# Patient Record
Sex: MALE | Race: BLACK OR AFRICAN AMERICAN | NOT HISPANIC OR LATINO | URBAN - METROPOLITAN AREA
[De-identification: names, ages, dates, MRNs, and addresses within clinical notes are randomized per-mention and may not be internally consistent; named-entity substitution may affect disease eponyms.]

---

## 2017-12-12 ENCOUNTER — INPATIENT (INPATIENT)
Facility: HOSPITAL | Age: 67
LOS: 15 days | Discharge: HOME | DRG: 871 | End: 2017-12-28
Attending: INTERNAL MEDICINE | Admitting: INTERNAL MEDICINE
Payer: COMMERCIAL

## 2017-12-12 VITALS
RESPIRATION RATE: 26 BRPM | SYSTOLIC BLOOD PRESSURE: 206 MMHG | HEART RATE: 105 BPM | OXYGEN SATURATION: 87 % | DIASTOLIC BLOOD PRESSURE: 108 MMHG

## 2017-12-12 DIAGNOSIS — J96.01 ACUTE RESPIRATORY FAILURE WITH HYPOXIA: ICD-10-CM

## 2017-12-12 LAB
ALBUMIN SERPL ELPH-MCNC: 4.7 G/DL — SIGNIFICANT CHANGE UP (ref 3.3–5)
ALP SERPL-CCNC: 67 U/L — SIGNIFICANT CHANGE UP (ref 40–120)
ALT FLD-CCNC: 17 U/L RC — SIGNIFICANT CHANGE UP (ref 10–45)
ANION GAP SERPL CALC-SCNC: 21 MMOL/L — HIGH (ref 5–17)
APTT BLD: 34.7 SEC — SIGNIFICANT CHANGE UP (ref 27.5–37.4)
AST SERPL-CCNC: 30 U/L — SIGNIFICANT CHANGE UP (ref 10–40)
BASE EXCESS BLDV CALC-SCNC: 2.3 MMOL/L — HIGH (ref -2–2)
BASOPHILS # BLD AUTO: 0 K/UL — SIGNIFICANT CHANGE UP (ref 0–0.2)
BASOPHILS NFR BLD AUTO: 0.4 % — SIGNIFICANT CHANGE UP (ref 0–2)
BILIRUB SERPL-MCNC: 0.8 MG/DL — SIGNIFICANT CHANGE UP (ref 0.2–1.2)
BUN SERPL-MCNC: 15 MG/DL — SIGNIFICANT CHANGE UP (ref 7–23)
CA-I SERPL-SCNC: 1.15 MMOL/L — SIGNIFICANT CHANGE UP (ref 1.12–1.3)
CALCIUM SERPL-MCNC: 9.1 MG/DL — SIGNIFICANT CHANGE UP (ref 8.4–10.5)
CHLORIDE BLDV-SCNC: 90 MMOL/L — LOW (ref 96–108)
CHLORIDE SERPL-SCNC: 86 MMOL/L — LOW (ref 96–108)
CO2 BLDV-SCNC: 30 MMOL/L — SIGNIFICANT CHANGE UP (ref 22–30)
CO2 SERPL-SCNC: 23 MMOL/L — SIGNIFICANT CHANGE UP (ref 22–31)
CREAT SERPL-MCNC: 1.09 MG/DL — SIGNIFICANT CHANGE UP (ref 0.5–1.3)
EOSINOPHIL # BLD AUTO: 0 K/UL — SIGNIFICANT CHANGE UP (ref 0–0.5)
EOSINOPHIL NFR BLD AUTO: 0.1 % — SIGNIFICANT CHANGE UP (ref 0–6)
GAS PNL BLDV: 128 MMOL/L — LOW (ref 136–145)
GAS PNL BLDV: SIGNIFICANT CHANGE UP
GAS PNL BLDV: SIGNIFICANT CHANGE UP
GLUCOSE BLDV-MCNC: 113 MG/DL — HIGH (ref 70–99)
GLUCOSE SERPL-MCNC: 102 MG/DL — HIGH (ref 70–99)
HCO3 BLDV-SCNC: 28 MMOL/L — SIGNIFICANT CHANGE UP (ref 21–29)
HCT VFR BLD CALC: 47 % — SIGNIFICANT CHANGE UP (ref 39–50)
HCT VFR BLDA CALC: 49 % — SIGNIFICANT CHANGE UP (ref 39–50)
HGB BLD CALC-MCNC: 15.9 G/DL — SIGNIFICANT CHANGE UP (ref 13–17)
HGB BLD-MCNC: 15.9 G/DL — SIGNIFICANT CHANGE UP (ref 13–17)
INR BLD: 1.14 RATIO — SIGNIFICANT CHANGE UP (ref 0.88–1.16)
LACTATE BLDV-MCNC: 2.3 MMOL/L — HIGH (ref 0.7–2)
LYMPHOCYTES # BLD AUTO: 0.9 K/UL — LOW (ref 1–3.3)
LYMPHOCYTES # BLD AUTO: 21 % — SIGNIFICANT CHANGE UP (ref 13–44)
MCHC RBC-ENTMCNC: 32.4 PG — SIGNIFICANT CHANGE UP (ref 27–34)
MCHC RBC-ENTMCNC: 33.9 GM/DL — SIGNIFICANT CHANGE UP (ref 32–36)
MCV RBC AUTO: 95.4 FL — SIGNIFICANT CHANGE UP (ref 80–100)
MONOCYTES # BLD AUTO: 0.5 K/UL — SIGNIFICANT CHANGE UP (ref 0–0.9)
MONOCYTES NFR BLD AUTO: 10.3 % — SIGNIFICANT CHANGE UP (ref 2–14)
NEUTROPHILS # BLD AUTO: 3 K/UL — SIGNIFICANT CHANGE UP (ref 1.8–7.4)
NEUTROPHILS NFR BLD AUTO: 68.3 % — SIGNIFICANT CHANGE UP (ref 43–77)
PCO2 BLDV: 51 MMHG — HIGH (ref 35–50)
PH BLDV: 7.36 — SIGNIFICANT CHANGE UP (ref 7.35–7.45)
PLATELET # BLD AUTO: 189 K/UL — SIGNIFICANT CHANGE UP (ref 150–400)
PO2 BLDV: 24 MMHG — LOW (ref 25–45)
POTASSIUM BLDV-SCNC: 4.2 MMOL/L — SIGNIFICANT CHANGE UP (ref 3.5–5)
POTASSIUM SERPL-MCNC: 4.2 MMOL/L — SIGNIFICANT CHANGE UP (ref 3.5–5.3)
POTASSIUM SERPL-SCNC: 4.2 MMOL/L — SIGNIFICANT CHANGE UP (ref 3.5–5.3)
PROT SERPL-MCNC: 8.4 G/DL — HIGH (ref 6–8.3)
PROTHROM AB SERPL-ACNC: 12.5 SEC — SIGNIFICANT CHANGE UP (ref 9.8–12.7)
RBC # BLD: 4.93 M/UL — SIGNIFICANT CHANGE UP (ref 4.2–5.8)
RBC # FLD: 12.5 % — SIGNIFICANT CHANGE UP (ref 10.3–14.5)
SAO2 % BLDV: 33 % — LOW (ref 67–88)
SODIUM SERPL-SCNC: 130 MMOL/L — LOW (ref 135–145)
WBC # BLD: 4.4 K/UL — SIGNIFICANT CHANGE UP (ref 3.8–10.5)
WBC # FLD AUTO: 4.4 K/UL — SIGNIFICANT CHANGE UP (ref 3.8–10.5)

## 2017-12-12 PROCEDURE — 71010: CPT | Mod: 26

## 2017-12-12 PROCEDURE — 71275 CT ANGIOGRAPHY CHEST: CPT | Mod: 26

## 2017-12-12 PROCEDURE — 99291 CRITICAL CARE FIRST HOUR: CPT

## 2017-12-12 RX ORDER — AZITHROMYCIN 500 MG/1
500 TABLET, FILM COATED ORAL ONCE
Qty: 0 | Refills: 0 | Status: COMPLETED | OUTPATIENT
Start: 2017-12-12 | End: 2017-12-12

## 2017-12-12 RX ORDER — CEFTRIAXONE 500 MG/1
1 INJECTION, POWDER, FOR SOLUTION INTRAMUSCULAR; INTRAVENOUS ONCE
Qty: 0 | Refills: 0 | Status: COMPLETED | OUTPATIENT
Start: 2017-12-12 | End: 2017-12-13

## 2017-12-12 RX ORDER — HEPARIN SODIUM 5000 [USP'U]/ML
5000 INJECTION INTRAVENOUS; SUBCUTANEOUS EVERY 8 HOURS
Qty: 0 | Refills: 0 | Status: DISCONTINUED | OUTPATIENT
Start: 2017-12-12 | End: 2017-12-18

## 2017-12-12 RX ORDER — ALBUTEROL 90 UG/1
1 AEROSOL, METERED ORAL EVERY 4 HOURS
Qty: 0 | Refills: 0 | Status: DISCONTINUED | OUTPATIENT
Start: 2017-12-12 | End: 2017-12-18

## 2017-12-12 RX ORDER — NITROGLYCERIN 6.5 MG
0.4 CAPSULE, EXTENDED RELEASE ORAL ONCE
Qty: 0 | Refills: 0 | Status: COMPLETED | OUTPATIENT
Start: 2017-12-12 | End: 2017-12-12

## 2017-12-12 RX ORDER — TIOTROPIUM BROMIDE 18 UG/1
1 CAPSULE ORAL; RESPIRATORY (INHALATION) DAILY
Qty: 0 | Refills: 0 | Status: DISCONTINUED | OUTPATIENT
Start: 2017-12-12 | End: 2017-12-18

## 2017-12-12 RX ORDER — IPRATROPIUM/ALBUTEROL SULFATE 18-103MCG
3 AEROSOL WITH ADAPTER (GRAM) INHALATION EVERY 6 HOURS
Qty: 0 | Refills: 0 | Status: DISCONTINUED | OUTPATIENT
Start: 2017-12-12 | End: 2017-12-18

## 2017-12-12 RX ORDER — AZITHROMYCIN 500 MG/1
500 TABLET, FILM COATED ORAL EVERY 24 HOURS
Qty: 0 | Refills: 0 | Status: DISCONTINUED | OUTPATIENT
Start: 2017-12-12 | End: 2017-12-18

## 2017-12-12 RX ORDER — SODIUM CHLORIDE 9 MG/ML
3 INJECTION INTRAMUSCULAR; INTRAVENOUS; SUBCUTANEOUS ONCE
Qty: 0 | Refills: 0 | Status: COMPLETED | OUTPATIENT
Start: 2017-12-12 | End: 2017-12-12

## 2017-12-12 RX ORDER — IPRATROPIUM/ALBUTEROL SULFATE 18-103MCG
3 AEROSOL WITH ADAPTER (GRAM) INHALATION ONCE
Qty: 0 | Refills: 0 | Status: COMPLETED | OUTPATIENT
Start: 2017-12-12 | End: 2017-12-12

## 2017-12-12 RX ORDER — SODIUM CHLORIDE 9 MG/ML
1000 INJECTION, SOLUTION INTRAVENOUS
Qty: 0 | Refills: 0 | Status: DISCONTINUED | OUTPATIENT
Start: 2017-12-12 | End: 2017-12-17

## 2017-12-12 RX ORDER — CEFTRIAXONE 500 MG/1
1 INJECTION, POWDER, FOR SOLUTION INTRAMUSCULAR; INTRAVENOUS EVERY 24 HOURS
Qty: 0 | Refills: 0 | Status: DISCONTINUED | OUTPATIENT
Start: 2017-12-12 | End: 2017-12-18

## 2017-12-12 RX ORDER — FUROSEMIDE 40 MG
40 TABLET ORAL ONCE
Qty: 0 | Refills: 0 | Status: COMPLETED | OUTPATIENT
Start: 2017-12-12 | End: 2017-12-12

## 2017-12-12 RX ADMIN — Medication 80 MILLIGRAM(S): at 18:53

## 2017-12-12 RX ADMIN — Medication 40 MILLIGRAM(S): at 19:05

## 2017-12-12 RX ADMIN — Medication 0.4 MILLIGRAM(S): at 20:53

## 2017-12-12 RX ADMIN — SODIUM CHLORIDE 3 MILLILITER(S): 9 INJECTION INTRAMUSCULAR; INTRAVENOUS; SUBCUTANEOUS at 20:53

## 2017-12-12 RX ADMIN — Medication 3 MILLILITER(S): at 20:54

## 2017-12-12 RX ADMIN — Medication 0.5 MILLIGRAM(S): at 18:50

## 2017-12-12 RX ADMIN — AZITHROMYCIN 250 MILLIGRAM(S): 500 TABLET, FILM COATED ORAL at 19:00

## 2017-12-12 NOTE — ED PROVIDER NOTE - CRITICAL CARE PROVIDED
consult w/ pt's family directly relating to pts condition/additional history taking/consultation with other physicians/interpretation of diagnostic studies/documentation/direct patient care (not related to procedure)

## 2017-12-12 NOTE — ED ADULT TRIAGE NOTE - CHIEF COMPLAINT QUOTE
Pt reports cough, runny nose since Saturday and SOB/difficulty breathing since last night. Pt is tachypneic to 26, respirations labored, SpO2 87% on RA, SpO2 91% on 6L nasal cannula.

## 2017-12-12 NOTE — ED PROVIDER NOTE - ATTENDING CONTRIBUTION TO CARE
Respiratory distress and fever concerning for COPD exacerbation  vs asthma vs pneumonia, less likely acs or pe. Pt in hypoxic respiratory failure. 1) LABS/EKG/CXR/VBG for CO2 level/oxygen prn to maintain sat ~90-95% 2) Nebs/steroids/Abx 3) reassess 4) admit, BiPAP for resp failure, BP control

## 2017-12-12 NOTE — ED PROVIDER NOTE - MEDICAL DECISION MAKING DETAILS
Respiratory distress and fever concerning for COPD exacerbation  vs asthma vs pneumonia, less likely acs or pe. Pt in hypoxic respiratory failure. 1) LABS/EKG/CXR/VBG for CO2 level/oxygen prn to maintain sat ~90-95% 2) Nebs/steroids/Abx 3) reassess 4) admit. Respiratory distress and fever concerning for COPD exacerbation  vs asthma vs pneumonia, less likely acs or pe. Pt in hypoxic respiratory failure. 1) LABS/EKG/CXR/VBG for CO2 level/oxygen prn to maintain sat ~90-95% 2) Nebs/steroids/Abx 3) reassess 4) admit, BiPAP for resp failure, BP control

## 2017-12-12 NOTE — ED ADULT NURSE NOTE - OBJECTIVE STATEMENT
Pt is an ambulatory 67 yr old male a/0 X 3 sent from PMD for diff breathing and sob.  Pt was hypoxic to 67% on room air at doctors office.  PERRl wnl, dum[place] with equal strength.  L:ungs have expiratory wheezes in all fields.  Pt tachpneic, with shallow respirations.  Sinus tachycardia on cm at 110 bpm.  No chest pain.  C/o sob at rest.  No fevers, chills or N/V.  Abdomen NT Nd with BS+4Q.  SKIN WDI.  Peripheral pulses +2bl no edema

## 2017-12-12 NOTE — ED PROVIDER NOTE - OBJECTIVE STATEMENT
67M hx of copd, asthma, htn, presenting from home with sob<< fever non productive cough and wheezing x # days. Sent from [facility] for hypoxic respiratory failure in office with sa02 at 86% and tachycardia a/w hypertension. No travel, no leg swellig, no chest pain, no calf pain, no n/v. 67M hx of copd, asthma, htn, presenting from home with sob<< fever non productive cough and wheezing x # days. Sent from [facility] for hypoxic respiratory failure in office with sa02 at 86% and tachycardia a/w hypertension. No travel, no leg swelling, no chest pain, no calf pain, no n/v. Seen and evaluated patricia arrival. Pt hypoxemic, tachypneic in acute respiratory distress.

## 2017-12-12 NOTE — H&P ADULT - ASSESSMENT
pt with above history p/w hypoxic resp failure likely sec to copd exacerbation                    -  start steroids, nebs, supplemental oxygen , titrate bipap , given fever - check RVP , ct CHEST for ? consolidation urine legionella start ceftriaxone zithromax, pulm eval               - check procalcitonin     Lactic acidosis - iv hydration   dvt prophyalxis

## 2017-12-12 NOTE — H&P ADULT - HISTORY OF PRESENT ILLNESS
67M hx of copd, asthma, htn, presenting from home with shortness of breath, productive cough past 4 days , fevers since yesterday , pt denies chest pain, nausea, vomiting,  palpitations weakness numbness in ext , dysuria polyuria, no sick contacts no recent travel went to pmds office today , found to be hypoxic <90% referred to ed, pt denies orthopnea/ pnd, never was intubated fro asthma exacerbation

## 2017-12-12 NOTE — ED ADULT NURSE NOTE - ED STAT RN HANDOFF DETAILS 2
break coverage: noman montana d/c'd, pt rectal temp noted, fluids infusing as per mds orders.  pt cleaned, turned and positioned, griselda wash provided, diaper placed, will cont to monitor. report given to Jaja PHILIPPE

## 2017-12-12 NOTE — ED ADULT NURSE REASSESSMENT NOTE - NS ED NURSE REASSESS COMMENT FT1
Pt resting comfortably in bed NAD.  VSS.  MD Nichols aware patient remains tachypneic with RR 26-32.  Lung wheezes have improved although still mild on expiration.  NSR on cm at 87 bpm.  Pending results of CT scan

## 2017-12-13 LAB
ALBUMIN SERPL ELPH-MCNC: 4.3 G/DL — SIGNIFICANT CHANGE UP (ref 3.3–5)
ALP SERPL-CCNC: 60 U/L — SIGNIFICANT CHANGE UP (ref 40–120)
ALT FLD-CCNC: 15 U/L RC — SIGNIFICANT CHANGE UP (ref 10–45)
ANION GAP SERPL CALC-SCNC: 19 MMOL/L — HIGH (ref 5–17)
APPEARANCE UR: CLEAR — SIGNIFICANT CHANGE UP
AST SERPL-CCNC: 25 U/L — SIGNIFICANT CHANGE UP (ref 10–40)
BASE EXCESS BLDA CALC-SCNC: 0.6 MMOL/L — SIGNIFICANT CHANGE UP (ref -2–2)
BASOPHILS # BLD AUTO: 0 K/UL — SIGNIFICANT CHANGE UP (ref 0–0.2)
BASOPHILS NFR BLD AUTO: 0 % — SIGNIFICANT CHANGE UP (ref 0–2)
BILIRUB SERPL-MCNC: 0.5 MG/DL — SIGNIFICANT CHANGE UP (ref 0.2–1.2)
BILIRUB UR-MCNC: NEGATIVE — SIGNIFICANT CHANGE UP
BUN SERPL-MCNC: 22 MG/DL — SIGNIFICANT CHANGE UP (ref 7–23)
CALCIUM SERPL-MCNC: 8.8 MG/DL — SIGNIFICANT CHANGE UP (ref 8.4–10.5)
CHLORIDE SERPL-SCNC: 88 MMOL/L — LOW (ref 96–108)
CK MB BLD-MCNC: 1.7 % — SIGNIFICANT CHANGE UP (ref 0–3.5)
CK MB CFR SERPL CALC: 8.3 NG/ML — HIGH (ref 0–6.7)
CK SERPL-CCNC: 272 U/L — HIGH (ref 30–200)
CK SERPL-CCNC: 498 U/L — HIGH (ref 30–200)
CO2 BLDA-SCNC: 26 MMOL/L — SIGNIFICANT CHANGE UP (ref 22–30)
CO2 SERPL-SCNC: 24 MMOL/L — SIGNIFICANT CHANGE UP (ref 22–31)
COLOR SPEC: YELLOW — SIGNIFICANT CHANGE UP
CREAT SERPL-MCNC: 1.12 MG/DL — SIGNIFICANT CHANGE UP (ref 0.5–1.3)
DIFF PNL FLD: NEGATIVE — SIGNIFICANT CHANGE UP
EOSINOPHIL # BLD AUTO: 0 K/UL — SIGNIFICANT CHANGE UP (ref 0–0.5)
EOSINOPHIL NFR BLD AUTO: 0 — SIGNIFICANT CHANGE UP
GAS PNL BLDA: SIGNIFICANT CHANGE UP
GLUCOSE SERPL-MCNC: 141 MG/DL — HIGH (ref 70–99)
GLUCOSE UR QL: NEGATIVE — SIGNIFICANT CHANGE UP
HCO3 BLDA-SCNC: 24 MMOL/L — SIGNIFICANT CHANGE UP (ref 21–29)
HCT VFR BLD CALC: 42.5 % — SIGNIFICANT CHANGE UP (ref 39–50)
HGB BLD-MCNC: 15 G/DL — SIGNIFICANT CHANGE UP (ref 13–17)
HMPV RNA SPEC QL NAA+PROBE: DETECTED
KETONES UR-MCNC: NEGATIVE — SIGNIFICANT CHANGE UP
LDH SERPL L TO P-CCNC: 178 U/L — SIGNIFICANT CHANGE UP (ref 50–242)
LEUKOCYTE ESTERASE UR-ACNC: NEGATIVE — SIGNIFICANT CHANGE UP
LYMPHOCYTES # BLD AUTO: 0.65 K/UL — LOW (ref 1–3.3)
LYMPHOCYTES # BLD AUTO: 22.1 % — SIGNIFICANT CHANGE UP (ref 13–44)
MACROCYTES BLD QL: SLIGHT — SIGNIFICANT CHANGE UP
MANUAL SMEAR VERIFICATION: SIGNIFICANT CHANGE UP
MCHC RBC-ENTMCNC: 31.7 PG — SIGNIFICANT CHANGE UP (ref 27–34)
MCHC RBC-ENTMCNC: 35.3 GM/DL — SIGNIFICANT CHANGE UP (ref 32–36)
MCV RBC AUTO: 89.9 FL — SIGNIFICANT CHANGE UP (ref 80–100)
MONOCYTES # BLD AUTO: 0.16 K/UL — SIGNIFICANT CHANGE UP (ref 0–0.9)
MONOCYTES NFR BLD AUTO: 5.3 % — SIGNIFICANT CHANGE UP (ref 2–14)
NEUTROPHILS # BLD AUTO: 2.07 K/UL — SIGNIFICANT CHANGE UP (ref 1.8–7.4)
NEUTROPHILS NFR BLD AUTO: 69.9 % — SIGNIFICANT CHANGE UP (ref 43–77)
NEUTS BAND # BLD: 1 % — SIGNIFICANT CHANGE UP (ref 0–8)
NITRITE UR-MCNC: NEGATIVE — SIGNIFICANT CHANGE UP
NT-PROBNP SERPL-SCNC: 2264 PG/ML — HIGH (ref 0–300)
PCO2 BLDA: 38 MMHG — SIGNIFICANT CHANGE UP (ref 32–46)
PH BLDA: 7.42 — SIGNIFICANT CHANGE UP (ref 7.35–7.45)
PH UR: 6 — SIGNIFICANT CHANGE UP (ref 5–8)
PLAT MORPH BLD: NORMAL — SIGNIFICANT CHANGE UP
PLATELET # BLD AUTO: 193 K/UL — SIGNIFICANT CHANGE UP (ref 150–400)
PO2 BLDA: 243 MMHG — HIGH (ref 74–108)
POTASSIUM SERPL-MCNC: 4.3 MMOL/L — SIGNIFICANT CHANGE UP (ref 3.5–5.3)
POTASSIUM SERPL-SCNC: 4.3 MMOL/L — SIGNIFICANT CHANGE UP (ref 3.5–5.3)
PROCALCITONIN SERPL-MCNC: 0.31 NG/ML — HIGH (ref 0–0.04)
PROT SERPL-MCNC: 7.7 G/DL — SIGNIFICANT CHANGE UP (ref 6–8.3)
PROT UR-MCNC: SIGNIFICANT CHANGE UP
RAPID RVP RESULT: DETECTED
RBC # BLD: 4.73 M/UL — SIGNIFICANT CHANGE UP (ref 4.2–5.8)
RBC # FLD: 13.6 % — SIGNIFICANT CHANGE UP (ref 10.3–14.5)
RBC BLD AUTO: NORMAL — SIGNIFICANT CHANGE UP
SAO2 % BLDA: 99 % — HIGH (ref 92–96)
SODIUM SERPL-SCNC: 131 MMOL/L — LOW (ref 135–145)
SP GR SPEC: >1.03 — HIGH (ref 1.01–1.02)
TROPONIN T SERPL-MCNC: <0.01 NG/ML — SIGNIFICANT CHANGE UP (ref 0–0.06)
UROBILINOGEN FLD QL: NEGATIVE — SIGNIFICANT CHANGE UP
VARIANT LYMPHS # BLD: 1.8 % — SIGNIFICANT CHANGE UP (ref 0–6)
WBC # BLD: 2.93 K/UL — LOW (ref 3.8–10.5)
WBC # FLD AUTO: 2.93 K/UL — LOW (ref 3.8–10.5)

## 2017-12-13 PROCEDURE — 99223 1ST HOSP IP/OBS HIGH 75: CPT

## 2017-12-13 PROCEDURE — 99291 CRITICAL CARE FIRST HOUR: CPT

## 2017-12-13 RX ADMIN — SODIUM CHLORIDE 75 MILLILITER(S): 9 INJECTION, SOLUTION INTRAVENOUS at 04:53

## 2017-12-13 RX ADMIN — Medication 60 MILLIGRAM(S): at 06:26

## 2017-12-13 RX ADMIN — Medication 3 MILLILITER(S): at 22:37

## 2017-12-13 RX ADMIN — Medication 3 MILLILITER(S): at 11:51

## 2017-12-13 RX ADMIN — Medication 60 MILLIGRAM(S): at 22:38

## 2017-12-13 RX ADMIN — HEPARIN SODIUM 5000 UNIT(S): 5000 INJECTION INTRAVENOUS; SUBCUTANEOUS at 06:26

## 2017-12-13 RX ADMIN — Medication 3 MILLILITER(S): at 04:50

## 2017-12-13 RX ADMIN — Medication 3 MILLILITER(S): at 19:35

## 2017-12-13 RX ADMIN — CEFTRIAXONE 100 GRAM(S): 500 INJECTION, POWDER, FOR SOLUTION INTRAMUSCULAR; INTRAVENOUS at 00:55

## 2017-12-13 RX ADMIN — HEPARIN SODIUM 5000 UNIT(S): 5000 INJECTION INTRAVENOUS; SUBCUTANEOUS at 22:38

## 2017-12-13 RX ADMIN — AZITHROMYCIN 250 MILLIGRAM(S): 500 TABLET, FILM COATED ORAL at 19:35

## 2017-12-13 NOTE — CONSULT NOTE ADULT - SUBJECTIVE AND OBJECTIVE BOX
CHIEF COMPLAINT: Shortness of breath/ Cough/ Fever     HPI: 67 M     PAST MEDICAL & SURGICAL HISTORY:  Asthma  COPD (chronic obstructive pulmonary disease)      FAMILY HISTORY:      SOCIAL HISTORY:  Smoking: [ ] Never Smoked [ ] Former Smoker (__ packs x ___ years) [ ] Current Smoker  (__ packs x ___ years)  Substance Use: [ ] Never Used [ ] Used ____  EtOH Use:  Marital Status: [ ] Single [ ]  [ ]  [ ]   Sexual History:   Occupation:  Recent Travel:  Country of Birth:  Advance Directives:    Allergies    No Known Allergies    Intolerances        HOME MEDICATIONS:    REVIEW OF SYSTEMS:  Constitutional: [ ] negative [ ] fevers [ ] chills [ ] weight loss [ ] weight gain  HEENT: [ ] negative [ ] dry eyes [ ] eye irritation [ ] postnasal drip [ ] nasal congestion  CV: [ ] negative  [ ] chest pain [ ] orthopnea [ ] palpitations [ ] murmur  Resp: [ ] negative [ ] cough [ ] shortness of breath [ ] dyspnea [ ] wheezing [ ] sputum [ ] hemoptysis  GI: [ ] negative [ ] nausea [ ] vomiting [ ] diarrhea [ ] constipation [ ] abd pain [ ] dysphagia   : [ ] negative [ ] dysuria [ ] nocturia [ ] hematuria [ ] increased urinary frequency  Musculoskeletal: [ ] negative [ ] back pain [ ] myalgias [ ] arthralgias [ ] fracture  Skin: [ ] negative [ ] rash [ ] itch  Neurological: [ ] negative [ ] headache [ ] dizziness [ ] syncope [ ] weakness [ ] numbness  Psychiatric: [ ] negative [ ] anxiety [ ] depression  Endocrine: [ ] negative [ ] diabetes [ ] thyroid problem  Hematologic/Lymphatic: [ ] negative [ ] anemia [ ] bleeding problem  Allergic/Immunologic: [ ] negative [ ] itchy eyes [ ] nasal discharge [ ] hives [ ] angioedema  [ ] All other systems negative  [ ] Unable to assess ROS because ________    OBJECTIVE:  ICU Vital Signs Last 24 Hrs  T(C): 36.4 (13 Dec 2017 07:57), Max: 36.8 (12 Dec 2017 20:06)  T(F): 97.5 (13 Dec 2017 07:57), Max: 98.3 (12 Dec 2017 20:06)  HR: 71 (13 Dec 2017 09:41) (71 - 112)  BP: 154/90 (13 Dec 2017 07:57) (112/81 - 206/108)  BP(mean): --  ABP: --  ABP(mean): --  RR: 20 (13 Dec 2017 09:41) (18 - 26)  SpO2: 100% (13 Dec 2017 09:41) (87% - 100%)        CAPILLARY BLOOD GLUCOSE          PHYSICAL EXAM:  General:   HEENT:   Lymph Nodes:  Neck:   Respiratory:   Cardiovascular:   Abdomen:   Extremities:   Skin:   Neurological:  Psychiatry:    HOSPITAL MEDICATIONS:  heparin  Injectable 5000 Unit(s) SubCutaneous every 8 hours    azithromycin  IVPB 500 milliGRAM(s) IV Intermittent every 24 hours  cefTRIAXone   IVPB 1 Gram(s) IV Intermittent every 24 hours      methylPREDNISolone sodium succinate Injectable 60 milliGRAM(s) IV Push every 8 hours    ALBUTerol    90 MICROgram(s) HFA Inhaler 1 Puff(s) Inhalation every 4 hours  ALBUTerol/ipratropium for Nebulization 3 milliLiter(s) Nebulizer every 6 hours  tiotropium 18 MICROgram(s) Capsule 1 Capsule(s) Inhalation daily            sodium chloride 0.45%. 1000 milliLiter(s) IV Continuous <Continuous>            LABS:                        15.0   2.93  )-----------( 193      ( 13 Dec 2017 07:25 )             42.5     Hgb Trend: 15.0<--, 15.9<--  12-13    131<L>  |  88<L>  |  22  ----------------------------<  141<H>  4.3   |  24  |  1.12    Ca    8.8      13 Dec 2017 06:52    TPro  7.7  /  Alb  4.3  /  TBili  0.5  /  DBili  x   /  AST  25  /  ALT  15  /  AlkPhos  60  12-13    Creatinine Trend: 1.12<--, 1.09<--  PT/INR - ( 12 Dec 2017 19:06 )   PT: 12.5 sec;   INR: 1.14 ratio         PTT - ( 12 Dec 2017 19:06 )  PTT:34.7 sec  Urinalysis Basic - ( 13 Dec 2017 05:27 )    Color: Yellow / Appearance: Clear / SG: >1.030 / pH: x  Gluc: x / Ketone: Negative  / Bili: Negative / Urobili: Negative   Blood: x / Protein: Trace / Nitrite: Negative   Leuk Esterase: Negative / RBC: x / WBC x   Sq Epi: x / Non Sq Epi: x / Bacteria: x      Arterial Blood Gas:  12-13 @ 01:08  7.42/38/243/24/99/.6  ABG lactate: --    Venous Blood Gas:  12-12 @ 19:06  7.36/51/24/28/33  VBG Lactate: 2.3      MICROBIOLOGY:   Rapid Respiratory Viral Panel (12.12.17 @ 23:41)    Rapid RVP Result: Detected: The FilmArray RVP Rapid uses polymerase chain reaction (PCR) and melt  curve analysis to screen for adenovirus; coronavirus HKU1, NL63, 229E,  OC43; human metapneumovirus (hMPV); human enterovirus/rhinovirus  (Entero/RV); influenza A; influenza A/H1;influenza A/H3; influenza  A/H1-2009; influenza B; parainfluenza viruses 1, 2, 3, 4; respiratory  syncytial virus; Bordetella pertussis; Mycoplasma pneumoniae; and  Chlamydophila pneumoniae.      RADIOLOGY:  < from: CT Angio Chest w/ IV Cont (12.12.17 @ 22:55) >  LUNGS AND LARGE AIRWAYS: Patent central airways. Mild centrilobular   emphysema. Mild peribronchial thickening with scattered tree-in-bud   opacities, suggesting small airway disease.  Subsegmental bibasilar   atelectasis.  PLEURA: No pleural effusion or pneumothorax. Mild biapical pleural   thickening.  VESSELS: Adequate contrast ossification of the pulmonary arteries. No   pulmonary embolus. Atherosclerotic change of the thoracic aorta, great   vessels and coronary artery.  HEART: Normal heart size. Trace pericardial effusion.  MEDIASTINUM AND LUCHO: No lymphadenopathy.  CHEST WALL AND LOWER NECK: Artifact from the metallic densities/foreign   body along the right chest wall degradingimages.  VISUALIZED UPPER ABDOMEN: Degraded by patient's respiratory motion.   Grossly unremarkable.  BONES: Mild rightward curvature and degenerative changes of the spine.   Age-indeterminate mild anterior wedging/superior endplate depression of   T11, likely chronic. T9 vertebral body hemangioma.    [x ] Reviewed and interpreted by me    PULMONARY FUNCTION TESTS:    EKG: CHIEF COMPLAINT: Shortness of breath/ Cough/ Fever     HPI: 67 M former smoker with a PMH of HTN, asthma/ COPD (never had official PFTs), no h/o intubations, now presenting with progressively worsening shortness of breath, wheezing, cough and subjective fever x 4-5 days. He reports that he began experiencing a runny nose, sore throat, a cough productive of yellow sputum and subjective fever on Saturday. His symptoms continued to worsen and he began experiencing worsening shortness of breath and wheezing over the next few days. He was unable to walk due to the severity of th shortness of breath and wheezing.  His symptoms were not relieved by his home nebulizers. He also endorses a 8 lb unintentional weight loss over 3-4 months. He went to see his PMD and was referred to the Ed due to work of breathing. In the ED he was hypoxic to 87 % RA, tachypneic to 27 and initially very hypertensive to 206/108. He was found to have an RVP + for hMPV and was placed on BIPAP due to increased work of breathing. CTA chest with no PE, + emphysema with peribronchial thinking and scattered tree in bud opacities. Currently pt reports improvement in his shortness of breath and wheezing. Denies chest pain.     PAST MEDICAL & SURGICAL HISTORY:  Asthma  COPD (chronic obstructive pulmonary disease)  HTN    FAMILY HISTORY:  Non contributory    SOCIAL HISTORY:  Smoking: [ ] Never Smoked [x] Former Smoker (0.5__ packs x 30___ years) [ ] Current Smoker  (__ packs x ___ years)  Substance Use: [x ] Never Used [ ] Used ____  EtOH Use: social  Marital Status: [ ] Single [x ]  [ ]  [ ]   Sexual History: NC  Occupation: Former    Recent Travel: None  Advance Directives: Full code     Allergies    No Known Allergies    Intolerances        HOME MEDICATIONS:  Reviewed   REVIEW OF SYSTEMS:  Constitutional: [ ] negative [+ ] fevers [ -] chills [+ ] weight loss [ ] weight gain  HEENT: [ ] negative [ -] dry eyes [- ] eye irritation [- ] postnasal drip [ ] nasal congestion  CV: [ ] negative  [- ] chest pain [ -] orthopnea [- ] palpitations [ ] murmur  Resp: [ ] negative [+ ] cough [ +] shortness of breath [ ] dyspnea [+ ] wheezing [ +] sputum [- ] hemoptysis  GI: [ ] negative [- ] nausea [- ] vomiting [- ] diarrhea [- ] constipation [ ] abd pain [ ] dysphagia   : [ ] negative -[ ] dysuria [- ] nocturia [- ] hematuria [ -] increased urinary frequency  Musculoskeletal: [ ] negative [- ] back pain [- ] myalgias [ ] arthralgias [ ] fracture  Skin: [ ] negative [- ] rash [ ] itch  Neurological: [ ] negative [- ] headache [- ] dizziness [ ] syncope [ ] weakness [ ] numbness  Psychiatric: [ ] negative [ -] anxiety [ ] depression  Endocrine: [ ] negative [- ] diabetes [ ] thyroid problem  Hematologic/Lymphatic: [- ] negative [ ] anemia [ ] bleeding problem  Allergic/Immunologic: [ ] negative [- ] itchy eyes [ ] nasal discharge [ ] hives [ ] angioedema  [ x] All other systems negative  [ ] Unable to assess ROS because ________    OBJECTIVE:  ICU Vital Signs Last 24 Hrs  T(C): 36.4 (13 Dec 2017 07:57), Max: 36.8 (12 Dec 2017 20:06)  T(F): 97.5 (13 Dec 2017 07:57), Max: 98.3 (12 Dec 2017 20:06)  HR: 71 (13 Dec 2017 09:41) (71 - 112)  BP: 154/90 (13 Dec 2017 07:57) (112/81 - 206/108)  BP(mean): --  ABP: --  ABP(mean): --  RR: 20 (13 Dec 2017 09:41) (18 - 26)  SpO2: 100% (13 Dec 2017 09:41) (87% - 100%)        CAPILLARY BLOOD GLUCOSE    PHYSICAL EXAM:  General: Thin male, NAD on BIPAP  HEENT: PERRLA  Lymph Nodes: None palpable  Neck: Supple  Respiratory: B/L scattered wheezing, no crackles  Cardiovascular: RRR, S1+S2+0  Abdomen: Soft, NT, ND, BS+  Extremities: No edema, pulses + b/l   Skin: Chronic skin changes, no rash  Neurological: AAOx3, grossly non focal   Psychiatry: Normal mood     HOSPITAL MEDICATIONS:  heparin  Injectable 5000 Unit(s) SubCutaneous every 8 hours  azithromycin  IVPB 500 milliGRAM(s) IV Intermittent every 24 hours  cefTRIAXone   IVPB 1 Gram(s) IV Intermittent every 24 hours  methylPREDNISolone sodium succinate Injectable 60 milliGRAM(s) IV Push every 8 hours  ALBUTerol    90 MICROgram(s) HFA Inhaler 1 Puff(s) Inhalation every 4 hours  ALBUTerol/ipratropium for Nebulization 3 milliLiter(s) Nebulizer every 6 hours  tiotropium 18 MICROgram(s) Capsule 1 Capsule(s) Inhalation daily  sodium chloride 0.45%. 1000 milliLiter(s) IV Continuous <Continuous>      LABS:                        15.0   2.93  )-----------( 193      ( 13 Dec 2017 07:25 )             42.5     Hgb Trend: 15.0<--, 15.9<--  12-13    131<L>  |  88<L>  |  22  ----------------------------<  141<H>  4.3   |  24  |  1.12    Ca    8.8      13 Dec 2017 06:52    TPro  7.7  /  Alb  4.3  /  TBili  0.5  /  DBili  x   /  AST  25  /  ALT  15  /  AlkPhos  60  12-13    Creatinine Trend: 1.12<--, 1.09<--  PT/INR - ( 12 Dec 2017 19:06 )   PT: 12.5 sec;   INR: 1.14 ratio         PTT - ( 12 Dec 2017 19:06 )  PTT:34.7 sec  Urinalysis Basic - ( 13 Dec 2017 05:27 )    Color: Yellow / Appearance: Clear / SG: >1.030 / pH: x  Gluc: x / Ketone: Negative  / Bili: Negative / Urobili: Negative   Blood: x / Protein: Trace / Nitrite: Negative   Leuk Esterase: Negative / RBC: x / WBC x   Sq Epi: x / Non Sq Epi: x / Bacteria: x      Arterial Blood Gas:  12-13 @ 01:08  7.42/38/243/24/99/.6  ABG lactate: --    Venous Blood Gas:  12-12 @ 19:06  7.36/51/24/28/33  VBG Lactate: 2.3      MICROBIOLOGY:   Rapid Respiratory Viral Panel (12.12.17 @ 23:41)    Rapid RVP Result: Detected: The FilmArray RVP Rapid uses polymerase chain reaction (PCR) and melt  curve analysis to screen for adenovirus; coronavirus HKU1, NL63, 229E,  OC43; human metapneumovirus (hMPV); human enterovirus/rhinovirus  (Entero/RV); influenza A; influenza A/H1;influenza A/H3; influenza  A/H1-2009; influenza B; parainfluenza viruses 1, 2, 3, 4; respiratory  syncytial virus; Bordetella pertussis; Mycoplasma pneumoniae; and  Chlamydophila pneumoniae.      RADIOLOGY:  < from: CT Angio Chest w/ IV Cont (12.12.17 @ 22:55) >  LUNGS AND LARGE AIRWAYS: Patent central airways. Mild centrilobular   emphysema. Mild peribronchial thickening with scattered tree-in-bud   opacities, suggesting small airway disease.  Subsegmental bibasilar   atelectasis.  PLEURA: No pleural effusion or pneumothorax. Mild biapical pleural   thickening.  VESSELS: Adequate contrast ossification of the pulmonary arteries. No   pulmonary embolus. Atherosclerotic change of the thoracic aorta, great   vessels and coronary artery.  HEART: Normal heart size. Trace pericardial effusion.  MEDIASTINUM AND LUCHO: No lymphadenopathy.  CHEST WALL AND LOWER NECK: Artifact from the metallic densities/foreign   body along the right chest wall degradingimages.  VISUALIZED UPPER ABDOMEN: Degraded by patient's respiratory motion.   Grossly unremarkable.  BONES: Mild rightward curvature and degenerative changes of the spine.   Age-indeterminate mild anterior wedging/superior endplate depression of   T11, likely chronic. T9 vertebral body hemangioma.    [x ] Reviewed and interpreted by me    PULMONARY FUNCTION TESTS:    EKG:

## 2017-12-13 NOTE — CONSULT NOTE ADULT - SUBJECTIVE AND OBJECTIVE BOX
CHIEF COMPLAINT: Patient is a 67y old  Male who presents with a chief complaint of SOB (13 Dec 2017 08:20)      HPI:  67M hx of copd, asthma, htn, presenting from home with shortness of breath, productive cough for past 4 days. States that he started to get dyspneic without relieve from his inhalers. Fevers since yesterday. Denies any chest pain, palpitations, PND, orthopnea, near syncope, syncope, lower extremity edema, stroke like symptoms. He required initiation of Bipap for hypoxic resp distress in ED. On Bipap he is feeling better. He does not follow doctors regularly.     EKG: SR septal infarct nonspecific ST changes    REVIEW OF SYSTEMS:   All other review of systems are negative unless indicated above    PAST MEDICAL & SURGICAL HISTORY:  Asthma  COPD (chronic obstructive pulmonary disease)      SOCIAL HISTORY:  No tobacco, ethanol, or drug abuse.    FAMILY HISTORY:    No family history of acute MI or sudden cardiac death.    MEDICATIONS  (STANDING):  ALBUTerol    90 MICROgram(s) HFA Inhaler 1 Puff(s) Inhalation every 4 hours  ALBUTerol/ipratropium for Nebulization 3 milliLiter(s) Nebulizer every 6 hours  azithromycin  IVPB 500 milliGRAM(s) IV Intermittent every 24 hours  cefTRIAXone   IVPB 1 Gram(s) IV Intermittent every 24 hours  heparin  Injectable 5000 Unit(s) SubCutaneous every 8 hours  methylPREDNISolone sodium succinate Injectable 60 milliGRAM(s) IV Push every 8 hours  sodium chloride 0.45%. 1000 milliLiter(s) (75 mL/Hr) IV Continuous <Continuous>  tiotropium 18 MICROgram(s) Capsule 1 Capsule(s) Inhalation daily    MEDICATIONS  (PRN):      Allergies    Allergy Status Unknown    Intolerances             VITAL SIGNS:   Vital Signs Last 24 Hrs  T(C): 36.4 (13 Dec 2017 07:57), Max: 36.8 (12 Dec 2017 20:06)  T(F): 97.5 (13 Dec 2017 07:57), Max: 98.3 (12 Dec 2017 20:06)  HR: 72 (13 Dec 2017 07:57) (72 - 112)  BP: 154/90 (13 Dec 2017 07:57) (112/81 - 206/108)  BP(mean): --  RR: 18 (13 Dec 2017 07:57) (18 - 26)  SpO2: 99% (13 Dec 2017 07:57) (87% - 100%)    I&O's Summary      On Exam:  TELE: SR  Constitutional: tachypnea, on bipap  HEENT: NCAT, on Bipap  Pulmonary: Decreased breath sounds b/l.   Cardiovascular: Regular, S1 and S2, distant   Gastrointestinal: Bowel Sounds present, soft, nontender.   Lymph: No peripheral edema. No lymphadenopathy.  Skin: No visible rashes or ulcers.  Psych:  Mood & affect appropriate    LABS: All Labs Reviewed:                        15.9   4.4   )-----------( 189      ( 12 Dec 2017 19:06 )             47.0     13 Dec 2017 06:52    131    |  88     |  22     ----------------------------<  141    4.3     |  24     |  1.12   12 Dec 2017 19:06    130    |  86     |  15     ----------------------------<  102    4.2     |  23     |  1.09     Ca    8.8        13 Dec 2017 06:52  Ca    9.1        12 Dec 2017 19:06    TPro  7.7    /  Alb  4.3    /  TBili  0.5    /  DBili  x      /  AST  25     /  ALT  15     /  AlkPhos  60     13 Dec 2017 06:52  TPro  8.4    /  Alb  4.7    /  TBili  0.8    /  DBili  x      /  AST  30     /  ALT  17     /  AlkPhos  67     12 Dec 2017 19:06    PT/INR - ( 12 Dec 2017 19:06 )   PT: 12.5 sec;   INR: 1.14 ratio         PTT - ( 12 Dec 2017 19:06 )  PTT:34.7 sec  CARDIAC MARKERS ( 13 Dec 2017 06:52 )  x     / x     / 272 U/L / x     / x      CARDIAC MARKERS ( 13 Dec 2017 01:08 )  x     / <0.01 ng/mL / 498 U/L / x     / 8.3 ng/mL      Blood Culture:   12-13 @ 06:52  Pro Bnp [id number]        RADIOLOGY:    < from: CT Angio Chest w/ IV Cont (12.12.17 @ 22:55) >    EXAM:  CT ANGIO CHEST (W)AW IC                            PROCEDURE DATE:  12/12/2017            INTERPRETATION:  CLINICAL INFORMATION: Shortness of breath for 2 days   duration.    COMPARISON: None.    PROCEDURE:   CT Angiography of the Chest.  90ml of Omnipaque 350 was injected intravenously. 10 ml were discarded.  Sagittal and coronal reformats were performed as well as 3D (MIP)   reconstructions.    FINDINGS: Artifact from the patient's respiratory motion and metallic   density/foreign body along the right anterior chest wall degrading images.    LUNGS AND LARGE AIRWAYS: Patent central airways. Mild centrilobular   emphysema. Mild peribronchial thickening with scattered tree-in-bud   opacities, suggesting small airway disease.  Subsegmental bibasilar   atelectasis.  PLEURA: No pleural effusion or pneumothorax. Mild biapical pleural   thickening.  VESSELS: Adequate contrast ossification of the pulmonary arteries. No   pulmonary embolus. Atherosclerotic change of the thoracic aorta, great   vessels and coronary artery.  HEART: Normal heart size. Trace pericardial effusion.  MEDIASTINUM AND LUCHO: No lymphadenopathy.  CHEST WALL AND LOWER NECK: Artifact from the metallic densities/foreign   body along the right chest wall degradingimages.  VISUALIZED UPPER ABDOMEN: Degraded by patient's respiratory motion.   Grossly unremarkable.  BONES: Mild rightward curvature and degenerative changes of the spine.   Age-indeterminate mild anterior wedging/superior endplate depression of   T11, likely chronic. T9 vertebral body hemangioma.    IMPRESSION:    No pulmonary embolus.    Mild centrilobular emphysema. Findings suggestive of small airway   disease. Recommend clinical correlation to assess superimposed   bronchopneumonia.    Additional findings as described.                MYLES CONNORS M.D., RADIOLOGY RESIDENT  This document has been electronically signed.  SUZETTE LORENZ M.D., ATTENDING RADIOLOGIST  This document has been electronically signed. Dec 13 2017 12:26AM                < end of copied text >

## 2017-12-13 NOTE — PROGRESS NOTE ADULT - SUBJECTIVE AND OBJECTIVE BOX
chief complaint : shortness of breath         SUBJECTIVE / OVERNIGHT EVENTS: pt says his breathing is better than before      MEDICATIONS  (STANDING):  ALBUTerol    90 MICROgram(s) HFA Inhaler 1 Puff(s) Inhalation every 4 hours  ALBUTerol/ipratropium for Nebulization 3 milliLiter(s) Nebulizer every 6 hours  azithromycin  IVPB 500 milliGRAM(s) IV Intermittent every 24 hours  cefTRIAXone   IVPB 1 Gram(s) IV Intermittent every 24 hours  heparin  Injectable 5000 Unit(s) SubCutaneous every 8 hours  methylPREDNISolone sodium succinate Injectable 60 milliGRAM(s) IV Push every 8 hours  sodium chloride 0.45%. 1000 milliLiter(s) (75 mL/Hr) IV Continuous <Continuous>  tiotropium 18 MICROgram(s) Capsule 1 Capsule(s) Inhalation daily    MEDICATIONS  (PRN):        CAPILLARY BLOOD GLUCOSE        I&O's Summary    13 Dec 2017 07:01  -  13 Dec 2017 22:37  --------------------------------------------------------  IN: 0 mL / OUT: 0 mL / NET: 0 mL        Constitutional: No fever, fatigue  Skin: No rash.  Eyes: No recent vision problems or eye pain.  ENT: No congestion, ear pain, or sore throat.  Cardiovascular: No chest pain or palpation.  Respiratory: No cough, shortness of breath, congestion, or wheezing.  Gastrointestinal: No abdominal pain, nausea, vomiting, or diarrhea.  Genitourinary: No dysuria.  Musculoskeletal: No joint swelling.  Neurologic: No headache.    PHYSICAL EXAM:  GENERAL: NAD  EYES: EOMI, PERRLA  NECK: Supple, No JVD  CHEST/LUNG: dec breath sounds at bases   HEART:  S1 , S2 +  ABDOMEN: soft, bs+  EXTREMITIES:  trace edema  NEUROLOGY:alert awake calm cooperative      LABS:                        15.0   2.93  )-----------( 193      ( 13 Dec 2017 07:25 )             42.5     12-13    131<L>  |  88<L>  |  22  ----------------------------<  141<H>  4.3   |  24  |  1.12    Ca    8.8      13 Dec 2017 06:52    TPro  7.7  /  Alb  4.3  /  TBili  0.5  /  DBili  x   /  AST  25  /  ALT  15  /  AlkPhos  60  12-13    PT/INR - ( 12 Dec 2017 19:06 )   PT: 12.5 sec;   INR: 1.14 ratio         PTT - ( 12 Dec 2017 19:06 )  PTT:34.7 sec  CARDIAC MARKERS ( 13 Dec 2017 06:52 )  x     / x     / 272 U/L / x     / x      CARDIAC MARKERS ( 13 Dec 2017 01:08 )  x     / <0.01 ng/mL / 498 U/L / x     / 8.3 ng/mL      Urinalysis Basic - ( 13 Dec 2017 05:27 )    Color: Yellow / Appearance: Clear / SG: >1.030 / pH: x  Gluc: x / Ketone: Negative  / Bili: Negative / Urobili: Negative   Blood: x / Protein: Trace / Nitrite: Negative   Leuk Esterase: Negative / RBC: x / WBC x   Sq Epi: x / Non Sq Epi: x / Bacteria: x        RADIOLOGY & ADDITIONAL TESTS:    Imaging Personally Reviewed:    Consultant(s) Notes Reviewed:      Care Discussed with Consultants/Other Providers:

## 2017-12-13 NOTE — CONSULT NOTE ADULT - ASSESSMENT
7M hx of copd, asthma, htn, presenting with hypoxic resp failure  - Cont Bipap. Wean as tolerated.  - Agree with rx for COPD exacerbation including steroids, nebs, abx  - No signs of significant ischemia or volume overload.   - Nonspecific EKG changes are not c/w ischemia.   - check 2d echo  - Monitor BP. may benefit from starting on Norvasc 5mg Qday if bp still remains elevated  - Further cardiac workup will depend on clinical course.   - All other workup per primary team. Will followup.   Patient at high risk for decompensation.  >35 minutes of critical care time was spent with this patient.

## 2017-12-13 NOTE — CONSULT NOTE ADULT - ASSESSMENT
67M hx of copd, asthma, htn, presenting from home with shortness of breath, productive cough past 4 days , fevers since yesterday   Ex smoker  Patient with metapneumoviral URI  ?Pneumonia v bacterial superinfection (CAP)  No recent hospitalization or antimicrobials  CT unclear  While changes could be secondary to viral LRTI,patient is hypoxic and requiring Bipap.  Given this think reasonable to cover for possible bacterial superinfection  rec:  1) Check blood,urine cx  2) Check sputum Cx  3) Check procalcitonin  4) Continue CAP coverage with ceftriaxone and zithromax  5) Hypoxia/bipap per primary team  Tailor plan per course,results.  Case d/w primary team NP  Will Follow.  Beeper [id number]/afterhours/No [id number]

## 2017-12-13 NOTE — CONSULT NOTE ADULT - SUBJECTIVE AND OBJECTIVE BOX
Patient is a 67y old  Male who presents with a chief complaint of SOB (13 Dec 2017 08:20)    From HPI" HPI:  67M hx of copd, asthma, htn, presenting from home with shortness of breath, productive cough past 4 days , fevers since yesterday , pt denies chest pain, nausea, vomiting,  palpitations weakness numbness in ext , dysuria polyuria, no sick contacts no recent travel went to [facility] office today , found to be hypoxic <90% referred to ed, pt denies orthopnea/ pnd, never was intubated fro asthma exacerbation (12 Dec 2017 22:08)  "    Above verified-agree with above unless noted below.  After admission given ceftriaxone,zithromax.  Also on BIpap    ID consulted     PAST MEDICAL & SURGICAL HISTORY:  Asthma  COPD (chronic obstructive pulmonary disease)      Social history:  ex Smoking,quit 9 years ago,no  ETOH, IVDU       FAMILY HISTORY:  - Reviewed,Non contributory     REVIEW OF SYSTEMS  General:	Denies any malaise fatigue or chills. Fevers +    Skin:No rash  	  Ophthalmologic:Denies any visual complaints,discharge redness or photophobia  	  ENMT:+ nasal discharge,headache,+ sinus congestion or throat pain.No dental complaints    Respiratory and Thorax:+ cough,+ sputum No chest pain.Denies shortness of breath  	  Cardiovascular:	No chest pain,palpitaions or dizziness    Gastrointestinal:	NO nausea,abdominal pain or diarrhea.    Genitourinary:	No dysuria,frequency. No flank pain    Musculoskeletal:	No joint swelling or pain.No weakness    Neurological:No confusion,diziness.No extremity weakness.No bladder or bowel incontinence	    Psychiatric:No delusions or hallucinations	    Hematology/Lymphatics:	No LN swelling.No gum bleeding     Endocrine:	No recent weight gain or loss.No abnormal heat/cold intolerance    Allergic/Immunologic:	No hives or rash   Allergies    Allergy Status Unknown    Intolerances        Antimicrobials:    azithromycin  IVPB 500 milliGRAM(s) IV Intermittent every 24 hours  cefTRIAXone   IVPB 1 Gram(s) IV Intermittent every 24 hours    Other medications reviewed      Vital Signs Last 24 Hrs  T(C): 36.4 (13 Dec 2017 07:57), Max: 36.8 (12 Dec 2017 20:06)  T(F): 97.5 (13 Dec 2017 07:57), Max: 98.3 (12 Dec 2017 20:06)  HR: 71 (13 Dec 2017 09:41) (71 - 112)  BP: 154/90 (13 Dec 2017 07:57) (112/81 - 206/108)  BP(mean): --  RR: 20 (13 Dec 2017 09:41) (18 - 26)  SpO2: 100% (13 Dec 2017 09:41) (87% - 100%)    PHYSICAL EXAM:Pleasant patient in no acute distress.      Constitutional:Comfortable.Awake and alert  No cachexia     Eyes:PERRL EOMI.NO discharge or conjunctival injection  On Bipap    ENMT:No sinus tenderness.No thrush.No pharyngeal exudate or erythema.Fair dental hygiene    Neck:Supple,No LN,no JVD      Respiratory:Good air entry bilaterally,Occ crackles and wheezes    Cardiovascular:S1 S2 wnl, No murmurs,rub or gallops    Gastrointestinal:Soft BS(+) no tenderness no masses ,No rebound or guarding    Genitourinary:No CVA tendereness     Extremities:No cyanosis,clubbing or edema.    Vascular:peripheral pulses felt    Neurological:AAO X 3,No grossly focal deficits    Skin:No rash     Lymph Nodes:No palpable LNs    Musculoskeletal:No joint swelling or LOM    Psychiatric:Affect normal.          Labs:                            15.0   2.93  )-----------( 193      ( 13 Dec 2017 07:25 )             42.5         12-13    131<L>  |  88<L>  |  22  ----------------------------<  141<H>  4.3   |  24  |  1.12    Ca    8.8      13 Dec 2017 06:52    TPro  7.7  /  Alb  4.3  /  TBili  0.5  /  DBili  x   /  AST  25  /  ALT  15  /  AlkPhos  60  12-13      RVP  hMPV (RapRVP): Detected (12.12.17 @ 23:41)    < from: CT Angio Chest w/ IV Cont (12.12.17 @ 22:55) >  IMPRESSION:    No pulmonary embolus.    Mild centrilobular emphysema. Findings suggestive of small airway   disease. Recommend clinical correlation to assess superimposed   bronchopneumonia.    Additional findings as described.      < end of copied text >      Blood Cx pending  Urine Cx pending

## 2017-12-13 NOTE — PROGRESS NOTE ADULT - ASSESSMENT
pt with above history p/w hypoxic resp failure likely sec to copd exacerbation                    -  - RVP +, appreciate ID/ Pulm input, cont steroids, nebs, abx  Lactic acidosis - iv hydration   dvt prophyalxis

## 2017-12-13 NOTE — ED ADULT NURSE REASSESSMENT NOTE - NS ED NURSE REASSESS COMMENT FT1
Report received from JOSE MARTIN Lopez. Patient resting in bed on CM and BiPAP. Tolerating well. patient admitted and awaiting bed assignment. Safety and comfort maintained.

## 2017-12-13 NOTE — CONSULT NOTE ADULT - ASSESSMENT
67 M former smoker, with a PMH of HTN, asthma/ COPD, admitted with acute hypoxic resp failure requiring Bilevel support , found to have a HMPV infection with possible superimposed bacterial pneumonia    1. Acute resp failure with hypoxia/ COPD exac/ hMPV infection/ Possible Bact PNA  - Agree with IV steroids, would reduce the dose to solumedrol 20 mg Q8H IV  - Would cont Duonebs Q6H  - Add symbicort BID and spiriva daily  - Check Bcxs, Scxs, urine legionella Ag  - For now agree with antibiotic coverage with Azithro/ Ceftriaxone for possible superimposed bacterial PNA.   - Cont BIPAP support as needed. Would titrate down FiO2 to 30 %, goal to keep O2 sat above 90 %  - Incentive spirometry/ acapella/ OBC as tolerated   - Requires oupt pulm FUP upon d-c with PFTs    2. Severe uncontrolled hypertension  - Unclear if he had pulm edema 2/2 fluid overall on arrival, which may have contributed to hsi work of breathing and resp failure.   - Would optimize BP control   - ProBNP elevated. Would follow serial cardiac enzymes, check TTE    3. Hyponatremia  - Would check urine lytes, urine and serum osm  - Possibly hypovolemic hyponatremia, follow serial BMPs    4. DVT PX:  - Hep SQ 67 M former smoker, with a PMH of HTN, asthma/ COPD, admitted with acute hypoxic resp failure requiring Bilevel support , found to have a HMPV infection with possible superimposed bacterial pneumonia    1. Acute resp failure with hypoxia/ COPD exac/ hMPV infection/ Possible Bact PNA  - Agree with IV steroids, would reduce the dose to solumedrol 20 mg Q8H IV  - Would cont Duonebs Q6H  - Add symbicort BID and spiriva daily  - Check Bcxs, Scxs, urine legionella Ag  - For now agree with antibiotic coverage with Azithro/ Ceftriaxone for possible superimposed bacterial PNA.   - Cont BIPAP support as needed. Would titrate down FiO2 to 30 %, goal to keep O2 sat above 90 %  - Incentive spirometry/ acapella/ OBC as tolerated   - Requires oupt pulm FUP upon d-c with PFTs    2. Severe uncontrolled hypertension  - Unclear if he had pulm edema 2/2 fluid overall on arrival, which may have contributed to his work of breathing and resp failure.   - Would optimize BP control   - ProBNP elevated. Would follow serial cardiac enzymes, check TTE    3. Hyponatremia  - Would check urine lytes, urine and serum osm  - Possibly hypovolemic hyponatremia, follow serial BMPs    4. DVT PX:  - Hep SQ

## 2017-12-14 LAB
ANION GAP SERPL CALC-SCNC: 18 MMOL/L — HIGH (ref 5–17)
BUN SERPL-MCNC: 26 MG/DL — HIGH (ref 7–23)
CALCIUM SERPL-MCNC: 9 MG/DL — SIGNIFICANT CHANGE UP (ref 8.4–10.5)
CHLORIDE SERPL-SCNC: 90 MMOL/L — LOW (ref 96–108)
CHLORIDE UR-SCNC: <20 MMOL/L — SIGNIFICANT CHANGE UP
CO2 SERPL-SCNC: 24 MMOL/L — SIGNIFICANT CHANGE UP (ref 22–31)
CREAT SERPL-MCNC: 1 MG/DL — SIGNIFICANT CHANGE UP (ref 0.5–1.3)
CULTURE RESULTS: NO GROWTH — SIGNIFICANT CHANGE UP
GLUCOSE SERPL-MCNC: 125 MG/DL — HIGH (ref 70–99)
HCT VFR BLD CALC: 45.2 % — SIGNIFICANT CHANGE UP (ref 39–50)
HGB BLD-MCNC: 15.1 G/DL — SIGNIFICANT CHANGE UP (ref 13–17)
MCHC RBC-ENTMCNC: 30.9 PG — SIGNIFICANT CHANGE UP (ref 27–34)
MCHC RBC-ENTMCNC: 33.4 GM/DL — SIGNIFICANT CHANGE UP (ref 32–36)
MCV RBC AUTO: 92.6 FL — SIGNIFICANT CHANGE UP (ref 80–100)
OSMOLALITY SERPL: 288 MOS/KG — SIGNIFICANT CHANGE UP (ref 275–300)
OSMOLALITY UR: 575 MOS/KG — SIGNIFICANT CHANGE UP (ref 50–1200)
PHOSPHATE 24H UR-MCNC: 143.6 MG/DL — SIGNIFICANT CHANGE UP
PLATELET # BLD AUTO: 193 K/UL — SIGNIFICANT CHANGE UP (ref 150–400)
POTASSIUM SERPL-MCNC: 4.6 MMOL/L — SIGNIFICANT CHANGE UP (ref 3.5–5.3)
POTASSIUM SERPL-SCNC: 4.6 MMOL/L — SIGNIFICANT CHANGE UP (ref 3.5–5.3)
POTASSIUM UR-SCNC: 45 MMOL/L — SIGNIFICANT CHANGE UP
RBC # BLD: 4.88 M/UL — SIGNIFICANT CHANGE UP (ref 4.2–5.8)
RBC # FLD: 13.5 % — SIGNIFICANT CHANGE UP (ref 10.3–14.5)
SODIUM SERPL-SCNC: 132 MMOL/L — LOW (ref 135–145)
SODIUM UR-SCNC: 21 MMOL/L — SIGNIFICANT CHANGE UP
SPECIMEN SOURCE: SIGNIFICANT CHANGE UP
WBC # BLD: 6.46 K/UL — SIGNIFICANT CHANGE UP (ref 3.8–10.5)
WBC # FLD AUTO: 6.46 K/UL — SIGNIFICANT CHANGE UP (ref 3.8–10.5)

## 2017-12-14 PROCEDURE — 99291 CRITICAL CARE FIRST HOUR: CPT

## 2017-12-14 PROCEDURE — 99233 SBSQ HOSP IP/OBS HIGH 50: CPT

## 2017-12-14 PROCEDURE — 99232 SBSQ HOSP IP/OBS MODERATE 35: CPT

## 2017-12-14 RX ORDER — AMLODIPINE BESYLATE 2.5 MG/1
5 TABLET ORAL DAILY
Qty: 0 | Refills: 0 | Status: DISCONTINUED | OUTPATIENT
Start: 2017-12-14 | End: 2017-12-15

## 2017-12-14 RX ADMIN — AMLODIPINE BESYLATE 5 MILLIGRAM(S): 2.5 TABLET ORAL at 06:30

## 2017-12-14 RX ADMIN — Medication 3 MILLILITER(S): at 16:11

## 2017-12-14 RX ADMIN — CEFTRIAXONE 100 GRAM(S): 500 INJECTION, POWDER, FOR SOLUTION INTRAMUSCULAR; INTRAVENOUS at 23:54

## 2017-12-14 RX ADMIN — HEPARIN SODIUM 5000 UNIT(S): 5000 INJECTION INTRAVENOUS; SUBCUTANEOUS at 21:46

## 2017-12-14 RX ADMIN — HEPARIN SODIUM 5000 UNIT(S): 5000 INJECTION INTRAVENOUS; SUBCUTANEOUS at 05:12

## 2017-12-14 RX ADMIN — Medication 3 MILLILITER(S): at 21:46

## 2017-12-14 RX ADMIN — Medication 20 MILLIGRAM(S): at 21:46

## 2017-12-14 RX ADMIN — Medication 60 MILLIGRAM(S): at 05:11

## 2017-12-14 RX ADMIN — Medication 3 MILLILITER(S): at 08:51

## 2017-12-14 RX ADMIN — Medication 3 MILLILITER(S): at 05:11

## 2017-12-14 RX ADMIN — SODIUM CHLORIDE 75 MILLILITER(S): 9 INJECTION, SOLUTION INTRAVENOUS at 05:11

## 2017-12-14 RX ADMIN — CEFTRIAXONE 100 GRAM(S): 500 INJECTION, POWDER, FOR SOLUTION INTRAMUSCULAR; INTRAVENOUS at 00:34

## 2017-12-14 RX ADMIN — AZITHROMYCIN 250 MILLIGRAM(S): 500 TABLET, FILM COATED ORAL at 18:18

## 2017-12-14 RX ADMIN — Medication 20 MILLIGRAM(S): at 16:11

## 2017-12-14 RX ADMIN — HEPARIN SODIUM 5000 UNIT(S): 5000 INJECTION INTRAVENOUS; SUBCUTANEOUS at 16:11

## 2017-12-14 NOTE — PROGRESS NOTE ADULT - ASSESSMENT
67M hx of copd, asthma, htn, presenting with hypoxic resp failure    - Cont Bipap. Wean as tolerated.  - Agree with rx for COPD exacerbation including steroids, nebs, abx  - No signs of significant ischemia or volume overload, though bnp is elevated  - Nonspecific EKG changes are not c/w ischemia.   - await echo  - has been somewhat hypertensive, started on amlodipine.  Would continue for now and adjust as needed  - Further cardiac workup will depend on clinical course.   - All other workup per primary team. Will follow    The patient is at risk of abrupt decompensation.  35 minutes was spent with this patient.

## 2017-12-14 NOTE — PROGRESS NOTE ADULT - ASSESSMENT
67 M former smoker, with a PMH of HTN, asthma/ COPD, admitted with acute hypoxic resp failure requiring Bilevel support , found to have a HMPV infection with possible superimposed bacterial pneumonia    1. Acute resp failure with hypoxia/ COPD exac/ hMPV infection/ Possible Bact PNA  - Respiratory status improving, would give him a break from BIPAP and transition to NC as work of breathing has significantly improved.   - Supplemental O2 to keep sats > 90 % and would uses BIPAP prn  - Please reduce solumedrol dose to 20 mg Q8H IV  - Would cont Duonebs Q6H  - Add symbicort BID and cont  spiriva daily  - Bcxs and UCx NGTD, FUP urine legionella Ag  - For now would suggest continuing  antibiotic coverage with Azithro/ Ceftriaxone for possible superimposed bacterial PNA.   - Incentive spirometry/ acapella/ OBC as tolerated   - Requires oupt pulm FUP upon d-c with PFTs and a repeat Ct chest in 6-8 weeks    2. Severe uncontrolled hypertension  - Unclear if he had pulm edema 2/2 fluid overall on arrival, which may have contributed to his work of breathing and resp failure.   - BP now improved on Norvasc 5 mg daily  - ProBNP elevated. First set of cardiac enzymes was negative, would check another set, check TTE    3. Hyponatremia  - Sodium improving, ? SIADH  - Would fluid restrict and follow serial BMPs, check TSH and cortisol level   - Given recent weight loss will also require age appropriate malignancy screening once acute issues resolved.     4. DVT PX:  - Hep SQ

## 2017-12-14 NOTE — PROGRESS NOTE ADULT - SUBJECTIVE AND OBJECTIVE BOX
[facility]    Mirta Moffett, Mary, Krista, Sharee, Rodger, Aaron      [phone]    CHIEF COMPLAINT: Patient is a 67y old  Male who presents with a chief complaint of SOB (13 Dec 2017 08:20)      Follow Up: resp failure    Interim history: remains sob and unable to come off bipap    MEDICATIONS  (STANDING):  ALBUTerol    90 MICROgram(s) HFA Inhaler 1 Puff(s) Inhalation every 4 hours  ALBUTerol/ipratropium for Nebulization 3 milliLiter(s) Nebulizer every 6 hours  amLODIPine   Tablet 5 milliGRAM(s) Oral daily  azithromycin  IVPB 500 milliGRAM(s) IV Intermittent every 24 hours  cefTRIAXone   IVPB 1 Gram(s) IV Intermittent every 24 hours  heparin  Injectable 5000 Unit(s) SubCutaneous every 8 hours  methylPREDNISolone sodium succinate Injectable 60 milliGRAM(s) IV Push every 8 hours  sodium chloride 0.45%. 1000 milliLiter(s) (75 mL/Hr) IV Continuous <Continuous>  tiotropium 18 MICROgram(s) Capsule 1 Capsule(s) Inhalation daily    MEDICATIONS  (PRN):      REVIEW OF SYSTEMS:  eye, ent, GI, , allergic, dermatologic, musculoskeletal and neurologic are negative except as described above    Vital Signs Last 24 Hrs  T(C): 36.5 (14 Dec 2017 04:55), Max: 36.6 (13 Dec 2017 21:24)  T(F): 97.7 (14 Dec 2017 04:55), Max: 97.8 (13 Dec 2017 21:24)  HR: 72 (14 Dec 2017 05:20) (70 - 86)  BP: 166/82 (14 Dec 2017 05:39) (140/80 - 166/88)  BP(mean): --  RR: 20 (14 Dec 2017 05:20) (18 - 20)  SpO2: 100% (14 Dec 2017 05:20) (98% - 100%)    I&O's Summary    13 Dec 2017 07:01  -  14 Dec 2017 07:00  --------------------------------------------------------  IN: 950 mL / OUT: 550 mL / NET: 400 mL        Telemetry past 24h: sr    PHYSICAL EXAM:    Constitutional: well-nourished, well-developed, NAD   HEENT:  bipap, sclerae anicteric, conjunctivae clear, no oral cyanosis.  Pulmonary: distant bs, mild diff wheeze  Cardiovascular: Regular, distant S1 and S2.  No murmur.  No rubs, gallops or clicks  Gastrointestinal: Bowel Sounds present, soft, nontender.   Lymph: No peripheral edema.   Neurological: Alert, no focal deficits  Skin: No rashes.  Psych:  Mood & affect appropriate    LABS: All Labs Reviewed:                        15.0   2.93  )-----------( 193      ( 13 Dec 2017 07:25 )             42.5                         15.9   4.4   )-----------( 189      ( 12 Dec 2017 19:06 )             47.0     13 Dec 2017 06:52    131    |  88     |  22     ----------------------------<  141    4.3     |  24     |  1.12   12 Dec 2017 19:06    130    |  86     |  15     ----------------------------<  102    4.2     |  23     |  1.09     Ca    8.8        13 Dec 2017 06:52  Ca    9.1        12 Dec 2017 19:06    TPro  7.7    /  Alb  4.3    /  TBili  0.5    /  DBili  x      /  AST  25     /  ALT  15     /  AlkPhos  60     13 Dec 2017 06:52  TPro  8.4    /  Alb  4.7    /  TBili  0.8    /  DBili  x      /  AST  30     /  ALT  17     /  AlkPhos  67     12 Dec 2017 19:06    PT/INR - ( 12 Dec 2017 19:06 )   PT: 12.5 sec;   INR: 1.14 ratio         PTT - ( 12 Dec 2017 19:06 )  PTT:34.7 sec  CARDIAC MARKERS ( 13 Dec 2017 06:52 )  x     / x     / 272 U/L / x     / x      CARDIAC MARKERS ( 13 Dec 2017 01:08 )  x     / <0.01 ng/mL / 498 U/L / x     / 8.3 ng/mL      Blood Culture: Organism --  Gram Stain Blood -- Gram Stain --  Specimen Source .Blood Blood-Peripheral  Culture-Blood --      12-13 @ 06:52  Pro Bnp [id number]        RADIOLOGY:    EKG:    Echo: pending

## 2017-12-14 NOTE — PROGRESS NOTE ADULT - ASSESSMENT
67M hx of copd, asthma, htn, presenting from home with shortness of breath, productive cough past 4 days , fevers since yesterday   Ex smoker  Patient with metapneumoviral URI  ?Pneumonia v bacterial superinfection (CAP)  No recent hospitalization or antimicrobials  CT unclear  While changes could be secondary to viral LRTI,patient is hypoxic and requiring Bipap,and has SIRS criteria  PCT 0.3  Blood cx negative  stable  Would continue antimicrobials for now  Oxygenation,steroids  and other plan per pulmonary and primary.  Will Follow.  Beeper [id number]/afterhours/No response-[id number]

## 2017-12-14 NOTE — PROGRESS NOTE ADULT - SUBJECTIVE AND OBJECTIVE BOX
chief complaint : shortness of breath         SUBJECTIVE / OVERNIGHT EVENTS: pt says his breathing is better than before    MEDICATIONS  (STANDING):  ALBUTerol    90 MICROgram(s) HFA Inhaler 1 Puff(s) Inhalation every 4 hours  ALBUTerol/ipratropium for Nebulization 3 milliLiter(s) Nebulizer every 6 hours  amLODIPine   Tablet 5 milliGRAM(s) Oral daily  azithromycin  IVPB 500 milliGRAM(s) IV Intermittent every 24 hours  cefTRIAXone   IVPB 1 Gram(s) IV Intermittent every 24 hours  heparin  Injectable 5000 Unit(s) SubCutaneous every 8 hours  methylPREDNISolone sodium succinate Injectable 20 milliGRAM(s) IV Push every 8 hours  sodium chloride 0.45%. 1000 milliLiter(s) (75 mL/Hr) IV Continuous <Continuous>  tiotropium 18 MICROgram(s) Capsule 1 Capsule(s) Inhalation daily    MEDICATIONS  (PRN):      Vital Signs Last 24 Hrs  T(C): 36.4 (14 Dec 2017 23:13), Max: 36.5 (14 Dec 2017 04:55)  T(F): 97.6 (14 Dec 2017 23:13), Max: 97.7 (14 Dec 2017 04:55)  HR: 75 (14 Dec 2017 23:13) (70 - 92)  BP: 165/90 (14 Dec 2017 23:13) (155/89 - 188/96)  BP(mean): --  RR: 22 (14 Dec 2017 23:13) (18 - 22)  SpO2: 97% (14 Dec 2017 23:13) (97% - 100%)      Constitutional: No fever, fatigue  Skin: No rash.  Eyes: No recent vision problems or eye pain.  ENT: No congestion, ear pain, or sore throat.  Cardiovascular: No chest pain or palpation.  Respiratory: No cough, shortness of breath, congestion, or wheezing.  Gastrointestinal: No abdominal pain, nausea, vomiting, or diarrhea.  Genitourinary: No dysuria.  Musculoskeletal: No joint swelling.  Neurologic: No headache.    PHYSICAL EXAM:  GENERAL: NAD  EYES: EOMI, PERRLA  NECK: Supple, No JVD  CHEST/LUNG: dec breath sounds at bases   HEART:  S1 , S2 +  ABDOMEN: soft, bs+  EXTREMITIES:  trace edema  NEUROLOGY:alert awake calm cooperative    LABS:  12-14    132<L>  |  90<L>  |  26<H>  ----------------------------<  125<H>  4.6   |  24  |  1.00    Ca    9.0      14 Dec 2017 07:35    TPro  7.7  /  Alb  4.3  /  TBili  0.5  /  DBili      /  AST  25  /  ALT  15  /  AlkPhos  60  12-13    Creatinine Trend: 1.00 <--, 1.12 <--, 1.09 <--                        15.1   6.46  )-----------( 193      ( 14 Dec 2017 07:25 )             45.2     Urine Studies:  Urinalysis Basic - ( 13 Dec 2017 05:27 )    Color: Yellow / Appearance: Clear / SG: >1.030 / pH:   Gluc:  / Ketone: Negative  / Bili: Negative / Urobili: Negative   Blood:  / Protein: Trace / Nitrite: Negative   Leuk Esterase: Negative / RBC:  / WBC    Sq Epi:  / Non Sq Epi:  / Bacteria:       Osmolality, Random Urine: 575 mos/kg (12-14 @ 07:51)  Sodium, Random Urine: 21 mmol/L (12-14 @ 06:32)  Potassium, Random Urine: 45 mmol/L (12-14 @ 06:32)  Chloride, Random Urine: <20 mmol/L (12-14 @ 06:32)    CARDIAC MARKERS ( 13 Dec 2017 06:52 )  x     / x     / 272 U/L / x     / x      CARDIAC MARKERS ( 13 Dec 2017 01:08 )  x     / <0.01 ng/mL / 498 U/L / x     / 8.3 ng/mL      ABG - ( 13 Dec 2017 01:08 )  pH: 7.42  /  pCO2: 38    /  pO2: 243   / HCO3: 24    / Base Excess: .6    /  SaO2: 99                LIVER FUNCTIONS - ( 13 Dec 2017 06:52 )  Alb: 4.3 g/dL / Pro: 7.7 g/dL / ALK PHOS: 60 U/L / ALT: 15 U/L RC / AST: 25 U/L / GGT: x

## 2017-12-14 NOTE — CHART NOTE - NSCHARTNOTEFT_GEN_A_CORE
Notified by Rn that patient BP is 166/82.     Vital Signs Last 24 Hrs  T(C): 36.5 (14 Dec 2017 04:55), Max: 36.6 (13 Dec 2017 21:24)  T(F): 97.7 (14 Dec 2017 04:55), Max: 97.8 (13 Dec 2017 21:24)  HR: 72 (14 Dec 2017 05:20) (70 - 86)  BP: 166/82 (14 Dec 2017 05:39) (140/80 - 166/88)  BP(mean): --  RR: 20 (14 Dec 2017 05:20) (18 - 20)  SpO2: 100% (14 Dec 2017 05:20) (98% - 100%)    Per cardiology note from 12/13 may start Norvasc 5mg if BP elevated    Plan  Norvasc 5mg po daily    Will continue to monitor, will endorse to primary day team, attending to follow

## 2017-12-14 NOTE — PROGRESS NOTE ADULT - SUBJECTIVE AND OBJECTIVE BOX
Patient is a 67y old  Male who presents with a chief complaint of SOB (13 Dec 2017 08:20)    Being followed by ID for ?pna,resp failure    Interval history:still on bipap  No acute events      ROS:  minimal  cough,no sputum,denies SOB,CP  No N/V/D./abd pain  No other complaints      Antimicrobials:    azithromycin  IVPB 500 milliGRAM(s) IV Intermittent every 24 hours  cefTRIAXone   IVPB 1 Gram(s) IV Intermittent every 24 hours    Other medications reviewed    Vital Signs Last 24 Hrs  T(C): 36.5 (12-14-17 @ 04:55), Max: 36.6 (12-13-17 @ 21:24)  T(F): 97.7 (12-14-17 @ 04:55), Max: 97.8 (12-13-17 @ 21:24)  HR: 73 (12-14-17 @ 09:46) (70 - 86)  BP: 166/82 (12-14-17 @ 05:39) (140/80 - 166/88)  BP(mean): --  RR: 20 (12-14-17 @ 05:20) (18 - 20)  SpO2: 99% (12-14-17 @ 09:46) (98% - 100%)    Physical Exam:        HEENT PERRLA EOMI    No oral exudate or erythema  bipap        Chest Good AE,CTA    CVS RRR S1 S2 WNl No murmur or rub or gallop    Abd soft BS normal No tenderness no masses      IV site no erythema tenderness or discharge        CNS AAO X 3 no focal    Lab Data:                          15.1   6.46  )-----------( 193      ( 14 Dec 2017 07:25 )             45.2       12-14    132<L>  |  90<L>  |  26<H>  ----------------------------<  125<H>  4.6   |  24  |  1.00    Ca    9.0      14 Dec 2017 07:35    TPro  7.7  /  Alb  4.3  /  TBili  0.5  /  DBili  x   /  AST  25  /  ALT  15  /  AlkPhos  60  12-13        Culture - Blood (collected 12 Dec 2017 21:42)  Source: .Blood Blood-Peripheral  Preliminary Report (13 Dec 2017 22:01):    No growth to date.    Culture - Blood (collected 12 Dec 2017 21:42)  Source: .Blood Blood-Peripheral  Preliminary Report (13 Dec 2017 22:01):    No growth to date.        Procalcitonin, Serum (12.13.17 @ 06:39)    Procalcitonin, Serum: 0.31: Procalcitonin (PCT) Interpretation (ng/mL) - Diagnosis of systemic  bacterial infection/sepsis

## 2017-12-14 NOTE — PROGRESS NOTE ADULT - SUBJECTIVE AND OBJECTIVE BOX
CHIEF COMPLAINT: Shortness of breath/ Cough/ Fever    Interval Events: Afebrile overnight, remains on BIPAP. SOB/ wheezing improved. Denies CP, N/V/D    REVIEW OF SYSTEMS:  Constitutional: [ ] negative [ -] fevers [-] chills [ ] weight loss [ ] weight gain  HEENT: [ ] negative [ ] dry eyes [- ] eye irritation [- ] postnasal drip [ ] nasal congestion  CV: [ ] negative  [- ] chest pain [ -] orthopnea [- ] palpitations [ ] murmur  Resp: [ ] negative [+ ] cough [+ ] shortness of breath [ ] dyspnea [ +] wheezing [- ] sputum [ ] hemoptysis  GI: [ ] negative [ -] nausea [- ] vomiting [- ] diarrhea [ ] constipation [ ] abd pain [ ] dysphagia   : [ ] negative [ -] dysuria [- nocturia [ -] hematuria [ ] increased urinary frequency  Musculoskeletal: [ ] negative [- ] back pain [ ] myalgias [ ] arthralgias [ ] fracture  Skin: [ ] negative [ ] rash [- ] itch  Neurological: [ ] negative [- ] headache [- ] dizziness [ ] syncope [ ] weakness [ ] numbness  Psychiatric: [ ] negative [ ] anxiety [- ] depression  Endocrine: [- ] negative [ ] diabetes [ ] thyroid problem  Hematologic/Lymphatic: [ ] negative [ ] anemia [- ] bleeding problem  Allergic/Immunologic: [ ] negative [ ] itchy eyes [ -] nasal discharge [ ] hives [ ] angioedema  [x ] All other systems negative  [ ] Unable to assess ROS because ________    OBJECTIVE:  ICU Vital Signs Last 24 Hrs  T(C): 36.5 (14 Dec 2017 04:55), Max: 36.6 (13 Dec 2017 21:24)  T(F): 97.7 (14 Dec 2017 04:55), Max: 97.8 (13 Dec 2017 21:24)  HR: 73 (14 Dec 2017 09:46) (70 - 86)  BP: 166/82 (14 Dec 2017 05:39) (140/80 - 166/88)  BP(mean): --  ABP: --  ABP(mean): --  RR: 20 (14 Dec 2017 05:20) (18 - 20)  SpO2: 99% (14 Dec 2017 09:46) (99% - 100%)        12-13 @ 07:01  -  12-14 @ 07:00  --------------------------------------------------------  IN: 950 mL / OUT: 550 mL / NET: 400 mL    12-14 @ 07:01 - 12-14 @ 12:18  --------------------------------------------------------  IN: 0 mL / OUT: 500 mL / NET: -500 mL      CAPILLARY BLOOD GLUCOSE          PHYSICAL EXAM:  General: NAD, on BIPAP  HEENT: PERRLA  Lymph Nodes: None palpable  Neck: Supple  Respiratory: Decreased BS b/l bases, mild b/l wheezing, no crackles   Cardiovascular: RRR, S1+S2+0  Abdomen: Soft, NT, ND, BS+  Extremities: No edema  Skin: No rash  Neurological: AAOx3. grossly non focal  Psychiatry: Normal mood     HOSPITAL MEDICATIONS:  heparin  Injectable 5000 Unit(s) SubCutaneous every 8 hours    azithromycin  IVPB 500 milliGRAM(s) IV Intermittent every 24 hours  cefTRIAXone   IVPB 1 Gram(s) IV Intermittent every 24 hours    amLODIPine   Tablet 5 milliGRAM(s) Oral daily    methylPREDNISolone sodium succinate Injectable 60 milliGRAM(s) IV Push every 8 hours    ALBUTerol    90 MICROgram(s) HFA Inhaler 1 Puff(s) Inhalation every 4 hours  ALBUTerol/ipratropium for Nebulization 3 milliLiter(s) Nebulizer every 6 hours  tiotropium 18 MICROgram(s) Capsule 1 Capsule(s) Inhalation daily            sodium chloride 0.45%. 1000 milliLiter(s) IV Continuous <Continuous>            LABS:                        15.1   6.46  )-----------( 193      ( 14 Dec 2017 07:25 )             45.2     Hgb Trend: 15.1<--, 15.0<--, 15.9<--  12-14    132<L>  |  90<L>  |  26<H>  ----------------------------<  125<H>  4.6   |  24  |  1.00    Ca    9.0      14 Dec 2017 07:35    TPro  7.7  /  Alb  4.3  /  TBili  0.5  /  DBili  x   /  AST  25  /  ALT  15  /  AlkPhos  60  12-13    Creatinine Trend: 1.00<--, 1.12<--, 1.09<--  PT/INR - ( 12 Dec 2017 19:06 )   PT: 12.5 sec;   INR: 1.14 ratio         PTT - ( 12 Dec 2017 19:06 )  PTT:34.7 sec  Urinalysis Basic - ( 13 Dec 2017 05:27 )    Color: Yellow / Appearance: Clear / SG: >1.030 / pH: x  Gluc: x / Ketone: Negative  / Bili: Negative / Urobili: Negative   Blood: x / Protein: Trace / Nitrite: Negative   Leuk Esterase: Negative / RBC: x / WBC x   Sq Epi: x / Non Sq Epi: x / Bacteria: x      Arterial Blood Gas:  12-13 @ 01:08  7.42/38/243/24/99/.6  ABG lactate: --    Venous Blood Gas:  12-12 @ 19:06  7.36/51/24/28/33  VBG Lactate: 2.3      MICROBIOLOGY:   Rapid Respiratory Viral Panel (12.12.17 @ 23:41)    Rapid RVP Result: Detected: The [employer] RVP Rapid uses polymerase chain reaction (PCR) and melt  curve analysis to screen for adenovirus; coronavirus HKU1, NL63, 229E,  OC43; human metapneumovirus (hMPV); human enterovirus/rhinovirus  (Entero/RV); influenza A; influenza A/H1;influenza A/H3; influenza  A/H1-2009; influenza B; parainfluenza viruses 1, 2, 3, 4; respiratory  syncytial virus; Bordetella pertussis; Mycoplasma pneumoniae; and  Chlamydophila pneumoniae.  Culture - Blood (12.12.17 @ 21:42)    Specimen Source: .Blood Blood-Peripheral    Culture Results:   No growth to date.    Culture - Urine (12.13.17 @ 08:43)    Specimen Source: .Urine Clean Catch (Midstream)    Culture Results:   No growth    RADIOLOGY:  < from: CT Angio Chest w/ IV Cont (12.12.17 @ 22:55) >  LUNGS AND LARGE AIRWAYS: Patent central airways. Mild centrilobular   emphysema. Mild peribronchial thickening with scattered tree-in-bud   opacities, suggesting small airway disease.  Subsegmental bibasilar   atelectasis.  PLEURA: No pleural effusion or pneumothorax. Mild biapical pleural   thickening.  VESSELS: Adequate contrast ossification of the pulmonary arteries. No   pulmonary embolus. Atherosclerotic change of the thoracic aorta, great   vessels and coronary artery.  HEART: Normal heart size. Trace pericardial effusion.  MEDIASTINUM AND LUCHO: No lymphadenopathy.  CHEST WALL AND LOWER NECK: Artifact from the metallic densities/foreign   body along the right chest wall degradingimages.  VISUALIZED UPPER ABDOMEN: Degraded by patient's respiratory motion.   Grossly unremarkable.  BONES: Mild rightward curvature and degenerative changes of the spine.   Age-indeterminate mild anterior wedging/superior endplate depression of   T11, likely chronic. T9 vertebral body hemangioma.    [x ] Reviewed and interpreted by me    PULMONARY FUNCTION TESTS:    EKG:

## 2017-12-14 NOTE — PROGRESS NOTE ADULT - ASSESSMENT
pt with above history p/w hypoxic resp failure likely sec to copd exacerbation                 - pt clinically improving               -  - RVP +, appreciate ID/ Pulm input, cont steroids, nebs, abx    hyponatremia- f/u closely     dvt prophylaxis

## 2017-12-15 LAB — LEGIONELLA AG UR QL: NEGATIVE — SIGNIFICANT CHANGE UP

## 2017-12-15 PROCEDURE — 99233 SBSQ HOSP IP/OBS HIGH 50: CPT

## 2017-12-15 PROCEDURE — 99232 SBSQ HOSP IP/OBS MODERATE 35: CPT

## 2017-12-15 PROCEDURE — 93306 TTE W/DOPPLER COMPLETE: CPT | Mod: 26

## 2017-12-15 RX ORDER — HYDRALAZINE HCL 50 MG
25 TABLET ORAL THREE TIMES A DAY
Qty: 0 | Refills: 0 | Status: DISCONTINUED | OUTPATIENT
Start: 2017-12-15 | End: 2017-12-16

## 2017-12-15 RX ORDER — HYDRALAZINE HCL 50 MG
25 TABLET ORAL ONCE
Qty: 0 | Refills: 0 | Status: COMPLETED | OUTPATIENT
Start: 2017-12-15 | End: 2017-12-15

## 2017-12-15 RX ORDER — AMLODIPINE BESYLATE 2.5 MG/1
10 TABLET ORAL DAILY
Qty: 0 | Refills: 0 | Status: DISCONTINUED | OUTPATIENT
Start: 2017-12-16 | End: 2017-12-18

## 2017-12-15 RX ORDER — AMLODIPINE BESYLATE 2.5 MG/1
5 TABLET ORAL ONCE
Qty: 0 | Refills: 0 | Status: COMPLETED | OUTPATIENT
Start: 2017-12-15 | End: 2017-12-15

## 2017-12-15 RX ADMIN — AZITHROMYCIN 250 MILLIGRAM(S): 500 TABLET, FILM COATED ORAL at 17:51

## 2017-12-15 RX ADMIN — Medication 3 MILLILITER(S): at 15:38

## 2017-12-15 RX ADMIN — CEFTRIAXONE 100 GRAM(S): 500 INJECTION, POWDER, FOR SOLUTION INTRAMUSCULAR; INTRAVENOUS at 23:56

## 2017-12-15 RX ADMIN — Medication 20 MILLIGRAM(S): at 15:36

## 2017-12-15 RX ADMIN — HEPARIN SODIUM 5000 UNIT(S): 5000 INJECTION INTRAVENOUS; SUBCUTANEOUS at 05:11

## 2017-12-15 RX ADMIN — Medication 20 MILLIGRAM(S): at 21:06

## 2017-12-15 RX ADMIN — HEPARIN SODIUM 5000 UNIT(S): 5000 INJECTION INTRAVENOUS; SUBCUTANEOUS at 21:06

## 2017-12-15 RX ADMIN — Medication 20 MILLIGRAM(S): at 05:11

## 2017-12-15 RX ADMIN — AMLODIPINE BESYLATE 5 MILLIGRAM(S): 2.5 TABLET ORAL at 07:02

## 2017-12-15 RX ADMIN — AMLODIPINE BESYLATE 5 MILLIGRAM(S): 2.5 TABLET ORAL at 05:10

## 2017-12-15 RX ADMIN — Medication 25 MILLIGRAM(S): at 09:09

## 2017-12-15 RX ADMIN — Medication 3 MILLILITER(S): at 09:10

## 2017-12-15 RX ADMIN — Medication 3 MILLILITER(S): at 05:10

## 2017-12-15 RX ADMIN — Medication 25 MILLIGRAM(S): at 21:06

## 2017-12-15 RX ADMIN — SODIUM CHLORIDE 75 MILLILITER(S): 9 INJECTION, SOLUTION INTRAVENOUS at 23:56

## 2017-12-15 RX ADMIN — Medication 3 MILLILITER(S): at 21:06

## 2017-12-15 RX ADMIN — Medication 25 MILLIGRAM(S): at 15:40

## 2017-12-15 RX ADMIN — HEPARIN SODIUM 5000 UNIT(S): 5000 INJECTION INTRAVENOUS; SUBCUTANEOUS at 15:37

## 2017-12-15 NOTE — PROGRESS NOTE ADULT - ASSESSMENT
67 M former smoker, with a PMH of HTN, asthma/ COPD, admitted with acute hypoxic resp failure requiring Bilevel support , found to have a HMPV infection with possible superimposed bacterial pneumonia    1. Acute resp failure with hypoxia/ COPD exac/ hMPV infection/ Possible Bact PNA  - Respiratory status improving, unable to tolerate trial off BIPAP yesterday, try to transition off BIPAP to high flow NC today   - Supplemental O2 to keep sats > 90 % and would use BIPAP at night and prn during the day  - Cont solumedrol dose 20 mg Q8H IV  - Would cont Duonebs Q6H  - Add symbicort BID and cont  spiriva daily  - Bcxs and UCx NGTD, urine legionella Ag negative  - For now would suggest continuing  antibiotic coverage with Azithro/ Ceftriaxone for possible superimposed bacterial PNA. Would complete a 5 day course of antibiotics.   - Incentive spirometry/ acapella/ OBC as tolerated   - Requires oupt pulm FUP upon d-c with PFTs and a repeat Ct chest in 6-8 weeks    2. Severe uncontrolled hypertension  - Unclear if he had pulm edema 2/2 fluid overall on arrival, which may have contributed to his work of breathing and resp failure.   - Remains hypertensive, now on Norvasc 10 mg daily and hydralazine 25 mg Q8H  - Cont to optimize BP control  - check TTE    3. Hyponatremia  - Sodium improving, ? SIADH  - Would fluid restrict and follow serial BMPs, check TSH and cortisol level   - Given recent weight loss will also require age appropriate malignancy screening once acute issues resolved.     4. DVT PX:  - Hep SQ

## 2017-12-15 NOTE — PROGRESS NOTE ADULT - SUBJECTIVE AND OBJECTIVE BOX
Patient is a 67y old  Male who presents with a chief complaint of SOB (13 Dec 2017 08:20)    Being followed by ID for ?PNA    Interval history:still on bipap but feels better  No acute events      ROS:  No cough,SOB,CP  No N/V/D./abd pain  No other complaints      Antimicrobials:    azithromycin  IVPB 500 milliGRAM(s) IV Intermittent every 24 hours  cefTRIAXone   IVPB 1 Gram(s) IV Intermittent every 24 hours    Other medications reviewed    Vital Signs Last 24 Hrs  T(C): 36.3 (12-15-17 @ 05:00), Max: 36.4 (12-14-17 @ 22:42)  T(F): 97.4 (12-15-17 @ 05:00), Max: 97.6 (12-14-17 @ 23:13)  HR: 77 (12-15-17 @ 09:51) (70 - 92)  BP: 190/90 (12-15-17 @ 06:44) (165/90 - 208/86)  BP(mean): --  RR: 22 (12-15-17 @ 09:51) (19 - 22)  SpO2: 98% (12-15-17 @ 09:51) (97% - 100%)    Physical Exam:        HEENT PERRLA EOMI    No oral exudate or erythema        Chest Good AE,CTA    CVS RRR S1 S2 WNl No murmur or rub or gallop    Abd soft BS normal No tenderness no masses      IV site no erythema tenderness or discharge        CNS AAO X 3 no focal    Lab Data:                          15.1   6.46  )-----------( 193      ( 14 Dec 2017 07:25 )             45.2       12-14    132<L>  |  90<L>  |  26<H>  ----------------------------<  125<H>  4.6   |  24  |  1.00    Ca    9.0      14 Dec 2017 07:35          Culture - Urine (collected 13 Dec 2017 08:43)  Source: .Urine Clean Catch (Midstream)  Final Report (14 Dec 2017 11:46):    No growth    Culture - Blood (collected 12 Dec 2017 21:42)  Source: .Blood Blood-Peripheral  Preliminary Report (13 Dec 2017 22:01):    No growth to date.    Culture - Blood (collected 12 Dec 2017 21:42)  Source: .Blood Blood-Peripheral  Preliminary Report (13 Dec 2017 22:01):    No growth to date.

## 2017-12-15 NOTE — PROGRESS NOTE ADULT - SUBJECTIVE AND OBJECTIVE BOX
CHIEF COMPLAINT: Shortness of breath/ cough/ fever     Interval Events: Afebrile overnight, remained on BIPAP. Was tachypneic and desaturating off BIPAP yesterday afternoon. Reports overall SOB and cough have improved. Denies CP, N/V/D/     REVIEW OF SYSTEMS:  Constitutional: [ ] negative [- ] fevers [- ] chills [ ] weight loss [ ] weight gain  HEENT: [ ] negative [- ] dry eyes [- ] eye irritation [ ] postnasal drip [ ] nasal congestion  CV: [ ] negative  [ -] chest pain [ -] orthopnea [ ] palpitations [ ] murmur  Resp: [ ] negative [ +] cough [+ ] shortness of breath [ ] dyspnea [- ] wheezing [- ] sputum [- ] hemoptysis  GI: [ ] negative [- ] nausea [ -] vomiting [- ] diarrhea [- ] constipation [- ] abd pain [ ] dysphagia   : [ ] negative [- ] dysuria [ -] nocturia [ -] hematuria [- ] increased urinary frequency  Musculoskeletal: [ ] negative [- ] back pain [ ] myalgias [ ] arthralgias [ ] fracture  Skin: [ ] negative [ ] rash [- ] itch  Neurological: [ ] negative [ -] headache [- ] dizziness [ -] syncope [ ] weakness [ ] numbness  Psychiatric: [ ] negative [- anxiety [ ] depression  Endocrine: [ ] negative [ ] diabetes [ -] thyroid problem  Hematologic/Lymphatic: [ ] negative [ ] anemia [- ] bleeding problem  Allergic/Immunologic: [ ] negative [ ] itchy eyes [- ] nasal discharge [ ] hives [ ] angioedema  [x ] All other systems negative  [ ] Unable to assess ROS because ________    OBJECTIVE:  ICU Vital Signs Last 24 Hrs  T(C): 36.3 (15 Dec 2017 13:02), Max: 36.4 (14 Dec 2017 22:42)  T(F): 97.4 (15 Dec 2017 13:02), Max: 97.6 (14 Dec 2017 23:13)  HR: 106 (15 Dec 2017 13:02) (70 - 106)  BP: 190/95 (15 Dec 2017 13:02) (165/90 - 208/86)  BP(mean): --  ABP: --  ABP(mean): --  RR: 22 (15 Dec 2017 13:02) (19 - 22)  SpO2: 100% (15 Dec 2017 13:02) (97% - 100%)        12-14 @ 07:01  -  12-15 @ 07:00  --------------------------------------------------------  IN: 0 mL / OUT: 1175 mL / NET: -1175 mL    12-15 @ 07:01  -  12-15 @ 14:58  --------------------------------------------------------  IN: 0 mL / OUT: 1000 mL / NET: -1000 mL      CAPILLARY BLOOD GLUCOSE          PHYSICAL EXAM:  General: NAD, on BIPAP  HEENT: PERRLA  Lymph Nodes: None palpable  Neck: Supple  Respiratory: Decreased BS b/l bases, mild b/l wheezing, no crackles   Cardiovascular: RRR, S1+S2+0  Abdomen: Soft, NT, ND, BS+  Extremities: No edema  Skin: No rash  Neurological: AAOx3. grossly non focal  Psychiatry: Normal mood     HOSPITAL MEDICATIONS:  heparin  Injectable 5000 Unit(s) SubCutaneous every 8 hours    azithromycin  IVPB 500 milliGRAM(s) IV Intermittent every 24 hours  cefTRIAXone   IVPB 1 Gram(s) IV Intermittent every 24 hours    hydrALAZINE 25 milliGRAM(s) Oral three times a day    methylPREDNISolone sodium succinate Injectable 20 milliGRAM(s) IV Push every 8 hours    ALBUTerol    90 MICROgram(s) HFA Inhaler 1 Puff(s) Inhalation every 4 hours  ALBUTerol/ipratropium for Nebulization 3 milliLiter(s) Nebulizer every 6 hours  tiotropium 18 MICROgram(s) Capsule 1 Capsule(s) Inhalation daily            sodium chloride 0.45%. 1000 milliLiter(s) IV Continuous <Continuous>            LABS:                        15.1   6.46  )-----------( 193      ( 14 Dec 2017 07:25 )             45.2     Hgb Trend: 15.1<--, 15.0<--, 15.9<--  12-14    132<L>  |  90<L>  |  26<H>  ----------------------------<  125<H>  4.6   |  24  |  1.00    Ca    9.0      14 Dec 2017 07:35      Creatinine Trend: 1.00<--, 1.12<--, 1.09<--            MICROBIOLOGY:   Legionella pneumophila Antigen, Urine (12.13.17 @ 07:31)    Legionella Antigen, Urine: Negative    Culture - Urine (12.13.17 @ 08:43)    Specimen Source: .Urine Clean Catch (Midstream)    Culture Results:   No growth  Culture - Blood (12.12.17 @ 21:42)    Specimen Source: .Blood Blood-Peripheral    Culture Results:   No growth to date.    RVP: +hMPV    RADIOLOGY:    < from: CT Angio Chest w/ IV Cont (12.12.17 @ 22:55) >  LUNGS AND LARGE AIRWAYS: Patent central airways. Mild centrilobular   emphysema. Mild peribronchial thickening with scattered tree-in-bud   opacities, suggesting small airway disease.  Subsegmental bibasilar   atelectasis.  PLEURA: No pleural effusion or pneumothorax. Mild biapical pleural   thickening.  VESSELS: Adequate contrast ossification of the pulmonary arteries. No   pulmonary embolus. Atherosclerotic change of the thoracic aorta, great   vessels and coronary artery.  HEART: Normal heart size. Trace pericardial effusion.  MEDIASTINUM AND LUCHO: No lymphadenopathy.  CHEST WALL AND LOWER NECK: Artifact from the metallic densities/foreign   body along the right chest wall degradingimages.  VISUALIZED UPPER ABDOMEN: Degraded by patient's respiratory motion.   Grossly unremarkable.  BONES: Mild rightward curvature and degenerative changes of the spine.   Age-indeterminate mild anterior wedging/superior endplate depression of   T11, likely chronic. T9 vertebral body hemangioma.      [ ] Reviewed and interpreted by me    PULMONARY FUNCTION TESTS:    EKG:

## 2017-12-15 NOTE — PROGRESS NOTE ADULT - SUBJECTIVE AND OBJECTIVE BOX
chief complaint : shortness of breath         SUBJECTIVE / OVERNIGHT EVENTS: pt says his breathing is better than before    MEDICATIONS  (STANDING):  ALBUTerol    90 MICROgram(s) HFA Inhaler 1 Puff(s) Inhalation every 4 hours  ALBUTerol/ipratropium for Nebulization 3 milliLiter(s) Nebulizer every 6 hours  azithromycin  IVPB 500 milliGRAM(s) IV Intermittent every 24 hours  cefTRIAXone   IVPB 1 Gram(s) IV Intermittent every 24 hours  heparin  Injectable 5000 Unit(s) SubCutaneous every 8 hours  hydrALAZINE 25 milliGRAM(s) Oral three times a day  methylPREDNISolone sodium succinate Injectable 20 milliGRAM(s) IV Push every 8 hours  sodium chloride 0.45%. 1000 milliLiter(s) (75 mL/Hr) IV Continuous <Continuous>  tiotropium 18 MICROgram(s) Capsule 1 Capsule(s) Inhalation daily    MEDICATIONS  (PRN):      Vital Signs Last 24 Hrs  T(C): 36.4 (15 Dec 2017 20:59), Max: 36.4 (14 Dec 2017 22:42)  T(F): 97.6 (15 Dec 2017 20:59), Max: 97.6 (14 Dec 2017 23:13)  HR: 90 (15 Dec 2017 20:59) (70 - 106)  BP: 178/79 (15 Dec 2017 20:59) (165/90 - 208/86)  BP(mean): --  RR: 20 (15 Dec 2017 20:59) (19 - 22)  SpO2: 98% (15 Dec 2017 20:59) (97% - 100%)    Constitutional: No fever, fatigue  Skin: No rash.  Eyes: No recent vision problems or eye pain.  ENT: No congestion, ear pain, or sore throat.  Cardiovascular: No chest pain or palpation.  Respiratory: No cough, shortness of breath, congestion, or wheezing.  Gastrointestinal: No abdominal pain, nausea, vomiting, or diarrhea.  Genitourinary: No dysuria.  Musculoskeletal: No joint swelling.  Neurologic: No headache.    PHYSICAL EXAM:  GENERAL: NAD  EYES: EOMI, PERRLA  NECK: Supple, No JVD  CHEST/LUNG: dec breath sounds at bases   HEART:  S1 , S2 +  ABDOMEN: soft, bs+  EXTREMITIES:  trace edema  NEUROLOGY:alert awake calm cooperative    LABS:  12-14    132<L>  |  90<L>  |  26<H>  ----------------------------<  125<H>  4.6   |  24  |  1.00    Ca    9.0      14 Dec 2017 07:35      Creatinine Trend: 1.00 <--, 1.12 <--, 1.09 <--                        15.1   6.46  )-----------( 193      ( 14 Dec 2017 07:25 )             45.2     Urine Studies:  Urinalysis Basic - ( 13 Dec 2017 05:27 )    Color: Yellow / Appearance: Clear / SG: >1.030 / pH:   Gluc:  / Ketone: Negative  / Bili: Negative / Urobili: Negative   Blood:  / Protein: Trace / Nitrite: Negative   Leuk Esterase: Negative / RBC:  / WBC    Sq Epi:  / Non Sq Epi:  / Bacteria:       Osmolality, Random Urine: 575 mos/kg (12-14 @ 07:51)  Sodium, Random Urine: 21 mmol/L (12-14 @ 06:32)  Potassium, Random Urine: 45 mmol/L (12-14 @ 06:32)  Chloride, Random Urine: <20 mmol/L (12-14 @ 06:32)

## 2017-12-15 NOTE — PROGRESS NOTE ADULT - ASSESSMENT
67M hx of copd, asthma, htn, presenting from home with shortness of breath, productive cough past 4 days , fevers since yesterday   Ex smoker  Patient with metapneumoviral URI  ?Pneumonia v bacterial superinfection (CAP)  No recent hospitalization or antimicrobials  Blood cx negative  stable  Would continue antimicrobials for now,can change to PO ceftin + zithromax when ready for DC  5 day total course  Oxygenation,steroids  and other plan per pulmonary and primary.  Infectious Diseases Service will cover over weekend.  Please call [phone] if issues  case d/w Dr coronado

## 2017-12-15 NOTE — PROGRESS NOTE ADULT - SUBJECTIVE AND OBJECTIVE BOX
[facility] - Mirta Moffett, Mary, Krista, Sharee, Aaron Soares  Office Number:  [phone]    Patient resting comfortably in bed.  He is on BIPAP.  Reports respiratory status better.  BP remains uncontrolled.    Telemetry:  Sinus rhythm.    MEDICATIONS  (STANDING):  ALBUTerol    90 MICROgram(s) HFA Inhaler 1 Puff(s) Inhalation every 4 hours  ALBUTerol/ipratropium for Nebulization 3 milliLiter(s) Nebulizer every 6 hours  azithromycin  IVPB 500 milliGRAM(s) IV Intermittent every 24 hours  cefTRIAXone   IVPB 1 Gram(s) IV Intermittent every 24 hours  heparin  Injectable 5000 Unit(s) SubCutaneous every 8 hours  methylPREDNISolone sodium succinate Injectable 20 milliGRAM(s) IV Push every 8 hours  sodium chloride 0.45%. 1000 milliLiter(s) (75 mL/Hr) IV Continuous <Continuous>  tiotropium 18 MICROgram(s) Capsule 1 Capsule(s) Inhalation daily      Allergies    No Known Allergies        Vital Signs Last 24 Hrs  T(C): 36.3 (15 Dec 2017 05:00), Max: 36.4 (14 Dec 2017 22:42)  T(F): 97.4 (15 Dec 2017 05:00), Max: 97.6 (14 Dec 2017 23:13)  HR: 80 (15 Dec 2017 06:44) (70 - 92)  BP: 190/90 (15 Dec 2017 06:44) (165/90 - 208/86)  BP(mean): --  RR: 22 (15 Dec 2017 06:44) (19 - 22)  SpO2: 100% (15 Dec 2017 06:44) (97% - 100%)    I&O's Summary    14 Dec 2017 07:01  -  15 Dec 2017 07:00  --------------------------------------------------------  IN: 0 mL / OUT: 1175 mL / NET: -1175 mL        ON EXAM:    General: NAD, awake and alert, oriented x 3  HEENT: Mucous membranes are moist, anicteric  Lungs: Non-labored, breath sounds are clear bilaterally.  Mild wheezes b/l.  Cardiovascular: Regular, S1 and S2, no murmurs, rubs, or gallops  Gastrointestinal: Bowel Sounds present, soft, nontender.   Lymph: No peripheral edema. No lymphadenopathy.  Skin: No rashes or ulcers  Psych:  Mood & affect appropriate    LABS: All Labs Reviewed:                        15.1   6.46  )-----------( 193      ( 14 Dec 2017 07:25 )             45.2                         15.0   2.93  )-----------( 193      ( 13 Dec 2017 07:25 )             42.5                         15.9   4.4   )-----------( 189      ( 12 Dec 2017 19:06 )             47.0     14 Dec 2017 07:35    132    |  90     |  26     ----------------------------<  125    4.6     |  24     |  1.00   13 Dec 2017 06:52    131    |  88     |  22     ----------------------------<  141    4.3     |  24     |  1.12   12 Dec 2017 19:06    130    |  86     |  15     ----------------------------<  102    4.2     |  23     |  1.09     Ca    9.0        14 Dec 2017 07:35  Ca    8.8        13 Dec 2017 06:52  Ca    9.1        12 Dec 2017 19:06    TPro  7.7    /  Alb  4.3    /  TBili  0.5    /  DBili  x      /  AST  25     /  ALT  15     /  AlkPhos  60     13 Dec 2017 06:52  TPro  8.4    /  Alb  4.7    /  TBili  0.8    /  DBili  x      /  AST  30     /  ALT  17     /  AlkPhos  67     12 Dec 2017 19:06          Blood Culture: Organism --  Gram Stain Blood -- Gram Stain --  Specimen Source .Urine Clean Catch (Midstream)  Culture-Blood --    Organism --  Gram Stain Blood -- Gram Stain --  Specimen Source .Blood Blood-Peripheral  Culture-Blood --      12-13 @ 06:52  Pro Bnp [id number]

## 2017-12-15 NOTE — PROGRESS NOTE ADULT - ASSESSMENT
pt with above history p/w hypoxic resp failure likely sec to copd exacerbation                 - pt clinically improving ,               -  - RVP +, appreciate ID/ Pulm input, cont steroids, nebs, abx, oxygen supplementation     hyponatremia- f/u closely     dvt prophylaxis

## 2017-12-15 NOTE — PROGRESS NOTE ADULT - ASSESSMENT
67M hx of copd, asthma, htn, presenting with hypoxic resp failure    - Cont Bipap. Wean as tolerated.  - Agree with rx for COPD exacerbation including steroids, nebs, abx per pulmonary  - No signs of significant ischemia or volume overload, though bnp is elevated  - Nonspecific EKG changes are not c/w ischemia.   - await echo  - has been somewhat hypertensive, started on amlodipine.  Start hydralzine 25 TID for uncontrolled blood pressures  - Further cardiac workup will depend on clinical course.   - All other workup per primary team. Will follow    The patient is at risk of abrupt decompensation.  35 minutes was spent with this patient.

## 2017-12-16 LAB
ANION GAP SERPL CALC-SCNC: 16 MMOL/L — SIGNIFICANT CHANGE UP (ref 5–17)
BUN SERPL-MCNC: 18 MG/DL — SIGNIFICANT CHANGE UP (ref 7–23)
CALCIUM SERPL-MCNC: 9.4 MG/DL — SIGNIFICANT CHANGE UP (ref 8.4–10.5)
CHLORIDE SERPL-SCNC: 90 MMOL/L — LOW (ref 96–108)
CO2 SERPL-SCNC: 29 MMOL/L — SIGNIFICANT CHANGE UP (ref 22–31)
CREAT SERPL-MCNC: 0.76 MG/DL — SIGNIFICANT CHANGE UP (ref 0.5–1.3)
GLUCOSE SERPL-MCNC: 139 MG/DL — HIGH (ref 70–99)
POTASSIUM SERPL-MCNC: 4.2 MMOL/L — SIGNIFICANT CHANGE UP (ref 3.5–5.3)
POTASSIUM SERPL-SCNC: 4.2 MMOL/L — SIGNIFICANT CHANGE UP (ref 3.5–5.3)
SODIUM SERPL-SCNC: 135 MMOL/L — SIGNIFICANT CHANGE UP (ref 135–145)

## 2017-12-16 PROCEDURE — 99233 SBSQ HOSP IP/OBS HIGH 50: CPT

## 2017-12-16 RX ORDER — HYDRALAZINE HCL 50 MG
50 TABLET ORAL
Qty: 0 | Refills: 0 | Status: DISCONTINUED | OUTPATIENT
Start: 2017-12-16 | End: 2017-12-18

## 2017-12-16 RX ADMIN — AMLODIPINE BESYLATE 10 MILLIGRAM(S): 2.5 TABLET ORAL at 05:00

## 2017-12-16 RX ADMIN — AZITHROMYCIN 250 MILLIGRAM(S): 500 TABLET, FILM COATED ORAL at 18:40

## 2017-12-16 RX ADMIN — Medication 20 MILLIGRAM(S): at 13:29

## 2017-12-16 RX ADMIN — Medication 25 MILLIGRAM(S): at 04:57

## 2017-12-16 RX ADMIN — CEFTRIAXONE 100 GRAM(S): 500 INJECTION, POWDER, FOR SOLUTION INTRAMUSCULAR; INTRAVENOUS at 23:24

## 2017-12-16 RX ADMIN — Medication 3 MILLILITER(S): at 16:03

## 2017-12-16 RX ADMIN — Medication 3 MILLILITER(S): at 09:32

## 2017-12-16 RX ADMIN — Medication 50 MILLIGRAM(S): at 18:40

## 2017-12-16 RX ADMIN — Medication 20 MILLIGRAM(S): at 23:19

## 2017-12-16 RX ADMIN — Medication 3 MILLILITER(S): at 03:00

## 2017-12-16 RX ADMIN — HEPARIN SODIUM 5000 UNIT(S): 5000 INJECTION INTRAVENOUS; SUBCUTANEOUS at 13:29

## 2017-12-16 RX ADMIN — HEPARIN SODIUM 5000 UNIT(S): 5000 INJECTION INTRAVENOUS; SUBCUTANEOUS at 04:57

## 2017-12-16 RX ADMIN — Medication 20 MILLIGRAM(S): at 04:57

## 2017-12-16 RX ADMIN — Medication 25 MILLIGRAM(S): at 13:29

## 2017-12-16 RX ADMIN — Medication 50 MILLIGRAM(S): at 23:23

## 2017-12-16 RX ADMIN — HEPARIN SODIUM 5000 UNIT(S): 5000 INJECTION INTRAVENOUS; SUBCUTANEOUS at 23:20

## 2017-12-16 RX ADMIN — Medication 3 MILLILITER(S): at 23:18

## 2017-12-16 RX ADMIN — SODIUM CHLORIDE 75 MILLILITER(S): 9 INJECTION, SOLUTION INTRAVENOUS at 13:31

## 2017-12-16 NOTE — PROGRESS NOTE ADULT - ASSESSMENT
67 M former smoker, with a PMH of HTN, asthma/ COPD, admitted with acute hypoxic resp failure requiring Bilevel support , found to have a HMPV infection with possible superimposed bacterial pneumonia    1. Acute resp failure with hypoxia/ COPD exac/ hMPV infection/ Possible Bact PNA  - patient worse this AM than yesterday, likely due to hMPV with COPD exacerbation +/- bacterial pna, +/- fluid overload  - would c/w solumedrol 20 mg IV q8h for now, continue with standing nebulizers add symbicort 160 2 puffs bid, c/w spiriva, c/w ceftriaxone/azithromycin  - would place patient on bipap prn at this point for increased wob, titrate O2 sat of 88-92%  - would also consider stopping IVF; he is extremely hypertensive and may have a component of diastolic dysfunction that is causing him to develop some pulmonary edema; consider TTE  - needs better BP control      --    Juan Louie MD  Pulmonary & Critical Care Medicine Fellow | PGY-6  [phone]

## 2017-12-16 NOTE — PROGRESS NOTE ADULT - SUBJECTIVE AND OBJECTIVE BOX
Follow up: SOB, HTN    HPI:  67M hx of copd, asthma, htn, presenting from home with shortness of breath, productive cough past 4 days , fevers since yesterday , pt denies chest pain, nausea, vomiting,  palpitations weakness numbness in ext , dysuria polyuria, no sick contacts no recent travel went to [facility] office today , found to be hypoxic <90% referred to ed, pt denies orthopnea/ pnd, never was intubated fro asthma exacerbation (12 Dec 2017 22:08)    He continues to have dyspnea today and is having trouble getting a deep breath at times. He has a nonproductive cough and reports no chest pain, palpitations, or syncope.      PAST MEDICAL & SURGICAL HISTORY:  Asthma  COPD (chronic obstructive pulmonary disease)      MEDICATIONS  (STANDING):  ALBUTerol    90 MICROgram(s) HFA Inhaler 1 Puff(s) Inhalation every 4 hours  ALBUTerol/ipratropium for Nebulization 3 milliLiter(s) Nebulizer every 6 hours  amLODIPine   Tablet 10 milliGRAM(s) Oral daily  azithromycin  IVPB 500 milliGRAM(s) IV Intermittent every 24 hours  cefTRIAXone   IVPB 1 Gram(s) IV Intermittent every 24 hours  heparin  Injectable 5000 Unit(s) SubCutaneous every 8 hours  hydrALAZINE 25 milliGRAM(s) Oral three times a day  methylPREDNISolone sodium succinate Injectable 20 milliGRAM(s) IV Push every 8 hours  sodium chloride 0.45%. 1000 milliLiter(s) (75 mL/Hr) IV Continuous <Continuous>  tiotropium 18 MICROgram(s) Capsule 1 Capsule(s) Inhalation daily    Vital Signs Last 24 Hrs  T(C): 36.4 (16 Dec 2017 13:23), Max: 36.4 (15 Dec 2017 20:59)  T(F): 97.5 (16 Dec 2017 13:23), Max: 97.6 (15 Dec 2017 20:59)  HR: 91 (16 Dec 2017 13:23) (77 - 94)  BP: 142/87 (16 Dec 2017 13:23) (142/87 - 184/82)  BP(mean): --  RR: 20 (16 Dec 2017 13:23) (18 - 21)  SpO2: 99% (16 Dec 2017 13:23) (96% - 100%)    I&O's Summary    15 Dec 2017 07:01  -  16 Dec 2017 07:00  --------------------------------------------------------  IN: 0 mL / OUT: 1900 mL / NET: -1900 mL    16 Dec 2017 07:01  -  16 Dec 2017 14:14  --------------------------------------------------------  IN: 120 mL / OUT: 500 mL / NET: -380 mL        PHYSICAL EXAM:    Constitutional: NAD, awake and alert, well-developed  Eyes:  EOMI,  Pupils round, no lesions  ENMT: no exudate or erythema  Pulmonary: Decreased breath sounds with scattered rhonchi bilaterally  Cardiovascular: PMI not palpable RRR normal S1 and S2, no murmurs, rubs, gallops or clicks  Gastrointestinal: Bowel Sounds present, soft, nontender.   Lymph: No cervical lymphadenopathy.  Neurological: Alert, no focal deficits  Skin: No rashes.  No cyanosis.  Psych:  Mood & affect appropriate   Ext: trc lower ext edema            12-16    135  |  90<L>  |  18  ----------------------------<  139<H>  4.2   |  29  |  0.76    Ca    9.4      16 Dec 2017 08:23      < from: 12 Lead ECG (12.12.17 @ 18:29) >    Ventricular Rate 98 BPM    Atrial Rate 98 BPM    P-R Interval 186 ms    QRS Duration 74 ms     ms    QTc 451 ms    P Axis 98 degrees    R Axis 68 degrees    T Axis 79 degrees    Diagnosis Line NORMAL SINUS RHYTHM  POSSIBLE LEFT ATRIAL ENLARGEMENT  SEPTAL INFARCT , AGE UNDETERMINED  ABNORMAL ECG    < end of copied text >    < from: CT Angio Chest w/ IV Cont (12.12.17 @ 22:55) >    EXAM:  CT ANGIO CHEST (W)AW IC                            PROCEDURE DATE:  12/12/2017            INTERPRETATION:  CLINICAL INFORMATION: Shortness of breath for 2 days   duration.    COMPARISON: None.    PROCEDURE:   CT Angiography of the Chest.  90ml of Omnipaque 350 was injected intravenously. 10 ml were discarded.  Sagittal and coronal reformats were performed as well as 3D (MIP)   reconstructions.    FINDINGS: Artifact from the patient's respiratory motion and metallic   density/foreign body along the right anterior chest wall degrading images.    LUNGS AND LARGE AIRWAYS: Patent central airways. Mild centrilobular   emphysema. Mild peribronchial thickening with scattered tree-in-bud   opacities, suggesting small airway disease.  Subsegmental bibasilar   atelectasis.  PLEURA: No pleural effusion or pneumothorax. Mild biapical pleural   thickening.  VESSELS: Adequate contrast ossification of the pulmonary arteries. No   pulmonary embolus. Atherosclerotic change of the thoracic aorta, great   vessels and coronary artery.  HEART: Normal heart size. Trace pericardial effusion.  MEDIASTINUM AND LUCHO: No lymphadenopathy.  CHEST WALL AND LOWER NECK: Artifact from the metallic densities/foreign   body along the right chest wall degradingimages.  VISUALIZED UPPER ABDOMEN: Degraded by patient's respiratory motion.   Grossly unremarkable.  BONES: Mild rightward curvature and degenerative changes of the spine.   Age-indeterminate mild anterior wedging/superior endplate depression of   T11, likely chronic. T9 vertebral body hemangioma.    IMPRESSION:    No pulmonary embolus.    Mild centrilobular emphysema. Findings suggestive of small airway   disease. Recommend clinical correlation to assess superimposed   bronchopneumonia.    Additional findings as described.                MYLES CONNORS M.D., RADIOLOGY RESIDENT  This document has been electronically signed.  SUZETTE LORENZ M.D., ATTENDING RADIOLOGIST  This document has been electronically signed. Dec 13 2017 12:26AM                < end of copied text >

## 2017-12-16 NOTE — PROGRESS NOTE ADULT - SUBJECTIVE AND OBJECTIVE BOX
CHIEF COMPLAINT: sob    Interval Events:  Used bipap overnight, but feels very sob this AM.  Feels like unable to take deep breath in, though is just getting nebulizer.  No fevers or chills. + productive cough.    REVIEW OF SYSTEMS:  Constitutional: [ ] negative [ ] fevers [ ] chills [ ] weight loss [ ] weight gain  HEENT: [ ] negative [ ] dry eyes [ ] eye irritation [ ] postnasal drip [ ] nasal congestion  CV: [ ] negative  [ ] chest pain [ ] orthopnea [ ] palpitations [ ] murmur  Resp: [ ] negative [ ] cough [ ] shortness of breath [ ] dyspnea [ ] wheezing [ ] sputum [ ] hemoptysis  GI: [ ] negative [ ] nausea [ ] vomiting [ ] diarrhea [ ] constipation [ ] abd pain [ ] dysphagia   : [ ] negative [ ] dysuria [ ] nocturia [ ] hematuria [ ] increased urinary frequency  Musculoskeletal: [ ] negative [ ] back pain [ ] myalgias [ ] arthralgias [ ] fracture  Skin: [ ] negative [ ] rash [ ] itch  Neurological: [ ] negative [ ] headache [ ] dizziness [ ] syncope [ ] weakness [ ] numbness  Psychiatric: [ ] negative [ ] anxiety [ ] depression  Endocrine: [ ] negative [ ] diabetes [ ] thyroid problem  Hematologic/Lymphatic: [ ] negative [ ] anemia [ ] bleeding problem  Allergic/Immunologic: [ ] negative [ ] itchy eyes [ ] nasal discharge [ ] hives [ ] angioedema  [x ] All other systems negative  [ ] Unable to assess ROS because ________    OBJECTIVE:  ICU Vital Signs Last 24 Hrs  T(C): 36.4 (16 Dec 2017 04:50), Max: 36.4 (15 Dec 2017 20:59)  T(F): 97.5 (16 Dec 2017 04:50), Max: 97.6 (15 Dec 2017 20:59)  HR: 86 (16 Dec 2017 10:22) (77 - 106)  BP: 179/85 (16 Dec 2017 04:50) (178/79 - 190/95)  BP(mean): --  ABP: --  ABP(mean): --  RR: 21 (16 Dec 2017 09:25) (18 - 22)  SpO2: 98% (16 Dec 2017 10:22) (96% - 100%)        12-15 @ 07:01  -  12-16 @ 07:00  --------------------------------------------------------  IN: 0 mL / OUT: 1900 mL / NET: -1900 mL    12-16 @ 07:01  -  12-16 @ 10:35  --------------------------------------------------------  IN: 0 mL / OUT: 500 mL / NET: -500 mL      CAPILLARY BLOOD GLUCOSE          PHYSICAL EXAM:  GENERAL: mild respiratory distress  HEENT:  Atraumatic, Normocephalic  EYES: EOMI, PERRLA, conjunctiva and sclera clear  NECK: Supple, No JVD  CHEST/LUNG: decreased air entry bilaterally  HEART: Regular rate and rhythm; No murmurs, rubs, or gallops  ABDOMEN: Soft, Nontender, Nondistended; Bowel sounds present  EXTREMITIES:  2+ Peripheral Pulses, No clubbing, cyanosis, or edema  PSYCH: AAOx3  NEUROLOGY: non-focal exam  SKIN: No rashes or lesions    HOSPITAL MEDICATIONS:  MEDICATIONS  (STANDING):  ALBUTerol    90 MICROgram(s) HFA Inhaler 1 Puff(s) Inhalation every 4 hours  ALBUTerol/ipratropium for Nebulization 3 milliLiter(s) Nebulizer every 6 hours  amLODIPine   Tablet 10 milliGRAM(s) Oral daily  azithromycin  IVPB 500 milliGRAM(s) IV Intermittent every 24 hours  cefTRIAXone   IVPB 1 Gram(s) IV Intermittent every 24 hours  heparin  Injectable 5000 Unit(s) SubCutaneous every 8 hours  hydrALAZINE 25 milliGRAM(s) Oral three times a day  methylPREDNISolone sodium succinate Injectable 20 milliGRAM(s) IV Push every 8 hours  sodium chloride 0.45%. 1000 milliLiter(s) (75 mL/Hr) IV Continuous <Continuous>  tiotropium 18 MICROgram(s) Capsule 1 Capsule(s) Inhalation daily    MEDICATIONS  (PRN):      LABS:    Hgb Trend: 15.1<--, 15.0<--, 15.9<--  12-16    135  |  90<L>  |  18  ----------------------------<  139<H>  4.2   |  29  |  0.76    Ca    9.4      16 Dec 2017 08:23      Creatinine Trend: 0.76<--, 1.00<--, 1.12<--, 1.09<--            MICROBIOLOGY:       RADIOLOGY:  [ ] Reviewed and interpreted by me    PULMONARY FUNCTION TESTS:    EKG:

## 2017-12-16 NOTE — PROGRESS NOTE ADULT - ASSESSMENT
67M hx of copd, asthma, htn, presenting with hypoxic resp failure. COPD exacerbation,hMPV, pneumonia  Still dyspneic    - Cont Bipap. Wean as tolerated.  - Agree with rx for COPD exacerbation including steroids, nebs, abx per pulmonary  - No signs of significant ischemia or volume overload, though bnp is elevated  - Nonspecific EKG changes are not c/w ischemia.   - await echo  - has been somewhat hypertensive, started on amlodipine, hydralzine 25 TID , will inc dose  - Further cardiac workup will depend on clinical course.   - All other workup per primary team. Will follow

## 2017-12-16 NOTE — PROGRESS NOTE ADULT - ASSESSMENT
pt with above history p/w hypoxic resp failure likely sec to copd exacerbation                 - pt clinically improving , bipap               -  - RVP +, appreciate ID/ Pulm input, cont steroids, nebs, abx, oxygen supplementation     hyponatremia- f/u closely     dvt prophylaxis

## 2017-12-16 NOTE — PROGRESS NOTE ADULT - SUBJECTIVE AND OBJECTIVE BOX
chief complaint : shortness of breath         SUBJECTIVE / OVERNIGHT EVENTS: pt says his breathing is better than before    MEDICATIONS  (STANDING):  ALBUTerol    90 MICROgram(s) HFA Inhaler 1 Puff(s) Inhalation every 4 hours  ALBUTerol/ipratropium for Nebulization 3 milliLiter(s) Nebulizer every 6 hours  amLODIPine   Tablet 10 milliGRAM(s) Oral daily  azithromycin  IVPB 500 milliGRAM(s) IV Intermittent every 24 hours  cefTRIAXone   IVPB 1 Gram(s) IV Intermittent every 24 hours  heparin  Injectable 5000 Unit(s) SubCutaneous every 8 hours  hydrALAZINE 50 milliGRAM(s) Oral four times a day  methylPREDNISolone sodium succinate Injectable 20 milliGRAM(s) IV Push every 8 hours  sodium chloride 0.45%. 1000 milliLiter(s) (75 mL/Hr) IV Continuous <Continuous>  tiotropium 18 MICROgram(s) Capsule 1 Capsule(s) Inhalation daily    MEDICATIONS  (PRN):    Vital Signs Last 24 Hrs  T(C): 36.5 (16 Dec 2017 20:23), Max: 36.5 (16 Dec 2017 20:23)  T(F): 97.7 (16 Dec 2017 20:23), Max: 97.7 (16 Dec 2017 20:23)  HR: 90 (16 Dec 2017 20:23) (77 - 92)  BP: 150/90 (16 Dec 2017 20:23) (108/65 - 179/85)  BP(mean): --  RR: 20 (16 Dec 2017 20:23) (18 - 21)  SpO2: 97% (16 Dec 2017 20:23) (96% - 100%)    Constitutional: No fever, fatigue  Skin: No rash.  Eyes: No recent vision problems or eye pain.  ENT: No congestion, ear pain, or sore throat.  Cardiovascular: No chest pain or palpation.  Respiratory: No cough, shortness of breath, congestion, or wheezing.  Gastrointestinal: No abdominal pain, nausea, vomiting, or diarrhea.  Genitourinary: No dysuria.  Musculoskeletal: No joint swelling.  Neurologic: No headache.    PHYSICAL EXAM:  GENERAL: NAD  EYES: EOMI, PERRLA  NECK: Supple, No JVD  CHEST/LUNG: dec breath sounds at bases   HEART:  S1 , S2 +  ABDOMEN: soft, bs+  EXTREMITIES:  trace edema  NEUROLOGY:alert awake calm cooperative    LABS:  12-16    135  |  90<L>  |  18  ----------------------------<  139<H>  4.2   |  29  |  0.76    Ca    9.4      16 Dec 2017 08:23      Creatinine Trend: 0.76 <--, 1.00 <--, 1.12 <--, 1.09 <--    Urine Studies:  Urinalysis Basic - ( 13 Dec 2017 05:27 )    Color: Yellow / Appearance: Clear / SG: >1.030 / pH:   Gluc:  / Ketone: Negative  / Bili: Negative / Urobili: Negative   Blood:  / Protein: Trace / Nitrite: Negative   Leuk Esterase: Negative / RBC:  / WBC    Sq Epi:  / Non Sq Epi:  / Bacteria:       Osmolality, Random Urine: 575 mos/kg (12-14 @ 07:51)  Sodium, Random Urine: 21 mmol/L (12-14 @ 06:32)  Potassium, Random Urine: 45 mmol/L (12-14 @ 06:32)  Chloride, Random Urine: <20 mmol/L (12-14 @ 06:32)

## 2017-12-17 LAB
ANION GAP SERPL CALC-SCNC: 18 MMOL/L — HIGH (ref 5–17)
BUN SERPL-MCNC: 23 MG/DL — SIGNIFICANT CHANGE UP (ref 7–23)
CALCIUM SERPL-MCNC: 9.3 MG/DL — SIGNIFICANT CHANGE UP (ref 8.4–10.5)
CHLORIDE SERPL-SCNC: 92 MMOL/L — LOW (ref 96–108)
CO2 SERPL-SCNC: 26 MMOL/L — SIGNIFICANT CHANGE UP (ref 22–31)
CREAT SERPL-MCNC: 0.87 MG/DL — SIGNIFICANT CHANGE UP (ref 0.5–1.3)
CULTURE RESULTS: SIGNIFICANT CHANGE UP
CULTURE RESULTS: SIGNIFICANT CHANGE UP
GLUCOSE SERPL-MCNC: 143 MG/DL — HIGH (ref 70–99)
HCT VFR BLD CALC: 41.9 % — SIGNIFICANT CHANGE UP (ref 39–50)
HGB BLD-MCNC: 14.1 G/DL — SIGNIFICANT CHANGE UP (ref 13–17)
MCHC RBC-ENTMCNC: 31.3 PG — SIGNIFICANT CHANGE UP (ref 27–34)
MCHC RBC-ENTMCNC: 33.7 GM/DL — SIGNIFICANT CHANGE UP (ref 32–36)
MCV RBC AUTO: 92.9 FL — SIGNIFICANT CHANGE UP (ref 80–100)
PLATELET # BLD AUTO: 227 K/UL — SIGNIFICANT CHANGE UP (ref 150–400)
POTASSIUM SERPL-MCNC: 4.5 MMOL/L — SIGNIFICANT CHANGE UP (ref 3.5–5.3)
POTASSIUM SERPL-SCNC: 4.5 MMOL/L — SIGNIFICANT CHANGE UP (ref 3.5–5.3)
RBC # BLD: 4.51 M/UL — SIGNIFICANT CHANGE UP (ref 4.2–5.8)
RBC # FLD: 13.2 % — SIGNIFICANT CHANGE UP (ref 10.3–14.5)
SODIUM SERPL-SCNC: 136 MMOL/L — SIGNIFICANT CHANGE UP (ref 135–145)
SPECIMEN SOURCE: SIGNIFICANT CHANGE UP
SPECIMEN SOURCE: SIGNIFICANT CHANGE UP
WBC # BLD: 5.8 K/UL — SIGNIFICANT CHANGE UP (ref 3.8–10.5)
WBC # FLD AUTO: 5.8 K/UL — SIGNIFICANT CHANGE UP (ref 3.8–10.5)

## 2017-12-17 PROCEDURE — 99233 SBSQ HOSP IP/OBS HIGH 50: CPT

## 2017-12-17 RX ADMIN — HEPARIN SODIUM 5000 UNIT(S): 5000 INJECTION INTRAVENOUS; SUBCUTANEOUS at 05:46

## 2017-12-17 RX ADMIN — Medication 50 MILLIGRAM(S): at 23:27

## 2017-12-17 RX ADMIN — HEPARIN SODIUM 5000 UNIT(S): 5000 INJECTION INTRAVENOUS; SUBCUTANEOUS at 23:27

## 2017-12-17 RX ADMIN — Medication 20 MILLIGRAM(S): at 05:46

## 2017-12-17 RX ADMIN — Medication 50 MILLIGRAM(S): at 05:46

## 2017-12-17 RX ADMIN — Medication 50 MILLIGRAM(S): at 11:21

## 2017-12-17 RX ADMIN — AZITHROMYCIN 250 MILLIGRAM(S): 500 TABLET, FILM COATED ORAL at 20:17

## 2017-12-17 RX ADMIN — Medication 50 MILLIGRAM(S): at 17:18

## 2017-12-17 RX ADMIN — Medication 3 MILLILITER(S): at 13:14

## 2017-12-17 RX ADMIN — Medication 3 MILLILITER(S): at 20:17

## 2017-12-17 RX ADMIN — CEFTRIAXONE 100 GRAM(S): 500 INJECTION, POWDER, FOR SOLUTION INTRAMUSCULAR; INTRAVENOUS at 23:27

## 2017-12-17 RX ADMIN — Medication 3 MILLILITER(S): at 05:25

## 2017-12-17 RX ADMIN — HEPARIN SODIUM 5000 UNIT(S): 5000 INJECTION INTRAVENOUS; SUBCUTANEOUS at 13:14

## 2017-12-17 RX ADMIN — AMLODIPINE BESYLATE 10 MILLIGRAM(S): 2.5 TABLET ORAL at 05:46

## 2017-12-17 RX ADMIN — Medication 3 MILLILITER(S): at 08:13

## 2017-12-17 NOTE — PROGRESS NOTE ADULT - SUBJECTIVE AND OBJECTIVE BOX
chief complaint : shortness of breath         SUBJECTIVE / OVERNIGHT EVENTS: pt says his breathing is better than before    MEDICATIONS  (STANDING):  ALBUTerol    90 MICROgram(s) HFA Inhaler 1 Puff(s) Inhalation every 4 hours  ALBUTerol/ipratropium for Nebulization 3 milliLiter(s) Nebulizer every 6 hours  amLODIPine   Tablet 10 milliGRAM(s) Oral daily  azithromycin  IVPB 500 milliGRAM(s) IV Intermittent every 24 hours  cefTRIAXone   IVPB 1 Gram(s) IV Intermittent every 24 hours  heparin  Injectable 5000 Unit(s) SubCutaneous every 8 hours  hydrALAZINE 50 milliGRAM(s) Oral four times a day  methylPREDNISolone sodium succinate Injectable 40 milliGRAM(s) IV Push daily  tiotropium 18 MICROgram(s) Capsule 1 Capsule(s) Inhalation daily    MEDICATIONS  (PRN):      Vital Signs Last 24 Hrs  T(C): 36.8 (17 Dec 2017 21:09), Max: 36.8 (17 Dec 2017 21:09)  T(F): 98.2 (17 Dec 2017 21:09), Max: 98.2 (17 Dec 2017 21:09)  HR: 98 (17 Dec 2017 21:09) (75 - 116)  BP: 146/78 (17 Dec 2017 21:09) (146/78 - 190/100)  BP(mean): --  RR: 24 (17 Dec 2017 21:09) (20 - 30)  SpO2: 96% (17 Dec 2017 21:09) (96% - 98%)    Constitutional: No fever, fatigue  Skin: No rash.  Eyes: No recent vision problems or eye pain.  ENT: No congestion, ear pain, or sore throat.  Cardiovascular: No chest pain or palpation.  Respiratory: No cough, shortness of breath, congestion, or wheezing.  Gastrointestinal: No abdominal pain, nausea, vomiting, or diarrhea.  Genitourinary: No dysuria.  Musculoskeletal: No joint swelling.  Neurologic: No headache.    PHYSICAL EXAM:  GENERAL: NAD  EYES: EOMI, PERRLA  NECK: Supple, No JVD  CHEST/LUNG: dec breath sounds at bases   HEART:  S1 , S2 +  ABDOMEN: soft, bs+  EXTREMITIES:  trace edema  NEUROLOGY:alert awake calm cooperative    LABS:  12-17    136  |  92<L>  |  23  ----------------------------<  143<H>  4.5   |  26  |  0.87    Ca    9.3      17 Dec 2017 08:34      Creatinine Trend: 0.87 <--, 0.76 <--, 1.00 <--, 1.12 <--, 1.09 <--                        14.1   5.80  )-----------( 227      ( 17 Dec 2017 08:41 )             41.9     Urine Studies:  Urinalysis Basic - ( 13 Dec 2017 05:27 )    Color: Yellow / Appearance: Clear / SG: >1.030 / pH:   Gluc:  / Ketone: Negative  / Bili: Negative / Urobili: Negative   Blood:  / Protein: Trace / Nitrite: Negative   Leuk Esterase: Negative / RBC:  / WBC    Sq Epi:  / Non Sq Epi:  / Bacteria:       Osmolality, Random Urine: 575 mos/kg (12-14 @ 07:51)  Sodium, Random Urine: 21 mmol/L (12-14 @ 06:32)  Potassium, Random Urine: 45 mmol/L (12-14 @ 06:32)  Chloride, Random Urine: <20 mmol/L (12-14 @ 06:32)

## 2017-12-17 NOTE — PROGRESS NOTE ADULT - SUBJECTIVE AND OBJECTIVE BOX
CHIEF COMPLAINT: sob    Interval Events:  No events overnight.  Used bipap, feels better this AM.  Put on HFNC this AM, tolerating it well.  Feels like breathing is easier today, has more energy than prior. No f/chills/cough.    REVIEW OF SYSTEMS:  Constitutional: [ ] negative [ ] fevers [ ] chills [ ] weight loss [ ] weight gain  HEENT: [ ] negative [ ] dry eyes [ ] eye irritation [ ] postnasal drip [ ] nasal congestion  CV: [ ] negative  [ ] chest pain [ ] orthopnea [ ] palpitations [ ] murmur  Resp: [ ] negative [ ] cough [ ] shortness of breath [ ] dyspnea [ ] wheezing [ ] sputum [ ] hemoptysis  GI: [ ] negative [ ] nausea [ ] vomiting [ ] diarrhea [ ] constipation [ ] abd pain [ ] dysphagia   : [ ] negative [ ] dysuria [ ] nocturia [ ] hematuria [ ] increased urinary frequency  Musculoskeletal: [ ] negative [ ] back pain [ ] myalgias [ ] arthralgias [ ] fracture  Skin: [ ] negative [ ] rash [ ] itch  Neurological: [ ] negative [ ] headache [ ] dizziness [ ] syncope [ ] weakness [ ] numbness  Psychiatric: [ ] negative [ ] anxiety [ ] depression  Endocrine: [ ] negative [ ] diabetes [ ] thyroid problem  Hematologic/Lymphatic: [ ] negative [ ] anemia [ ] bleeding problem  Allergic/Immunologic: [ ] negative [ ] itchy eyes [ ] nasal discharge [ ] hives [ ] angioedema  [x ] All other systems negative  [ ] Unable to assess ROS because ________    OBJECTIVE:  ICU Vital Signs Last 24 Hrs  T(C): 36.3 (17 Dec 2017 04:45), Max: 36.5 (16 Dec 2017 20:23)  T(F): 97.4 (17 Dec 2017 04:45), Max: 97.7 (16 Dec 2017 20:23)  HR: 90 (17 Dec 2017 06:42) (75 - 92)  BP: 157/82 (17 Dec 2017 04:45) (108/65 - 157/82)  BP(mean): --  ABP: --  ABP(mean): --  RR: 22 (17 Dec 2017 08:25) (20 - 22)  SpO2: 97% (17 Dec 2017 08:25) (95% - 100%)        12-16 @ 07:01  -  12-17 @ 07:00  --------------------------------------------------------  IN: 1770 mL / OUT: 1300 mL / NET: 470 mL      CAPILLARY BLOOD GLUCOSE          PHYSICAL EXAM:  GENERAL: mild respiratory distress  HEENT:  Atraumatic, Normocephalic  EYES: EOMI, PERRLA, conjunctiva and sclera clear  NECK: Supple, No JVD  CHEST/LUNG: decreased air entry bilaterally though improving from yesterday  HEART: Regular rate and rhythm; No murmurs, rubs, or gallops  ABDOMEN: Soft, Nontender, Nondistended; Bowel sounds present  EXTREMITIES:  2+ Peripheral Pulses, No clubbing, cyanosis, or edema  PSYCH: AAOx3  NEUROLOGY: non-focal exam  SKIN: No rashes or lesions    HOSPITAL MEDICATIONS:  MEDICATIONS  (STANDING):  ALBUTerol    90 MICROgram(s) HFA Inhaler 1 Puff(s) Inhalation every 4 hours  ALBUTerol/ipratropium for Nebulization 3 milliLiter(s) Nebulizer every 6 hours  amLODIPine   Tablet 10 milliGRAM(s) Oral daily  azithromycin  IVPB 500 milliGRAM(s) IV Intermittent every 24 hours  cefTRIAXone   IVPB 1 Gram(s) IV Intermittent every 24 hours  heparin  Injectable 5000 Unit(s) SubCutaneous every 8 hours  hydrALAZINE 50 milliGRAM(s) Oral four times a day  methylPREDNISolone sodium succinate Injectable 40 milliGRAM(s) IV Push daily  sodium chloride 0.45%. 1000 milliLiter(s) (75 mL/Hr) IV Continuous <Continuous>  tiotropium 18 MICROgram(s) Capsule 1 Capsule(s) Inhalation daily    MEDICATIONS  (PRN):      LABS:                        14.1   5.80  )-----------( 227      ( 17 Dec 2017 08:41 )             41.9     Hgb Trend: 14.1<--, 15.1<--, 15.0<--, 15.9<--  12-17    136  |  92<L>  |  23  ----------------------------<  143<H>  4.5   |  26  |  0.87    Ca    9.3      17 Dec 2017 08:34      Creatinine Trend: 0.87<--, 0.76<--, 1.00<--, 1.12<--, 1.09<--

## 2017-12-17 NOTE — PROGRESS NOTE ADULT - ASSESSMENT
pt with above history p/w hypoxic resp failure likely sec to copd exacerbation                 - pt clinically improving , bipap               -  - RVP +, appreciate ID/ Pulm input, cont steroids, nebs, abx, oxygen supplementation     hyponatremia- improved    dvt prophylaxis

## 2017-12-17 NOTE — PROGRESS NOTE ADULT - SUBJECTIVE AND OBJECTIVE BOX
Follow up: SOB    HPI:  67M hx of copd, asthma, htn, presenting from home with shortness of breath, productive cough past 4 days , fevers since yesterday , pt denies chest pain, nausea, vomiting,  palpitations weakness numbness in ext , dysuria polyuria, no sick contacts no recent travel went to [facility] office today , found to be hypoxic <90% referred to ed, pt denies orthopnea/ pnd, never was intubated fro asthma exacerbation (12 Dec 2017 22:08)    He is awake and alert feeling somewhat better this morning. He remains on BiPAP and still had some shortness of breath at times       PAST MEDICAL & SURGICAL HISTORY:  Asthma  COPD (chronic obstructive pulmonary disease)      MEDICATIONS  (STANDING):  ALBUTerol    90 MICROgram(s) HFA Inhaler 1 Puff(s) Inhalation every 4 hours  ALBUTerol/ipratropium for Nebulization 3 milliLiter(s) Nebulizer every 6 hours  amLODIPine   Tablet 10 milliGRAM(s) Oral daily  azithromycin  IVPB 500 milliGRAM(s) IV Intermittent every 24 hours  cefTRIAXone   IVPB 1 Gram(s) IV Intermittent every 24 hours  heparin  Injectable 5000 Unit(s) SubCutaneous every 8 hours  hydrALAZINE 50 milliGRAM(s) Oral four times a day  methylPREDNISolone sodium succinate Injectable 40 milliGRAM(s) IV Push daily  sodium chloride 0.45%. 1000 milliLiter(s) (75 mL/Hr) IV Continuous <Continuous>  tiotropium 18 MICROgram(s) Capsule 1 Capsule(s) Inhalation daily    MEDICATIONS  (PRN):      REVIEW OF SYSTEMS:    CONSTITUTIONAL: No weakness, fevers or chills  EYES: No visual changes, No diplopia  ENMT: No throat pain , No exudate  NECK: No pain or stiffness  RESPIRATORY: No cough, wheezing, hemoptysis; No shortness of breath  CARDIOVASCULAR: No chest pain or palpitations  GASTROINTESTINAL: No abdominal pain. No nausea, vomiting, or hematemesis; No diarrhea or constipation. No melena or hematochezia.  GENITOURINARY: No dysuria, frequency or hematuria  NEUROLOGICAL: No numbness or weakness  SKIN: No itching or rash  All other review of systems is negative unless indicated above    Vital Signs Last 24 Hrs  T(C): 36.3 (17 Dec 2017 04:45), Max: 36.5 (16 Dec 2017 20:23)  T(F): 97.4 (17 Dec 2017 04:45), Max: 97.7 (16 Dec 2017 20:23)  HR: 90 (17 Dec 2017 06:42) (75 - 92)  BP: 157/82 (17 Dec 2017 04:45) (108/65 - 157/82)  BP(mean): --  RR: 22 (17 Dec 2017 08:25) (20 - 22)  SpO2: 97% (17 Dec 2017 08:25) (95% - 100%)    I&O's Summary    16 Dec 2017 07:01  -  17 Dec 2017 07:00  --------------------------------------------------------  IN: 1770 mL / OUT: 1300 mL / NET: 470 mL        PHYSICAL EXAM:    Constitutional: NAD, awake and alert, well-developed  Eyes:  EOMI,  Pupils round, no lesions  ENMT: no exudate or erythema  Pulmonary: Non-labored, breath sounds are clear bilaterally, No wheezing, rales or rhonchi  Cardiovascular: PMI not palpable RRR normal S1 and S2, no murmurs, rubs, gallops or clicks  Gastrointestinal: Bowel Sounds present, soft, nontender.   Lymph: No cervical lymphadenopathy.  Neurological: Alert, no focal deficits  Skin: No rashes.  No cyanosis.  Psych:  Mood & affect appropriate   Ext: No lower ext edema                                14.1   5.80  )-----------( 227      ( 17 Dec 2017 08:41 )             41.9     12-17    136  |  92<L>  |  23  ----------------------------<  143<H>  4.5   |  26  |  0.87    Ca    9.3      17 Dec 2017 08:34 Follow up: SOB    HPI:  67M hx of copd, asthma, htn, presenting from home with shortness of breath, productive cough past 4 days , fevers since yesterday , pt denies chest pain, nausea, vomiting,  palpitations weakness numbness in ext , dysuria polyuria, no sick contacts no recent travel went to [employer] office today , found to be hypoxic <90% referred to ed, pt denies orthopnea/ pnd, never was intubated fro asthma exacerbation (12 Dec 2017 22:08)    He is awake and alert feeling somewhat better this morning. He remains on BiPAP and still had some shortness of breath at times       PAST MEDICAL & SURGICAL HISTORY:  Asthma  COPD (chronic obstructive pulmonary disease)      MEDICATIONS  (STANDING):  ALBUTerol    90 MICROgram(s) HFA Inhaler 1 Puff(s) Inhalation every 4 hours  ALBUTerol/ipratropium for Nebulization 3 milliLiter(s) Nebulizer every 6 hours  amLODIPine   Tablet 10 milliGRAM(s) Oral daily  azithromycin  IVPB 500 milliGRAM(s) IV Intermittent every 24 hours  cefTRIAXone   IVPB 1 Gram(s) IV Intermittent every 24 hours  heparin  Injectable 5000 Unit(s) SubCutaneous every 8 hours  hydrALAZINE 50 milliGRAM(s) Oral four times a day  methylPREDNISolone sodium succinate Injectable 40 milliGRAM(s) IV Push daily  sodium chloride 0.45%. 1000 milliLiter(s) (75 mL/Hr) IV Continuous <Continuous>  tiotropium 18 MICROgram(s) Capsule 1 Capsule(s) Inhalation daily    Vital Signs Last 24 Hrs  T(C): 36.3 (17 Dec 2017 04:45), Max: 36.5 (16 Dec 2017 20:23)  T(F): 97.4 (17 Dec 2017 04:45), Max: 97.7 (16 Dec 2017 20:23)  HR: 90 (17 Dec 2017 06:42) (75 - 92)  BP: 157/82 (17 Dec 2017 04:45) (108/65 - 157/82)  BP(mean): --  RR: 22 (17 Dec 2017 08:25) (20 - 22)  SpO2: 97% (17 Dec 2017 08:25) (95% - 100%)    I&O's Summary    16 Dec 2017 07:01  -  17 Dec 2017 07:00  --------------------------------------------------------  IN: 1770 mL / OUT: 1300 mL / NET: 470 mL        PHYSICAL EXAM:    Constitutional: NAD, awake and alert, well-developed  Eyes:   Pupils round, no lesions  ENMT: no exudate or erythema  Pulmonary: mild tachypnea, scattered rhonchi bilat  Cardiovascular: PMI not palpable RRR normal S1 and S2, no murmurs, rubs, gallops or clicks  Gastrointestinal: Bowel Sounds present, soft, nontender.   Lymph: No cervical lymphadenopathy.  Neurological: Alert, no focal deficits  Skin: No rashes.  No cyanosis.  Psych:  Mood & affect appropriate   Ext: No lower ext edema                                14.1   5.80  )-----------( 227      ( 17 Dec 2017 08:41 )             41.9     12-17    136  |  92<L>  |  23  ----------------------------<  143<H>  4.5   |  26  |  0.87    Ca    9.3      17 Dec 2017 08:34    < from: 12 Lead ECG (12.12.17 @ 18:29) >    Ventricular Rate 98 BPM    Atrial Rate 98 BPM    P-R Interval 186 ms    QRS Duration 74 ms     ms    QTc 451 ms    P Axis 98 degrees    R Axis 68 degrees    T Axis 79 degrees    Diagnosis Line NORMAL SINUS RHYTHM  POSSIBLE LEFT ATRIAL ENLARGEMENT  SEPTAL INFARCT , AGE UNDETERMINED  ABNORMAL ECG    < end of copied text >  < from: CT Angio Chest w/ IV Cont (12.12.17 @ 22:55) >    EXAM:  CT ANGIO CHEST (W)AW IC                            PROCEDURE DATE:  12/12/2017            INTERPRETATION:  CLINICAL INFORMATION: Shortness of breath for 2 days   duration.    COMPARISON: None.    PROCEDURE:   CT Angiography of the Chest.  90ml of Omnipaque 350 was injected intravenously. 10 ml were discarded.  Sagittal and coronal reformats were performed as well as 3D (MIP)   reconstructions.    FINDINGS: Artifact from the patient's respiratory motion and metallic   density/foreign body along the right anterior chest wall degrading images.    LUNGS AND LARGE AIRWAYS: Patent central airways. Mild centrilobular   emphysema. Mild peribronchial thickening with scattered tree-in-bud   opacities, suggesting small airway disease.  Subsegmental bibasilar   atelectasis.  PLEURA: No pleural effusion or pneumothorax. Mild biapical pleural   thickening.  VESSELS: Adequate contrast ossification of the pulmonary arteries. No   pulmonary embolus. Atherosclerotic change of the thoracic aorta, great   vessels and coronary artery.  HEART: Normal heart size. Trace pericardial effusion.  MEDIASTINUM AND LUCHO: No lymphadenopathy.  CHEST WALL AND LOWER NECK: Artifact from the metallic densities/foreign   body along the right chest wall degradingimages.  VISUALIZED UPPER ABDOMEN: Degraded by patient's respiratory motion.   Grossly unremarkable.  BONES: Mild rightward curvature and degenerative changes of the spine.   Age-indeterminate mild anterior wedging/superior endplate depression of   T11, likely chronic. T9 vertebral body hemangioma.    IMPRESSION:    No pulmonary embolus.    Mild centrilobular emphysema. Findings suggestive of small airway   disease. Recommend clinical correlation to assess superimposed   bronchopneumonia.    Additional findings as described.                MYLES CONNORS M.D., RADIOLOGY RESIDENT  This document has been electronically signed.  SUZETTE LORENZ M.D., ATTENDING RADIOLOGIST  This document has been electronically signed. Dec 13 2017 12:26AM                < end of copied text >

## 2017-12-17 NOTE — PROGRESS NOTE ADULT - ASSESSMENT
67 M former smoker, with a PMH of HTN, asthma/ COPD, admitted with acute hypoxic resp failure requiring Bilevel support , found to have a HMPV infection with possible superimposed bacterial pneumonia    1. Acute resp failure with hypoxia/ COPD exac/ hMPV infection/ Possible Bact PNA  - likely due to hMPV with COPD exacerbation +/- bacterial pna, +/- fluid overload  - improving today, on HFNC 36%, 30 LPM  - would switch to solumedrol 40 mg IV daily, c/w standing nebulizers; please add symbicort 160 2 puffs bid, c/w spiriva  - continue with five day course of ceftriaxone and azithromycin  - bipap prn, continue to wean down HFNC as tolerated; target O2 sat is 88-92%  - please stop IVF if not needed        --    Juan Louie MD  Pulmonary & Critical Care Medicine Fellow | PGY-6  [phone]

## 2017-12-17 NOTE — PROGRESS NOTE ADULT - ASSESSMENT
67M hx of copd, asthma, htn, presenting with hypoxic resp failure. COPD exacerbation,hMPV, pneumonia  Still dyspneic, slightly better    - Cont Bipap. Wean as tolerated.  - Agree with rx for COPD exacerbation including steroids, nebs, abx per pulmonary  - No signs of significant ischemia or volume overload, though bnp is elevated  - Nonspecific EKG changes are not c/w ischemia.   - await echo  - has been somewhat hypertensive, started on amlodipine, hydralzine 25 TID , will inc dose  - Further cardiac workup will depend on clinical course.   - All other workup per primary team. Will follow 67M hx of copd, asthma, htn, presenting with hypoxic resp failure. COPD exacerbation,hMPV, pneumonia  Still dyspneic, slightly better  BP reasonable    - Cont Bipap. Wean as tolerated.  - Agree with rx for COPD exacerbation including steroids, nebs, abx per pulmonary  - No signs of significant ischemia or volume overload, though bnp is elevated  - Nonspecific EKG changes are not c/w ischemia.   - await echo  - cont amlodipine, hydralazine  - Further cardiac workup will depend on clinical course.   - All other workup per primary team. Will follow

## 2017-12-18 LAB
ALBUMIN SERPL ELPH-MCNC: 4.1 G/DL — SIGNIFICANT CHANGE UP (ref 3.3–5)
ALP SERPL-CCNC: 52 U/L — SIGNIFICANT CHANGE UP (ref 40–120)
ALT FLD-CCNC: 28 U/L RC — SIGNIFICANT CHANGE UP (ref 10–45)
ANION GAP SERPL CALC-SCNC: 15 MMOL/L — SIGNIFICANT CHANGE UP (ref 5–17)
APTT BLD: 28.7 SEC — SIGNIFICANT CHANGE UP (ref 27.5–37.4)
AST SERPL-CCNC: 34 U/L — SIGNIFICANT CHANGE UP (ref 10–40)
BASOPHILS # BLD AUTO: 0 K/UL — SIGNIFICANT CHANGE UP (ref 0–0.2)
BASOPHILS NFR BLD AUTO: 0.3 % — SIGNIFICANT CHANGE UP (ref 0–2)
BILIRUB SERPL-MCNC: 0.5 MG/DL — SIGNIFICANT CHANGE UP (ref 0.2–1.2)
BUN SERPL-MCNC: 24 MG/DL — HIGH (ref 7–23)
CALCIUM SERPL-MCNC: 9.1 MG/DL — SIGNIFICANT CHANGE UP (ref 8.4–10.5)
CHLORIDE SERPL-SCNC: 93 MMOL/L — LOW (ref 96–108)
CO2 SERPL-SCNC: 29 MMOL/L — SIGNIFICANT CHANGE UP (ref 22–31)
CREAT SERPL-MCNC: 0.91 MG/DL — SIGNIFICANT CHANGE UP (ref 0.5–1.3)
EOSINOPHIL # BLD AUTO: 0 K/UL — SIGNIFICANT CHANGE UP (ref 0–0.5)
EOSINOPHIL NFR BLD AUTO: 0.2 % — SIGNIFICANT CHANGE UP (ref 0–6)
GAS PNL BLDA: SIGNIFICANT CHANGE UP
GLUCOSE BLDC GLUCOMTR-MCNC: 137 MG/DL — HIGH (ref 70–99)
GLUCOSE SERPL-MCNC: 155 MG/DL — HIGH (ref 70–99)
HCT VFR BLD CALC: 47.1 % — SIGNIFICANT CHANGE UP (ref 39–50)
HGB BLD-MCNC: 15.6 G/DL — SIGNIFICANT CHANGE UP (ref 13–17)
INR BLD: 1.05 RATIO — SIGNIFICANT CHANGE UP (ref 0.88–1.16)
LYMPHOCYTES # BLD AUTO: 2.1 K/UL — SIGNIFICANT CHANGE UP (ref 1–3.3)
LYMPHOCYTES # BLD AUTO: 22.4 % — SIGNIFICANT CHANGE UP (ref 13–44)
MAGNESIUM SERPL-MCNC: 2.7 MG/DL — HIGH (ref 1.6–2.6)
MCHC RBC-ENTMCNC: 31.9 PG — SIGNIFICANT CHANGE UP (ref 27–34)
MCHC RBC-ENTMCNC: 33.1 GM/DL — SIGNIFICANT CHANGE UP (ref 32–36)
MCV RBC AUTO: 96.4 FL — SIGNIFICANT CHANGE UP (ref 80–100)
MONOCYTES # BLD AUTO: 1.1 K/UL — HIGH (ref 0–0.9)
MONOCYTES NFR BLD AUTO: 11.5 % — SIGNIFICANT CHANGE UP (ref 2–14)
NEUTROPHILS # BLD AUTO: 6 K/UL — SIGNIFICANT CHANGE UP (ref 1.8–7.4)
NEUTROPHILS NFR BLD AUTO: 65.5 % — SIGNIFICANT CHANGE UP (ref 43–77)
PHOSPHATE SERPL-MCNC: 3 MG/DL — SIGNIFICANT CHANGE UP (ref 2.5–4.5)
PLATELET # BLD AUTO: 274 K/UL — SIGNIFICANT CHANGE UP (ref 150–400)
POTASSIUM SERPL-MCNC: 3.8 MMOL/L — SIGNIFICANT CHANGE UP (ref 3.5–5.3)
POTASSIUM SERPL-SCNC: 3.8 MMOL/L — SIGNIFICANT CHANGE UP (ref 3.5–5.3)
PROT SERPL-MCNC: 7.6 G/DL — SIGNIFICANT CHANGE UP (ref 6–8.3)
PROTHROM AB SERPL-ACNC: 11.4 SEC — SIGNIFICANT CHANGE UP (ref 9.8–12.7)
RBC # BLD: 4.89 M/UL — SIGNIFICANT CHANGE UP (ref 4.2–5.8)
RBC # FLD: 12.3 % — SIGNIFICANT CHANGE UP (ref 10.3–14.5)
SODIUM SERPL-SCNC: 137 MMOL/L — SIGNIFICANT CHANGE UP (ref 135–145)
WBC # BLD: 9.2 K/UL — SIGNIFICANT CHANGE UP (ref 3.8–10.5)
WBC # FLD AUTO: 9.2 K/UL — SIGNIFICANT CHANGE UP (ref 3.8–10.5)

## 2017-12-18 PROCEDURE — 99291 CRITICAL CARE FIRST HOUR: CPT

## 2017-12-18 PROCEDURE — 71010: CPT | Mod: 26

## 2017-12-18 PROCEDURE — 99233 SBSQ HOSP IP/OBS HIGH 50: CPT

## 2017-12-18 RX ORDER — IPRATROPIUM/ALBUTEROL SULFATE 18-103MCG
3 AEROSOL WITH ADAPTER (GRAM) INHALATION EVERY 6 HOURS
Qty: 0 | Refills: 0 | Status: DISCONTINUED | OUTPATIENT
Start: 2017-12-18 | End: 2017-12-28

## 2017-12-18 RX ORDER — PROPOFOL 10 MG/ML
5 INJECTION, EMULSION INTRAVENOUS
Qty: 500 | Refills: 0 | Status: DISCONTINUED | OUTPATIENT
Start: 2017-12-18 | End: 2017-12-18

## 2017-12-18 RX ORDER — ENOXAPARIN SODIUM 100 MG/ML
40 INJECTION SUBCUTANEOUS DAILY
Qty: 0 | Refills: 0 | Status: DISCONTINUED | OUTPATIENT
Start: 2017-12-18 | End: 2017-12-28

## 2017-12-18 RX ORDER — TIOTROPIUM BROMIDE 18 UG/1
1 CAPSULE ORAL; RESPIRATORY (INHALATION) DAILY
Qty: 0 | Refills: 0 | Status: DISCONTINUED | OUTPATIENT
Start: 2017-12-18 | End: 2017-12-19

## 2017-12-18 RX ORDER — SODIUM CHLORIDE 9 MG/ML
1000 INJECTION, SOLUTION INTRAVENOUS
Qty: 0 | Refills: 0 | Status: DISCONTINUED | OUTPATIENT
Start: 2017-12-18 | End: 2017-12-19

## 2017-12-18 RX ORDER — IPRATROPIUM/ALBUTEROL SULFATE 18-103MCG
3 AEROSOL WITH ADAPTER (GRAM) INHALATION ONCE
Qty: 0 | Refills: 0 | Status: DISCONTINUED | OUTPATIENT
Start: 2017-12-18 | End: 2017-12-18

## 2017-12-18 RX ORDER — HYDRALAZINE HCL 50 MG
5 TABLET ORAL THREE TIMES A DAY
Qty: 0 | Refills: 0 | Status: DISCONTINUED | OUTPATIENT
Start: 2017-12-18 | End: 2017-12-19

## 2017-12-18 RX ORDER — HYDRALAZINE HCL 50 MG
5 TABLET ORAL THREE TIMES A DAY
Qty: 0 | Refills: 0 | Status: DISCONTINUED | OUTPATIENT
Start: 2017-12-18 | End: 2017-12-18

## 2017-12-18 RX ORDER — HYDRALAZINE HCL 50 MG
10 TABLET ORAL ONCE
Qty: 0 | Refills: 0 | Status: COMPLETED | OUTPATIENT
Start: 2017-12-18 | End: 2017-12-18

## 2017-12-18 RX ORDER — ALBUTEROL 90 UG/1
1 AEROSOL, METERED ORAL EVERY 4 HOURS
Qty: 0 | Refills: 0 | Status: DISCONTINUED | OUTPATIENT
Start: 2017-12-18 | End: 2017-12-19

## 2017-12-18 RX ORDER — FENTANYL CITRATE 50 UG/ML
100 INJECTION INTRAVENOUS ONCE
Qty: 0 | Refills: 0 | Status: DISCONTINUED | OUTPATIENT
Start: 2017-12-18 | End: 2017-12-18

## 2017-12-18 RX ORDER — AMLODIPINE BESYLATE 2.5 MG/1
10 TABLET ORAL DAILY
Qty: 0 | Refills: 0 | Status: DISCONTINUED | OUTPATIENT
Start: 2017-12-18 | End: 2017-12-28

## 2017-12-18 RX ORDER — HEPARIN SODIUM 5000 [USP'U]/ML
5000 INJECTION INTRAVENOUS; SUBCUTANEOUS EVERY 8 HOURS
Qty: 0 | Refills: 0 | Status: DISCONTINUED | OUTPATIENT
Start: 2017-12-18 | End: 2017-12-18

## 2017-12-18 RX ADMIN — FENTANYL CITRATE 100 MICROGRAM(S): 50 INJECTION INTRAVENOUS at 08:30

## 2017-12-18 RX ADMIN — Medication 5 MILLIGRAM(S): at 20:24

## 2017-12-18 RX ADMIN — AMLODIPINE BESYLATE 10 MILLIGRAM(S): 2.5 TABLET ORAL at 05:52

## 2017-12-18 RX ADMIN — Medication 3 MILLILITER(S): at 03:25

## 2017-12-18 RX ADMIN — Medication 40 MILLIGRAM(S): at 05:52

## 2017-12-18 RX ADMIN — Medication 0.5 MILLIGRAM(S): at 11:40

## 2017-12-18 RX ADMIN — Medication 5 MILLIGRAM(S): at 12:48

## 2017-12-18 RX ADMIN — Medication 3 MILLILITER(S): at 17:26

## 2017-12-18 RX ADMIN — HEPARIN SODIUM 5000 UNIT(S): 5000 INJECTION INTRAVENOUS; SUBCUTANEOUS at 05:52

## 2017-12-18 RX ADMIN — Medication 3 MILLILITER(S): at 11:42

## 2017-12-18 RX ADMIN — Medication 50 MILLIGRAM(S): at 05:52

## 2017-12-18 NOTE — AIRWAY REMOVAL NOTE  ADULT & PEDS - ARTIFICAL AIRWAY REMOVAL COMMENTS
Pt was given stat neb treatment with duoneb and placed on 40% ventimask. Breath sounds improved. mild SOB noted.

## 2017-12-18 NOTE — PROGRESS NOTE ADULT - ASSESSMENT
66 yo with COPD  Exacerbation  Metapneumovirus  No consolidation on imaging  D 6 of empiric abx  Intunbated but being extubated later today  Case d/w MICU team(DR Stewart)  Repeat X ray as well as ultrasound(done by MICU) has no evidence of consolidation or bacterial superinfection  No secretions  No fevers or leucocytosis(though on steroids)  Low PCT  already D 6 of CAP coverage and no risk factors for MDR  ?Intubation due to primary pulmonary bronchoconstrictive issues  Agree with ICU on possibly stoppinga bx if stable  Monitor for any s/s superimposed HCAP-Rx if any signs  Tailor plan per course,results.  Will Follow.  Beeper [id number]/afterhours/No response-[id number]

## 2017-12-18 NOTE — CHART NOTE - NSCHARTNOTEFT_GEN_A_CORE
12/18/2017    0808    Critical Care Note--Medicine Attending    See Rapid Response Sheet for detailed assessment and plan.  I personally provided 45 minutes of critical care time for respiratory failure in this patient with severe COPD exacerbation with increased work of breathing despite BiPAP evaluated with chest radiograph, ABG and patient urgently intubated for respiratory distress and work of breathing, protection of airway.  On steroids and IV antibiotics.  Transferred to the MICU.  Patient's family notified by staff aware and agree with plan/care as above.  Primary attending, Dr. Thacker, notified.  Prognosis is guarded.  Case reviewed with house staff.    Lobito Vivas MD  [facility] Medicine

## 2017-12-18 NOTE — PROGRESS NOTE ADULT - ASSESSMENT
67M hx of copd, asthma, htn, presenting with hypoxic respiratory failure. COPD exacerbation, hMPV, pneumonia, now intubated for increased work of breathing.  Bedside echocardiogram with what appears to be normal LV and RV function. There is a trace pericardial effusion anterior to the RV. No severe valvular disease noted.  Continue treatment for COPD exacerbation. Vent support per MICU  CXR this morning.  There is no evidence of significant volume overload, despite a BNP elevation  Repeat EKG this morning. Previous EKG with no changes worrisome for ischemia.  Official TTE pending.   Oxygenating well per ABG this morning.  BP elevated last night, and required prn hydralazine. While his status could be explained by flash pulmonary edema in the setting of severe hypertension, there really is not evidence of severe overload.  BP is currently in the 120 range. We may need to hold amlodipine and hydralazine temporarily  Further cardiac workup will depend on clinical course.     High risk of decompensation. >35 min spent taking care of patient.

## 2017-12-18 NOTE — PROGRESS NOTE ADULT - SUBJECTIVE AND OBJECTIVE BOX
chief complaint : shortness of breath         SUBJECTIVE / OVERNIGHT EVENTS: pt had RRT earlier today sec to tachypnea intubated , transferred to MICU , PT SELF EXTUBATED ,on bipap at present    MEDICATIONS  (STANDING):  ALBUTerol    90 MICROgram(s) HFA Inhaler 1 Puff(s) Inhalation every 4 hours  ALBUTerol/ipratropium for Nebulization 3 milliLiter(s) Nebulizer every 6 hours  amLODIPine   Tablet 10 milliGRAM(s) Oral daily  dextrose 5% + sodium chloride 0.45%. 1000 milliLiter(s) (100 mL/Hr) IV Continuous <Continuous>  enoxaparin Injectable 40 milliGRAM(s) SubCutaneous daily  hydrALAZINE Injectable 5 milliGRAM(s) IV Push three times a day  methylPREDNISolone sodium succinate Injectable 40 milliGRAM(s) IV Push daily  tiotropium 18 MICROgram(s) Capsule 1 Capsule(s) Inhalation daily    MEDICATIONS  (PRN):    Vital Signs Last 24 Hrs  T(C): 36.8 (18 Dec 2017 20:00), Max: 37.3 (18 Dec 2017 08:00)  T(F): 98.3 (18 Dec 2017 20:00), Max: 99.1 (18 Dec 2017 08:00)  HR: 65 (18 Dec 2017 21:00) (65 - 102)  BP: 127/70 (18 Dec 2017 21:00) (123/67 - 171/81)  BP(mean): 93 (18 Dec 2017 21:00) (90 - 116)  RR: 15 (18 Dec 2017 21:00) (15 - 32)  SpO2: 98% (18 Dec 2017 21:00) (94% - 100%)    Constitutional: No fever, fatigue  Skin: No rash.  Eyes: No recent vision problems or eye pain.  ENT: No congestion, ear pain, or sore throat.  Cardiovascular: No chest pain or palpation.  Respiratory: No cough, shortness of breath, congestion, or wheezing.  Gastrointestinal: No abdominal pain, nausea, vomiting, or diarrhea.  Genitourinary: No dysuria.  Musculoskeletal: No joint swelling.  Neurologic: No headache.    PHYSICAL EXAM:  GENERAL: NAD  EYES: EOMI, PERRLA  NECK: Supple, No JVD  CHEST/LUNG: dec breath sounds at bases   HEART:  S1 , S2 +  ABDOMEN: soft, bs+  EXTREMITIES:  trace edema  NEUROLOGY:alert awake calm cooperative    LABS:  12-18    137  |  93<L>  |  24<H>  ----------------------------<  155<H>  3.8   |  29  |  0.91    Ca    9.1      18 Dec 2017 07:28  Phos  3.0     12-18  Mg     2.7     12-18    TPro  7.6  /  Alb  4.1  /  TBili  0.5  /  DBili      /  AST  34  /  ALT  28  /  AlkPhos  52  12-18    Creatinine Trend: 0.91 <--, 0.87 <--, 0.76 <--, 1.00 <--, 1.12 <--, 1.09 <--                        15.6   9.2   )-----------( 274      ( 18 Dec 2017 07:28 )             47.1     Urine Studies:  Urinalysis Basic - ( 13 Dec 2017 05:27 )    Color: Yellow / Appearance: Clear / SG: >1.030 / pH:   Gluc:  / Ketone: Negative  / Bili: Negative / Urobili: Negative   Blood:  / Protein: Trace / Nitrite: Negative   Leuk Esterase: Negative / RBC:  / WBC    Sq Epi:  / Non Sq Epi:  / Bacteria:       Osmolality, Random Urine: 575 mos/kg (12-14 @ 07:51)  Sodium, Random Urine: 21 mmol/L (12-14 @ 06:32)  Potassium, Random Urine: 45 mmol/L (12-14 @ 06:32)  Chloride, Random Urine: <20 mmol/L (12-14 @ 06:32)        ABG - ( 18 Dec 2017 07:16 )  pH: 7.40  /  pCO2: 53    /  pO2: 236   / HCO3: 32    / Base Excess: 5.6   /  SaO2: 100               LIVER FUNCTIONS - ( 18 Dec 2017 07:28 )  Alb: 4.1 g/dL / Pro: 7.6 g/dL / ALK PHOS: 52 U/L / ALT: 28 U/L RC / AST: 34 U/L / GGT: x           PT/INR - ( 18 Dec 2017 07:28 )   PT: 11.4 sec;   INR: 1.05 ratio         PTT - ( 18 Dec 2017 07:28 )  PTT:28.7 sec          LIVER FUNCTIONS - ( 18 Dec 2017 07:28 )  Alb: 4.1 g/dL / Pro: 7.6 g/dL / ALK PHOS: 52 U/L / ALT: 28 U/L RC / AST: 34 U/L / GGT: x           PT/INR - ( 18 Dec 2017 07:28 )   PT: 11.4 sec;   INR: 1.05 ratio         PTT - ( 18 Dec 2017 07:28 )  PTT:28.7 sec

## 2017-12-18 NOTE — PROGRESS NOTE ADULT - ASSESSMENT
pt with above history p/w hypoxic resp failure likely sec to copd exacerbation   sec to meta pneumo virau        appreciate pulm / ID input , unclear why pt was tachypneic earlier - monitor pts resp status closely , cont present care as per ICU team              -hyponatremia- improved    dvt prophylaxis

## 2017-12-18 NOTE — CHART NOTE - NSCHARTNOTEFT_GEN_A_CORE
CHIEF COMPLAINT:    HPI:    PAST MEDICAL & SURGICAL HISTORY:  Asthma  COPD (chronic obstructive pulmonary disease)      FAMILY HISTORY:      SOCIAL HISTORY:  Smoking: [ ] Never Smoked [ ] Former Smoker (__ packs x ___ years) [ ] Current Smoker  (__ packs x ___ years)  Substance Use: [ ] Never Used [ ] Used ____  EtOH Use:  Marital Status: [ ] Single [ ]  [ ]  [ ]   Sexual History:   Occupation:  Recent Travel:  Country of Birth:  Advance Directives:    Allergies    No Known Allergies    Intolerances        HOME MEDICATIONS:    REVIEW OF SYSTEMS:  Constitutional: [ ] fevers [ ] chills [ ] weight loss [ ] weight gain  HEENT: [ ] dry eyes [ ] eye irritation [ ] postnasal drip [ ] nasal congestion  CV: [ ] chest pain [ ] orthopnea [ ] palpitations [ ] murmur  Resp: [ ] cough [ ] shortness of breath [ ] dyspnea [ ] wheezing [ ] sputum [ ] hemoptysis  GI: [ ] nausea [ ] vomiting [ ] diarrhea [ ] constipation [ ] abd pain [ ] dysphagia   : [ ] dysuria [ ] nocturia [ ] hematuria [ ] increased urinary frequency  Musculoskeletal: [ ] back pain [ ] myalgias [ ] arthralgias [ ] fracture  Skin: [ ] rash [ ] itch  Neurological: [ ] headache [ ] dizziness [ ] syncope [ ] weakness [ ] numbness  Psychiatric: [ ] anxiety [ ] depression  Endocrine: [ ] diabetes [ ] thyroid problem  Hematologic/Lymphatic: [ ] anemia [ ] bleeding problem  Allergic/Immunologic: [ ] itchy eyes [ ] nasal discharge [ ] hives [ ] angioedema  [ ] All other systems negative  [ ] Unable to assess ROS because ________    OBJECTIVE:  ICU Vital Signs Last 24 Hrs  T(C): 37.3 (18 Dec 2017 08:00), Max: 37.3 (18 Dec 2017 08:00)  T(F): 99.1 (18 Dec 2017 08:00), Max: 99.1 (18 Dec 2017 08:00)  HR: 94 (18 Dec 2017 11:14) (88 - 116)  BP: 139/66 (18 Dec 2017 09:00) (127/67 - 190/100)  BP(mean): 94 (18 Dec 2017 09:00) (90 - 94)  ABP: --  ABP(mean): --  RR: 19 (18 Dec 2017 09:00) (19 - 30)  SpO2: 98% (18 Dec 2017 11:14) (95% - 100%)    Mode: CPAP with PS, FiO2: 40, PEEP: 5, PS: 10    12-17 @ 07:01  -  12-18 @ 07:00  --------------------------------------------------------  IN: 780 mL / OUT: 1500 mL / NET: -720 mL    12-18 @ 07:01  -  12-18 @ 11:28  --------------------------------------------------------  IN: 137.6 mL / OUT: 0 mL / NET: 137.6 mL      CAPILLARY BLOOD GLUCOSE      POCT Blood Glucose.: 137 mg/dL (18 Dec 2017 07:09)      GENERAL: NAD, cachetic  HEAD:  Atraumatic, Normocephalic  EYES: conjunctiva and sclera clear  NECK: Supple  CHEST/LUNG: Clear to auscultation bilaterally; No wheeze, rales or rhonchi  HEART: Regular rate and rhythm; No murmurs, rubs, or gallops  ABDOMEN: Soft, Nontender, Nondistended; Bowel sounds present  EXTREMITIES:  2+ Peripheral Pulses, No clubbing, cyanosis, or edemal    LINES: [facility] MEDICATIONS:  MEDICATIONS  (STANDING):  ALBUTerol    90 MICROgram(s) HFA Inhaler 1 Puff(s) Inhalation every 4 hours  ALBUTerol/ipratropium for Nebulization 3 milliLiter(s) Nebulizer every 6 hours  ALBUTerol/ipratropium for Nebulization. 3 milliLiter(s) Nebulizer once  amLODIPine   Tablet 10 milliGRAM(s) Oral daily  dextrose 5% + sodium chloride 0.45%. 1000 milliLiter(s) (100 mL/Hr) IV Continuous <Continuous>  enoxaparin Injectable 40 milliGRAM(s) SubCutaneous daily  fentaNYL    Injectable 100 MICROGram(s) IV Push once  hydrALAZINE 50 milliGRAM(s) Oral four times a day  LORazepam   Injectable 0.5 milliGRAM(s) IV Push once  methylPREDNISolone sodium succinate Injectable 40 milliGRAM(s) IV Push daily  propofol Infusion 5 MICROgram(s)/kG/Min (1.884 mL/Hr) IV Continuous <Continuous>  tiotropium 18 MICROgram(s) Capsule 1 Capsule(s) Inhalation daily    MEDICATIONS  (PRN):      LABS:                        15.6   9.2   )-----------( 274      ( 18 Dec 2017 07:28 )             47.1     Hgb Trend: 15.6<--, 14.1<--, 15.1<--, 15.0<--, 15.9<--  12-18    137  |  93<L>  |  24<H>  ----------------------------<  155<H>  3.8   |  29  |  0.91    Ca    9.1      18 Dec 2017 07:28  Phos  3.0     12-18  Mg     2.7     12-18    TPro  7.6  /  Alb  4.1  /  TBili  0.5  /  DBili  x   /  AST  34  /  ALT  28  /  AlkPhos  52  12-18    Creatinine Trend: 0.91<--, 0.87<--, 0.76<--, 1.00<--, 1.12<--, 1.09<--  PT/INR - ( 18 Dec 2017 07:28 )   PT: 11.4 sec;   INR: 1.05 ratio         PTT - ( 18 Dec 2017 07:28 )  PTT:28.7 sec    Arterial Blood Gas:  12-18 @ 07:16  7.40/53/236/32/100/5.6  ABG lactate: --        MICROBIOLOGY:     RADIOLOGY:  [ ] Reviewed and interpreted by me    EKG:      Assessment & Plan:  ***Assessment here    Plan:    Neuro:   - pt intubated on propofol gtt, however patient extubated himself.    Respiratory:  - DDx: malignancy, less likely COPD and infectious etiology  - POCUS without evidence of consolidation (no B lines)  - d/c antibiotics  - pt intubated on propofol gtt, however, patient extubated himself.  Will put patient on CPAP.   - c/w duonebs Q6hrs  - c/w spiriva  - c/w albuterol inhaler  - c/w solumedrol 40 IV daily    Cardiology:  - c/w hydralazine 50 Q6   - c/w amlodipine 20 daily    Gastroenterology:  - no GI ppx necessary as pt is no longer intubated    Renal:  - c/w D5 1/2 NS @ 100 cc/hr    ID:  - not likely infectious etiology as POCUS without evidence of consolidation  - d/c abx    Hematology:  - no active issues    Derm:  - no active issues  DVT PPx: lovenox 40 mg subq daily CHIEF COMPLAINT:    HPI:  67 M with a PMH of HTN, asthma/ COPD (no official PFTs), no h/o intubations, intially presenting with progressive worsening of shortness of breath, wheezing, cough and subjective fever x 4-5 days. Prior to presentation, pt was experiencing a runny nose, sore throat, a cough productive of yellow sputum and subjective fever.  He began experiencing worsening shortness of breath and wheezing over the next few days. He was unable to walk due to the severity of the shortness of breath and wheezing;  his symptoms were not alleviated with his home medications. He endorses a 8 lb unintentional weight loss over 3-4 months. He went to see his PMD and was referred to the Ed due to work of breathing.     In the hospital, patient was being treated for acute respiratory failure with hypoxia and COPD exacerbation with solumedrol, duonebs, and symbicort.  Patient was found to have a hMPV infection.  In addition, he was being treated with ceftriaxone and azithromycin for possible bacteria pneumonia.  Patient was placed on bipap with weaning as tolerated.  This morning, patient was intubated for increased work of breathing.       PAST MEDICAL & SURGICAL HISTORY:  Asthma  COPD (chronic obstructive pulmonary disease)    SOCIAL HISTORY:  Smoking: [ ] Never Smoked [x ] Former Smoker quit 7 years ago [ ] Current Smoker  (__ packs x ___ years)  Substance Use: unknown   EtOH Use:  denies alcohol use  Occupation: retired       Allergies    No Known Allergies    Intolerances        HOME MEDICATIONS:    REVIEW OF SYSTEMS:  [x ] Unable to assess ROS because patient intubated    OBJECTIVE:  ICU Vital Signs Last 24 Hrs  T(C): 37.3 (18 Dec 2017 08:00), Max: 37.3 (18 Dec 2017 08:00)  T(F): 99.1 (18 Dec 2017 08:00), Max: 99.1 (18 Dec 2017 08:00)  HR: 94 (18 Dec 2017 11:14) (88 - 116)  BP: 139/66 (18 Dec 2017 09:00) (127/67 - 190/100)  BP(mean): 94 (18 Dec 2017 09:00) (90 - 94)  ABP: --  ABP(mean): --  RR: 19 (18 Dec 2017 09:00) (19 - 30)  SpO2: 98% (18 Dec 2017 11:14) (95% - 100%)    Mode: CPAP with PS, FiO2: 40, PEEP: 5, PS: 10    12-17 @ 07:01  -  12-18 @ 07:00  --------------------------------------------------------  IN: 780 mL / OUT: 1500 mL / NET: -720 mL    12-18 @ 07:01  -  12-18 @ 11:28  --------------------------------------------------------  IN: 137.6 mL / OUT: 0 mL / NET: 137.6 mL      CAPILLARY BLOOD GLUCOSE      POCT Blood Glucose.: 137 mg/dL (18 Dec 2017 07:09)      GENERAL: NAD, cachetic  HEAD:  Atraumatic, Normocephalic  EYES: conjunctiva and sclera clear  NECK: Supple  CHEST/LUNG: Clear to auscultation bilaterally; No wheeze, rales or rhonchi  HEART: Regular rate and rhythm; No murmurs, rubs, or gallops  ABDOMEN: Soft, Nontender, Nondistended; Bowel sounds present  EXTREMITIES:  2+ Peripheral Pulses, No clubbing, cyanosis, or edemal    LINES: [facility] MEDICATIONS:  MEDICATIONS  (STANDING):  ALBUTerol    90 MICROgram(s) HFA Inhaler 1 Puff(s) Inhalation every 4 hours  ALBUTerol/ipratropium for Nebulization 3 milliLiter(s) Nebulizer every 6 hours  ALBUTerol/ipratropium for Nebulization. 3 milliLiter(s) Nebulizer once  amLODIPine   Tablet 10 milliGRAM(s) Oral daily  dextrose 5% + sodium chloride 0.45%. 1000 milliLiter(s) (100 mL/Hr) IV Continuous <Continuous>  enoxaparin Injectable 40 milliGRAM(s) SubCutaneous daily  fentaNYL    Injectable 100 MICROGram(s) IV Push once  hydrALAZINE 50 milliGRAM(s) Oral four times a day  LORazepam   Injectable 0.5 milliGRAM(s) IV Push once  methylPREDNISolone sodium succinate Injectable 40 milliGRAM(s) IV Push daily  propofol Infusion 5 MICROgram(s)/kG/Min (1.884 mL/Hr) IV Continuous <Continuous>  tiotropium 18 MICROgram(s) Capsule 1 Capsule(s) Inhalation daily    MEDICATIONS  (PRN):      LABS:                        15.6   9.2   )-----------( 274      ( 18 Dec 2017 07:28 )             47.1     Hgb Trend: 15.6<--, 14.1<--, 15.1<--, 15.0<--, 15.9<--  12-18    137  |  93<L>  |  24<H>  ----------------------------<  155<H>  3.8   |  29  |  0.91    Ca    9.1      18 Dec 2017 07:28  Phos  3.0     12-18  Mg     2.7     12-18    TPro  7.6  /  Alb  4.1  /  TBili  0.5  /  DBili  x   /  AST  34  /  ALT  28  /  AlkPhos  52  12-18    Creatinine Trend: 0.91<--, 0.87<--, 0.76<--, 1.00<--, 1.12<--, 1.09<--  PT/INR - ( 18 Dec 2017 07:28 )   PT: 11.4 sec;   INR: 1.05 ratio         PTT - ( 18 Dec 2017 07:28 )  PTT:28.7 sec    Arterial Blood Gas:  12-18 @ 07:16  7.40/53/236/32/100/5.6  ABG lactate: --        MICROBIOLOGY:     RADIOLOGY:  [ ] Reviewed and interpreted by me    TTE 12/15: Conclusions:  1. Normal mitral valve. No mitral regurgitation seen.  2. Normal trileaflet aortic valve. No aortic valve  regurgitation seen.  3. Normal left ventricular systolic function. No segmental  wall motion abnormalities.  4. Normal diastolic function  5. Normal right ventricular size and function.  6. Normal tricuspid valve. Minimal tricuspid regurgitation.  7. Normal pericardium with trace pericardial effusion.        Assessment & Plan:  67 M with a PMH of HTN, asthma/ COPD (no official PFTs), no h/o intubations who was admitted for hypoxic respiratory failure secondary to COPD exacerbation, and possible pneumonia.  Patient was experiencing increased work of breathing and subsequently intubated on the floor and transferred to the MICU for management.    Plan:  Neuro:   - pt intubated on propofol gtt    Respiratory:  - DDx: malignancy, less likely COPD and infectious etiology  - POCUS without evidence of consolidation (no B lines)  - d/c antibiotics  - c/w duonebs Q6hrs  - c/w spiriva  - c/w albuterol inhaler  - c/w solumedrol 40 IV daily    Cardiology:  - Bedside echo with be normal LV and RV function with a trace pericardial effusion  - no evidence of volume overload on exam  - c/w hydralazine 50 Q6   - c/w amlodipine 20 daily    Gastroenterology:  - no GI ppx necessary as pt is no longer intubated    Renal:  - creatinine stable  - c/w D5 1/2 NS @ 100 cc/hr    ID:  - not likely infectious etiology as POCUS without evidence of consolidation  - d/c abx    Hematology:  - no active issues    Derm:  - no active issues  DVT PPx: lovenox 40 mg subq daily

## 2017-12-18 NOTE — CHART NOTE - NSCHARTNOTEFT_GEN_A_CORE
MICU Transfer Note    Transfer from: MICU    Transfer to: (x ) Medicine    (  ) Telemetry     (   ) RCU        (    ) Palliative         (   ) Stroke Unit          (   ) __________________    Accepting Physician: Dr. Thacker  Signout given to:     MICU COURSE:  67 M with a PMH of HTN, asthma/ COPD (no official PFTs), no h/o intubations, intially presenting with progressive worsening of shortness of breath, wheezing, cough and subjective fever x 4-5 days. Prior to presentation, pt was experiencing a runny nose, sore throat, a cough productive of yellow sputum and subjective fever.  He began experiencing worsening shortness of breath and wheezing over the next few days. He was unable to walk due to the severity of the shortness of breath and wheezing;  his symptoms were not alleviated with his home medications. He endorses a 8 lb unintentional weight loss over 3-4 months. He went to see his PMD and was referred to the Ed due to work of breathing.     In the hospital, patient was being treated for acute respiratory failure with hypoxia and COPD exacerbation with solumedrol, duonebs, and symbicort.  Patient was found to have a hMPV infection.  In addition, he was being treated with ceftriaxone and azithromycin for possible bacteria pneumonia.  Patient was placed on bipap with weaning as tolerated.  This morning, patient was intubated for increased work of breathing.  Patient extubated himself a few hours after intubation.  Patient was placed on cpap and is hemodynamically stable.  Anxiety may have attributed to patient's increased work of breathing.         ASSESSMENT & PLAN:     67 M with a PMH of HTN, asthma/ COPD (no official PFTs), no h/o intubations who was admitted for hypoxic respiratory failure secondary to COPD exacerbation, and possible pneumonia.  Patient was experiencing increased work of breathing and subsequently intubated on the floor and transferred to the MICU for management.  Patient is now s/p extubation and tachypnea could be 2/2 to anxiety.     Plan:  Neuro:   - pt intubated on propofol gtt    Respiratory:  - POCUS without evidence of consolidation (no B lines)  - d/c antibiotics  - c/w duonebs Q6hrs  - c/w spiriva  - c/w albuterol inhaler  - c/w solumedrol 40 IV daily  - continue CPAP, pt is tolerating CPAP well    Cardiology:  - Bedside echo with be normal LV and RV function with a trace pericardial effusion  - no evidence of volume overload on exam  - c/w hydralazine 5 IV TID  - c/w amlodipine 20 daily    Gastroenterology:  - no GI ppx necessary as pt is no longer intubated    Renal:  - creatinine stable  - c/w D5 1/2 NS @ 100 cc/hr    ID:  - not likely infectious etiology as POCUS without evidence of consolidation  - d/c abx    Hematology:  - no active issues    Derm:  - no active issues    Psychiatry:   - anxiety may have contributed to patient's tachypnea  - patient has anxiety associated with blood draws  - try to limit blood draws  - consider adding an anxiolytic    DVT PPx: lovenox 40 mg subq daily.    FOR FOLLOW UP:  - consider adding an anxiolytic   - continue CPAP  - limit blood draws MICU Transfer Note    Transfer from: MICU    Transfer to: (x ) Medicine    (  ) Telemetry     (   ) RCU        (    ) Palliative         (   ) Stroke Unit          (   ) __________________    Accepting Physician: Dr. Thacker  Signout given to:     MICU COURSE:  67 M with a PMH of HTN, asthma/ COPD (no official PFTs), no h/o intubations, intially presenting with progressive worsening of shortness of breath, wheezing, cough and subjective fever x 4-5 days. Prior to presentation, pt was experiencing a runny nose, sore throat, a cough productive of yellow sputum and subjective fever.  He began experiencing worsening shortness of breath and wheezing over the next few days. He was unable to walk due to the severity of the shortness of breath and wheezing;  his symptoms were not alleviated with his home medications. He endorses a 8 lb unintentional weight loss over 3-4 months. He went to see his PMD and was referred to the Ed due to work of breathing.     In the hospital, patient was being treated for acute respiratory failure with hypoxia and COPD exacerbation with solumedrol, duonebs, and symbicort.  Patient was found to have a hMPV infection.  In addition, he was being treated with ceftriaxone and azithromycin for possible bacteria pneumonia.  Patient was placed on bipap with weaning as tolerated.  This morning, patient was intubated for increased work of breathing.  Patient extubated himself a few hours after intubation.  Patient was placed on cpap and is hemodynamically stable.  Anxiety may have attributed to patient's increased work of breathing.         ASSESSMENT & PLAN:     67 M with a PMH of HTN, asthma/ COPD (no official PFTs), no h/o intubations who was admitted for hypoxic respiratory failure secondary to COPD exacerbation, and possible pneumonia.  Patient was experiencing increased work of breathing and subsequently intubated on the floor and transferred to the MICU for management.  Patient is now s/p extubation and tachypnea could be 2/2 to anxiety.     Plan:  Assessment:   67M hx of copd, asthma, htn, presenting from home with SOB, productive cough past 4 days, fevers since yesterday, pt denies CP, N/V,  palpitations, weakness, numbness in ext, dysuria polyuria, no sick contacts no recent travel went to pmds office today , found to be hypoxic <90% referred to ed, pt denies orthopnea/ pnd, never was intubated fro asthma exacerbation. Patient intubated on the floor for increased work of breathing on 12/18 and self-extubated few hours after    Plan:    Neuro:   - Patient no longer sedated or intubated  - Mentating well, non-focal     Skin:   -No rashes or ecchymoses or other pertinent skin findings      Gastroenterology:  - NPO d/karen, now on regular diet  -d/karen GI ppx as patient no longer intubated    Metabolic :  -Electrolytes wnl  -CO2 slightly elevated in the setting of COPD    -Volume status  -adequate skin turgor  -normal pressures    -Hematology  -no active issues    -DVT ppx  -Enoxaparin 40 mg daily    ID:  - not likely infectious bacterial etiology as POCUS without evidence of consolidation  - d/c abx  - white plaque that scrapes off observed on tongue; scraping sent to lab for evaluation for candida  - if positive, start swish and swallow    Cardiovascular:  - Bedside echo with be normal LV and RV function with a trace pericardial effusion  - no evidence of volume overload on exam  - d/karen hydralazine   - c/w amlodipine, atenolol, and HCTZ        Respiratory:  - POCUS without evidence of consolidation (no B lines)  - Satting well on 3L NC, continue to wean as tolerated   - continue off abx antibiotics  - c/w duonebs Q6hrs  - hold spiriva and albuterol inhaler while on duonebs  - d/c solumedrol  - continue with BIPAP nightly and PRN  -Ativan 0.5 mg BID for anti-dyspneic in order to prevent tachypnea in the setting of anxiety    Mobilization  -PT evaluation  -Out of Bed to chair    Ethics  No issues at present      Psychiatry:   - anxiety may have contributed to patient's tachypnea  - patient has anxiety associated with blood draws  - try to limit blood draws  - consider adding an anxiolytic    FOR FOLLOW UP:  - continue to wean O2 via NC as tolerated   - BIPAP for nocturnal use, also PRN  - limit blood draws

## 2017-12-18 NOTE — CHART NOTE - NSCHARTNOTEFT_GEN_A_CORE
Patient intubated for work of breathing    ABG stable  Dr. Thacker notified  Xni Winslow - emergency contact notified as well    patient being transferred to MICU s/p intubation

## 2017-12-18 NOTE — PROGRESS NOTE ADULT - SUBJECTIVE AND OBJECTIVE BOX
[facility] - Mirta Moffett, Mary, Krista, Sharee, Aaron Soares  Office Number:  [phone]    Intubated this morning for increased work of breathing. HR in 120's. Now on sedation and HR in 90's    ROS: negative unless otherwise mentioned.    Telemetry:  SR/'s    MEDICATIONS  (STANDING):  ALBUTerol    90 MICROgram(s) HFA Inhaler 1 Puff(s) Inhalation every 4 hours  ALBUTerol/ipratropium for Nebulization 3 milliLiter(s) Nebulizer every 6 hours  ALBUTerol/ipratropium for Nebulization. 3 milliLiter(s) Nebulizer once  amLODIPine   Tablet 10 milliGRAM(s) Oral daily  dextrose 5% + sodium chloride 0.45%. 1000 milliLiter(s) (100 mL/Hr) IV Continuous <Continuous>  enoxaparin Injectable 40 milliGRAM(s) SubCutaneous daily  hydrALAZINE 50 milliGRAM(s) Oral four times a day  hydrALAZINE Injectable 10 milliGRAM(s) IV Push once  methylPREDNISolone sodium succinate Injectable 40 milliGRAM(s) IV Push daily  propofol Infusion 5 MICROgram(s)/kG/Min (1.884 mL/Hr) IV Continuous <Continuous>  tiotropium 18 MICROgram(s) Capsule 1 Capsule(s) Inhalation daily    MEDICATIONS  (PRN):      Allergies    No Known Allergies    Intolerances        Vital Signs Last 24 Hrs  T(C): 36.8 (18 Dec 2017 04:55), Max: 36.8 (17 Dec 2017 21:09)  T(F): 98.3 (18 Dec 2017 04:55), Max: 98.3 (18 Dec 2017 04:55)  HR: 102 (18 Dec 2017 08:15) (80 - 116)  BP: 129/87 (18 Dec 2017 04:55) (129/87 - 190/100)  BP(mean): --  RR: 20 (18 Dec 2017 04:55) (20 - 30)  SpO2: 97% (18 Dec 2017 08:15) (95% - 98%)    I&O's Summary    17 Dec 2017 07:01  -  18 Dec 2017 07:00  --------------------------------------------------------  IN: 780 mL / OUT: 1500 mL / NET: -720 mL        ON EXAM:    Constitutional:  resp distress, now intubated, sedated  Eyes:   Pupils round, no lesions  ENMT: no exudate or erythema  Pulmonary: mild tachypnea, scattered rhonchi bilat  Cardiovascular: PMI not palpable RRR normal S1 and S2, no murmurs, rubs, gallops or clicks  Gastrointestinal: Bowel Sounds present, soft, nontender.   Lymph: No cervical lymphadenopathy.  Skin: No rashes.  No cyanosis.  Psych:  Unable to assess   Ext: No lower ext edema    LABS: All Labs Reviewed:                        15.6   9.2   )-----------( 274      ( 18 Dec 2017 07:28 )             47.1                         14.1   5.80  )-----------( 227      ( 17 Dec 2017 08:41 )             41.9     18 Dec 2017 07:28    137    |  93     |  24     ----------------------------<  155    3.8     |  29     |  0.91   17 Dec 2017 08:34    136    |  92     |  23     ----------------------------<  143    4.5     |  26     |  0.87   16 Dec 2017 08:23    135    |  90     |  18     ----------------------------<  139    4.2     |  29     |  0.76     Ca    9.1        18 Dec 2017 07:28  Ca    9.3        17 Dec 2017 08:34  Ca    9.4        16 Dec 2017 08:23  Phos  3.0       18 Dec 2017 07:28  Mg     2.7       18 Dec 2017 07:28    TPro  7.6    /  Alb  4.1    /  TBili  0.5    /  DBili  x      /  AST  34     /  ALT  28     /  AlkPhos  52     18 Dec 2017 07:28    PT/INR - ( 18 Dec 2017 07:28 )   PT: 11.4 sec;   INR: 1.05 ratio         PTT - ( 18 Dec 2017 07:28 )  PTT:28.7 sec      Blood Culture: Organism --  Gram Stain Blood -- Gram Stain --  Specimen Source .Urine Clean Catch (Midstream)  Culture-Blood --

## 2017-12-18 NOTE — PROGRESS NOTE ADULT - SUBJECTIVE AND OBJECTIVE BOX
Patient is a 67y old  Male who presents with a chief complaint of SOB (13 Dec 2017 08:20)    Patientw as seen earlier in teh day at 1000 hrs when he was still intubated    Being followed by ID for RSV,COPD exacerbation    Interval history:Events noted  Intubated  in ICU  no secretions  on CPAP  awake -responds to  some queries      No other acute events      ROS:  No cough,SOB,CP  No N/V/D  No abd pain  No urinary complaints  No HA  No joint or limb pain  No other complaints      Antimicrobials:        other medications reviewed    Vital Signs Last 24 Hrs  T(C): 36.6 (12-18-17 @ 12:00), Max: 37.3 (12-18-17 @ 08:00)  T(F): 97.8 (12-18-17 @ 12:00), Max: 99.1 (12-18-17 @ 08:00)  HR: 90 (12-18-17 @ 14:00) (88 - 102)  BP: 160/76 (12-18-17 @ 14:00) (123/67 - 171/81)  BP(mean): 109 (12-18-17 @ 14:00) (90 - 116)  RR: 27 (12-18-17 @ 14:00) (17 - 32)  SpO2: 98% (12-18-17 @ 14:00) (94% - 100%)    Physical Exam:    Constitutional well preserved,comfortable,pleasant    HEENT PERRLA EOMI,No pallor or icterus    No oral exudate or erythema    Neck supple no JVD or LN    Chest Good AE,CTA    CVS RRR S1 S2 WNl No murmur or rub or gallop    Abd soft BS normal No tenderness no masses    Ext No cyanosis clubbing or edema    IV site no erythema tenderness or discharge    Joints no swelling or LOM    CNS AAO X 3 no focal    Lab Data:                          15.6   9.2   )-----------( 274      ( 18 Dec 2017 07:28 )             47.1       12-18    137  |  93<L>  |  24<H>  ----------------------------<  155<H>  3.8   |  29  |  0.91    Ca    9.1      18 Dec 2017 07:28  Phos  3.0     12-18  Mg     2.7     12-18    TPro  7.6  /  Alb  4.1  /  TBili  0.5  /  DBili  x   /  AST  34  /  ALT  28  /  AlkPhos  52  12-18 Patient is a 67y old  Male who presents with a chief complaint of SOB (13 Dec 2017 08:20)    Patientw as seen earlier in teh day at 1000 hrs when he was still intubated    Being followed by ID for RSV,COPD exacerbation    Interval history:Events noted  Intubated  in ICU  no secretions  on CPAP  awake -responds to  some queries      No other acute events      ROS:  Not obatainable      Antimicrobials:  ceftriaxone/zithromax D 6        other medications reviewed    Vital Signs Last 24 Hrs  T(C): 36.6 (12-18-17 @ 12:00), Max: 37.3 (12-18-17 @ 08:00)  T(F): 97.8 (12-18-17 @ 12:00), Max: 99.1 (12-18-17 @ 08:00)  HR: 90 (12-18-17 @ 14:00) (88 - 102)  BP: 160/76 (12-18-17 @ 14:00) (123/67 - 171/81)  BP(mean): 109 (12-18-17 @ 14:00) (90 - 116)  RR: 27 (12-18-17 @ 14:00) (17 - 32)  SpO2: 98% (12-18-17 @ 14:00) (94% - 100%)    Physical Exam:    Constitutional comfortable    HEENT PERRLA EOMI,No pallor or icterus    ETT    Neck supple no JVD or LN    Chest Good AE,CTA    CVS RRR S1 S2 WNl No murmur or rub or gallop    Abd soft BS normal No tenderness no masses    Ext No cyanosis clubbing or edema    IV site no erythema tenderness or discharge    Joints no swelling or LOM    CNS as above     Lab Data:                          15.6   9.2   )-----------( 274      ( 18 Dec 2017 07:28 )             47.1       12-18    137  |  93<L>  |  24<H>  ----------------------------<  155<H>  3.8   |  29  |  0.91    Ca    9.1      18 Dec 2017 07:28  Phos  3.0     12-18  Mg     2.7     12-18    TPro  7.6  /  Alb  4.1  /  TBili  0.5  /  DBili  x   /  AST  34  /  ALT  28  /  AlkPhos  52  12-18        < from: Xray Chest 1 View AP- PORTABLE-Urgent (12.18.17 @ 07:42) >    FINDINGS:    The distal tip of the ET tube is below the thoracic inlet and above the   suki.  There is no focal consolidation.  There are no pleural effusions.  There is no evidence of pneumothorax.  The heart size is normal.  Radiopaque foreign body overlies the right lower chest.    IMPRESSION:    ET tube is noted with its tip above the suki.    Findings discussed by Dr. Flores at 12/18/2017 7:52 AM with Dr. Morales.      < end of copied text >        Procalcitonin, Serum: 0.31: Procalcitonin (PCT) Interpretation (ng/mL) - Diagnosis of systemic  bacterial infection/sepsis  PCT < 0.5: Systemic infection (sepsis) is not likely and risk for  progression to severe systemic infection is low. Local bacterial  infection is possible. If early sepsis is suspected clinically, PCT  should be re-assessed in 6-24 hours.  PCT >/= 0.5 but < 2.0: Systemic infection (sepsis) is possible, but other  conditions are known to elevate PCT as well. Moderate risk for  progression to severe systemic infection. The patient should be closely  monitored both clinically and by re-assessing PCT within 6-24 hours.  PCT >/= 2.0 but < 10.0: Systemic infection (sepsis) is likely, unless  other causes are known. High risk of progression to severe systemic  infection (severe sepsis/septic shock).  PCT >/= 10.0: Important systemic inflammatory response, almost  exclusively due to severe bacterial sepsis or septic shock. High  likelihood of severe sepsis or septic shock. ng/mL (12.13.17 @ 06:39) Patient is a 67y old  Male who presents with a chief complaint of SOB (13 Dec 2017 08:20)    Patientw as seen earlier in teh day at 1000 hrs when he was still intubated    Being followed by ID for metapneumo,COPD exacerbation    Interval history:Events noted  Intubated  in ICU  no secretions  on CPAP  awake -responds to  some queries      No other acute events      ROS:  Not obatainable      Antimicrobials:  ceftriaxone/zithromax D 6        other medications reviewed    Vital Signs Last 24 Hrs  T(C): 36.6 (12-18-17 @ 12:00), Max: 37.3 (12-18-17 @ 08:00)  T(F): 97.8 (12-18-17 @ 12:00), Max: 99.1 (12-18-17 @ 08:00)  HR: 90 (12-18-17 @ 14:00) (88 - 102)  BP: 160/76 (12-18-17 @ 14:00) (123/67 - 171/81)  BP(mean): 109 (12-18-17 @ 14:00) (90 - 116)  RR: 27 (12-18-17 @ 14:00) (17 - 32)  SpO2: 98% (12-18-17 @ 14:00) (94% - 100%)    Physical Exam:    Constitutional comfortable    HEENT PERRLA EOMI,No pallor or icterus    ETT    Neck supple no JVD or LN    Chest Good AE,CTA    CVS RRR S1 S2 WNl No murmur or rub or gallop    Abd soft BS normal No tenderness no masses    Ext No cyanosis clubbing or edema    IV site no erythema tenderness or discharge    Joints no swelling or LOM    CNS as above     Lab Data:                          15.6   9.2   )-----------( 274      ( 18 Dec 2017 07:28 )             47.1       12-18    137  |  93<L>  |  24<H>  ----------------------------<  155<H>  3.8   |  29  |  0.91    Ca    9.1      18 Dec 2017 07:28  Phos  3.0     12-18  Mg     2.7     12-18    TPro  7.6  /  Alb  4.1  /  TBili  0.5  /  DBili  x   /  AST  34  /  ALT  28  /  AlkPhos  52  12-18        < from: Xray Chest 1 View AP- PORTABLE-Urgent (12.18.17 @ 07:42) >    FINDINGS:    The distal tip of the ET tube is below the thoracic inlet and above the   suki.  There is no focal consolidation.  There are no pleural effusions.  There is no evidence of pneumothorax.  The heart size is normal.  Radiopaque foreign body overlies the right lower chest.    IMPRESSION:    ET tube is noted with its tip above the suki.    Findings discussed by Dr. Flores at 12/18/2017 7:52 AM with Dr. Morales.      < end of copied text >        Procalcitonin, Serum: 0.31: Procalcitonin (PCT) Interpretation (ng/mL) - Diagnosis of systemic  bacterial infection/sepsis  PCT < 0.5: Systemic infection (sepsis) is not likely and risk for  progression to severe systemic infection is low. Local bacterial  infection is possible. If early sepsis is suspected clinically, PCT  should be re-assessed in 6-24 hours.  PCT >/= 0.5 but < 2.0: Systemic infection (sepsis) is possible, but other  conditions are known to elevate PCT as well. Moderate risk for  progression to severe systemic infection. The patient should be closely  monitored both clinically and by re-assessing PCT within 6-24 hours.  PCT >/= 2.0 but < 10.0: Systemic infection (sepsis) is likely, unless  other causes are known. High risk of progression to severe systemic  infection (severe sepsis/septic shock).  PCT >/= 10.0: Important systemic inflammatory response, almost  exclusively due to severe bacterial sepsis or septic shock. High  likelihood of severe sepsis or septic shock. ng/mL (12.13.17 @ 06:39)

## 2017-12-19 PROCEDURE — 99232 SBSQ HOSP IP/OBS MODERATE 35: CPT

## 2017-12-19 PROCEDURE — 99233 SBSQ HOSP IP/OBS HIGH 50: CPT

## 2017-12-19 RX ORDER — HYDRALAZINE HCL 50 MG
10 TABLET ORAL ONCE
Qty: 0 | Refills: 0 | Status: COMPLETED | OUTPATIENT
Start: 2017-12-19 | End: 2017-12-19

## 2017-12-19 RX ORDER — HYDROCHLOROTHIAZIDE 25 MG
25 TABLET ORAL DAILY
Qty: 0 | Refills: 0 | Status: DISCONTINUED | OUTPATIENT
Start: 2017-12-19 | End: 2017-12-28

## 2017-12-19 RX ORDER — ATENOLOL 25 MG/1
50 TABLET ORAL DAILY
Qty: 0 | Refills: 0 | Status: DISCONTINUED | OUTPATIENT
Start: 2017-12-19 | End: 2017-12-28

## 2017-12-19 RX ORDER — MAGNESIUM SULFATE 500 MG/ML
1 VIAL (ML) INJECTION ONCE
Qty: 0 | Refills: 0 | Status: DISCONTINUED | OUTPATIENT
Start: 2017-12-19 | End: 2017-12-19

## 2017-12-19 RX ADMIN — Medication 25 MILLIGRAM(S): at 06:09

## 2017-12-19 RX ADMIN — Medication 0.5 MILLIGRAM(S): at 22:11

## 2017-12-19 RX ADMIN — Medication 3 MILLILITER(S): at 00:40

## 2017-12-19 RX ADMIN — Medication 3 MILLILITER(S): at 17:23

## 2017-12-19 RX ADMIN — ATENOLOL 50 MILLIGRAM(S): 25 TABLET ORAL at 06:12

## 2017-12-19 RX ADMIN — AMLODIPINE BESYLATE 10 MILLIGRAM(S): 2.5 TABLET ORAL at 06:09

## 2017-12-19 RX ADMIN — Medication 0.5 MILLIGRAM(S): at 12:58

## 2017-12-19 RX ADMIN — Medication 10 MILLIGRAM(S): at 02:44

## 2017-12-19 RX ADMIN — Medication 40 MILLIGRAM(S): at 06:08

## 2017-12-19 RX ADMIN — Medication 3 MILLILITER(S): at 11:26

## 2017-12-19 RX ADMIN — Medication 3 MILLILITER(S): at 05:16

## 2017-12-19 RX ADMIN — ENOXAPARIN SODIUM 40 MILLIGRAM(S): 100 INJECTION SUBCUTANEOUS at 13:01

## 2017-12-19 NOTE — DIETITIAN INITIAL EVALUATION ADULT. - ADHERENCE
No diet restrictions. Diet hx: Breakfast usually has coffee and PB+J sandwich, Lunch has pepper steak w/ rice and beans, Dinner stewed chicken,  rice and beans

## 2017-12-19 NOTE — PROGRESS NOTE ADULT - ASSESSMENT
Assessment:     Plan:  Neuro:   - pt self extubated yesterday, non-sedated    Respiratory:  - POCUS without evidence of consolidation (no B lines)  - d/c antibiotics  - c/w duonebs Q6hrs  - hold spiriva and albuterol inhaler while on duonebs  - c/w solumedrol 40 IV daily  - continue CPAP,    Cardiology:  - Bedside echo with be normal LV and RV function with a trace pericardial effusion  - no evidence of volume overload on exam  - d/karen hydralazine   - c/w amlodipine, atenolol, and HCTZ     Gastroenterology:  - no GI ppx necessary as pt is no longer intubated    Renal:  - creatinine stable  - fluids?     ID:  - not likely infectious bacterial etiology as POCUS without evidence of consolidation  - d/c abx    Hematology:  - no active issues    Derm:  - no active issues    Psychiatry:   - anxiety may have contributed to patient's tachypnea  - patient has anxiety associated with blood draws  - try to limit blood draws  - consider adding an anxiolytic    DVT PPx: lovenox 40 mg subq daily.    FOR FOLLOW UP:  - consider adding an anxiolytic   - continue CPAP  - limit blood draws. Assessment:   67M hx of copd, asthma, htn, presenting from home with SOB, productive cough past 4 days, fevers since yesterday, pt denies CP, N/V,  palpitations, weakness, numbness in ext, dysuria polyuria, no sick contacts no recent travel went to [facility] office today , found to be hypoxic <90% referred to ed, pt denies orthopnea/ pnd, never was intubated fro asthma exacerbation. Patient intubated on the floor for increased work of breathing on 12/18 and self-extubated few hours after    Plan:    Neuro:   - Patient no longer sedated or intubated  - Mentating well, non-focal     Skin:   -No rashes or ecchymoses or other pertinent skin findings      Gastroenterology:  - NPO d/karen, now on regular diet  -d/karen GI ppx as patient no longer intubated    Metabolic :  -Electrolytes wnl  -CO2 slightly elevated in the setting of COPD    -Volume status  -adequate skin turgor  -normal pressures    -Hematology  -no active issues    -DVT ppx  -Enoxaparin 40 mg daily    ID:  - not likely infectious bacterial etiology as POCUS without evidence of consolidation  - d/c abx  - white plaque that scrapes off observed on tongue; scraping sent to lab for evaluation for candida  - if positive, start swish and swallow    Cardiovascular:  - Bedside echo with be normal LV and RV function with a trace pericardial effusion  - no evidence of volume overload on exam  - d/karen hydralazine   - c/w amlodipine, atenolol, and HCTZ        Respiratory:  - POCUS without evidence of consolidation (no B lines)  - continue off abx antibiotics  - c/w duonebs Q6hrs  - hold spiriva and albuterol inhaler while on duonebs  - d/c solumedrol  - continue with BIPAP nightly and PRN    Mobilization  -PT evaluation  -Out of Bed to chair    Ethics  No issues at present      Psychiatry:   - anxiety may have contributed to patient's tachypnea  - patient has anxiety associated with blood draws  - try to limit blood draws  - consider adding an anxiolytic Assessment:   67M hx of copd, asthma, htn, presenting from home with SOB, productive cough past 4 days, fevers since yesterday, pt denies CP, N/V,  palpitations, weakness, numbness in ext, dysuria polyuria, no sick contacts no recent travel went to [facility] office today , found to be hypoxic <90% referred to ed, pt denies orthopnea/ pnd, never was intubated fro asthma exacerbation. Patient intubated on the floor for increased work of breathing on 12/18 and self-extubated few hours after    Plan:    Neuro:   - Patient no longer sedated or intubated  - Mentating well, non-focal     Skin:   -No rashes or ecchymoses or other pertinent skin findings      Gastroenterology:  - NPO d/karen, now on regular diet  -d/karen GI ppx as patient no longer intubated    Metabolic :  -Electrolytes wnl  -CO2 slightly elevated in the setting of COPD    -Volume status  -adequate skin turgor  -normal pressures    -Hematology  -no active issues    -DVT ppx  -Enoxaparin 40 mg daily    ID:  - not likely infectious bacterial etiology as POCUS without evidence of consolidation  - d/c abx  - white plaque that scrapes off observed on tongue; scraping sent to lab for evaluation for candida  - if positive, start swish and swallow    Cardiovascular:  - Bedside echo with be normal LV and RV function with a trace pericardial effusion  - no evidence of volume overload on exam  - d/karen hydralazine   - c/w amlodipine, atenolol, and HCTZ        Respiratory:  - POCUS without evidence of consolidation (no B lines)  - continue off abx antibiotics  - c/w duonebs Q6hrs  - hold spiriva and albuterol inhaler while on duonebs  - d/c solumedrol  - continue with BIPAP nightly and PRN  -Ativan 0.5 mg BID for anti-dyspneic in order to prevent tachypnea in the setting of anxiety    Mobilization  -PT evaluation  -Out of Bed to chair    Ethics  No issues at present      Psychiatry:   - anxiety may have contributed to patient's tachypnea  - patient has anxiety associated with blood draws  - try to limit blood draws  - consider adding an anxiolytic Assessment:   67M hx of copd, asthma, htn, presenting from home with SOB, productive cough past 4 days, fevers since yesterday, pt denies CP, N/V,  palpitations, weakness, numbness in ext, dysuria polyuria, no sick contacts no recent travel went to [facility] office today , found to be hypoxic <90% referred to ed, pt denies orthopnea/ pnd, never was intubated fro asthma exacerbation. Patient intubated on the floor for increased work of breathing on 12/18 and self-extubated few hours after    Plan:    Neuro:   - Patient no longer sedated or intubated  - Mentating well, non-focal     Skin:   -No rashes or ecchymoses or other pertinent skin findings      Gastroenterology:  - NPO d/karen, now on regular diet  -d/karen GI ppx as patient no longer intubated    Metabolic :  -Electrolytes wnl  -CO2 slightly elevated in the setting of COPD    -Volume status  -adequate skin turgor  -normal pressures    -Hematology  -no active issues    -DVT ppx  -Enoxaparin 40 mg daily    ID:  - not likely infectious bacterial etiology as POCUS without evidence of consolidation  - d/c abx  - white plaque that scrapes off observed on tongue; scraping sent to lab for evaluation for candida  - if positive, start swish and swallow    Cardiovascular:  - Bedside echo with be normal LV and RV function with a trace pericardial effusion  - no evidence of volume overload on exam  - d/karen hydralazine   - c/w amlodipine, atenolol, and HCTZ        Respiratory:  -Currently satting well on 3L NC; continue to wean as tolerated   - POCUS without evidence of consolidation (no B lines)  - continue off abx antibiotics  - c/w duonebs Q6hrs  - hold spiriva and albuterol inhaler while on duonebs  - d/c solumedrol  - continue with BIPAP nightly and PRN  -Ativan 0.5 mg BID for anti-dyspneic in order to prevent tachypnea in the setting of anxiety    Mobilization  -PT evaluation  -Out of Bed to chair    Ethics  No issues at present      Psychiatry:   - anxiety may have contributed to patient's tachypnea  - patient has anxiety associated with blood draws  - try to limit blood draws  - consider adding an anxiolytic

## 2017-12-19 NOTE — PROGRESS NOTE ADULT - SUBJECTIVE AND OBJECTIVE BOX
Patient is a 67y old  Male who presents with a chief complaint of SOB (13 Dec 2017 08:20)    Being followed by ID for metapneumovirus    Interval history:self extubated  On 5 L NC  No acute events      ROS:  Minimal  cough,SOB,CP  No N/V/D./abd pain  No other complaints      Antimicrobials:      Other medications reviewed    Vital Signs Last 24 Hrs  T(C): 36.7 (12-19-17 @ 08:00), Max: 37.3 (12-18-17 @ 16:00)  T(F): 98.1 (12-19-17 @ 08:00), Max: 99.1 (12-18-17 @ 16:00)  HR: 68 (12-19-17 @ 09:00) (65 - 98)  BP: 147/74 (12-19-17 @ 09:00) (127/70 - 171/81)  BP(mean): 104 (12-19-17 @ 09:00) (93 - 116)  RR: 27 (12-19-17 @ 09:00) (14 - 32)  SpO2: 96% (12-19-17 @ 09:00) (91% - 100%)    Physical Exam:        HEENT PERRLA EOMI    No oral exudate or erythema    Chest Good AE,CTA    CVS RRR S1 S2 WNl No murmur or rub or gallop    Abd soft BS normal No tenderness no masses    IV site no erythema tenderness or discharge    CNS AAO X 3 no focal    Lab Data:                          15.6   9.2   )-----------( 274      ( 18 Dec 2017 07:28 )             47.1       12-18    137  |  93<L>  |  24<H>  ----------------------------<  155<H>  3.8   |  29  |  0.91    Ca    9.1      18 Dec 2017 07:28  Phos  3.0     12-18  Mg     2.7     12-18    TPro  7.6  /  Alb  4.1  /  TBili  0.5  /  DBili  x   /  AST  34  /  ALT  28  /  AlkPhos  52  12-18

## 2017-12-19 NOTE — DIETITIAN INITIAL EVALUATION ADULT. - PHYSICAL APPEARANCE
NFPE performed w/ pt consent. Noted mild muscle loss in temporal, patella and clavicle regions. No fat loss noted in orbital and upper arm areas./underweight

## 2017-12-19 NOTE — PROGRESS NOTE ADULT - SUBJECTIVE AND OBJECTIVE BOX
[facility] - Mirta Moffett, Mary, Krista, Sharee, Aaron Soares  Office Number:  [phone]    Patient resting comfortably in bed in NAD.  Laying flat with no respiratory distress.  No complaints of chest pain, dyspnea, palpitations, PND, or orthopnea.  S/p self extubation. Says he is feeling much better.    ROS: negative unless otherwise mentioned.    Telemetry:  SR    MEDICATIONS  (STANDING):  ALBUTerol    90 MICROgram(s) HFA Inhaler 1 Puff(s) Inhalation every 4 hours  ALBUTerol/ipratropium for Nebulization 3 milliLiter(s) Nebulizer every 6 hours  amLODIPine   Tablet 10 milliGRAM(s) Oral daily  ATENolol  Tablet 50 milliGRAM(s) Oral daily  dextrose 5% + sodium chloride 0.45%. 1000 milliLiter(s) (100 mL/Hr) IV Continuous <Continuous>  enoxaparin Injectable 40 milliGRAM(s) SubCutaneous daily  hydrochlorothiazide 25 milliGRAM(s) Oral daily  magnesium sulfate  IVPB 1 Gram(s) IV Intermittent once  methylPREDNISolone sodium succinate Injectable 40 milliGRAM(s) IV Push daily  tiotropium 18 MICROgram(s) Capsule 1 Capsule(s) Inhalation daily    MEDICATIONS  (PRN):      Allergies    No Known Allergies    Intolerances        Vital Signs Last 24 Hrs  T(C): 37 (19 Dec 2017 04:00), Max: 37.3 (18 Dec 2017 16:00)  T(F): 98.6 (19 Dec 2017 04:00), Max: 99.1 (18 Dec 2017 16:00)  HR: 67 (19 Dec 2017 07:40) (65 - 100)  BP: 153/74 (19 Dec 2017 07:00) (123/67 - 171/81)  BP(mean): 105 (19 Dec 2017 07:00) (90 - 116)  RR: 24 (19 Dec 2017 07:40) (14 - 32)  SpO2: 92% (19 Dec 2017 07:40) (91% - 100%)    I&O's Summary    18 Dec 2017 07:01  -  19 Dec 2017 07:00  --------------------------------------------------------  IN: 2356.4 mL / OUT: 1750 mL / NET: 606.4 mL        ON EXAM:    General: NAD, awake and alert, oriented x 3, on nasal cannula o2  HEENT: Mucous membranes are moist, anicteric  Lungs: Non-labored, breath sounds are clear bilaterally, No wheezing, rales or rhonchi  Cardiovascular: Regular, S1 and S2, no murmurs, rubs, or gallops  Gastrointestinal: Bowel Sounds present, soft, nontender.   Lymph: No peripheral edema. No lymphadenopathy.  Skin: No rashes or ulcers  Psych:  Mood & affect appropriate    LABS: All Labs Reviewed:                        15.6   9.2   )-----------( 274      ( 18 Dec 2017 07:28 )             47.1                         14.1   5.80  )-----------( 227      ( 17 Dec 2017 08:41 )             41.9     18 Dec 2017 07:28    137    |  93     |  24     ----------------------------<  155    3.8     |  29     |  0.91   17 Dec 2017 08:34    136    |  92     |  23     ----------------------------<  143    4.5     |  26     |  0.87     Ca    9.1        18 Dec 2017 07:28  Ca    9.3        17 Dec 2017 08:34  Phos  3.0       18 Dec 2017 07:28  Mg     2.7       18 Dec 2017 07:28    TPro  7.6    /  Alb  4.1    /  TBili  0.5    /  DBili  x      /  AST  34     /  ALT  28     /  AlkPhos  52     18 Dec 2017 07:28    PT/INR - ( 18 Dec 2017 07:28 )   PT: 11.4 sec;   INR: 1.05 ratio         PTT - ( 18 Dec 2017 07:28 )  PTT:28.7 sec      Blood Culture:

## 2017-12-19 NOTE — PROGRESS NOTE ADULT - ASSESSMENT
67M hx of copd, asthma, htn, presenting with hypoxic respiratory failure. COPD exacerbation, hMPV, pneumonia, intubated for increased work of breathing, now s/p extubation.    His breathing is much improved this morning, and he seems comfortable on nasal cannula oxygen. He did require bipap overnight.  Bedside echocardiogram with what appears to be normal LV and RV function. There is a trace pericardial effusion anterior to the RV. No severe valvular disease noted. Official echocardiogram confirms normal LV function.  Continue treatment for COPD exacerbation. Oxygen supplementation as necessary  CXR this morning.  There is no evidence of significant volume overload, despite a BNP elevation  BP elevated prior to episode on 12/17, and required prn hydralazine. While his status could be explained by flash pulmonary edema in the setting of severe hypertension, there really is not evidence of severe overload.  BP is currently in the 150 range. Can continue with Atenolol, HCTZ and Norvasc.  Further cardiac workup will depend on clinical course.     High risk of decompensation. >35 min spent taking care of patient.

## 2017-12-19 NOTE — CHART NOTE - NSCHARTNOTEFT_GEN_A_CORE
Upon Nutritional Assessment by the Registered Dietitian your patient was determined to meet criteria / has evidence of the following diagnosis/diagnoses:          [x ]  Mild Protein Calorie Malnutrition        [ ]  Moderate Protein Calorie Malnutrition        [ ] Severe Protein Calorie Malnutrition        [ ] Unspecified Protein Calorie Malnutrition        [ ] Underweight / BMI <19        [ ] Morbid Obesity / BMI > 40      Findings as based on:  [x ] Comprehensive nutrition assessment   [x ] Nutrition Focused Physical Exam  [ ] Other:       Nutrition Plan/Recommendations:  recommend advancing diet to low sodium, Ensure Enlive 2x/day        PROVIDER Section:     By signing this assessment you are acknowledging and agree with the diagnosis/diagnoses assigned by the Registered Dietitian    Comments:

## 2017-12-19 NOTE — DIETITIAN INITIAL EVALUATION ADULT. - OTHER INFO
Pt seen for: BMI < 19.   Adm dx: COPD exacerbation, metapneumovirus, MICU adm 12/18.    GI issues: denies N/V/D/C   Last BM: 12/16    Food allergies: NKFA    Vit/supplement PTA: mary man

## 2017-12-19 NOTE — PROGRESS NOTE ADULT - SUBJECTIVE AND OBJECTIVE BOX
CHIEF COMPLAINT: Patient is a 67y old  Male who presents with a chief complaint of SOB (13 Dec 2017 08:20)    Interval Events: Patient self extubated yesterday.     REVIEW OF SYSTEMS:  Constitutional:   Eyes:  ENT:  CV:  Resp:  GI:  :  MSK:  Integumentary:  Neurological:  Psychiatric:  Endocrine:  Hematologic/Lymphatic:  Allergic/Immunologic:  [ ] All other systems negative  [ ] Unable to assess ROS because ________    OBJECTIVE:  ICU Vital Signs Last 24 Hrs  T(C): 37 (19 Dec 2017 04:00), Max: 37.3 (18 Dec 2017 08:00)  T(F): 98.6 (19 Dec 2017 04:00), Max: 99.1 (18 Dec 2017 08:00)  HR: 65 (19 Dec 2017 07:00) (65 - 102)  BP: 153/74 (19 Dec 2017 07:00) (123/67 - 171/81)  BP(mean): 105 (19 Dec 2017 07:00) (90 - 116)  ABP: --  ABP(mean): --  RR: 23 (19 Dec 2017 07:00) (14 - 32)  SpO2: 91% (19 Dec 2017 07:00) (91% - 100%)    Mode: CPAP with PS, FiO2: 40, PEEP: 5, PS: 10    12-18 @ 07:01  -  12-19 @ 07:00  --------------------------------------------------------  IN: 2356.4 mL / OUT: 1750 mL / NET: 606.4 mL      CAPILLARY BLOOD GLUCOSE      POCT Blood Glucose.: 137 mg/dL (18 Dec 2017 07:09)      PHYSICAL EXAM:  General:   HEENT:   Lymph Nodes:  Neck:   Respiratory:   Cardiovascular:   Abdomen:   Extremities:   Skin:   Neurological:  Psychiatry:    HOSPITAL MEDICATIONS:  MEDICATIONS  (STANDING):  ALBUTerol    90 MICROgram(s) HFA Inhaler 1 Puff(s) Inhalation every 4 hours  ALBUTerol/ipratropium for Nebulization 3 milliLiter(s) Nebulizer every 6 hours  amLODIPine   Tablet 10 milliGRAM(s) Oral daily  ATENolol  Tablet 50 milliGRAM(s) Oral daily  dextrose 5% + sodium chloride 0.45%. 1000 milliLiter(s) (100 mL/Hr) IV Continuous <Continuous>  enoxaparin Injectable 40 milliGRAM(s) SubCutaneous daily  hydrochlorothiazide 25 milliGRAM(s) Oral daily  methylPREDNISolone sodium succinate Injectable 40 milliGRAM(s) IV Push daily  tiotropium 18 MICROgram(s) Capsule 1 Capsule(s) Inhalation daily    MEDICATIONS  (PRN):      LABS:  (12-18 @ 07:28)                        15.6  9.2 )-----------( 274                 47.1    Neutrophils = 6.0 (65.5%)  Lymphocytes = 2.1 (22.4%)  Eosinophils = 0.0 (0.2%)  Basophils = 0.0 (0.3%)  Monocytes = 1.1 (11.5%)  Bands = --%    WBC Trend: 9.2<--, 5.80<--, 6.46<--  Hb Trend: 15.6<--, 14.1<--, 15.1<--, 15.0<--, 15.9<--  Plt Trend: 274<--, 227<--, 193<--, 193<--, 189<--  12-18    137  |  93<L>  |  24<H>  ----------------------------<  155<H>  3.8   |  29  |  0.91    Ca    9.1      18 Dec 2017 07:28  Phos  3.0     12-18  Mg     2.7     12-18    TPro  7.6  /  Alb  4.1  /  TBili  0.5  /  DBili  x   /  AST  34  /  ALT  28  /  AlkPhos  52  12-18    Creatinine Trend: 0.91<--, 0.87<--, 0.76<--, 1.00<--, 1.12<--, 1.09<--  PT/INR - ( 18 Dec 2017 07:28 )   PT: 11.4 sec;   INR: 1.05 ratio         PTT - ( 18 Dec 2017 07:28 )  PTT:28.7 sec    Arterial Blood Gas:  12-18 @ 07:16  7.40/53/236/32/100/5.6  ABG lactate: --            MICROBIOLOGY:   Blood Cx:  Urine Cx:  Sputum Cx:  Legionella:  RVP:    RADIOLOGY:  X Ray:  CT:  MRI:  Ultrasound:  [ ] Reviewed and interpreted by me    EKG: CHIEF COMPLAINT: Patient is a 67y old  Male who presents with a chief complaint of SOB (13 Dec 2017 08:20)    Interval Events: Patient self extubated yesterday. Now breathing comfortably on NC.     REVIEW OF SYSTEMS:  REVIEW OF SYSTEMS:    CONSTITUTIONAL: No weakness, fevers or chills  EYES/ENT: No visual changes;  No vertigo or throat pain   NECK: No pain or stiffness  RESPIRATORY: N wheezing, hemoptysis; No shortness of breath. Positive for cough  CARDIOVASCULAR: No chest pain or palpitations  GASTROINTESTINAL: No abdominal or epigastric pain. No nausea, vomiting, or hematemesis; No diarrhea or constipation. No melena or hematochezia.  GENITOURINARY: No dysuria, frequency or hematuria  NEUROLOGICAL: No numbness or weakness  SKIN: No itching, rashes          	    OBJECTIVE:  ICU Vital Signs Last 24 Hrs  T(C): 37 (19 Dec 2017 04:00), Max: 37.3 (18 Dec 2017 08:00)  T(F): 98.6 (19 Dec 2017 04:00), Max: 99.1 (18 Dec 2017 08:00)  HR: 65 (19 Dec 2017 07:00) (65 - 102)  BP: 153/74 (19 Dec 2017 07:00) (123/67 - 171/81)  BP(mean): 105 (19 Dec 2017 07:00) (90 - 116)  ABP: --  ABP(mean): --  RR: 23 (19 Dec 2017 07:00) (14 - 32)  SpO2: 91% (19 Dec 2017 07:00) (91% - 100%)    Mode: CPAP with PS, FiO2: 40, PEEP: 5, PS: 10    12-18 @ 07:01  -  12-19 @ 07:00  --------------------------------------------------------  IN: 2356.4 mL / OUT: 1750 mL / NET: 606.4 mL      CAPILLARY BLOOD GLUCOSE      POCT Blood Glucose.: 137 mg/dL (18 Dec 2017 07:09)      PHYSICAL EXAM:  General: WN/WD NAD  Neurology: A&Ox3, nonfocal, EDWARDS x 4  Eyes: PERRLA/ EOMI, Gross vision intact  ENT/Neck: Neck supple, trachea midline, No JVD, Gross hearing intact  Respiratory: CTA B/L, No wheezing,  rhonchi, mild bibasilar rales  CV: RRR, S1S2, no murmurs, rubs or gallops  Abdominal: Soft, NT, ND +BS,   Extremities: No edema, + peripheral pulses  Skin: No Rashes, Hematoma, Ecchymosis      HOSPITAL MEDICATIONS:  MEDICATIONS  (STANDING):  ALBUTerol    90 MICROgram(s) HFA Inhaler 1 Puff(s) Inhalation every 4 hours  ALBUTerol/ipratropium for Nebulization 3 milliLiter(s) Nebulizer every 6 hours  amLODIPine   Tablet 10 milliGRAM(s) Oral daily  ATENolol  Tablet 50 milliGRAM(s) Oral daily  dextrose 5% + sodium chloride 0.45%. 1000 milliLiter(s) (100 mL/Hr) IV Continuous <Continuous>  enoxaparin Injectable 40 milliGRAM(s) SubCutaneous daily  hydrochlorothiazide 25 milliGRAM(s) Oral daily  methylPREDNISolone sodium succinate Injectable 40 milliGRAM(s) IV Push daily  tiotropium 18 MICROgram(s) Capsule 1 Capsule(s) Inhalation daily    MEDICATIONS  (PRN):      LABS:  (12-18 @ 07:28)                        15.6  9.2 )-----------( 274                 47.1    Neutrophils = 6.0 (65.5%)  Lymphocytes = 2.1 (22.4%)  Eosinophils = 0.0 (0.2%)  Basophils = 0.0 (0.3%)  Monocytes = 1.1 (11.5%)  Bands = --%    WBC Trend: 9.2<--, 5.80<--, 6.46<--  Hb Trend: 15.6<--, 14.1<--, 15.1<--, 15.0<--, 15.9<--  Plt Trend: 274<--, 227<--, 193<--, 193<--, 189<--  12-18    137  |  93<L>  |  24<H>  ----------------------------<  155<H>  3.8   |  29  |  0.91    Ca    9.1      18 Dec 2017 07:28  Phos  3.0     12-18  Mg     2.7     12-18    TPro  7.6  /  Alb  4.1  /  TBili  0.5  /  DBili  x   /  AST  34  /  ALT  28  /  AlkPhos  52  12-18    Creatinine Trend: 0.91<--, 0.87<--, 0.76<--, 1.00<--, 1.12<--, 1.09<--  PT/INR - ( 18 Dec 2017 07:28 )   PT: 11.4 sec;   INR: 1.05 ratio         PTT - ( 18 Dec 2017 07:28 )  PTT:28.7 sec    Arterial Blood Gas:  12-18 @ 07:16  7.40/53/236/32/100/5.6  ABG lactate: --            MICROBIOLOGY:   Blood Cx: NG   RVP: Positive metapneumovirus

## 2017-12-19 NOTE — DIETITIAN INITIAL EVALUATION ADULT. - ENERGY NEEDS
Ht: 73"   Wt: 138  BMI: 18.3 kg/m2   IBW:184  (+/-10%)     75% IBW  Edema: none    Skin: no pressure injuries

## 2017-12-19 NOTE — PROGRESS NOTE ADULT - ASSESSMENT
68 yo with COPD  Exacerbation  Metapneumovirus  No consolidation on imaging  s/p 6 days of empiric abx  s/p short period of ventilation  stable off abx  Continue plan per MICU  Will follow  Beeper [id number]/afterhours/No response-[id number]

## 2017-12-19 NOTE — PROGRESS NOTE ADULT - SUBJECTIVE AND OBJECTIVE BOX
chief complaint : shortness of breath         SUBJECTIVE / OVERNIGHT EVENTS: pt had RRT earlier today sec to tachypnea intubated , transferred to MICU , PT SELF EXTUBATED ,on bipap at present    MEDICATIONS  (STANDING):  ALBUTerol/ipratropium for Nebulization 3 milliLiter(s) Nebulizer every 6 hours  amLODIPine   Tablet 10 milliGRAM(s) Oral daily  ATENolol  Tablet 50 milliGRAM(s) Oral daily  enoxaparin Injectable 40 milliGRAM(s) SubCutaneous daily  hydrochlorothiazide 25 milliGRAM(s) Oral daily  LORazepam     Tablet 0.5 milliGRAM(s) Oral two times a day    MEDICATIONS  (PRN):    Vital Signs Last 24 Hrs  T(C): 36.4 (19 Dec 2017 21:28), Max: 37 (19 Dec 2017 04:00)  T(F): 97.6 (19 Dec 2017 21:28), Max: 98.6 (19 Dec 2017 04:00)  HR: 69 (19 Dec 2017 21:28) (64 - 88)  BP: 148/74 (19 Dec 2017 21:28) (103/61 - 169/77)  BP(mean): 96 (19 Dec 2017 18:00) (76 - 111)  RR: 23 (19 Dec 2017 21:28) (14 - 31)  SpO2: 97% (19 Dec 2017 21:28) (88% - 100%)    Constitutional: No fever, fatigue  Skin: No rash.  Eyes: No recent vision problems or eye pain.  ENT: No congestion, ear pain, or sore throat.  Cardiovascular: No chest pain or palpation.  Respiratory: No cough, shortness of breath, congestion, or wheezing.  Gastrointestinal: No abdominal pain, nausea, vomiting, or diarrhea.  Genitourinary: No dysuria.  Musculoskeletal: No joint swelling.  Neurologic: No headache.    PHYSICAL EXAM:  GENERAL: NAD  EYES: EOMI, PERRLA  NECK: Supple, No JVD  CHEST/LUNG: dec breath sounds at bases   HEART:  S1 , S2 +  ABDOMEN: soft, bs+  EXTREMITIES:  trace edema  NEUROLOGY:alert awake calm cooperative    LABS:  12-18    137  |  93<L>  |  24<H>  ----------------------------<  155<H>  3.8   |  29  |  0.91    Ca    9.1      18 Dec 2017 07:28  Phos  3.0     12-18  Mg     2.7     12-18    TPro  7.6  /  Alb  4.1  /  TBili  0.5  /  DBili      /  AST  34  /  ALT  28  /  AlkPhos  52  12-18    Creatinine Trend: 0.91 <--, 0.87 <--, 0.76 <--, 1.00 <--, 1.12 <--                        15.6   9.2   )-----------( 274      ( 18 Dec 2017 07:28 )             47.1     Urine Studies:  Urinalysis Basic - ( 13 Dec 2017 05:27 )    Color: Yellow / Appearance: Clear / SG: >1.030 / pH:   Gluc:  / Ketone: Negative  / Bili: Negative / Urobili: Negative   Blood:  / Protein: Trace / Nitrite: Negative   Leuk Esterase: Negative / RBC:  / WBC    Sq Epi:  / Non Sq Epi:  / Bacteria:       Osmolality, Random Urine: 575 mos/kg (12-14 @ 07:51)  Sodium, Random Urine: 21 mmol/L (12-14 @ 06:32)  Potassium, Random Urine: 45 mmol/L (12-14 @ 06:32)  Chloride, Random Urine: <20 mmol/L (12-14 @ 06:32)        ABG - ( 18 Dec 2017 07:16 )  pH: 7.40  /  pCO2: 53    /  pO2: 236   / HCO3: 32    / Base Excess: 5.6   /  SaO2: 100               LIVER FUNCTIONS - ( 18 Dec 2017 07:28 )  Alb: 4.1 g/dL / Pro: 7.6 g/dL / ALK PHOS: 52 U/L / ALT: 28 U/L RC / AST: 34 U/L / GGT: x           PT/INR - ( 18 Dec 2017 07:28 )   PT: 11.4 sec;   INR: 1.05 ratio         PTT - ( 18 Dec 2017 07:28 )  PTT:28.7 sec pt was seen and examined by me in MICU    chief complaint : shortness of breath         SUBJECTIVE / OVERNIGHT EVENTS: pt had RRT earlier today sec to tachypnea intubated , transferred to MICU , PT SELF EXTUBATED ,on bipap at present    MEDICATIONS  (STANDING):  ALBUTerol/ipratropium for Nebulization 3 milliLiter(s) Nebulizer every 6 hours  amLODIPine   Tablet 10 milliGRAM(s) Oral daily  ATENolol  Tablet 50 milliGRAM(s) Oral daily  enoxaparin Injectable 40 milliGRAM(s) SubCutaneous daily  hydrochlorothiazide 25 milliGRAM(s) Oral daily  LORazepam     Tablet 0.5 milliGRAM(s) Oral two times a day    MEDICATIONS  (PRN):    Vital Signs Last 24 Hrs  T(C): 36.4 (19 Dec 2017 21:28), Max: 37 (19 Dec 2017 04:00)  T(F): 97.6 (19 Dec 2017 21:28), Max: 98.6 (19 Dec 2017 04:00)  HR: 69 (19 Dec 2017 21:28) (64 - 88)  BP: 148/74 (19 Dec 2017 21:28) (103/61 - 169/77)  BP(mean): 96 (19 Dec 2017 18:00) (76 - 111)  RR: 23 (19 Dec 2017 21:28) (14 - 31)  SpO2: 97% (19 Dec 2017 21:28) (88% - 100%)    Constitutional: No fever, fatigue  Skin: No rash.  Eyes: No recent vision problems or eye pain.  ENT: No congestion, ear pain, or sore throat.  Cardiovascular: No chest pain or palpation.  Respiratory: No cough, shortness of breath, congestion, or wheezing.  Gastrointestinal: No abdominal pain, nausea, vomiting, or diarrhea.  Genitourinary: No dysuria.  Musculoskeletal: No joint swelling.  Neurologic: No headache.    PHYSICAL EXAM:  GENERAL: NAD  EYES: EOMI, PERRLA  NECK: Supple, No JVD  CHEST/LUNG: dec breath sounds at bases   HEART:  S1 , S2 +  ABDOMEN: soft, bs+  EXTREMITIES:  trace edema  NEUROLOGY:alert awake calm cooperative    LABS:  12-18    137  |  93<L>  |  24<H>  ----------------------------<  155<H>  3.8   |  29  |  0.91    Ca    9.1      18 Dec 2017 07:28  Phos  3.0     12-18  Mg     2.7     12-18    TPro  7.6  /  Alb  4.1  /  TBili  0.5  /  DBili      /  AST  34  /  ALT  28  /  AlkPhos  52  12-18    Creatinine Trend: 0.91 <--, 0.87 <--, 0.76 <--, 1.00 <--, 1.12 <--                        15.6   9.2   )-----------( 274      ( 18 Dec 2017 07:28 )             47.1     Urine Studies:  Urinalysis Basic - ( 13 Dec 2017 05:27 )    Color: Yellow / Appearance: Clear / SG: >1.030 / pH:   Gluc:  / Ketone: Negative  / Bili: Negative / Urobili: Negative   Blood:  / Protein: Trace / Nitrite: Negative   Leuk Esterase: Negative / RBC:  / WBC    Sq Epi:  / Non Sq Epi:  / Bacteria:       Osmolality, Random Urine: 575 mos/kg (12-14 @ 07:51)  Sodium, Random Urine: 21 mmol/L (12-14 @ 06:32)  Potassium, Random Urine: 45 mmol/L (12-14 @ 06:32)  Chloride, Random Urine: <20 mmol/L (12-14 @ 06:32)        ABG - ( 18 Dec 2017 07:16 )  pH: 7.40  /  pCO2: 53    /  pO2: 236   / HCO3: 32    / Base Excess: 5.6   /  SaO2: 100               LIVER FUNCTIONS - ( 18 Dec 2017 07:28 )  Alb: 4.1 g/dL / Pro: 7.6 g/dL / ALK PHOS: 52 U/L / ALT: 28 U/L RC / AST: 34 U/L / GGT: x           PT/INR - ( 18 Dec 2017 07:28 )   PT: 11.4 sec;   INR: 1.05 ratio         PTT - ( 18 Dec 2017 07:28 )  PTT:28.7 sec

## 2017-12-20 ENCOUNTER — TRANSCRIPTION ENCOUNTER (OUTPATIENT)
Age: 67
End: 2017-12-20

## 2017-12-20 PROCEDURE — 99232 SBSQ HOSP IP/OBS MODERATE 35: CPT

## 2017-12-20 PROCEDURE — 99233 SBSQ HOSP IP/OBS HIGH 50: CPT

## 2017-12-20 RX ADMIN — Medication 3 MILLILITER(S): at 11:26

## 2017-12-20 RX ADMIN — Medication 3 MILLILITER(S): at 17:04

## 2017-12-20 RX ADMIN — Medication 0.5 MILLIGRAM(S): at 23:10

## 2017-12-20 RX ADMIN — AMLODIPINE BESYLATE 10 MILLIGRAM(S): 2.5 TABLET ORAL at 06:56

## 2017-12-20 RX ADMIN — ATENOLOL 50 MILLIGRAM(S): 25 TABLET ORAL at 06:56

## 2017-12-20 RX ADMIN — ENOXAPARIN SODIUM 40 MILLIGRAM(S): 100 INJECTION SUBCUTANEOUS at 11:26

## 2017-12-20 RX ADMIN — Medication 25 MILLIGRAM(S): at 06:56

## 2017-12-20 RX ADMIN — Medication 0.5 MILLIGRAM(S): at 11:26

## 2017-12-20 RX ADMIN — Medication 3 MILLILITER(S): at 06:57

## 2017-12-20 NOTE — DISCHARGE NOTE ADULT - HOSPITAL COURSE
67 M former smoker, with a PMH of HTN, asthma/ COPD, admitted with acute hypoxic resp failure 2/2 hMPV with possible superimposed bacterial PNA, initially required Bilevel support, followed by a brief intubation, extubated on 12/18, now doing well on NC 67 M former smoker, with a PMH of HTN, asthma/ COPD, admitted with acute hypoxic respiratory  failure 2/2 hMPV with possible superimposed bacterial PNA; which was treated empirically, initially required Bilevel support, followed by a brief intubation, extubated on 12/18, now doing well on NC. Completed a course of systemic steroids.   All Bcxs/ UCx NGTD, urine legionella Ag negative. Pt currently stable for discharge with follow up with Pulm for PFTs and repeat CT chest in 6-8 weeks, and his PMD.

## 2017-12-20 NOTE — DISCHARGE NOTE ADULT - SECONDARY DIAGNOSIS.
Chronic obstructive pulmonary disease, unspecified COPD type Essential hypertension Asthma, unspecified asthma severity, unspecified whether complicated, unspecified whether persistent

## 2017-12-20 NOTE — PROGRESS NOTE ADULT - SUBJECTIVE AND OBJECTIVE BOX
CHIEF COMPLAINT: Shortness of breath     Interval Events: No acute events overnight. Reports SOB greatly improved. Reports minimal cough, no CP, N/V/D    REVIEW OF SYSTEMS:  Constitutional: [ ] negative [- ] fevers [- ] chills [ ] weight loss [ ] weight gain  HEENT: [ ] negative [ ] dry eyes [ ] eye irritation [- ] postnasal drip [ ] nasal congestion  CV: [ ] negative  [ -] chest pain [ -] orthopnea [- ] palpitations [ ] murmur  Resp: [ ] negative [+ ] cough [+ ] shortness of breath [ ] dyspnea [- ] wheezing [- ] sputum [ -] hemoptysis  GI: [ ] negative [- ] nausea [ -] vomiting [ -] diarrhea [ -] constipation [- ] abd pain [ ] dysphagia   : [ ] negative [-] dysuria [ -] nocturia [- ] hematuria [- ] increased urinary frequency  Musculoskeletal: [ ] negative [ -] back pain [- ] myalgias [ -] arthralgias [ ] fracture  Skin: [ ] negative [- ] rash [ ] itch  Neurological: [ ] negative [- ] headache [ -] dizziness [ ] syncope [ ] weakness [ ] numbness  Psychiatric: [ ] negative [+ ] anxiety [ ] depression  Endocrine: [- ] negative [ ] diabetes [ ] thyroid problem  Hematologic/Lymphatic: [ ] negative [ ] anemia [- ] bleeding problem  Allergic/Immunologic: [ ] negative [ ] itchy eyes [- ] nasal discharge [ ] hives [ ] angioedema  [x ] All other systems negative  [ ] Unable to assess ROS because ________    OBJECTIVE:  ICU Vital Signs Last 24 Hrs  T(C): 36.5 (20 Dec 2017 11:27), Max: 37.1 (20 Dec 2017 03:59)  T(F): 97.7 (20 Dec 2017 11:27), Max: 98.7 (20 Dec 2017 03:59)  HR: 73 (20 Dec 2017 11:27) (64 - 88)  BP: 137/72 (20 Dec 2017 11:27) (103/61 - 148/74)  BP(mean): 96 (19 Dec 2017 18:00) (76 - 103)  ABP: --  ABP(mean): --  RR: 17 (20 Dec 2017 11:27) (17 - 31)  SpO2: 92% (20 Dec 2017 11:27) (88% - 98%)        12-19 @ 07:01  -  12-20 @ 07:00  --------------------------------------------------------  IN: 640 mL / OUT: 1450 mL / NET: -810 mL    12-20 @ 07:01  -  12-20 @ 13:15  --------------------------------------------------------  IN: 500 mL / OUT: 0 mL / NET: 500 mL      CAPILLARY BLOOD GLUCOSE          PHYSICAL EXAM:  General: NAD  HEENT: PERRLA  Lymph Nodes: None palpable  Neck: Supple  Respiratory: CTA b/l, no wheezing or crackles   Cardiovascular: RRR, S1+S2+0  Abdomen: Soft, NT, ND, BS+  Extremities: No edema, pulses + b/l  Skin: No rash  Neurological: AAOx3, grossly non focal  Psychiatry: Normal mood     HOSPITAL MEDICATIONS:  enoxaparin Injectable 40 milliGRAM(s) SubCutaneous daily      amLODIPine   Tablet 10 milliGRAM(s) Oral daily  ATENolol  Tablet 50 milliGRAM(s) Oral daily  hydrochlorothiazide 25 milliGRAM(s) Oral daily      ALBUTerol/ipratropium for Nebulization 3 milliLiter(s) Nebulizer every 6 hours    LORazepam     Tablet 0.5 milliGRAM(s) Oral two times a day                    LABS:    Hgb Trend: 15.6<--, 14.1<--, 15.1<--        Creatinine Trend: 0.91<--, 0.87<--, 0.76<--, 1.00<--, 1.12<--, 1.09<--            MICROBIOLOGY:   Culture - Urine (12.13.17 @ 08:43)    Specimen Source: .Urine Clean Catch (Midstream)    Culture Results:   No growth    Legionella pneumophila Antigen, Urine (12.13.17 @ 07:31)    Legionella Antigen, Urine: Negative    Rapid Respiratory Viral Panel (12.12.17 @ 23:41)    Rapid RVP Result: Detected: The [employer] RVP Rapid uses polymerase chain reaction (PCR) and melt  curve analysis to screen for adenovirus; coronavirus HKU1, NL63, 229E,  OC43; human metapneumovirus (hMPV); human enterovirus/rhinovirus  (Entero/RV); influenza A; influenza A/H1;influenza A/H3; influenza  A/H1-2009; influenza B; parainfluenza viruses 1, 2, 3, 4; respiratory  syncytial virus; Bordetella pertussis; Mycoplasma pneumoniae; and  Chlamydophila pneumoniae.        RADIOLOGY:  < from: CT Angio Chest w/ IV Cont (12.12.17 @ 22:55) >  No pulmonary embolus.    Mild centrilobular emphysema. Findings suggestive of small airway   disease.    [ ] Reviewed and interpreted by me    PULMONARY FUNCTION TESTS:    EKG:

## 2017-12-20 NOTE — DISCHARGE NOTE ADULT - CARE PROVIDER_API CALL
Luz Patton (RAJAN), Cardiology  [address] [place] [id number]  Phone: [phone]  Fax: [phone]    Suleman Hernández), Critical Care Medicine; Internal Medicine; Pulmonary Disease  [address] [facility] [address]  [place] [address]  Phone: [phone]  Fax: [phone]    Edvin Velasco), Cardiovascular Disease  [address] [place] [address]  Phone: [phone]  Fax: [phone]

## 2017-12-20 NOTE — PROGRESS NOTE ADULT - ASSESSMENT
67 M former smoker, with a PMH of HTN, asthma/ COPD, admitted with acute hypoxic resp failure 2/2 hMPV with possible superimposed bacterial PNA, initially required Bilevel support, followed by a brief intubation, extubated on 12/18, now doing well on NC    1. Acute resp failure with hypoxia/ COPD exac/ hMPV infection/ Possible Bact PNA  - Respiratory status greatly improved, saturating well on NC. Would titrate down NC to keep sats > 90 %,   - BIPAP at bedside, if he remains stable today, can discontinue BIPAP  - Completed a course of systemic steroids.   - Would cont Duonebs Q6H but can change to prn now  - Add symbicort BID and spiriva daily  - RT to teach correct inhaler technique  - Competed a 6 day course of antibiotics for possible bacterial PNA. All Bcxs/ UCx NGTD, urine legionella Ag negative.  - Incentive spirometry/ acapella/ OBC as tolerated   - Requires oupt pulm FUP upon d-c with PFTs and a repeat Ct chest in 6-8 weeks    2. Severe uncontrolled hypertension  - On Atenolol, HCTZ and Norvasc for BP control    3. Severe Anxiety:  - On Ativan BID    4. DVT PX:  - Lovenox SQ

## 2017-12-20 NOTE — PROGRESS NOTE ADULT - ASSESSMENT
67M hx of copd, asthma, htn, presenting with hypoxic respiratory failure. COPD exacerbation, hMPV, pneumonia, intubated for increased work of breathing, now s/p extubation.    - Appear that is breathing is markedly improved.   - Bedside echocardiogram with what appears to be normal LV and RV function. There is a trace pericardial effusion anterior to the RV. No severe valvular disease noted. Official echocardiogram confirms normal LV function.  - Continue treatment for COPD exacerbation. Oxygen supplementation as necessary  - No signs of significant ischemia or volume overload.   - BP relatively controlled on Atenolol, HCTZ and Norvasc.  - Monitor and replete electrolytes. Keep K>4.0 and Mg>2.0.  - Further cardiac workup will depend on clinical course.   - All other workup per primary team. Will followup.

## 2017-12-20 NOTE — DISCHARGE NOTE ADULT - PATIENT PORTAL LINK FT
“You can access the FollowHealth Patient Portal, offered by [facility], by registering with the following website: [email or web address]”

## 2017-12-20 NOTE — DISCHARGE NOTE ADULT - MEDICATION SUMMARY - MEDICATIONS TO STOP TAKING
I will STOP taking the medications listed below when I get home from the hospital:    Advair Diskus  -- 1 puff(s) inhaled 2 times a day, As Needed

## 2017-12-20 NOTE — DISCHARGE NOTE ADULT - CARE PROVIDERS DIRECT ADDRESSES
[email or web address]

## 2017-12-20 NOTE — PROGRESS NOTE ADULT - SUBJECTIVE AND OBJECTIVE BOX
Patient is a 67y old  Male who presents with a chief complaint of SOB (13 Dec 2017 08:20)    Being followed by ID for metapneumo,resp failure    Interval history:Transferred to floor  No acute events      ROS:  No cough,SOB,CP  No N/V/D./abd pain  No other complaints      Antimicrobials:None       Other medications reviewed    Vital Signs Last 24 Hrs  T(C): 36.5 (12-20-17 @ 11:27), Max: 37.1 (12-20-17 @ 03:59)  T(F): 97.7 (12-20-17 @ 11:27), Max: 98.7 (12-20-17 @ 03:59)  HR: 73 (12-20-17 @ 11:27) (64 - 88)  BP: 137/72 (12-20-17 @ 11:27) (103/61 - 148/74)  BP(mean): 96 (12-19-17 @ 18:00) (76 - 103)  RR: 17 (12-20-17 @ 11:27) (17 - 31)  SpO2: 92% (12-20-17 @ 11:27) (88% - 98%)    Physical Exam:        HEENT PERRLA EOMI    No oral exudate or erythema    Chest Good AE,CTA    CVS RRR S1 S2 WNl No murmur or rub or gallop    Abd soft BS normal No tenderness no masses    IV site no erythema tenderness or discharge    CNS AAO X 3 no focal    Lab Data:

## 2017-12-20 NOTE — DISCHARGE NOTE ADULT - PLAN OF CARE
Call your Health Care provider upon arrival home to make a follow up appointment within one week.  Take all inhalers as prescribed by your Health Care Provider.  Take steroids as prescribed by your Health Care Provider.  If your cough increases infrequency and severity and/or you have shortness of breath or increased shortness of breath call your Health Care Provider.  If you develop fever, chills, night sweats, malaise, and/or change in mental status call your Health care Provider.  Nutrition is very important.  Eat small frequent meals.  Increase your activity as tolerated.  Do not stay in bed all day Remain free of symptoms likely related to hMPV. symptoms improving, likely related to hMPV.  Please continue to take inhalers/ nebulizer treatments  You were treated for possible bacterial pneumonia. Follow up with the Pulmonologist. Low salt diet  Activity as tolerated.  Take all medication as prescribed.  Follow up with your medical doctor for routine blood pressure monitoring at your next visit.  Notify your doctor if you have any of the following symptoms:   Dizziness, Lightheadedness, Blurry vision, Headache, Chest pain, Shortness of breath Asthma attacks happen when the airways in the lungs become narrow and inflamed  Asthma symptoms can include - Wheezing or noisy breathing, coughing, tight feeling in the chest, shortness of breath  Almost everyone with asthma has a quick-relief inhaler that works in 5- 10mins that they carry with them and long-term controller medicines control asthma and prevent future symptoms. People with frequent asthma symptoms take these 1 or 2 times each day.  You can stay away from things that cause your symptoms or make them worse. Doctors call these "triggers."  avoid them as much as possible. Some common triggers include - Dust, mold, animals, such as dogs and cats, pollen and plants, cigarette smoke, getting sick with a cold or flu (that's why it's important to get a flu shot), stress  Talk to your caregiver about an action plan for managing asthma attacks. This includes the use of a peak flow meter which measures the severity of the attack and medicines that can help stop the attack.   SEEK MEDICAL CARE IF:  You have wheezing, shortness of breath, or a cough even if taking medicine to prevent attacks.   You have thickening of sputum, sputum changes from clear or white to yellow, green, gray, or bloody.   You have any problems that may be related to the medicines you are taking (such as a rash, itching, swelling, or trouble breathing).  You are using a reliever medicine more than 2–3 times per week.  Your peak flow is still at 50–79% of personal best after following your action plan for 1 hour.  SEEK IMMEDIATE MEDICAL CARE IF:  You are short of breath even at rest,or with very little physical activity, chest pain, heart beating fast, bluish color to your lips or fingernails, fever, dizziness,   You seem to be getting worse and are unresponsive to treatment during an asthma attack.   Your peak flow is less than 50% of personal best.

## 2017-12-20 NOTE — DISCHARGE NOTE ADULT - MEDICATION SUMMARY - MEDICATIONS TO TAKE
I will START or STAY ON the medications listed below when I get home from the hospital:    losartan 50 mg oral tablet  -- 1 tab(s) by mouth once a day  -- Indication: For Essential hypertension    atenolol-chlorthalidone 50 mg-25 mg oral tablet  -- 1 tab(s) by mouth once a day    **See internal gerald**      -- Indication: For Essential hypertension    Proventil HFA 90 mcg/inh inhalation aerosol  -- 1 puff(s) inhaled every 6 hours, As Needed -for shortness of breath and/or wheezing   -- Indication: For Asthma, unspecified asthma severity, unspecified whether complicated, unspecified whether persistent    budesonide-formoterol 80 mcg-4.5 mcg/inh inhalation aerosol  -- 2 puff(s) inhaled 2 times a day   -- Indication: For Asthma, unspecified asthma severity, unspecified whether complicated, unspecified whether persistent    tiotropium 18 mcg inhalation capsule  -- 1 cap(s) inhaled once a day  -- Indication: For Asthma, unspecified asthma severity, unspecified whether complicated, unspecified whether persistent    amLODIPine 10 mg oral tablet  -- 1 tab(s) by mouth once a day  -- Indication: For Essential hypertension    Singulair 10 mg oral tablet  -- 1 tab(s) by mouth once a day, As Needed  -- Indication: For Asthma

## 2017-12-20 NOTE — PROGRESS NOTE ADULT - ASSESSMENT
68 yo with COPD  Exacerbation  Metapneumovirus  No consolidation on imaging  s/p 6 days of empiric abx  s/p short period of ventilation  stable off abx  Continue plan per primary team   Will follow  Beeper [id number]/afterhours/No response-[id number]

## 2017-12-20 NOTE — PHYSICAL THERAPY INITIAL EVALUATION ADULT - GAIT DEVIATIONS NOTED, PT EVAL
decreased aldo/increased time in double stance/decreased velocity of limb motion/decreased stride length/decreased weight-shifting ability

## 2017-12-20 NOTE — PROGRESS NOTE ADULT - SUBJECTIVE AND OBJECTIVE BOX
[facility] -- Mirta Moffett, Krista Hernandez, Rodger Tolliver Savella  Office # [phone]      Follow Up:  resp failure, dyspnea    Subjective/Observations: Patient seen and examined. Events noted. Resting comfortably in bed. No complaints of chest pain,   or palpitations reported. No signs of orthopnea or PND. Dyspnea has improved.       REVIEW OF SYSTEMS: All other review of systems is negative unless indicated above    PAST MEDICAL & SURGICAL HISTORY:  Asthma  COPD (chronic obstructive pulmonary disease)      MEDICATIONS  (STANDING):  ALBUTerol/ipratropium for Nebulization 3 milliLiter(s) Nebulizer every 6 hours  amLODIPine   Tablet 10 milliGRAM(s) Oral daily  ATENolol  Tablet 50 milliGRAM(s) Oral daily  enoxaparin Injectable 40 milliGRAM(s) SubCutaneous daily  hydrochlorothiazide 25 milliGRAM(s) Oral daily  LORazepam     Tablet 0.5 milliGRAM(s) Oral two times a day    MEDICATIONS  (PRN):      Allergies    No Known Allergies    Intolerances            Vital Signs Last 24 Hrs  T(C): 36.5 (20 Dec 2017 11:27), Max: 37.1 (20 Dec 2017 03:59)  T(F): 97.7 (20 Dec 2017 11:27), Max: 98.7 (20 Dec 2017 03:59)  HR: 74 (20 Dec 2017 16:25) (69 - 83)  BP: 137/72 (20 Dec 2017 11:27) (114/65 - 148/74)  BP(mean): --  RR: 17 (20 Dec 2017 11:27) (17 - 23)  SpO2: 93% (20 Dec 2017 16:25) (91% - 97%)    I&O's Summary    19 Dec 2017 07:01  -  20 Dec 2017 07:00  --------------------------------------------------------  IN: 640 mL / OUT: 1450 mL / NET: -810 mL    20 Dec 2017 07:01  -  20 Dec 2017 18:59  --------------------------------------------------------  IN: 920 mL / OUT: 300 mL / NET: 620 mL          PHYSICAL EXAM:  TELE: SR, 9WCT  Constitutional: NAD, awake and alert, well-developed  HEENT: Moist Mucous Membranes, Anicteric  Pulmonary: Decreased breath sounds b/l. No rales, crackles or wheeze appreciated.   Cardiovascular: Regular, S1 and S2, No murmurs, rubs, gallops or clicks  Gastrointestinal: Bowel Sounds present, soft, nontender.   Lymph: No peripheral edema. No lymphadenopathy.  Skin: No visible rashes or ulcers.  Psych:  Mood & affect appropriate    LABS: All Labs Reviewed:                        15.6   9.2   )-----------( 274      ( 18 Dec 2017 07:28 )             47.1     18 Dec 2017 07:28    137    |  93     |  24     ----------------------------<  155    3.8     |  29     |  0.91     Ca    9.1        18 Dec 2017 07:28  Phos  3.0       18 Dec 2017 07:28  Mg     2.7       18 Dec 2017 07:28    TPro  7.6    /  Alb  4.1    /  TBili  0.5    /  DBili  x      /  AST  34     /  ALT  28     /  AlkPhos  52     18 Dec 2017 07:28

## 2017-12-20 NOTE — PHYSICAL THERAPY INITIAL EVALUATION ADULT - PERTINENT HX OF CURRENT PROBLEM, REHAB EVAL
Pt is a 67M hx of COPD, asthma, HTN, presenting from home with SOB, productive cough past 4 days, fevers since yesterday, pt denies CP, N/V,  palpitations, weakness. Went to PMDs office, found to be hypoxic <90% referred to ED. Intubated on the floor for increased work of breathing on 12/18 and self-extubated few hours after. Currently satting well on 3L NC. Cxray, TTE neg.

## 2017-12-20 NOTE — DISCHARGE NOTE ADULT - CARE PLAN
Principal Discharge DX:	Acute respiratory failure with hypoxia  Goal:	Remain free of symptoms  Secondary Diagnosis:	Chronic obstructive pulmonary disease, unspecified COPD type  Instructions for follow-up, activity and diet:	Call your Health Care provider upon arrival home to make a follow up appointment within one week.  Take all inhalers as prescribed by your Health Care Provider.  Take steroids as prescribed by your Health Care Provider.  If your cough increases infrequency and severity and/or you have shortness of breath or increased shortness of breath call your Health Care Provider.  If you develop fever, chills, night sweats, malaise, and/or change in mental status call your Health care Provider.  Nutrition is very important.  Eat small frequent meals.  Increase your activity as tolerated.  Do not stay in bed all day Principal Discharge DX:	Acute respiratory failure with hypoxia  Goal:	Remain free of symptoms  Instructions for follow-up, activity and diet:	likely related to hMPV.  Secondary Diagnosis:	Chronic obstructive pulmonary disease, unspecified COPD type  Instructions for follow-up, activity and diet:	Call your Health Care provider upon arrival home to make a follow up appointment within one week.  Take all inhalers as prescribed by your Health Care Provider.  Take steroids as prescribed by your Health Care Provider.  If your cough increases infrequency and severity and/or you have shortness of breath or increased shortness of breath call your Health Care Provider.  If you develop fever, chills, night sweats, malaise, and/or change in mental status call your Health care Provider.  Nutrition is very important.  Eat small frequent meals.  Increase your activity as tolerated.  Do not stay in bed all day Principal Discharge DX:	Acute respiratory failure with hypoxia  Goal:	Remain free of symptoms  Instructions for follow-up, activity and diet:	symptoms improving, likely related to hMPV.  Please continue to take inhalers/ nebulizer treatments  You were treated for possible bacterial pneumonia. Follow up with the Pulmonologist.  Secondary Diagnosis:	Chronic obstructive pulmonary disease, unspecified COPD type  Instructions for follow-up, activity and diet:	Call your Health Care provider upon arrival home to make a follow up appointment within one week.  Take all inhalers as prescribed by your Health Care Provider.  Take steroids as prescribed by your Health Care Provider.  If your cough increases infrequency and severity and/or you have shortness of breath or increased shortness of breath call your Health Care Provider.  If you develop fever, chills, night sweats, malaise, and/or change in mental status call your Health care Provider.  Nutrition is very important.  Eat small frequent meals.  Increase your activity as tolerated.  Do not stay in bed all day  Secondary Diagnosis:	Essential hypertension  Instructions for follow-up, activity and diet:	Low salt diet  Activity as tolerated.  Take all medication as prescribed.  Follow up with your medical doctor for routine blood pressure monitoring at your next visit.  Notify your doctor if you have any of the following symptoms:   Dizziness, Lightheadedness, Blurry vision, Headache, Chest pain, Shortness of breath  Secondary Diagnosis:	Asthma, unspecified asthma severity, unspecified whether complicated, unspecified whether persistent  Instructions for follow-up, activity and diet:	Asthma attacks happen when the airways in the lungs become narrow and inflamed  Asthma symptoms can include - Wheezing or noisy breathing, coughing, tight feeling in the chest, shortness of breath  Almost everyone with asthma has a quick-relief inhaler that works in 5- 10mins that they carry with them and long-term controller medicines control asthma and prevent future symptoms. People with frequent asthma symptoms take these 1 or 2 times each day.  You can stay away from things that cause your symptoms or make them worse. Doctors call these "triggers."  avoid them as much as possible. Some common triggers include - Dust, mold, animals, such as dogs and cats, pollen and plants, cigarette smoke, getting sick with a cold or flu (that's why it's important to get a flu shot), stress  Talk to your caregiver about an action plan for managing asthma attacks. This includes the use of a peak flow meter which measures the severity of the attack and medicines that can help stop the attack.   SEEK MEDICAL CARE IF:  You have wheezing, shortness of breath, or a cough even if taking medicine to prevent attacks.   You have thickening of sputum, sputum changes from clear or white to yellow, green, gray, or bloody.   You have any problems that may be related to the medicines you are taking (such as a rash, itching, swelling, or trouble breathing).  You are using a reliever medicine more than 2–3 times per week.  Your peak flow is still at 50–79% of personal best after following your action plan for 1 hour.  SEEK IMMEDIATE MEDICAL CARE IF:  You are short of breath even at rest,or with very little physical activity, chest pain, heart beating fast, bluish color to your lips or fingernails, fever, dizziness,   You seem to be getting worse and are unresponsive to treatment during an asthma attack.   Your peak flow is less than 50% of personal best.

## 2017-12-21 LAB
CULTURE RESULTS: SIGNIFICANT CHANGE UP
SPECIMEN SOURCE: SIGNIFICANT CHANGE UP

## 2017-12-21 PROCEDURE — 99232 SBSQ HOSP IP/OBS MODERATE 35: CPT

## 2017-12-21 PROCEDURE — 99233 SBSQ HOSP IP/OBS HIGH 50: CPT

## 2017-12-21 RX ADMIN — Medication 3 MILLILITER(S): at 23:51

## 2017-12-21 RX ADMIN — Medication 25 MILLIGRAM(S): at 05:48

## 2017-12-21 RX ADMIN — Medication 0.5 MILLIGRAM(S): at 12:16

## 2017-12-21 RX ADMIN — Medication 3 MILLILITER(S): at 05:48

## 2017-12-21 RX ADMIN — ENOXAPARIN SODIUM 40 MILLIGRAM(S): 100 INJECTION SUBCUTANEOUS at 12:16

## 2017-12-21 RX ADMIN — Medication 3 MILLILITER(S): at 18:09

## 2017-12-21 RX ADMIN — AMLODIPINE BESYLATE 10 MILLIGRAM(S): 2.5 TABLET ORAL at 05:48

## 2017-12-21 RX ADMIN — Medication 3 MILLILITER(S): at 12:16

## 2017-12-21 RX ADMIN — Medication 0.5 MILLIGRAM(S): at 23:51

## 2017-12-21 RX ADMIN — ATENOLOL 50 MILLIGRAM(S): 25 TABLET ORAL at 05:48

## 2017-12-21 NOTE — PROGRESS NOTE ADULT - ASSESSMENT
pt with above history p/w hypoxic resp failure likely sec to copd exacerbation   sec to meta pneumo virau      Acute resp failure with hypoxia/ COPD exac/ hMPV infection/ Possible Bact PNA  - as per pulm , Respiratory status greatly improved, saturating well on NC. Would titrate down NC to keep sats > 90 %,   - Can discontinue BIPAP  - Completed a course of systemic steroids.   - Would cont Duonebs Q6H but can change to prn now  - Add symbicort BID and spiriva daily    HTN - titrate htn meds for optimal bp control                 -hyponatremia- improved    dvt prophylaxis

## 2017-12-21 NOTE — PROGRESS NOTE ADULT - ASSESSMENT
67 M former smoker, with a PMH of HTN, asthma/ COPD, admitted with acute hypoxic resp failure 2/2 hMPV with possible superimposed bacterial PNA, initially required Bilevel support, followed by a brief intubation, extubated on 12/18, now doing well on NC    1. Acute resp failure with hypoxia/ COPD exac/ hMPV infection/ Possible Bact PNA  - Respiratory status greatly improved, saturating well on NC. Would titrate down NC to keep sats > 90 %,   - Can discontinue BIPAP  - Completed a course of systemic steroids.   - Would cont Duonebs Q6H but can change to prn now  - Add symbicort BID and spiriva daily  - RT to teach correct inhaler technique  - Competed a 6 day course of antibiotics for possible bacterial PNA. All Bcxs/ UCx NGTD, urine legionella Ag negative.  - Incentive spirometry/ acapella/ OBC as tolerated   - Requires oupt pulm FUP upon d-c with PFTs and a repeat Ct chest in 6-8 weeks    2. Severe uncontrolled hypertension  - On Atenolol, HCTZ and Norvasc for BP control    3. Severe Anxiety:  - On Ativan BID    4. DVT PX:  - Lovenox SQ

## 2017-12-21 NOTE — PROGRESS NOTE ADULT - ASSESSMENT
67M hx of copd, asthma, htn, presenting with hypoxic respiratory failure. COPD exacerbation, hMPV, pneumonia, intubated for increased work of breathing, now s/p extubation.    -resp status is markedly improved.   -there is no evidence of acute ischemia.  -there is no evidence of significant arrhythmia.  -there is no evidence for meaningful  volume overload.  -bp reasonable on present regimen  -DVT prophylaxis  -monitor electrolytes, keep k>4, Mg>2  - All other workup per primary team. Will follow

## 2017-12-21 NOTE — PROGRESS NOTE ADULT - SUBJECTIVE AND OBJECTIVE BOX
CHIEF COMPLAINT: Shortness of breath    Interval Events: No acute events overnight. Does not want to use BIPAP. Afebrile, stable hemodynamics. SOB greatly improved, +cough, no CP    REVIEW OF SYSTEMS:  Constitutional: [ ] negative [- ] fevers [- ] chills [ ] weight loss [ ] weight gain  HEENT: [ ] negative [ ] dry eyes [ ] eye irritation [- ] postnasal drip [ ] nasal congestion  CV: [ ] negative  [ -] chest pain [ -] orthopnea [- ] palpitations [ ] murmur  Resp: [ ] negative [+ ] cough [+ ] shortness of breath [ ] dyspnea [- ] wheezing [- ] sputum [ -] hemoptysis  GI: [ ] negative [- ] nausea [ -] vomiting [ -] diarrhea [ -] constipation [- ] abd pain [ ] dysphagia   : [ ] negative [-] dysuria [ -] nocturia [- ] hematuria [- ] increased urinary frequency  Musculoskeletal: [ ] negative [ -] back pain [- ] myalgias [ -] arthralgias [ ] fracture  Skin: [ ] negative [- ] rash [ ] itch  Neurological: [ ] negative [- ] headache [ -] dizziness [ ] syncope [ ] weakness [ ] numbness  Psychiatric: [ ] negative [+ ] anxiety [ ] depression  Endocrine: [- ] negative [ ] diabetes [ ] thyroid problem  Hematologic/Lymphatic: [ ] negative [ ] anemia [- ] bleeding problem  Allergic/Immunologic: [ ] negative [ ] itchy eyes [- ] nasal discharge [ ] hives [ ] angioedema  [x ] All other systems negative  [ ] Unable to assess ROS because ________    OBJECTIVE:  ICU Vital Signs Last 24 Hrs  T(C): 36.8 (21 Dec 2017 11:36), Max: 37.1 (20 Dec 2017 21:01)  T(F): 98.2 (21 Dec 2017 11:36), Max: 98.7 (20 Dec 2017 21:01)  HR: 69 (21 Dec 2017 11:36) (69 - 80)  BP: 119/71 (21 Dec 2017 11:36) (119/71 - 137/80)  BP(mean): --  ABP: --  ABP(mean): --  RR: 18 (21 Dec 2017 15:40) (17 - 18)  SpO2: 95% (21 Dec 2017 15:40) (94% - 98%)        12-20 @ 07:01  -  12-21 @ 07:00  --------------------------------------------------------  IN: 1430 mL / OUT: 950 mL / NET: 480 mL    12-21 @ 07:01  -  12-21 @ 16:30  --------------------------------------------------------  IN: 1040 mL / OUT: 400 mL / NET: 640 mL      CAPILLARY BLOOD GLUCOSE          PHYSICAL EXAM:  General: NAD  HEENT: PERRLA  Lymph Nodes: None palpable  Neck: Supple  Respiratory: CTA b/l, no wheezing or crackles   Cardiovascular: RRR, S1+S2+0  Abdomen: Soft, NT, ND, BS+  Extremities: No edema, pulses + b/l  Skin: No rash  Neurological: AAOx3, grossly non focal  Psychiatry: Normal mood     HOSPITAL MEDICATIONS:  enoxaparin Injectable 40 milliGRAM(s) SubCutaneous daily      amLODIPine   Tablet 10 milliGRAM(s) Oral daily  ATENolol  Tablet 50 milliGRAM(s) Oral daily  hydrochlorothiazide 25 milliGRAM(s) Oral daily      ALBUTerol/ipratropium for Nebulization 3 milliLiter(s) Nebulizer every 6 hours    LORazepam     Tablet 0.5 milliGRAM(s) Oral two times a day                    LABS:    Hgb Trend: 15.6<--, 14.1<--        Creatinine Trend: 0.91<--, 0.87<--, 0.76<--, 1.00<--, 1.12<--, 1.09<--            MICROBIOLOGY:   Culture - Blood (12.12.17 @ 21:42)    Specimen Source: .Blood Blood-Peripheral    Culture Results:   No growth at 5 days.      RADIOLOGY:  No new imaging  [ ] Reviewed and interpreted by me    PULMONARY FUNCTION TESTS:    EKG:

## 2017-12-21 NOTE — PROGRESS NOTE ADULT - SUBJECTIVE AND OBJECTIVE BOX
[facility]    Mirta Moffett, Mary, Krista, Sharee, Rodger, Aaron      [phone]    CHIEF COMPLAINT: Patient is a 67y old  Male who presents with a chief complaint of SOB (20 Dec 2017 12:43)      Follow Up: s/[p resp failure    Interim history: The patient reports no new symptoms.  Denies chest discomfort and shortness of breath.  No abdominal pain.  No new neurologic symptoms.      MEDICATIONS  (STANDING):  ALBUTerol/ipratropium for Nebulization 3 milliLiter(s) Nebulizer every 6 hours  amLODIPine   Tablet 10 milliGRAM(s) Oral daily  ATENolol  Tablet 50 milliGRAM(s) Oral daily  enoxaparin Injectable 40 milliGRAM(s) SubCutaneous daily  hydrochlorothiazide 25 milliGRAM(s) Oral daily  LORazepam     Tablet 0.5 milliGRAM(s) Oral two times a day    MEDICATIONS  (PRN):      REVIEW OF SYSTEMS:  eye, ent, GI, , allergic, dermatologic, musculoskeletal and neurologic are negative except as described above    Vital Signs Last 24 Hrs  T(C): 36.8 (21 Dec 2017 03:46), Max: 37.1 (20 Dec 2017 21:01)  T(F): 98.2 (21 Dec 2017 03:46), Max: 98.7 (20 Dec 2017 21:01)  HR: 76 (21 Dec 2017 06:29) (73 - 77)  BP: 131/79 (21 Dec 2017 03:46) (131/79 - 137/80)  BP(mean): --  RR: 18 (21 Dec 2017 03:46) (17 - 18)  SpO2: 98% (21 Dec 2017 06:29) (91% - 98%)    I&O's Summary    20 Dec 2017 07:01  -  21 Dec 2017 07:00  --------------------------------------------------------  IN: 1430 mL / OUT: 950 mL / NET: 480 mL        Telemetry past 24h: sr    PHYSICAL EXAM:    Constitutional: well-nourished, well-developed, NAD   HEENT:  MMM, sclerae anicteric, conjunctivae clear, no oral cyanosis.  Pulmonary: Non-labored, breath sounds are clear bilaterally, No wheezing, rales or rhonchi  Cardiovascular: Regular, S1 and S2.  No murmur.  No rubs, gallops or clicks  Gastrointestinal: Bowel Sounds present, soft, nontender.   Lymph: No peripheral edema.   Neurological: Alert, no focal deficits  Skin: No rashes.  Psych:  Mood & affect appropriate    LABS: All Labs Reviewed:                Blood Culture: Organism --  Gram Stain Blood -- Gram Stain --  Specimen Source Skin oropharynx  Culture-Blood --            RADIOLOGY:    EKG:    Echo:    < from: Transthoracic Echocardiogram (12.15.17 @ 19:43) >    Patient name: ANABELLE JACINTO  YOB: 1950   Age: 67 (M)   MR#: [id number]  Study Date: 12/15/2017  [facility]  Study quality: Technically limited  Referring Physician: Ramon Thacker MD  Blood Pressure: 184/82 mmHg  Height: 185 cm  Weight: 66 kg  BSA: 1.9 m2  Heart Rate: 92 mmHg  ------------------------------------------------------------------------  PROCEDURE: Transthoracic echocardiogram with 2-D, M-Mode  and complete spectral and color flow Doppler.  INDICATION: Abnormal electrocardiogram (ECG) (EKG) (R94.31)  ------------------------------------------------------------------------  Dimensions:    Normal Values:  LA:     2.8    2.0 - 4.0 cm  Ao:     3.4    2.0 - 3.8 cm  SEPTUM: 0.7    0.6 - 1.2 cm  PWT:    0.7    0.6 - 1.1 cm  LVIDd:  5.3    3.0 - 5.6 cm  LVIDs:  4.1    1.8 - 4.0 cm  Derived variables:  LVMI: 68 g/m2  RWT: 0.26  Fractional short: 23 %  EF (Visual Estimate): 55-60 %  Doppler Peak Velocity (m/sec): AoV=1.2  ------------------------------------------------------------------------  Observations:  Mitral Valve: Normal mitral valve. No mitral regurgitation  seen.  Aortic Valve/Aorta: Normal trileaflet aortic valve. Peak  transaortic valve gradient equals 6 mm Hg. No aortic valve  regurgitation seen. Peak left ventricular outflow tract  gradient equals 3 mm Hg.  Aortic Root: 3.4 cm.  LVOT diameter: 2.3 cm.  Left Atrium: Normal left atrium.  LA volume index = 27  cc/m2.  Left Ventricle: Normal left ventricular systolic function.  No segmental wall motion abnormalities. Normal left  ventricular internal dimensions and wall thicknesses.  Normal diastolic function  Right Heart: Normal right atrium. Normal right ventricular  size and function. Normal tricuspid valve. Minimal  tricuspid regurgitation. Pulmonic valve not well  visualized, probably normal. No pulmonic regurgitation.  Pericardium/Pleura: Normal pericardium with trace  pericardial effusion.  Hemodynamic: Estimated right atrial pressure is 3mm Hg.  Estimated right ventricular systolic pressure equals 21 mm  Hg, assuming right atrial pressure equals 3 mm Hg,  consistent with normal pulmonary pressures. Color Doppler  demonstrates no evidence of a patent foramen ovale.  ------------------------------------------------------------------------  Conclusions:  1. Normal mitral valve. No mitral regurgitation seen.  2. Normal trileaflet aortic valve. No aortic valve  regurgitation seen.  3. Normal left ventricular systolic function. No segmental  wall motion abnormalities.  4. Normal diastolic function  5. Normal right ventricular size and function.  6. Normal tricuspid valve. Minimal tricuspid regurgitation.  7. Normal pericardium with trace pericardial effusion.  *** No previous Echo exam.  Results communicatted Katiuska Castillo NP  ------------------------------------------------------------------------  Confirmed on  12/18/2017 - 08:32:36 by Harvinder Rea M.D.  ------------------------------------------------------------------------    < end of copied text >

## 2017-12-21 NOTE — PROGRESS NOTE ADULT - ASSESSMENT
68 yo with COPD  Exacerbation  Metapneumovirus  No consolidation on imaging  s/p 6 days of empiric abx  s/p short period of ventilation  stable off abx  Continue plan per primary team   Will follow  Beeper [id number]/afterhours/No response-[id number]

## 2017-12-21 NOTE — PROGRESS NOTE ADULT - SUBJECTIVE AND OBJECTIVE BOX
Patient is a 67y old  Male who presents with a chief complaint of SOB (20 Dec 2017 12:43)    Being followed by ID for metapneumovirus    Interval history:feels better  on 2 L NC  No acute events      ROS:  No cough,SOB,CP  No N/V/D./abd pain  No other complaints      Antimicrobials:None       Other medications reviewed    Vital Signs Last 24 Hrs  T(C): 36.8 (12-21-17 @ 11:36), Max: 37.1 (12-20-17 @ 21:01)  T(F): 98.2 (12-21-17 @ 11:36), Max: 98.7 (12-20-17 @ 21:01)  HR: 69 (12-21-17 @ 11:36) (69 - 80)  BP: 119/71 (12-21-17 @ 11:36) (119/71 - 137/80)  BP(mean): --  RR: 17 (12-21-17 @ 11:36) (17 - 18)  SpO2: 94% (12-21-17 @ 11:36) (93% - 98%)    Physical Exam:        HEENT PERRLA EOMI    No oral exudate or erythema    Chest Good AE,CTA    CVS RRR S1 S2 WNl No murmur or rub or gallop    Abd soft BS normal No tenderness no masses    IV site no erythema tenderness or discharge    CNS AAO X 3 no focal    Lab Data:                  Culture - Other (collected 19 Dec 2017 18:35)  Source: Skin oropharynx  Preliminary Report (20 Dec 2017 16:32):    Few Enterococcus species    Few Yeast like cells

## 2017-12-21 NOTE — PROGRESS NOTE ADULT - SUBJECTIVE AND OBJECTIVE BOX
chief complaint : shortness of breath         SUBJECTIVE / OVERNIGHT EVENTS: pt says he feels much better than before      MEDICATIONS  (STANDING):  ALBUTerol/ipratropium for Nebulization 3 milliLiter(s) Nebulizer every 6 hours  amLODIPine   Tablet 10 milliGRAM(s) Oral daily  ATENolol  Tablet 50 milliGRAM(s) Oral daily  enoxaparin Injectable 40 milliGRAM(s) SubCutaneous daily  hydrochlorothiazide 25 milliGRAM(s) Oral daily  LORazepam     Tablet 0.5 milliGRAM(s) Oral two times a day    MEDICATIONS  (PRN):      Vital Signs Last 24 Hrs  T(C): 37 (21 Dec 2017 20:48), Max: 37 (21 Dec 2017 20:48)  T(F): 98.6 (21 Dec 2017 20:48), Max: 98.6 (21 Dec 2017 20:48)  HR: 69 (21 Dec 2017 11:36) (69 - 80)  BP: 118/72 (21 Dec 2017 20:48) (118/72 - 131/79)  BP(mean): --  RR: 20 (21 Dec 2017 20:48) (17 - 20)  SpO2: 95% (21 Dec 2017 20:48) (94% - 98%)    Constitutional: No fever, fatigue  Skin: No rash.  Eyes: No recent vision problems or eye pain.  ENT: No congestion, ear pain, or sore throat.  Cardiovascular: No chest pain or palpation.  Respiratory: No cough, shortness of breath, congestion, or wheezing.  Gastrointestinal: No abdominal pain, nausea, vomiting, or diarrhea.  Genitourinary: No dysuria.  Musculoskeletal: No joint swelling.  Neurologic: No headache.    PHYSICAL EXAM:  GENERAL: NAD  EYES: EOMI, PERRLA  NECK: Supple, No JVD  CHEST/LUNG: dec breath sounds at bases   HEART:  S1 , S2 +  ABDOMEN: soft, bs+  EXTREMITIES:  trace edema  NEUROLOGY:alert awake calm cooperative    LABS:        Creatinine Trend: 0.91 <--, 0.87 <--, 0.76 <--    Urine Studies:

## 2017-12-22 PROCEDURE — 99232 SBSQ HOSP IP/OBS MODERATE 35: CPT

## 2017-12-22 RX ADMIN — Medication 3 MILLILITER(S): at 11:05

## 2017-12-22 RX ADMIN — ENOXAPARIN SODIUM 40 MILLIGRAM(S): 100 INJECTION SUBCUTANEOUS at 11:05

## 2017-12-22 RX ADMIN — Medication 0.5 MILLIGRAM(S): at 11:37

## 2017-12-22 RX ADMIN — ATENOLOL 50 MILLIGRAM(S): 25 TABLET ORAL at 05:55

## 2017-12-22 RX ADMIN — Medication 25 MILLIGRAM(S): at 05:52

## 2017-12-22 RX ADMIN — Medication 3 MILLILITER(S): at 05:52

## 2017-12-22 RX ADMIN — Medication 3 MILLILITER(S): at 17:25

## 2017-12-22 RX ADMIN — Medication 0.5 MILLIGRAM(S): at 23:59

## 2017-12-22 RX ADMIN — AMLODIPINE BESYLATE 10 MILLIGRAM(S): 2.5 TABLET ORAL at 05:52

## 2017-12-22 NOTE — PROGRESS NOTE ADULT - SUBJECTIVE AND OBJECTIVE BOX
Patient is a 67y old  Male who presents with a chief complaint of SOB (20 Dec 2017 12:43)    Being followed by ID for pna,metapneumo    Interval history:off O2  feels well  No acute events      ROS:  No cough,SOB,CP  No N/V/D./abd pain  No other complaints      Antimicrobials:      Other medications reviewed    Vital Signs Last 24 Hrs  T(C): 37 (12-22-17 @ 11:36), Max: 37 (12-21-17 @ 20:48)  T(F): 98.6 (12-22-17 @ 11:36), Max: 98.6 (12-21-17 @ 20:48)  HR: 75 (12-22-17 @ 11:36) (68 - 75)  BP: 124/71 (12-22-17 @ 11:36) (118/72 - 125/68)  BP(mean): --  RR: 17 (12-22-17 @ 11:36) (17 - 20)  SpO2: 94% (12-22-17 @ 11:36) (94% - 99%)    Physical Exam:        HEENT PERRLA EOMI    No oral exudate or erythema    Chest Good AE,CTA    CVS RRR S1 S2 WNl No murmur or rub or gallop    Abd soft BS normal No tenderness no masses    IV site no erythema tenderness or discharge    CNS AAO X 3 no focal    Lab Data:

## 2017-12-22 NOTE — PROGRESS NOTE ADULT - SUBJECTIVE AND OBJECTIVE BOX
[facility] -- Mirta Moffett, Krista Hernandez, Rodger Tolliver Savella  Office # [phone]      Follow Up:  dyspnea, elevated trop    Subjective/Observations: Patient seen and examined. Events noted. Resting comfortably in bed. No complaints of chest pain,  or palpitations reported. No signs of orthopnea or PND. SOB improved.      REVIEW OF SYSTEMS: All other review of systems is negative unless indicated above    PAST MEDICAL & SURGICAL HISTORY:  Asthma  COPD (chronic obstructive pulmonary disease)      MEDICATIONS  (STANDING):  ALBUTerol/ipratropium for Nebulization 3 milliLiter(s) Nebulizer every 6 hours  amLODIPine   Tablet 10 milliGRAM(s) Oral daily  ATENolol  Tablet 50 milliGRAM(s) Oral daily  enoxaparin Injectable 40 milliGRAM(s) SubCutaneous daily  hydrochlorothiazide 25 milliGRAM(s) Oral daily  LORazepam     Tablet 0.5 milliGRAM(s) Oral two times a day    MEDICATIONS  (PRN):      Allergies    No Known Allergies    Intolerances            Vital Signs Last 24 Hrs  T(C): 36.5 (22 Dec 2017 04:00), Max: 37 (21 Dec 2017 20:48)  T(F): 97.7 (22 Dec 2017 04:00), Max: 98.6 (21 Dec 2017 20:48)  HR: 68 (22 Dec 2017 04:00) (68 - 80)  BP: 125/68 (22 Dec 2017 04:00) (118/72 - 125/68)  BP(mean): --  RR: 18 (22 Dec 2017 04:00) (17 - 20)  SpO2: 99% (22 Dec 2017 04:00) (94% - 99%)    I&O's Summary    21 Dec 2017 07:01  -  22 Dec 2017 07:00  --------------------------------------------------------  IN: 1520 mL / OUT: 1600 mL / NET: -80 mL          PHYSICAL EXAM:  TELE:   Constitutional: NAD, awake and alert, well-developed  HEENT: Moist Mucous Membranes, Anicteric  Pulmonary: Decreased breath sounds b/l. No rales, crackles or wheeze appreciated.   Cardiovascular: Regular, S1 and S2, No murmurs, rubs, gallops or clicks  Gastrointestinal: Bowel Sounds present, soft, nontender.   Lymph: No peripheral edema. No lymphadenopathy.  Skin: No visible rashes or ulcers.  Psych:  Mood & affect appropriate    LABS: All Labs Reviewed:

## 2017-12-22 NOTE — PROGRESS NOTE ADULT - SUBJECTIVE AND OBJECTIVE BOX
chief complaint : shortness of breath         SUBJECTIVE / OVERNIGHT EVENTS: pt says he feels much better than before    MEDICATIONS  (STANDING):  ALBUTerol/ipratropium for Nebulization 3 milliLiter(s) Nebulizer every 6 hours  amLODIPine   Tablet 10 milliGRAM(s) Oral daily  ATENolol  Tablet 50 milliGRAM(s) Oral daily  enoxaparin Injectable 40 milliGRAM(s) SubCutaneous daily  hydrochlorothiazide 25 milliGRAM(s) Oral daily  LORazepam     Tablet 0.5 milliGRAM(s) Oral two times a day    MEDICATIONS  (PRN):      Vital Signs Last 24 Hrs  T(C): 37.5 (22 Dec 2017 21:01), Max: 37.5 (22 Dec 2017 21:01)  T(F): 99.5 (22 Dec 2017 21:01), Max: 99.5 (22 Dec 2017 21:01)  HR: 88 (22 Dec 2017 21:01) (68 - 88)  BP: 121/76 (22 Dec 2017 21:01) (121/76 - 125/68)  BP(mean): --  RR: 18 (22 Dec 2017 21:01) (17 - 18)  SpO2: 92% (22 Dec 2017 21:01) (92% - 99%)    Constitutional: No fever, fatigue  Skin: No rash.  Eyes: No recent vision problems or eye pain.  ENT: No congestion, ear pain, or sore throat.  Cardiovascular: No chest pain or palpation.  Respiratory: No cough, shortness of breath, congestion, or wheezing.  Gastrointestinal: No abdominal pain, nausea, vomiting, or diarrhea.  Genitourinary: No dysuria.  Musculoskeletal: No joint swelling.  Neurologic: No headache.    PHYSICAL EXAM:  GENERAL: NAD  EYES: EOMI, PERRLA  NECK: Supple, No JVD  CHEST/LUNG: dec breath sounds at bases   HEART:  S1 , S2 +  ABDOMEN: soft, bs+  EXTREMITIES:  trace edema  NEUROLOGY:alert awake calm cooperative    LABS:        Creatinine Trend: 0.91 <--, 0.87 <--, 0.76 <--    Urine Studies:

## 2017-12-22 NOTE — PROGRESS NOTE ADULT - ASSESSMENT
68 yo with COPD  Exacerbation  Metapneumovirus  No consolidation on imaging  s/p 6 days of empiric abx  s/p short period of ventilation  stable off abx  Continue plan per primary team   ID will follow as needed,please call [phone] if any questions or issues.

## 2017-12-23 LAB
ANION GAP SERPL CALC-SCNC: 10 MMOL/L — SIGNIFICANT CHANGE UP (ref 5–17)
BUN SERPL-MCNC: 21 MG/DL — SIGNIFICANT CHANGE UP (ref 7–23)
CALCIUM SERPL-MCNC: 8.9 MG/DL — SIGNIFICANT CHANGE UP (ref 8.4–10.5)
CHLORIDE SERPL-SCNC: 93 MMOL/L — LOW (ref 96–108)
CO2 SERPL-SCNC: 29 MMOL/L — SIGNIFICANT CHANGE UP (ref 22–31)
CREAT SERPL-MCNC: 0.88 MG/DL — SIGNIFICANT CHANGE UP (ref 0.5–1.3)
GLUCOSE SERPL-MCNC: 91 MG/DL — SIGNIFICANT CHANGE UP (ref 70–99)
MAGNESIUM SERPL-MCNC: 1.6 MG/DL — SIGNIFICANT CHANGE UP (ref 1.6–2.6)
POTASSIUM SERPL-MCNC: 3.9 MMOL/L — SIGNIFICANT CHANGE UP (ref 3.5–5.3)
POTASSIUM SERPL-SCNC: 3.9 MMOL/L — SIGNIFICANT CHANGE UP (ref 3.5–5.3)
SODIUM SERPL-SCNC: 132 MMOL/L — LOW (ref 135–145)

## 2017-12-23 PROCEDURE — 99232 SBSQ HOSP IP/OBS MODERATE 35: CPT

## 2017-12-23 RX ORDER — MAGNESIUM SULFATE 500 MG/ML
2 VIAL (ML) INJECTION ONCE
Qty: 0 | Refills: 0 | Status: COMPLETED | OUTPATIENT
Start: 2017-12-23 | End: 2017-12-23

## 2017-12-23 RX ADMIN — Medication 25 MILLIGRAM(S): at 05:29

## 2017-12-23 RX ADMIN — Medication 0.5 MILLIGRAM(S): at 11:40

## 2017-12-23 RX ADMIN — AMLODIPINE BESYLATE 10 MILLIGRAM(S): 2.5 TABLET ORAL at 05:29

## 2017-12-23 RX ADMIN — Medication 3 MILLILITER(S): at 05:29

## 2017-12-23 RX ADMIN — Medication 50 GRAM(S): at 09:21

## 2017-12-23 RX ADMIN — Medication 3 MILLILITER(S): at 11:40

## 2017-12-23 RX ADMIN — ENOXAPARIN SODIUM 40 MILLIGRAM(S): 100 INJECTION SUBCUTANEOUS at 11:40

## 2017-12-23 RX ADMIN — ATENOLOL 50 MILLIGRAM(S): 25 TABLET ORAL at 05:29

## 2017-12-23 RX ADMIN — Medication 3 MILLILITER(S): at 17:34

## 2017-12-23 NOTE — PROGRESS NOTE ADULT - SUBJECTIVE AND OBJECTIVE BOX
chief complaint : shortness of breath         SUBJECTIVE / OVERNIGHT EVENTS: pt says he feels much better than before    MEDICATIONS  (STANDING):  ALBUTerol/ipratropium for Nebulization 3 milliLiter(s) Nebulizer every 6 hours  amLODIPine   Tablet 10 milliGRAM(s) Oral daily  ATENolol  Tablet 50 milliGRAM(s) Oral daily  enoxaparin Injectable 40 milliGRAM(s) SubCutaneous daily  hydrochlorothiazide 25 milliGRAM(s) Oral daily  LORazepam     Tablet 0.5 milliGRAM(s) Oral two times a day    MEDICATIONS  (PRN):    Vital Signs Last 24 Hrs  T(C): 36.9 (23 Dec 2017 20:41), Max: 36.9 (23 Dec 2017 04:13)  T(F): 98.4 (23 Dec 2017 20:41), Max: 98.4 (23 Dec 2017 04:13)  HR: 69 (23 Dec 2017 20:41) (69 - 81)  BP: 122/72 (23 Dec 2017 20:41) (122/72 - 145/78)  BP(mean): --  RR: 18 (23 Dec 2017 20:41) (18 - 18)  SpO2: 97% (23 Dec 2017 20:41) (93% - 97%)    Constitutional: No fever, fatigue  Skin: No rash.  Eyes: No recent vision problems or eye pain.  ENT: No congestion, ear pain, or sore throat.  Cardiovascular: No chest pain or palpation.  Respiratory: No cough, shortness of breath, congestion, or wheezing.  Gastrointestinal: No abdominal pain, nausea, vomiting, or diarrhea.  Genitourinary: No dysuria.  Musculoskeletal: No joint swelling.  Neurologic: No headache.    PHYSICAL EXAM:  GENERAL: NAD  EYES: EOMI, PERRLA  NECK: Supple, No JVD  CHEST/LUNG: dec breath sounds at bases   HEART:  S1 , S2 +  ABDOMEN: soft, bs+  EXTREMITIES:  trace edema  NEUROLOGY:alert awake calm cooperative    LABS:  12-23    132<L>  |  93<L>  |  21  ----------------------------<  91  3.9   |  29  |  0.88    Ca    8.9      23 Dec 2017 05:15  Mg     1.6     12-23      Creatinine Trend: 0.88 <--, 0.91 <--, 0.87 <--    Urine Studies:

## 2017-12-23 NOTE — PROGRESS NOTE ADULT - ASSESSMENT
67M hx of copd, asthma, htn, presenting with hypoxic respiratory failure. COPD exacerbation, hMPV, pneumonia, intubated for increased work of breathing, now s/p extubation.    -resp status is markedly improved.   -there is no evidence of acute ischemia.  -there is no evidence of significant arrhythmia.  -there is no evidence for meaningful  volume overload.  -bp reasonable on present regimen  -sodium falling, consider stopping hctz and adding arb instead  -DVT prophylaxis  -monitor electrolytes, keep k>4, Mg>2  - All other workup per primary team. Will follow

## 2017-12-23 NOTE — PROGRESS NOTE ADULT - SUBJECTIVE AND OBJECTIVE BOX
[facility]    Mirta Moffett, Mary, Krista, Sharee, Rodger, Aaron      [phone]    CHIEF COMPLAINT: Patient is a 67y old  Male who presents with a chief complaint of SOB (20 Dec 2017 12:43)      Follow Up: dyspnea, s/p resp failure    Interim history: breathing a bit worse today, no cp    MEDICATIONS  (STANDING):  ALBUTerol/ipratropium for Nebulization 3 milliLiter(s) Nebulizer every 6 hours  amLODIPine   Tablet 10 milliGRAM(s) Oral daily  ATENolol  Tablet 50 milliGRAM(s) Oral daily  enoxaparin Injectable 40 milliGRAM(s) SubCutaneous daily  hydrochlorothiazide 25 milliGRAM(s) Oral daily  LORazepam     Tablet 0.5 milliGRAM(s) Oral two times a day    MEDICATIONS  (PRN):      REVIEW OF SYSTEMS:  eye, ent, GI, , allergic, dermatologic, musculoskeletal and neurologic are negative except as described above    Vital Signs Last 24 Hrs  T(C): 36.9 (23 Dec 2017 04:13), Max: 37.5 (22 Dec 2017 21:01)  T(F): 98.4 (23 Dec 2017 04:13), Max: 99.5 (22 Dec 2017 21:01)  HR: 81 (23 Dec 2017 04:13) (75 - 88)  BP: 125/73 (23 Dec 2017 04:13) (121/76 - 125/73)  BP(mean): --  RR: 18 (23 Dec 2017 04:13) (17 - 18)  SpO2: 93% (23 Dec 2017 04:13) (92% - 94%)    I&O's Summary    22 Dec 2017 07:01  -  23 Dec 2017 07:00  --------------------------------------------------------  IN: 1080 mL / OUT: 700 mL / NET: 380 mL    23 Dec 2017 07:01  -  23 Dec 2017 10:15  --------------------------------------------------------  IN: 360 mL / OUT: 0 mL / NET: 360 mL        Telemetry past 24h:    PHYSICAL EXAM:    Constitutional: well-nourished, well-developed, NAD   HEENT:  MMM, sclerae anicteric, conjunctivae clear, no oral cyanosis.  Pulmonary: Non-labored, breath sounds are clear bilaterally, No wheezing, rales or rhonchi  Cardiovascular: Regular, S1 and S2.  No murmur.  No rubs, gallops or clicks  Gastrointestinal: Bowel Sounds present, soft, nontender.   Lymph: No peripheral edema.   Neurological: Alert, no focal deficits  Skin: No rashes.  Psych:  Mood & affect appropriate    LABS: All Labs Reviewed:    23 Dec 2017 05:15    132    |  93     |  21     ----------------------------<  91     3.9     |  29     |  0.88     Ca    8.9        23 Dec 2017 05:15  Mg     1.6       23 Dec 2017 05:15            Blood Culture: Organism --  Gram Stain Blood -- Gram Stain --  Specimen Source Skin oropharynx  Culture-Blood --            RADIOLOGY:    EKG:    Echo:    < from: Transthoracic Echocardiogram (12.15.17 @ 19:43) >    Patient name: ANABELLE JACINTO  YOB: 1950   Age: 67 (M)   MR#: [id number]  Study Date: 12/15/2017  [facility]hon[facility] RDCS  Study quality: Technically limited  Referring Physician: Ramon Thacker MD  Blood Pressure: 184/82 mmHg  Height: 185 cm  Weight: 66 kg  BSA: 1.9 m2  Heart Rate: 92 mmHg  ------------------------------------------------------------------------  PROCEDURE: Transthoracic echocardiogram with 2-D, M-Mode  and complete spectral and color flow Doppler.  INDICATION: Abnormal electrocardiogram (ECG) (EKG) (R94.31)  ------------------------------------------------------------------------  Dimensions:    Normal Values:  LA:     2.8    2.0 - 4.0 cm  Ao:     3.4    2.0 - 3.8 cm  SEPTUM: 0.7    0.6 - 1.2 cm  PWT:    0.7    0.6 - 1.1 cm  LVIDd:  5.3    3.0 - 5.6 cm  LVIDs:  4.1    1.8 - 4.0 cm  Derived variables:  LVMI: 68 g/m2  RWT: 0.26  Fractional short: 23 %  EF (Visual Estimate): 55-60 %  Doppler Peak Velocity (m/sec): AoV=1.2  ------------------------------------------------------------------------  Observations:  Mitral Valve: Normal mitral valve. No mitral regurgitation  seen.  Aortic Valve/Aorta: Normal trileaflet aortic valve. Peak  transaortic valve gradient equals 6 mm Hg. No aortic valve  regurgitation seen. Peak left ventricular outflow tract  gradient equals 3 mm Hg.  Aortic Root: 3.4 cm.  LVOT diameter: 2.3 cm.  Left Atrium: Normal left atrium.  LA volume index = 27  cc/m2.  Left Ventricle: Normal left ventricular systolic function.  No segmental wall motion abnormalities. Normal left  ventricular internal dimensions and wall thicknesses.  Normal diastolic function  Right Heart: Normal right atrium. Normal right ventricular  size and function. Normal tricuspid valve. Minimal  tricuspid regurgitation. Pulmonic valve not well  visualized, probably normal. No pulmonic regurgitation.  Pericardium/Pleura: Normal pericardium with trace  pericardial effusion.  Hemodynamic: Estimated right atrial pressure is 3mm Hg.  Estimated right ventricular systolic pressure equals 21 mm  Hg, assuming right atrial pressure equals 3 mm Hg,  consistent with normal pulmonary pressures. Color Doppler  demonstrates no evidence of a patent foramen ovale.  ------------------------------------------------------------------------  Conclusions:  1. Normal mitral valve. No mitral regurgitation seen.  2. Normal trileaflet aortic valve. No aortic valve  regurgitation seen.  3. Normal left ventricular systolic function. No segmental  wall motion abnormalities.  4. Normal diastolic function  5. Normal right ventricular size and function.  6. Normal tricuspid valve. Minimal tricuspid regurgitation.  7. Normal pericardium with trace pericardial effusion.  *** No previous Echo exam.  Results communicatted Katiuska Castillo NP  ------------------------------------------------------------------------  Confirmed on  12/18/2017 - 08:32:36 by Harvinder Rea M.D.  ------------------------------------------------------------------------    < end of copied text >

## 2017-12-24 LAB
ANION GAP SERPL CALC-SCNC: 13 MMOL/L — SIGNIFICANT CHANGE UP (ref 5–17)
BUN SERPL-MCNC: 16 MG/DL — SIGNIFICANT CHANGE UP (ref 7–23)
CALCIUM SERPL-MCNC: 8.7 MG/DL — SIGNIFICANT CHANGE UP (ref 8.4–10.5)
CHLORIDE SERPL-SCNC: 94 MMOL/L — LOW (ref 96–108)
CO2 SERPL-SCNC: 24 MMOL/L — SIGNIFICANT CHANGE UP (ref 22–31)
CREAT SERPL-MCNC: 0.8 MG/DL — SIGNIFICANT CHANGE UP (ref 0.5–1.3)
GLUCOSE SERPL-MCNC: 144 MG/DL — HIGH (ref 70–99)
HCT VFR BLD CALC: 38.1 % — LOW (ref 39–50)
HGB BLD-MCNC: 12.5 G/DL — LOW (ref 13–17)
MAGNESIUM SERPL-MCNC: 1.6 MG/DL — SIGNIFICANT CHANGE UP (ref 1.6–2.6)
MCHC RBC-ENTMCNC: 30.3 PG — SIGNIFICANT CHANGE UP (ref 27–34)
MCHC RBC-ENTMCNC: 32.8 GM/DL — SIGNIFICANT CHANGE UP (ref 32–36)
MCV RBC AUTO: 92.5 FL — SIGNIFICANT CHANGE UP (ref 80–100)
PLATELET # BLD AUTO: 328 K/UL — SIGNIFICANT CHANGE UP (ref 150–400)
POTASSIUM SERPL-MCNC: 3.7 MMOL/L — SIGNIFICANT CHANGE UP (ref 3.5–5.3)
POTASSIUM SERPL-SCNC: 3.7 MMOL/L — SIGNIFICANT CHANGE UP (ref 3.5–5.3)
RBC # BLD: 4.12 M/UL — LOW (ref 4.2–5.8)
RBC # FLD: 13.2 % — SIGNIFICANT CHANGE UP (ref 10.3–14.5)
SODIUM SERPL-SCNC: 131 MMOL/L — LOW (ref 135–145)
WBC # BLD: 8.32 K/UL — SIGNIFICANT CHANGE UP (ref 3.8–10.5)
WBC # FLD AUTO: 8.32 K/UL — SIGNIFICANT CHANGE UP (ref 3.8–10.5)

## 2017-12-24 PROCEDURE — 99232 SBSQ HOSP IP/OBS MODERATE 35: CPT

## 2017-12-24 RX ADMIN — Medication 25 MILLIGRAM(S): at 05:00

## 2017-12-24 RX ADMIN — Medication 0.5 MILLIGRAM(S): at 00:14

## 2017-12-24 RX ADMIN — Medication 0.5 MILLIGRAM(S): at 11:22

## 2017-12-24 RX ADMIN — ENOXAPARIN SODIUM 40 MILLIGRAM(S): 100 INJECTION SUBCUTANEOUS at 11:21

## 2017-12-24 RX ADMIN — Medication 3 MILLILITER(S): at 11:21

## 2017-12-24 RX ADMIN — ATENOLOL 50 MILLIGRAM(S): 25 TABLET ORAL at 05:00

## 2017-12-24 RX ADMIN — Medication 3 MILLILITER(S): at 00:15

## 2017-12-24 RX ADMIN — Medication 3 MILLILITER(S): at 05:00

## 2017-12-24 RX ADMIN — Medication 3 MILLILITER(S): at 18:27

## 2017-12-24 RX ADMIN — AMLODIPINE BESYLATE 10 MILLIGRAM(S): 2.5 TABLET ORAL at 05:00

## 2017-12-24 NOTE — PROGRESS NOTE ADULT - ASSESSMENT
67M hx of copd, asthma, htn, presenting with hypoxic respiratory failure. COPD exacerbation, hMPV, pneumonia, intubated for increased work of breathing, now s/p extubation.    -resp status is markedly improved.   -there is no evidence of acute ischemia.  -there is no evidence of significant arrhythmia.  -there is no evidence for meaningful  volume overload.  -bp reasonable on present regimen  -sodium falling though yesterday, can consider stopping hctz and adding arb instead  -DVT prophylaxis  -monitor electrolytes, keep k>4, Mg>2  - All other workup per primary team. Will follow

## 2017-12-24 NOTE — PROGRESS NOTE ADULT - SUBJECTIVE AND OBJECTIVE BOX
chief complaint : shortness of breath         SUBJECTIVE / OVERNIGHT EVENTS: pt says he feels much better than before    MEDICATIONS  (STANDING):  ALBUTerol/ipratropium for Nebulization 3 milliLiter(s) Nebulizer every 6 hours  amLODIPine   Tablet 10 milliGRAM(s) Oral daily  ATENolol  Tablet 50 milliGRAM(s) Oral daily  enoxaparin Injectable 40 milliGRAM(s) SubCutaneous daily  hydrochlorothiazide 25 milliGRAM(s) Oral daily  LORazepam     Tablet 0.5 milliGRAM(s) Oral two times a day    MEDICATIONS  (PRN):    Vital Signs Last 24 Hrs  T(C): 36.6 (24 Dec 2017 11:06), Max: 36.9 (23 Dec 2017 20:41)  T(F): 97.8 (24 Dec 2017 11:06), Max: 98.4 (23 Dec 2017 20:41)  HR: 69 (24 Dec 2017 11:06) (69 - 78)  BP: 136/72 (24 Dec 2017 11:06) (122/72 - 155/79)  BP(mean): --  RR: 18 (24 Dec 2017 11:06) (18 - 18)  SpO2: 95% (24 Dec 2017 11:06) (95% - 97%)    Constitutional: No fever, fatigue  Skin: No rash.  Eyes: No recent vision problems or eye pain.  ENT: No congestion, ear pain, or sore throat.  Cardiovascular: No chest pain or palpation.  Respiratory: No cough, shortness of breath, congestion, or wheezing.  Gastrointestinal: No abdominal pain, nausea, vomiting, or diarrhea.  Genitourinary: No dysuria.  Musculoskeletal: No joint swelling.  Neurologic: No headache.    PHYSICAL EXAM:  GENERAL: NAD  EYES: EOMI, PERRLA  NECK: Supple, No JVD  CHEST/LUNG: dec breath sounds at bases   HEART:  S1 , S2 +  ABDOMEN: soft, bs+  EXTREMITIES:  trace edema  NEUROLOGY:alert awake calm cooperative    LABS:  12-24    131<L>  |  94<L>  |  16  ----------------------------<  144<H>  3.7   |  24  |  0.80    Ca    8.7      24 Dec 2017 06:32  Mg     1.6     12-24      Creatinine Trend: 0.80 <--, 0.88 <--, 0.91 <--                        12.5   8.32  )-----------( 328      ( 24 Dec 2017 06:29 )             38.1     Urine Studies:

## 2017-12-24 NOTE — PROGRESS NOTE ADULT - SUBJECTIVE AND OBJECTIVE BOX
[facility]    Mirta Moffett, Mary, Krista, Sharee, Rodger, Philсергей      [phone]    CHIEF COMPLAINT: Patient is a 67y old  Male who presents with a chief complaint of SOB (20 Dec 2017 12:43)      Follow Up: sob    Interim history: The patient reports no new symptoms.  Denies chest discomfort.  Shortness of breath stable.  No abdominal pain.  No new neurologic symptoms.      MEDICATIONS  (STANDING):  ALBUTerol/ipratropium for Nebulization 3 milliLiter(s) Nebulizer every 6 hours  amLODIPine   Tablet 10 milliGRAM(s) Oral daily  ATENolol  Tablet 50 milliGRAM(s) Oral daily  enoxaparin Injectable 40 milliGRAM(s) SubCutaneous daily  hydrochlorothiazide 25 milliGRAM(s) Oral daily  LORazepam     Tablet 0.5 milliGRAM(s) Oral two times a day    MEDICATIONS  (PRN):      REVIEW OF SYSTEMS:  eye, ent, GI, , allergic, dermatologic, musculoskeletal and neurologic are negative except as described above    Vital Signs Last 24 Hrs  T(C): 36.5 (24 Dec 2017 04:49), Max: 36.9 (23 Dec 2017 20:41)  T(F): 97.7 (24 Dec 2017 04:49), Max: 98.4 (23 Dec 2017 20:41)  HR: 78 (24 Dec 2017 04:49) (69 - 78)  BP: 155/79 (24 Dec 2017 04:49) (122/72 - 155/79)  BP(mean): --  RR: 18 (24 Dec 2017 04:49) (18 - 18)  SpO2: 96% (24 Dec 2017 04:49) (96% - 97%)    I&O's Summary    23 Dec 2017 07:01  -  24 Dec 2017 07:00  --------------------------------------------------------  IN: 1080 mL / OUT: 2100 mL / NET: -1020 mL        Telemetry past 24h:    PHYSICAL EXAM:    Constitutional: well-nourished, well-developed, NAD   HEENT:  MMM, sclerae anicteric, conjunctivae clear, no oral cyanosis.  Pulmonary: Non-labored, breath sounds are clear bilaterally, No wheezing, rales or rhonchi  Cardiovascular: Regular, S1 and S2.  No murmur.  No rubs, gallops or clicks  Gastrointestinal: Bowel Sounds present, soft, nontender.   Lymph: No peripheral edema.   Neurological: Alert, no focal deficits  Skin: No rashes.  Psych:  Mood & affect appropriate    LABS: All Labs Reviewed:                        12.5   8.32  )-----------( 328      ( 24 Dec 2017 06:29 )             38.1     24 Dec 2017 06:32    131    |  94     |  16     ----------------------------<  144    3.7     |  24     |  0.80   23 Dec 2017 05:15    132    |  93     |  21     ----------------------------<  91     3.9     |  29     |  0.88     Ca    8.7        24 Dec 2017 06:32  Ca    8.9        23 Dec 2017 05:15  Mg     1.6       24 Dec 2017 06:32  Mg     1.6       23 Dec 2017 05:15            Blood Culture: Organism --  Gram Stain Blood -- Gram Stain --  Specimen Source Skin oropharynx  Culture-Blood --            RADIOLOGY:    EKG:    Echo:

## 2017-12-24 NOTE — PROGRESS NOTE ADULT - ASSESSMENT
pt with above history p/w hypoxic resp failure likely sec to copd exacerbation   sec to meta pneumo virau      Acute resp failure with hypoxia/ COPD exac/ hMPV infection/ Possible Bact PNA  - as per pulm , Respiratory status greatly improved, saturating well on NC. Would titrate down NC to keep sats > 90 %,   - Can discontinue BIPAP  - Completed a course of systemic steroids.   - Would cont Duonebs Q6H but can change to prn now  - Add symbicort BID and spiriva daily    HTN - titrate htn meds for optimal bp control                 -hyponatremia- fluid restriction     dvt prophylaxis

## 2017-12-25 LAB
ANION GAP SERPL CALC-SCNC: 13 MMOL/L — SIGNIFICANT CHANGE UP (ref 5–17)
BUN SERPL-MCNC: 15 MG/DL — SIGNIFICANT CHANGE UP (ref 7–23)
CALCIUM SERPL-MCNC: 9.1 MG/DL — SIGNIFICANT CHANGE UP (ref 8.4–10.5)
CHLORIDE SERPL-SCNC: 92 MMOL/L — LOW (ref 96–108)
CO2 SERPL-SCNC: 28 MMOL/L — SIGNIFICANT CHANGE UP (ref 22–31)
CREAT SERPL-MCNC: 0.87 MG/DL — SIGNIFICANT CHANGE UP (ref 0.5–1.3)
GLUCOSE SERPL-MCNC: 84 MG/DL — SIGNIFICANT CHANGE UP (ref 70–99)
HCT VFR BLD CALC: 35.8 % — LOW (ref 39–50)
HGB BLD-MCNC: 12 G/DL — LOW (ref 13–17)
MCHC RBC-ENTMCNC: 30.6 PG — SIGNIFICANT CHANGE UP (ref 27–34)
MCHC RBC-ENTMCNC: 33.5 GM/DL — SIGNIFICANT CHANGE UP (ref 32–36)
MCV RBC AUTO: 91.3 FL — SIGNIFICANT CHANGE UP (ref 80–100)
PLATELET # BLD AUTO: 363 K/UL — SIGNIFICANT CHANGE UP (ref 150–400)
POTASSIUM SERPL-MCNC: 3.6 MMOL/L — SIGNIFICANT CHANGE UP (ref 3.5–5.3)
POTASSIUM SERPL-SCNC: 3.6 MMOL/L — SIGNIFICANT CHANGE UP (ref 3.5–5.3)
RBC # BLD: 3.92 M/UL — LOW (ref 4.2–5.8)
RBC # FLD: 13.1 % — SIGNIFICANT CHANGE UP (ref 10.3–14.5)
SODIUM SERPL-SCNC: 133 MMOL/L — LOW (ref 135–145)
WBC # BLD: 7.77 K/UL — SIGNIFICANT CHANGE UP (ref 3.8–10.5)
WBC # FLD AUTO: 7.77 K/UL — SIGNIFICANT CHANGE UP (ref 3.8–10.5)

## 2017-12-25 PROCEDURE — 99232 SBSQ HOSP IP/OBS MODERATE 35: CPT

## 2017-12-25 RX ADMIN — Medication 3 MILLILITER(S): at 00:21

## 2017-12-25 RX ADMIN — ATENOLOL 50 MILLIGRAM(S): 25 TABLET ORAL at 05:58

## 2017-12-25 RX ADMIN — AMLODIPINE BESYLATE 10 MILLIGRAM(S): 2.5 TABLET ORAL at 05:58

## 2017-12-25 RX ADMIN — Medication 3 MILLILITER(S): at 22:20

## 2017-12-25 RX ADMIN — Medication 3 MILLILITER(S): at 20:00

## 2017-12-25 RX ADMIN — ENOXAPARIN SODIUM 40 MILLIGRAM(S): 100 INJECTION SUBCUTANEOUS at 11:46

## 2017-12-25 RX ADMIN — Medication 3 MILLILITER(S): at 05:58

## 2017-12-25 RX ADMIN — Medication 25 MILLIGRAM(S): at 05:58

## 2017-12-25 RX ADMIN — Medication 0.5 MILLIGRAM(S): at 00:21

## 2017-12-25 RX ADMIN — Medication 0.5 MILLIGRAM(S): at 11:45

## 2017-12-25 RX ADMIN — Medication 0.5 MILLIGRAM(S): at 22:20

## 2017-12-25 RX ADMIN — Medication 3 MILLILITER(S): at 11:46

## 2017-12-25 NOTE — PROGRESS NOTE ADULT - SUBJECTIVE AND OBJECTIVE BOX
[facility]    Mirta Moffett, Mary, Krista, Sharee, Rodger, Philсергей      [phone]    CHIEF COMPLAINT: Patient is a 67y old  Male who presents with a chief complaint of SOB (20 Dec 2017 12:43)      Follow Up:  sob, copd    Interim history: improved sob, no cp. no abd pain. was off o2 this am    MEDICATIONS  (STANDING):  ALBUTerol/ipratropium for Nebulization 3 milliLiter(s) Nebulizer every 6 hours  amLODIPine   Tablet 10 milliGRAM(s) Oral daily  ATENolol  Tablet 50 milliGRAM(s) Oral daily  enoxaparin Injectable 40 milliGRAM(s) SubCutaneous daily  hydrochlorothiazide 25 milliGRAM(s) Oral daily  LORazepam     Tablet 0.5 milliGRAM(s) Oral two times a day    MEDICATIONS  (PRN):      REVIEW OF SYSTEMS:  eye, ent, GI, , allergic, dermatologic, musculoskeletal and neurologic are negative except as described above    Vital Signs Last 24 Hrs  T(C): 36.6 (25 Dec 2017 11:07), Max: 36.6 (24 Dec 2017 20:56)  T(F): 97.8 (25 Dec 2017 11:07), Max: 97.9 (25 Dec 2017 04:02)  HR: 70 (25 Dec 2017 11:07) (70 - 74)  BP: 119/64 (25 Dec 2017 11:07) (112/56 - 120/68)  BP(mean): --  RR: 18 (25 Dec 2017 11:07) (18 - 18)  SpO2: 95% (25 Dec 2017 11:07) (95% - 96%)    I&O's Summary    24 Dec 2017 07:01  -  25 Dec 2017 07:00  --------------------------------------------------------  IN: 1080 mL / OUT: 1900 mL / NET: -820 mL    25 Dec 2017 07:01  -  25 Dec 2017 11:26  --------------------------------------------------------  IN: 360 mL / OUT: 0 mL / NET: 360 mL        Telemetry past 24h: off    PHYSICAL EXAM:    Constitutional: well-nourished, well-developed, NAD   HEENT:  MMM, sclerae anicteric, conjunctivae clear, no oral cyanosis.  Pulmonary: Non-labored, breath sounds are clear bilaterally, No wheezing, rales or rhonchi  Cardiovascular: Regular, S1 and S2.  No murmur.  No rubs, gallops or clicks  Gastrointestinal: Bowel Sounds present, soft, nontender.   Lymph: No peripheral edema.   Neurological: Alert, no focal deficits  Skin: No rashes.  Psych:  Mood & affect appropriate    LABS: All Labs Reviewed:                        12.0   7.77  )-----------( 363      ( 25 Dec 2017 08:23 )             35.8                         12.5   8.32  )-----------( 328      ( 24 Dec 2017 06:29 )             38.1     25 Dec 2017 08:22    133    |  92     |  15     ----------------------------<  84     3.6     |  28     |  0.87   24 Dec 2017 06:32    131    |  94     |  16     ----------------------------<  144    3.7     |  24     |  0.80   23 Dec 2017 05:15    132    |  93     |  21     ----------------------------<  91     3.9     |  29     |  0.88     Ca    9.1        25 Dec 2017 08:22  Ca    8.7        24 Dec 2017 06:32  Ca    8.9        23 Dec 2017 05:15  Mg     1.6       24 Dec 2017 06:32  Mg     1.6       23 Dec 2017 05:15            Blood Culture:         RADIOLOGY:    EKG:    Echo:

## 2017-12-25 NOTE — PROGRESS NOTE ADULT - SUBJECTIVE AND OBJECTIVE BOX
chief complaint : shortness of breath         SUBJECTIVE / OVERNIGHT EVENTS: pt says he feels much better than before    MEDICATIONS  (STANDING):  ALBUTerol/ipratropium for Nebulization 3 milliLiter(s) Nebulizer every 6 hours  amLODIPine   Tablet 10 milliGRAM(s) Oral daily  ATENolol  Tablet 50 milliGRAM(s) Oral daily  enoxaparin Injectable 40 milliGRAM(s) SubCutaneous daily  hydrochlorothiazide 25 milliGRAM(s) Oral daily  LORazepam     Tablet 0.5 milliGRAM(s) Oral two times a day    MEDICATIONS  (PRN):      Vital Signs Last 24 Hrs  T(C): 36.8 (25 Dec 2017 21:00), Max: 36.8 (25 Dec 2017 21:00)  T(F): 98.3 (25 Dec 2017 21:00), Max: 98.3 (25 Dec 2017 21:00)  HR: 69 (25 Dec 2017 21:00) (69 - 72)  BP: 149/73 (25 Dec 2017 21:00) (112/56 - 149/73)  BP(mean): --  RR: 18 (25 Dec 2017 21:00) (18 - 18)  SpO2: 95% (25 Dec 2017 21:00) (95% - 95%)    Constitutional: No fever, fatigue  Skin: No rash.  Eyes: No recent vision problems or eye pain.  ENT: No congestion, ear pain, or sore throat.  Cardiovascular: No chest pain or palpation.  Respiratory: No cough, shortness of breath, congestion, or wheezing.  Gastrointestinal: No abdominal pain, nausea, vomiting, or diarrhea.  Genitourinary: No dysuria.  Musculoskeletal: No joint swelling.  Neurologic: No headache.    PHYSICAL EXAM:  GENERAL: NAD  EYES: EOMI, PERRLA  NECK: Supple, No JVD  CHEST/LUNG: dec breath sounds at bases   HEART:  S1 , S2 +  ABDOMEN: soft, bs+  EXTREMITIES:  trace edema  NEUROLOGY:alert awake calm cooperative    LABS:  12-25    133<L>  |  92<L>  |  15  ----------------------------<  84  3.6   |  28  |  0.87    Ca    9.1      25 Dec 2017 08:22  Mg     1.6     12-24      Creatinine Trend: 0.87 <--, 0.80 <--, 0.88 <--                        12.0   7.77  )-----------( 363      ( 25 Dec 2017 08:23 )             35.8     Urine Studies:

## 2017-12-25 NOTE — PROGRESS NOTE ADULT - ASSESSMENT
67M hx of copd, asthma, htn, presenting with hypoxic respiratory failure. COPD exacerbation, hMPV, pneumonia, intubated for increased work of breathing, now s/p extubation.    -resp status is markedly improved.   -there is no evidence of acute ischemia.  -there is no evidence of significant arrhythmia.  -there is no evidence for meaningful  volume overload.  -bp reasonable on present regimen  -sodium was recently low, can consider stopping hctz and adding arb instead  -resp status improved  -DVT prophylaxis  -monitor electrolytes, keep k>4, Mg>2  - All other workup per primary team. Will follow

## 2017-12-26 LAB
ANION GAP SERPL CALC-SCNC: 14 MMOL/L — SIGNIFICANT CHANGE UP (ref 5–17)
BUN SERPL-MCNC: 15 MG/DL — SIGNIFICANT CHANGE UP (ref 7–23)
CALCIUM SERPL-MCNC: 9.2 MG/DL — SIGNIFICANT CHANGE UP (ref 8.4–10.5)
CHLORIDE SERPL-SCNC: 91 MMOL/L — LOW (ref 96–108)
CO2 SERPL-SCNC: 27 MMOL/L — SIGNIFICANT CHANGE UP (ref 22–31)
CREAT SERPL-MCNC: 0.81 MG/DL — SIGNIFICANT CHANGE UP (ref 0.5–1.3)
GLUCOSE SERPL-MCNC: 86 MG/DL — SIGNIFICANT CHANGE UP (ref 70–99)
HCT VFR BLD CALC: 33.5 % — LOW (ref 39–50)
HGB BLD-MCNC: 11.2 G/DL — LOW (ref 13–17)
MCHC RBC-ENTMCNC: 30.7 PG — SIGNIFICANT CHANGE UP (ref 27–34)
MCHC RBC-ENTMCNC: 33.4 GM/DL — SIGNIFICANT CHANGE UP (ref 32–36)
MCV RBC AUTO: 91.8 FL — SIGNIFICANT CHANGE UP (ref 80–100)
PLATELET # BLD AUTO: 406 K/UL — HIGH (ref 150–400)
POTASSIUM SERPL-MCNC: 3.4 MMOL/L — LOW (ref 3.5–5.3)
POTASSIUM SERPL-SCNC: 3.4 MMOL/L — LOW (ref 3.5–5.3)
RBC # BLD: 3.65 M/UL — LOW (ref 4.2–5.8)
RBC # FLD: 13.2 % — SIGNIFICANT CHANGE UP (ref 10.3–14.5)
SODIUM SERPL-SCNC: 132 MMOL/L — LOW (ref 135–145)
WBC # BLD: 6.6 K/UL — SIGNIFICANT CHANGE UP (ref 3.8–10.5)
WBC # FLD AUTO: 6.6 K/UL — SIGNIFICANT CHANGE UP (ref 3.8–10.5)

## 2017-12-26 PROCEDURE — 99232 SBSQ HOSP IP/OBS MODERATE 35: CPT

## 2017-12-26 PROCEDURE — 99233 SBSQ HOSP IP/OBS HIGH 50: CPT

## 2017-12-26 RX ORDER — POTASSIUM CHLORIDE 20 MEQ
40 PACKET (EA) ORAL EVERY 4 HOURS
Qty: 0 | Refills: 0 | Status: COMPLETED | OUTPATIENT
Start: 2017-12-26 | End: 2017-12-26

## 2017-12-26 RX ADMIN — Medication 0.5 MILLIGRAM(S): at 12:22

## 2017-12-26 RX ADMIN — Medication 3 MILLILITER(S): at 18:09

## 2017-12-26 RX ADMIN — ATENOLOL 50 MILLIGRAM(S): 25 TABLET ORAL at 06:12

## 2017-12-26 RX ADMIN — Medication 40 MILLIEQUIVALENT(S): at 09:57

## 2017-12-26 RX ADMIN — Medication 40 MILLIEQUIVALENT(S): at 13:25

## 2017-12-26 RX ADMIN — Medication 3 MILLILITER(S): at 13:25

## 2017-12-26 RX ADMIN — AMLODIPINE BESYLATE 10 MILLIGRAM(S): 2.5 TABLET ORAL at 06:11

## 2017-12-26 RX ADMIN — Medication 3 MILLILITER(S): at 23:04

## 2017-12-26 RX ADMIN — Medication 3 MILLILITER(S): at 06:11

## 2017-12-26 RX ADMIN — Medication 25 MILLIGRAM(S): at 06:11

## 2017-12-26 RX ADMIN — ENOXAPARIN SODIUM 40 MILLIGRAM(S): 100 INJECTION SUBCUTANEOUS at 12:22

## 2017-12-26 NOTE — PROGRESS NOTE ADULT - ASSESSMENT
68 yo with COPD  Exacerbation  Metapneumovirus  No consolidation on imaging  s/p 6 days of empiric abx  s/p short period of ventilation  stable off abx  Continue plan per primary team   ID will follow as needed,please call [phone] if any questions or issues.

## 2017-12-26 NOTE — PROGRESS NOTE ADULT - ASSESSMENT
67M hx of copd, asthma, htn, presenting with hypoxic respiratory failure. COPD exacerbation, hMPV, pneumonia, intubated for increased work of breathing, now s/p extubation.    -resp status is markedly improved.   -there is no evidence of acute ischemia.  -there is no evidence of significant arrhythmia.  -there is no evidence for meaningful  volume overload.  -bp reasonable on present regimen  -sodium is somewhat low, and he is hypokalemic.  can consider stopping hctz and adding arb instead  -resp status improved  -DVT prophylaxis  -monitor electrolytes, keep k>4, Mg>2  - All other workup and dc planning per primary team. Will follow

## 2017-12-26 NOTE — PROGRESS NOTE ADULT - SUBJECTIVE AND OBJECTIVE BOX
chief complaint : shortness of breath         SUBJECTIVE / OVERNIGHT EVENTS: pt says he feels much better than before    MEDICATIONS  (STANDING):  ALBUTerol/ipratropium for Nebulization 3 milliLiter(s) Nebulizer every 6 hours  amLODIPine   Tablet 10 milliGRAM(s) Oral daily  ATENolol  Tablet 50 milliGRAM(s) Oral daily  enoxaparin Injectable 40 milliGRAM(s) SubCutaneous daily  hydrochlorothiazide 25 milliGRAM(s) Oral daily    MEDICATIONS  (PRN):      Vital Signs Last 24 Hrs  T(C): 36.6 (26 Dec 2017 11:20), Max: 36.8 (25 Dec 2017 21:00)  T(F): 97.9 (26 Dec 2017 11:20), Max: 98.3 (25 Dec 2017 21:00)  HR: 79 (26 Dec 2017 11:20) (69 - 79)  BP: 121/66 (26 Dec 2017 11:20) (121/66 - 149/73)  BP(mean): --  RR: 18 (26 Dec 2017 15:12) (18 - 18)  SpO2: 95% (26 Dec 2017 15:12) (95% - 96%)    Constitutional: No fever, fatigue  Skin: No rash.  Eyes: No recent vision problems or eye pain.  ENT: No congestion, ear pain, or sore throat.  Cardiovascular: No chest pain or palpation.  Respiratory: No cough, shortness of breath, congestion, or wheezing.  Gastrointestinal: No abdominal pain, nausea, vomiting, or diarrhea.  Genitourinary: No dysuria.  Musculoskeletal: No joint swelling.  Neurologic: No headache.    PHYSICAL EXAM:  GENERAL: NAD  EYES: EOMI, PERRLA  NECK: Supple, No JVD  CHEST/LUNG: dec breath sounds at bases   HEART:  S1 , S2 +  ABDOMEN: soft, bs+  EXTREMITIES:  trace edema  NEUROLOGY:alert awake calm cooperative    LABS:  12-26    132<L>  |  91<L>  |  15  ----------------------------<  86  3.4<L>   |  27  |  0.81    Ca    9.2      26 Dec 2017 07:23      Creatinine Trend: 0.81 <--, 0.87 <--, 0.80 <--, 0.88 <--                        11.2   6.60  )-----------( 406      ( 26 Dec 2017 07:23 )             33.5     Urine Studies:

## 2017-12-26 NOTE — PROGRESS NOTE ADULT - SUBJECTIVE AND OBJECTIVE BOX
[facility]    Mirta Moffett, Mary, Krista, Sharee, Rodger, Aaron      [phone]    CHIEF COMPLAINT: Patient is a 67y old  Male who presents with a chief complaint of SOB (20 Dec 2017 12:43)      Follow Up: sob, copd    Interim history: sob improved, denies cp    MEDICATIONS  (STANDING):  ALBUTerol/ipratropium for Nebulization 3 milliLiter(s) Nebulizer every 6 hours  amLODIPine   Tablet 10 milliGRAM(s) Oral daily  ATENolol  Tablet 50 milliGRAM(s) Oral daily  enoxaparin Injectable 40 milliGRAM(s) SubCutaneous daily  hydrochlorothiazide 25 milliGRAM(s) Oral daily  LORazepam     Tablet 0.5 milliGRAM(s) Oral two times a day  potassium chloride    Tablet ER 40 milliEquivalent(s) Oral every 4 hours    MEDICATIONS  (PRN):      REVIEW OF SYSTEMS:  eye, ent, GI, , allergic, dermatologic, musculoskeletal and neurologic are negative except as described above    Vital Signs Last 24 Hrs  T(C): 36.7 (26 Dec 2017 03:53), Max: 36.8 (25 Dec 2017 21:00)  T(F): 98 (26 Dec 2017 03:53), Max: 98.3 (25 Dec 2017 21:00)  HR: 71 (26 Dec 2017 03:53) (69 - 71)  BP: 121/67 (26 Dec 2017 03:53) (119/64 - 149/73)  BP(mean): --  RR: 18 (26 Dec 2017 03:53) (18 - 18)  SpO2: 96% (26 Dec 2017 03:53) (95% - 96%)    I&O's Summary    25 Dec 2017 07:01  -  26 Dec 2017 07:00  --------------------------------------------------------  IN: 720 mL / OUT: 900 mL / NET: -180 mL        Telemetry past 24h:    PHYSICAL EXAM:    Constitutional: well-nourished, well-developed, NAD   HEENT:  MMM, sclerae anicteric, conjunctivae clear, no oral cyanosis.  Pulmonary: Non-labored, breath sounds are diminished bilaterally, No wheezing, rales or rhonchi  Cardiovascular: Regular, S1 and S2.  No murmur.  No rubs, gallops or clicks  Gastrointestinal: Bowel Sounds present, soft, nontender.   Lymph: No peripheral edema.   Neurological: Alert, no focal deficits  Skin: No rashes.  Psych:  Mood & affect appropriate    LABS: All Labs Reviewed:                        11.2   6.60  )-----------( 406      ( 26 Dec 2017 07:23 )             33.5                         12.0   7.77  )-----------( 363      ( 25 Dec 2017 08:23 )             35.8                         12.5   8.32  )-----------( 328      ( 24 Dec 2017 06:29 )             38.1     26 Dec 2017 07:23    132    |  91     |  15     ----------------------------<  86     3.4     |  27     |  0.81   25 Dec 2017 08:22    133    |  92     |  15     ----------------------------<  84     3.6     |  28     |  0.87   24 Dec 2017 06:32    131    |  94     |  16     ----------------------------<  144    3.7     |  24     |  0.80     Ca    9.2        26 Dec 2017 07:23  Ca    9.1        25 Dec 2017 08:22  Ca    8.7        24 Dec 2017 06:32  Mg     1.6       24 Dec 2017 06:32            Blood Culture:         RADIOLOGY:    EKG:    Echo:    < from: Transthoracic Echocardiogram (12.15.17 @ 19:43) >    Patient name: ANABELLE JACINTO  YOB: 1950   Age: 67 (M)   MR#: [id number]  Study Date: 12/15/2017  [facility]honny [facility] RDCS  Study quality: Technically limited  Referring Physician: Ramon Thacker MD  Blood Pressure: 184/82 mmHg  Height: 185 cm  Weight: 66 kg  BSA: 1.9 m2  Heart Rate: 92 mmHg  ------------------------------------------------------------------------  PROCEDURE: Transthoracic echocardiogram with 2-D, M-Mode  and complete spectral and color flow Doppler.  INDICATION: Abnormal electrocardiogram (ECG) (EKG) (R94.31)  ------------------------------------------------------------------------  Dimensions:    Normal Values:  LA:     2.8    2.0 - 4.0 cm  Ao:     3.4    2.0 - 3.8 cm  SEPTUM: 0.7    0.6 - 1.2 cm  PWT:    0.7    0.6 - 1.1 cm  LVIDd:  5.3    3.0 - 5.6 cm  LVIDs:  4.1    1.8 - 4.0 cm  Derived variables:  LVMI: 68 g/m2  RWT: 0.26  Fractional short: 23 %  EF (Visual Estimate): 55-60 %  Doppler Peak Velocity (m/sec): AoV=1.2  ------------------------------------------------------------------------  Observations:  Mitral Valve: Normal mitral valve. No mitral regurgitation  seen.  Aortic Valve/Aorta: Normal trileaflet aortic valve. Peak  transaortic valve gradient equals 6 mm Hg. No aortic valve  regurgitation seen. Peak left ventricular outflow tract  gradient equals 3 mm Hg.  Aortic Root: 3.4 cm.  LVOT diameter: 2.3 cm.  Left Atrium: Normal left atrium.  LA volume index = 27  cc/m2.  Left Ventricle: Normal left ventricular systolic function.  No segmental wall motion abnormalities. Normal left  ventricular internal dimensions and wall thicknesses.  Normal diastolic function  Right Heart: Normal right atrium. Normal right ventricular  size and function. Normal tricuspid valve. Minimal  tricuspid regurgitation. Pulmonic valve not well  visualized, probably normal. No pulmonic regurgitation.  Pericardium/Pleura: Normal pericardium with trace  pericardial effusion.  Hemodynamic: Estimated right atrial pressure is 3mm Hg.  Estimated right ventricular systolic pressure equals 21 mm  Hg, assuming right atrial pressure equals 3 mm Hg,  consistent with normal pulmonary pressures. Color Doppler  demonstrates no evidence of a patent foramen ovale.  ------------------------------------------------------------------------  Conclusions:  1. Normal mitral valve. No mitral regurgitation seen.  2. Normal trileaflet aortic valve. No aortic valve  regurgitation seen.  3. Normal left ventricular systolic function. No segmental  wall motion abnormalities.  4. Normal diastolic function  5. Normal right ventricular size and function.  6. Normal tricuspid valve. Minimal tricuspid regurgitation.  7. Normal pericardium with trace pericardial effusion.  *** No previous Echo exam.  Results communicatted Katiuska Castillo NP  ------------------------------------------------------------------------  Confirmed on  12/18/2017 - 08:32:36 by Harvinder Rea M.D.  ------------------------------------------------------------------------    < end of copied text >

## 2017-12-26 NOTE — PROGRESS NOTE ADULT - ASSESSMENT
67 M former smoker, with a PMH of HTN, asthma/ COPD, admitted with acute hypoxic resp failure 2/2 hMPV with possible superimposed bacterial PNA, initially required Bilevel support, followed by a brief intubation, extubated on 12/18, now doing well on NC    1. Acute resp failure with hypoxia/ COPD exac/ hMPV infection/ Possible Bact PNA  - Respiratory status greatly improved, saturating well on RA. Maintain O2 sat > 90   - Has not required NIPPV in many days  - Completed a course of systemic steroids.   - Would cont Duonebs Q6H but can change to prn now  - Add symbicort BID and spiriva daily  - RT to teach correct inhaler technique  - Competed a 6 day course of antibiotics for possible bacterial PNA. All Bcxs/ UCx NGTD, urine legionella Ag negative.  - Incentive spirometry/ acapella/ OBC as tolerated   - Requires oupt pulm FUP upon d-c with PFTs and a repeat Ct chest in 6-8 weeks    2. Severe uncontrolled hypertension  - Continue antihypertensives to maintain BP control  - Cardiology evaluation appreciated    3. Severe Anxiety:  - On Ativan BID    4. DVT PX:  - Lovenox SQ

## 2017-12-26 NOTE — PROGRESS NOTE ADULT - SUBJECTIVE AND OBJECTIVE BOX
[facility] DIVISION OF PULMONARY, CRITICAL CARE and SLEEP MEDICINE  PULMONARY PROGRESS NOTE  FOR ANY QUESTIONS PLEASE CALL [phone] MONDAY - FRIDAY 8a-5p or [phone] on NIGHTS/WEEKENDS/HOLIDAYS    PATIENT INFORMATION:  NAME: ANABELLE JACINTO:  MRN: [id number]    CHIEF COMPLAINT: Patient is a 67y old  Male who presents with a chief complaint of SOB (20 Dec 2017 12:43)      [x] INITIAL CONSULT, H&P, FAMILY HISTORY and PAST MEDICAL AND SURGICAL HISTORY REVIEWED    OVERNIGHT EVENTS or CHANGES TO HPI: Patient feels well. No respiratory complaints    ========================REVIEW OF SYSTEMS========================  CONSTITUTIONAL: Feels well  CARDIOVASCULAR: Denies CP or palpitaitons  PULMONARY: No cough, SOB, or respiratory distress  [x] REMAINING REVIEW OF SYSTEMS NEGATIVE  [] UNABLE TO OBTAIN REVIEW OF SYSTEMS DUE TO _______________    ========================MEDICATIONS=============================  MEDICATIONS  (STANDING):  ALBUTerol/ipratropium for Nebulization 3 milliLiter(s) Nebulizer every 6 hours  amLODIPine   Tablet 10 milliGRAM(s) Oral daily  ATENolol  Tablet 50 milliGRAM(s) Oral daily  enoxaparin Injectable 40 milliGRAM(s) SubCutaneous daily  hydrochlorothiazide 25 milliGRAM(s) Oral daily  LORazepam     Tablet 0.5 milliGRAM(s) Oral two times a day  potassium chloride    Tablet ER 40 milliEquivalent(s) Oral every 4 hours      MEDICATIONS  (PRN):      ========================PHYSICAL EXAM============================    VITALS: ICU Vital Signs Last 24 Hrs  T(C): 36.7 (26 Dec 2017 03:53), Max: 36.8 (25 Dec 2017 21:00)  T(F): 98 (26 Dec 2017 03:53), Max: 98.3 (25 Dec 2017 21:00)  HR: 71 (26 Dec 2017 03:53) (69 - 71)  BP: 121/67 (26 Dec 2017 03:53) (119/64 - 149/73)  BP(mean): --  ABP: --  ABP(mean): --  RR: 18 (26 Dec 2017 03:53) (18 - 18)  SpO2: 96% (26 Dec 2017 03:53) (95% - 96%)      INTAKE and OUTPUT: I&O's Summary    25 Dec 2017 07:01  -  26 Dec 2017 07:00  --------------------------------------------------------  IN: 720 mL / OUT: 900 mL / NET: -180 mL    26 Dec 2017 07:01  -  26 Dec 2017 10:20  --------------------------------------------------------  IN: 360 mL / OUT: 0 mL / NET: 360 mL    VENTILATOR SETTINGS:  N/A    GENERAL: AAOx3, NAD, comfortable  EYES: EOMI, anicteric  EAR/NOSE/MOUTH/THROAT: normocephalic, atraumatic, MMM, nares clear, trachea midline  NECK: supple, no JVD  LYMPH NODES: no palpable supraclavicular LAD  CARDIOVASCULAR: RRR, S1S2  RESPIRATORY: CTA bilaterally, no wheeze  ABDOMEN: soft, NT, ND, +BS  EXTREMITIES: no clubbing or cyanosis  SKIN: warm and dry  MUSCULOSKELETAL: strength in tact  NEUROLOGIC: moves all extremities, nonfocal exam  PSYCHIATRIC: calm and in good spirits    ========================LABORATORY RESULTS AND IMAGING=============                        11.2   6.60  )-----------( 406      ( 26 Dec 2017 07:23 )             33.5                                                    12-26    132<L>  |  91<L>  |  15  ----------------------------<  86  3.4<L>   |  27  |  0.81    Ca    9.2      26 Dec 2017 07:23      Creatinine Trend: 0.81<--, 0.87<--, 0.80<--, 0.88<--, 0.91<--, 0.87<--    CT CHEST:     [] RADIOLOGY REVIEWED AND INTERPRETED BY ME      THANK YOU FOR ALLOWING US TO PARTICIPATE IN THE CARE OF THIS PATIENT

## 2017-12-26 NOTE — PROGRESS NOTE ADULT - SUBJECTIVE AND OBJECTIVE BOX
Patient is a 67y old  Male who presents with a chief complaint of SOB (20 Dec 2017 12:43)    Being followed by ID for pna    Interval history:Feels well  Off O2  No acute events      ROS:  No cough,SOB,CP  No N/V/D./abd pain  No other complaints      Antimicrobials:None       Other medications reviewed    Vital Signs Last 24 Hrs  T(C): 36.7 (12-26-17 @ 03:53), Max: 36.8 (12-25-17 @ 21:00)  T(F): 98 (12-26-17 @ 03:53), Max: 98.3 (12-25-17 @ 21:00)  HR: 71 (12-26-17 @ 03:53) (69 - 71)  BP: 121/67 (12-26-17 @ 03:53) (119/64 - 149/73)  BP(mean): --  RR: 18 (12-26-17 @ 03:53) (18 - 18)  SpO2: 96% (12-26-17 @ 03:53) (95% - 96%)    Physical Exam:        HEENT PERRLA EOMI    No oral exudate or erythema    Chest Good AE,CTA    CVS RRR S1 S2 WNl No murmur or rub or gallop    Abd soft BS normal No tenderness no masses    IV site no erythema tenderness or discharge    CNS AAO X 3 no focal    Lab Data:                          11.2   6.60  )-----------( 406      ( 26 Dec 2017 07:23 )             33.5       12-26    132<L>  |  91<L>  |  15  ----------------------------<  86  3.4<L>   |  27  |  0.81    Ca    9.2      26 Dec 2017 07:23

## 2017-12-27 LAB
ANION GAP SERPL CALC-SCNC: 13 MMOL/L — SIGNIFICANT CHANGE UP (ref 5–17)
BUN SERPL-MCNC: 11 MG/DL — SIGNIFICANT CHANGE UP (ref 7–23)
CALCIUM SERPL-MCNC: 9.1 MG/DL — SIGNIFICANT CHANGE UP (ref 8.4–10.5)
CHLORIDE SERPL-SCNC: 94 MMOL/L — LOW (ref 96–108)
CO2 SERPL-SCNC: 27 MMOL/L — SIGNIFICANT CHANGE UP (ref 22–31)
CREAT SERPL-MCNC: 0.68 MG/DL — SIGNIFICANT CHANGE UP (ref 0.5–1.3)
GLUCOSE SERPL-MCNC: 92 MG/DL — SIGNIFICANT CHANGE UP (ref 70–99)
MAGNESIUM SERPL-MCNC: 1.7 MG/DL — SIGNIFICANT CHANGE UP (ref 1.6–2.6)
POTASSIUM SERPL-MCNC: 4.3 MMOL/L — SIGNIFICANT CHANGE UP (ref 3.5–5.3)
POTASSIUM SERPL-SCNC: 4.3 MMOL/L — SIGNIFICANT CHANGE UP (ref 3.5–5.3)
SODIUM SERPL-SCNC: 134 MMOL/L — LOW (ref 135–145)

## 2017-12-27 PROCEDURE — 99232 SBSQ HOSP IP/OBS MODERATE 35: CPT

## 2017-12-27 RX ADMIN — Medication 3 MILLILITER(S): at 23:45

## 2017-12-27 RX ADMIN — Medication 3 MILLILITER(S): at 18:06

## 2017-12-27 RX ADMIN — ENOXAPARIN SODIUM 40 MILLIGRAM(S): 100 INJECTION SUBCUTANEOUS at 11:50

## 2017-12-27 RX ADMIN — Medication 3 MILLILITER(S): at 04:24

## 2017-12-27 RX ADMIN — AMLODIPINE BESYLATE 10 MILLIGRAM(S): 2.5 TABLET ORAL at 05:08

## 2017-12-27 RX ADMIN — ATENOLOL 50 MILLIGRAM(S): 25 TABLET ORAL at 05:09

## 2017-12-27 RX ADMIN — Medication 25 MILLIGRAM(S): at 05:08

## 2017-12-27 RX ADMIN — Medication 3 MILLILITER(S): at 11:49

## 2017-12-27 NOTE — PROGRESS NOTE ADULT - SUBJECTIVE AND OBJECTIVE BOX
[facility] - Mirta Moffett, Mary, Krista, Sharee, Rodger Philсергей  Office Number:  [phone]    Patient resting comfortably in bed in NAD.  Laying flat with no respiratory distress.   Episode of shortness of breath when walking to the bathroom. No on oxygen    ROS: negative unless otherwise mentioned.    Telemetry:  Off tele    MEDICATIONS  (STANDING):  ALBUTerol/ipratropium for Nebulization 3 milliLiter(s) Nebulizer every 6 hours  amLODIPine   Tablet 10 milliGRAM(s) Oral daily  ATENolol  Tablet 50 milliGRAM(s) Oral daily  enoxaparin Injectable 40 milliGRAM(s) SubCutaneous daily  hydrochlorothiazide 25 milliGRAM(s) Oral daily    MEDICATIONS  (PRN):      Allergies    No Known Allergies    Intolerances        Vital Signs Last 24 Hrs  T(C): 36.6 (27 Dec 2017 04:22), Max: 36.9 (26 Dec 2017 20:27)  T(F): 97.9 (27 Dec 2017 04:22), Max: 98.5 (26 Dec 2017 20:27)  HR: 86 (27 Dec 2017 04:22) (79 - 86)  BP: 170/78 (27 Dec 2017 04:22) (121/66 - 170/78)  BP(mean): --  RR: 18 (27 Dec 2017 04:22) (18 - 18)  SpO2: 97% (27 Dec 2017 04:22) (95% - 98%)    I&O's Summary    26 Dec 2017 07:01  -  27 Dec 2017 07:00  --------------------------------------------------------  IN: 960 mL / OUT: 1450 mL / NET: -490 mL        ON EXAM:    Constitutional: well-nourished, well-developed, NAD   HEENT:  MMM, sclerae anicteric, conjunctivae clear, no oral cyanosis.  Pulmonary: Non-labored, breath sounds are diminished bilaterally and are distant, No wheezing, rales or rhonchi  Cardiovascular: Regular, S1 and S2.  No murmur.  No rubs, gallops or clicks  Gastrointestinal: Bowel Sounds present, soft, nontender.   Lymph: No peripheral edema.   Neurological: Alert, no focal deficits  Skin: No rashes.  Psych:  Mood & affect appropriate    LABS: All Labs Reviewed:                        11.2   6.60  )-----------( 406      ( 26 Dec 2017 07:23 )             33.5                         12.0   7.77  )-----------( 363      ( 25 Dec 2017 08:23 )             35.8     27 Dec 2017 07:24    134    |  94     |  11     ----------------------------<  92     4.3     |  27     |  0.68   26 Dec 2017 07:23    132    |  91     |  15     ----------------------------<  86     3.4     |  27     |  0.81   25 Dec 2017 08:22    133    |  92     |  15     ----------------------------<  84     3.6     |  28     |  0.87     Ca    9.1        27 Dec 2017 07:24  Ca    9.2        26 Dec 2017 07:23  Ca    9.1        25 Dec 2017 08:22  Mg     1.7       27 Dec 2017 07:24            Blood Culture:

## 2017-12-27 NOTE — CHART NOTE - NSCHARTNOTEFT_GEN_A_CORE
Patient seen for nutrition follow-up assessment.   68 yo male with PMH of COPD, asthma, HTN initially admitted with hypoxic respiratory failure secondary to hMPV with possible superimposed bacterial PNA. Complicated medical course including MICU admission and brief intubation (extubated 12/18); now doing well on nasal cannula.     Source: Patient [x]    Family [ ]     other [x- medical record]    Diet : Low Sodium diet with Ensure Enlive two times daily; Provider to RN signaling 1L fluid restriction daily for hyponatremia  PO intake:   % [x]    Source for PO intake [x] Patient [x] Chart  Patient previously diagnosed with mild malnutrition and reported weight loss 2/2 poor appetite/PO intake PTA. States appetite & PO intake have greatly improved this week; will consume % of his meals daily. Per medical record, % PO intake x12 meals, 50% PO intake x2 meals, and 25% PO intake x1 meal since last nutrition assessment. States appetite has improved with improvement in SOB; states he was previously off all respiratory support but required nasal cannula this morning. Reports good appetite currently on nasal cannula. Patient states he is currently receiving 1 Ensure Enlive daily, which he consumes 100%. Will adjust supplement in CBORD to ensure patient receives 2x daily (as ordered by MD). Will provide 1 chocolate, 1 strawberry as per preference. Reviewed menu and ordering process with patient including alternative menu. Discussed importance of small, frequent meals focusing on calorically dense, high protein foods following discharge to prevent further weight loss and assist with SOB. Denies any GI distress; last BM 12/26      Current Weight: 12/27 weight of 140.2 pounds  Weight Change: -2.8 pounds since admit weight of 143 pounds on 12/14. Noted slight weight fluctuations since admission. ?wt loss vs fluid shifts on fluid restriction vs scale inaccuracies     Pertinent Medications: MEDICATIONS  (STANDING):  ALBUTerol/ipratropium for Nebulization 3 milliLiter(s) Nebulizer every 6 hours  amLODIPine   Tablet 10 milliGRAM(s) Oral daily  ATENolol  Tablet 50 milliGRAM(s) Oral daily  enoxaparin Injectable 40 milliGRAM(s) SubCutaneous daily  hydrochlorothiazide 25 milliGRAM(s) Oral daily    MEDICATIONS  (PRN):    Pertinent Labs:  (12/27) Na 134L, Cl 94L      Skin: no edema, no pressure ulcers    Estimated Needs:   [x] no change since previous assessment  [ ] recalculated:       Previous Nutrition Diagnosis:     [x] Malnutrition (mild, chronic)         Nutrition Diagnosis is [x] improving with good PO intake of meals/supplement       New Nutrition Diagnosis: [x] not applicable       Interventions:     Recommend    1) Continue low sodium diet with Ensure Enlive 2x daily for additional 700cal, 40 gm prot. (1 chocolate, 1 strawberry). Defer fluid restriction to medical team  2) Discussed importance of small, frequent meals focusing on calorically dense, high protein foods following discharge to prevent further weight loss and assist with SOB.   3) Provide food preferences within dietary restrictions when feasible   4) Reviewed menu and ordering process  5) Encouraged continued good PO intake of meals/supplements       Monitoring and Evaluation:     [x] PO intake [x] Tolerance to diet prescription [x] weights [x] follow up per protocol    [x] other: RD remains available, Radha Horne RD Pager #[phone]

## 2017-12-27 NOTE — PROGRESS NOTE ADULT - SUBJECTIVE AND OBJECTIVE BOX
chief complaint : shortness of breath         SUBJECTIVE / OVERNIGHT EVENTS: pt says he feels much better than before    MEDICATIONS  (STANDING):  ALBUTerol/ipratropium for Nebulization 3 milliLiter(s) Nebulizer every 6 hours  amLODIPine   Tablet 10 milliGRAM(s) Oral daily  ATENolol  Tablet 50 milliGRAM(s) Oral daily  enoxaparin Injectable 40 milliGRAM(s) SubCutaneous daily  hydrochlorothiazide 25 milliGRAM(s) Oral daily    MEDICATIONS  (PRN):      Vital Signs Last 24 Hrs  T(C): 36.3 (27 Dec 2017 20:50), Max: 36.6 (27 Dec 2017 04:22)  T(F): 97.4 (27 Dec 2017 20:50), Max: 97.9 (27 Dec 2017 04:22)  HR: 69 (27 Dec 2017 20:50) (66 - 86)  BP: 143/73 (27 Dec 2017 20:50) (129/77 - 170/78)  BP(mean): --  RR: 18 (27 Dec 2017 20:50) (18 - 18)  SpO2: 99% (27 Dec 2017 20:50) (97% - 99%)    Constitutional: No fever, fatigue  Skin: No rash.  Eyes: No recent vision problems or eye pain.  ENT: No congestion, ear pain, or sore throat.  Cardiovascular: No chest pain or palpation.  Respiratory: No cough, shortness of breath, congestion, or wheezing.  Gastrointestinal: No abdominal pain, nausea, vomiting, or diarrhea.  Genitourinary: No dysuria.  Musculoskeletal: No joint swelling.  Neurologic: No headache.    PHYSICAL EXAM:  GENERAL: NAD  EYES: EOMI, PERRLA  NECK: Supple, No JVD  CHEST/LUNG: dec breath sounds at bases   HEART:  S1 , S2 +  ABDOMEN: soft, bs+  EXTREMITIES:  trace edema  NEUROLOGY:alert awake calm cooperative    LABS:  12-27    134<L>  |  94<L>  |  11  ----------------------------<  92  4.3   |  27  |  0.68    Ca    9.1      27 Dec 2017 07:24  Mg     1.7     12-27      Creatinine Trend: 0.68 <--, 0.81 <--, 0.87 <--, 0.80 <--, 0.88 <--                        11.2   6.60  )-----------( 406      ( 26 Dec 2017 07:23 )             33.5     Urine Studies:

## 2017-12-27 NOTE — PROGRESS NOTE ADULT - ASSESSMENT
67M hx of copd, asthma, htn, presenting with hypoxic respiratory failure. COPD exacerbation, hMPV, pneumonia, intubated for increased work of breathing, now s/p extubation.    -Episode of shortness of breath overnight, when walking. Blood pressure in 170's, which correlates to his symptoms. There is no sign of heart failure, although he is slightly short of breath when speaking  - Can repeat CXR if no improvement.  -Please walk patient around floor and monitor oxygen saturation.  -there is no evidence of acute ischemia.  -there is no evidence of significant arrhythmia.  -there is no evidence for meaningful volume overload.  -sodium is somewhat low, with on HCTZ. Can continue temporarily but watch carefully. I would add losartan 25 mg po daily.  -resp status improved  -DVT prophylaxis  - monitor electrolytes, keep k>4, Mg>2  - All other workup and dc planning per primary team. Will follow

## 2017-12-28 VITALS
OXYGEN SATURATION: 98 % | HEART RATE: 65 BPM | DIASTOLIC BLOOD PRESSURE: 70 MMHG | SYSTOLIC BLOOD PRESSURE: 120 MMHG | RESPIRATION RATE: 17 BRPM | TEMPERATURE: 98 F

## 2017-12-28 LAB
ANION GAP SERPL CALC-SCNC: 11 MMOL/L — SIGNIFICANT CHANGE UP (ref 5–17)
BUN SERPL-MCNC: 15 MG/DL — SIGNIFICANT CHANGE UP (ref 7–23)
CALCIUM SERPL-MCNC: 9.3 MG/DL — SIGNIFICANT CHANGE UP (ref 8.4–10.5)
CHLORIDE SERPL-SCNC: 95 MMOL/L — LOW (ref 96–108)
CO2 SERPL-SCNC: 28 MMOL/L — SIGNIFICANT CHANGE UP (ref 22–31)
CREAT SERPL-MCNC: 0.72 MG/DL — SIGNIFICANT CHANGE UP (ref 0.5–1.3)
GLUCOSE SERPL-MCNC: 129 MG/DL — HIGH (ref 70–99)
POTASSIUM SERPL-MCNC: 4.4 MMOL/L — SIGNIFICANT CHANGE UP (ref 3.5–5.3)
POTASSIUM SERPL-SCNC: 4.4 MMOL/L — SIGNIFICANT CHANGE UP (ref 3.5–5.3)
SODIUM SERPL-SCNC: 134 MMOL/L — LOW (ref 135–145)

## 2017-12-28 PROCEDURE — 80053 COMPREHEN METABOLIC PANEL: CPT

## 2017-12-28 PROCEDURE — 87070 CULTURE OTHR SPECIMN AEROBIC: CPT

## 2017-12-28 PROCEDURE — 97161 PT EVAL LOW COMPLEX 20 MIN: CPT

## 2017-12-28 PROCEDURE — 83615 LACTATE (LD) (LDH) ENZYME: CPT

## 2017-12-28 PROCEDURE — 87581 M.PNEUMON DNA AMP PROBE: CPT

## 2017-12-28 PROCEDURE — 87040 BLOOD CULTURE FOR BACTERIA: CPT

## 2017-12-28 PROCEDURE — 82330 ASSAY OF CALCIUM: CPT

## 2017-12-28 PROCEDURE — 84300 ASSAY OF URINE SODIUM: CPT

## 2017-12-28 PROCEDURE — 99291 CRITICAL CARE FIRST HOUR: CPT | Mod: 25

## 2017-12-28 PROCEDURE — 84132 ASSAY OF SERUM POTASSIUM: CPT

## 2017-12-28 PROCEDURE — 83935 ASSAY OF URINE OSMOLALITY: CPT

## 2017-12-28 PROCEDURE — 84133 ASSAY OF URINE POTASSIUM: CPT

## 2017-12-28 PROCEDURE — 83880 ASSAY OF NATRIURETIC PEPTIDE: CPT

## 2017-12-28 PROCEDURE — 87633 RESP VIRUS 12-25 TARGETS: CPT

## 2017-12-28 PROCEDURE — 82947 ASSAY GLUCOSE BLOOD QUANT: CPT

## 2017-12-28 PROCEDURE — 85610 PROTHROMBIN TIME: CPT

## 2017-12-28 PROCEDURE — 96374 THER/PROPH/DIAG INJ IV PUSH: CPT | Mod: XU

## 2017-12-28 PROCEDURE — 84105 ASSAY OF URINE PHOSPHORUS: CPT

## 2017-12-28 PROCEDURE — 87798 DETECT AGENT NOS DNA AMP: CPT

## 2017-12-28 PROCEDURE — 94002 VENT MGMT INPAT INIT DAY: CPT

## 2017-12-28 PROCEDURE — 81003 URINALYSIS AUTO W/O SCOPE: CPT

## 2017-12-28 PROCEDURE — 83605 ASSAY OF LACTIC ACID: CPT

## 2017-12-28 PROCEDURE — 99233 SBSQ HOSP IP/OBS HIGH 50: CPT

## 2017-12-28 PROCEDURE — 99232 SBSQ HOSP IP/OBS MODERATE 35: CPT

## 2017-12-28 PROCEDURE — 83930 ASSAY OF BLOOD OSMOLALITY: CPT

## 2017-12-28 PROCEDURE — 82553 CREATINE MB FRACTION: CPT

## 2017-12-28 PROCEDURE — 82803 BLOOD GASES ANY COMBINATION: CPT

## 2017-12-28 PROCEDURE — 71275 CT ANGIOGRAPHY CHEST: CPT

## 2017-12-28 PROCEDURE — 94660 CPAP INITIATION&MGMT: CPT

## 2017-12-28 PROCEDURE — 97116 GAIT TRAINING THERAPY: CPT

## 2017-12-28 PROCEDURE — 82550 ASSAY OF CK (CPK): CPT

## 2017-12-28 PROCEDURE — 80048 BASIC METABOLIC PNL TOTAL CA: CPT

## 2017-12-28 PROCEDURE — 85014 HEMATOCRIT: CPT

## 2017-12-28 PROCEDURE — 94640 AIRWAY INHALATION TREATMENT: CPT

## 2017-12-28 PROCEDURE — 71045 X-RAY EXAM CHEST 1 VIEW: CPT

## 2017-12-28 PROCEDURE — 93306 TTE W/DOPPLER COMPLETE: CPT

## 2017-12-28 PROCEDURE — 84100 ASSAY OF PHOSPHORUS: CPT

## 2017-12-28 PROCEDURE — 84145 PROCALCITONIN (PCT): CPT

## 2017-12-28 PROCEDURE — 97530 THERAPEUTIC ACTIVITIES: CPT

## 2017-12-28 PROCEDURE — 82435 ASSAY OF BLOOD CHLORIDE: CPT

## 2017-12-28 PROCEDURE — 82962 GLUCOSE BLOOD TEST: CPT

## 2017-12-28 PROCEDURE — 84484 ASSAY OF TROPONIN QUANT: CPT

## 2017-12-28 PROCEDURE — 96375 TX/PRO/DX INJ NEW DRUG ADDON: CPT | Mod: XU

## 2017-12-28 PROCEDURE — 36600 WITHDRAWAL OF ARTERIAL BLOOD: CPT

## 2017-12-28 PROCEDURE — 85730 THROMBOPLASTIN TIME PARTIAL: CPT

## 2017-12-28 PROCEDURE — 87486 CHLMYD PNEUM DNA AMP PROBE: CPT

## 2017-12-28 PROCEDURE — 84295 ASSAY OF SERUM SODIUM: CPT

## 2017-12-28 PROCEDURE — 82436 ASSAY OF URINE CHLORIDE: CPT

## 2017-12-28 PROCEDURE — 83735 ASSAY OF MAGNESIUM: CPT

## 2017-12-28 PROCEDURE — 87449 NOS EACH ORGANISM AG IA: CPT

## 2017-12-28 PROCEDURE — 85027 COMPLETE CBC AUTOMATED: CPT

## 2017-12-28 PROCEDURE — 87086 URINE CULTURE/COLONY COUNT: CPT

## 2017-12-28 RX ORDER — LOSARTAN POTASSIUM 100 MG/1
50 TABLET, FILM COATED ORAL DAILY
Qty: 0 | Refills: 0 | Status: DISCONTINUED | OUTPATIENT
Start: 2017-12-28 | End: 2017-12-28

## 2017-12-28 RX ORDER — IPRATROPIUM/ALBUTEROL SULFATE 18-103MCG
3 AEROSOL WITH ADAPTER (GRAM) INHALATION EVERY 6 HOURS
Qty: 0 | Refills: 0 | Status: DISCONTINUED | OUTPATIENT
Start: 2017-12-28 | End: 2017-12-28

## 2017-12-28 RX ORDER — ALBUTEROL 90 UG/1
1 AEROSOL, METERED ORAL
Qty: 1 | Refills: 0
Start: 2017-12-28 | End: 2018-01-26

## 2017-12-28 RX ORDER — LOSARTAN POTASSIUM 100 MG/1
1 TABLET, FILM COATED ORAL
Qty: 30 | Refills: 0
Start: 2017-12-28 | End: 2018-01-26

## 2017-12-28 RX ORDER — ALBUTEROL 90 UG/1
1 AEROSOL, METERED ORAL
Qty: 0 | Refills: 0 | COMMUNITY

## 2017-12-28 RX ORDER — TIOTROPIUM BROMIDE 18 UG/1
1 CAPSULE ORAL; RESPIRATORY (INHALATION)
Qty: 1 | Refills: 0
Start: 2017-12-28 | End: 2018-01-26

## 2017-12-28 RX ORDER — FLUTICASONE PROPIONATE AND SALMETEROL 50; 250 UG/1; UG/1
1 POWDER ORAL; RESPIRATORY (INHALATION)
Qty: 0 | Refills: 0 | COMMUNITY

## 2017-12-28 RX ORDER — BUDESONIDE AND FORMOTEROL FUMARATE DIHYDRATE 160; 4.5 UG/1; UG/1
2 AEROSOL RESPIRATORY (INHALATION)
Qty: 0 | Refills: 0 | Status: DISCONTINUED | OUTPATIENT
Start: 2017-12-28 | End: 2017-12-28

## 2017-12-28 RX ORDER — BUDESONIDE AND FORMOTEROL FUMARATE DIHYDRATE 160; 4.5 UG/1; UG/1
2 AEROSOL RESPIRATORY (INHALATION)
Qty: 1 | Refills: 0
Start: 2017-12-28 | End: 2018-01-26

## 2017-12-28 RX ORDER — TIOTROPIUM BROMIDE 18 UG/1
1 CAPSULE ORAL; RESPIRATORY (INHALATION) DAILY
Qty: 0 | Refills: 0 | Status: DISCONTINUED | OUTPATIENT
Start: 2017-12-28 | End: 2017-12-28

## 2017-12-28 RX ORDER — IPRATROPIUM/ALBUTEROL SULFATE 18-103MCG
3 AEROSOL WITH ADAPTER (GRAM) INHALATION
Qty: 120 | Refills: 0 | OUTPATIENT
Start: 2017-12-28 | End: 2018-01-26

## 2017-12-28 RX ADMIN — Medication 3 MILLILITER(S): at 05:23

## 2017-12-28 RX ADMIN — Medication 25 MILLIGRAM(S): at 05:23

## 2017-12-28 RX ADMIN — ATENOLOL 50 MILLIGRAM(S): 25 TABLET ORAL at 05:24

## 2017-12-28 RX ADMIN — LOSARTAN POTASSIUM 50 MILLIGRAM(S): 100 TABLET, FILM COATED ORAL at 11:19

## 2017-12-28 RX ADMIN — Medication 3 MILLILITER(S): at 11:18

## 2017-12-28 RX ADMIN — ENOXAPARIN SODIUM 40 MILLIGRAM(S): 100 INJECTION SUBCUTANEOUS at 11:18

## 2017-12-28 RX ADMIN — AMLODIPINE BESYLATE 10 MILLIGRAM(S): 2.5 TABLET ORAL at 05:23

## 2017-12-28 NOTE — PROGRESS NOTE ADULT - ASSESSMENT
67M hx of copd, asthma, htn, presenting with hypoxic respiratory failure. COPD exacerbation, hMPV, pneumonia, intubated for increased work of breathing, now s/p extubation.    -again feeling better with respect to dyspnea  -no acute ischemia  -no arrhythmias noted clinically  -no vol ol  -have dc hctz for his hyponatremia, and have started losartan instead  -DVT prophylaxis  - monitor electrolytes, keep k>4, Mg>2  - All other workup and dc planning per primary team. Will follow

## 2017-12-28 NOTE — PROGRESS NOTE ADULT - SUBJECTIVE AND OBJECTIVE BOX
[facility] DIVISION OF PULMONARY, CRITICAL CARE and SLEEP MEDICINE - PULMONARY PROGRESS NOTE  FOR ANY QUESTIONS PLEASE CALL [phone] MONDAY - FRIDAY 8a-5p or [phone] on NIGHTS/WEEKENDS/HOLIDAYS    PATIENT INFORMATION:  NAME: ANABELLE JACINTO:  MRN: [id number]    CHIEF COMPLAINT: Patient is a 67y old  Male who presents with a chief complaint of SOB (20 Dec 2017 12:43)      [x] INITIAL CONSULT, H&P, FAMILY HISTORY and PAST MEDICAL AND SURGICAL HISTORY REVIEWED    OVERNIGHT EVENTS or CHANGES TO HPI: Feels well. No complaints.   Respiratory status stable.    ========================REVIEW OF SYSTEMS========================  CONSTITUTIONAL: No complaints  CARDIOVASCULAR: Denies chest pain or palpitations  PULMONARY: Denies cough or shortness of breath  [x] REMAINING REVIEW OF SYSTEMS NEGATIVE  [] UNABLE TO OBTAIN REVIEW OF SYSTEMS DUE TO _______________    ========================MEDICATIONS=============================  MEDICATIONS  (STANDING):  ALBUTerol/ipratropium for Nebulization 3 milliLiter(s) Nebulizer every 6 hours  amLODIPine   Tablet 10 milliGRAM(s) Oral daily  ATENolol  Tablet 50 milliGRAM(s) Oral daily  enoxaparin Injectable 40 milliGRAM(s) SubCutaneous daily  losartan 50 milliGRAM(s) Oral daily      MEDICATIONS  (PRN):      ========================PHYSICAL EXAM============================    VITALS: ICU Vital Signs Last 24 Hrs  T(C): 36.6 (28 Dec 2017 04:14), Max: 36.6 (28 Dec 2017 04:14)  T(F): 97.8 (28 Dec 2017 04:14), Max: 97.8 (28 Dec 2017 04:14)  HR: 60 (28 Dec 2017 04:14) (60 - 69)  BP: 127/68 (28 Dec 2017 04:14) (127/68 - 143/73)  BP(mean): --  ABP: --  ABP(mean): --  RR: 18 (28 Dec 2017 04:14) (18 - 18)  SpO2: 99% (28 Dec 2017 04:14) (99% - 99%)      INTAKE and OUTPUT: I&O's Summary    27 Dec 2017 07:01  -  28 Dec 2017 07:00  --------------------------------------------------------  IN: 840 mL / OUT: 1500 mL / NET: -660 mL    28 Dec 2017 07:01  -  28 Dec 2017 11:06  --------------------------------------------------------  IN: 300 mL / OUT: 100 mL / NET: 200 mL    VENTILATOR SETTINGS: N/A    GENERAL: AAOx3, NAD  EYES: EOMI, anicteric  EAR/NOSE/MOUTH/THROAT: MMM, nares clear, normocephalic, atraumatic, trachea midline  NECK: supple, no JVD  LYMPH NODES: no palpable supraclavicular LAD  CARDIOVASCULAR: RRR, S1S2  RESPIRATORY: CTA bilaterally, no wheeze  ABDOMEN: soft, NT, ND, +BS  EXTREMITIES: no clubbing or cyanosis  SKIN: warm and dry  MUSCULOSKELETAL: normal strength   NEUROLOGIC: moves all extremities, nonfocal exam  PSYCHIATRIC: calm but mildly anxious    ========================LABORATORY RESULTS AND IMAGING=============                                                   12-27    134<L>  |  94<L>  |  11  ----------------------------<  92  4.3   |  27  |  0.68    Ca    9.1      27 Dec 2017 07:24  Mg     1.7     12-27    Creatinine Trend: 0.68<--, 0.81<--, 0.87<--, 0.80<--, 0.88<--, 0.91<--    CT CHEST:     [x] RADIOLOGY REVIEWED AND INTERPRETED BY ME      THANK YOU FOR ALLOWING US TO PARTICIPATE IN THE CARE OF THIS PATIENT

## 2017-12-28 NOTE — PROGRESS NOTE ADULT - ASSESSMENT
67 M former smoker, with a PMH of HTN, asthma/ COPD, admitted with acute hypoxic resp failure 2/2 hMPV with possible superimposed bacterial PNA, initially required Bilevel support, followed by a brief intubation, extubated on 12/18, now doing well on NC    1. Acute resp failure with hypoxia/ COPD exac/ hMPV infection/ Possible Bact PNA  - Respiratory status greatly improved, saturating well on RA. Maintain O2 sat > 90   - Has not required NIPPV in many days  - Completed a course of systemic steroids.   - Would cont Duonebs Q6H but can change to prn   - Add symbicort BID and spiriva daily  - RT to teach correct inhaler technique  - Competed a 6 day course of antibiotics for possible bacterial PNA. All Bcxs/ UCx NGTD, urine legionella Ag negative.  - Incentive spirometry/ acapella/ OBC as tolerated   - Requires outpatient pulm followup - will need PFTs and a repeat Ct chest in 6-8 weeks    2. Severe uncontrolled hypertension  - Continue antihypertensives to maintain BP control  - Cardiology evaluation appreciated    3. Severe Anxiety:  - Benzodiazepines as needed    4. DVT PX:  - Lovenox SQ

## 2017-12-28 NOTE — PROGRESS NOTE ADULT - ATTENDING COMMENTS
For Qs over the weekend please call: [phone]
Outpatient pulmonary followup for PFTs and repeat CT chest in 6-8 weeks.  Respiratory status greatly improved and patient free of respiratory distress at this time.
Outpatient pulmonary followup for PFTs and repeat CT chest in 6-8 weeks.  Respiratory status greatly improved and patient free of respiratory distress at this time.

## 2017-12-28 NOTE — PROGRESS NOTE ADULT - SUBJECTIVE AND OBJECTIVE BOX
[facility]    Mirta Moffett, Mary, Krista, Sharee, Rodger, Aaron      [phone]    CHIEF COMPLAINT: Patient is a 67y old  Male who presents with a chief complaint of SOB (20 Dec 2017 12:43)      Follow Up: sob, htn, hyponatremia    Interim history: sob better.  hypontremia noted    MEDICATIONS  (STANDING):  ALBUTerol/ipratropium for Nebulization 3 milliLiter(s) Nebulizer every 6 hours  amLODIPine   Tablet 10 milliGRAM(s) Oral daily  ATENolol  Tablet 50 milliGRAM(s) Oral daily  enoxaparin Injectable 40 milliGRAM(s) SubCutaneous daily  losartan 50 milliGRAM(s) Oral daily    MEDICATIONS  (PRN):      REVIEW OF SYSTEMS:  eye, ent, GI, , allergic, dermatologic, musculoskeletal and neurologic are negative except as described above    Vital Signs Last 24 Hrs  T(C): 36.6 (28 Dec 2017 04:14), Max: 36.6 (28 Dec 2017 04:14)  T(F): 97.8 (28 Dec 2017 04:14), Max: 97.8 (28 Dec 2017 04:14)  HR: 60 (28 Dec 2017 04:14) (60 - 69)  BP: 127/68 (28 Dec 2017 04:14) (127/68 - 143/73)  BP(mean): --  RR: 18 (28 Dec 2017 04:14) (18 - 18)  SpO2: 99% (28 Dec 2017 04:14) (99% - 99%)    I&O's Summary    27 Dec 2017 07:01  -  28 Dec 2017 07:00  --------------------------------------------------------  IN: 840 mL / OUT: 1500 mL / NET: -660 mL        Telemetry past 24h:    PHYSICAL EXAM:    Constitutional: well-nourished, well-developed, NAD   HEENT:  MMM, sclerae anicteric, conjunctivae clear, no oral cyanosis.  Pulmonary: Non-labored, breath sounds are clear bilaterally, No wheezing, rales or rhonchi  Cardiovascular: Regular, S1 and S2.  No murmur.  No rubs, gallops or clicks  Gastrointestinal: Bowel Sounds present, soft, nontender.   Lymph: No peripheral edema.   Neurological: Alert, no focal deficits  Skin: No rashes.  Psych:  Mood & affect appropriate    LABS: All Labs Reviewed:                        11.2   6.60  )-----------( 406      ( 26 Dec 2017 07:23 )             33.5                         12.0   7.77  )-----------( 363      ( 25 Dec 2017 08:23 )             35.8     27 Dec 2017 07:24    134    |  94     |  11     ----------------------------<  92     4.3     |  27     |  0.68   26 Dec 2017 07:23    132    |  91     |  15     ----------------------------<  86     3.4     |  27     |  0.81   25 Dec 2017 08:22    133    |  92     |  15     ----------------------------<  84     3.6     |  28     |  0.87     Ca    9.1        27 Dec 2017 07:24  Ca    9.2        26 Dec 2017 07:23  Ca    9.1        25 Dec 2017 08:22  Mg     1.7       27 Dec 2017 07:24            Blood Culture:         RADIOLOGY:    EKG:    Echo:

## 2018-03-22 NOTE — DISCHARGE NOTE ADULT - NS AS DC AMI YN
From: Aldair Hendrickson  To: Remigio Mace DO  Sent: 3/22/2018 9:11 AM CDT  Subject: Test Results Question    Lab results back from Monday? no

## 2018-07-29 NOTE — PROGRESS NOTE ADULT - PROVIDER SPECIALTY LIST ADULT
Infectious Disease TRANSFER - ED to INPATIENT REPORT: 
 
SBAR report made available to receiving floor on this patient being transferred to [facility] ICU [id number])  for routine progression of care Admitting diagnosis Sepsis ([facility] [address] Sepsis ([facility] [address] Information from the following report(s) ED Summary, MAR and Recent Results was made available to receiving floor. Lines:  
Venous Access Device 07/28/18 Upper chest (subclavicular area), left (Active) Medication list unable to confirm Opportunity for questions and clarification was provided. Patient is oriented to time, place, person and situation Patient is  continent and ambulatory with assist 
  
Valuables transported with patient Patient transported with: 
 Monitor Registered Nurse

## 2018-12-03 NOTE — ED PROVIDER NOTE - CHPI ED SYMPTOMS NEG
I spoke with Bradley 
PT LEFT MESSAGE, RETURNING CALL   
no chest pain/no diaphoresis/no dizziness/no back pain

## 2019-01-16 NOTE — PROGRESS NOTE ADULT - ASSESSMENT
pt with above history p/w hypoxic resp failure likely sec to copd exacerbation   sec to meta pneumo virau      Acute resp failure with hypoxia/ COPD exac/ hMPV infection/ Possible Bact PNA  - as per pulm , Respiratory status greatly improved, saturating well on NC. Would titrate down NC to keep sats > 90 %,   - Can discontinue BIPAP  - Completed a course of systemic steroids.   - Would cont Duonebs Q6H but can change to prn now  - Add symbicort BID and spiriva daily    HTN - titrate htn meds for optimal bp control                 -hyponatremia- fluid restriction     dvt prophylaxis H/O cervical spine surgery

## 2019-09-22 ENCOUNTER — INPATIENT (INPATIENT)
Facility: HOSPITAL | Age: 69
LOS: 8 days | Discharge: HOME | DRG: 246 | End: 2019-10-01
Attending: HOSPITALIST | Admitting: HOSPITALIST
Payer: COMMERCIAL

## 2019-09-22 VITALS
OXYGEN SATURATION: 100 % | SYSTOLIC BLOOD PRESSURE: 160 MMHG | WEIGHT: 149.91 LBS | HEIGHT: 73 IN | DIASTOLIC BLOOD PRESSURE: 94 MMHG | RESPIRATION RATE: 16 BRPM | HEART RATE: 92 BPM

## 2019-09-22 DIAGNOSIS — J96.01 ACUTE RESPIRATORY FAILURE WITH HYPOXIA: ICD-10-CM

## 2019-09-22 DIAGNOSIS — J44.1 CHRONIC OBSTRUCTIVE PULMONARY DISEASE WITH (ACUTE) EXACERBATION: ICD-10-CM

## 2019-09-22 DIAGNOSIS — I10 ESSENTIAL (PRIMARY) HYPERTENSION: ICD-10-CM

## 2019-09-22 DIAGNOSIS — I21.4 NON-ST ELEVATION (NSTEMI) MYOCARDIAL INFARCTION: ICD-10-CM

## 2019-09-22 DIAGNOSIS — Z29.9 ENCOUNTER FOR PROPHYLACTIC MEASURES, UNSPECIFIED: ICD-10-CM

## 2019-09-22 DIAGNOSIS — R07.9 CHEST PAIN, UNSPECIFIED: ICD-10-CM

## 2019-09-22 PROBLEM — J45.909 UNSPECIFIED ASTHMA, UNCOMPLICATED: Chronic | Status: ACTIVE | Noted: 2017-12-12

## 2019-09-22 LAB
ALBUMIN SERPL ELPH-MCNC: 4.5 G/DL — SIGNIFICANT CHANGE UP (ref 3.3–5)
ALP SERPL-CCNC: 75 U/L — SIGNIFICANT CHANGE UP (ref 40–120)
ALT FLD-CCNC: 26 U/L — SIGNIFICANT CHANGE UP (ref 10–45)
ANION GAP SERPL CALC-SCNC: 16 MMOL/L — SIGNIFICANT CHANGE UP (ref 5–17)
APTT BLD: 119.9 SEC — HIGH (ref 27.5–36.3)
APTT BLD: 40.3 SEC — HIGH (ref 27.5–36.3)
APTT BLD: 43.9 SEC — HIGH (ref 27.5–36.3)
AST SERPL-CCNC: 39 U/L — SIGNIFICANT CHANGE UP (ref 10–40)
BILIRUB SERPL-MCNC: 1 MG/DL — SIGNIFICANT CHANGE UP (ref 0.2–1.2)
BUN SERPL-MCNC: 12 MG/DL — SIGNIFICANT CHANGE UP (ref 7–23)
CALCIUM SERPL-MCNC: 9.3 MG/DL — SIGNIFICANT CHANGE UP (ref 8.4–10.5)
CHLORIDE SERPL-SCNC: 99 MMOL/L — SIGNIFICANT CHANGE UP (ref 96–108)
CK MB CFR SERPL CALC: 16.4 NG/ML — HIGH (ref 0–6.7)
CO2 SERPL-SCNC: 22 MMOL/L — SIGNIFICANT CHANGE UP (ref 22–31)
CREAT SERPL-MCNC: 0.9 MG/DL — SIGNIFICANT CHANGE UP (ref 0.5–1.3)
GLUCOSE SERPL-MCNC: 157 MG/DL — HIGH (ref 70–99)
HCT VFR BLD CALC: 40.7 % — SIGNIFICANT CHANGE UP (ref 39–50)
HCT VFR BLD CALC: 45.8 % — SIGNIFICANT CHANGE UP (ref 39–50)
HGB BLD-MCNC: 13.2 G/DL — SIGNIFICANT CHANGE UP (ref 13–17)
HGB BLD-MCNC: 14.9 G/DL — SIGNIFICANT CHANGE UP (ref 13–17)
INR BLD: 1.04 RATIO — SIGNIFICANT CHANGE UP (ref 0.88–1.16)
INR BLD: 1.07 RATIO — SIGNIFICANT CHANGE UP (ref 0.88–1.16)
LIDOCAIN IGE QN: 13 U/L — SIGNIFICANT CHANGE UP (ref 7–60)
MAGNESIUM SERPL-MCNC: 2.1 MG/DL — SIGNIFICANT CHANGE UP (ref 1.6–2.6)
MCHC RBC-ENTMCNC: 31.1 PG — SIGNIFICANT CHANGE UP (ref 27–34)
MCHC RBC-ENTMCNC: 31.3 PG — SIGNIFICANT CHANGE UP (ref 27–34)
MCHC RBC-ENTMCNC: 32.4 GM/DL — SIGNIFICANT CHANGE UP (ref 32–36)
MCHC RBC-ENTMCNC: 32.5 GM/DL — SIGNIFICANT CHANGE UP (ref 32–36)
MCV RBC AUTO: 96 FL — SIGNIFICANT CHANGE UP (ref 80–100)
MCV RBC AUTO: 96.3 FL — SIGNIFICANT CHANGE UP (ref 80–100)
NT-PROBNP SERPL-SCNC: 2256 PG/ML — HIGH (ref 0–300)
PLATELET # BLD AUTO: 196 K/UL — SIGNIFICANT CHANGE UP (ref 150–400)
PLATELET # BLD AUTO: 207 K/UL — SIGNIFICANT CHANGE UP (ref 150–400)
POTASSIUM SERPL-MCNC: 4.6 MMOL/L — SIGNIFICANT CHANGE UP (ref 3.5–5.3)
POTASSIUM SERPL-SCNC: 4.6 MMOL/L — SIGNIFICANT CHANGE UP (ref 3.5–5.3)
PROT SERPL-MCNC: 7.2 G/DL — SIGNIFICANT CHANGE UP (ref 6–8.3)
PROTHROM AB SERPL-ACNC: 12 SEC — SIGNIFICANT CHANGE UP (ref 10–12.9)
PROTHROM AB SERPL-ACNC: 12.2 SEC — SIGNIFICANT CHANGE UP (ref 10–12.9)
RAPID RVP RESULT: DETECTED
RBC # BLD: 4.24 M/UL — SIGNIFICANT CHANGE UP (ref 4.2–5.8)
RBC # BLD: 4.75 M/UL — SIGNIFICANT CHANGE UP (ref 4.2–5.8)
RBC # FLD: 12.8 % — SIGNIFICANT CHANGE UP (ref 10.3–14.5)
RBC # FLD: 12.9 % — SIGNIFICANT CHANGE UP (ref 10.3–14.5)
RV+EV RNA SPEC QL NAA+PROBE: DETECTED
SODIUM SERPL-SCNC: 137 MMOL/L — SIGNIFICANT CHANGE UP (ref 135–145)
TROPONIN T, HIGH SENSITIVITY RESULT: 599 NG/L — HIGH (ref 0–51)
TROPONIN T, HIGH SENSITIVITY RESULT: 600 NG/L — HIGH (ref 0–51)
TROPONIN T, HIGH SENSITIVITY RESULT: 738 NG/L — HIGH (ref 0–51)
WBC # BLD: 10.2 K/UL — SIGNIFICANT CHANGE UP (ref 3.8–10.5)
WBC # BLD: 13.9 K/UL — HIGH (ref 3.8–10.5)
WBC # FLD AUTO: 10.2 K/UL — SIGNIFICANT CHANGE UP (ref 3.8–10.5)
WBC # FLD AUTO: 13.9 K/UL — HIGH (ref 3.8–10.5)

## 2019-09-22 PROCEDURE — 93010 ELECTROCARDIOGRAM REPORT: CPT | Mod: GC

## 2019-09-22 PROCEDURE — 99223 1ST HOSP IP/OBS HIGH 75: CPT | Mod: GC

## 2019-09-22 PROCEDURE — 71045 X-RAY EXAM CHEST 1 VIEW: CPT | Mod: 26

## 2019-09-22 PROCEDURE — 99285 EMERGENCY DEPT VISIT HI MDM: CPT | Mod: GC

## 2019-09-22 PROCEDURE — 99223 1ST HOSP IP/OBS HIGH 75: CPT

## 2019-09-22 PROCEDURE — 93308 TTE F-UP OR LMTD: CPT | Mod: 26

## 2019-09-22 RX ORDER — ATENOLOL AND CHLORTHALIDONE 50; 25 MG/1; MG/1
1 TABLET ORAL
Qty: 0 | Refills: 0 | DISCHARGE

## 2019-09-22 RX ORDER — INFLUENZA VIRUS VACCINE 15; 15; 15; 15 UG/.5ML; UG/.5ML; UG/.5ML; UG/.5ML
0.5 SUSPENSION INTRAMUSCULAR ONCE
Refills: 0 | Status: DISCONTINUED | OUTPATIENT
Start: 2019-09-22 | End: 2019-10-01

## 2019-09-22 RX ORDER — IPRATROPIUM/ALBUTEROL SULFATE 18-103MCG
3 AEROSOL WITH ADAPTER (GRAM) INHALATION EVERY 6 HOURS
Refills: 0 | Status: COMPLETED | OUTPATIENT
Start: 2019-09-22 | End: 2019-09-23

## 2019-09-22 RX ORDER — HEPARIN SODIUM 5000 [USP'U]/ML
3800 INJECTION INTRAVENOUS; SUBCUTANEOUS ONCE
Refills: 0 | Status: COMPLETED | OUTPATIENT
Start: 2019-09-22 | End: 2019-09-22

## 2019-09-22 RX ORDER — MONTELUKAST 4 MG/1
1 TABLET, CHEWABLE ORAL
Qty: 0 | Refills: 0 | DISCHARGE

## 2019-09-22 RX ORDER — HYDROCHLOROTHIAZIDE 25 MG
25 TABLET ORAL DAILY
Refills: 0 | Status: DISCONTINUED | OUTPATIENT
Start: 2019-09-22 | End: 2019-09-24

## 2019-09-22 RX ORDER — HEPARIN SODIUM 5000 [USP'U]/ML
INJECTION INTRAVENOUS; SUBCUTANEOUS
Qty: 25000 | Refills: 0 | Status: DISCONTINUED | OUTPATIENT
Start: 2019-09-22 | End: 2019-09-24

## 2019-09-22 RX ORDER — ASPIRIN/CALCIUM CARB/MAGNESIUM 324 MG
81 TABLET ORAL DAILY
Refills: 0 | Status: DISCONTINUED | OUTPATIENT
Start: 2019-09-22 | End: 2019-09-26

## 2019-09-22 RX ORDER — AMLODIPINE BESYLATE 2.5 MG/1
10 TABLET ORAL DAILY
Refills: 0 | Status: DISCONTINUED | OUTPATIENT
Start: 2019-09-22 | End: 2019-10-01

## 2019-09-22 RX ORDER — LOSARTAN POTASSIUM 100 MG/1
50 TABLET, FILM COATED ORAL DAILY
Refills: 0 | Status: DISCONTINUED | OUTPATIENT
Start: 2019-09-22 | End: 2019-10-01

## 2019-09-22 RX ORDER — HEPARIN SODIUM 5000 [USP'U]/ML
3800 INJECTION INTRAVENOUS; SUBCUTANEOUS EVERY 6 HOURS
Refills: 0 | Status: DISCONTINUED | OUTPATIENT
Start: 2019-09-22 | End: 2019-09-26

## 2019-09-22 RX ADMIN — HEPARIN SODIUM 950 UNIT(S)/HR: 5000 INJECTION INTRAVENOUS; SUBCUTANEOUS at 21:58

## 2019-09-22 RX ADMIN — Medication 81 MILLIGRAM(S): at 16:41

## 2019-09-22 RX ADMIN — LOSARTAN POTASSIUM 50 MILLIGRAM(S): 100 TABLET, FILM COATED ORAL at 16:41

## 2019-09-22 RX ADMIN — AMLODIPINE BESYLATE 10 MILLIGRAM(S): 2.5 TABLET ORAL at 16:41

## 2019-09-22 RX ADMIN — Medication 3 MILLILITER(S): at 13:50

## 2019-09-22 RX ADMIN — HEPARIN SODIUM 750 UNIT(S)/HR: 5000 INJECTION INTRAVENOUS; SUBCUTANEOUS at 06:28

## 2019-09-22 RX ADMIN — HEPARIN SODIUM 850 UNIT(S)/HR: 5000 INJECTION INTRAVENOUS; SUBCUTANEOUS at 15:40

## 2019-09-22 RX ADMIN — Medication 25 MILLIGRAM(S): at 16:41

## 2019-09-22 NOTE — ED PROVIDER NOTE - ATTENDING CONTRIBUTION TO CARE
Attending MD Celeste Davis:  I personally have seen and examined this patient.  Resident note reviewed and agree on plan of care and except where noted.  See HPI, PE, and MDM for details.

## 2019-09-22 NOTE — ED PROVIDER NOTE - CLINICAL SUMMARY MEDICAL DECISION MAKING FREE TEXT BOX
68y/o M h/o COPD, HTN, asthma, PNA p/w SOB and elevated troponin. Denies chest pain, fevers, chills, nausea, vomiting, abdominal pain, calf tenderness, tearing back pain, hemoptysis, active CA, recent surgeries. Benign physical exam. Trend EKG, Bedside echo, repeat troponin and basics, coags. CXR and admit for urgent catheterization 68y/o M h/o COPD, HTN, asthma, PNA p/w SOB and elevated troponin. Denies chest pain, fevers, chills, nausea, vomiting, abdominal pain, calf tenderness, tearing back pain, hemoptysis, active CA, recent surgeries. Benign physical exam. Trend EKG, Bedside echo, repeat troponin and basics, coags. CXR and admit for urgent catheterization  Attending Celeste Davis: 70 y/o male h/o COPD, HTN, ashtma presenting with worsening sob and dypnea on exertion. was seen at [facility] where found to have an abnromal ekg and elevated troponin. given aspirin, heparin gtt, steroids and transferred. upon arrival pt denies any chest pain. consider sob as atypical anginal equivalent. upon arrival to ed ptwith persistent st elevation and does report sob therefore cath consult called. d dimer elevated at [facility]. will hold off on cta as pt on heparin and pt may require cath. plan to admit

## 2019-09-22 NOTE — ED ADULT NURSE NOTE - CHPI ED NUR SYMPTOMS NEG
no congestion/no dizziness/no syncope/no nausea/no vomiting/no chills/no back pain/no chest pain/no diaphoresis/no fever

## 2019-09-22 NOTE — H&P ADULT - PROBLEM SELECTOR PLAN 2
ST changes on EKG with associated tryptonemia, transferred for possible cath. Likely type II NSTMI in the setting of acute COPD exacerbation. Most recent TTE 2017, EF 55-60% with normal systolic function.  - cardiology consulted, donte recs  - Trop here 599, repeat 600, CKMB 16.4  - will c/w heparin gtt, ASA  - check TTE ST changes on EKG with associated tryptonemia, transferred for possible cath. Likely type II NSTMI in the setting of acute COPD exacerbation. Most recent TTE 2017, EF 55-60% with normal systolic function.  - admit to tele  - cardiology consulted, donte recs  - Trop here 599, repeat 600, CKMB 16.4  - will c/w heparin gtt, ASA  - check TTE Unclear type I vs. type II. ST changes on EKG with associated tryptonemia, transferred for possible cath. Given smoking history, possibly underlying CAD. Most recent TTE 2017, EF 55-60% with normal systolic function.  - admit to tele  - cardiology consulted, donte recs  - Trop here 599, repeat 600, CKMB 16.4. Will repeat 1 more trop @1600  - will c/w heparin gtt, ASA  - check TTE

## 2019-09-22 NOTE — H&P ADULT - NSHPPHYSICALEXAM_GEN_ALL_CORE
Vital Signs Last 24 Hrs  T(C): 36.6 (22 Sep 2019 07:33), Max: 36.6 (22 Sep 2019 07:33)  T(F): 97.9 (22 Sep 2019 07:33), Max: 97.9 (22 Sep 2019 07:33)  HR: 85 (22 Sep 2019 07:33) (85 - 92)  BP: 147/82 (22 Sep 2019 07:33) (147/82 - 160/94)  BP(mean): --  RR: 22 (22 Sep 2019 07:33) (16 - 22)  SpO2: 100% (22 Sep 2019 07:33) (100% - 100%)    PHYSICAL EXAM:  GENERAL: NAD, well-developed, appears comfortable on NC  HEAD:  Atraumatic, Normocephalic  EYES: EOMI, PERRLA, conjunctiva and sclera clear  NECK: Supple, No JVD  CHEST/LUNG: Diffused diminished breath sound in all lung fields; No wheeze  HEART: Tachycardia; No murmurs, rubs, or gallops  ABDOMEN: Soft, Nontender, Nondistended; Bowel sounds present  EXTREMITIES:  2+ Peripheral Pulses, No clubbing, cyanosis, or edema  PSYCH: AAOx3  NEUROLOGY: non-focal  SKIN: No rashes or lesions Vital Signs Last 24 Hrs  T(F): 97.9 (22 Sep 2019 07:33), Max: 97.9 (22 Sep 2019 07:33)  HR: 85 (22 Sep 2019 07:33) (85 - 92)  BP: 147/82 (22 Sep 2019 07:33) (147/82 - 160/94)  RR: 22 (22 Sep 2019 07:33) (16 - 22)  SpO2: 100% (22 Sep 2019 07:33) (100% - 100%)    PHYSICAL EXAM:  GENERAL: NAD, well-developed, appears comfortable on NC  HEAD:  Atraumatic, Normocephalic  EYES: EOMI, PERRLA, conjunctiva and sclera clear  NECK: Supple, No JVD  CHEST/LUNG: Diffused diminished breath sound in all lung fields; No wheeze  HEART: Tachycardia; No murmurs, rubs, or gallops  ABDOMEN: Soft, Nontender, Nondistended; Bowel sounds present  EXTREMITIES:  2+ Peripheral Pulses, No clubbing, cyanosis, or edema  PSYCH: AAOx3  NEUROLOGY: non-focal  SKIN: No rashes or lesions

## 2019-09-22 NOTE — ED PROVIDER NOTE - CARE PLAN
Principal Discharge DX:	Chest pain  Secondary Diagnosis:	NSTEMI (non-ST elevated myocardial infarction)

## 2019-09-22 NOTE — CONSULT NOTE ADULT - ASSESSMENT
Assessment and Recommendations:  68 y/o man with h/o COPD, HTN, asthma who presented to OSH with sudden onset shortness of breath for 1 hour, found to have troponin elevation and no significant ECG changes from 1 year prior.  Presentation is atypical, unclear if due to HTN emergency, vs type II NSTEMI in setting of COPD exacerbation vs type I NSTEMI.  -TTE this AM, which will help guide our the timing and whether or not to pursue invasive ischemic evaluation  -pt is currently chest pain free  -serial ECGs and troponins    Discussed with interventional attending on call      Angel Kunz MD  Cardiology Fellow    All Cardiology service information can be found 24/7 on [email or web address], password: [email or web address]

## 2019-09-22 NOTE — H&P ADULT - NSHPLABSRESULTS_GEN_ALL_CORE
13.2   10.2  )-----------( 196      ( 22 Sep 2019 05:51 )             40.7       09-22    137  |  99  |  12  ----------------------------<  157<H>  4.6   |  22  |  0.90    Ca    9.3      22 Sep 2019 05:51  Mg     2.1     09-22    TPro  7.2  /  Alb  4.5  /  TBili  1.0  /  DBili  x   /  AST  39  /  ALT  26  /  AlkPhos  75  09-22    PT/INR - ( 22 Sep 2019 05:51 )   PT: 12.2 sec;   INR: 1.07 ratio       PTT - ( 22 Sep 2019 05:51 )  PTT:119.9 sec    Lactate Trend    CAPILLARY BLOOD GLUCOSE    < from: Xray Chest 1 View AP/PA (09.22.19 @ 06:54) >    IMPRESSION:  Clear lungs. No pleural effusion or pneumothorax. The heart is normal in   size. No acute osseous finding. A radiopaque foreign body overlies the   right chest.    < end of copied text >    EKG: Sinus tach, with ST elevations noted from V3 -V5. Unchanged from last night. Labs and imaging obtained personally reviewed.                13.2   10.2  )-----------( 196      ( 22 Sep 2019 05:51 )             40.7     09-22  137  |  99  |  12  ----------------------------<  157<H>  4.6   |  22  |  0.90    Ca    9.3      22 Sep 2019 05:51  Mg     2.1     09-22    TPro  7.2  /  Alb  4.5  /  TBili  1.0  /  DBili  x   /  AST  39  /  ALT  26  /  AlkPhos  75  09-22    PT/INR - ( 22 Sep 2019 05:51 )   PT: 12.2 sec;   INR: 1.07 ratio    PTT - ( 22 Sep 2019 05:51 )  PTT:119.9 sec    < from: Xray Chest 1 View AP/PA (09.22.19 @ 06:54) >  IMPRESSION:  Clear lungs. No pleural effusion or pneumothorax. The heart is normal in size. No acute osseous finding. A radiopaque foreign body overlies the right chest.  < end of copied text >    EKG: Sinus tach, with ST elevations noted from V3 -V5. Unchanged from last night.

## 2019-09-22 NOTE — ED ADULT NURSE NOTE - OBJECTIVE STATEMENT
69 year old male comes to the ED via EMS s/p transfer from [facility] for cardiology follow up. As per EMS, patient began to experience SOB around 9:30pm last night. Patient has a PMH of COPD/asthma. Patient states he was at work last night around dust when he began to cough and SOB so he went to the hospital. As per medical records, patient had elevated troponin lab values and elevated ST segments on V3 lead and was started on heparin drip around 0212 this AM at 8.7ml/hr. Patient denies CP throughout the entire onset of SOB. Patient is a/ox3, VSS, ambulatory, sensory/motor function intact, follows commands and in NAD at this time. Heparin currently running at 8.7ml/hr. Lung sounds are clear and equal b/l with no labored breathing noted. Abdomen is soft, nontender and nondistended. Peripheral pulses are strong and equal b/l with no edema noted. Patient denies CP/SOB, f/c, n/v/d, abdominal pain, dizziness/lightheadedness/vision changes, numbness/tingling/weakness at this time.

## 2019-09-22 NOTE — ED PROVIDER NOTE - PHYSICAL EXAMINATION
GENERAL: elderly male in no acute distress, non-toxic appearing  HEAD: normocephalic, atraumatic  HEENT: normal conjunctiva, oral mucosa moist, neck supple  CARDIAC: regular rate and rhythm, no appreciable murmurs  PULM: poor inspiration and expiration distant breath sounds, clear to ascultation bilaterally, no rales, rhonchi, or wheezing  GI: abdomen nondistended, soft, nontender, no guarding or rebound tenderness  : no CVA tenderness b/l, no suprapubic pain  NEURO: AAOx3, normal speech, PERRLA, EOMI, no focal motor or sensory deficits  MSK: no peripheral edema, no calf tenderness/redness/swelling, no visible deformities  SKIN: well-perfused, extremities warm, no visible rashes  PSYCH: appropriate mood and affect GENERAL: elderly male in no acute distress, non-toxic appearing  HEAD: normocephalic, atraumatic  HEENT: normal conjunctiva, oral mucosa moist, neck supple  CARDIAC: regular rate and rhythm, no appreciable murmurs  PULM: poor inspiration and expiration distant breath sounds, clear to ascultation bilaterally, no rales, rhonchi, or wheezing  GI: abdomen nondistended, soft, nontender, no guarding or rebound tenderness  : no CVA tenderness b/l, no suprapubic pain  NEURO: AAOx3, normal speech, PERRLA, EOMI, no focal motor or sensory deficits  MSK: no peripheral edema, no calf tenderness/redness/swelling, no visible deformities  SKIN: well-perfused, extremities warm, no visible rashes  PSYCH: appropriate mood and affect  Attending Celeste Davis: Gen: NAD, heent: atrauamtic, eomi, perrla, mmm, op pink, uvula midline, neck; nttp, no nuchal rigidity, chest: nttp, no crepitus, cv: rrr, no murmurs, lungs: poor air movement, abd: soft, nontender, nondistended, no peritoneal signs, +BS, no guarding, ext: wwp, neg homans, skin: no rash, neuro: awake and alert, following commands, speech clear, sensation and strength intact, no focal deficits

## 2019-09-22 NOTE — CONSULT NOTE ADULT - SUBJECTIVE AND OBJECTIVE BOX
Patient seen and evaluated at bedside    Chief Complaint:    HPI:      PMHx:   Asthma  COPD (chronic obstructive pulmonary disease)      PSHx:   No significant past surgical history      Allergies:  No Known Allergies      Home Meds:    Current Medications:   heparin  Infusion.  Unit(s)/Hr IV Continuous <Continuous>  heparin  Injectable 3800 Unit(s) IV Push every 6 hours PRN      FAMILY HISTORY:      Social History:  Smoking History:  Alcohol Use:  Drug Use:    REVIEW OF SYSTEMS:  CONSTITUTIONAL: No weakness, fevers or chills  EYES/ENT: No visual changes;  No dysphagia  NECK: No pain or stiffness  RESPIRATORY: No cough, wheezing, hemoptysis; No shortness of breath  CARDIOVASCULAR: No chest pain or palpitations; No lower extremity edema  GASTROINTESTINAL: No abdominal or epigastric pain. No nausea, vomiting, or hematemesis; No diarrhea or constipation. No melena or hematochezia.  BACK: No back pain  GENITOURINARY: No dysuria, frequency or hematuria  NEUROLOGICAL: No numbness or weakness  SKIN: No itching, burning, rashes, or lesions   All other review of systems is negative unless indicated above.    Physical Exam:  T(F): --  HR: 92 (09-22) (92 - 92)  BP: 160/94 (09-22) (160/94 - 160/94)  RR: 16 (09-22)  SpO2: 100% (09-22)  GENERAL: No acute distress, well-developed  HEAD:  Atraumatic, Normocephalic  ENT: EOMI, PERRLA, conjunctiva and sclera clear, Neck supple, No JVD, moist mucosa  CHEST/LUNG: Clear to auscultation bilaterally; No wheeze, equal breath sounds bilaterally   BACK: No spinal tenderness  HEART: Regular rate and rhythm; No murmurs, rubs, or gallops  ABDOMEN: Soft, Nontender, Nondistended; Bowel sounds present  EXTREMITIES:  No clubbing, cyanosis, or edema  PSYCH: Nl behavior, nl affect  NEUROLOGY: AAOx3, non-focal, cranial nerves intact  SKIN: Normal color, No rashes or lesions  LINES:    Labs:  reviewed                        13.2   10.2  )-----------( 196      ( 22 Sep 2019 05:51 )             40.7     09-22    137  |  99  |  12  ----------------------------<  157<H>  4.6   |  22  |  0.90    Ca    9.3      22 Sep 2019 05:51  Mg     2.1     09-22    TPro  7.2  /  Alb  4.5  /  TBili  1.0  /  DBili  x   /  AST  39  /  ALT  26  /  AlkPhos  75  09-22    PT/INR - ( 22 Sep 2019 05:51 )   PT: 12.2 sec;   INR: 1.07 ratio         PTT - ( 22 Sep 2019 05:51 )  PTT:119.9 sec    CARDIAC MARKERS ( 22 Sep 2019 05:51 )  599 ng/L / x     / x     / x     / x     / x            Serum Pro-Brain Natriuretic Peptide: 2256 pg/mL (09-22 @ 05:51)      Cardiovascular Diagnostic Testing:    ECG:     Echo:     Stress Testing:    Cath:    Imaging:    CXR:  pending    Assessment and Recommendations:        Angel Kunz MD  Cardiology Fellow    All Cardiology service information can be found 24/7 on [email or web address], password: [email or web address] Patient seen and evaluated at bedside    Chief Complaint: sob    HPI: 70 y/o man with h/o COPD, HTN, asthma who presented to OSH with sudden onset shortness of breath for 1 hour. Pt states he was taking out the garbage when he felt sudden shortness of breath, cough and wheezing that did not resolve with inhalers so he called EMS. Denies any cp, palpitations, orthopnea, PND, LOC, LE edema or other symptoms. Reports that SOB resolved after nebulizers/steroids and 2 SLNTG received at OSH ED. In their ED, pt was hypertensive to 190s/110s, HR 110s, satting 100 percen ton 2L NC. Troponin I was 0.153 which uptrended to 1.59 but still chest pain free so pt was transferred for management of tropoinemia.    Upon evaluation, pt reports no symptoms and feels well. Pt was AF, HR 92, /90, satting 100% on 2L NC. ECG with poor R wave progression and J point elevation in V4-V5. These have been repeated over several hours without significant change      PMHx:   Asthma  COPD (chronic obstructive pulmonary disease)      PSHx:   No significant past surgical history      Allergies:  No Known Allergies      Home Meds:  Symbicort, montelukast, HCTZ, Spiriva    Current Medications:   heparin  Infusion.  Unit(s)/Hr IV Continuous <Continuous>  heparin  Injectable 3800 Unit(s) IV Push every 6 hours PRN      FAMILY HISTORY:      Social History:  No toxic habits    Physical Exam:  T(F): --  HR: 92 (09-22) (92 - 92)  BP: 160/94 (09-22) (160/94 - 160/94)  RR: 16 (09-22)  SpO2: 100% (09-22)  Well-appearing man in NAD  RRR nl s1 s2 no mrg  Poor air entry b/l  Abd Soft NTND  Ext WWP no edema    LINES:    Labs:  reviewed                        13.2   10.2  )-----------( 196      ( 22 Sep 2019 05:51 )             40.7     09-22    137  |  99  |  12  ----------------------------<  157<H>  4.6   |  22  |  0.90    Ca    9.3      22 Sep 2019 05:51  Mg     2.1     09-22    TPro  7.2  /  Alb  4.5  /  TBili  1.0  /  DBili  x   /  AST  39  /  ALT  26  /  AlkPhos  75  09-22    PT/INR - ( 22 Sep 2019 05:51 )   PT: 12.2 sec;   INR: 1.07 ratio         PTT - ( 22 Sep 2019 05:51 )  PTT:119.9 sec    CARDIAC MARKERS ( 22 Sep 2019 05:51 )  599 ng/L / x     / x     / x     / x     / x            Serum Pro-Brain Natriuretic Peptide: 2256 pg/mL (09-22 @ 05:51)      Cardiovascular Diagnostic Testing:    ECG: reviewed    Echo: Conclusions:  1. Normal mitral valve. No mitral regurgitation seen.  2. Normal trileaflet aortic valve. No aortic valve  regurgitation seen.  3. Normal left ventricular systolic function. No segmental  wall motion abnormalities.  4. Normal diastolic function  5. Normal right ventricular size and function.  6. Normal tricuspid valve. Minimal tricuspid regurgitation.  7. Normal pericardium with trace pericardial effusion.    Imaging:    CXR:  Clear lungs. No pleural effusion or pneumothorax. The heart is normal in   size. No acute osseous finding. A radiopaque foreign body overlies the   right chest. Patient seen and evaluated at bedside    Chief Complaint: sob    HPI: 68 y/o man with h/o COPD, HTN, asthma who presented to OSH with sudden onset shortness of breath for 1 hour. Pt states he was taking out the garbage when he felt sudden shortness of breath, cough and wheezing that did not resolve with inhalers so he called EMS. Denies any cp, palpitations, orthopnea, PND, LOC, LE edema or other symptoms. Reports that SOB resolved after nebulizers/steroids and 2 SLNTG received at OSH ED. In their ED, pt was hypertensive to 190s/110s, HR 110s, satting 100 percen ton 2L NC. Troponin I was 0.153 which uptrended to 1.59 but still chest pain free so pt was transferred for management of tropoinemia.    Upon evaluation, pt reports no symptoms and feels well. Pt was AF, HR 92, /90, satting 100% on 2L NC. ECG with poor R wave progression and J point elevation in V4-V5. These have been repeated over several hours without significant change.      PMHx:   Asthma  COPD (chronic obstructive pulmonary disease)      PSHx:   No significant past surgical history      Allergies:  No Known Allergies      Home Meds:  Symbicort, montelukast, HCTZ, Spiriva    Current Medications:   heparin  Infusion.  Unit(s)/Hr IV Continuous <Continuous>  heparin  Injectable 3800 Unit(s) IV Push every 6 hours PRN      FAMILY HISTORY:      Social History:  No toxic habits    Physical Exam:  T(F): --  HR: 92 (09-22) (92 - 92)  BP: 160/94 (09-22) (160/94 - 160/94)  RR: 16 (09-22)  SpO2: 100% (09-22)  Well-appearing man in NAD  RRR nl s1 s2 no mrg  Poor air entry b/l  Abd Soft NTND  Ext WWP no edema    LINES:    Labs:  reviewed                        13.2   10.2  )-----------( 196      ( 22 Sep 2019 05:51 )             40.7     09-22    137  |  99  |  12  ----------------------------<  157<H>  4.6   |  22  |  0.90    Ca    9.3      22 Sep 2019 05:51  Mg     2.1     09-22    TPro  7.2  /  Alb  4.5  /  TBili  1.0  /  DBili  x   /  AST  39  /  ALT  26  /  AlkPhos  75  09-22    PT/INR - ( 22 Sep 2019 05:51 )   PT: 12.2 sec;   INR: 1.07 ratio         PTT - ( 22 Sep 2019 05:51 )  PTT:119.9 sec    CARDIAC MARKERS ( 22 Sep 2019 05:51 )  599 ng/L / x     / x     / x     / x     / x            Serum Pro-Brain Natriuretic Peptide: 2256 pg/mL (09-22 @ 05:51)      Cardiovascular Diagnostic Testing:    ECG: reviewed    Echo: Conclusions:  1. Normal mitral valve. No mitral regurgitation seen.  2. Normal trileaflet aortic valve. No aortic valve  regurgitation seen.  3. Normal left ventricular systolic function. No segmental  wall motion abnormalities.  4. Normal diastolic function  5. Normal right ventricular size and function.  6. Normal tricuspid valve. Minimal tricuspid regurgitation.  7. Normal pericardium with trace pericardial effusion.    Imaging:    CXR:  Clear lungs. No pleural effusion or pneumothorax. The heart is normal in   size. No acute osseous finding. A radiopaque foreign body overlies the   right chest.

## 2019-09-22 NOTE — ED ADULT NURSE NOTE - ED STAT RN HANDOFF DETAILS 2
Report received from JOSE MARTIN Noble patient with Heparin infusing @ 7.5 cc hr, no signs  of distress noted, comfort measures provided patient admitted.

## 2019-09-22 NOTE — H&P ADULT - PROBLEM SELECTOR PLAN 3
On home Amlodipine, Losartan and Hydrochlorothiazide at home  - mildly HTN in the ED likely 2/2 medication non-compliance   - restart all home medications with hold parameters

## 2019-09-22 NOTE — ED PROVIDER NOTE - PROGRESS NOTE DETAILS
EKG performed in [facility] ED and shows ST elevations in V3 and V4. Attending Celeste Davis: d/w cards fellow does not need ctu pt did have elevated d dimer as may need catherization will hold off on dual contrast dye load

## 2019-09-22 NOTE — ED ADULT NURSE REASSESSMENT NOTE - NS ED NURSE REASSESS COMMENT FT1
Patient weighed on standing scale. MD Davis made aware, as per MD, pt is to receive 7.5units/hr of heparin as per the Heparin monogram chart. As per medical charts from [facility], heparin began at 0212 this AM, leaving the next 6hour PTT to be drawn at 0812 this AM. Patient a/ox3, VSS, resting comfortably in stretcher with family at the bedside. Patient denies CP/SOB, dizziness/lightheadedness at this time. Patient currently placed on cardiac monitor p/w NSR. Patient awaiting CXR. Will continue to monitor and reassess.

## 2019-09-22 NOTE — ED PROVIDER NOTE - OBJECTIVE STATEMENT
70y/o M h/o COPD, HTN, asthma, PNA BIBEMS as a transfer from [facility] concerning NSTEMI. Pt states he was having SOB last night and thought he was having an asthma attack. He was not able to walk so his wife called an ambulance and brought him to [place] ED. There they treated him with Duoneb, methylprednisolone and worked up for ACS. Troponin was elevated and was started on nitroglycerin, ASA, and heparin drip. He denies chest pain, fevers, chills, nausea, vomiting, abdominal pain, calf tenderness, tearing back pain, hemoptysis, active CA, recent surgeries.

## 2019-09-22 NOTE — H&P ADULT - NSHPREVIEWOFSYSTEMS_GEN_ALL_CORE
REVIEW OF SYSTEMS:    CONSTITUTIONAL: No weakness, fevers or chills. + diaphoresis  EYES: No vertigo or throat pain  ENT: No visual changes, eye pain  MOUTH: moist payal mucosal, no mouth ulcers. + sore throat  NECK: No pain or stiffness  RESPIRATORY: + cough, wheezing, and shortness of breath. No hemoptysis;  CARDIOVASCULAR: No chest pain or palpitations  GASTROINTESTINAL: No abdominal or epigastric pain. No nausea, vomiting, or hematemesis; No diarrhea or constipation. No melena or hematochezia.  GENITOURINARY: No dysuria, frequency or hematuria  NEUROLOGICAL: No numbness or weakness  SKIN: No itching, rashes CONSTITUTIONAL: No weakness, fevers or chills. + diaphoresis  EYES: No vertigo or throat pain  ENT: No visual changes, eye pain  MOUTH: moist payal mucosal, no mouth ulcers. + sore throat  NECK: No pain or stiffness  RESPIRATORY: + cough, wheezing, and shortness of breath. No hemoptysis;  CARDIOVASCULAR: No chest pain or palpitations  GASTROINTESTINAL: No abdominal or epigastric pain. No nausea, vomiting, or hematemesis; No diarrhea or constipation. No melena or hematochezia.  GENITOURINARY: No dysuria, frequency or hematuria  NEUROLOGICAL: No numbness or weakness  SKIN: No itching, rashes

## 2019-09-22 NOTE — H&P ADULT - NSHPSOCIALHISTORY_GEN_ALL_CORE
Currently retired. Ambulatory without aid at baseline. Former 1ppd smoker age [age], social ETOH. No illicit drug use

## 2019-09-22 NOTE — PATIENT PROFILE ADULT - PUBLIC BENEFITS
Pt making progress towards LTGs  Pt currently functioning at a Deanna/CGA level for LB ADL tasks  He is completing functional transfers with RW at /CGA which fluctuates at times based on pain  Pt currently limited by pain, decreased general strength, dec endurance, dec standing balance, and dec activity tolerance  Pt will continue to benefit from skilled OT services following POC with focus on noted deficits to reach Beth goals 
no

## 2019-09-22 NOTE — H&P ADULT - HISTORY OF PRESENT ILLNESS
68y/o M h/o COPD, HTN, asthma, PNA BIBEMS as a transfer from [facility] concerning NSTEMI vs. COPD exacerbation.     Patient had sore throat two days ago, followed by excessive dry cough yesterday, which he attributed to dust in the apartment complex. He then develop sudden onset SOB ~9pm last night. Unable to get to inhalers on time, hence called EMS, which brought him to [facility] for further evaluation. There, patient received Duoneb and Methylprednisolone. EKG concerning for ST elevation in anteroseptal leads, and trop also elevated 0.153 which uptrended to 1.59 s/p SL nitroglycerin, ASA and heparin drip. Patient was then transferred to [facility] for possible cath.  ED vitals sig for hypertension up to 160/94 and tachypnea. CBC, CMP WNL. Trop 599, BNP 2256. CXR clear. Repeat EKG again demonstrated ST elevations in V3-V5, unchanged from last night.   At the time of my examination, SOB is much improved. Denies fever, CP, abd pain, n/v/c/d or urinary symptoms. Reports that normal SBP is 130s, missed his anti-HTN for the past two days. No recent travels, and no O2 needs at home. 70y/o M h/o COPD, HTN, asthma, PNA BIBEMS as a transfer from [facility] concerning NSTEMI vs. COPD exacerbation.     Patient had sore throat two days ago, followed by excessive dry cough yesterday, which he attributed to dust in the apartment complex. He then develop sudden onset SOB with wheezing ~9pm last night. Unable to get to inhalers on time, hence called EMS, which brought him to [facility] for further evaluation. There, patient received Duoneb and Methylprednisolone. EKG concerning for ST elevation in anteroseptal leads, and trop also elevated 0.153 which uptrended to 1.59 s/p SL nitroglycerin, ASA and heparin drip. Patient was then transferred to [facility] for possible cath.  ED vitals sig for hypertension up to 160/94 and tachypnea. CBC, CMP WNL. Trop 599, BNP 2256. CXR clear. Repeat EKG again demonstrated ST elevations in V3-V5, unchanged from last night.   At the time of my examination, SOB is much improved. Denies fever, CP, abd pain, n/v/c/d or urinary symptoms. Reports that normal SBP is 130s, missed his anti-HTN for the past two days. No recent travels, and no O2 needs at home. 69yoM w/ PMHx significant for COPD, asthma, HTN, BIBEMS as a transfer from [facility] for NSTEMI. Patient had sore throat two days ago, followed by excessive dry cough yesterday, which he attributed to dust in the apartment complex. He then develop sudden onset SOB with wheezing ~9pm last night. Unable to get to inhalers on time, hence called EMS, which brought him to [facility] for further evaluation. There, patient received Duoneb and Methylprednisolone. EKG concerning for ST elevation in anteroseptal leads, and trop also elevated 0.153 which uptrended to 1.59 s/p SL nitroglycerin, ASA and heparin drip. Patient was then transferred to [facility] for possible cath. ED vitals sig for hypertension up to 160/94 and tachypnea. CBC, CMP WNL. Trop 599, BNP 2256. CXR clear. Repeat EKG again demonstrated ST elevations in V3-V5, unchanged from last night. At the time of my examination, SOB is much improved. Denies fever, CP, abd pain, n/v/c/d or urinary symptoms. Reports that normal SBP is 130s, missed his anti-HTN for the past two days. No recent travels, and no O2 needs at home.

## 2019-09-22 NOTE — H&P ADULT - PROBLEM SELECTOR PLAN 1
Sore throat, dry cough followed by SOB, improved with Sore throat, dry cough followed by SOB, improved with Duoneb and Solu-medrol at OSH. Last hospitalization for COPD exacerbation in 2017. Currently SOB improved, no longer wheezing. Saturating 100% on 2L NC. CXR clear. Low suspicion for CHF.  - c/w Duoneb standing for today, and will change to PRN as tolerated  - wean off NC as tolerated  - check RVP  - hold off abx for now given low suspicion for bacterial infectious etiology  - c/w prednisone 40mg x 4 more days given poor airway movement Presented with sore throat, dry cough followed by SOB, improved with Duoneb and Solu-medrol at OSH. COPD vs. asthma exacerbation. Last hospitalization for COPD exacerbation in 2017. Currently SOB improved, no longer wheezing. Saturating 100% on 2L NC. CXR clear. Low suspicion for CHF.  - c/w Duoneb standing for today, and will change to PRN as tolerated  - wean off NC as tolerated  - check RVP  - hold off abx for now given low suspicion for bacterial infectious etiology  - c/w prednisone 40mg x 4 more days given poor airway movement Presented with sore throat, dry cough followed by SOB, improved with Duoneb and Solu-medrol at OSH. COPD vs. asthma exacerbation. Last hospitalization for COPD exacerbation in 2017. Currently SOB improved, no longer wheezing. Saturating 100% on 2L NC. CXR clear. Low suspicion for CHF.  - c/w Duoneb standing for today, and will change to PRN as tolerated  - wean off NC as tolerated  - check RVP  - hold off Abx for now given low suspicion for bacterial infectious etiology  - c/w Prednisone 40mg x 4 more days given poor airway movement

## 2019-09-22 NOTE — ED PROVIDER NOTE - NS ED ROS FT
GENERAL: denies fever, chills  HEENT: denies headaches, lightheadedness, dizziness, vision changes, congestion, trouble swallowing  CARDIAC: denies chest pain, palpitations  PULM: +dyspnea; denies wheezing, cough  GI: denies abdominal pain, nausea, vomiting, diarrhea, constipation, melena, hematochezia  : denies dysuria, frequency, incontinence, hematuria  NEURO: denies motor weakness, sensory changes  MSK: denies joint, muscle pain  SKIN: denies new rashes, hives  HEME: denies active bleeding, bruising

## 2019-09-22 NOTE — H&P ADULT - ASSESSMENT
70y/o M h/o COPD, HTN, asthma, presented to [place] for SOB, on EKG with concerns for possible NSTEMI. Transferred to [facility] for possible cath, now with concern for COPD exacerbation. 68y/o M h/o COPD, HTN, asthma, presented to [place] for SOB, on EKG with concerns for possible NSTEMI. Transferred to [facility] for possible cath, now with concern for COPD vs. asthma exacerbation. 69yoM w/ PMHx significant for COPD, asthma, HTN, presented to [place] for SOB, on EKG with concerns for possible NSTEMI. Transferred to [facility] for possible cath, now with concern for COPD vs. asthma exacerbation.

## 2019-09-23 LAB
ALBUMIN SERPL ELPH-MCNC: 3.8 G/DL — SIGNIFICANT CHANGE UP (ref 3.3–5)
ALP SERPL-CCNC: 64 U/L — SIGNIFICANT CHANGE UP (ref 40–120)
ALT FLD-CCNC: 23 U/L — SIGNIFICANT CHANGE UP (ref 10–45)
ANION GAP SERPL CALC-SCNC: 13 MMOL/L — SIGNIFICANT CHANGE UP (ref 5–17)
APTT BLD: 40.7 SEC — HIGH (ref 27.5–36.3)
APTT BLD: 52.3 SEC — HIGH (ref 27.5–36.3)
APTT BLD: 66.7 SEC — HIGH (ref 27.5–36.3)
AST SERPL-CCNC: 42 U/L — HIGH (ref 10–40)
BASOPHILS # BLD AUTO: 0.03 K/UL — SIGNIFICANT CHANGE UP (ref 0–0.2)
BASOPHILS NFR BLD AUTO: 0.3 % — SIGNIFICANT CHANGE UP (ref 0–2)
BILIRUB SERPL-MCNC: 0.9 MG/DL — SIGNIFICANT CHANGE UP (ref 0.2–1.2)
BUN SERPL-MCNC: 16 MG/DL — SIGNIFICANT CHANGE UP (ref 7–23)
CALCIUM SERPL-MCNC: 9.1 MG/DL — SIGNIFICANT CHANGE UP (ref 8.4–10.5)
CHLORIDE SERPL-SCNC: 100 MMOL/L — SIGNIFICANT CHANGE UP (ref 96–108)
CHOLEST SERPL-MCNC: 178 MG/DL — SIGNIFICANT CHANGE UP (ref 10–199)
CO2 SERPL-SCNC: 24 MMOL/L — SIGNIFICANT CHANGE UP (ref 22–31)
CREAT SERPL-MCNC: 1.13 MG/DL — SIGNIFICANT CHANGE UP (ref 0.5–1.3)
EOSINOPHIL # BLD AUTO: 0.12 K/UL — SIGNIFICANT CHANGE UP (ref 0–0.5)
EOSINOPHIL NFR BLD AUTO: 1.1 % — SIGNIFICANT CHANGE UP (ref 0–6)
GLUCOSE SERPL-MCNC: 135 MG/DL — HIGH (ref 70–99)
HBA1C BLD-MCNC: 5.3 % — SIGNIFICANT CHANGE UP (ref 4–5.6)
HCT VFR BLD CALC: 41.5 % — SIGNIFICANT CHANGE UP (ref 39–50)
HCT VFR BLD CALC: 42.1 % — SIGNIFICANT CHANGE UP (ref 39–50)
HCV AB S/CO SERPL IA: 0.14 S/CO — SIGNIFICANT CHANGE UP (ref 0–0.99)
HCV AB SERPL-IMP: SIGNIFICANT CHANGE UP
HDLC SERPL-MCNC: 96 MG/DL — SIGNIFICANT CHANGE UP
HGB BLD-MCNC: 13.6 G/DL — SIGNIFICANT CHANGE UP (ref 13–17)
HGB BLD-MCNC: 13.7 G/DL — SIGNIFICANT CHANGE UP (ref 13–17)
IMM GRANULOCYTES NFR BLD AUTO: 0.3 % — SIGNIFICANT CHANGE UP (ref 0–1.5)
LIPID PNL WITH DIRECT LDL SERPL: 66 MG/DL — SIGNIFICANT CHANGE UP
LYMPHOCYTES # BLD AUTO: 1.83 K/UL — SIGNIFICANT CHANGE UP (ref 1–3.3)
LYMPHOCYTES # BLD AUTO: 16.6 % — SIGNIFICANT CHANGE UP (ref 13–44)
MAGNESIUM SERPL-MCNC: 2.1 MG/DL — SIGNIFICANT CHANGE UP (ref 1.6–2.6)
MCHC RBC-ENTMCNC: 30.6 PG — SIGNIFICANT CHANGE UP (ref 27–34)
MCHC RBC-ENTMCNC: 31.3 PG — SIGNIFICANT CHANGE UP (ref 27–34)
MCHC RBC-ENTMCNC: 32.5 GM/DL — SIGNIFICANT CHANGE UP (ref 32–36)
MCHC RBC-ENTMCNC: 32.8 GM/DL — SIGNIFICANT CHANGE UP (ref 32–36)
MCV RBC AUTO: 94.2 FL — SIGNIFICANT CHANGE UP (ref 80–100)
MCV RBC AUTO: 95.4 FL — SIGNIFICANT CHANGE UP (ref 80–100)
MONOCYTES # BLD AUTO: 0.98 K/UL — HIGH (ref 0–0.9)
MONOCYTES NFR BLD AUTO: 8.9 % — SIGNIFICANT CHANGE UP (ref 2–14)
NEUTROPHILS # BLD AUTO: 8.01 K/UL — HIGH (ref 1.8–7.4)
NEUTROPHILS NFR BLD AUTO: 72.8 % — SIGNIFICANT CHANGE UP (ref 43–77)
PHOSPHATE SERPL-MCNC: 3.4 MG/DL — SIGNIFICANT CHANGE UP (ref 2.5–4.5)
PLATELET # BLD AUTO: 220 K/UL — SIGNIFICANT CHANGE UP (ref 150–400)
PLATELET # BLD AUTO: 231 K/UL — SIGNIFICANT CHANGE UP (ref 150–400)
POTASSIUM SERPL-MCNC: 3.3 MMOL/L — LOW (ref 3.5–5.3)
POTASSIUM SERPL-SCNC: 3.3 MMOL/L — LOW (ref 3.5–5.3)
PROT SERPL-MCNC: 6.5 G/DL — SIGNIFICANT CHANGE UP (ref 6–8.3)
RBC # BLD: 4.35 M/UL — SIGNIFICANT CHANGE UP (ref 4.2–5.8)
RBC # BLD: 4.47 M/UL — SIGNIFICANT CHANGE UP (ref 4.2–5.8)
RBC # FLD: 13.7 % — SIGNIFICANT CHANGE UP (ref 10.3–14.5)
RBC # FLD: 13.8 % — SIGNIFICANT CHANGE UP (ref 10.3–14.5)
SODIUM SERPL-SCNC: 137 MMOL/L — SIGNIFICANT CHANGE UP (ref 135–145)
TOTAL CHOLESTEROL/HDL RATIO MEASUREMENT: 1.9 RATIO — LOW (ref 3.4–9.6)
TRIGL SERPL-MCNC: 80 MG/DL — SIGNIFICANT CHANGE UP (ref 10–149)
TROPONIN T, HIGH SENSITIVITY RESULT: 502 NG/L — HIGH (ref 0–51)
WBC # BLD: 11 K/UL — HIGH (ref 3.8–10.5)
WBC # BLD: 11.76 K/UL — HIGH (ref 3.8–10.5)
WBC # FLD AUTO: 11 K/UL — HIGH (ref 3.8–10.5)
WBC # FLD AUTO: 11.76 K/UL — HIGH (ref 3.8–10.5)

## 2019-09-23 PROCEDURE — 99233 SBSQ HOSP IP/OBS HIGH 50: CPT

## 2019-09-23 RX ORDER — ATORVASTATIN CALCIUM 80 MG/1
80 TABLET, FILM COATED ORAL AT BEDTIME
Refills: 0 | Status: DISCONTINUED | OUTPATIENT
Start: 2019-09-23 | End: 2019-10-01

## 2019-09-23 RX ORDER — POTASSIUM CHLORIDE 20 MEQ
40 PACKET (EA) ORAL EVERY 4 HOURS
Refills: 0 | Status: COMPLETED | OUTPATIENT
Start: 2019-09-23 | End: 2019-09-23

## 2019-09-23 RX ADMIN — Medication 3 MILLILITER(S): at 02:31

## 2019-09-23 RX ADMIN — Medication 40 MILLIEQUIVALENT(S): at 18:10

## 2019-09-23 RX ADMIN — Medication 81 MILLIGRAM(S): at 11:29

## 2019-09-23 RX ADMIN — Medication 40 MILLIEQUIVALENT(S): at 22:13

## 2019-09-23 RX ADMIN — HEPARIN SODIUM 1150 UNIT(S)/HR: 5000 INJECTION INTRAVENOUS; SUBCUTANEOUS at 18:57

## 2019-09-23 RX ADMIN — HEPARIN SODIUM 1050 UNIT(S)/HR: 5000 INJECTION INTRAVENOUS; SUBCUTANEOUS at 04:13

## 2019-09-23 RX ADMIN — AMLODIPINE BESYLATE 10 MILLIGRAM(S): 2.5 TABLET ORAL at 05:14

## 2019-09-23 RX ADMIN — Medication 3 MILLILITER(S): at 11:29

## 2019-09-23 RX ADMIN — Medication 3 MILLILITER(S): at 17:53

## 2019-09-23 RX ADMIN — ATORVASTATIN CALCIUM 80 MILLIGRAM(S): 80 TABLET, FILM COATED ORAL at 22:13

## 2019-09-23 RX ADMIN — Medication 3 MILLILITER(S): at 05:14

## 2019-09-23 RX ADMIN — Medication 40 MILLIGRAM(S): at 05:14

## 2019-09-23 RX ADMIN — HEPARIN SODIUM 1150 UNIT(S)/HR: 5000 INJECTION INTRAVENOUS; SUBCUTANEOUS at 11:43

## 2019-09-23 RX ADMIN — Medication 25 MILLIGRAM(S): at 05:14

## 2019-09-23 RX ADMIN — LOSARTAN POTASSIUM 50 MILLIGRAM(S): 100 TABLET, FILM COATED ORAL at 05:15

## 2019-09-23 NOTE — PROGRESS NOTE ADULT - SUBJECTIVE AND OBJECTIVE BOX
Patient is a 69y old  Male who presents with a chief complaint of SOB, abnormal EKG (23 Sep 2019 10:12)    SUBJECTIVE / OVERNIGHT EVENTS: no acute events overnight, still has on and off dyspnea     MEDICATIONS  (STANDING):  ALBUTerol/ipratropium for Nebulization 3 milliLiter(s) Nebulizer every 6 hours  amLODIPine   Tablet 10 milliGRAM(s) Oral daily  aspirin enteric coated 81 milliGRAM(s) Oral daily  atorvastatin 80 milliGRAM(s) Oral at bedtime  heparin  Infusion.  Unit(s)/Hr (7.5 mL/Hr) IV Continuous <Continuous>  hydrochlorothiazide 25 milliGRAM(s) Oral daily  influenza   Vaccine 0.5 milliLiter(s) IntraMuscular once  losartan 50 milliGRAM(s) Oral daily  predniSONE   Tablet 40 milliGRAM(s) Oral daily    MEDICATIONS  (PRN):  heparin  Injectable 3800 Unit(s) IV Push every 6 hours PRN For aPTT less than 40      Vital Signs Last 24 Hrs  T(C): 36.3 (23 Sep 2019 08:00), Max: 37.2 (22 Sep 2019 16:20)  T(F): 97.4 (23 Sep 2019 08:00), Max: 98.9 (22 Sep 2019 16:20)  HR: 103 (23 Sep 2019 08:00) (80 - 103)  BP: 116/70 (23 Sep 2019 08:00) (116/70 - 163/94)  BP(mean): --  RR: 18 (23 Sep 2019 08:00) (18 - 20)  SpO2: 97% (23 Sep 2019 08:00) (97% - 100%)  CAPILLARY BLOOD GLUCOSE        I&O's Summary      PHYSICAL EXAM:  GENERAL: NAD  EYES: conjunctiva and sclera clear  NECK: Supple, No JVD  CHEST/LUNG: Clear to auscultation bilaterally; No wheeze  HEART: +S1/S2, reg   ABDOMEN: Soft, Nontender, Nondistended; Bowel sounds present  EXTREMITIES: no edema   PSYCH: AAOx3      LABS:                        13.7   11.00 )-----------( 220      ( 23 Sep 2019 08:58 )             42.1     09-23    137  |  100  |  16  ----------------------------<  135<H>  3.3<L>   |  24  |  1.13    Ca    9.1      23 Sep 2019 03:46  Phos  3.4     09-23  Mg     2.1     09-23    TPro  6.5  /  Alb  3.8  /  TBili  0.9  /  DBili  x   /  AST  42<H>  /  ALT  23  /  AlkPhos  64  09-23    PT/INR - ( 22 Sep 2019 14:21 )   PT: 12.0 sec;   INR: 1.04 ratio         PTT - ( 23 Sep 2019 10:37 )  PTT:40.7 sec  CARDIAC MARKERS ( 22 Sep 2019 11:45 )  x     / x     / x     / x     / 16.4 ng/mL      Consultant(s) Notes Reviewed:  Cardiology     Care Discussed with Consultants/Other Providers: Discussed with Dr. Walker Brandon Osborn)  2018 12:36:18 Prabha Aguilar)  2018 09:30:44

## 2019-09-23 NOTE — PROGRESS NOTE ADULT - SUBJECTIVE AND OBJECTIVE BOX
[facility] - Mirta Moffett, Mary, Krista, Sharee, Aaron Soares  Office Number:  [phone]    Patient resting comfortably in bed in NAD. reports shortness of breath, though better  on oxygen    ROS: negative unless otherwise mentioned.    Telemetry: sr      MEDICATIONS  (STANDING):  ALBUTerol/ipratropium for Nebulization 3 milliLiter(s) Nebulizer every 6 hours  amLODIPine   Tablet 10 milliGRAM(s) Oral daily  aspirin enteric coated 81 milliGRAM(s) Oral daily  heparin  Infusion.  Unit(s)/Hr (7.5 mL/Hr) IV Continuous <Continuous>  hydrochlorothiazide 25 milliGRAM(s) Oral daily  influenza   Vaccine 0.5 milliLiter(s) IntraMuscular once  losartan 50 milliGRAM(s) Oral daily  predniSONE   Tablet 40 milliGRAM(s) Oral daily    MEDICATIONS  (PRN):  heparin  Injectable 3800 Unit(s) IV Push every 6 hours PRN For aPTT less than 40      Allergies    No Known Allergies    Intolerances        Vital Signs Last 24 Hrs  T(C): 36.3 (23 Sep 2019 08:00), Max: 37.2 (22 Sep 2019 12:57)  T(F): 97.4 (23 Sep 2019 08:00), Max: 98.9 (22 Sep 2019 12:57)  HR: 103 (23 Sep 2019 08:00) (80 - 103)  BP: 116/70 (23 Sep 2019 08:00) (116/70 - 163/94)  BP(mean): --  RR: 18 (23 Sep 2019 08:00) (16 - 20)  SpO2: 97% (23 Sep 2019 08:00) (97% - 100%)    I&O's Summary      ON EXAM:    General: NAD, awake and alert, on oxygen  HEENT: Mucous membranes are moist, anicteric  Lungs: Non-labored, decreased bs bilaterally, No wheezing, rales or rhonchi  Cardiovascular: Regular, S1 and S2, no murmurs, rubs, or gallops  Gastrointestinal: Bowel Sounds present, soft, nontender.   Lymph: No peripheral edema. No lymphadenopathy.  Skin: No rashes or ulcers  Psych:  Mood & affect appropriate    LABS: All Labs Reviewed:                        13.7   11.00 )-----------( 220      ( 23 Sep 2019 08:58 )             42.1                         14.9   13.9  )-----------( 207      ( 22 Sep 2019 18:50 )             45.8                         13.2   10.2  )-----------( 196      ( 22 Sep 2019 05:51 )             40.7     23 Sep 2019 03:46    137    |  100    |  16     ----------------------------<  135    3.3     |  24     |  1.13   22 Sep 2019 05:51    137    |  99     |  12     ----------------------------<  157    4.6     |  22     |  0.90     Ca    9.1        23 Sep 2019 03:46  Ca    9.3        22 Sep 2019 05:51  Phos  3.4       23 Sep 2019 03:46  Mg     2.1       23 Sep 2019 03:46  Mg     2.1       22 Sep 2019 05:51    TPro  6.5    /  Alb  3.8    /  TBili  0.9    /  DBili  x      /  AST  42     /  ALT  23     /  AlkPhos  64     23 Sep 2019 03:46  TPro  7.2    /  Alb  4.5    /  TBili  1.0    /  DBili  x      /  AST  39     /  ALT  26     /  AlkPhos  75     22 Sep 2019 05:51    PT/INR - ( 22 Sep 2019 14:21 )   PT: 12.0 sec;   INR: 1.04 ratio         PTT - ( 23 Sep 2019 03:46 )  PTT:52.3 sec  CARDIAC MARKERS ( 22 Sep 2019 11:45 )  x     / x     / x     / x     / 16.4 ng/mL      Blood Culture:   09-22 @ 05:51  Pro Bnp [id number]

## 2019-09-23 NOTE — PROGRESS NOTE ADULT - PROBLEM SELECTOR PLAN 2
Unclear type I vs. type II. ST changes on EKG with associated tryptonemia, transferred for possible cath. Given smoking history, possibly underlying CAD. Most recent TTE 2017, EF 55-60% with normal systolic function.  - admit to tele  - cardiology consulted, donte recs  - will c/w heparin gtt, ASA  - check TTE  - discussed with Dr. Walker, recommending cath, however pt is reluctant. Will get echo first. Encouraged pt to seriously consider procedure. He will think it over.

## 2019-09-23 NOTE — PROGRESS NOTE ADULT - ASSESSMENT
69yoM w/ PMHx significant for COPD, asthma, HTN, presented to [place] for SOB, on EKG with concerns for possible NSTEMI. Transferred to [facility] for possible cath, now with concern for COPD vs. asthma exacerbation.

## 2019-09-23 NOTE — PROGRESS NOTE ADULT - PROBLEM SELECTOR PLAN 1
Presented with sore throat, dry cough followed by SOB, improved with Duoneb and Solu-medrol at OSH. COPD vs. asthma exacerbation. Last hospitalization for COPD exacerbation in 2017. Currently SOB improved, no longer wheezing. Saturating 100% on 2L NC. CXR clear. Low suspicion for CHF.  - c/w Duoneb standing for today, and will change to PRN as tolerated  - wean off NC as tolerated  - RVP positive   - hold off Abx for now given low suspicion for bacterial infectious etiology  - c/w Prednisone 40mg x 4 more days given poor airway movement  - breathing comfortably today

## 2019-09-23 NOTE — PROGRESS NOTE ADULT - ASSESSMENT
68 y/o man with h/o COPD, HTN, asthma who presented to OSH with sudden onset shortness of breath for 1 hour, found to have troponin elevation and no significant ECG changes from 1 year prior.  Presentation is atypical, and he had viral symptoms prior to all of this. Though it seems to be an NSTEMI, there is pulmonary and viral component to everything.    - He has no chest pain this morning and his breathing has improved.  - Will continue to treat for NSTEMI with asa and heparin gtt. Given his CE leak, I have recommended that he have a cardiac cath when his breathing is improved, though he is very hesitant to proceed with an invasive procedure, and would like to think about it.  - Await echocardiogram  - Start Lipitor 80  - HCTZ and amlodipine for HTN  - hold off on bb in the setting of reactive airway disease  - Trend CE  - Watch creatinine and electrolytes. Keep K>4, Mg>2  - Will follow with you

## 2019-09-24 DIAGNOSIS — N17.9 ACUTE KIDNEY FAILURE, UNSPECIFIED: ICD-10-CM

## 2019-09-24 LAB
ANION GAP SERPL CALC-SCNC: 14 MMOL/L — SIGNIFICANT CHANGE UP (ref 5–17)
ANION GAP SERPL CALC-SCNC: 15 MMOL/L — SIGNIFICANT CHANGE UP (ref 5–17)
APPEARANCE UR: CLEAR — SIGNIFICANT CHANGE UP
APTT BLD: 34.6 SEC — SIGNIFICANT CHANGE UP (ref 27.5–36.3)
APTT BLD: 74.6 SEC — HIGH (ref 27.5–36.3)
APTT BLD: 79.2 SEC — HIGH (ref 27.5–36.3)
APTT BLD: 94.8 SEC — HIGH (ref 27.5–36.3)
BILIRUB UR-MCNC: NEGATIVE — SIGNIFICANT CHANGE UP
BUN SERPL-MCNC: 28 MG/DL — HIGH (ref 7–23)
BUN SERPL-MCNC: 37 MG/DL — HIGH (ref 7–23)
CALCIUM SERPL-MCNC: 9.4 MG/DL — SIGNIFICANT CHANGE UP (ref 8.4–10.5)
CALCIUM SERPL-MCNC: 9.5 MG/DL — SIGNIFICANT CHANGE UP (ref 8.4–10.5)
CHLORIDE SERPL-SCNC: 100 MMOL/L — SIGNIFICANT CHANGE UP (ref 96–108)
CHLORIDE SERPL-SCNC: 101 MMOL/L — SIGNIFICANT CHANGE UP (ref 96–108)
CHLORIDE UR-SCNC: 49 MMOL/L — SIGNIFICANT CHANGE UP
CO2 SERPL-SCNC: 21 MMOL/L — LOW (ref 22–31)
CO2 SERPL-SCNC: 23 MMOL/L — SIGNIFICANT CHANGE UP (ref 22–31)
COLOR SPEC: YELLOW — SIGNIFICANT CHANGE UP
CREAT ?TM UR-MCNC: 181 MG/DL — SIGNIFICANT CHANGE UP
CREAT SERPL-MCNC: 1.5 MG/DL — HIGH (ref 0.5–1.3)
CREAT SERPL-MCNC: 1.5 MG/DL — HIGH (ref 0.5–1.3)
DIFF PNL FLD: NEGATIVE — SIGNIFICANT CHANGE UP
GLUCOSE SERPL-MCNC: 114 MG/DL — HIGH (ref 70–99)
GLUCOSE SERPL-MCNC: 124 MG/DL — HIGH (ref 70–99)
GLUCOSE UR QL: NEGATIVE — SIGNIFICANT CHANGE UP
HCT VFR BLD CALC: 31.9 % — LOW (ref 39–50)
HCT VFR BLD CALC: 39 % — SIGNIFICANT CHANGE UP (ref 39–50)
HCT VFR BLD CALC: 40.5 % — SIGNIFICANT CHANGE UP (ref 39–50)
HCT VFR BLD CALC: 40.7 % — SIGNIFICANT CHANGE UP (ref 39–50)
HGB BLD-MCNC: 12.5 G/DL — LOW (ref 13–17)
HGB BLD-MCNC: 12.9 G/DL — LOW (ref 13–17)
HGB BLD-MCNC: 12.9 G/DL — LOW (ref 13–17)
HGB BLD-MCNC: 9 G/DL — LOW (ref 13–17)
KETONES UR-MCNC: NEGATIVE — SIGNIFICANT CHANGE UP
LEUKOCYTE ESTERASE UR-ACNC: NEGATIVE — SIGNIFICANT CHANGE UP
MAGNESIUM SERPL-MCNC: 2.2 MG/DL — SIGNIFICANT CHANGE UP (ref 1.6–2.6)
MAGNESIUM SERPL-MCNC: 2.2 MG/DL — SIGNIFICANT CHANGE UP (ref 1.6–2.6)
MCHC RBC-ENTMCNC: 28.1 GM/DL — LOW (ref 32–36)
MCHC RBC-ENTMCNC: 28.3 PG — SIGNIFICANT CHANGE UP (ref 27–34)
MCHC RBC-ENTMCNC: 30.8 PG — SIGNIFICANT CHANGE UP (ref 27–34)
MCHC RBC-ENTMCNC: 30.8 PG — SIGNIFICANT CHANGE UP (ref 27–34)
MCHC RBC-ENTMCNC: 31 PG — SIGNIFICANT CHANGE UP (ref 27–34)
MCHC RBC-ENTMCNC: 31.7 GM/DL — LOW (ref 32–36)
MCHC RBC-ENTMCNC: 31.8 GM/DL — LOW (ref 32–36)
MCHC RBC-ENTMCNC: 32.1 GM/DL — SIGNIFICANT CHANGE UP (ref 32–36)
MCV RBC AUTO: 101 FL — HIGH (ref 80–100)
MCV RBC AUTO: 96.7 FL — SIGNIFICANT CHANGE UP (ref 80–100)
MCV RBC AUTO: 96.8 FL — SIGNIFICANT CHANGE UP (ref 80–100)
MCV RBC AUTO: 97.2 FL — SIGNIFICANT CHANGE UP (ref 80–100)
NITRITE UR-MCNC: NEGATIVE — SIGNIFICANT CHANGE UP
PH UR: 5.5 — SIGNIFICANT CHANGE UP (ref 5–8)
PHOSPHATE SERPL-MCNC: 5 MG/DL — HIGH (ref 2.5–4.5)
PLATELET # BLD AUTO: 169 K/UL — SIGNIFICANT CHANGE UP (ref 150–400)
PLATELET # BLD AUTO: 196 K/UL — SIGNIFICANT CHANGE UP (ref 150–400)
PLATELET # BLD AUTO: 213 K/UL — SIGNIFICANT CHANGE UP (ref 150–400)
PLATELET # BLD AUTO: 216 K/UL — SIGNIFICANT CHANGE UP (ref 150–400)
POTASSIUM SERPL-MCNC: 5.5 MMOL/L — HIGH (ref 3.5–5.3)
POTASSIUM SERPL-MCNC: 5.8 MMOL/L — HIGH (ref 3.5–5.3)
POTASSIUM SERPL-SCNC: 5.5 MMOL/L — HIGH (ref 3.5–5.3)
POTASSIUM SERPL-SCNC: 5.8 MMOL/L — HIGH (ref 3.5–5.3)
PROT UR-MCNC: SIGNIFICANT CHANGE UP
RBC # BLD: 3.17 M/UL — LOW (ref 4.2–5.8)
RBC # BLD: 4.03 M/UL — LOW (ref 4.2–5.8)
RBC # BLD: 4.18 M/UL — LOW (ref 4.2–5.8)
RBC # BLD: 4.18 M/UL — LOW (ref 4.2–5.8)
RBC # FLD: 12.6 % — SIGNIFICANT CHANGE UP (ref 10.3–14.5)
RBC # FLD: 12.6 % — SIGNIFICANT CHANGE UP (ref 10.3–14.5)
RBC # FLD: 12.7 % — SIGNIFICANT CHANGE UP (ref 10.3–14.5)
RBC # FLD: 12.7 % — SIGNIFICANT CHANGE UP (ref 10.3–14.5)
SODIUM SERPL-SCNC: 136 MMOL/L — SIGNIFICANT CHANGE UP (ref 135–145)
SODIUM SERPL-SCNC: 138 MMOL/L — SIGNIFICANT CHANGE UP (ref 135–145)
SODIUM UR-SCNC: 43 MMOL/L — SIGNIFICANT CHANGE UP
SP GR SPEC: 1.02 — SIGNIFICANT CHANGE UP (ref 1.01–1.02)
TROPONIN T, HIGH SENSITIVITY RESULT: 227 NG/L — HIGH (ref 0–51)
TROPONIN T, HIGH SENSITIVITY RESULT: 380 NG/L — HIGH (ref 0–51)
UROBILINOGEN FLD QL: NEGATIVE — SIGNIFICANT CHANGE UP
WBC # BLD: 5.6 K/UL — SIGNIFICANT CHANGE UP (ref 3.8–10.5)
WBC # BLD: 8.7 K/UL — SIGNIFICANT CHANGE UP (ref 3.8–10.5)
WBC # BLD: 9.1 K/UL — SIGNIFICANT CHANGE UP (ref 3.8–10.5)
WBC # BLD: 9.9 K/UL — SIGNIFICANT CHANGE UP (ref 3.8–10.5)
WBC # FLD AUTO: 5.6 K/UL — SIGNIFICANT CHANGE UP (ref 3.8–10.5)
WBC # FLD AUTO: 8.7 K/UL — SIGNIFICANT CHANGE UP (ref 3.8–10.5)
WBC # FLD AUTO: 9.1 K/UL — SIGNIFICANT CHANGE UP (ref 3.8–10.5)
WBC # FLD AUTO: 9.9 K/UL — SIGNIFICANT CHANGE UP (ref 3.8–10.5)

## 2019-09-24 PROCEDURE — 99233 SBSQ HOSP IP/OBS HIGH 50: CPT

## 2019-09-24 RX ORDER — IPRATROPIUM/ALBUTEROL SULFATE 18-103MCG
3 AEROSOL WITH ADAPTER (GRAM) INHALATION EVERY 8 HOURS
Refills: 0 | Status: DISCONTINUED | OUTPATIENT
Start: 2019-09-24 | End: 2019-09-30

## 2019-09-24 RX ORDER — BUDESONIDE AND FORMOTEROL FUMARATE DIHYDRATE 160; 4.5 UG/1; UG/1
2 AEROSOL RESPIRATORY (INHALATION)
Refills: 0 | Status: DISCONTINUED | OUTPATIENT
Start: 2019-09-24 | End: 2019-10-01

## 2019-09-24 RX ORDER — SODIUM CHLORIDE 9 MG/ML
1000 INJECTION INTRAMUSCULAR; INTRAVENOUS; SUBCUTANEOUS
Refills: 0 | Status: DISCONTINUED | OUTPATIENT
Start: 2019-09-24 | End: 2019-10-01

## 2019-09-24 RX ORDER — HEPARIN SODIUM 5000 [USP'U]/ML
1050 INJECTION INTRAVENOUS; SUBCUTANEOUS
Qty: 25000 | Refills: 0 | Status: DISCONTINUED | OUTPATIENT
Start: 2019-09-24 | End: 2019-09-26

## 2019-09-24 RX ORDER — HEPARIN SODIUM 5000 [USP'U]/ML
1100 INJECTION INTRAVENOUS; SUBCUTANEOUS
Qty: 25000 | Refills: 0 | Status: DISCONTINUED | OUTPATIENT
Start: 2019-09-24 | End: 2019-09-24

## 2019-09-24 RX ORDER — MONTELUKAST 4 MG/1
10 TABLET, CHEWABLE ORAL DAILY
Refills: 0 | Status: DISCONTINUED | OUTPATIENT
Start: 2019-09-24 | End: 2019-10-01

## 2019-09-24 RX ADMIN — SODIUM CHLORIDE 50 MILLILITER(S): 9 INJECTION INTRAMUSCULAR; INTRAVENOUS; SUBCUTANEOUS at 18:46

## 2019-09-24 RX ADMIN — MONTELUKAST 10 MILLIGRAM(S): 4 TABLET, CHEWABLE ORAL at 20:34

## 2019-09-24 RX ADMIN — Medication 40 MILLIGRAM(S): at 05:48

## 2019-09-24 RX ADMIN — HEPARIN SODIUM 1250 UNIT(S)/HR: 5000 INJECTION INTRAVENOUS; SUBCUTANEOUS at 18:47

## 2019-09-24 RX ADMIN — HEPARIN SODIUM 1050 UNIT(S)/HR: 5000 INJECTION INTRAVENOUS; SUBCUTANEOUS at 10:39

## 2019-09-24 RX ADMIN — Medication 81 MILLIGRAM(S): at 11:50

## 2019-09-24 RX ADMIN — LOSARTAN POTASSIUM 50 MILLIGRAM(S): 100 TABLET, FILM COATED ORAL at 05:48

## 2019-09-24 RX ADMIN — HEPARIN SODIUM 1050 UNIT(S)/HR: 5000 INJECTION INTRAVENOUS; SUBCUTANEOUS at 23:26

## 2019-09-24 RX ADMIN — Medication 25 MILLIGRAM(S): at 05:48

## 2019-09-24 RX ADMIN — HEPARIN SODIUM 0 UNIT(S)/HR: 5000 INJECTION INTRAVENOUS; SUBCUTANEOUS at 22:02

## 2019-09-24 RX ADMIN — Medication 3 MILLILITER(S): at 21:23

## 2019-09-24 RX ADMIN — ATORVASTATIN CALCIUM 80 MILLIGRAM(S): 80 TABLET, FILM COATED ORAL at 21:22

## 2019-09-24 RX ADMIN — HEPARIN SODIUM 1100 UNIT(S)/HR: 5000 INJECTION INTRAVENOUS; SUBCUTANEOUS at 01:45

## 2019-09-24 RX ADMIN — AMLODIPINE BESYLATE 10 MILLIGRAM(S): 2.5 TABLET ORAL at 05:48

## 2019-09-24 RX ADMIN — BUDESONIDE AND FORMOTEROL FUMARATE DIHYDRATE 2 PUFF(S): 160; 4.5 AEROSOL RESPIRATORY (INHALATION) at 20:33

## 2019-09-24 NOTE — PROGRESS NOTE ADULT - SUBJECTIVE AND OBJECTIVE BOX
[facility] - Mirta Moffett, Mary, Krista, Sharee, Rodger Philсергей  Office Number:  [phone]    Patient resting comfortably in bed in NAD.  Laying flat with no respiratory distress.  Still requiring oxygen.  no chest pain or palpitations.    ROS: negative unless otherwise mentioned.    Telemetry: sr     MEDICATIONS  (STANDING):  amLODIPine   Tablet 10 milliGRAM(s) Oral daily  aspirin enteric coated 81 milliGRAM(s) Oral daily  atorvastatin 80 milliGRAM(s) Oral at bedtime  heparin  Infusion. 1100 Unit(s)/Hr (11 mL/Hr) IV Continuous <Continuous>  hydrochlorothiazide 25 milliGRAM(s) Oral daily  influenza   Vaccine 0.5 milliLiter(s) IntraMuscular once  losartan 50 milliGRAM(s) Oral daily  predniSONE   Tablet 40 milliGRAM(s) Oral daily    MEDICATIONS  (PRN):  heparin  Injectable 3800 Unit(s) IV Push every 6 hours PRN For aPTT less than 40      Allergies    No Known Allergies    Intolerances        Vital Signs Last 24 Hrs  T(C): 36.4 (24 Sep 2019 04:31), Max: 36.4 (23 Sep 2019 16:02)  T(F): 97.6 (24 Sep 2019 04:31), Max: 97.6 (23 Sep 2019 16:02)  HR: 68 (24 Sep 2019 04:31) (68 - 114)  BP: 106/62 (24 Sep 2019 04:31) (106/62 - 144/77)  BP(mean): --  RR: 18 (24 Sep 2019 04:31) (18 - 20)  SpO2: 99% (24 Sep 2019 04:31) (97% - 99%)    I&O's Summary    23 Sep 2019 07:01  -  24 Sep 2019 07:00  --------------------------------------------------------  IN: 0 mL / OUT: 300 mL / NET: -300 mL        ON EXAM:    General: NAD, awake and alert, on oxygen  HEENT: Mucous membranes are moist, anicteric  Lungs: Non-labored, decreased bs bilaterally, No wheezing, rales or rhonchi  Cardiovascular: Regular, S1 and S2, no murmurs, rubs, or gallops  Gastrointestinal: Bowel Sounds present, soft, nontender.   Lymph: No peripheral edema. No lymphadenopathy.  Skin: No rashes or ulcers  Psych:  Mood & affect appropriate    LABS: All Labs Reviewed:                        12.9   9.9   )-----------( 196      ( 24 Sep 2019 01:05 )             40.7                         13.6   11.76 )-----------( 231      ( 23 Sep 2019 13:19 )             41.5                         13.7   11.00 )-----------( 220      ( 23 Sep 2019 08:58 )             42.1     24 Sep 2019 01:05    138    |  101    |  28     ----------------------------<  114    5.8     |  23     |  1.50   23 Sep 2019 03:46    137    |  100    |  16     ----------------------------<  135    3.3     |  24     |  1.13   22 Sep 2019 05:51    137    |  99     |  12     ----------------------------<  157    4.6     |  22     |  0.90     Ca    9.4        24 Sep 2019 01:05  Ca    9.1        23 Sep 2019 03:46  Ca    9.3        22 Sep 2019 05:51  Phos  3.4       23 Sep 2019 03:46  Mg     2.2       24 Sep 2019 01:05  Mg     2.1       23 Sep 2019 03:46  Mg     2.1       22 Sep 2019 05:51    TPro  6.5    /  Alb  3.8    /  TBili  0.9    /  DBili  x      /  AST  42     /  ALT  23     /  AlkPhos  64     23 Sep 2019 03:46  TPro  7.2    /  Alb  4.5    /  TBili  1.0    /  DBili  x      /  AST  39     /  ALT  26     /  AlkPhos  75     22 Sep 2019 05:51    PT/INR - ( 22 Sep 2019 14:21 )   PT: 12.0 sec;   INR: 1.04 ratio         PTT - ( 24 Sep 2019 01:05 )  PTT:74.6 sec  CARDIAC MARKERS ( 22 Sep 2019 11:45 )  x     / x     / x     / x     / 16.4 ng/mL      Blood Culture:   09-22 @ 05:51  Pro Bnp [id number]

## 2019-09-24 NOTE — PROGRESS NOTE ADULT - ASSESSMENT
69yoM w/ PMHx significant for COPD, asthma, HTN, presented to [place] for SOB, on EKG with concerns for possible NSTEMI. Transferred to [facility] for possible cath, now with concern for COPD vs. asthma exacerbation. 69yoM w/ PMHx significant for COPD, asthma, HTN, presented to [place] for SOB, on EKG with concerns for possible NSTEMI. Transferred to [facility] for possible cath, now with concern for COPD  exacerbation.

## 2019-09-24 NOTE — PROGRESS NOTE ADULT - SUBJECTIVE AND OBJECTIVE BOX
Patient is a 69y old  Male who presents with a chief complaint of SOB, abnormal EKG (24 Sep 2019 12:13)        SUBJECTIVE / OVERNIGHT EVENTS:       MEDICATIONS  (STANDING):  amLODIPine   Tablet 10 milliGRAM(s) Oral daily  aspirin enteric coated 81 milliGRAM(s) Oral daily  atorvastatin 80 milliGRAM(s) Oral at bedtime  heparin  Infusion. 1100 Unit(s)/Hr (11 mL/Hr) IV Continuous <Continuous>  influenza   Vaccine 0.5 milliLiter(s) IntraMuscular once  losartan 50 milliGRAM(s) Oral daily  predniSONE   Tablet 40 milliGRAM(s) Oral daily    MEDICATIONS  (PRN):  heparin  Injectable 3800 Unit(s) IV Push every 6 hours PRN For aPTT less than 40      Vital Signs Last 24 Hrs  T(C): 36.5 (24 Sep 2019 11:41), Max: 36.5 (24 Sep 2019 11:41)  T(F): 97.7 (24 Sep 2019 11:41), Max: 97.7 (24 Sep 2019 11:41)  HR: 94 (24 Sep 2019 11:41) (68 - 114)  BP: 113/74 (24 Sep 2019 11:41) (106/62 - 144/77)  BP(mean): --  RR: 18 (24 Sep 2019 11:41) (18 - 20)  SpO2: 100% (24 Sep 2019 11:41) (97% - 100%)  CAPILLARY BLOOD GLUCOSE        I&O's Summary    23 Sep 2019 07:01  -  24 Sep 2019 07:00  --------------------------------------------------------  IN: 0 mL / OUT: 300 mL / NET: -300 mL    24 Sep 2019 07:01  -  24 Sep 2019 12:19  --------------------------------------------------------  IN: 180 mL / OUT: 250 mL / NET: -70 mL        PHYSICAL EXAM:  GENERAL: NAD, breathing normal  HEAD:  Atraumatic, Normocephalic  EYES: conjunctiva and sclera clear  NECK: supple, No JVD  CHEST/LUNG: CTA b/l  HEART: S1 S2 RRR  ABDOMEN: +BS Soft, NT/ND  EXTREMITIES:  2+ DP Pulses, No c/c/e  NEUROLOGY: AAOx3, no facial droop, no focal deficits   SKIN: No rashes or lesions    LABS:                        12.9   9.1   )-----------( 216      ( 24 Sep 2019 09:29 )             40.5     09-24    138  |  101  |  28<H>  ----------------------------<  114<H>  5.8<H>   |  23  |  1.50<H>    Ca    9.4      24 Sep 2019 01:05  Phos  3.4     09-23  Mg     2.2     09-24    TPro  6.5  /  Alb  3.8  /  TBili  0.9  /  DBili  x   /  AST  42<H>  /  ALT  23  /  AlkPhos  64  09-23    PT/INR - ( 22 Sep 2019 14:21 )   PT: 12.0 sec;   INR: 1.04 ratio         PTT - ( 24 Sep 2019 09:29 )  PTT:79.2 sec          RADIOLOGY & ADDITIONAL TESTS:    Imaging Personally Reviewed:  Consultant(s) Notes Reviewed:    Care Discussed with Consultants/Other Providers: Patient is a 69y old  Male who presents with a chief complaint of SOB, abnormal EKG (24 Sep 2019 12:13)        SUBJECTIVE / OVERNIGHT EVENTS: breathing improved      MEDICATIONS  (STANDING):  amLODIPine   Tablet 10 milliGRAM(s) Oral daily  aspirin enteric coated 81 milliGRAM(s) Oral daily  atorvastatin 80 milliGRAM(s) Oral at bedtime  heparin  Infusion. 1100 Unit(s)/Hr (11 mL/Hr) IV Continuous <Continuous>  influenza   Vaccine 0.5 milliLiter(s) IntraMuscular once  losartan 50 milliGRAM(s) Oral daily  predniSONE   Tablet 40 milliGRAM(s) Oral daily    MEDICATIONS  (PRN):  heparin  Injectable 3800 Unit(s) IV Push every 6 hours PRN For aPTT less than 40      Vital Signs Last 24 Hrs  T(C): 36.5 (24 Sep 2019 11:41), Max: 36.5 (24 Sep 2019 11:41)  T(F): 97.7 (24 Sep 2019 11:41), Max: 97.7 (24 Sep 2019 11:41)  HR: 94 (24 Sep 2019 11:41) (68 - 114)  BP: 113/74 (24 Sep 2019 11:41) (106/62 - 144/77)  BP(mean): --  RR: 18 (24 Sep 2019 11:41) (18 - 20)  SpO2: 100% (24 Sep 2019 11:41) (97% - 100%)  CAPILLARY BLOOD GLUCOSE        I&O's Summary    23 Sep 2019 07:01  -  24 Sep 2019 07:00  --------------------------------------------------------  IN: 0 mL / OUT: 300 mL / NET: -300 mL    24 Sep 2019 07:01  -  24 Sep 2019 12:19  --------------------------------------------------------  IN: 180 mL / OUT: 250 mL / NET: -70 mL        PHYSICAL EXAM:  GENERAL: NAD, mild sob with talking   EYES: conjunctiva and sclera clear  NECK: supple, No JVD  CHEST/LUNG: poor air entry, no wheezing appreciated  HEART: S1 S2 RRR  ABDOMEN: +BS Soft, NT/ND  EXTREMITIES:  2+ DP Pulses, No c/c. no LE edema   NEUROLOGY: AAOx3, no facial droop, no focal deficits   SKIN: No rashes or lesions    LABS:                        12.9   9.1   )-----------( 216      ( 24 Sep 2019 09:29 )             40.5     09-24    138  |  101  |  28<H>  ----------------------------<  114<H>  5.8<H>   |  23  |  1.50<H>    Ca    9.4      24 Sep 2019 01:05  Phos  3.4     09-23  Mg     2.2     09-24    TPro  6.5  /  Alb  3.8  /  TBili  0.9  /  DBili  x   /  AST  42<H>  /  ALT  23  /  AlkPhos  64  09-23    PT/INR - ( 22 Sep 2019 14:21 )   PT: 12.0 sec;   INR: 1.04 ratio         PTT - ( 24 Sep 2019 09:29 )  PTT:79.2 sec          RADIOLOGY & ADDITIONAL TESTS:    Imaging Personally Reviewed:  Consultant(s) Notes Reviewed:    Care Discussed with Consultants/Other Providers:

## 2019-09-24 NOTE — PROGRESS NOTE ADULT - PROBLEM SELECTOR PLAN 4
DVT: on Heparin gtt  Diet: regular On home Amlodipine, Losartan and Hydrochlorothiazide at home  - mildly HTN in the ED likely 2/2 medication non-compliance   - restart all home medications with hold parameters On home Amlodipine, Losartan and atenolol at home  - mildly HTN in the ED likely 2/2 medication non-compliance   hold atenolol in setting of copd exacerbation

## 2019-09-24 NOTE — PROGRESS NOTE ADULT - PROBLEM SELECTOR PLAN 2
Unclear type I vs. type II. ST changes on EKG with associated tryptonemia, transferred for possible cath. Given smoking history, possibly underlying CAD. Most recent TTE 2017, EF 55-60% with normal systolic function.  - admit to tele  - cardiology consulted, donte recs  - will c/w heparin gtt, ASA  - check TTE  - discussed with Dr. Walker, recommending cath, however pt is reluctant. Will get echo first. Encouraged pt to seriously consider procedure. He will think it over. Presented with sore throat, dry cough followed by SOB, improved with Duoneb and Solu-medrol at OSH. COPD vs. asthma exacerbation. Last hospitalization for COPD exacerbation in 2017. Currently SOB improved, no longer wheezing. Saturating 100% on 2L NC. CXR clear. Low suspicion for CHF.  - c/w Duoneb standing for today, and will change to PRN as tolerated  - wean off NC as tolerated  - RVP positive   - hold off Abx for now given low suspicion for bacterial infectious etiology  - c/w Prednisone 40mg x 4 more days given poor airway movement  - breathing comfortably today Presented with sore throat, dry cough followed by SOB, improved with Duoneb and Solu-medrol at OSH. possible COPD exacerbation. Last hospitalization for COPD exacerbation in 2017. Currently SOB improved, no longer wheezing. Saturating 100% on 2L NC. CXR clear. Low suspicion for CHF.  - c/w Duoneb standing as poor air entry on exam  - wean off NC as tolerated   - RVP positive for Rhino/entero  - hold off Abx for now given low suspicion for bacterial infectious etiology  - c/w Prednisone 40mg x 3 more days given poor airway movement

## 2019-09-24 NOTE — PROGRESS NOTE ADULT - ASSESSMENT
70 y/o man with h/o COPD, HTN, asthma who presented to OSH with sudden onset shortness of breath for 1 hour, found to have troponin elevation though no significant ECG changes from 1 year prior.  Presentation is atypical, and he had viral symptoms prior to all of this. Though it seems to be an NSTEMI, there is pulmonary and viral component to everything.    - He has no chest pain this morning and his breathing has improved overall  - Will continue to treat for NSTEMI with asa and heparin gtt x 48 hours. Given his CE leak, I have recommended that he have a cardiac cath when his breathing is improved, though he is very hesitant to proceed with an invasive procedure, and would like to think about it.  - Await echocardiogram. If he has LV dysfunction, he will agree to cardiac cath  - Continue Lipitor 80  - amlodipine for HTN. Hold HCTZ  - hold off on bb in the setting of reactive airway disease  - Hyperkalemia and OWEN noted on labs. Await repeat this morning.  - troponin has peaked  - pulmonary evaluation and oxygen supplementation  - Watch creatinine and electrolytes. Keep K>4, Mg>2  - Will follow with you 70 y/o man with h/o COPD, HTN, asthma who presented to OSH with sudden onset shortness of breath for 1 hour, found to have troponin elevation though no significant ECG changes from 1 year prior.  Presentation is atypical, and he had viral symptoms prior to all of this. Though it seems to be an NSTEMI, there is pulmonary and viral component to everything.    - He has no chest pain this morning and his breathing has improved overall  - Will continue to treat for NSTEMI with asa and heparin gtt x 48 hours. Given his CE leak, I have recommended that he have a cardiac cath when his breathing is improved, though he is very hesitant to proceed with an invasive procedure, and would like to think about it.  - Await echocardiogram. If he has LV dysfunction, he will agree to cardiac cath  - Continue Lipitor 80  - amlodipine for HTN. Hold HCTZ  - hold off on bb in the setting of reactive airway disease  - Hyperkalemia and OWEN noted on labs. Await repeat this morning. Will also need to hold losartan if this remains.  - troponin has peaked  - pulmonary evaluation and oxygen supplementation  - Watch creatinine and electrolytes. Keep K>4, Mg>2  - Will follow with you

## 2019-09-24 NOTE — PROGRESS NOTE ADULT - PROBLEM SELECTOR PLAN 3
On home Amlodipine, Losartan and Hydrochlorothiazide at home  - mildly HTN in the ED likely 2/2 medication non-compliance   - restart all home medications with hold parameters Unclear type I vs. type II. ST changes on EKG with associated tryptonemia, transferred for possible cath. Given smoking history, possibly underlying CAD. Most recent TTE 2017, EF 55-60% with normal systolic function.  - admit to tele  - cardiology consulted, donte recs  - will c/w heparin gtt, ASA  - check TTE  - discussed with Dr. Walker, recommending cath, however pt is reluctant. Will get echo first. Encouraged pt to seriously consider procedure. He will think it over. Unclear type I vs. type II. ST changes on EKG with associated tryptonemia, transferred for possible cath. Given smoking history, possibly underlying CAD. Most recent TTE 2017, EF 55-60% with normal systolic function.  - admit to tele  - cardiology consulted, check TTE, pt refusing angiogram at this time  - will c/w heparin gtt, ASA  - check TTE  - discussed with Dr. Walker, recommending cath, however pt is reluctant. Will get echo first. Encouraged pt to seriously consider procedure. He will think it over.

## 2019-09-24 NOTE — PROGRESS NOTE ADULT - PROBLEM SELECTOR PLAN 1
Presented with sore throat, dry cough followed by SOB, improved with Duoneb and Solu-medrol at OSH. COPD vs. asthma exacerbation. Last hospitalization for COPD exacerbation in 2017. Currently SOB improved, no longer wheezing. Saturating 100% on 2L NC. CXR clear. Low suspicion for CHF.  - c/w Duoneb standing for today, and will change to PRN as tolerated  - wean off NC as tolerated  - RVP positive   - hold off Abx for now given low suspicion for bacterial infectious etiology  - c/w Prednisone 40mg x 4 more days given poor airway movement  - breathing comfortably today check urine lytes   hold HCTZ  repeat bmp check urine lytes   hold HCTZ (not on medication list)  repeat bmp

## 2019-09-25 LAB
ANION GAP SERPL CALC-SCNC: 14 MMOL/L — SIGNIFICANT CHANGE UP (ref 5–17)
APTT BLD: 60.7 SEC — HIGH (ref 27.5–36.3)
APTT BLD: 65.8 SEC — HIGH (ref 27.5–36.3)
APTT BLD: 79.6 SEC — HIGH (ref 27.5–36.3)
BLD GP AB SCN SERPL QL: NEGATIVE — SIGNIFICANT CHANGE UP
BUN SERPL-MCNC: 32 MG/DL — HIGH (ref 7–23)
CALCIUM SERPL-MCNC: 8.8 MG/DL — SIGNIFICANT CHANGE UP (ref 8.4–10.5)
CHLORIDE SERPL-SCNC: 102 MMOL/L — SIGNIFICANT CHANGE UP (ref 96–108)
CO2 SERPL-SCNC: 22 MMOL/L — SIGNIFICANT CHANGE UP (ref 22–31)
CREAT SERPL-MCNC: 1.11 MG/DL — SIGNIFICANT CHANGE UP (ref 0.5–1.3)
GLUCOSE SERPL-MCNC: 133 MG/DL — HIGH (ref 70–99)
HCT VFR BLD CALC: 38 % — LOW (ref 39–50)
HGB BLD-MCNC: 12.5 G/DL — LOW (ref 13–17)
MCHC RBC-ENTMCNC: 31.7 PG — SIGNIFICANT CHANGE UP (ref 27–34)
MCHC RBC-ENTMCNC: 32.8 GM/DL — SIGNIFICANT CHANGE UP (ref 32–36)
MCV RBC AUTO: 96.6 FL — SIGNIFICANT CHANGE UP (ref 80–100)
PLATELET # BLD AUTO: 202 K/UL — SIGNIFICANT CHANGE UP (ref 150–400)
POTASSIUM SERPL-MCNC: 3.7 MMOL/L — SIGNIFICANT CHANGE UP (ref 3.5–5.3)
POTASSIUM SERPL-SCNC: 3.7 MMOL/L — SIGNIFICANT CHANGE UP (ref 3.5–5.3)
RBC # BLD: 3.93 M/UL — LOW (ref 4.2–5.8)
RBC # FLD: 12.8 % — SIGNIFICANT CHANGE UP (ref 10.3–14.5)
RH IG SCN BLD-IMP: POSITIVE — SIGNIFICANT CHANGE UP
SODIUM SERPL-SCNC: 138 MMOL/L — SIGNIFICANT CHANGE UP (ref 135–145)
WBC # BLD: 8.1 K/UL — SIGNIFICANT CHANGE UP (ref 3.8–10.5)
WBC # FLD AUTO: 8.1 K/UL — SIGNIFICANT CHANGE UP (ref 3.8–10.5)

## 2019-09-25 PROCEDURE — 99232 SBSQ HOSP IP/OBS MODERATE 35: CPT

## 2019-09-25 PROCEDURE — 93306 TTE W/DOPPLER COMPLETE: CPT | Mod: 26

## 2019-09-25 RX ADMIN — BUDESONIDE AND FORMOTEROL FUMARATE DIHYDRATE 2 PUFF(S): 160; 4.5 AEROSOL RESPIRATORY (INHALATION) at 16:08

## 2019-09-25 RX ADMIN — Medication 81 MILLIGRAM(S): at 11:57

## 2019-09-25 RX ADMIN — Medication 3 MILLILITER(S): at 21:48

## 2019-09-25 RX ADMIN — HEPARIN SODIUM 1000 UNIT(S)/HR: 5000 INJECTION INTRAVENOUS; SUBCUTANEOUS at 05:53

## 2019-09-25 RX ADMIN — BUDESONIDE AND FORMOTEROL FUMARATE DIHYDRATE 2 PUFF(S): 160; 4.5 AEROSOL RESPIRATORY (INHALATION) at 05:49

## 2019-09-25 RX ADMIN — LOSARTAN POTASSIUM 50 MILLIGRAM(S): 100 TABLET, FILM COATED ORAL at 05:50

## 2019-09-25 RX ADMIN — Medication 3 MILLILITER(S): at 12:06

## 2019-09-25 RX ADMIN — Medication 100 MILLIGRAM(S): at 21:47

## 2019-09-25 RX ADMIN — Medication 40 MILLIGRAM(S): at 05:49

## 2019-09-25 RX ADMIN — MONTELUKAST 10 MILLIGRAM(S): 4 TABLET, CHEWABLE ORAL at 11:56

## 2019-09-25 RX ADMIN — HEPARIN SODIUM 1000 UNIT(S)/HR: 5000 INJECTION INTRAVENOUS; SUBCUTANEOUS at 13:04

## 2019-09-25 RX ADMIN — Medication 100 MILLIGRAM(S): at 16:08

## 2019-09-25 RX ADMIN — HEPARIN SODIUM 1000 UNIT(S)/HR: 5000 INJECTION INTRAVENOUS; SUBCUTANEOUS at 20:14

## 2019-09-25 RX ADMIN — AMLODIPINE BESYLATE 10 MILLIGRAM(S): 2.5 TABLET ORAL at 05:49

## 2019-09-25 RX ADMIN — ATORVASTATIN CALCIUM 80 MILLIGRAM(S): 80 TABLET, FILM COATED ORAL at 21:48

## 2019-09-25 RX ADMIN — Medication 3 MILLILITER(S): at 05:49

## 2019-09-25 NOTE — PROGRESS NOTE ADULT - SUBJECTIVE AND OBJECTIVE BOX
[facility] -- Mirta Moffett, Mary, Krista, Rodger Tolliver Savella  Office # [phone]      Follow Up:  Dyspnea, NSTEMI    Subjective/Observations: Patient seen and examined. Events noted. Resting comfortably in bed. No complaints of chest pain,   or palpitations reported. No signs of orthopnea or PND. Dyspnea improving.       REVIEW OF SYSTEMS: All other review of systems is negative unless indicated above    PAST MEDICAL & SURGICAL HISTORY:  Asthma  COPD (chronic obstructive pulmonary disease)  No significant past surgical history      MEDICATIONS  (STANDING):  ALBUTerol/ipratropium for Nebulization 3 milliLiter(s) Nebulizer every 8 hours  amLODIPine   Tablet 10 milliGRAM(s) Oral daily  aspirin enteric coated 81 milliGRAM(s) Oral daily  atorvastatin 80 milliGRAM(s) Oral at bedtime  buDESOnide 160 MICROgram(s)/formoterol 4.5 MICROgram(s) Inhaler 2 Puff(s) Inhalation two times a day  heparin  Infusion. 1050 Unit(s)/Hr (10.5 mL/Hr) IV Continuous <Continuous>  influenza   Vaccine 0.5 milliLiter(s) IntraMuscular once  losartan 50 milliGRAM(s) Oral daily  montelukast 10 milliGRAM(s) Oral daily  predniSONE   Tablet 40 milliGRAM(s) Oral daily  sodium chloride 0.9%. 1000 milliLiter(s) (50 mL/Hr) IV Continuous <Continuous>    MEDICATIONS  (PRN):  heparin  Injectable 3800 Unit(s) IV Push every 6 hours PRN For aPTT less than 40      Allergies    No Known Allergies    Intolerances            Vital Signs Last 24 Hrs  T(C): 36.8 (25 Sep 2019 04:39), Max: 36.8 (25 Sep 2019 04:39)  T(F): 98.2 (25 Sep 2019 04:39), Max: 98.2 (25 Sep 2019 04:39)  HR: 77 (25 Sep 2019 04:39) (76 - 94)  BP: 131/63 (25 Sep 2019 04:39) (113/74 - 146/71)  BP(mean): --  RR: 18 (25 Sep 2019 04:39) (18 - 18)  SpO2: 97% (25 Sep 2019 04:39) (97% - 100%)    I&O's Summary    24 Sep 2019 07:01  -  25 Sep 2019 07:00  --------------------------------------------------------  IN: 1528.5 mL / OUT: 1550 mL / NET: -21.5 mL      Weight (kg): 65.4 (09-24 @ 17:02)    PHYSICAL EXAM:  TELE: SR 60-80  Constitutional: NAD, awake and alert, well-developed  HEENT: Moist Mucous Membranes, Anicteric  Pulmonary: Decreased breath sounds b/l. No rales, crackles or wheeze appreciated.   Cardiovascular: Regular, S1 and S2, No murmurs, rubs, gallops or clicks  Gastrointestinal: Bowel Sounds present, soft, nontender.   Lymph: No peripheral edema. No lymphadenopathy.  Skin: No visible rashes or ulcers.  Psych:  Mood & affect appropriate for situation    LABS: All Labs Reviewed:                        12.5   8.1   )-----------( 202      ( 25 Sep 2019 05:22 )             38.0                         12.5   8.7   )-----------( 213      ( 24 Sep 2019 21:30 )             39.0                         9.0    5.6   )-----------( 169      ( 24 Sep 2019 17:03 )             31.9     25 Sep 2019 05:22    138    |  102    |  32     ----------------------------<  133    3.7     |  22     |  1.11   24 Sep 2019 17:03    136    |  100    |  37     ----------------------------<  124    5.5     |  21     |  1.50   24 Sep 2019 01:05    138    |  101    |  28     ----------------------------<  114    5.8     |  23     |  1.50     Ca    8.8        25 Sep 2019 05:22  Ca    9.5        24 Sep 2019 17:03  Ca    9.4        24 Sep 2019 01:05  Phos  5.0       24 Sep 2019 17:03  Phos  3.4       23 Sep 2019 03:46  Mg     2.2       24 Sep 2019 17:03  Mg     2.2       24 Sep 2019 01:05  Mg     2.1       23 Sep 2019 03:46    TPro  6.5    /  Alb  3.8    /  TBili  0.9    /  DBili  x      /  AST  42     /  ALT  23     /  AlkPhos  64     23 Sep 2019 03:46    PTT - ( 25 Sep 2019 05:22 )  PTT:79.6 sec

## 2019-09-25 NOTE — PROGRESS NOTE ADULT - ASSESSMENT
70 y/o man with h/o COPD, HTN, asthma who presented to OSH with sudden onset shortness of breath for 1 hour, found to have troponin elevation though no significant ECG changes from 1 year prior.  Presentation is atypical, and he had viral symptoms prior to all of this. Though it seems to be an NSTEMI, there is pulmonary and viral component to everything.    - He has no chest pain this morning and his breathing has improved overall  - Will continue to treat for NSTEMI with asa and heparin gtt x 48 hours. Would dc hep gtt post 48 hours   - Given his CE leak and wall motion abn seen on limited ED echo, I have recommended that he have a cardiac cath when his breathing is improved, though he is very hesitant to proceed with an invasive procedure, and would like to think about it.  - Await official full echocardiogram. If he has LV dysfunction, he will agree to cardiac cath. At minimum he needs some sort of ischemic evaluation prior to dc.   - Continue Lipitor 80  - amlodipine for HTN. Hold HCTZ  - hold off on bb in the setting of reactive airway disease  - CR and K improved.   - troponin has peaked  - pulmonary evaluation and oxygen supplementation  - Watch creatinine and electrolytes. Keep K>4, Mg>2  - Will follow with you

## 2019-09-25 NOTE — CHART NOTE - NSCHARTNOTEFT_GEN_A_CORE
PT with complaints of ocassional Dry cough. Pt reports the cough is not constant. It comes and goes. He has had this cough from before arriving to the hospital. Pt denies any production fo sputum with the cough. He denies feeling feverish, having chest pain, SOB, headaches dizziness or feeling flushed.     Vital Signs Last 24 Hrs  T(C): 36.6 (25 Sep 2019 11:48), Max: 36.8 (25 Sep 2019 04:39)  T(F): 97.8 (25 Sep 2019 11:48), Max: 98.2 (25 Sep 2019 04:39)  HR: 90 (25 Sep 2019 11:48) (76 - 90)  BP: 113/60 (25 Sep 2019 11:48) (113/60 - 146/71)  BP(mean): --  RR: 18 (25 Sep 2019 11:48) (18 - 18)  SpO2: 100% (25 Sep 2019 11:48) (97% - 100%)    09-25    138  |  102  |  32<H>  ----------------------------<  133<H>  3.7   |  22  |  1.11    Ca    8.8      25 Sep 2019 05:22  Phos  5.0     09-24  Mg     2.2     09-24                            12.5   8.1   )-----------( 202      ( 25 Sep 2019 05:22 )             38.0             Radiology :    MEDICATIONS  (STANDING):  ALBUTerol/ipratropium for Nebulization 3 milliLiter(s) Nebulizer every 8 hours  amLODIPine   Tablet 10 milliGRAM(s) Oral daily  aspirin enteric coated 81 milliGRAM(s) Oral daily  atorvastatin 80 milliGRAM(s) Oral at bedtime  benzonatate 100 milliGRAM(s) Oral three times a day  buDESOnide 160 MICROgram(s)/formoterol 4.5 MICROgram(s) Inhaler 2 Puff(s) Inhalation two times a day  heparin  Infusion. 1050 Unit(s)/Hr (10.5 mL/Hr) IV Continuous <Continuous>  influenza   Vaccine 0.5 milliLiter(s) IntraMuscular once  losartan 50 milliGRAM(s) Oral daily  montelukast 10 milliGRAM(s) Oral daily  predniSONE   Tablet 40 milliGRAM(s) Oral daily  sodium chloride 0.9%. 1000 milliLiter(s) (50 mL/Hr) IV Continuous <Continuous>    MEDICATIONS  (PRN):  heparin  Injectable 3800 Unit(s) IV Push every 6 hours PRN For aPTT less than 40        PHYSICAL EXAM:  Constitutional: A&O  Respiratory: CTA B  Cardiovascular:  normal S1/S2      A/P  HPI:  -Dry cough: Pt with occasional dry cough from prior to his admission. This is not new. VSS. Pt orderd for tessalon pearls.   -will continue to monitor and follow the patient closely. Pt care will be signed out to night team for continued close follow up.         Benito Roger PA-C (Medicine PA )

## 2019-09-25 NOTE — PROGRESS NOTE ADULT - PROBLEM SELECTOR PLAN 4
On home Amlodipine, Losartan and atenolol at home  - mildly HTN in the ED likely 2/2 medication non-compliance   hold atenolol in setting of copd exacerbation

## 2019-09-25 NOTE — PROGRESS NOTE ADULT - PROBLEM SELECTOR PLAN 2
Presented with sore throat, dry cough followed by SOB, improved with Duoneb and Solu-medrol at OSH. possible COPD exacerbation. Last hospitalization for COPD exacerbation in 2017. Currently SOB improved, no longer wheezing. Saturating 100% on 2L NC. CXR clear. Low suspicion for CHF.  - c/w Duoneb standing as poor air entry on exam  - wean off NC as tolerated   - RVP positive for Rhino/entero  - hold off Abx for now given low suspicion for bacterial infectious etiology  - c/w Prednisone 40mg x 2 more days given poor airway movement

## 2019-09-25 NOTE — PROGRESS NOTE ADULT - PROBLEM SELECTOR PLAN 1
resolved  fena <1% suggestive of prerenal azotemia - creatinine improved with fluids.   hold HCTZ (not on home medication list)

## 2019-09-25 NOTE — PROGRESS NOTE ADULT - SUBJECTIVE AND OBJECTIVE BOX
Patient is a 69y old  Male who presents with a chief complaint of SOB, abnormal EKG (25 Sep 2019 09:15)        SUBJECTIVE / OVERNIGHT EVENTS: breathing improved       MEDICATIONS  (STANDING):  ALBUTerol/ipratropium for Nebulization 3 milliLiter(s) Nebulizer every 8 hours  amLODIPine   Tablet 10 milliGRAM(s) Oral daily  aspirin enteric coated 81 milliGRAM(s) Oral daily  atorvastatin 80 milliGRAM(s) Oral at bedtime  benzonatate 100 milliGRAM(s) Oral three times a day  buDESOnide 160 MICROgram(s)/formoterol 4.5 MICROgram(s) Inhaler 2 Puff(s) Inhalation two times a day  heparin  Infusion. 1050 Unit(s)/Hr (10.5 mL/Hr) IV Continuous <Continuous>  influenza   Vaccine 0.5 milliLiter(s) IntraMuscular once  losartan 50 milliGRAM(s) Oral daily  montelukast 10 milliGRAM(s) Oral daily  predniSONE   Tablet 40 milliGRAM(s) Oral daily  sodium chloride 0.9%. 1000 milliLiter(s) (50 mL/Hr) IV Continuous <Continuous>    MEDICATIONS  (PRN):  heparin  Injectable 3800 Unit(s) IV Push every 6 hours PRN For aPTT less than 40      Vital Signs Last 24 Hrs  T(C): 36.6 (25 Sep 2019 11:48), Max: 36.8 (25 Sep 2019 04:39)  T(F): 97.8 (25 Sep 2019 11:48), Max: 98.2 (25 Sep 2019 04:39)  HR: 90 (25 Sep 2019 11:48) (76 - 90)  BP: 113/60 (25 Sep 2019 11:48) (113/60 - 137/79)  BP(mean): --  RR: 18 (25 Sep 2019 11:48) (18 - 18)  SpO2: 100% (25 Sep 2019 11:48) (97% - 100%)  CAPILLARY BLOOD GLUCOSE        I&O's Summary    24 Sep 2019 07:01  -  25 Sep 2019 07:00  --------------------------------------------------------  IN: 1528.5 mL / OUT: 1550 mL / NET: -21.5 mL    25 Sep 2019 07:01  -  25 Sep 2019 17:44  --------------------------------------------------------  IN: 600 mL / OUT: 1250 mL / NET: -650 mL        PHYSICAL EXAM:  GENERAL: NAD, breathing normal  HEAD:  Atraumatic, Normocephalic  EYES: conjunctiva and sclera clear  NECK: supple, No JVD  CHEST/LUNG: CTA b/l  HEART: S1 S2 RRR  ABDOMEN: +BS Soft, NT/ND  EXTREMITIES:  2+ DP Pulses, No c/c/e  NEUROLOGY: AAOx3, no facial droop, no focal deficits   SKIN: No rashes or lesions    LABS:                        12.5   8.1   )-----------( 202      ( 25 Sep 2019 05:22 )             38.0     09-25    138  |  102  |  32<H>  ----------------------------<  133<H>  3.7   |  22  |  1.11    Ca    8.8      25 Sep 2019 05:22  Phos  5.0     -  Mg     2.2     09-24      PTT - ( 25 Sep 2019 12:06 )  PTT:65.8 sec      Urinalysis Basic - ( 24 Sep 2019 15:45 )    Color: Yellow / Appearance: Clear / S.016 / pH: x  Gluc: x / Ketone: Negative  / Bili: Negative / Urobili: Negative   Blood: x / Protein: Trace / Nitrite: Negative   Leuk Esterase: Negative / RBC: x / WBC x   Sq Epi: x / Non Sq Epi: x / Bacteria: x        RADIOLOGY & ADDITIONAL TESTS:    Imaging Personally Reviewed: TTE reviewed - EF 65%1. Normal mitral valve. Minimal mitral regurgitation.  2. Thickened aortic valve.  3. Normal left ventricular systolic function. No segmental  wall motion abnormalities.  4. Mild diastolic dysfunction (Stage I).  5. Normal right ventricular size and function.  Consultant(s) Notes Reviewed:    Care Discussed with Consultants/Other Providers:

## 2019-09-25 NOTE — PROVIDER CONTACT NOTE (OTHER) - BACKGROUND
Patient admitted for Chest Pain, Acute Kidney Injury, Respiratory Failure with Hypoxia, Hypertension, Non-ST Elevated Myocardial Infarction, Chronic Obstructive Pulmonary Disease.

## 2019-09-25 NOTE — PROVIDER CONTACT NOTE (OTHER) - ASSESSMENT
Patient noted to have an infrequent non-productive cough. Patient's breath sounds noted to be clear and equal bilaterally throughout all fields. Patient denies chest pain and shortness of breath. No respiratory distress noted. Patient's Respiratory Rate is 18 and O2 Saturation is 100% Room Air. Patient requesting medication for cough.

## 2019-09-25 NOTE — PROGRESS NOTE ADULT - PROBLEM SELECTOR PLAN 3
Unclear type I vs. type II. ST changes on EKG with associated tryptonemia, transferred for possible cath. Given smoking history, possibly underlying CAD. Most recent TTE 2017, EF 55-60% with normal systolic function.  - monitor on telemetry  TTE w/o segmental wall motion abn  on heparin gtt, ASA  - cardiology f/u

## 2019-09-25 NOTE — PROGRESS NOTE ADULT - ATTENDING COMMENTS
d/c planning - patient can f/u with pulmonary outpatient   Dr. M. Luke  [facility]  [phone]

## 2019-09-25 NOTE — PROGRESS NOTE ADULT - ASSESSMENT
69yoM w/ PMHx significant for COPD, asthma, HTN, presented to [place] for SOB, on EKG with concerns for possible NSTEMI. Transferred to [facility] for possible cath, now with concern for COPD  exacerbation.

## 2019-09-26 ENCOUNTER — TRANSCRIPTION ENCOUNTER (OUTPATIENT)
Age: 69
End: 2019-09-26

## 2019-09-26 LAB
ANION GAP SERPL CALC-SCNC: 14 MMOL/L — SIGNIFICANT CHANGE UP (ref 5–17)
APTT BLD: 72.6 SEC — HIGH (ref 27.5–36.3)
BUN SERPL-MCNC: 28 MG/DL — HIGH (ref 7–23)
CALCIUM SERPL-MCNC: 9.2 MG/DL — SIGNIFICANT CHANGE UP (ref 8.4–10.5)
CHLORIDE SERPL-SCNC: 100 MMOL/L — SIGNIFICANT CHANGE UP (ref 96–108)
CO2 SERPL-SCNC: 22 MMOL/L — SIGNIFICANT CHANGE UP (ref 22–31)
CREAT SERPL-MCNC: 1.12 MG/DL — SIGNIFICANT CHANGE UP (ref 0.5–1.3)
GLUCOSE SERPL-MCNC: 104 MG/DL — HIGH (ref 70–99)
HCT VFR BLD CALC: 37.9 % — LOW (ref 39–50)
HGB BLD-MCNC: 12.1 G/DL — LOW (ref 13–17)
MCHC RBC-ENTMCNC: 30.8 PG — SIGNIFICANT CHANGE UP (ref 27–34)
MCHC RBC-ENTMCNC: 32 GM/DL — SIGNIFICANT CHANGE UP (ref 32–36)
MCV RBC AUTO: 96.2 FL — SIGNIFICANT CHANGE UP (ref 80–100)
PLATELET # BLD AUTO: 203 K/UL — SIGNIFICANT CHANGE UP (ref 150–400)
POTASSIUM SERPL-MCNC: 3.7 MMOL/L — SIGNIFICANT CHANGE UP (ref 3.5–5.3)
POTASSIUM SERPL-SCNC: 3.7 MMOL/L — SIGNIFICANT CHANGE UP (ref 3.5–5.3)
RBC # BLD: 3.94 M/UL — LOW (ref 4.2–5.8)
RBC # FLD: 12.7 % — SIGNIFICANT CHANGE UP (ref 10.3–14.5)
SODIUM SERPL-SCNC: 136 MMOL/L — SIGNIFICANT CHANGE UP (ref 135–145)
WBC # BLD: 6.8 K/UL — SIGNIFICANT CHANGE UP (ref 3.8–10.5)
WBC # FLD AUTO: 6.8 K/UL — SIGNIFICANT CHANGE UP (ref 3.8–10.5)

## 2019-09-26 PROCEDURE — 93016 CV STRESS TEST SUPVJ ONLY: CPT

## 2019-09-26 PROCEDURE — 78452 HT MUSCLE IMAGE SPECT MULT: CPT | Mod: 26

## 2019-09-26 PROCEDURE — 93018 CV STRESS TEST I&R ONLY: CPT

## 2019-09-26 PROCEDURE — 99232 SBSQ HOSP IP/OBS MODERATE 35: CPT

## 2019-09-26 PROCEDURE — 99233 SBSQ HOSP IP/OBS HIGH 50: CPT

## 2019-09-26 RX ORDER — ASPIRIN/CALCIUM CARB/MAGNESIUM 324 MG
81 TABLET ORAL DAILY
Refills: 0 | Status: DISCONTINUED | OUTPATIENT
Start: 2019-09-26 | End: 2019-10-01

## 2019-09-26 RX ADMIN — BUDESONIDE AND FORMOTEROL FUMARATE DIHYDRATE 2 PUFF(S): 160; 4.5 AEROSOL RESPIRATORY (INHALATION) at 16:55

## 2019-09-26 RX ADMIN — HEPARIN SODIUM 1000 UNIT(S)/HR: 5000 INJECTION INTRAVENOUS; SUBCUTANEOUS at 09:55

## 2019-09-26 RX ADMIN — ATORVASTATIN CALCIUM 80 MILLIGRAM(S): 80 TABLET, FILM COATED ORAL at 22:10

## 2019-09-26 RX ADMIN — MONTELUKAST 10 MILLIGRAM(S): 4 TABLET, CHEWABLE ORAL at 09:55

## 2019-09-26 RX ADMIN — Medication 40 MILLIGRAM(S): at 05:28

## 2019-09-26 RX ADMIN — LOSARTAN POTASSIUM 50 MILLIGRAM(S): 100 TABLET, FILM COATED ORAL at 05:29

## 2019-09-26 RX ADMIN — Medication 100 MILLIGRAM(S): at 05:28

## 2019-09-26 RX ADMIN — Medication 100 MILLIGRAM(S): at 14:19

## 2019-09-26 RX ADMIN — Medication 3 MILLILITER(S): at 14:19

## 2019-09-26 RX ADMIN — Medication 81 MILLIGRAM(S): at 09:55

## 2019-09-26 RX ADMIN — Medication 3 MILLILITER(S): at 22:10

## 2019-09-26 RX ADMIN — AMLODIPINE BESYLATE 10 MILLIGRAM(S): 2.5 TABLET ORAL at 05:29

## 2019-09-26 RX ADMIN — BUDESONIDE AND FORMOTEROL FUMARATE DIHYDRATE 2 PUFF(S): 160; 4.5 AEROSOL RESPIRATORY (INHALATION) at 05:28

## 2019-09-26 RX ADMIN — Medication 100 MILLIGRAM(S): at 22:10

## 2019-09-26 RX ADMIN — Medication 3 MILLILITER(S): at 05:28

## 2019-09-26 NOTE — PROGRESS NOTE ADULT - ASSESSMENT
69yoM w/ PMHx significant for COPD, asthma, HTN, presented to [place] for SOB, on EKG with concerns for possible NSTEMI. Transferred to [facility] for possible cath, now with concern for COPD  exacerbation.

## 2019-09-26 NOTE — PROGRESS NOTE ADULT - SUBJECTIVE AND OBJECTIVE BOX
[facility]    Mirta Moffett, Mary, Krista, Sharee, Rodger, Aaron      [phone]    CHIEF COMPLAINT: Patient is a 69y old  Male who presents with a chief complaint of SOB, abnormal EKG (26 Sep 2019 08:50)      Follow Up: sob, elevated ce    Interim history: The patient reports no new symptoms.  Denies chest discomfort.  Improved shortness of breath.  No abdominal pain.  No new neurologic symptoms.      MEDICATIONS  (STANDING):  ALBUTerol/ipratropium for Nebulization 3 milliLiter(s) Nebulizer every 8 hours  amLODIPine   Tablet 10 milliGRAM(s) Oral daily  aspirin enteric coated 81 milliGRAM(s) Oral daily  atorvastatin 80 milliGRAM(s) Oral at bedtime  benzonatate 100 milliGRAM(s) Oral three times a day  buDESOnide 160 MICROgram(s)/formoterol 4.5 MICROgram(s) Inhaler 2 Puff(s) Inhalation two times a day  heparin  Infusion. 1050 Unit(s)/Hr (10.5 mL/Hr) IV Continuous <Continuous>  influenza   Vaccine 0.5 milliLiter(s) IntraMuscular once  losartan 50 milliGRAM(s) Oral daily  montelukast 10 milliGRAM(s) Oral daily  sodium chloride 0.9%. 1000 milliLiter(s) (50 mL/Hr) IV Continuous <Continuous>    MEDICATIONS  (PRN):  heparin  Injectable 3800 Unit(s) IV Push every 6 hours PRN For aPTT less than 40      REVIEW OF SYSTEMS:  eye, ent, GI, , allergic, dermatologic, musculoskeletal and neurologic are negative except as described above    Vital Signs Last 24 Hrs  T(C): 36.8 (26 Sep 2019 04:30), Max: 36.8 (26 Sep 2019 04:30)  T(F): 98.3 (26 Sep 2019 04:30), Max: 98.3 (26 Sep 2019 04:30)  HR: 74 (26 Sep 2019 04:30) (74 - 90)  BP: 138/64 (26 Sep 2019 04:30) (113/60 - 138/64)  BP(mean): --  RR: 18 (26 Sep 2019 04:30) (18 - 18)  SpO2: 97% (26 Sep 2019 04:30) (96% - 100%)    I&O's Summary    25 Sep 2019 07:01  -  26 Sep 2019 07:00  --------------------------------------------------------  IN: 1260 mL / OUT: 1850 mL / NET: -590 mL        Telemetry past 24h: sr/st    PHYSICAL EXAM:    Constitutional: well-nourished, well-developed, NAD   HEENT:  MMM, sclerae anicteric, conjunctivae clear, no oral cyanosis.  Pulmonary: Non-labored, breath sounds are clear bilaterally, No wheezing, rales or rhonchi  Cardiovascular: Regular, S1 and S2.  No murmur.  No rubs, gallops or clicks  Gastrointestinal: Bowel Sounds present, soft, nontender.   Lymph: No peripheral edema.   Neurological: Alert, no focal deficits  Skin: No rashes.  Psych:  Mood & affect appropriate    LABS: All Labs Reviewed:                        12.1   6.8   )-----------( 203      ( 26 Sep 2019 07:07 )             37.9                         12.5   8.1   )-----------( 202      ( 25 Sep 2019 05:22 )             38.0                         12.5   8.7   )-----------( 213      ( 24 Sep 2019 21:30 )             39.0     25 Sep 2019 05:22    138    |  102    |  32     ----------------------------<  133    3.7     |  22     |  1.11   24 Sep 2019 17:03    136    |  100    |  37     ----------------------------<  124    5.5     |  21     |  1.50   24 Sep 2019 01:05    138    |  101    |  28     ----------------------------<  114    5.8     |  23     |  1.50     Ca    8.8        25 Sep 2019 05:22  Ca    9.5        24 Sep 2019 17:03  Ca    9.4        24 Sep 2019 01:05  Phos  5.0       24 Sep 2019 17:03  Mg     2.2       24 Sep 2019 17:03  Mg     2.2       24 Sep 2019 01:05      PTT - ( 26 Sep 2019 07:06 )  PTT:72.6 sec      Blood Culture:         RADIOLOGY:    EKG:    Echo:    < from: Transthoracic Echocardiogram (09.25.19 @ 08:23) >    Patient name: ANABELLE JACINTO  YOB: 1950   Age: 69 (M)   MR#: [id number]  Study Date: 9/25/2019  [facility]  Study quality: Technically fair  Referring Physician: Gregg Ramachandran MD  Blood Pressure: 113/74 mmHg  Height: 185 cm  Weight: 68 kg  BSA: 1.9 m2  ------------------------------------------------------------------------  PROCEDURE: Transthoracic echocardiogram with 2-D, M-Mode  and complete spectral and color flow Doppler.  INDICATION: Dyspnea, unspecified (R06.00)  ------------------------------------------------------------------------  Dimensions:    Normal Values:  LA:     2.6    2.0 - 4.0 cm  Ao:     3.3    2.0 - 3.8 cm  SEPTUM: 0.9    0.6 - 1.2 cm  PWT:    0.9    0.6 - 1.1 cm  LVIDd:  4.0    3.0 - 5.6 cm  LVIDs:  2.4    1.8 - 4.0 cm  Derived variables:  LVMI: 58 g/m2  RWT: 0.45  Fractional short: 40 %  EF (Visual Estimate): 65 %  ------------------------------------------------------------------------  Observations:  Mitral Valve: Normal mitral valve. Minimal mitral  regurgitation.  Aortic Valve/Aorta: Thickened aortic valve.  Aortic Root: 3.3 cm.  Left Atrium: Normal left atrium.  Left Ventricle: Normal left ventricular systolic function.  No segmental wall motion abnormalities. Normal left  ventricular internal dimensions and wall thicknesses. Mild  diastolic dysfunction (Stage I).  Right Heart: Normal right atrium. Normal right ventricular  size and function. Normal tricuspid valve. Normal pulmonic  valve.  Pericardium/Pleura: Normal pericardium with no pericardial  effusion.  Hemodynamic: Estimated right atrial pressure is 8 mm Hg.  Inadequate tricuspid regurgitation Doppler envelope  precludes estimation of RVSP.  ------------------------------------------------------------------------  Conclusions:  1. Normal mitral valve. Minimal mitral regurgitation.  2. Thickened aortic valve.  3. Normal left ventricular systolic function. No segmental  wall motion abnormalities.  4. Mild diastolic dysfunction(Stage I).  5. Normal right ventricular size and function.  *** Compared with echocardiogram of 12/15/2017, no  significant changes noted.  ------------------------------------------------------------------------  Confirmed on  9/25/2019 - 14:02:24 by Víctor Maldonado MD  ------------------------------------------------------------------------    < end of copied text >

## 2019-09-26 NOTE — CHART NOTE - NSCHARTNOTEFT_GEN_A_CORE
Notified by stress lab of + stress test; suggestive of ischemia on RCA and LAD territory; Discussed findings with pt's cardiologist; Plan for possible angiogram in AM. Pt and wife updated on plan. Attending notified.

## 2019-09-26 NOTE — DISCHARGE NOTE PROVIDER - NSDCFUADDAPPT_GEN_ALL_CORE_FT
Follow up with cardiology in 1 week  Follow up with PMD in 1 week Follow up with cardiology in 1 week - can follow up with Dr. Walker or Dr. Pedraza   Follow up with PMD in 1 week

## 2019-09-26 NOTE — DISCHARGE NOTE PROVIDER - NSDCCPCAREPLAN_GEN_ALL_CORE_FT
PRINCIPAL DISCHARGE DIAGNOSIS  Diagnosis: NSTEMI (non-ST elevated myocardial infarction)  Assessment and Plan of Treatment: continue all your medications  Follow cardiac diet - avoid fatty & fried foods, don't eat too much red meat, eat lots of fruits & vegetables, dairy products should be low fat  Lose weight if you are overweight  Become more active with walking, gardening, or any other activity that gets you to move  Follow up with your cardiologist in 1 week        SECONDARY DISCHARGE DIAGNOSES  Diagnosis: RSV infection  Assessment and Plan of Treatment: you had a viral illness, that has resolved    Diagnosis: OWEN (acute kidney injury)  Assessment and Plan of Treatment: Resolved    Diagnosis: Acute respiratory failure with hypoxia  Assessment and Plan of Treatment: You had a viral illness that excaerbated your COPD   your symptoms have since resolved    Diagnosis: COPD exacerbation  Assessment and Plan of Treatment: continue inhlaers  Follow up with your pulmonologist

## 2019-09-26 NOTE — PROGRESS NOTE ADULT - SUBJECTIVE AND OBJECTIVE BOX
Patient is a 69y old  Male who presents with a chief complaint of SOB, abnormal EKG (26 Sep 2019 08:58)        SUBJECTIVE / OVERNIGHT EVENTS: no acute complaints. feeling much better       MEDICATIONS  (STANDING):  ALBUTerol/ipratropium for Nebulization 3 milliLiter(s) Nebulizer every 8 hours  amLODIPine   Tablet 10 milliGRAM(s) Oral daily  atorvastatin 80 milliGRAM(s) Oral at bedtime  benzonatate 100 milliGRAM(s) Oral three times a day  buDESOnide 160 MICROgram(s)/formoterol 4.5 MICROgram(s) Inhaler 2 Puff(s) Inhalation two times a day  influenza   Vaccine 0.5 milliLiter(s) IntraMuscular once  losartan 50 milliGRAM(s) Oral daily  montelukast 10 milliGRAM(s) Oral daily  sodium chloride 0.9%. 1000 milliLiter(s) (50 mL/Hr) IV Continuous <Continuous>    MEDICATIONS  (PRN):      Vital Signs Last 24 Hrs  T(C): 36.4 (26 Sep 2019 11:37), Max: 36.8 (26 Sep 2019 04:30)  T(F): 97.5 (26 Sep 2019 11:37), Max: 98.3 (26 Sep 2019 04:30)  HR: 88 (26 Sep 2019 11:37) (74 - 88)  BP: 121/62 (26 Sep 2019 11:37) (121/62 - 138/64)  BP(mean): --  RR: 18 (26 Sep 2019 11:37) (18 - 18)  SpO2: 99% (26 Sep 2019 11:37) (96% - 99%)  CAPILLARY BLOOD GLUCOSE        I&O's Summary    25 Sep 2019 07:01  -  26 Sep 2019 07:00  --------------------------------------------------------  IN: 1260 mL / OUT: 1850 mL / NET: -590 mL    26 Sep 2019 07:01  -  26 Sep 2019 14:19  --------------------------------------------------------  IN: 180 mL / OUT: 550 mL / NET: -370 mL        PHYSICAL EXAM:  GENERAL: NAD, breathing comfortably  EYES: conjunctiva and sclera clear  NECK: supple, No JVD  CHEST/LUNG: better air entry, no wheezing appreciated  HEART: S1 S2 RRR  ABDOMEN: +BS Soft, NT/ND  EXTREMITIES:  2+ DP Pulses, No c/c. no LE edema   NEUROLOGY: AAOx3, no facial droop, no focal deficits   SKIN: No rashes or lesions    LABS:                        12.1   6.8   )-----------( 203      ( 26 Sep 2019 07:07 )             37.9     09-    136  |  100  |  28<H>  ----------------------------<  104<H>  3.7   |  22  |  1.12    Ca    9.2      26 Sep 2019 07:06  Phos  5.0     09-24  Mg     2.2     09-24      PTT - ( 26 Sep 2019 07:06 )  PTT:72.6 sec      Urinalysis Basic - ( 24 Sep 2019 15:45 )    Color: Yellow / Appearance: Clear / S.016 / pH: x  Gluc: x / Ketone: Negative  / Bili: Negative / Urobili: Negative   Blood: x / Protein: Trace / Nitrite: Negative   Leuk Esterase: Negative / RBC: x / WBC x   Sq Epi: x / Non Sq Epi: x / Bacteria: x        RADIOLOGY & ADDITIONAL TESTS:    Imaging Personally Reviewed: Stress ordered per cardiology  Consultant(s) Notes Reviewed:    Care Discussed with Consultants/Other Providers:

## 2019-09-26 NOTE — DISCHARGE NOTE PROVIDER - CARE PROVIDER_API CALL
Jadiel Pedraza (MD)  Cardiology; Internal Medicine  [address] [facility] [address]  [place] [address]  Phone: [phone]  Fax: [phone]  Follow Up Time:     ZANE Maki  Phone: (   )    -  Fax: (   )    -  Follow Up Time: Jadiel Pedraza (MD)  Cardiology; Internal Medicine  [address] [facility], [address]  [place] [address]  Phone: [phone]  Fax: [phone]  Follow Up Time:     ZANE Maki  Phone: (   )    -  Fax: (   )    -  Follow Up Time:     Sonny Walker)  Internal Medicine  [address], [place] [id number]  Phone: [phone]  Fax: [phone]  Follow Up Time:

## 2019-09-26 NOTE — DISCHARGE NOTE PROVIDER - HOSPITAL COURSE
69M h/o COPD, asthma, HTN, presented to [facility] for SOB transferred to NS due to concern for NSTEMI. patient recommended for cath but patient declined. TTE done showed no wall motion abnormality with EF 65%. patient treated for COPD exacerbation likely due to viral infection with RVP positive for Rhino/enterovirus. patient given prednisone, nebs ATC and initially required O2 for acute hypoxic respiratory failure. patient's symptoms improved and O2 titrated down. patient had no s/s of acute decompensated heart failure. Elevated troponin trended down. ransferred to [facility] for possible cath, now with concern for COPD  exacerbation. Hospital course c/b OWEN likely due to prerenal azotemia that improved with IVF hydration. Patient atenolol stopped on admission due to copd exacerbation and will be restarted on discharge. Patient to follow up with pulmonary in 1-2 weeks. Cardiology... 69M h/o COPD, asthma, HTN, presented to [facility] for SOB transferred to NS due to concern for NSTEMI. patient recommended for cath but patient declined. TTE done showed no wall motion abnormality with EF 65%. patient treated for COPD exacerbation likely due to viral infection with RVP positive for Rhino/enterovirus. patient given prednisone, nebs ATC and initially required O2 for acute hypoxic respiratory failure. patient's symptoms improved and O2 titrated down. patient had no s/s of acute decompensated heart failure. Elevated troponin trended down. ransferred to [facility] for possible cath, now with concern for COPD  exacerbation. Hospital course c/b OWEN likely due to prerenal azotemia that improved with IVF hydration. Patient atenolol stopped on admission due to copd exacerbation and was restarted on discharge. pt had a positive stress test and underwent cardiac cath. he is s/p now s/p PCI to his LCx and staged PCI  to LAD (RISSA x 2) and D1 (RISSA x 1); -Pt to  continue with asa and plavix and  Lipitor 80. Discharged home with cardiology follow up.

## 2019-09-26 NOTE — PROGRESS NOTE ADULT - PROBLEM SELECTOR PLAN 2
Presented with sore throat, dry cough followed by SOB, improved with Duoneb and Solu-medrol at OSH. possible COPD exacerbation. Last hospitalization for COPD exacerbation in 2017. Currently SOB improved, no longer wheezing. Saturating 100% on 2L NC. CXR clear. Low suspicion for CHF.  - c/w Duoneb standing as poor air entry on exam  - wean off NC as tolerated   - RVP positive for Rhino/entero  - hold off Abx for now given low suspicion for bacterial infectious etiology  - c/w Prednisone 40mg x 2 more days given poor airway movement Presented with sore throat, dry cough followed by SOB, improved with Duoneb and Solu-medrol at OSH. possible COPD exacerbation. Last hospitalization for COPD exacerbation in 2017. Currently SOB improved, no longer wheezing. Saturating 100% on 2L NC. CXR clear. Low suspicion for CHF.  - c/w Duoneb standing as poor air entry on exam  - wean off NC as tolerated   - RVP positive for Rhino/entero  - hold off Abx for now given low suspicion for bacterial infectious etiology  - c/w Prednisone 40mg x 1 more days given poor airway movement

## 2019-09-26 NOTE — PROGRESS NOTE ADULT - ASSESSMENT
70 y/o man with h/o COPD, HTN, asthma who presented to OSH with sudden onset shortness of breath for 1 hour, found to have troponin elevation though no significant ECG changes from 1 year prior.  Presentation is atypical, and he had viral symptoms prior to all of this. Though it seems to be an NSTEMI, there is pulmonary and viral component to everything.    - He has no chest pain this morning and his breathing has improved overall  - he has received heparin for a sufficient amount of time so that it seems that it can be dc'd, plz dz the gtt  -echo reveals no wma, which places him into a lower risk category from the perspective of ischemic dz  - I had an extensive conversation with him about the merits of cath versus nuclear stress testing.  He understands that there is a risk of false negative stress testing, but prefers to avoid invasive testing  - will order pharm nuclear stress testing to better risk stratify him in the setting of elevated cardiac enzymes.  remains plausible that  this will reflect demand ischemia.     - Continue Lipitor 80  - amlodipine for HTN. Hold HCTZ  - hold off on bb in the setting of reactive airway disease  - CR and K improved yesterday  - troponin peaked  - Watch creatinine and electrolytes. Keep K>4, Mg>2  - Will follow with you

## 2019-09-26 NOTE — PROGRESS NOTE ADULT - ATTENDING COMMENTS
d/c planning - patient can f/u with pulmonary outpatient   Dr. M. Luke  [place] Hospitalist  [phone] discharge time - 35 minutes   Dr. M. Luke  Medicine Hospitalist  [phone]

## 2019-09-26 NOTE — DISCHARGE NOTE PROVIDER - CARE PROVIDERS DIRECT ADDRESSES
,[email or web address],DirectAddress_Unknown ,[email or web address],DirectAddress_Unknown,DirectAddress_Unknown

## 2019-09-26 NOTE — PROGRESS NOTE ADULT - PROBLEM SELECTOR PLAN 5
DVT: on Heparin gtt  Diet: regular DVT: stopping heparin gtt, pas in bed - possible d/c today  Diet: regular

## 2019-09-26 NOTE — PROGRESS NOTE ADULT - PROBLEM SELECTOR PLAN 3
Unclear type I vs. type II. ST changes on EKG with associated tryptonemia, transferred for possible cath. Given smoking history, possibly underlying CAD. Most recent TTE 2017, EF 55-60% with normal systolic function.  - monitor on telemetry  TTE w/o segmental wall motion abn  on heparin gtt, ASA  - cardiology f/u Unclear type I vs. type II. ST changes on EKG with associated tryptonemia, transferred for possible cath. Given smoking history, possibly underlying CAD. Most recent TTE 2017, EF 55-60% with normal systolic function.  - monitor on telemetry  TTE w/o segmental wall motion abn  ASA  - cardiology f/u - recommending Stress test, stop heparin gtt

## 2019-09-26 NOTE — DISCHARGE NOTE PROVIDER - PROVIDER TOKENS
PROVIDER:[TOKEN:[[id number]]],FREE:[LAST:[[place]],PHONE:[(   )    -],FAX:[(   )    -],ADDRESS:[PMD]] PROVIDER:[TOKEN:[[id number]]],FREE:[LAST:[[place]],PHONE:[(   )    -],FAX:[(   )    -],ADDRESS:[PMD]],PROVIDER:[TOKEN:[[id number]]]

## 2019-09-27 LAB
ANION GAP SERPL CALC-SCNC: 11 MMOL/L — SIGNIFICANT CHANGE UP (ref 5–17)
APTT BLD: 25.2 SEC — LOW (ref 27.5–36.3)
BUN SERPL-MCNC: 26 MG/DL — HIGH (ref 7–23)
CALCIUM SERPL-MCNC: 9.4 MG/DL — SIGNIFICANT CHANGE UP (ref 8.4–10.5)
CHLORIDE SERPL-SCNC: 101 MMOL/L — SIGNIFICANT CHANGE UP (ref 96–108)
CO2 SERPL-SCNC: 23 MMOL/L — SIGNIFICANT CHANGE UP (ref 22–31)
CREAT SERPL-MCNC: 1.16 MG/DL — SIGNIFICANT CHANGE UP (ref 0.5–1.3)
GLUCOSE SERPL-MCNC: 81 MG/DL — SIGNIFICANT CHANGE UP (ref 70–99)
HCT VFR BLD CALC: 38 % — LOW (ref 39–50)
HGB BLD-MCNC: 11.9 G/DL — LOW (ref 13–17)
MCHC RBC-ENTMCNC: 30.1 PG — SIGNIFICANT CHANGE UP (ref 27–34)
MCHC RBC-ENTMCNC: 31.2 GM/DL — LOW (ref 32–36)
MCV RBC AUTO: 96.4 FL — SIGNIFICANT CHANGE UP (ref 80–100)
PLATELET # BLD AUTO: 223 K/UL — SIGNIFICANT CHANGE UP (ref 150–400)
POTASSIUM SERPL-MCNC: 3.9 MMOL/L — SIGNIFICANT CHANGE UP (ref 3.5–5.3)
POTASSIUM SERPL-SCNC: 3.9 MMOL/L — SIGNIFICANT CHANGE UP (ref 3.5–5.3)
RBC # BLD: 3.94 M/UL — LOW (ref 4.2–5.8)
RBC # FLD: 12.7 % — SIGNIFICANT CHANGE UP (ref 10.3–14.5)
SODIUM SERPL-SCNC: 135 MMOL/L — SIGNIFICANT CHANGE UP (ref 135–145)
WBC # BLD: 6.9 K/UL — SIGNIFICANT CHANGE UP (ref 3.8–10.5)
WBC # FLD AUTO: 6.9 K/UL — SIGNIFICANT CHANGE UP (ref 3.8–10.5)

## 2019-09-27 PROCEDURE — 92928 PRQ TCAT PLMT NTRAC ST 1 LES: CPT | Mod: LC,GC

## 2019-09-27 PROCEDURE — 93572 IV DOP VEL&/PRESS C FLO EA: CPT | Mod: 26,LC,GC

## 2019-09-27 PROCEDURE — 93458 L HRT ARTERY/VENTRICLE ANGIO: CPT | Mod: 26,59,GC

## 2019-09-27 PROCEDURE — 99233 SBSQ HOSP IP/OBS HIGH 50: CPT

## 2019-09-27 PROCEDURE — 99152 MOD SED SAME PHYS/QHP 5/>YRS: CPT | Mod: GC

## 2019-09-27 PROCEDURE — 93571 IV DOP VEL&/PRESS C FLO 1ST: CPT | Mod: 26,LD,GC

## 2019-09-27 PROCEDURE — 93010 ELECTROCARDIOGRAM REPORT: CPT

## 2019-09-27 RX ORDER — CLOPIDOGREL BISULFATE 75 MG/1
75 TABLET, FILM COATED ORAL DAILY
Refills: 0 | Status: DISCONTINUED | OUTPATIENT
Start: 2019-09-28 | End: 2019-10-01

## 2019-09-27 RX ADMIN — BUDESONIDE AND FORMOTEROL FUMARATE DIHYDRATE 2 PUFF(S): 160; 4.5 AEROSOL RESPIRATORY (INHALATION) at 21:15

## 2019-09-27 RX ADMIN — Medication 100 MILLIGRAM(S): at 05:37

## 2019-09-27 RX ADMIN — LOSARTAN POTASSIUM 50 MILLIGRAM(S): 100 TABLET, FILM COATED ORAL at 05:37

## 2019-09-27 RX ADMIN — Medication 3 MILLILITER(S): at 13:12

## 2019-09-27 RX ADMIN — Medication 100 MILLIGRAM(S): at 21:17

## 2019-09-27 RX ADMIN — Medication 3 MILLILITER(S): at 21:16

## 2019-09-27 RX ADMIN — Medication 81 MILLIGRAM(S): at 13:12

## 2019-09-27 RX ADMIN — BUDESONIDE AND FORMOTEROL FUMARATE DIHYDRATE 2 PUFF(S): 160; 4.5 AEROSOL RESPIRATORY (INHALATION) at 05:37

## 2019-09-27 RX ADMIN — MONTELUKAST 10 MILLIGRAM(S): 4 TABLET, CHEWABLE ORAL at 13:12

## 2019-09-27 RX ADMIN — Medication 3 MILLILITER(S): at 05:38

## 2019-09-27 RX ADMIN — Medication 100 MILLIGRAM(S): at 13:12

## 2019-09-27 RX ADMIN — AMLODIPINE BESYLATE 10 MILLIGRAM(S): 2.5 TABLET ORAL at 05:37

## 2019-09-27 RX ADMIN — ATORVASTATIN CALCIUM 80 MILLIGRAM(S): 80 TABLET, FILM COATED ORAL at 21:15

## 2019-09-27 NOTE — CHART NOTE - NSCHARTNOTEFT_GEN_A_CORE
Patient underwent a PCI procedure and is being admitted as they are at increased risk for major adverse cardiac and vascular events if discharged due to the following high risk characteristics:      Pre-procedure Clinical Criteria  Major : Acute Coronary Syndrome - Unstable Angina    Angiographic Criteria       PEPE Jennings [phone]

## 2019-09-27 NOTE — PROGRESS NOTE ADULT - SUBJECTIVE AND OBJECTIVE BOX
Removal of Femoral Sheath    Pulses in the (right) lower extremity are (palpable). The patient was placed in the supine position. The insertion site was identified and the sutures were removed per protocol.  The __6__ [place] femoral sheath was then removed by Dick Biswas NP Direct pressure was applied for  ___20___ minutes.     Monitoring of the (right) groin and both lower extremities including neuro-vascular checks and vital signs every 15 minutes x 4, then every 30 minutes x 2, then every 1 hour was ordered.    Complications: None/Other    Comments: no bleeding or hematoma noted

## 2019-09-27 NOTE — PROGRESS NOTE ADULT - PROBLEM SELECTOR PLAN 4
On home Amlodipine, Losartan and atenolol at home  - mildly HTN in the ED likely 2/2 medication non-compliance   hold atenolol in setting of copd exacerbation - will likely restart on discharge

## 2019-09-27 NOTE — PROGRESS NOTE ADULT - ASSESSMENT
69yoM w/ PMHx significant for COPD, asthma, HTN, presented to [facility] for SOB, on EKG with concerns for possible NSTEMI. Transferred to [facility] for possible cath, now with concern for COPD  exacerbation. 69yoM w/ PMHx significant for COPD, asthma, HTN, presented to [facility] for SOB, on EKG with concerns for possible NSTEMI. Transferred to [facility] for possible cath treated for COPD  exacerbation with positive stress test plan for cath today.

## 2019-09-27 NOTE — PROGRESS NOTE ADULT - PROBLEM SELECTOR PLAN 3
Unclear type I vs. type II. ST changes on EKG with associated tryptonemia, transferred for possible cath. Given smoking history, possibly underlying CAD.   +Stress - plan for cath today, off heparin gtt  - monitor on telemetry  TTE w/o segmental wall motion abn  c/w ASA  cardiology f/u

## 2019-09-27 NOTE — PROGRESS NOTE ADULT - SUBJECTIVE AND OBJECTIVE BOX
[facility] - Mirta Moffett, Mary, Krista, Sharee, Aaron Soares  Office Number:  [phone]    Patient resting comfortably in bed in NAD.  Laying flat with no respiratory distress.  No complaints of chest pain, dyspnea, palpitations, PND, or orthopnea.    F/U for:  CAD, CHF, NSTEMI    Telemetry:  NSR.  No events.    MEDICATIONS  (STANDING):  ALBUTerol/ipratropium for Nebulization 3 milliLiter(s) Nebulizer every 8 hours  amLODIPine   Tablet 10 milliGRAM(s) Oral daily  aspirin enteric coated 81 milliGRAM(s) Oral daily  atorvastatin 80 milliGRAM(s) Oral at bedtime  benzonatate 100 milliGRAM(s) Oral three times a day  buDESOnide 160 MICROgram(s)/formoterol 4.5 MICROgram(s) Inhaler 2 Puff(s) Inhalation two times a day  influenza   Vaccine 0.5 milliLiter(s) IntraMuscular once  losartan 50 milliGRAM(s) Oral daily  montelukast 10 milliGRAM(s) Oral daily  sodium chloride 0.9%. 1000 milliLiter(s) (50 mL/Hr) IV Continuous <Continuous>      Allergies    No Known Allergies          Vital Signs Last 24 Hrs  T(C): 36.6 (27 Sep 2019 04:37), Max: 36.8 (26 Sep 2019 21:07)  T(F): 97.9 (27 Sep 2019 04:37), Max: 98.2 (26 Sep 2019 21:07)  HR: 77 (27 Sep 2019 04:37) (77 - 88)  BP: 139/69 (27 Sep 2019 04:37) (121/62 - 143/71)  BP(mean): --  RR: 17 (27 Sep 2019 04:37) (17 - 18)  SpO2: 98% (27 Sep 2019 04:37) (98% - 99%)    I&O's Summary    25 Sep 2019 07:01  -  26 Sep 2019 07:00  --------------------------------------------------------  IN: 1260 mL / OUT: 1850 mL / NET: -590 mL    26 Sep 2019 07:01  -  27 Sep 2019 06:52  --------------------------------------------------------  IN: 852.7 mL / OUT: 1200 mL / NET: -347.3 mL        ON EXAM:    Constitutional: well-nourished, well-developed, NAD   HEENT:  MMM, sclerae anicteric, conjunctivae clear, no oral cyanosis.  Pulmonary: Non-labored, breath sounds are clear bilaterally, No wheezing, rales or rhonchi  Cardiovascular: Regular, S1 and S2.  No murmur.  No rubs, gallops or clicks  Gastrointestinal: Bowel Sounds present, soft, nontender.   Lymph: No peripheral edema.   Neurological: Alert, no focal deficits  Skin: No rashes.  Psych:  Mood & affect appropriate      LABS: All Labs Reviewed:                        12.1   6.8   )-----------( 203      ( 26 Sep 2019 07:07 )             37.9                         12.5   8.1   )-----------( 202      ( 25 Sep 2019 05:22 )             38.0                         12.5   8.7   )-----------( 213      ( 24 Sep 2019 21:30 )             39.0     26 Sep 2019 07:06    136    |  100    |  28     ----------------------------<  104    3.7     |  22     |  1.12   25 Sep 2019 05:22    138    |  102    |  32     ----------------------------<  133    3.7     |  22     |  1.11   24 Sep 2019 17:03    136    |  100    |  37     ----------------------------<  124    5.5     |  21     |  1.50     Ca    9.2        26 Sep 2019 07:06  Ca    8.8        25 Sep 2019 05:22  Ca    9.5        24 Sep 2019 17:03  Phos  5.0       24 Sep 2019 17:03  Mg     2.2       24 Sep 2019 17:03      PTT - ( 26 Sep 2019 07:06 )  PTT:72.6 sec      < from: Transthoracic Echocardiogram (19 @ 08:23) >    Patient name: ANABELLE JACINTO  YOB: 1950   Age: 69 (M)   MR#: [id number]  Study Date: 2019  [facility]  Study quality: Technically fair  Referring Physician: Gregg Ramachandran MD  Blood Pressure: 113/74 mmHg  Height: 185 cm  Weight: 68 kg  BSA: 1.9 m2  ------------------------------------------------------------------------  PROCEDURE: Transthoracic echocardiogram with 2-D, M-Mode  and complete spectral and color flow Doppler.  INDICATION: Dyspnea, unspecified (R06.00)  ------------------------------------------------------------------------  Dimensions:    Normal Values:  LA:     2.6    2.0 - 4.0 cm  Ao:     3.3    2.0 - 3.8 cm  SEPTUM: 0.9    0.6 - 1.2 cm  PWT:    0.9    0.6 - 1.1 cm  LVIDd:  4.0    3.0 - 5.6 cm  LVIDs:  2.4    1.8 - 4.0 cm  Derived variables:  LVMI: 58 g/m2  RWT: 0.45  Fractional short: 40 %  EF (Visual Estimate): 65 %  ------------------------------------------------------------------------  Observations:  Mitral Valve: Normal mitral valve. Minimal mitral  regurgitation.  Aortic Valve/Aorta: Thickened aortic valve.  Aortic Root: 3.3 cm.  Left Atrium: Normal left atrium.  Left Ventricle: Normal left ventricular systolic function.  No segmental wall motion abnormalities. Normal left  ventricular internal dimensions and wall thicknesses. Mild  diastolic dysfunction (Stage I).  Right Heart: Normal right atrium. Normal right ventricular  size and function. Normal tricuspid valve. Normal pulmonic  valve.  Pericardium/Pleura: Normal pericardium with no pericardial  effusion.  Hemodynamic: Estimated right atrial pressure is 8 mm Hg.  Inadequate tricuspid regurgitation Doppler envelope  precludes estimation of RVSP.  ------------------------------------------------------------------------  Conclusions:  1. Normal mitral valve. Minimal mitral regurgitation.  2. Thickened aortic valve.  3. Normal left ventricular systolic function. No segmental  wall motion abnormalities.  4. Mild diastolic dysfunction(Stage I).  5. Normal right ventricular size and function.  *** Compared with echocardiogram of 12/15/2017, no  significant changes noted.  ------------------------------------------------------------------------  Confirmed on  2019 - 14:02:24 by Víctor Maldonado MD  ------------------------------------------------------------------------    < end of copied text >    < from: Nuclear Stress Test-Pharmacologic (19 @ 14:29) >    PATIENT: ANABELLE JACINTO  : 1950   AGE: 69 (M)   MR#: [id number]  STUDY DATE: 2019  LOCATION: [address] [place] [address]  REF. PHYSICIAN(S): Nafisa Nicholson MD  FELLOW: Mary Villatoro NP, TIM  ------------------------------------------------------------------------  TYPE OF TEST: Dual Isotope Pharmacologic  INDICATION: Abnormal electrocardiogram (ECG) (EKG)  (R94.31), Dyspnea, unspecified (R06.00)  ------------------------------------------------------------------------  HISTORY:  CARDIAC HISTORY: 69 year old man with PMH HTN, HLD, former  smoker, COPD/asthma presents for ischemic evaluation with  complaints of SOB, abnormal EKG, +cough  OTHER HISTORY: COPD/Asthma  RISK FACTORS: Past smoker, Elevated Cholesterol,  Hypertension  MEDICATIONS: Heparin, ASA, Tessalon, Duoneb, Singulair,  Atorvastatin, Norvasc, Losartan  ------------------------------------------------------------------------  BASELINE ELECTROCARDIOGRAM:  Rhythm: Sinus Rhythm - Frequent APD's - 81 BPM  Q Waves: Anteroseptal MI  ST: Nonspecific ST-T wave abnormality.  ------------------------------------------------------------------------  HEMODYNAMIC PARAMETERS:                                      HR      BP  Baseline  Pre-Injection             81  147/76  00:00     Inject Regadenoson         0  00:30     Post Injection             0  01:00     Post Injection            89  145/83  02:00     Post Injection           113  152/72  03:00     Post Injection           110  142/78  04:00     Post Injection           106  138/76  05:00     Recovery                 101  133/68  06:00     Recovery                 104  127/92  07:00     Recovery                 104  129/64  08:00     Recovery                 106  132/70  ------------------------------------------------------------------------  Agent: Regadenoson 0.4 mg/5 ml NS. intravenously injected  over 10 sec.  HR: Baseline HR: 81 bpm   Peak HR: 113 bpm (75% of MPHR)  MPHR: 151 bpm   85% of MPHR: 128 bpm  BP: Baseline BP: 147/76 mmHg   Peak BP: 152/72 mmHg   Peak  RPP: [id number] (Rate Pressure Product)  Last Caffeine intake: 12 hrs  Terminated: Completion of protocol  ------------------------------------------------------------------------  SYMPTOMS/FINDINGS:  Symptoms: No Symptoms  Chest pain: No chest pain with administration of  Regadenoson  ------------------------------------------------------------------------  ECG ABNORMALITIES DURING/AFTER STRESS:   ST Changes:ST Depression: 0.5 mm horizontal in leads II,  III, aVF, and V4-V6  started at 2:00 min following  regadenoson infusion at a HR of 113 bpm and persisted at  least 6:25 min into recovery.  ------------------------------------------------------------------------  NEW ARRHYTHMIAS DEVELOPED DURING/AFTER STRESS:  Occasional APDs occurred during rest.  ------------------------------------------------------------------------  STRESS TEST IMPRESSIONS:  Chest Pain: No chest pain with administration of  Regadenoson.  Symptom: No Symptoms.  HR Response: Appropriate.  BP Response: Appropriate.  Heart Rhythm: Sinus Rhythm - Frequent APD's - 81 BPM.  Q Waves: Anteroseptal MI.  Baseline ECG: Nonspecific ST-T wave abnormality.  ECG Changes: ST Depression: 0.5 mm horizontal in leads II,  III, aVF, and V4-V6  started at 2:00 min following  regadenoson infusion at a HR of 113 bpm and persisted at  least 6:25 min into recovery.  Arrhythmia: Occasional APDs occurred during rest.  ------------------------------------------------------------------------  PROCEDURE:  2.5 mCi of TI-201 were injected intravenously at rest.  Approximately 20 minutes later, tomographic images were  obtained in a 180 degree arc from right anterior oblique  to left anterior oblique with 32 stops. At a separate  time, 17.4 mCi of Tc99m Sestamibi were injected  intravenously during stress protocol. Approximately 85  minute(s) later, tomographic images were obtained in a 180  degree arc from right anterior oblique to left anterior  oblique with 64 stops. The tomographic slices were  reconstructed in 3 orthogonal planes (shortaxis,  horizontal long axis and vertical long axis).  Interpretation was performed both by visual and  quantitative analysis.  Stress images were acquired using  CZT-based system with pinhole collimation ([employer], [employer]), and reconstructed using MLEM  algorithm. Images were re-acquired with the patient in a  prone position.  ------------------------------------------------------------------------  NUCLEAR FINDINGS:  The left ventricle was normal in size. There are large,  severe defects in the anterior, anteroseptal, and apical  walls that are mostly reversible suggestive of infarct  with significant tracy-infarct ischemia.  There are large, moderate to severe defects in the  inferior, inferoseptal, and mid to distal inferolateral  walls that are mostly reversible suggestive of infarct  with significant tracy-infarct ischemia.  Increased right ventricular tracer uptake is noted on  stress imaging.  ------------------------------------------------------------------------  GATED ANALYSIS:  Post-stress gated wall motion analysis was performed (LVEF  = 61 %;LVEDV = 87 ml.) revealing markedly reduced systolic  thickening of the anterior, apical, septal, inferior, and  mid to distal inferolateral walls and hypokinesis of the  distal septal and apical septal walls.  Overall left  ventricular ejection fraction is preserved.  ------------------------------------------------------------------------  IMPRESSIONS:Abnormal Study  * Chest Pain: No chest pain with administration of  Regadenoson.  * Symptom: No Symptoms.  * HR Response: Appropriate.  * BP Response: Appropriate.  * Heart Rhythm: Sinus Rhythm - Frequent APD's - 81 BPM.  * Q Waves: Anteroseptal MI.  * Baseline ECG: Nonspecific ST-T wave abnormality.  * ECG Changes: ST Depression: 0.5 mm horizontal in leads  II, III, aVF, and V4-V6  started at 2:00 min following  regadenoson infusion at a HR of 113 bpm and persisted at  least 6:25 min into recovery.  * Arrhythmia: Occasional APDs occurred during rest.  * Theleft ventricle was normal in size. There are large,  severe defects in the anterior, anteroseptal, and apical  walls that are mostly reversible suggestive of infarct  with significant tracy-infarct ischemia.  * There are large, moderate to severe defects in the  inferior, inferoseptal, and mid to distal inferolateral  walls that are mostly reversible suggestive of infarct  with significant tracy-infarct ischemia.  * Increased right ventricular tracer uptake is noted on  stress imaging.  * Post-stress gated wall motion analysis was performed  (LVEF = 61 %;LVEDV = 87 ml.) revealing markedly reduced  systolic thickening of the anterior, apical, septal,  inferior, and mid to distal inferolateral walls and  hypokinesis of the distal septal and apical septal walls.  Overall left ventricular ejection fraction is preserved.  *** No previous Nuclear/Stress exam.  ------------------------------------------------------------------------  Confirmed on  2019 - 16:46:57 by Dick Barton M.D.  ----------------------------------------------------------------------    < end of copied text >

## 2019-09-27 NOTE — PROGRESS NOTE ADULT - SUBJECTIVE AND OBJECTIVE BOX
Patient is a 69y old  Male who presents with a chief complaint of SOB, abnormal EKG (27 Sep 2019 06:52)        SUBJECTIVE / OVERNIGHT EVENTS: no acute complaints      MEDICATIONS  (STANDING):  ALBUTerol/ipratropium for Nebulization 3 milliLiter(s) Nebulizer every 8 hours  amLODIPine   Tablet 10 milliGRAM(s) Oral daily  aspirin enteric coated 81 milliGRAM(s) Oral daily  atorvastatin 80 milliGRAM(s) Oral at bedtime  benzonatate 100 milliGRAM(s) Oral three times a day  buDESOnide 160 MICROgram(s)/formoterol 4.5 MICROgram(s) Inhaler 2 Puff(s) Inhalation two times a day  influenza   Vaccine 0.5 milliLiter(s) IntraMuscular once  losartan 50 milliGRAM(s) Oral daily  montelukast 10 milliGRAM(s) Oral daily  sodium chloride 0.9%. 1000 milliLiter(s) (50 mL/Hr) IV Continuous <Continuous>    MEDICATIONS  (PRN):      Vital Signs Last 24 Hrs  T(C): 36.7 (27 Sep 2019 12:28), Max: 36.8 (26 Sep 2019 21:07)  T(F): 98 (27 Sep 2019 12:28), Max: 98.2 (26 Sep 2019 21:07)  HR: 78 (27 Sep 2019 12:28) (77 - 86)  BP: 122/68 (27 Sep 2019 12:28) (122/68 - 143/71)  BP(mean): --  RR: 18 (27 Sep 2019 12:28) (17 - 18)  SpO2: 99% (27 Sep 2019 12:28) (98% - 99%)  CAPILLARY BLOOD GLUCOSE        I&O's Summary    26 Sep 2019 07:01  -  27 Sep 2019 07:00  --------------------------------------------------------  IN: 852.7 mL / OUT: 1200 mL / NET: -347.3 mL    27 Sep 2019 07:01  -  27 Sep 2019 16:13  --------------------------------------------------------  IN: 600 mL / OUT: 0 mL / NET: 600 mL        PHYSICAL EXAM:  GENERAL: NAD, breathing comfortably  EYES: conjunctiva and sclera clear  NECK: supple, No JVD  CHEST/LUNG: better air entry, no wheezing appreciated  HEART: S1 S2 RRR  ABDOMEN: +BS Soft, NT/ND  EXTREMITIES:  2+ DP Pulses, No c/c. no LE edema   NEUROLOGY: AAOx3, no facial droop, no focal deficits   SKIN: No rashes or lesions      LABS:                        11.9   6.9   )-----------( 223      ( 27 Sep 2019 06:52 )             38.0     09-27    135  |  101  |  26<H>  ----------------------------<  81  3.9   |  23  |  1.16    Ca    9.4      27 Sep 2019 06:52      PTT - ( 27 Sep 2019 06:52 )  PTT:25.2 sec          RADIOLOGY & ADDITIONAL TESTS:    Imaging Personally Reviewed: Stress test reviewed -  The left ventricle was normal in size. There are large,  severe defects in the anterior, anteroseptal, and apical  walls that are mostly reversible suggestive of infarct  with significant tracy-infarct ischemia.  * There are large, moderate to severe defects in the  inferior, inferoseptal, and mid to distal inferolateral  walls that are mostly reversible suggestive of infarct  with significant tracy-infarct ischemia.  * Increased right ventricular tracer uptake is noted on  stress imaging.  * Post-stress gated wall motion analysis was performed  (LVEF = 61 %;LVEDV = 87 ml.) revealing markedly reduced  systolic thickening of the anterior, apical, septal,  inferior, and mid to distal inferolateral walls and  hypokinesis of the distal septal and apical septal walls.  Overall left ventricular ejection fraction is preserved.    Consultant(s) Notes Reviewed:    Care Discussed with Consultants/Other Providers: d/w cath lab regarding scheduling of cath

## 2019-09-27 NOTE — PROGRESS NOTE ADULT - ASSESSMENT
68 y/o man with h/o COPD, HTN, asthma who presented to OSH with sudden onset shortness of breath for 1 hour, found to have troponin elevation though no significant ECG changes from 1 year prior.  Presentation is atypical, and he had viral symptoms prior to all of this. Though it seems to be an NSTEMI, there is pulmonary and viral component to everything.    - He has no chest pain this morning and his breathing has improved overall  - he has received heparin for a sufficient amount of time, and it was stopped yesterday.    -echo reveals no wma   - Nuclear stress test positive as above, and suspicious for multivessel CAD.  We discussed coronary angiography, and he is agreeable this AM.  This will be arranged for today hopefully   - Continue Lipitor 80  - amlodipine for HTN. Hold HCTZ  - hold off on bb in the setting of reactive airway disease  - troponin peaked  -Continue aspirin.  Will not start Plavix as nuclear suggestive of multivessel CAD.  At this point, prefer to define the anatomy first.  - Watch creatinine and electrolytes. Keep K>4, Mg>2  - Will follow with you

## 2019-09-27 NOTE — PROGRESS NOTE ADULT - PROBLEM SELECTOR PLAN 2
Presented with sore throat, dry cough followed by SOB, improved with Duoneb and Solu-medrol at OSH. possible COPD exacerbation. Last hospitalization for COPD exacerbation in 2017. Currently SOB improved, no longer wheezing. Saturating 100% on 2L NC. CXR clear. Low suspicion for CHF.  - c/w Duoneb standing as poor air entry on exam  - wean off NC as tolerated   - RVP positive for Rhino/entero  - hold off Abx for now given low suspicion for bacterial infectious etiology  s/p prednisone x 5 days

## 2019-09-28 LAB
ANION GAP SERPL CALC-SCNC: 12 MMOL/L — SIGNIFICANT CHANGE UP (ref 5–17)
BUN SERPL-MCNC: 18 MG/DL — SIGNIFICANT CHANGE UP (ref 7–23)
CALCIUM SERPL-MCNC: 8.8 MG/DL — SIGNIFICANT CHANGE UP (ref 8.4–10.5)
CHLORIDE SERPL-SCNC: 101 MMOL/L — SIGNIFICANT CHANGE UP (ref 96–108)
CO2 SERPL-SCNC: 22 MMOL/L — SIGNIFICANT CHANGE UP (ref 22–31)
CREAT SERPL-MCNC: 0.88 MG/DL — SIGNIFICANT CHANGE UP (ref 0.5–1.3)
GLUCOSE SERPL-MCNC: 83 MG/DL — SIGNIFICANT CHANGE UP (ref 70–99)
HCT VFR BLD CALC: 36 % — LOW (ref 39–50)
HGB BLD-MCNC: 11.9 G/DL — LOW (ref 13–17)
MCHC RBC-ENTMCNC: 31.5 PG — SIGNIFICANT CHANGE UP (ref 27–34)
MCHC RBC-ENTMCNC: 32.9 GM/DL — SIGNIFICANT CHANGE UP (ref 32–36)
MCV RBC AUTO: 95.8 FL — SIGNIFICANT CHANGE UP (ref 80–100)
PLATELET # BLD AUTO: 224 K/UL — SIGNIFICANT CHANGE UP (ref 150–400)
POTASSIUM SERPL-MCNC: 4 MMOL/L — SIGNIFICANT CHANGE UP (ref 3.5–5.3)
POTASSIUM SERPL-SCNC: 4 MMOL/L — SIGNIFICANT CHANGE UP (ref 3.5–5.3)
RBC # BLD: 3.76 M/UL — LOW (ref 4.2–5.8)
RBC # FLD: 12.6 % — SIGNIFICANT CHANGE UP (ref 10.3–14.5)
SODIUM SERPL-SCNC: 135 MMOL/L — SIGNIFICANT CHANGE UP (ref 135–145)
WBC # BLD: 6.3 K/UL — SIGNIFICANT CHANGE UP (ref 3.8–10.5)
WBC # FLD AUTO: 6.3 K/UL — SIGNIFICANT CHANGE UP (ref 3.8–10.5)

## 2019-09-28 PROCEDURE — 93010 ELECTROCARDIOGRAM REPORT: CPT

## 2019-09-28 PROCEDURE — 99233 SBSQ HOSP IP/OBS HIGH 50: CPT

## 2019-09-28 PROCEDURE — 99232 SBSQ HOSP IP/OBS MODERATE 35: CPT

## 2019-09-28 RX ADMIN — ATORVASTATIN CALCIUM 80 MILLIGRAM(S): 80 TABLET, FILM COATED ORAL at 21:17

## 2019-09-28 RX ADMIN — BUDESONIDE AND FORMOTEROL FUMARATE DIHYDRATE 2 PUFF(S): 160; 4.5 AEROSOL RESPIRATORY (INHALATION) at 05:42

## 2019-09-28 RX ADMIN — BUDESONIDE AND FORMOTEROL FUMARATE DIHYDRATE 2 PUFF(S): 160; 4.5 AEROSOL RESPIRATORY (INHALATION) at 18:03

## 2019-09-28 RX ADMIN — Medication 100 MILLIGRAM(S): at 12:46

## 2019-09-28 RX ADMIN — Medication 81 MILLIGRAM(S): at 12:45

## 2019-09-28 RX ADMIN — Medication 3 MILLILITER(S): at 12:48

## 2019-09-28 RX ADMIN — AMLODIPINE BESYLATE 10 MILLIGRAM(S): 2.5 TABLET ORAL at 05:17

## 2019-09-28 RX ADMIN — Medication 3 MILLILITER(S): at 21:17

## 2019-09-28 RX ADMIN — MONTELUKAST 10 MILLIGRAM(S): 4 TABLET, CHEWABLE ORAL at 12:45

## 2019-09-28 RX ADMIN — Medication 3 MILLILITER(S): at 05:16

## 2019-09-28 RX ADMIN — CLOPIDOGREL BISULFATE 75 MILLIGRAM(S): 75 TABLET, FILM COATED ORAL at 12:45

## 2019-09-28 RX ADMIN — Medication 100 MILLIGRAM(S): at 05:17

## 2019-09-28 RX ADMIN — LOSARTAN POTASSIUM 50 MILLIGRAM(S): 100 TABLET, FILM COATED ORAL at 05:17

## 2019-09-28 RX ADMIN — Medication 100 MILLIGRAM(S): at 21:17

## 2019-09-28 NOTE — CHART NOTE - NSCHARTNOTEFT_GEN_A_CORE
70 y/o man with h/o COPD, HTN, asthma who presented to OSH with sudden onset shortness of breath for 1 hour, found to have troponin elevation though no significant ECG changes from 1 year prior.  Presentation is atypical, and he had viral symptoms prior to all of this. Though it seems to be an NSTEMI, there is pulmonary and viral component to everything.   -Nuclear stress test abnormal now s/p PCI of Lcx RISSA X1 via RFA on 9/27  - right groin stable w/o bleeding or hematoma, soft and non tender to touch   +2 palpable bl pedal and femoral pulses and good capillary refill   - patient denies: chest pain, dyspnea, dizziness, palpitations, n&v  - patient denies numbness or tingling or RLE  - patient is able to ambulate without distress or pain to RLE  - Staged PCI for Monday, patient aware   - teaching reinforced about activity limitations, as well as importance of diet, exercise, smoke free life style, S&S of site complications  - teaching reinforces about importance of medication adherence specifically compliance with Aspirin and Plavix  - pt verbalizes teaching understanding and gives positive feedback   - Continue Lipitor  - cont amlodipine and Losartan   - cont to monitor renal function   - vs stable ( see floe sheet)   - no events on tele remains in SR 70-90 bpm   - ECG with SR no significant ST or T wave changes noted                           11.9   6.3   )-----------( 224      ( 28 Sep 2019 06:54 )             36.0     09-28    135  |  101  |  18  ----------------------------<  83  4.0   |  22  |  0.88    Ca    8.8      28 Sep 2019 06:54    Theresa Hennessy NP

## 2019-09-28 NOTE — PROGRESS NOTE ADULT - PROBLEM SELECTOR PLAN 3
Unclear type I vs. type II. ST changes on EKG with associated tryptonemia, transferred for possible cath. Given smoking history, possibly underlying CAD.   +Stress   s/p PCI to his LCx yesterday, with plan for PCI to his LAD on Monday.  - monitor on telemetry  TTE w/o segmental wall motion abn  c/w ASA  cardiology f/u

## 2019-09-28 NOTE — PROGRESS NOTE ADULT - ASSESSMENT
70 y/o man with h/o COPD, HTN, asthma who presented to OSH with sudden onset shortness of breath for 1 hour, found to have troponin elevation though no significant ECG changes from 1 year prior.  Presentation is atypical, and he had viral symptoms prior to all of this. Though it seems to be an NSTEMI, there is pulmonary and viral component to everything.    - He has no chest pain this morning and his breathing has improved overall  - echocardiogram reveals no wall motion abnormalities   - Nuclear stress test positive and suspicious for multivessel CAD. He is now s/p PCI to his LCx yesterday, with plan for PCI to his LAD on Monday.  - continue with asa and plavix  - Continue Lipitor 80  - amlodipine for HTN. Hold HCTZ  - hold off on bb in the setting of reactive airway disease  - troponin peaked  - Watch creatinine and electrolytes. Keep K>4, mg>2  - Will follow with you

## 2019-09-28 NOTE — PROGRESS NOTE ADULT - SUBJECTIVE AND OBJECTIVE BOX
[facility] - Mirta Moffett, Mary, Krista, Sharee, Aaron Soares  Office Number:  [phone]    Patient resting comfortably in bed in NAD.  Laying flat with no respiratory distress.  No complaints of chest pain, dyspnea, palpitations, PND, or orthopnea.  feeling well this morning.    ROS: negative unless otherwise mentioned.    Telemetry:  sr    MEDICATIONS  (STANDING):  ALBUTerol/ipratropium for Nebulization 3 milliLiter(s) Nebulizer every 8 hours  amLODIPine   Tablet 10 milliGRAM(s) Oral daily  aspirin enteric coated 81 milliGRAM(s) Oral daily  atorvastatin 80 milliGRAM(s) Oral at bedtime  benzonatate 100 milliGRAM(s) Oral three times a day  buDESOnide 160 MICROgram(s)/formoterol 4.5 MICROgram(s) Inhaler 2 Puff(s) Inhalation two times a day  clopidogrel Tablet 75 milliGRAM(s) Oral daily  influenza   Vaccine 0.5 milliLiter(s) IntraMuscular once  losartan 50 milliGRAM(s) Oral daily  montelukast 10 milliGRAM(s) Oral daily  sodium chloride 0.9%. 1000 milliLiter(s) (50 mL/Hr) IV Continuous <Continuous>    MEDICATIONS  (PRN):      Allergies    No Known Allergies    Intolerances        Vital Signs Last 24 Hrs  T(C): 36.6 (28 Sep 2019 04:38), Max: 36.6 (28 Sep 2019 04:38)  T(F): 97.9 (28 Sep 2019 04:38), Max: 97.9 (28 Sep 2019 04:38)  HR: 76 (28 Sep 2019 04:38) (70 - 82)  BP: 128/69 (28 Sep 2019 04:38) (119/73 - 158/78)  BP(mean): --  RR: 18 (28 Sep 2019 04:38) (18 - 18)  SpO2: 98% (28 Sep 2019 04:38) (97% - 98%)    I&O's Summary    27 Sep 2019 07:01  -  28 Sep 2019 07:00  --------------------------------------------------------  IN: 600 mL / OUT: 400 mL / NET: 200 mL    28 Sep 2019 07:01  -  28 Sep 2019 12:46  --------------------------------------------------------  IN: 420 mL / OUT: 0 mL / NET: 420 mL        ON EXAM:    Constitutional: well-nourished, well-developed, NAD   HEENT:  MMM, sclerae anicteric, conjunctivae clear, no oral cyanosis.  Pulmonary: Non-labored, breath sounds are clear bilaterally, No wheezing, rales or rhonchi  Cardiovascular: Regular, S1 and S2.  No murmur.  No rubs, gallops or clicks  Gastrointestinal: Bowel Sounds present, soft, nontender.   Lymph: No peripheral edema.   Neurological: Alert, no focal deficits  Skin: No rashes.  Psych:  Mood & affect appropriate  LABS: All Labs Reviewed:                        11.9   6.3   )-----------( 224      ( 28 Sep 2019 06:54 )             36.0                         11.9   6.9   )-----------( 223      ( 27 Sep 2019 06:52 )             38.0                         12.1   6.8   )-----------( 203      ( 26 Sep 2019 07:07 )             37.9     28 Sep 2019 06:54    135    |  101    |  18     ----------------------------<  83     4.0     |  22     |  0.88   27 Sep 2019 06:52    135    |  101    |  26     ----------------------------<  81     3.9     |  23     |  1.16   26 Sep 2019 07:06    136    |  100    |  28     ----------------------------<  104    3.7     |  22     |  1.12     Ca    8.8        28 Sep 2019 06:54  Ca    9.4        27 Sep 2019 06:52  Ca    9.2        26 Sep 2019 07:06      PTT - ( 27 Sep 2019 06:52 )  PTT:25.2 sec      Blood Culture:

## 2019-09-28 NOTE — PROGRESS NOTE ADULT - SUBJECTIVE AND OBJECTIVE BOX
Patient is a 69y old  Male who presents with a chief complaint of SOB, abnormal EKG (28 Sep 2019 12:45)      SUBJECTIVE / OVERNIGHT EVENTS: sinus 80-90 on tele, feels better, no cp, sob, chills     MEDICATIONS  (STANDING):  ALBUTerol/ipratropium for Nebulization 3 milliLiter(s) Nebulizer every 8 hours  amLODIPine   Tablet 10 milliGRAM(s) Oral daily  aspirin enteric coated 81 milliGRAM(s) Oral daily  atorvastatin 80 milliGRAM(s) Oral at bedtime  benzonatate 100 milliGRAM(s) Oral three times a day  buDESOnide 160 MICROgram(s)/formoterol 4.5 MICROgram(s) Inhaler 2 Puff(s) Inhalation two times a day  clopidogrel Tablet 75 milliGRAM(s) Oral daily  influenza   Vaccine 0.5 milliLiter(s) IntraMuscular once  losartan 50 milliGRAM(s) Oral daily  montelukast 10 milliGRAM(s) Oral daily  sodium chloride 0.9%. 1000 milliLiter(s) (50 mL/Hr) IV Continuous <Continuous>    MEDICATIONS  (PRN):        CAPILLARY BLOOD GLUCOSE        I&O's Summary    27 Sep 2019 07:01  -  28 Sep 2019 07:00  --------------------------------------------------------  IN: 600 mL / OUT: 400 mL / NET: 200 mL    28 Sep 2019 07:01  -  28 Sep 2019 17:50  --------------------------------------------------------  IN: 720 mL / OUT: 600 mL / NET: 120 mL        PHYSICAL EXAM:  GENERAL: NAD, well-developed  HEAD:  Atraumatic, Normocephalic  EYES: EOMI, PERRLA, conjunctiva and sclera clear  NECK: Supple, No JVD  CHEST/LUNG: Clear to auscultation bilaterally; No wheeze  HEART: Regular rate and rhythm; No murmurs, rubs, or gallops  ABDOMEN: Soft, Nontender, Nondistended; Bowel sounds present  EXTREMITIES:  2+ Peripheral Pulses, No clubbing, cyanosis, or edema  PSYCH: AAOx3  NEUROLOGY: non-focal  SKIN: No rashes or lesions    LABS:                        11.9   6.3   )-----------( 224      ( 28 Sep 2019 06:54 )             36.0     09-28    135  |  101  |  18  ----------------------------<  83  4.0   |  22  |  0.88    Ca    8.8      28 Sep 2019 06:54      PTT - ( 27 Sep 2019 06:52 )  PTT:25.2 sec          RADIOLOGY & ADDITIONAL TESTS:    Imaging Personally Reviewed:    Consultant(s) Notes Reviewed:      Care Discussed with Consultants/Other Providers:

## 2019-09-28 NOTE — PROGRESS NOTE ADULT - ASSESSMENT
69yoM w/ PMHx significant for COPD, asthma, HTN, presented to [place] for SOB, on EKG with concerns for possible NSTEMI. Transferred to [facility] for possible cath treated for COPD  exacerbation with positive stress test.  Pt s/p PCI to his LCx yesterday, with plan for PCI to his LAD on Monday.

## 2019-09-29 LAB
ANION GAP SERPL CALC-SCNC: 12 MMOL/L — SIGNIFICANT CHANGE UP (ref 5–17)
BUN SERPL-MCNC: 18 MG/DL — SIGNIFICANT CHANGE UP (ref 7–23)
CALCIUM SERPL-MCNC: 9 MG/DL — SIGNIFICANT CHANGE UP (ref 8.4–10.5)
CHLORIDE SERPL-SCNC: 101 MMOL/L — SIGNIFICANT CHANGE UP (ref 96–108)
CO2 SERPL-SCNC: 24 MMOL/L — SIGNIFICANT CHANGE UP (ref 22–31)
CREAT SERPL-MCNC: 0.88 MG/DL — SIGNIFICANT CHANGE UP (ref 0.5–1.3)
GLUCOSE SERPL-MCNC: 97 MG/DL — SIGNIFICANT CHANGE UP (ref 70–99)
HCT VFR BLD CALC: 35.7 % — LOW (ref 39–50)
HGB BLD-MCNC: 11.8 G/DL — LOW (ref 13–17)
MAGNESIUM SERPL-MCNC: 1.8 MG/DL — SIGNIFICANT CHANGE UP (ref 1.6–2.6)
MCHC RBC-ENTMCNC: 31.9 PG — SIGNIFICANT CHANGE UP (ref 27–34)
MCHC RBC-ENTMCNC: 33.2 GM/DL — SIGNIFICANT CHANGE UP (ref 32–36)
MCV RBC AUTO: 96.1 FL — SIGNIFICANT CHANGE UP (ref 80–100)
PHOSPHATE SERPL-MCNC: 3.6 MG/DL — SIGNIFICANT CHANGE UP (ref 2.5–4.5)
PLATELET # BLD AUTO: 212 K/UL — SIGNIFICANT CHANGE UP (ref 150–400)
POTASSIUM SERPL-MCNC: 4.3 MMOL/L — SIGNIFICANT CHANGE UP (ref 3.5–5.3)
POTASSIUM SERPL-SCNC: 4.3 MMOL/L — SIGNIFICANT CHANGE UP (ref 3.5–5.3)
RBC # BLD: 3.71 M/UL — LOW (ref 4.2–5.8)
RBC # FLD: 12.6 % — SIGNIFICANT CHANGE UP (ref 10.3–14.5)
SODIUM SERPL-SCNC: 137 MMOL/L — SIGNIFICANT CHANGE UP (ref 135–145)
WBC # BLD: 6.3 K/UL — SIGNIFICANT CHANGE UP (ref 3.8–10.5)
WBC # FLD AUTO: 6.3 K/UL — SIGNIFICANT CHANGE UP (ref 3.8–10.5)

## 2019-09-29 PROCEDURE — 99232 SBSQ HOSP IP/OBS MODERATE 35: CPT

## 2019-09-29 PROCEDURE — 99233 SBSQ HOSP IP/OBS HIGH 50: CPT

## 2019-09-29 RX ORDER — MAGNESIUM SULFATE 500 MG/ML
1 VIAL (ML) INJECTION ONCE
Refills: 0 | Status: COMPLETED | OUTPATIENT
Start: 2019-09-29 | End: 2019-09-29

## 2019-09-29 RX ADMIN — Medication 3 MILLILITER(S): at 05:52

## 2019-09-29 RX ADMIN — Medication 100 MILLIGRAM(S): at 21:41

## 2019-09-29 RX ADMIN — Medication 100 MILLIGRAM(S): at 05:53

## 2019-09-29 RX ADMIN — AMLODIPINE BESYLATE 10 MILLIGRAM(S): 2.5 TABLET ORAL at 05:52

## 2019-09-29 RX ADMIN — BUDESONIDE AND FORMOTEROL FUMARATE DIHYDRATE 2 PUFF(S): 160; 4.5 AEROSOL RESPIRATORY (INHALATION) at 17:18

## 2019-09-29 RX ADMIN — LOSARTAN POTASSIUM 50 MILLIGRAM(S): 100 TABLET, FILM COATED ORAL at 05:53

## 2019-09-29 RX ADMIN — CLOPIDOGREL BISULFATE 75 MILLIGRAM(S): 75 TABLET, FILM COATED ORAL at 12:32

## 2019-09-29 RX ADMIN — MONTELUKAST 10 MILLIGRAM(S): 4 TABLET, CHEWABLE ORAL at 12:31

## 2019-09-29 RX ADMIN — Medication 100 GRAM(S): at 09:36

## 2019-09-29 RX ADMIN — ATORVASTATIN CALCIUM 80 MILLIGRAM(S): 80 TABLET, FILM COATED ORAL at 21:41

## 2019-09-29 RX ADMIN — Medication 3 MILLILITER(S): at 13:19

## 2019-09-29 RX ADMIN — Medication 3 MILLILITER(S): at 21:41

## 2019-09-29 RX ADMIN — BUDESONIDE AND FORMOTEROL FUMARATE DIHYDRATE 2 PUFF(S): 160; 4.5 AEROSOL RESPIRATORY (INHALATION) at 05:53

## 2019-09-29 RX ADMIN — Medication 81 MILLIGRAM(S): at 12:31

## 2019-09-29 RX ADMIN — Medication 100 MILLIGRAM(S): at 13:18

## 2019-09-29 NOTE — PROGRESS NOTE ADULT - ASSESSMENT
69yoM w/ PMHx significant for COPD, asthma, HTN, presented to [place] for SOB, on EKG with concerns for possible NSTEMI. Transferred to [facility] for possible cath treated for COPD  exacerbation with positive stress test.  Pt s/p PCI to his LCx yesterday, with plan for PCI to his LAD on Monday.

## 2019-09-29 NOTE — PROGRESS NOTE ADULT - PROBLEM SELECTOR PLAN 2
Presented with sore throat, dry cough followed by SOB, improved with Duoneb and Solu-medrol at OSH. possible COPD exacerbation. Last hospitalization for COPD exacerbation in 2017. Currently SOB improved, no longer wheezing. Saturating 98% on RA. CXR clear. Low suspicion for CHF.  c/w nebs   RVP positive for Rhino/entero  hold off Abx for now given low suspicion for bacterial infectious etiology  s/p prednisone x 5 days

## 2019-09-29 NOTE — PROGRESS NOTE ADULT - SUBJECTIVE AND OBJECTIVE BOX
[facility] - Mirta Moffett, Mary, Krista, Sharee, Aaron Soares  Office Number:  [phone]    Patient resting comfortably in bed in NAD.  Laying flat with no respiratory distress.  No complaints of chest pain, dyspnea, palpitations, PND, or orthopnea.  feeling better this morning    ROS: negative unless otherwise mentioned.    Telemetry:  sr     MEDICATIONS  (STANDING):  ALBUTerol/ipratropium for Nebulization 3 milliLiter(s) Nebulizer every 8 hours  amLODIPine   Tablet 10 milliGRAM(s) Oral daily  aspirin enteric coated 81 milliGRAM(s) Oral daily  atorvastatin 80 milliGRAM(s) Oral at bedtime  benzonatate 100 milliGRAM(s) Oral three times a day  buDESOnide 160 MICROgram(s)/formoterol 4.5 MICROgram(s) Inhaler 2 Puff(s) Inhalation two times a day  clopidogrel Tablet 75 milliGRAM(s) Oral daily  influenza   Vaccine 0.5 milliLiter(s) IntraMuscular once  losartan 50 milliGRAM(s) Oral daily  montelukast 10 milliGRAM(s) Oral daily  sodium chloride 0.9%. 1000 milliLiter(s) (50 mL/Hr) IV Continuous <Continuous>    MEDICATIONS  (PRN):      Allergies    No Known Allergies    Intolerances        Vital Signs Last 24 Hrs  T(C): 36.3 (29 Sep 2019 13:11), Max: 37.1 (29 Sep 2019 04:52)  T(F): 97.3 (29 Sep 2019 13:11), Max: 98.7 (29 Sep 2019 04:52)  HR: 84 (29 Sep 2019 13:11) (73 - 84)  BP: 122/66 (29 Sep 2019 13:11) (118/67 - 126/67)  BP(mean): --  RR: 19 (29 Sep 2019 13:11) (18 - 19)  SpO2: 97% (29 Sep 2019 13:11) (94% - 98%)    I&O's Summary    28 Sep 2019 07:01  -  29 Sep 2019 07:00  --------------------------------------------------------  IN: 1200 mL / OUT: 1050 mL / NET: 150 mL        ON EXAM:    General: NAD, awake and alert, oriented x 3  HEENT: Mucous membranes are moist, anicteric  Lungs: Non-labored, breath sounds are clear bilaterally, No wheezing, rales or rhonchi  Cardiovascular: Regular, S1 and S2, no murmurs, rubs, or gallops  Gastrointestinal: Bowel Sounds present, soft, nontender.   Lymph: No peripheral edema. No lymphadenopathy.  Skin: No rashes or ulcers  Psych:  Mood & affect appropriate    LABS: All Labs Reviewed:                        11.8   6.3   )-----------( 212      ( 29 Sep 2019 06:34 )             35.7                         11.9   6.3   )-----------( 224      ( 28 Sep 2019 06:54 )             36.0                         11.9   6.9   )-----------( 223      ( 27 Sep 2019 06:52 )             38.0     29 Sep 2019 06:34    137    |  101    |  18     ----------------------------<  97     4.3     |  24     |  0.88   28 Sep 2019 06:54    135    |  101    |  18     ----------------------------<  83     4.0     |  22     |  0.88   27 Sep 2019 06:52    135    |  101    |  26     ----------------------------<  81     3.9     |  23     |  1.16     Ca    9.0        29 Sep 2019 06:34  Ca    8.8        28 Sep 2019 06:54  Ca    9.4        27 Sep 2019 06:52  Phos  3.6       29 Sep 2019 06:34  Mg     1.8       29 Sep 2019 06:34            Blood Culture:

## 2019-09-29 NOTE — PROGRESS NOTE ADULT - SUBJECTIVE AND OBJECTIVE BOX
Patient is a 69y old  Male who presents with a chief complaint of SOB, abnormal EKG (29 Sep 2019 13:15)      SUBJECTIVE / OVERNIGHT EVENTS: sinus 70-80 on tele, no cp, sob, palpitations, chills, n/v    MEDICATIONS  (STANDING):  ALBUTerol/ipratropium for Nebulization 3 milliLiter(s) Nebulizer every 8 hours  amLODIPine   Tablet 10 milliGRAM(s) Oral daily  aspirin enteric coated 81 milliGRAM(s) Oral daily  atorvastatin 80 milliGRAM(s) Oral at bedtime  benzonatate 100 milliGRAM(s) Oral three times a day  buDESOnide 160 MICROgram(s)/formoterol 4.5 MICROgram(s) Inhaler 2 Puff(s) Inhalation two times a day  clopidogrel Tablet 75 milliGRAM(s) Oral daily  influenza   Vaccine 0.5 milliLiter(s) IntraMuscular once  losartan 50 milliGRAM(s) Oral daily  montelukast 10 milliGRAM(s) Oral daily  sodium chloride 0.9%. 1000 milliLiter(s) (50 mL/Hr) IV Continuous <Continuous>    MEDICATIONS  (PRN):        CAPILLARY BLOOD GLUCOSE        I&O's Summary    28 Sep 2019 07:01  -  29 Sep 2019 07:00  --------------------------------------------------------  IN: 1200 mL / OUT: 1050 mL / NET: 150 mL    29 Sep 2019 07:01  -  29 Sep 2019 15:12  --------------------------------------------------------  IN: 480 mL / OUT: 0 mL / NET: 480 mL        PHYSICAL EXAM:  GENERAL: NAD, well-developed  HEAD:  Atraumatic, Normocephalic  EYES: conjunctiva and sclera clear  NECK:  No JVD  CHEST/LUNG: Clear to auscultation bilaterally; No wheeze  HEART: Regular rate and rhythm; S1S2  ABDOMEN: Soft, Nontender, Nondistended; Bowel sounds present  EXTREMITIES:  2+ Peripheral Pulses, No clubbing, cyanosis, or edema  PSYCH: AAOx3      LABS:                        11.8   6.3   )-----------( 212      ( 29 Sep 2019 06:34 )             35.7     09-29    137  |  101  |  18  ----------------------------<  97  4.3   |  24  |  0.88    Ca    9.0      29 Sep 2019 06:34  Phos  3.6     09-29  Mg     1.8     09-29                RADIOLOGY & ADDITIONAL TESTS:    Imaging Personally Reviewed:    Consultant(s) Notes Reviewed:  cardio    Care Discussed with Consultants/Other Providers:

## 2019-09-29 NOTE — PROGRESS NOTE ADULT - ASSESSMENT
68 y/o man with h/o COPD, HTN, asthma who presented to OSH with sudden onset shortness of breath for 1 hour, found to have troponin elevation though no significant ECG changes from 1 year prior.  Presentation is atypical, and he had viral symptoms prior to all of this. He has been treated for NSTEMI, in the setting of viral illness.    - He has no chest pain this morning and his breathing has improved overall  - echocardiogram reveals no wall motion abnormalities   - Nuclear stress test positive and suspicious for multivessel CAD. He is now s/p PCI to his LCx yesterday, with plan for PCI to his LAD on tomorrow.  - continue with asa and plavix  - Continue Lipitor 80  - amlodipine for HTN. Hold HCTZ  - hold off on bb in the setting of reactive airway disease  - troponin peaked  - Watch creatinine and electrolytes. Keep K>4, mg>2  - Will follow with you

## 2019-09-29 NOTE — PROGRESS NOTE ADULT - PROBLEM SELECTOR PLAN 3
Unclear type I vs. type II. ST changes on EKG with associated tryptonemia, transferred for possible cath. Given smoking history, possibly underlying CAD.   +Stress   s/p PCI to his LCx yesterday, with plan for PCI to his LAD on Monday.  - monitor on telemetry  TTE w/o segmental wall motion abn  c/w ASA  cardiology f/u  hold bblocker for now in the setting of reactive airway dx

## 2019-09-30 LAB
ANION GAP SERPL CALC-SCNC: 12 MMOL/L — SIGNIFICANT CHANGE UP (ref 5–17)
BUN SERPL-MCNC: 18 MG/DL — SIGNIFICANT CHANGE UP (ref 7–23)
CALCIUM SERPL-MCNC: 9.1 MG/DL — SIGNIFICANT CHANGE UP (ref 8.4–10.5)
CHLORIDE SERPL-SCNC: 99 MMOL/L — SIGNIFICANT CHANGE UP (ref 96–108)
CO2 SERPL-SCNC: 23 MMOL/L — SIGNIFICANT CHANGE UP (ref 22–31)
CREAT SERPL-MCNC: 0.91 MG/DL — SIGNIFICANT CHANGE UP (ref 0.5–1.3)
GLUCOSE SERPL-MCNC: 101 MG/DL — HIGH (ref 70–99)
HCT VFR BLD CALC: 36.9 % — LOW (ref 39–50)
HGB BLD-MCNC: 11.8 G/DL — LOW (ref 13–17)
MAGNESIUM SERPL-MCNC: 2 MG/DL — SIGNIFICANT CHANGE UP (ref 1.6–2.6)
MCHC RBC-ENTMCNC: 31 PG — SIGNIFICANT CHANGE UP (ref 27–34)
MCHC RBC-ENTMCNC: 32 GM/DL — SIGNIFICANT CHANGE UP (ref 32–36)
MCV RBC AUTO: 97 FL — SIGNIFICANT CHANGE UP (ref 80–100)
PLATELET # BLD AUTO: 226 K/UL — SIGNIFICANT CHANGE UP (ref 150–400)
POTASSIUM SERPL-MCNC: 4.3 MMOL/L — SIGNIFICANT CHANGE UP (ref 3.5–5.3)
POTASSIUM SERPL-SCNC: 4.3 MMOL/L — SIGNIFICANT CHANGE UP (ref 3.5–5.3)
RBC # BLD: 3.81 M/UL — LOW (ref 4.2–5.8)
RBC # FLD: 12.6 % — SIGNIFICANT CHANGE UP (ref 10.3–14.5)
SODIUM SERPL-SCNC: 134 MMOL/L — LOW (ref 135–145)
WBC # BLD: 6.4 K/UL — SIGNIFICANT CHANGE UP (ref 3.8–10.5)
WBC # FLD AUTO: 6.4 K/UL — SIGNIFICANT CHANGE UP (ref 3.8–10.5)

## 2019-09-30 PROCEDURE — 92928 PRQ TCAT PLMT NTRAC ST 1 LES: CPT | Mod: LD

## 2019-09-30 PROCEDURE — 99232 SBSQ HOSP IP/OBS MODERATE 35: CPT

## 2019-09-30 PROCEDURE — 93010 ELECTROCARDIOGRAM REPORT: CPT

## 2019-09-30 PROCEDURE — 92929: CPT | Mod: LD

## 2019-09-30 PROCEDURE — 99152 MOD SED SAME PHYS/QHP 5/>YRS: CPT

## 2019-09-30 PROCEDURE — 92978 ENDOLUMINL IVUS OCT C 1ST: CPT | Mod: 26,LD

## 2019-09-30 RX ORDER — ATENOLOL 25 MG/1
25 TABLET ORAL DAILY
Refills: 0 | Status: DISCONTINUED | OUTPATIENT
Start: 2019-10-01 | End: 2019-10-01

## 2019-09-30 RX ORDER — IPRATROPIUM/ALBUTEROL SULFATE 18-103MCG
3 AEROSOL WITH ADAPTER (GRAM) INHALATION EVERY 6 HOURS
Refills: 0 | Status: DISCONTINUED | OUTPATIENT
Start: 2019-09-30 | End: 2019-10-01

## 2019-09-30 RX ORDER — FENTANYL CITRATE 50 UG/ML
25 INJECTION INTRAVENOUS ONCE
Refills: 0 | Status: DISCONTINUED | OUTPATIENT
Start: 2019-09-30 | End: 2019-09-30

## 2019-09-30 RX ADMIN — Medication 100 MILLIGRAM(S): at 21:40

## 2019-09-30 RX ADMIN — LOSARTAN POTASSIUM 50 MILLIGRAM(S): 100 TABLET, FILM COATED ORAL at 05:33

## 2019-09-30 RX ADMIN — ATORVASTATIN CALCIUM 80 MILLIGRAM(S): 80 TABLET, FILM COATED ORAL at 21:40

## 2019-09-30 RX ADMIN — Medication 100 MILLIGRAM(S): at 05:33

## 2019-09-30 RX ADMIN — BUDESONIDE AND FORMOTEROL FUMARATE DIHYDRATE 2 PUFF(S): 160; 4.5 AEROSOL RESPIRATORY (INHALATION) at 05:33

## 2019-09-30 RX ADMIN — BUDESONIDE AND FORMOTEROL FUMARATE DIHYDRATE 2 PUFF(S): 160; 4.5 AEROSOL RESPIRATORY (INHALATION) at 18:33

## 2019-09-30 RX ADMIN — Medication 81 MILLIGRAM(S): at 07:48

## 2019-09-30 RX ADMIN — AMLODIPINE BESYLATE 10 MILLIGRAM(S): 2.5 TABLET ORAL at 05:33

## 2019-09-30 RX ADMIN — CLOPIDOGREL BISULFATE 75 MILLIGRAM(S): 75 TABLET, FILM COATED ORAL at 07:49

## 2019-09-30 RX ADMIN — Medication 3 MILLILITER(S): at 05:33

## 2019-09-30 RX ADMIN — MONTELUKAST 10 MILLIGRAM(S): 4 TABLET, CHEWABLE ORAL at 07:49

## 2019-09-30 RX ADMIN — FENTANYL CITRATE 25 MICROGRAM(S): 50 INJECTION INTRAVENOUS at 13:50

## 2019-09-30 RX ADMIN — FENTANYL CITRATE 25 MICROGRAM(S): 50 INJECTION INTRAVENOUS at 13:35

## 2019-09-30 NOTE — CHART NOTE - NSCHARTNOTEFT_GEN_A_CORE
Patient underwent a PCI procedure and is being admitted as they are at increased risk for major adverse cardiac and vascular events if discharged due to the following high risk characteristics: Unstable angina, s/p staged PCI DESx2 to LAD and DESx1 to prox Diag.

## 2019-09-30 NOTE — PROGRESS NOTE ADULT - ASSESSMENT
69yoM w/ PMHx significant for COPD, asthma, HTN, presented to [place] for SOB, on EKG with concerns for possible NSTEMI. Transferred to [facility] for possible cath treated for COPD  exacerbation with positive stress test.  Pt s/p PCI to his LCx yesterday, with plan for staged PCI to LAD today.

## 2019-09-30 NOTE — PROGRESS NOTE ADULT - PROBLEM SELECTOR PLAN 1
ST changes on EKG with associated tryptonemia  +Stress   s/p PCI to his LCx 9/27, with plan for PCI to his LAD today.  - monitor on telemetry  TTE w/o segmental wall motion abn  c/w ASA  cardiology f/u  bblocker held for now in the setting of reactive airway dx - will restart low dose atenolol (home med)

## 2019-09-30 NOTE — PROGRESS NOTE ADULT - SUBJECTIVE AND OBJECTIVE BOX
Patient is a 69y old  Male who presents with a chief complaint of SOB, abnormal EKG (30 Sep 2019 07:17)        SUBJECTIVE / OVERNIGHT EVENTS: no acute complaints      MEDICATIONS  (STANDING):  ALBUTerol/ipratropium for Nebulization 3 milliLiter(s) Nebulizer every 8 hours  amLODIPine   Tablet 10 milliGRAM(s) Oral daily  aspirin enteric coated 81 milliGRAM(s) Oral daily  atorvastatin 80 milliGRAM(s) Oral at bedtime  benzonatate 100 milliGRAM(s) Oral three times a day  buDESOnide 160 MICROgram(s)/formoterol 4.5 MICROgram(s) Inhaler 2 Puff(s) Inhalation two times a day  clopidogrel Tablet 75 milliGRAM(s) Oral daily  influenza   Vaccine 0.5 milliLiter(s) IntraMuscular once  losartan 50 milliGRAM(s) Oral daily  montelukast 10 milliGRAM(s) Oral daily  sodium chloride 0.9%. 1000 milliLiter(s) (50 mL/Hr) IV Continuous <Continuous>    MEDICATIONS  (PRN):      Vital Signs Last 24 Hrs  T(C): 36.8 (30 Sep 2019 05:16), Max: 36.8 (29 Sep 2019 20:47)  T(F): 98.2 (30 Sep 2019 05:16), Max: 98.3 (29 Sep 2019 20:47)  HR: 67 (30 Sep 2019 05:16) (67 - 77)  BP: 135/70 (30 Sep 2019 05:16) (129/55 - 135/70)  BP(mean): --  RR: 18 (30 Sep 2019 05:16) (18 - 18)  SpO2: 98% (30 Sep 2019 05:16) (97% - 98%)  CAPILLARY BLOOD GLUCOSE        I&O's Summary    29 Sep 2019 07:01  -  30 Sep 2019 07:00  --------------------------------------------------------  IN: 480 mL / OUT: 0 mL / NET: 480 mL    30 Sep 2019 07:01  -  30 Sep 2019 14:38  --------------------------------------------------------  IN: 240 mL / OUT: 0 mL / NET: 240 mL        PHYSICAL EXAM:  GENERAL: NAD, breathing comfortably  EYES: conjunctiva and sclera clear  NECK: supple, No JVD  CHEST/LUNG: better air entry, no wheezing appreciated  HEART: S1 S2 RRR  ABDOMEN: +BS Soft, NT/ND  EXTREMITIES:  2+ DP Pulses, No c/c. no LE edema   NEUROLOGY: AAOx3, no facial droop, no focal deficits   SKIN: No rashes or lesions      LABS:                        11.8   6.4   )-----------( 226      ( 30 Sep 2019 06:55 )             36.9     09-30    134<L>  |  99  |  18  ----------------------------<  101<H>  4.3   |  23  |  0.91    Ca    9.1      30 Sep 2019 06:55  Phos  3.6     09-29  Mg     2.0     09-30                RADIOLOGY & ADDITIONAL TESTS:    Imaging Personally Reviewed: cath today  Consultant(s) Notes Reviewed:    Care Discussed with Consultants/Other Providers:

## 2019-09-30 NOTE — PROGRESS NOTE ADULT - ASSESSMENT
68 y/o man with h/o COPD, HTN, asthma who presented to OSH with sudden onset shortness of breath for 1 hour, found to have troponin elevation though no significant ECG changes from 1 year prior.  Presentation is atypical, and he had viral symptoms prior to all of this. He has been treated for NSTEMI, in the setting of viral illness.    - He has no chest pain this morning and his breathing has improved overall  - echocardiogram reveals no wall motion abnormalities   - Nuclear stress test positive and suspicious for multivessel CAD. He is now s/p PCI to his LCx. Plan for staged PCI to LAD today.   - continue with asa and plavix  - Continue Lipitor 80  - amlodipine for HTN. Hold HCTZ  - hold off on bb in the setting of reactive airway disease.    - troponin peaked  - Watch creatinine and electrolytes. Keep K>4, mg>2  - Will follow with you

## 2019-09-30 NOTE — PROGRESS NOTE ADULT - PROBLEM SELECTOR PLAN 4
On home Amlodipine, Losartan and atenolol at home  - mildly HTN in the ED likely 2/2 medication non-compliance   restart low dose atenolol

## 2019-09-30 NOTE — PROGRESS NOTE ADULT - SUBJECTIVE AND OBJECTIVE BOX
[facility] - Mirta Moffett, Mary, Krista, Sharee, Aaron Soares  Office Number:  [phone]    Patient resting comfortably in bed in NAD.  Laying flat with no respiratory distress.  No complaints of chest pain, dyspnea, palpitations, PND, or orthopnea.    F/U for:  CAD    Telemetry:  NSR.  5 WCT    MEDICATIONS  (STANDING):  ALBUTerol/ipratropium for Nebulization 3 milliLiter(s) Nebulizer every 8 hours  amLODIPine   Tablet 10 milliGRAM(s) Oral daily  aspirin enteric coated 81 milliGRAM(s) Oral daily  atorvastatin 80 milliGRAM(s) Oral at bedtime  benzonatate 100 milliGRAM(s) Oral three times a day  buDESOnide 160 MICROgram(s)/formoterol 4.5 MICROgram(s) Inhaler 2 Puff(s) Inhalation two times a day  clopidogrel Tablet 75 milliGRAM(s) Oral daily  influenza   Vaccine 0.5 milliLiter(s) IntraMuscular once  losartan 50 milliGRAM(s) Oral daily  montelukast 10 milliGRAM(s) Oral daily  sodium chloride 0.9%. 1000 milliLiter(s) (50 mL/Hr) IV Continuous <Continuous>          Allergies    No Known Allergies        Vital Signs Last 24 Hrs  T(C): 36.8 (30 Sep 2019 05:16), Max: 36.8 (29 Sep 2019 20:47)  T(F): 98.2 (30 Sep 2019 05:16), Max: 98.3 (29 Sep 2019 20:47)  HR: 67 (30 Sep 2019 05:16) (67 - 84)  BP: 135/70 (30 Sep 2019 05:16) (122/66 - 135/70)  BP(mean): --  RR: 18 (30 Sep 2019 05:16) (18 - 19)  SpO2: 98% (30 Sep 2019 05:16) (97% - 98%)    I&O's Summary    29 Sep 2019 07:01  -  30 Sep 2019 07:00  --------------------------------------------------------  IN: 480 mL / OUT: 0 mL / NET: 480 mL        ON EXAM:    General: NAD, awake and alert, oriented x 3  HEENT: Mucous membranes are moist, anicteric  Lungs: Non-labored, breath sounds are clear bilaterally, No wheezing, rales or rhonchi  Cardiovascular: Regular, S1 and S2, no murmurs, rubs, or gallops  Gastrointestinal: Bowel Sounds present, soft, nontender.   Lymph: No peripheral edema. No lymphadenopathy.  Skin: No rashes or ulcers  Psych:  Mood & affect appropriate    LABS: All Labs Reviewed:                        11.8   6.4   )-----------( 226      ( 30 Sep 2019 06:55 )             36.9                         11.8   6.3   )-----------( 212      ( 29 Sep 2019 06:34 )             35.7                         11.9   6.3   )-----------( 224      ( 28 Sep 2019 06:54 )             36.0     29 Sep 2019 06:34    137    |  101    |  18     ----------------------------<  97     4.3     |  24     |  0.88   28 Sep 2019 06:54    135    |  101    |  18     ----------------------------<  83     4.0     |  22     |  0.88     Ca    9.0        29 Sep 2019 06:34  Ca    8.8        28 Sep 2019 06:54  Phos  3.6       29 Sep 2019 06:34  Mg     1.8       29 Sep 2019 06:34            Blood Culture:

## 2019-09-30 NOTE — CHART NOTE - NSCHARTNOTEFT_GEN_A_CORE
Removal of Femoral Sheath    Pulses in the right lower extremity are palpable.   The patient was placed in the supine position. The insertion site was identified and the sutures were removed per protocol.    The __6__ [place] femoral sheath was then removed.   Direct pressure was applied for  _20_ minutes.   Complications: None, VSS, Good Hemostasis.     Monitoring of the right/left groin and both lower extremities including neuro-vascular checks and vital signs every 15 minutes x 4, then every 30 minutes x 2, then every 1 hour was ordered.    Discharge Instruction discussed with patient: ASA, Plavix, statin, diet, activities, access site care, follow up care, reportable signs and symptoms.     A/P   69 year old male, HTN, asthma, COPD s/p  s/p PCI to his LCx, now staged PCI today in mLAD and pLCX via right groin access.     Plan: continue to monitor, Continue ASA, Plavix, Statin,   discharge home in am if stable.  Pt will follow up with his cardiologist in 1-2weeks. Removal of Femoral Sheath    Pulses in the right lower extremity are palpable.   The patient was placed in the supine position. The insertion site was identified and the sutures were removed per protocol.    The __6__ [place] femoral sheath was then removed.   Direct pressure was applied for  _20_ minutes.   Complications: golf ball size hematoma prior sheath removal manually compressed during sheath removal with good hemostasis, s/p 25mcg IVP fentanyl for pain control. VSS,  Pain relieved on reassessment.      Monitoring of the right groin and both lower extremities including neuro-vascular checks and vital signs every 15 minutes x 4, then every 30 minutes x 2, then every 1 hour was ordered.    Discharge Instruction discussed with patient: ASA, Plavix, statin, diet, activities, access site care, follow up care, reportable signs and symptoms.     A/P   69 year old male, HTN, asthma, COPD s/p  s/p PCI to his LCx, now staged PCI today in mLAD and pLCX via right groin access.     Plan: continue to monitor, Continue ASA, Plavix, Statin,   discharge home in am if stable, planning per primary care.  Pt will follow up with his cardiologist in 1-2weeks.

## 2019-10-01 ENCOUNTER — TRANSCRIPTION ENCOUNTER (OUTPATIENT)
Age: 69
End: 2019-10-01

## 2019-10-01 VITALS
TEMPERATURE: 97 F | OXYGEN SATURATION: 94 % | RESPIRATION RATE: 19 BRPM | SYSTOLIC BLOOD PRESSURE: 118 MMHG | HEART RATE: 74 BPM | DIASTOLIC BLOOD PRESSURE: 63 MMHG

## 2019-10-01 LAB
ANION GAP SERPL CALC-SCNC: 10 MMOL/L — SIGNIFICANT CHANGE UP (ref 5–17)
BUN SERPL-MCNC: 15 MG/DL — SIGNIFICANT CHANGE UP (ref 7–23)
CALCIUM SERPL-MCNC: 8.8 MG/DL — SIGNIFICANT CHANGE UP (ref 8.4–10.5)
CHLORIDE SERPL-SCNC: 100 MMOL/L — SIGNIFICANT CHANGE UP (ref 96–108)
CO2 SERPL-SCNC: 26 MMOL/L — SIGNIFICANT CHANGE UP (ref 22–31)
CREAT SERPL-MCNC: 0.86 MG/DL — SIGNIFICANT CHANGE UP (ref 0.5–1.3)
GLUCOSE SERPL-MCNC: 90 MG/DL — SIGNIFICANT CHANGE UP (ref 70–99)
MAGNESIUM SERPL-MCNC: 1.9 MG/DL — SIGNIFICANT CHANGE UP (ref 1.6–2.6)
POTASSIUM SERPL-MCNC: 4.1 MMOL/L — SIGNIFICANT CHANGE UP (ref 3.5–5.3)
POTASSIUM SERPL-SCNC: 4.1 MMOL/L — SIGNIFICANT CHANGE UP (ref 3.5–5.3)
SODIUM SERPL-SCNC: 136 MMOL/L — SIGNIFICANT CHANGE UP (ref 135–145)

## 2019-10-01 PROCEDURE — 99239 HOSP IP/OBS DSCHRG MGMT >30: CPT

## 2019-10-01 PROCEDURE — 99232 SBSQ HOSP IP/OBS MODERATE 35: CPT

## 2019-10-01 RX ORDER — ATENOLOL 25 MG/1
1 TABLET ORAL
Qty: 0 | Refills: 0 | DISCHARGE
Start: 2019-10-01

## 2019-10-01 RX ORDER — MONTELUKAST 4 MG/1
1 TABLET, CHEWABLE ORAL
Qty: 30 | Refills: 0
Start: 2019-10-01 | End: 2019-10-30

## 2019-10-01 RX ORDER — BUDESONIDE AND FORMOTEROL FUMARATE DIHYDRATE 160; 4.5 UG/1; UG/1
2 AEROSOL RESPIRATORY (INHALATION)
Qty: 0 | Refills: 0 | DISCHARGE

## 2019-10-01 RX ORDER — CLOPIDOGREL BISULFATE 75 MG/1
1 TABLET, FILM COATED ORAL
Qty: 30 | Refills: 0
Start: 2019-10-01 | End: 2019-10-30

## 2019-10-01 RX ADMIN — ATENOLOL 25 MILLIGRAM(S): 25 TABLET ORAL at 05:32

## 2019-10-01 RX ADMIN — MONTELUKAST 10 MILLIGRAM(S): 4 TABLET, CHEWABLE ORAL at 09:42

## 2019-10-01 RX ADMIN — CLOPIDOGREL BISULFATE 75 MILLIGRAM(S): 75 TABLET, FILM COATED ORAL at 09:42

## 2019-10-01 RX ADMIN — BUDESONIDE AND FORMOTEROL FUMARATE DIHYDRATE 2 PUFF(S): 160; 4.5 AEROSOL RESPIRATORY (INHALATION) at 05:32

## 2019-10-01 RX ADMIN — LOSARTAN POTASSIUM 50 MILLIGRAM(S): 100 TABLET, FILM COATED ORAL at 05:32

## 2019-10-01 RX ADMIN — Medication 81 MILLIGRAM(S): at 09:42

## 2019-10-01 RX ADMIN — AMLODIPINE BESYLATE 10 MILLIGRAM(S): 2.5 TABLET ORAL at 05:32

## 2019-10-01 RX ADMIN — BUDESONIDE AND FORMOTEROL FUMARATE DIHYDRATE 2 PUFF(S): 160; 4.5 AEROSOL RESPIRATORY (INHALATION) at 17:16

## 2019-10-01 RX ADMIN — Medication 100 MILLIGRAM(S): at 05:32

## 2019-10-01 RX ADMIN — Medication 100 MILLIGRAM(S): at 13:10

## 2019-10-01 NOTE — PROGRESS NOTE ADULT - SUBJECTIVE AND OBJECTIVE BOX
[facility] - Mirta Moffett, Mary, Krista, Sharee, Aaron Soares  Office Number:  [phone]    Patient resting comfortably in bed in NAD.  Laying flat with no respiratory distress.  No complaints of chest pain, dyspnea, palpitations, PND, or orthopnea.    ROS: negative unless otherwise mentioned.    Telemetry:  sr, 7 beats nsvt    MEDICATIONS  (STANDING):  amLODIPine   Tablet 10 milliGRAM(s) Oral daily  aspirin enteric coated 81 milliGRAM(s) Oral daily  ATENolol  Tablet 25 milliGRAM(s) Oral daily  atorvastatin 80 milliGRAM(s) Oral at bedtime  benzonatate 100 milliGRAM(s) Oral three times a day  buDESOnide 160 MICROgram(s)/formoterol 4.5 MICROgram(s) Inhaler 2 Puff(s) Inhalation two times a day  clopidogrel Tablet 75 milliGRAM(s) Oral daily  influenza   Vaccine 0.5 milliLiter(s) IntraMuscular once  losartan 50 milliGRAM(s) Oral daily  montelukast 10 milliGRAM(s) Oral daily  sodium chloride 0.9%. 1000 milliLiter(s) (50 mL/Hr) IV Continuous <Continuous>    MEDICATIONS  (PRN):  ALBUTerol/ipratropium for Nebulization 3 milliLiter(s) Nebulizer every 6 hours PRN Shortness of Breath and/or Wheezing      Allergies    No Known Allergies    Intolerances        Vital Signs Last 24 Hrs  T(C): 36.8 (01 Oct 2019 04:58), Max: 36.9 (30 Sep 2019 20:53)  T(F): 98.3 (01 Oct 2019 04:58), Max: 98.5 (30 Sep 2019 20:53)  HR: 73 (01 Oct 2019 07:02) (66 - 92)  BP: 135/72 (01 Oct 2019 07:02) (115/76 - 135/72)  BP(mean): --  RR: 18 (01 Oct 2019 04:58) (18 - 18)  SpO2: 97% (01 Oct 2019 04:58) (96% - 97%)    I&O's Summary    30 Sep 2019 07:01  -  01 Oct 2019 07:00  --------------------------------------------------------  IN: 540 mL / OUT: 0 mL / NET: 540 mL        ON EXAM:    General: NAD, awake and alert, oriented x 3  HEENT: Mucous membranes are moist, anicteric  Lungs: Non-labored, breath sounds are clear bilaterally, No wheezing, rales or rhonchi  Cardiovascular: Regular, S1 and S2, no murmurs, rubs, or gallops  Gastrointestinal: Bowel Sounds present, soft, nontender.   Lymph: No peripheral edema. No lymphadenopathy.  Skin: No rashes or ulcers  Psych:  Mood & affect appropriate    LABS: All Labs Reviewed:                        11.8   6.4   )-----------( 226      ( 30 Sep 2019 06:55 )             36.9                         11.8   6.3   )-----------( 212      ( 29 Sep 2019 06:34 )             35.7     01 Oct 2019 08:57    136    |  100    |  15     ----------------------------<  90     4.1     |  26     |  0.86   30 Sep 2019 06:55    134    |  99     |  18     ----------------------------<  101    4.3     |  23     |  0.91   29 Sep 2019 06:34    137    |  101    |  18     ----------------------------<  97     4.3     |  24     |  0.88     Ca    8.8        01 Oct 2019 08:57  Ca    9.1        30 Sep 2019 06:55  Ca    9.0        29 Sep 2019 06:34  Phos  3.6       29 Sep 2019 06:34  Mg     1.9       01 Oct 2019 08:57  Mg     2.0       30 Sep 2019 06:55  Mg     1.8       29 Sep 2019 06:34            Blood Culture:

## 2019-10-01 NOTE — DISCHARGE NOTE NURSING/CASE MANAGEMENT/SOCIAL WORK - NSDCFUADDAPPT_GEN_ALL_CORE_FT
Follow up with cardiology in 1 week - can follow up with Dr. Walker or Dr. Pedraza   Follow up with PMD in 1 week

## 2019-10-01 NOTE — PROGRESS NOTE ADULT - SUBJECTIVE AND OBJECTIVE BOX
Overnight Events: NAEON    Review Of Systems: No chest pain, shortness of breath, or palpitations            Current Meds:  ALBUTerol/ipratropium for Nebulization 3 milliLiter(s) Nebulizer every 6 hours PRN  amLODIPine   Tablet 10 milliGRAM(s) Oral daily  aspirin enteric coated 81 milliGRAM(s) Oral daily  ATENolol  Tablet 25 milliGRAM(s) Oral daily  atorvastatin 80 milliGRAM(s) Oral at bedtime  benzonatate 100 milliGRAM(s) Oral three times a day  buDESOnide 160 MICROgram(s)/formoterol 4.5 MICROgram(s) Inhaler 2 Puff(s) Inhalation two times a day  clopidogrel Tablet 75 milliGRAM(s) Oral daily  influenza   Vaccine 0.5 milliLiter(s) IntraMuscular once  losartan 50 milliGRAM(s) Oral daily  montelukast 10 milliGRAM(s) Oral daily  sodium chloride 0.9%. 1000 milliLiter(s) IV Continuous <Continuous>    Vitals:  T(F): 98.3 (10-01), Max: 98.5 (09-30)  HR: 73 (10-01) (66 - 92)  BP: 135/72 (10-01) (115/76 - 135/72)  RR: 18 (10-01)  SpO2: 97% (10-01)  I&O's Summary    30 Sep 2019 07:01  -  01 Oct 2019 07:00  --------------------------------------------------------  IN: 540 mL / OUT: 0 mL / NET: 540 mL    Physical Exam:  Appearance: No acute distress; well appearing  Cardiovascular: RRR, S1, S2, no murmurs, rubs, or gallops; no JVD  Respiratory: Poor air entry b/l  Gastrointestinal: soft, non-tender, non-distended with normal bowel sounds  Ext: WWP no edema, 2+ DP pulses. R groin site with superficial ecchymoses but soft without hematoma                          11.8   6.4   )-----------( 226      ( 30 Sep 2019 06:55 )             36.9     09-30    134<L>  |  99  |  18  ----------------------------<  101<H>  4.3   |  23  |  0.91    Ca    9.1      30 Sep 2019 06:55  Mg     2.0     09-30    CARDIAC MARKERS ( 24 Sep 2019 17:03 )  227 ng/L / x     / x     / x     / x     / x          Imaging:    TTE: 1. Normal mitral valve. Minimal mitral regurgitation.  2. Thickened aortic valve.  3. Normal left ventricular systolic function. No segmental  wall motion abnormalities.  4. Mild diastolic dysfunction(Stage I).  5. Normal right ventricular size and function.    Cath: CORONARY VESSELS: The coronary circulation is right dominant.  LM:   --  LM: Normal.  LAD:   --  Proximal LAD: There was a 60 % stenosis.  --  Mid LAD: There was a 70 % stenosis. IFR=0.77  --  D1: There was a 70 % stenosis.  CX:   --  Proximal circumflex: There was a 80 % stenosis. RWI=076  RCA:   --  Mid RCA: There was a 50 % stenosis.  COMPLICATIONS: There were no complications.  DIAGNOSTIC IMPRESSIONS: Severe, IFR abn Lcx and LAD disease.  INTERVENTIONAL IMPRESSIONS: Successful PCI to the LCx. Staged PC to the  LAD.    Interpretation of Telemetry: 7 beats of NSVT, other NSR    Patient seen and examined at bedside.

## 2019-10-01 NOTE — CHART NOTE - NSCHARTNOTEFT_GEN_A_CORE
7 beats of WCT    Notified at 0702 that patient had 7 beats of WCT. Denies chest pain sob    Vital Signs Last 24 Hrs  T(C): 36.8 (01 Oct 2019 04:58), Max: 36.9 (30 Sep 2019 20:53)  T(F): 98.3 (01 Oct 2019 04:58), Max: 98.5 (30 Sep 2019 20:53)  HR: 73 (01 Oct 2019 07:02) (66 - 92)  BP: 135/72 (01 Oct 2019 07:02) (115/76 - 135/72)  BP(mean): --  RR: 18 (01 Oct 2019 04:58) (18 - 18)  SpO2: 97% (01 Oct 2019 04:58) (96% - 97%)    MEDICATIONS  (STANDING):  amLODIPine   Tablet 10 milliGRAM(s) Oral daily  aspirin enteric coated 81 milliGRAM(s) Oral daily  ATENolol  Tablet 25 milliGRAM(s) Oral daily  atorvastatin 80 milliGRAM(s) Oral at bedtime  benzonatate 100 milliGRAM(s) Oral three times a day  buDESOnide 160 MICROgram(s)/formoterol 4.5 MICROgram(s) Inhaler 2 Puff(s) Inhalation two times a day  clopidogrel Tablet 75 milliGRAM(s) Oral daily  influenza   Vaccine 0.5 milliLiter(s) IntraMuscular once  losartan 50 milliGRAM(s) Oral daily  montelukast 10 milliGRAM(s) Oral daily  sodium chloride 0.9%. 1000 milliLiter(s) (50 mL/Hr) IV Continuous <Continuous>    09-30    134<L>  |  99  |  18  ----------------------------<  101<H>  4.3   |  23  |  0.91    Ca    9.1      30 Sep 2019 06:55  Mg     2.0     09-30    Plan  c/w BB  see am electrolytes above    Endorsed to day NP, attending to follow    Hallie Franklin NP  spectralink [phone]

## 2019-10-01 NOTE — PROGRESS NOTE ADULT - REASON FOR ADMISSION
SOB, abnormal EKG

## 2019-10-01 NOTE — PROVIDER CONTACT NOTE (OTHER) - ACTION/TREATMENT ORDERED:
No intervention at this, Will continue to monitor, TIM Sterling aware.
PA aware & assessed patient & reviewed orders, labs, history & plan. PA ordered benzonatate.

## 2019-10-01 NOTE — CHART NOTE - NSCHARTNOTEFT_GEN_A_CORE
patient seen and examined. no acute complaints     Lungs CTA b/l, no wheezing   no JVD, no LE edema     69M h/o COPD, asthma, HTN, presented to [facility] for SOB, on EKG with concerns for possible NSTEMI. Patient was found to have COPD exacerbation with acute hypoxic respiratory failure likely due to Rhino/enterovirus. CXR without consolidation. patient treated with 5 days prednisone, nebs atc with improvement in respiratory status and later did not require O2. patient had Roberta that resolved with IVF hydration. With concern for cardiac ischemia with elevated troponin, patient was initially recommended for cath which he refused and had TTE that showed no segmental wall motion abnormality and Stress that was positive for ischemia. patient underwent angiogram that showed multivessel disease. Patient had cath with RISSA to LCx on 9/27 and staged cath on 9/30 with RISSA to LAD and D1. patient had some ectopy on telemetry and restarted on atenolol (lower dose with concern for worsening copd disease). patient will have close follow up with cardiology, pmd and pulmonary as outpatient     discharge time 42 minutes   Dr. ALANA Nicholson  Medicine Hospitalist  [phone]

## 2019-10-01 NOTE — DISCHARGE NOTE NURSING/CASE MANAGEMENT/SOCIAL WORK - PATIENT PORTAL LINK FT
You can access the FollowMyHealth Patient Portal offered by [facility] by registering at the following website: [email or web address]. By joining [facility] [facility]’s FollowMyHealth portal, you will also be able to view your health information using other applications (apps) compatible with our system.

## 2019-10-01 NOTE — PROGRESS NOTE ADULT - ASSESSMENT
68 y/o man with h/o COPD, HTN, asthma who presented to OSH with sudden onset shortness of breath for 1 hour, found to have troponin elevation though no significant ECG changes from 1 year prior.  Presentation is atypical, and he had viral symptoms prior to all of this. He has been treated for NSTEMI, in the setting of viral illness.    - He has no chest pain this morning and his breathing has improved overall  - echocardiogram reveals no wall motion abnormalities   - Nuclear stress test positive and suspicious for multivessel CAD. He is now s/p PCI to his LCx. s/p PCI to LAD (RISSA x 2) and D1 (RISSA x 1)  - continue with asa and plavix  - Continue Lipitor 80  - amlodipine for HTN. Hold HCTZ  - 7 beats NSVT noted, in the setting of normal LV function. Can add Toprol XL 25, though will need to be cautious in the setting of reactive airway disease  - Watch creatinine and electrolytes. Keep K>4, mg>2  - Will follow with you

## 2019-10-01 NOTE — PROGRESS NOTE ADULT - ASSESSMENT
Assessment and Recommendations:  70 y/o man with h/o COPD, HTN, asthma who presented with SOB found to have NSTEMI now s/p PCI to LCx and staged PCI to LAD, currently chest pain free and asymptomatic.  -continue asa, plavix, atorvastatin daily  -R groin stable without hematoma  -TTE without wall motion abnormalities  -rest of plan per Dr. Sharee Kunz MD  Cardiology Fellow  All Cardiology service information can be found 24/7 on [email or web address], password: [email or web address]

## 2019-10-01 NOTE — PROGRESS NOTE ADULT - PROVIDER SPECIALTY LIST ADULT
Cardiology
Hospitalist
Intervent Cardiology
Cardiology
Cardiology

## 2019-10-02 ENCOUNTER — APPOINTMENT (OUTPATIENT)
Dept: CARDIOLOGY | Facility: CLINIC | Age: 69
End: 2019-10-02

## 2019-10-02 PROBLEM — Z00.00 ENCOUNTER FOR PREVENTIVE HEALTH EXAMINATION: Status: ACTIVE | Noted: 2019-10-02

## 2019-10-13 PROCEDURE — 84300 ASSAY OF URINE SODIUM: CPT

## 2019-10-13 PROCEDURE — 86900 BLOOD TYPING SEROLOGIC ABO: CPT

## 2019-10-13 PROCEDURE — 86803 HEPATITIS C AB TEST: CPT

## 2019-10-13 PROCEDURE — 83880 ASSAY OF NATRIURETIC PEPTIDE: CPT

## 2019-10-13 PROCEDURE — 94640 AIRWAY INHALATION TREATMENT: CPT

## 2019-10-13 PROCEDURE — C1874: CPT

## 2019-10-13 PROCEDURE — C9601: CPT | Mod: LD

## 2019-10-13 PROCEDURE — C9600: CPT | Mod: LC

## 2019-10-13 PROCEDURE — C1887: CPT

## 2019-10-13 PROCEDURE — 87486 CHLMYD PNEUM DNA AMP PROBE: CPT

## 2019-10-13 PROCEDURE — 85027 COMPLETE CBC AUTOMATED: CPT

## 2019-10-13 PROCEDURE — 83735 ASSAY OF MAGNESIUM: CPT

## 2019-10-13 PROCEDURE — C1725: CPT

## 2019-10-13 PROCEDURE — 83036 HEMOGLOBIN GLYCOSYLATED A1C: CPT

## 2019-10-13 PROCEDURE — 71045 X-RAY EXAM CHEST 1 VIEW: CPT

## 2019-10-13 PROCEDURE — 99152 MOD SED SAME PHYS/QHP 5/>YRS: CPT

## 2019-10-13 PROCEDURE — 84100 ASSAY OF PHOSPHORUS: CPT

## 2019-10-13 PROCEDURE — 82436 ASSAY OF URINE CHLORIDE: CPT

## 2019-10-13 PROCEDURE — 86901 BLOOD TYPING SEROLOGIC RH(D): CPT

## 2019-10-13 PROCEDURE — 93572 IV DOP VEL&/PRESS C FLO EA: CPT | Mod: LC

## 2019-10-13 PROCEDURE — A9500: CPT

## 2019-10-13 PROCEDURE — 87581 M.PNEUMON DNA AMP PROBE: CPT

## 2019-10-13 PROCEDURE — 93308 TTE F-UP OR LMTD: CPT

## 2019-10-13 PROCEDURE — C1769: CPT

## 2019-10-13 PROCEDURE — 84484 ASSAY OF TROPONIN QUANT: CPT

## 2019-10-13 PROCEDURE — A9505: CPT

## 2019-10-13 PROCEDURE — 87798 DETECT AGENT NOS DNA AMP: CPT

## 2019-10-13 PROCEDURE — 99285 EMERGENCY DEPT VISIT HI MDM: CPT | Mod: 25

## 2019-10-13 PROCEDURE — 87633 RESP VIRUS 12-25 TARGETS: CPT

## 2019-10-13 PROCEDURE — 86850 RBC ANTIBODY SCREEN: CPT

## 2019-10-13 PROCEDURE — 85610 PROTHROMBIN TIME: CPT

## 2019-10-13 PROCEDURE — 80048 BASIC METABOLIC PNL TOTAL CA: CPT

## 2019-10-13 PROCEDURE — 82553 CREATINE MB FRACTION: CPT

## 2019-10-13 PROCEDURE — 93017 CV STRESS TEST TRACING ONLY: CPT

## 2019-10-13 PROCEDURE — 78452 HT MUSCLE IMAGE SPECT MULT: CPT

## 2019-10-13 PROCEDURE — 81003 URINALYSIS AUTO W/O SCOPE: CPT

## 2019-10-13 PROCEDURE — 93571 IV DOP VEL&/PRESS C FLO 1ST: CPT | Mod: LD

## 2019-10-13 PROCEDURE — 99153 MOD SED SAME PHYS/QHP EA: CPT

## 2019-10-13 PROCEDURE — 93005 ELECTROCARDIOGRAM TRACING: CPT

## 2019-10-13 PROCEDURE — 80061 LIPID PANEL: CPT

## 2019-10-13 PROCEDURE — 82570 ASSAY OF URINE CREATININE: CPT

## 2019-10-13 PROCEDURE — 96374 THER/PROPH/DIAG INJ IV PUSH: CPT

## 2019-10-13 PROCEDURE — 93306 TTE W/DOPPLER COMPLETE: CPT

## 2019-10-13 PROCEDURE — C1894: CPT

## 2019-10-13 PROCEDURE — 92978 ENDOLUMINL IVUS OCT C 1ST: CPT | Mod: LD

## 2019-10-13 PROCEDURE — 83690 ASSAY OF LIPASE: CPT

## 2019-10-13 PROCEDURE — 85730 THROMBOPLASTIN TIME PARTIAL: CPT

## 2019-10-13 PROCEDURE — 93458 L HRT ARTERY/VENTRICLE ANGIO: CPT | Mod: 59

## 2019-10-13 PROCEDURE — C1753: CPT

## 2019-10-13 PROCEDURE — 80053 COMPREHEN METABOLIC PANEL: CPT

## 2020-08-13 NOTE — DISCHARGE NOTE PROVIDER - PROVIDER RX CONTACT NUMBER
Clinical was unable to get an Iv in patient will having oncoming clinical try to get Iv for patient. [phone]

## 2020-08-18 NOTE — ED PROVIDER NOTE - CONTEXT
Patient called stating that she needs to know if the office  her long term disability papers and if they have been sent back yet.   coughing/exertion

## 2020-11-24 DIAGNOSIS — J44.9 CHRONIC OBSTRUCTIVE PULMONARY DISEASE, UNSPECIFIED: ICD-10-CM

## 2020-11-24 DIAGNOSIS — J45.909 UNSPECIFIED ASTHMA, UNCOMPLICATED: ICD-10-CM

## 2020-11-24 RX ORDER — MONTELUKAST SODIUM 10 MG/1
10 TABLET, FILM COATED ORAL
Qty: 1 | Refills: 1 | Status: ACTIVE | COMMUNITY

## 2020-11-24 RX ORDER — ASPIRIN 81 MG/1
81 TABLET, CHEWABLE ORAL
Refills: 0 | Status: ACTIVE | COMMUNITY

## 2021-01-13 ENCOUNTER — APPOINTMENT (OUTPATIENT)
Dept: CARDIOLOGY | Facility: CLINIC | Age: 71
End: 2021-01-13

## 2021-04-16 ENCOUNTER — NON-APPOINTMENT (OUTPATIENT)
Age: 71
End: 2021-04-16

## 2021-04-16 ENCOUNTER — APPOINTMENT (OUTPATIENT)
Dept: ELECTROPHYSIOLOGY | Facility: CLINIC | Age: 71
End: 2021-04-16
Payer: MEDICARE

## 2021-04-16 ENCOUNTER — APPOINTMENT (OUTPATIENT)
Dept: CARDIOLOGY | Facility: CLINIC | Age: 71
End: 2021-04-16
Payer: MEDICARE

## 2021-04-16 VITALS
SYSTOLIC BLOOD PRESSURE: 174 MMHG | WEIGHT: 155 LBS | BODY MASS INDEX: 20.54 KG/M2 | HEIGHT: 73 IN | OXYGEN SATURATION: 99 % | HEART RATE: 112 BPM | DIASTOLIC BLOOD PRESSURE: 85 MMHG

## 2021-04-16 DIAGNOSIS — Z01.84 ENCOUNTER FOR ANTIBODY RESPONSE EXAMINATION: ICD-10-CM

## 2021-04-16 DIAGNOSIS — Z82.5 FAMILY HISTORY OF ASTHMA AND OTHER CHRONIC LOWER RESPIRATORY DISEASES: ICD-10-CM

## 2021-04-16 DIAGNOSIS — Z87.891 PERSONAL HISTORY OF NICOTINE DEPENDENCE: ICD-10-CM

## 2021-04-16 DIAGNOSIS — Z78.9 OTHER SPECIFIED HEALTH STATUS: ICD-10-CM

## 2021-04-16 DIAGNOSIS — Z82.49 FAMILY HISTORY OF ISCHEMIC HEART DISEASE AND OTHER DISEASES OF THE CIRCULATORY SYSTEM: ICD-10-CM

## 2021-04-16 PROCEDURE — 93246 EXT ECG>7D<15D RECORDING: CPT

## 2021-04-16 PROCEDURE — 99072 ADDL SUPL MATRL&STAF TM PHE: CPT

## 2021-04-16 PROCEDURE — 99214 OFFICE O/P EST MOD 30 MIN: CPT

## 2021-04-16 PROCEDURE — 93000 ELECTROCARDIOGRAM COMPLETE: CPT

## 2021-04-28 PROBLEM — Z82.49 FAMILY HISTORY OF MYOCARDIAL INFARCTION: Status: ACTIVE | Noted: 2021-04-28

## 2021-04-28 PROBLEM — Z82.5 FAMILY HISTORY OF CHRONIC OBSTRUCTIVE PULMONARY DISEASE: Status: ACTIVE | Noted: 2021-04-28

## 2021-04-28 PROBLEM — Z78.9 SOCIAL ALCOHOL USE: Status: ACTIVE | Noted: 2021-04-28

## 2021-04-28 PROBLEM — Z87.891 FORMER SMOKER: Status: ACTIVE | Noted: 2021-04-28

## 2021-04-28 RX ORDER — CHLORTHALIDONE 25 MG/1
25 TABLET ORAL DAILY
Refills: 0 | Status: DISCONTINUED | COMMUNITY
End: 2021-04-28

## 2021-04-28 RX ORDER — ATENOLOL 25 MG/1
25 TABLET ORAL DAILY
Qty: 30 | Refills: 3 | Status: DISCONTINUED | COMMUNITY
End: 2021-04-28

## 2021-04-28 NOTE — CARDIOLOGY SUMMARY
[de-identified] : 04/16/2021 sinus tach [de-identified] : 09/26/2019\par * Chest Pain: No chest pain with administration of\par Regadenoson.\par * Symptom: No Symptoms.\par * HR Response: Appropriate.\par * BP Response: Appropriate.\par * Heart Rhythm: Sinus Rhythm - Frequent APD's - 81 BPM.\par * Q Waves: Anteroseptal MI.\par * Baseline ECG: Nonspecific ST-T wave abnormality.\par * ECG Changes: ST Depression: 0.5 mm horizontal in leads\par II, III, aVF, and V4-V6  started at 2:00 min following\par regadenoson infusion at a HR of 113 bpm and persisted at\par least 6:25 min into recovery.\par * Arrhythmia: Occasional APDs occurred during rest.\par * The left ventricle was normal in size. There are large,\par severe defects in the anterior, anteroseptal, and apical\par walls that are mostly reversible suggestive of infarct\par with significant tracy-infarct ischemia.\par * There are large, moderate to severe defects in the\par inferior, inferoseptal, and mid to distal inferolateral\par walls that are mostly reversible suggestive of infarct\par with significant tracy-infarct ischemia.\par * Increased right ventricular tracer uptake is noted on\par stress imaging.\par * Post-stress gated wall motion analysis was performed\par (LVEF = 61 %;LVEDV = 87 ml.) revealing markedly reduced\par systolic thickening of the anterior, apical, septal,\par inferior, and mid to distal inferolateral walls and\par hypokinesis of the distal septal and apical septal walls.\par Overall left ventricular ejection fraction is preserved.\par  [de-identified] : 09/25/2019 \par Mitral Valve: Normal mitral valve. Minimal mitral\par regurgitation.\par Aortic Valve/Aorta: Thickened aortic valve.\par Aortic Root: 3.3 cm.\par Left Atrium: Normal left atrium.\par Left Ventricle: Normal left ventricular systolic function.\par No segmental wall motion abnormalities. Normal left\par ventricular internal dimensions and wall thicknesses. Mild\par diastolic dysfunction (Stage I).\par Right Heart: Normal right atrium. Normal right ventricular\par size and function. Normal tricuspid valve. Normal pulmonic\par valve.\par Pericardium/Pleura: Normal pericardium with no pericardial\par effusion.\par Hemodynamic: Estimated right atrial pressure is 8 mm Hg.\par Inadequate tricuspid regurgitation Doppler envelope\par precludes estimation of RVSP.\par  [de-identified] : 09/27/2019\par CORONARY VESSELS: The coronary circulation is right dominant.\par LM:   --  LM: Normal.\par LAD:   --  Proximal LAD: There was a 60 % stenosis.\par --  Mid LAD: There was a 70 % stenosis. IFR=0.77\par --  D1: There was a 70 % stenosis.\par CX:   --  Proximal circumflex: There was a 80 % stenosis. AMG=910\par RCA:   --  Mid RCA: There was a 50 % stenosis.\par COMPLICATIONS: There were no complications.\par DIAGNOSTIC IMPRESSIONS: Severe, IFR abn Lcx and LAD disease.\par INTERVENTIONAL IMPRESSIONS: Successful PCI to the LCx. Staged PC to the\par LAD.\par

## 2021-04-28 NOTE — DISCUSSION/SUMMARY
[FreeTextEntry1] : 69 yo with known CAD s/p PCI 2019 LCx and LAD and moderate LAD disease, COPD (follows up with Dr. Bajwa), possible COVID19 illness given positive antibodies per pt, + former smoker who presents for cardiovascular evaluation after being referred by his PMD with no previous cardiac follow up after PCI in 2019.  \par \par -Dyspnea/Tachycardia\par Given physical exam findings of elevated BP, LE edema, tachycardia and rales I suspect some component of CHF.  Will start Lasix 20 mg po daily and pt will follow up with me closely in one week.  Will obtain TTE given this clinical condition, history of COVID19 illness and murmur.  For tachycardia will place on event monitor to rule out arrhythmias.  \par \par -CAD s/p PCI in 2019\par Unclear why he is still on Plavix and not taking ASA.  For now will continue, but will discuss with him need to switch to ASA 81 mg po daily only.   \par \par HTN\par -Not controlled, but reports that he did not take his meds today.  I strongly encouraged to adhere to medication compliance, and advised him to do ambulatory BP monitoring, adhere to low salt intake.  \par \par HLD\par -on high intensity statin\par \par Pt will follow up with me in one week.  Will attempt to obtain recent labs from PMD.  I have spent 45 min on this encounter by seeing the pt, documenting the note, coordinating and discussing plan of care, reviewing previous records.

## 2021-04-28 NOTE — HISTORY OF PRESENT ILLNESS
[FreeTextEntry1] : 71 yo with known CAD s/p PCI 2019 LCx and LAD and moderate LAD disease, COPD (follows up with Dr. Bajwa), possible COVID19 illness given positive antibodies per pt, + former smoker who presents for cardiovascular evaluation after being referred by his PMD.  Off note, pt has not seen a cardiologist since 2019.  Pt reports dyspnea.  Has a hard time stating if has changed recently.  He denies any palpitations.  He does admit to some LE edema as well.  Denies any CP, syncope, dizziness.  His most current medication list: Albuterol, Daliresp 500, Norvasc 10 mg po daily, Losartan 50 mg po daily, Lipitor 80 mg po daily, Singulair 10 mg po daily, Plavix 75 mg po daily, Symbicort, occasional Viagra.  \par \par

## 2021-04-28 NOTE — REASON FOR VISIT
[CV Risk Factors and Non-Cardiac Disease] : CV risk factors and non-cardiac disease [Coronary Artery Disease] : coronary artery disease [Other: ____] : [unfilled] [FreeTextEntry3] : Dr. Britt [phone]

## 2021-04-28 NOTE — PHYSICAL EXAM
[No Acute Distress] : no acute distress [Frail] : frail [Normal Conjunctiva] : normal conjunctiva [No Xanthelasma] : no xanthelasma [Normal Venous Pressure] : normal venous pressure [No Carotid Bruit] : no carotid bruit [Normal S1, S2] : normal S1, S2 [Murmur] : murmur [Regina ___] : regina MaldonadoV [Soft] : abdomen soft [Non Tender] : non-tender [Normal Bowel Sounds] : normal bowel sounds [Normal Gait] : normal gait [Gait - Sufficient for Exercise Testing] : gait - sufficient for exercise testing [No Cyanosis] : no cyanosis [No Clubbing] : no clubbing [No Varicosities] : no varicosities [Normal Radial B/L] : normal radial B/L [Edema ___] : edema [unfilled] [Normal] : alert and oriented, normal memory [de-identified] : + ROMAINE 2/6 [de-identified] : a

## 2021-04-30 ENCOUNTER — APPOINTMENT (OUTPATIENT)
Dept: CARDIOLOGY | Facility: CLINIC | Age: 71
End: 2021-04-30
Payer: MEDICARE

## 2021-04-30 VITALS — HEART RATE: 91 BPM | SYSTOLIC BLOOD PRESSURE: 162 MMHG | DIASTOLIC BLOOD PRESSURE: 76 MMHG | OXYGEN SATURATION: 98 %

## 2021-04-30 DIAGNOSIS — R00.0 TACHYCARDIA, UNSPECIFIED: ICD-10-CM

## 2021-04-30 PROCEDURE — 99072 ADDL SUPL MATRL&STAF TM PHE: CPT

## 2021-04-30 PROCEDURE — 93000 ELECTROCARDIOGRAM COMPLETE: CPT

## 2021-04-30 PROCEDURE — 99214 OFFICE O/P EST MOD 30 MIN: CPT

## 2021-04-30 RX ORDER — CLOPIDOGREL 75 MG/1
75 TABLET, FILM COATED ORAL DAILY
Qty: 1 | Refills: 1 | Status: DISCONTINUED | COMMUNITY
End: 2021-04-30

## 2021-04-30 RX ORDER — LOSARTAN POTASSIUM 50 MG/1
50 TABLET, FILM COATED ORAL
Qty: 90 | Refills: 4 | Status: DISCONTINUED | COMMUNITY
End: 2021-04-30

## 2021-05-04 ENCOUNTER — APPOINTMENT (OUTPATIENT)
Dept: CARDIOLOGY | Facility: CLINIC | Age: 71
End: 2021-05-04
Payer: MEDICARE

## 2021-05-04 DIAGNOSIS — R60.0 LOCALIZED EDEMA: ICD-10-CM

## 2021-05-04 PROCEDURE — 93306 TTE W/DOPPLER COMPLETE: CPT

## 2021-05-10 PROCEDURE — 93248 EXT ECG>7D<15D REV&INTERPJ: CPT

## 2021-05-11 PROBLEM — R00.0 TACHYCARDIA: Status: ACTIVE | Noted: 2021-04-16

## 2021-05-11 NOTE — CARDIOLOGY SUMMARY
[de-identified] : 04/16/2021 sinus tach [de-identified] : 09/26/2019\par * Chest Pain: No chest pain with administration of\par Regadenoson.\par * Symptom: No Symptoms.\par * HR Response: Appropriate.\par * BP Response: Appropriate.\par * Heart Rhythm: Sinus Rhythm - Frequent APD's - 81 BPM.\par * Q Waves: Anteroseptal MI.\par * Baseline ECG: Nonspecific ST-T wave abnormality.\par * ECG Changes: ST Depression: 0.5 mm horizontal in leads\par II, III, aVF, and V4-V6  started at 2:00 min following\par regadenoson infusion at a HR of 113 bpm and persisted at\par least 6:25 min into recovery.\par * Arrhythmia: Occasional APDs occurred during rest.\par * The left ventricle was normal in size. There are large,\par severe defects in the anterior, anteroseptal, and apical\par walls that are mostly reversible suggestive of infarct\par with significant tracy-infarct ischemia.\par * There are large, moderate to severe defects in the\par inferior, inferoseptal, and mid to distal inferolateral\par walls that are mostly reversible suggestive of infarct\par with significant tracy-infarct ischemia.\par * Increased right ventricular tracer uptake is noted on\par stress imaging.\par * Post-stress gated wall motion analysis was performed\par (LVEF = 61 %;LVEDV = 87 ml.) revealing markedly reduced\par systolic thickening of the anterior, apical, septal,\par inferior, and mid to distal inferolateral walls and\par hypokinesis of the distal septal and apical septal walls.\par Overall left ventricular ejection fraction is preserved.\par  [de-identified] : 09/25/2019 \par Mitral Valve: Normal mitral valve. Minimal mitral\par regurgitation.\par Aortic Valve/Aorta: Thickened aortic valve.\par Aortic Root: 3.3 cm.\par Left Atrium: Normal left atrium.\par Left Ventricle: Normal left ventricular systolic function.\par No segmental wall motion abnormalities. Normal left\par ventricular internal dimensions and wall thicknesses. Mild\par diastolic dysfunction (Stage I).\par Right Heart: Normal right atrium. Normal right ventricular\par size and function. Normal tricuspid valve. Normal pulmonic\par valve.\par Pericardium/Pleura: Normal pericardium with no pericardial\par effusion.\par Hemodynamic: Estimated right atrial pressure is 8 mm Hg.\par Inadequate tricuspid regurgitation Doppler envelope\par precludes estimation of RVSP.\par  [de-identified] : 09/27/2019\par CORONARY VESSELS: The coronary circulation is right dominant.\par LM:   --  LM: Normal.\par LAD:   --  Proximal LAD: There was a 60 % stenosis.\par --  Mid LAD: There was a 70 % stenosis. IFR=0.77\par --  D1: There was a 70 % stenosis.\par CX:   --  Proximal circumflex: There was a 80 % stenosis. WVL=160\par RCA:   --  Mid RCA: There was a 50 % stenosis.\par COMPLICATIONS: There were no complications.\par DIAGNOSTIC IMPRESSIONS: Severe, IFR abn Lcx and LAD disease.\par INTERVENTIONAL IMPRESSIONS: Successful PCI to the LCx. Staged PC to the\par LAD.\par

## 2021-05-11 NOTE — DISCUSSION/SUMMARY
[FreeTextEntry1] : 71 yo with known CAD s/p PCI 2019 LCx and LAD and moderate LAD disease, COPD (follows up with Dr. Bajwa), possible COVID19 illness given positive antibodies per pt, + former smoker who presents for a follow up.  \par \par -Dyspnea/Tachycardia\par Dyspnea has resolved and tachycardia has improved as well.  TTE showed preserved LV function, thickened AV, mild MR.  Awaiting event monitor results.  Will continue low dose Lasix 20 mg po daily.  \par \par -CAD s/p PCI in 2019\par Will d/c Plavix at this time and continue ASA 81 mg po daily.  \par \par HTN\par -Not controlled but slightly better than last week.  Will continue Norvasc 10 mg po daily, Lasix 20 mg po daily, and increase Losartan to 100 mg po daily.  We discussed importance of medication compliance, low salt intake, regular exercise and ambulatory BP monitoring.  \par \par HLD\par -on high intensity statin\par \par Pt will follow up with me in one month.  I have spent 30 min on this encounter by seeing the pt, documenting the note, coordinating and discussing plan of care.

## 2021-05-11 NOTE — HISTORY OF PRESENT ILLNESS
[FreeTextEntry1] : 69 yo with known CAD s/p PCI 2019 LCx and LAD and moderate LAD disease, COPD (follows up with Dr. Bajwa), possible COVID19 illness given positive antibodies per pt, + former smoker who presents for a follow up.  On the previous visit pt presented for cardiovascular evaluation after being referred by his PMD.  At that time he was started on Lasix 20 mg po daily given LE edema and dyspnea.  Since then he reports feeling better and LE has resolved.  His most current medication list: Albuterol, Daliresp 500, Norvasc 10 mg po daily, Losartan 50 mg po daily, Lipitor 80 mg po daily, Singulair 10 mg po daily, Plavix 75 mg po daily, Symbicort, Lasix 20 mg po daily and occasional Viagra.  \par \par

## 2021-05-11 NOTE — PHYSICAL EXAM
[No Acute Distress] : no acute distress [Frail] : frail [Normal Conjunctiva] : normal conjunctiva [No Xanthelasma] : no xanthelasma [Normal Venous Pressure] : normal venous pressure [No Carotid Bruit] : no carotid bruit [Normal S1, S2] : normal S1, S2 [Murmur] : murmur [Soft] : abdomen soft [Non Tender] : non-tender [Normal Bowel Sounds] : normal bowel sounds [Normal Gait] : normal gait [Gait - Sufficient for Exercise Testing] : gait - sufficient for exercise testing [No Edema] : no edema [No Cyanosis] : no cyanosis [No Clubbing] : no clubbing [No Varicosities] : no varicosities [Normal Radial B/L] : normal radial B/L [Edema ___] : edema [unfilled] [Normal] : alert and oriented, normal memory [de-identified] : + ROMAINE 2/6

## 2021-05-28 ENCOUNTER — APPOINTMENT (OUTPATIENT)
Dept: CARDIOLOGY | Facility: CLINIC | Age: 71
End: 2021-05-28
Payer: MEDICARE

## 2021-05-28 VITALS
HEART RATE: 79 BPM | HEIGHT: 73 IN | DIASTOLIC BLOOD PRESSURE: 76 MMHG | OXYGEN SATURATION: 99 % | SYSTOLIC BLOOD PRESSURE: 160 MMHG

## 2021-05-28 PROCEDURE — 93000 ELECTROCARDIOGRAM COMPLETE: CPT

## 2021-05-28 PROCEDURE — 99214 OFFICE O/P EST MOD 30 MIN: CPT

## 2021-06-01 PROBLEM — R60.0 BILATERAL EDEMA OF LOWER EXTREMITY: Status: ACTIVE | Noted: 2021-04-16

## 2021-06-09 NOTE — CARDIOLOGY SUMMARY
[de-identified] : 04/16/2021 sinus tach [de-identified] : 09/26/2019\par * Chest Pain: No chest pain with administration of\par Regadenoson.\par * Symptom: No Symptoms.\par * HR Response: Appropriate.\par * BP Response: Appropriate.\par * Heart Rhythm: Sinus Rhythm - Frequent APD's - 81 BPM.\par * Q Waves: Anteroseptal MI.\par * Baseline ECG: Nonspecific ST-T wave abnormality.\par * ECG Changes: ST Depression: 0.5 mm horizontal in leads\par II, III, aVF, and V4-V6  started at 2:00 min following\par regadenoson infusion at a HR of 113 bpm and persisted at\par least 6:25 min into recovery.\par * Arrhythmia: Occasional APDs occurred during rest.\par * The left ventricle was normal in size. There are large,\par severe defects in the anterior, anteroseptal, and apical\par walls that are mostly reversible suggestive of infarct\par with significant tracy-infarct ischemia.\par * There are large, moderate to severe defects in the\par inferior, inferoseptal, and mid to distal inferolateral\par walls that are mostly reversible suggestive of infarct\par with significant tracy-infarct ischemia.\par * Increased right ventricular tracer uptake is noted on\par stress imaging.\par * Post-stress gated wall motion analysis was performed\par (LVEF = 61 %;LVEDV = 87 ml.) revealing markedly reduced\par systolic thickening of the anterior, apical, septal,\par inferior, and mid to distal inferolateral walls and\par hypokinesis of the distal septal and apical septal walls.\par Overall left ventricular ejection fraction is preserved.\par  [de-identified] : 09/25/2019 \par Mitral Valve: Normal mitral valve. Minimal mitral\par regurgitation.\par Aortic Valve/Aorta: Thickened aortic valve.\par Aortic Root: 3.3 cm.\par Left Atrium: Normal left atrium.\par Left Ventricle: Normal left ventricular systolic function.\par No segmental wall motion abnormalities. Normal left\par ventricular internal dimensions and wall thicknesses. Mild\par diastolic dysfunction (Stage I).\par Right Heart: Normal right atrium. Normal right ventricular\par size and function. Normal tricuspid valve. Normal pulmonic\par valve.\par Pericardium/Pleura: Normal pericardium with no pericardial\par effusion.\par Hemodynamic: Estimated right atrial pressure is 8 mm Hg.\par Inadequate tricuspid regurgitation Doppler envelope\par precludes estimation of RVSP.\par  [de-identified] : 09/27/2019\par CORONARY VESSELS: The coronary circulation is right dominant.\par LM:   --  LM: Normal.\par LAD:   --  Proximal LAD: There was a 60 % stenosis.\par --  Mid LAD: There was a 70 % stenosis. IFR=0.77\par --  D1: There was a 70 % stenosis.\par CX:   --  Proximal circumflex: There was a 80 % stenosis. PGP=[id number]\par RCA:   --  Mid RCA: There was a 50 % stenosis.\par COMPLICATIONS: There were no complications.\par DIAGNOSTIC IMPRESSIONS: Severe, IFR abn Lcx and LAD disease.\par INTERVENTIONAL IMPRESSIONS: Successful PCI to the LCx. Staged PC to the\par LAD.\par

## 2021-06-09 NOTE — REASON FOR VISIT
[Arrhythmia/ECG Abnorrmalities] : arrhythmia/ECG abnormalities [Hypertension] : hypertension [Coronary Artery Disease] : coronary artery disease

## 2021-06-09 NOTE — PHYSICAL EXAM
[No Acute Distress] : no acute distress [Frail] : frail [Normal Conjunctiva] : normal conjunctiva [No Xanthelasma] : no xanthelasma [Normal Venous Pressure] : normal venous pressure [No Carotid Bruit] : no carotid bruit [Normal S1, S2] : normal S1, S2 [Murmur] : murmur [Soft] : abdomen soft [Non Tender] : non-tender [Normal Bowel Sounds] : normal bowel sounds [Normal Gait] : normal gait [Gait - Sufficient for Exercise Testing] : gait - sufficient for exercise testing [No Edema] : no edema [No Cyanosis] : no cyanosis [No Clubbing] : no clubbing [No Varicosities] : no varicosities [Normal Radial B/L] : normal radial B/L [Edema ___] : edema [unfilled] [Normal] : alert and oriented, normal memory [de-identified] : + ROMAINE 2/6

## 2021-06-09 NOTE — HISTORY OF PRESENT ILLNESS
[FreeTextEntry1] : 69 yo with known CAD s/p PCI 2019 LCx and LAD and moderate LAD disease, COPD (follows up with Dr. Bajwa), possible COVID19 illness given positive antibodies per pt, + former smoker who presents for a follow up.  On the previous visit pt presented for cardiovascular evaluation after being referred by his PMD.  At that time he was started on Lasix 20 mg po daily given LE edema and dyspnea.  Since then he reports feeling better and LE has resolved.  He is having a hard time remembering his meds.  \par \par

## 2021-06-09 NOTE — DISCUSSION/SUMMARY
[FreeTextEntry1] : 69 yo with known CAD s/p PCI 2019 LCx and LAD and moderate LAD disease, COPD (follows up with Dr. Bajwa), possible COVID19 illness given positive antibodies per pt, + former smoker who presents for a follow up. \par \par -Dyspnea/Tachycardia\par Dyspnea has resolved and tachycardia has improved as well. TTE showed preserved LV function, thickened AV, mild MR. Noted to have NSVT and SVT on event monitor. Given these findings will start Toprol XL 25 mg po daily.  Given his risk factors and this arrhythmias I suggested ischemic evaluation with stress test.  Will continue low dose Lasix 20 mg po daily. \par \par -CAD s/p PCI in 2019\par continue ASA 81 mg po daily. \par \par HTN\par -Not controlled.  Will continue Norvasc 10 mg po daily, Lasix 20 mg po daily, Losartan 100 mg po daily. We discussed importance of medication compliance, low salt intake, regular exercise and ambulatory BP monitoring. \par \par HLD\par -on high intensity statin\par \par Pt will follow up with me after testing is complete.

## 2021-06-10 RX ORDER — BUDESONIDE AND FORMOTEROL FUMARATE DIHYDRATE 160; 4.5 UG/1; UG/1
160-4.5 AEROSOL RESPIRATORY (INHALATION)
Refills: 0 | Status: ACTIVE | COMMUNITY

## 2021-06-10 RX ORDER — TIOTROPIUM BROMIDE 18 UG/1
18 CAPSULE ORAL; RESPIRATORY (INHALATION)
Refills: 0 | Status: ACTIVE | COMMUNITY

## 2021-06-10 RX ORDER — ALBUTEROL SULFATE 2.5 MG/3ML
(2.5 MG/3ML) SOLUTION RESPIRATORY (INHALATION)
Refills: 0 | Status: ACTIVE | COMMUNITY

## 2021-06-10 RX ORDER — ROFLUMILAST 500 UG/1
500 TABLET ORAL DAILY
Refills: 0 | Status: ACTIVE | COMMUNITY

## 2021-06-10 RX ORDER — FLUTICASONE PROPIONATE AND SALMETEROL 50; 500 UG/1; UG/1
500-50 POWDER RESPIRATORY (INHALATION)
Refills: 0 | Status: DISCONTINUED | COMMUNITY
End: 2021-06-10

## 2021-06-10 RX ORDER — FUROSEMIDE 20 MG/1
20 TABLET ORAL DAILY
Qty: 15 | Refills: 0 | Status: DISCONTINUED | COMMUNITY
Start: 2021-04-16 | End: 2021-06-10

## 2021-06-22 ENCOUNTER — OUTPATIENT (OUTPATIENT)
Dept: OUTPATIENT SERVICES | Facility: HOSPITAL | Age: 71
LOS: 1 days | End: 2021-06-22
Payer: COMMERCIAL

## 2021-06-22 ENCOUNTER — APPOINTMENT (OUTPATIENT)
Dept: CV DIAGNOSTICS | Facility: HOSPITAL | Age: 71
End: 2021-06-22

## 2021-06-22 DIAGNOSIS — I47.2 VENTRICULAR TACHYCARDIA: ICD-10-CM

## 2021-06-22 PROCEDURE — A9500: CPT

## 2021-06-22 PROCEDURE — 78452 HT MUSCLE IMAGE SPECT MULT: CPT

## 2021-06-22 PROCEDURE — 78452 HT MUSCLE IMAGE SPECT MULT: CPT | Mod: 26

## 2021-06-22 PROCEDURE — 93017 CV STRESS TEST TRACING ONLY: CPT

## 2021-06-22 PROCEDURE — 93018 CV STRESS TEST I&R ONLY: CPT

## 2021-06-22 PROCEDURE — 93016 CV STRESS TEST SUPVJ ONLY: CPT

## 2021-08-27 ENCOUNTER — APPOINTMENT (OUTPATIENT)
Dept: CARDIOLOGY | Facility: CLINIC | Age: 71
End: 2021-08-27
Payer: MEDICARE

## 2021-08-27 VITALS
HEIGHT: 73 IN | DIASTOLIC BLOOD PRESSURE: 72 MMHG | SYSTOLIC BLOOD PRESSURE: 180 MMHG | HEART RATE: 65 BPM | BODY MASS INDEX: 20.54 KG/M2 | OXYGEN SATURATION: 99 % | WEIGHT: 155 LBS

## 2021-08-27 DIAGNOSIS — I47.2 VENTRICULAR TACHYCARDIA: ICD-10-CM

## 2021-08-27 PROCEDURE — 93000 ELECTROCARDIOGRAM COMPLETE: CPT

## 2021-08-27 PROCEDURE — 99214 OFFICE O/P EST MOD 30 MIN: CPT

## 2021-08-27 RX ORDER — AMLODIPINE BESYLATE 10 MG/1
10 TABLET ORAL DAILY
Qty: 90 | Refills: 2 | Status: ACTIVE | COMMUNITY
Start: 1900-01-01 | End: 1900-01-01

## 2021-08-27 RX ORDER — ATORVASTATIN CALCIUM 80 MG/1
80 TABLET, FILM COATED ORAL
Qty: 90 | Refills: 2 | Status: ACTIVE | COMMUNITY
Start: 1900-01-01 | End: 1900-01-01

## 2021-08-27 RX ORDER — LOSARTAN POTASSIUM 100 MG/1
100 TABLET, FILM COATED ORAL DAILY
Qty: 90 | Refills: 2 | Status: ACTIVE | COMMUNITY
Start: 2021-04-30 | End: 1900-01-01

## 2021-09-28 ENCOUNTER — NON-APPOINTMENT (OUTPATIENT)
Age: 71
End: 2021-09-28

## 2021-09-28 PROBLEM — I47.2 NSVT (NONSUSTAINED VENTRICULAR TACHYCARDIA): Status: ACTIVE | Noted: 2021-05-28

## 2021-09-28 NOTE — DISCUSSION/SUMMARY
[FreeTextEntry1] : 72 yo with known CAD s/p PCI 2019 LCx and LAD and moderate LAD disease, COPD (follows up with Dr. Bajwa), possible COVID19 illness given positive antibodies per pt, + former smoker who presents for a follow up. \par \par -Dyspnea/Tachycardia\par Dyspnea has resolved and tachycardia has improved as well. TTE showed preserved LV function, thickened AV, mild MR. Noted to have NSVT and SVT on event monitor previously and was initiated on BB. Given his risk factors and this arrhythmias I suggested ischemic evaluation with stress test, which showed less ischemia than 2019 stress testing.  Pt is currently with less WYMAN and no palpitations.  For now will continue to monitor.   \par \par -CAD s/p PCI in 2019\par continue ASA 81 mg po daily. \par \par HTN\par -Not controlled.  Will continue Norvasc 10 mg po daily, Losartan 100 mg po daily. We again discussed importance of medication compliance, low salt intake, regular exercise and ambulatory BP monitoring. It is still unclear if pt is in fact takes his meds\par \par HLD\par -on high intensity statin\par \par Pt will follow up with me in 2 months.  Close follow up given uncontrolled BP and ?compliance with meds.

## 2021-09-28 NOTE — HISTORY OF PRESENT ILLNESS
[FreeTextEntry1] : 72 yo with known CAD s/p PCI 2019 LCx and LAD and moderate LAD disease, COPD (follows up with Dr. Bajwa), possible COVID19 illness given positive antibodies per pt, + former smoker who presents for a follow up.  Previously in April, 2021 pt presented for cardiovascular evaluation after being referred by his PMD.  At that time he was started on Lasix 20 mg po daily given LE edema and dyspnea.  Since then he has been feeling better and LE has resolved.  He is still having a hard time remembering his meds.  Denies CP, admits to baseline SOB, no LE, no syncope, no palpitations, no dizziness.  \par \par

## 2021-09-28 NOTE — REVIEW OF SYSTEMS
[Cough] : cough [Erectile Dysfunction] : erectile dysfunction [Negative] : Heme/Lymph [FreeTextEntry5] : see HPI

## 2021-09-28 NOTE — PHYSICAL EXAM
[No Acute Distress] : no acute distress [Frail] : frail [Normal Conjunctiva] : normal conjunctiva [No Xanthelasma] : no xanthelasma [Normal Venous Pressure] : normal venous pressure [No Carotid Bruit] : no carotid bruit [Normal S1, S2] : normal S1, S2 [Murmur] : murmur [Soft] : abdomen soft [Non Tender] : non-tender [Normal Bowel Sounds] : normal bowel sounds [Normal Gait] : normal gait [Gait - Sufficient for Exercise Testing] : gait - sufficient for exercise testing [No Edema] : no edema [No Cyanosis] : no cyanosis [No Clubbing] : no clubbing [No Varicosities] : no varicosities [Normal Radial B/L] : normal radial B/L [Edema ___] : edema [unfilled] [Normal] : alert and oriented, normal memory [de-identified] : + ROMAINE 2/6

## 2021-09-28 NOTE — CARDIOLOGY SUMMARY
[de-identified] : 04/16/2021 sinus tach [de-identified] : 09/26/2019\par * Chest Pain: No chest pain with administration of\par Regadenoson.\par * Symptom: No Symptoms.\par * HR Response: Appropriate.\par * BP Response: Appropriate.\par * Heart Rhythm: Sinus Rhythm - Frequent APD's - 81 BPM.\par * Q Waves: Anteroseptal MI.\par * Baseline ECG: Nonspecific ST-T wave abnormality.\par * ECG Changes: ST Depression: 0.5 mm horizontal in leads\par II, III, aVF, and V4-V6  started at 2:00 min following\par regadenoson infusion at a HR of 113 bpm and persisted at\par least 6:25 min into recovery.\par * Arrhythmia: Occasional APDs occurred during rest.\par * The left ventricle was normal in size. There are large,\par severe defects in the anterior, anteroseptal, and apical\par walls that are mostly reversible suggestive of infarct\par with significant tracy-infarct ischemia.\par * There are large, moderate to severe defects in the\par inferior, inferoseptal, and mid to distal inferolateral\par walls that are mostly reversible suggestive of infarct\par with significant tracy-infarct ischemia.\par * Increased right ventricular tracer uptake is noted on\par stress imaging.\par * Post-stress gated wall motion analysis was performed\par (LVEF = 61 %;LVEDV = 87 ml.) revealing markedly reduced\par systolic thickening of the anterior, apical, septal,\par inferior, and mid to distal inferolateral walls and\par hypokinesis of the distal septal and apical septal walls.\par Overall left ventricular ejection fraction is preserved.\par  [de-identified] : 09/25/2019 \par Mitral Valve: Normal mitral valve. Minimal mitral\par regurgitation.\par Aortic Valve/Aorta: Thickened aortic valve.\par Aortic Root: 3.3 cm.\par Left Atrium: Normal left atrium.\par Left Ventricle: Normal left ventricular systolic function.\par No segmental wall motion abnormalities. Normal left\par ventricular internal dimensions and wall thicknesses. Mild\par diastolic dysfunction (Stage I).\par Right Heart: Normal right atrium. Normal right ventricular\par size and function. Normal tricuspid valve. Normal pulmonic\par valve.\par Pericardium/Pleura: Normal pericardium with no pericardial\par effusion.\par Hemodynamic: Estimated right atrial pressure is 8 mm Hg.\par Inadequate tricuspid regurgitation Doppler envelope\par precludes estimation of RVSP.\par  [de-identified] : 09/27/2019\par CORONARY VESSELS: The coronary circulation is right dominant.\par LM:   --  LM: Normal.\par LAD:   --  Proximal LAD: There was a 60 % stenosis.\par --  Mid LAD: There was a 70 % stenosis. IFR=0.77\par --  D1: There was a 70 % stenosis.\par CX:   --  Proximal circumflex: There was a 80 % stenosis. BJP=[id number]\par RCA:   --  Mid RCA: There was a 50 % stenosis.\par COMPLICATIONS: There were no complications.\par DIAGNOSTIC IMPRESSIONS: Severe, IFR abn Lcx and LAD disease.\par INTERVENTIONAL IMPRESSIONS: Successful PCI to the LCx. Staged PC to the\par LAD.\par

## 2021-10-08 ENCOUNTER — INPATIENT (INPATIENT)
Facility: HOSPITAL | Age: 71
LOS: 20 days | Discharge: NURSING, REHAB OR LONG-TERM CARE | DRG: 870 | End: 2021-10-29
Attending: INTERNAL MEDICINE | Admitting: INTERNAL MEDICINE
Payer: COMMERCIAL

## 2021-10-08 VITALS
SYSTOLIC BLOOD PRESSURE: 206 MMHG | TEMPERATURE: 101 F | DIASTOLIC BLOOD PRESSURE: 93 MMHG | RESPIRATION RATE: 28 BRPM | HEIGHT: 73 IN | OXYGEN SATURATION: 95 % | HEART RATE: 131 BPM | WEIGHT: 154.98 LBS

## 2021-10-08 DIAGNOSIS — J96.00 ACUTE RESPIRATORY FAILURE, UNSPECIFIED WHETHER WITH HYPOXIA OR HYPERCAPNIA: ICD-10-CM

## 2021-10-08 LAB
ALBUMIN SERPL ELPH-MCNC: 4 G/DL — SIGNIFICANT CHANGE UP (ref 3.3–5)
ALBUMIN SERPL ELPH-MCNC: 4.8 G/DL — SIGNIFICANT CHANGE UP (ref 3.3–5)
ALP SERPL-CCNC: 59 U/L — SIGNIFICANT CHANGE UP (ref 40–120)
ALP SERPL-CCNC: 65 U/L — SIGNIFICANT CHANGE UP (ref 40–120)
ALT FLD-CCNC: 13 U/L — SIGNIFICANT CHANGE UP (ref 10–45)
ALT FLD-CCNC: 15 U/L — SIGNIFICANT CHANGE UP (ref 10–45)
ANION GAP SERPL CALC-SCNC: 15 MMOL/L — SIGNIFICANT CHANGE UP (ref 5–17)
ANION GAP SERPL CALC-SCNC: 17 MMOL/L — SIGNIFICANT CHANGE UP (ref 5–17)
ANISOCYTOSIS BLD QL: SLIGHT — SIGNIFICANT CHANGE UP
APTT BLD: 28.1 SEC — SIGNIFICANT CHANGE UP (ref 27.5–35.5)
AST SERPL-CCNC: 25 U/L — SIGNIFICANT CHANGE UP (ref 10–40)
AST SERPL-CCNC: 34 U/L — SIGNIFICANT CHANGE UP (ref 10–40)
BASE EXCESS BLDV CALC-SCNC: -0.8 MMOL/L — SIGNIFICANT CHANGE UP (ref -2–2)
BASE EXCESS BLDV CALC-SCNC: -4.8 MMOL/L — LOW (ref -2–2)
BASOPHILS # BLD AUTO: 0 K/UL — SIGNIFICANT CHANGE UP (ref 0–0.2)
BASOPHILS NFR BLD AUTO: 0 % — SIGNIFICANT CHANGE UP (ref 0–2)
BILIRUB SERPL-MCNC: 0.4 MG/DL — SIGNIFICANT CHANGE UP (ref 0.2–1.2)
BILIRUB SERPL-MCNC: 0.8 MG/DL — SIGNIFICANT CHANGE UP (ref 0.2–1.2)
BUN SERPL-MCNC: 13 MG/DL — SIGNIFICANT CHANGE UP (ref 7–23)
BUN SERPL-MCNC: 15 MG/DL — SIGNIFICANT CHANGE UP (ref 7–23)
CA-I SERPL-SCNC: 1.15 MMOL/L — SIGNIFICANT CHANGE UP (ref 1.15–1.33)
CA-I SERPL-SCNC: 1.18 MMOL/L — SIGNIFICANT CHANGE UP (ref 1.15–1.33)
CALCIUM SERPL-MCNC: 8.4 MG/DL — SIGNIFICANT CHANGE UP (ref 8.4–10.5)
CALCIUM SERPL-MCNC: 9.5 MG/DL — SIGNIFICANT CHANGE UP (ref 8.4–10.5)
CHLORIDE BLDV-SCNC: 101 MMOL/L — SIGNIFICANT CHANGE UP (ref 96–108)
CHLORIDE BLDV-SCNC: 101 MMOL/L — SIGNIFICANT CHANGE UP (ref 96–108)
CHLORIDE SERPL-SCNC: 101 MMOL/L — SIGNIFICANT CHANGE UP (ref 96–108)
CHLORIDE SERPL-SCNC: 99 MMOL/L — SIGNIFICANT CHANGE UP (ref 96–108)
CK SERPL-CCNC: 440 U/L — HIGH (ref 30–200)
CO2 BLDV-SCNC: 22 MMOL/L — SIGNIFICANT CHANGE UP (ref 22–26)
CO2 BLDV-SCNC: 26 MMOL/L — SIGNIFICANT CHANGE UP (ref 22–26)
CO2 SERPL-SCNC: 16 MMOL/L — LOW (ref 22–31)
CO2 SERPL-SCNC: 21 MMOL/L — LOW (ref 22–31)
CREAT SERPL-MCNC: 1.04 MG/DL — SIGNIFICANT CHANGE UP (ref 0.5–1.3)
CREAT SERPL-MCNC: 1.17 MG/DL — SIGNIFICANT CHANGE UP (ref 0.5–1.3)
ELLIPTOCYTES BLD QL SMEAR: SLIGHT — SIGNIFICANT CHANGE UP
EOSINOPHIL # BLD AUTO: 0 K/UL — SIGNIFICANT CHANGE UP (ref 0–0.5)
EOSINOPHIL NFR BLD AUTO: 0 % — SIGNIFICANT CHANGE UP (ref 0–6)
GAS PNL BLDA: SIGNIFICANT CHANGE UP
GAS PNL BLDA: SIGNIFICANT CHANGE UP
GAS PNL BLDV: 133 MMOL/L — LOW (ref 136–145)
GAS PNL BLDV: 133 MMOL/L — LOW (ref 136–145)
GAS PNL BLDV: SIGNIFICANT CHANGE UP
GLUCOSE BLDV-MCNC: 142 MG/DL — HIGH (ref 70–99)
GLUCOSE BLDV-MCNC: 178 MG/DL — HIGH (ref 70–99)
GLUCOSE SERPL-MCNC: 140 MG/DL — HIGH (ref 70–99)
GLUCOSE SERPL-MCNC: 188 MG/DL — HIGH (ref 70–99)
HCO3 BLDV-SCNC: 21 MMOL/L — LOW (ref 22–29)
HCO3 BLDV-SCNC: 24 MMOL/L — SIGNIFICANT CHANGE UP (ref 22–29)
HCT VFR BLD CALC: 28.4 % — LOW (ref 39–50)
HCT VFR BLD CALC: 32.4 % — LOW (ref 39–50)
HCT VFR BLDA CALC: 25 % — LOW (ref 39–51)
HCT VFR BLDA CALC: 30 % — LOW (ref 39–51)
HGB BLD CALC-MCNC: 10.1 G/DL — LOW (ref 12.6–17.4)
HGB BLD CALC-MCNC: 8.3 G/DL — LOW (ref 12.6–17.4)
HGB BLD-MCNC: 8.4 G/DL — LOW (ref 13–17)
HGB BLD-MCNC: 9.4 G/DL — LOW (ref 13–17)
HPIV3 RNA SPEC QL NAA+PROBE: DETECTED
HYPOCHROMIA BLD QL: SLIGHT — SIGNIFICANT CHANGE UP
INR BLD: 1.04 RATIO — SIGNIFICANT CHANGE UP (ref 0.88–1.16)
LACTATE BLDV-MCNC: 1.9 MMOL/L — SIGNIFICANT CHANGE UP (ref 0.7–2)
LACTATE BLDV-MCNC: 2.2 MMOL/L — HIGH (ref 0.7–2)
LYMPHOCYTES # BLD AUTO: 0.07 K/UL — LOW (ref 1–3.3)
LYMPHOCYTES # BLD AUTO: 0.9 % — LOW (ref 13–44)
MACROCYTES BLD QL: SLIGHT — SIGNIFICANT CHANGE UP
MANUAL SMEAR VERIFICATION: SIGNIFICANT CHANGE UP
MCHC RBC-ENTMCNC: 20.4 PG — LOW (ref 27–34)
MCHC RBC-ENTMCNC: 20.8 PG — LOW (ref 27–34)
MCHC RBC-ENTMCNC: 29 GM/DL — LOW (ref 32–36)
MCHC RBC-ENTMCNC: 29.6 GM/DL — LOW (ref 32–36)
MCV RBC AUTO: 70.4 FL — LOW (ref 80–100)
MCV RBC AUTO: 70.5 FL — LOW (ref 80–100)
MONOCYTES # BLD AUTO: 0.4 K/UL — SIGNIFICANT CHANGE UP (ref 0–0.9)
MONOCYTES NFR BLD AUTO: 5.3 % — SIGNIFICANT CHANGE UP (ref 2–14)
NEUTROPHILS # BLD AUTO: 6.75 K/UL — SIGNIFICANT CHANGE UP (ref 1.8–7.4)
NEUTROPHILS NFR BLD AUTO: 87.6 % — HIGH (ref 43–77)
NEUTS BAND # BLD: 1.8 % — SIGNIFICANT CHANGE UP (ref 0–8)
NRBC # BLD: 0 /100 WBCS — SIGNIFICANT CHANGE UP (ref 0–0)
NT-PROBNP SERPL-SCNC: 746 PG/ML — HIGH (ref 0–300)
PCO2 BLDV: 41 MMHG — LOW (ref 42–55)
PCO2 BLDV: 42 MMHG — SIGNIFICANT CHANGE UP (ref 42–55)
PH BLDV: 7.31 — LOW (ref 7.32–7.43)
PH BLDV: 7.38 — SIGNIFICANT CHANGE UP (ref 7.32–7.43)
PLAT MORPH BLD: NORMAL — SIGNIFICANT CHANGE UP
PLATELET # BLD AUTO: 261 K/UL — SIGNIFICANT CHANGE UP (ref 150–400)
PLATELET # BLD AUTO: 288 K/UL — SIGNIFICANT CHANGE UP (ref 150–400)
PO2 BLDV: 47 MMHG — HIGH (ref 25–45)
PO2 BLDV: 97 MMHG — HIGH (ref 25–45)
POIKILOCYTOSIS BLD QL AUTO: SLIGHT — SIGNIFICANT CHANGE UP
POLYCHROMASIA BLD QL SMEAR: SLIGHT — SIGNIFICANT CHANGE UP
POTASSIUM BLDV-SCNC: 4.4 MMOL/L — SIGNIFICANT CHANGE UP (ref 3.5–5.1)
POTASSIUM BLDV-SCNC: 4.5 MMOL/L — SIGNIFICANT CHANGE UP (ref 3.5–5.1)
POTASSIUM SERPL-MCNC: 4.3 MMOL/L — SIGNIFICANT CHANGE UP (ref 3.5–5.3)
POTASSIUM SERPL-MCNC: 4.7 MMOL/L — SIGNIFICANT CHANGE UP (ref 3.5–5.3)
POTASSIUM SERPL-SCNC: 4.3 MMOL/L — SIGNIFICANT CHANGE UP (ref 3.5–5.3)
POTASSIUM SERPL-SCNC: 4.7 MMOL/L — SIGNIFICANT CHANGE UP (ref 3.5–5.3)
PROT SERPL-MCNC: 7.1 G/DL — SIGNIFICANT CHANGE UP (ref 6–8.3)
PROT SERPL-MCNC: 8.3 G/DL — SIGNIFICANT CHANGE UP (ref 6–8.3)
PROTHROM AB SERPL-ACNC: 12.5 SEC — SIGNIFICANT CHANGE UP (ref 10.6–13.6)
RAPID RVP RESULT: DETECTED
RBC # BLD: 4.03 M/UL — LOW (ref 4.2–5.8)
RBC # BLD: 4.6 M/UL — SIGNIFICANT CHANGE UP (ref 4.2–5.8)
RBC # FLD: 22.6 % — HIGH (ref 10.3–14.5)
RBC # FLD: 23.3 % — HIGH (ref 10.3–14.5)
RBC BLD AUTO: ABNORMAL
SAO2 % BLDV: 75.8 % — SIGNIFICANT CHANGE UP (ref 67–88)
SAO2 % BLDV: 98.9 % — HIGH (ref 67–88)
SARS-COV-2 RNA SPEC QL NAA+PROBE: SIGNIFICANT CHANGE UP
SODIUM SERPL-SCNC: 134 MMOL/L — LOW (ref 135–145)
SODIUM SERPL-SCNC: 135 MMOL/L — SIGNIFICANT CHANGE UP (ref 135–145)
TARGETS BLD QL SMEAR: SLIGHT — SIGNIFICANT CHANGE UP
TROPONIN T, HIGH SENSITIVITY RESULT: 27 NG/L — SIGNIFICANT CHANGE UP (ref 0–51)
TROPONIN T, HIGH SENSITIVITY RESULT: 61 NG/L — HIGH (ref 0–51)
VARIANT LYMPHS # BLD: 4.4 % — SIGNIFICANT CHANGE UP (ref 0–6)
WBC # BLD: 7.55 K/UL — SIGNIFICANT CHANGE UP (ref 3.8–10.5)
WBC # BLD: 9.06 K/UL — SIGNIFICANT CHANGE UP (ref 3.8–10.5)
WBC # FLD AUTO: 7.55 K/UL — SIGNIFICANT CHANGE UP (ref 3.8–10.5)
WBC # FLD AUTO: 9.06 K/UL — SIGNIFICANT CHANGE UP (ref 3.8–10.5)

## 2021-10-08 PROCEDURE — 31500 INSERT EMERGENCY AIRWAY: CPT | Mod: GC

## 2021-10-08 PROCEDURE — 99291 CRITICAL CARE FIRST HOUR: CPT

## 2021-10-08 PROCEDURE — 71045 X-RAY EXAM CHEST 1 VIEW: CPT | Mod: 26,76

## 2021-10-08 PROCEDURE — 93010 ELECTROCARDIOGRAM REPORT: CPT | Mod: 59,GC

## 2021-10-08 PROCEDURE — 71045 X-RAY EXAM CHEST 1 VIEW: CPT | Mod: 26,77

## 2021-10-08 PROCEDURE — 99291 CRITICAL CARE FIRST HOUR: CPT | Mod: 25,GC

## 2021-10-08 RX ORDER — FENTANYL CITRATE 50 UG/ML
1 INJECTION INTRAVENOUS
Qty: 2500 | Refills: 0 | Status: DISCONTINUED | OUTPATIENT
Start: 2021-10-08 | End: 2021-10-08

## 2021-10-08 RX ORDER — FENTANYL CITRATE 50 UG/ML
100 INJECTION INTRAVENOUS ONCE
Refills: 0 | Status: DISCONTINUED | OUTPATIENT
Start: 2021-10-08 | End: 2021-10-08

## 2021-10-08 RX ORDER — MAGNESIUM SULFATE 500 MG/ML
1 VIAL (ML) INJECTION ONCE
Refills: 0 | Status: COMPLETED | OUTPATIENT
Start: 2021-10-08 | End: 2021-10-08

## 2021-10-08 RX ORDER — KETAMINE HYDROCHLORIDE 100 MG/ML
100 INJECTION INTRAMUSCULAR; INTRAVENOUS ONCE
Refills: 0 | Status: DISCONTINUED | OUTPATIENT
Start: 2021-10-08 | End: 2021-10-08

## 2021-10-08 RX ORDER — FENTANYL CITRATE 50 UG/ML
1 INJECTION INTRAVENOUS
Qty: 5000 | Refills: 0 | Status: DISCONTINUED | OUTPATIENT
Start: 2021-10-08 | End: 2021-10-10

## 2021-10-08 RX ORDER — NOREPINEPHRINE BITARTRATE/D5W 8 MG/250ML
0.05 PLASTIC BAG, INJECTION (ML) INTRAVENOUS
Qty: 8 | Refills: 0 | Status: DISCONTINUED | OUTPATIENT
Start: 2021-10-08 | End: 2021-10-13

## 2021-10-08 RX ORDER — CHLORHEXIDINE GLUCONATE 213 G/1000ML
1 SOLUTION TOPICAL
Refills: 0 | Status: DISCONTINUED | OUTPATIENT
Start: 2021-10-08 | End: 2021-10-29

## 2021-10-08 RX ORDER — AZITHROMYCIN 500 MG/1
500 TABLET, FILM COATED ORAL EVERY 24 HOURS
Refills: 0 | Status: DISCONTINUED | OUTPATIENT
Start: 2021-10-08 | End: 2021-10-09

## 2021-10-08 RX ORDER — HEPARIN SODIUM 5000 [USP'U]/ML
5000 INJECTION INTRAVENOUS; SUBCUTANEOUS EVERY 8 HOURS
Refills: 0 | Status: DISCONTINUED | OUTPATIENT
Start: 2021-10-08 | End: 2021-10-29

## 2021-10-08 RX ORDER — ACETAMINOPHEN 500 MG
1000 TABLET ORAL ONCE
Refills: 0 | Status: COMPLETED | OUTPATIENT
Start: 2021-10-08 | End: 2021-10-08

## 2021-10-08 RX ORDER — PROPOFOL 10 MG/ML
20 INJECTION, EMULSION INTRAVENOUS ONCE
Refills: 0 | Status: DISCONTINUED | OUTPATIENT
Start: 2021-10-08 | End: 2021-10-08

## 2021-10-08 RX ORDER — CHLORHEXIDINE GLUCONATE 213 G/1000ML
15 SOLUTION TOPICAL EVERY 12 HOURS
Refills: 0 | Status: DISCONTINUED | OUTPATIENT
Start: 2021-10-08 | End: 2021-10-12

## 2021-10-08 RX ORDER — IPRATROPIUM/ALBUTEROL SULFATE 18-103MCG
3 AEROSOL WITH ADAPTER (GRAM) INHALATION
Refills: 0 | Status: COMPLETED | OUTPATIENT
Start: 2021-10-08 | End: 2021-10-08

## 2021-10-08 RX ORDER — MIDAZOLAM HYDROCHLORIDE 1 MG/ML
2 INJECTION, SOLUTION INTRAMUSCULAR; INTRAVENOUS ONCE
Refills: 0 | Status: DISCONTINUED | OUTPATIENT
Start: 2021-10-08 | End: 2021-10-08

## 2021-10-08 RX ORDER — FENTANYL CITRATE 50 UG/ML
50 INJECTION INTRAVENOUS ONCE
Refills: 0 | Status: DISCONTINUED | OUTPATIENT
Start: 2021-10-08 | End: 2021-10-08

## 2021-10-08 RX ORDER — SUCCINYLCHOLINE CHLORIDE 100 MG/5ML
100 SYRINGE (ML) INTRAVENOUS ONCE
Refills: 0 | Status: COMPLETED | OUTPATIENT
Start: 2021-10-08 | End: 2021-10-08

## 2021-10-08 RX ORDER — ACETAMINOPHEN 500 MG
650 TABLET ORAL ONCE
Refills: 0 | Status: COMPLETED | OUTPATIENT
Start: 2021-10-08 | End: 2021-10-08

## 2021-10-08 RX ORDER — PROPOFOL 10 MG/ML
20 INJECTION, EMULSION INTRAVENOUS
Qty: 1000 | Refills: 0 | Status: DISCONTINUED | OUTPATIENT
Start: 2021-10-08 | End: 2021-10-10

## 2021-10-08 RX ORDER — FENTANYL CITRATE 50 UG/ML
50 INJECTION INTRAVENOUS ONCE
Refills: 0 | Status: DISCONTINUED | OUTPATIENT
Start: 2021-10-08 | End: 2021-10-09

## 2021-10-08 RX ORDER — CEFTRIAXONE 500 MG/1
1000 INJECTION, POWDER, FOR SOLUTION INTRAMUSCULAR; INTRAVENOUS ONCE
Refills: 0 | Status: COMPLETED | OUTPATIENT
Start: 2021-10-08 | End: 2021-10-08

## 2021-10-08 RX ORDER — CEFTRIAXONE 500 MG/1
1000 INJECTION, POWDER, FOR SOLUTION INTRAMUSCULAR; INTRAVENOUS EVERY 24 HOURS
Refills: 0 | Status: DISCONTINUED | OUTPATIENT
Start: 2021-10-08 | End: 2021-10-09

## 2021-10-08 RX ORDER — AZITHROMYCIN 500 MG/1
500 TABLET, FILM COATED ORAL ONCE
Refills: 0 | Status: COMPLETED | OUTPATIENT
Start: 2021-10-08 | End: 2021-10-08

## 2021-10-08 RX ORDER — PROPOFOL 10 MG/ML
10 INJECTION, EMULSION INTRAVENOUS
Qty: 1000 | Refills: 0 | Status: DISCONTINUED | OUTPATIENT
Start: 2021-10-08 | End: 2021-10-08

## 2021-10-08 RX ORDER — PANTOPRAZOLE SODIUM 20 MG/1
40 TABLET, DELAYED RELEASE ORAL DAILY
Refills: 0 | Status: DISCONTINUED | OUTPATIENT
Start: 2021-10-08 | End: 2021-10-29

## 2021-10-08 RX ADMIN — Medication 6.59 MICROGRAM(S)/KG/MIN: at 15:27

## 2021-10-08 RX ADMIN — Medication 3 MILLILITER(S): at 13:30

## 2021-10-08 RX ADMIN — AZITHROMYCIN 250 MILLIGRAM(S): 500 TABLET, FILM COATED ORAL at 20:57

## 2021-10-08 RX ADMIN — MIDAZOLAM HYDROCHLORIDE 2 MILLIGRAM(S): 1 INJECTION, SOLUTION INTRAMUSCULAR; INTRAVENOUS at 15:23

## 2021-10-08 RX ADMIN — FENTANYL CITRATE 100 MICROGRAM(S): 50 INJECTION INTRAVENOUS at 15:23

## 2021-10-08 RX ADMIN — FENTANYL CITRATE 100 MICROGRAM(S): 50 INJECTION INTRAVENOUS at 16:52

## 2021-10-08 RX ADMIN — Medication 125 MILLIGRAM(S): at 12:57

## 2021-10-08 RX ADMIN — Medication 20 MILLIGRAM(S): at 22:17

## 2021-10-08 RX ADMIN — AZITHROMYCIN 250 MILLIGRAM(S): 500 TABLET, FILM COATED ORAL at 12:55

## 2021-10-08 RX ADMIN — CEFTRIAXONE 100 MILLIGRAM(S): 500 INJECTION, POWDER, FOR SOLUTION INTRAMUSCULAR; INTRAVENOUS at 20:56

## 2021-10-08 RX ADMIN — PROPOFOL 8.44 MICROGRAM(S)/KG/MIN: 10 INJECTION, EMULSION INTRAVENOUS at 15:25

## 2021-10-08 RX ADMIN — Medication 1000 MILLIGRAM(S): at 16:19

## 2021-10-08 RX ADMIN — FENTANYL CITRATE 50 MICROGRAM(S): 50 INJECTION INTRAVENOUS at 16:53

## 2021-10-08 RX ADMIN — KETAMINE HYDROCHLORIDE 100 MILLIGRAM(S): 100 INJECTION INTRAMUSCULAR; INTRAVENOUS at 15:38

## 2021-10-08 RX ADMIN — FENTANYL CITRATE 50 MICROGRAM(S): 50 INJECTION INTRAVENOUS at 15:24

## 2021-10-08 RX ADMIN — CEFTRIAXONE 100 MILLIGRAM(S): 500 INJECTION, POWDER, FOR SOLUTION INTRAMUSCULAR; INTRAVENOUS at 12:56

## 2021-10-08 RX ADMIN — Medication 1000 MILLIGRAM(S): at 16:52

## 2021-10-08 RX ADMIN — Medication 3 MILLILITER(S): at 12:54

## 2021-10-08 RX ADMIN — HEPARIN SODIUM 5000 UNIT(S): 5000 INJECTION INTRAVENOUS; SUBCUTANEOUS at 22:17

## 2021-10-08 RX ADMIN — Medication 400 MILLIGRAM(S): at 13:10

## 2021-10-08 RX ADMIN — Medication 3 MILLILITER(S): at 15:28

## 2021-10-08 RX ADMIN — Medication 100 MILLIGRAM(S): at 15:24
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
## 2021-10-08 NOTE — ED PROVIDER NOTE - OBJECTIVE STATEMENT
71M pmh asthma, copd, recently received 2nd covid vaccine 2 days ago p/w acute shortness of breath and found to be febrile in triage.  Pt arrives in respiratory distress, using all accessory muscles to breathe, tachypenic without wheezes or rales.  Denies shortness of breath, no signs of volume overload.  Pt emergently placed on Bipap, but continued to decline and urgently required intubation.

## 2021-10-08 NOTE — H&P ADULT - ATTENDING COMMENTS
Pt seen and examined. 71 M with medical hx as noted above including COPD/ asthma, now with acute hypoxic resp failure, in the setting os parainfluenza infection with possible superimposed bacterial PNA and a COPD exacerbation. FUP post intubation CXR and ABG. Titrate down FiO2 as tolerated. Cont ABx coverage with Ceftriaxone and Azithro, check Cxs, serum procal and urine legionella antigen. Start Solumedrol 20 mg IV Q8H and Duonebs Q6H. Titrate pressors to keep MAP >65. Mild OWEN likely 2/2 pre-renal ATN ( baseline Cr 0.8 now 1.17). Gentle IV hydration with LR, cont to follow UO/ electrolytes. Overall prognosis guarded. Pt seen and examined. 71 M with medical hx as noted above including COPD/ asthma, now with acute hypoxic resp failure, in the setting os parainfluenza infection with possible superimposed bacterial PNA ( b/l lower lobe consolidations on POCUS)  and a COPD exacerbation. FUP post intubation CXR and ABG. Titrate down FiO2 as tolerated. Cont ABx coverage with Ceftriaxone and Azithro, check Cxs, serum procal and urine legionella antigen. Start Solumedrol 20 mg IV Q8H and Duonebs Q6H. Titrate pressors to keep MAP >65. Mild OWEN likely 2/2 pre-renal ATN ( baseline Cr 0.8 now 1.17). Gentle IV hydration with LR, cont to follow UO/ electrolytes. Overall prognosis guarded.

## 2021-10-08 NOTE — H&P ADULT - NSHPPHYSICALEXAM_GEN_ALL_CORE
PHYSICAL EXAM:  GENERAL: Pt intubated and sedated on mechanical ventilation  HEAD: Atraumatic, Normocephalic  EYES: Pupils small, equal, minimally reactive to light, conjunctiva and sclera clear  ENMT: MMM, otherwise difficult to assess iso ET tube  NECK: Supple  NERVOUS SYSTEM: Sedated as above, pupils as above, otherwise difficult to appreciate neurologic exam   CHEST/LUNG: Clear to auscultation bilaterally; No rales, rhonchi, wheezing, or rubs  HEART: +Tachycardic w/ regular rhythm. No murmurs, rubs, or gallops  ABDOMEN: Soft, Nondistended; Bowel sounds present  EXTREMITIES: 2+ Peripheral Pulses, No edema

## 2021-10-08 NOTE — H&P ADULT - NSICDXPASTMEDICALHX_GEN_ALL_CORE_FT
PAST MEDICAL HISTORY:  Asthma     CAD (coronary artery disease)     COPD (chronic obstructive pulmonary disease) s/p PCI

## 2021-10-08 NOTE — ED ADULT NURSE NOTE - OBJECTIVE STATEMENT
Pt is a 71 year old male c/o fever and SOB.  Pt placed on non rebreather, then bipap for respiratory support.  Pt became tachypneic, tachycardic, O2 sat declining.  Pt in tripod position.  Attending called to bedside for respiratory distress.  Team prepared for intubation.  100 ketamine @ 1324, 100 succ @ 1325 for sedation and paralytic.  Resident placed ET tube at 23 at the lip.  Color change documented at 1326.  + lung sound reported by resident Williams Corona.  100 fent pushed at 1330. Prop started @ 20 mc's/kg.  2 mg versed pushed @1340. fent drip started at 1 mc/kg. Prop up to 50. Pt BP began to drop and levo was started at .05.  Pt stable at 1500.

## 2021-10-08 NOTE — H&P ADULT - NSHPSOCIALHISTORY_GEN_ALL_CORE
Unable to obtain iso pt mental status as above Occasionally drinks beer, former smoker (quit 2001)  Retired   Splits time b/t wife and grandchildren

## 2021-10-08 NOTE — ED PROVIDER NOTE - CLINICAL SUMMARY MEDICAL DECISION MAKING FREE TEXT BOX
71M pmh asthma, copd, recently received 2nd covid vaccine 2 days ago p/w acute shortness of breath and found to be febrile in triage.  Pt arrives in respiratory distress, using all accessory muscles to breathe, tachypenic without wheezes or rales.  Denies shortness of breath, no signs of volume overload.  Pt emergently placed on Bipap, but continued to decline and urgently required intubation.  Pt intubated successfully on first attempt, sedated, treated with fluids, abx, and MICU consulted for admission.  - Paris Tyler DO 71M pmh asthma, copd, recently received 2nd covid vaccine 2 days ago p/w acute shortness of breath and found to be febrile in triage.  Pt arrives in respiratory distress, using all accessory muscles to breathe, tachypenic without wheezes or rales.  Denies shortness of breath, no signs of volume overload.  Pt emergently placed on Bipap, but continued to decline and urgently required intubation.  Pt intubated successfully on first attempt, sedated, treated with fluids, abx, and MICU consulted for admission.  - DO Cj Stewart  - hx asthma, copd, presents acutely ill with SOB and fever. tachy, tachypneic, obvious WOB. no signs of fluid overload. attempted bipap, however patient acutely decompensated and became increasingly tachypneic, lethargic, needing emergent intubation with ketamine/succs. sedated with prop/fent, pressors as needed to maintain MAP with sedation. micu consult, empiric abx,empiric copd/asthma tx with steroids, nebs, mag, admit to icu.

## 2021-10-08 NOTE — H&P ADULT - ASSESSMENT
Assessment: 71M PMH COPD, Asthma, HTN, CAD s/p PCI (LCx, LAD, D1), who presents iso SOB, admitted to MICU iso AHRF requiring intubation for mechanical ventilation    Plan:  ASSESSMENT AND PLAN:    71M PMH COPD, Asthma, HTN, CAD s/p PCI (LCx, LAD, D1), who presents iso SOB, admitted to MICU iso AHRF requiring intubation for mechanical ventilation    #Neuro    #Cardiovascular    #Respiratory    #GI/Nutrition    #/Renal    #ID    #Endocrine    #Hematologic/Oncologic    #PPx    #Ethics ASSESSMENT AND PLAN:    71M PMH COPD, Asthma, HTN, CAD s/p PCI (LCx, LAD, D1), who presents iso SOB, admitted to MICU iso AHRF requiring intubation for mechanical ventilation    #Neuro  Sedated on propofol and fentanyl while intubated  - continue to monitor per ICU protocol     #Cardiovascular  On pressor support w/ levophed iso hypoTN likely iso vasoplegia from sedation  - wean pressors as tolerated, goal MAP > 65    #Respiratory  Presented w/ SOB x 2 days not improved w/ home nebs  Intubated on mechanical ventilation in ED  - f/u ABG  - c/w duonebs  - c/w solumedrol   - broad spectrum abx coverage w/ CTX and azithromycin  - will send legionella  - f/u RVP    #GI/Nutrition  No active issues  NG tube inserted in ED  - start feeds as tolerated    #/Renal  No active issues  - monitor UOP, Strict Is/Os  - monitor BMP, replete lytes prn     #ID  - Broad spectrum abx to coverage CAP as above  - monitor WBC and fever curve   - f/u BCx x 2, RVP, legionella    #Endocrine  No active issues  - obtain TSH and A1c   - monitor FS while NPO and when starting TF    #Hematologic/Oncologic  Microcytic Anemia (MCV 70.4) hgb 9.4  - monitor CBC, transfuse hgb > 7     #PPx  - DVT ppx w/ subq heparin 5000u TID  - Feeds as above    #Ethics  - Will need to obtain further collateral from family  - Will need to clarify GOC ASSESSMENT AND PLAN:    71M PMH COPD, Asthma, HTN, CAD s/p PCI (LCx, LAD, D1), who presents iso SOB, admitted to MICU iso AHRF requiring intubation for mechanical ventilation    #Neuro  Sedated on propofol and fentanyl while intubated  - continue to monitor per ICU protocol     #Cardiovascular  On pressor support w/ levophed iso hypoTN likely iso vasoplegia from sedation  - wean pressors as tolerated, goal MAP > 65    #Respiratory  Presented w/ SOB x 2 days not improved w/ home nebs  Intubated on mechanical ventilation in ED  - f/u ABG  - c/w duonebs  - c/w solumedrol   - broad spectrum abx coverage w/ CTX and azithromycin  - will send legionella  - f/u RVP  - ctm SpO2% per ICU protocol and adjust vent settings accordingly    #GI/Nutrition  No active issues  NG tube inserted in ED  - start feeds as tolerated    #/Renal  No active issues  - monitor UOP, Strict Is/Os  - monitor BMP, replete lytes prn     #ID  - Broad spectrum abx to coverage CAP as above  - monitor WBC and fever curve   - f/u BCx x 2, RVP, legionella    #Endocrine  No active issues  - obtain TSH and A1c   - monitor FS while NPO and when starting TF    #Hematologic/Oncologic  Microcytic Anemia (MCV 70.4) hgb 9.4  - monitor CBC, transfuse hgb > 7     #PPx  - DVT ppx w/ subq heparin 5000u TID  - Feeds as above    #Ethics  - Will need to obtain further collateral from family  - Will need to clarify GOC

## 2021-10-08 NOTE — ED PROVIDER NOTE - PHYSICAL EXAMINATION
Vitals: I have reviewed the patients vital signs. tachycardic, hypertensive, febrile  General: acutely ill appearing  HEENT: Atraumatic, normocephalic, airway patent  Eyes: EOMI, tracking appropriately  Neck: no JVD, accessory muscle use  Chest/Lungs: symmetric chest rise with poor air entry, obvious WOB, tachypnea >30/min, inability to speak in complete sentences  Heart: WWP, tachy, 2+ pulses, no peripheral edema  Neuro: A+Ox3, unable to ambulate due to condition,CN grossly intact, moving all extremities  MSK: strength at baseline in all extremities, no muscle wasting or atrophy  Skin: no cyanosis, no jaundice, no new emergent lesions

## 2021-10-08 NOTE — H&P ADULT - NSHPLABSRESULTS_GEN_ALL_CORE
LABS:                        9.4    7.55  )-----------( 288      ( 08 Oct 2021 13:02 )             32.4     10-08    135  |  99  |  13  ----------------------------<  140<H>  4.3   |  21<L>  |  1.04    Ca    9.5      08 Oct 2021 13:02    TPro  8.3  /  Alb  4.8  /  TBili  0.8  /  DBili  x   /  AST  25  /  ALT  13  /  AlkPhos  65  10-08    PT/INR - ( 08 Oct 2021 13:02 )   PT: 12.5 sec;   INR: 1.04 ratio         PTT - ( 08 Oct 2021 13:02 )  PTT:28.1 sec        RADIOLOGY & ADDITIONAL TESTS:      EXAM:  XR CHEST AP OR PA 1V                            PROCEDURE DATE:  10/08/2021            INTERPRETATION:  HISTORY: Intubation    TECHNIQUE: Single frontal view of the chest with comparison to 10/8/2021 at 12:50 PM    FINDINGS:    The heart is normal in size. Lungs are grossly clear. The apices and hemidiaphragms are unremarkable. Degenerative changes.    Endotracheal tube and NG tube in place.    IMPRESSION:    Lines in place.    --- End of Report ---                DULCE MARIA HINSON MD; Attending Radiologist  This document has been electronically signed. Oct  8 2021  2:02PM    < end of copied text >

## 2021-10-08 NOTE — ED PROCEDURE NOTE - PROCEDURE ADDITIONAL DETAILS
acute respiratory failure unknown cause, possible COPD/Asthma given ketamine succs, intubated with glide without difficulty, 24 at lips. BL breath sounds, ETCO+. confirmed on XR.

## 2021-10-08 NOTE — ED ADULT NURSE NOTE - NSSUHOSCREENINGYN_ED_ALL_ED
8/14/2017      RE: Brooke Ahuja  [address] [facility] [id number]       Pediatric Otolaryngology and Facial Plastic Surgery    CC:   Chief Complaints and History of Present Illnesses   Patient presents with     RECHECK     Return 6 wk Post op DL, bronch 6/30/2017 Pt states no pain today.       Referring Provider: Rozina:  Date of Service: 08/14/17    Dear Dr. Handy,    I had the pleasure of seeing Brooke Ahuja in follow up today in the [facility].    HPI:  Brooke is a 6 year old female who presents for follow up after her DL and bronchoscopy on 6/30 with Pulmonology for chronic cough. Overall, she had a normal airway with very mild tracheomalacia. Since the procedure, Brooke has done very well. No issues with breathing, bleeding, or pain. Mom reports that Brooke has not had any cough whatsoever. Currently undergoing more workup for CF. All testing has been negative so far. Cultures grew light Strep pneumo and Rhinovirus.      Past medical history, past social history, family history, allergies and medications reviewed.     PMH:  Past Medical History:   Diagnosis Date     Chronic cough      Eczema      Overweight for pediatric patient         PSH:  Past Surgical History:   Procedure Laterality Date     LARYNGOSCOPY N/A 6/30/2017    Procedure: LARYNGOSCOPY;;  Surgeon: Edinson Vázquez MD;  Location:  OR       Medications:    Current Outpatient Prescriptions   Medication Sig Dispense Refill     ipratropium (ATROVENT HFA) 17 MCG/ACT Inhaler Inhale 2 puffs into the lungs 2 times daily Increase to four times daily during times of illness 1 Inhaler 3     fluticasone (FLOVENT HFA) 110 MCG/ACT Inhaler Inhale 2 puffs into the lungs 2 times daily Increase to four times daily with times of illness 1 Inhaler 3     acetaminophen (TYLENOL) 160 MG/5ML elixir Take 20.5 mLs (650 mg) by mouth every 4 hours as needed for mild pain (Patient not taking: Reported on  "8/14/2017) 120 mL      fluticasone (FLONASE) 50 MCG/ACT spray Spray 1 spray into both nostrils daily (Patient not taking: Reported on 6/21/2017) 1 Bottle 0     albuterol (PROAIR HFA/PROVENTIL HFA/VENTOLIN HFA) 108 (90 BASE) MCG/ACT Inhaler Inhale 2 puffs into the lungs every 4 hours as needed for shortness of breath / dyspnea or wheezing (Patient not taking: Reported on 5/22/2017) 1 Inhaler 3       Allergies:   No Known Allergies    Social History:  Social History     Social History     Marital status: Single     Spouse name: N/A     Number of children: N/A     Years of education: N/A     Occupational History     Not on file.     Social History Main Topics     Smoking status: Never Smoker     Smokeless tobacco: Never Used     Alcohol use No     Drug use: No     Sexual activity: No     Other Topics Concern     Not on file     Social History Narrative       FAMILY HISTORY:      Family History   Problem Relation Age of Onset     Prostate Cancer Paternal Grandfather        REVIEW OF SYSTEMS:  12 point ROS obtained and was negative other than the symptoms noted above in the HPI.    PHYSICAL EXAMINATION:  General: No acute distress, age appropriate behavior  Ht 1.27 m (4' 2\")  Wt 45.4 kg (100 lb)  BMI 28.12 kg/m2  HEAD: normocephalic, atraumatic  Face: symmetrical, no swelling, edema, or erythema, no facial droop  Eyes: EOMI, PERRLA    Ears:   Right EAC:Normal caliber with minimal cerumen  Right TM: TM intact, translucent  Right middle ear:No effusion    Left EAC:Normal caliber with minimal cerumen  Left TM:intact, translucent  Left middle ear:No effusion    Nose:   No anterior drainage, intact and midline septum without perforation or hematoma   Mouth: Moist, no ulcers, no jaw or tooth tenderness, tongue midline and symmetric.    Oropharynx:   Tonsils: 2+  Palate intact with normal movement  Uvula singular and midline, no oropharyngeal erythema  Neck: no LAD, trach midline  Neuro: cranial nerves 2-12 grossly " intact    Imaging reviewed: None    Laboratory reviewed: None    Audiology reviewed: None    Impressions and Recommendations:  Brooke is a 6 year old female with history of chronic cough who is 6 weeks s/p DL and bronchoscopy and flexible bronchoscopy with Pulmonology. From an ENT perspective, Brooke is doing very well. There are no issues from an airway standpoint. No further surgery or procedures are required. She is currently going to see Dr. Estrella today for further discussion of medical optimization and further workup. She does not need to follow up with us unless there are any questions or concerns. We are always happy to see her if we can be of any assistance. We answered Mom's questions and she was grateful for the visit.    Thank you for allowing me to participate in the care of Brooke. Please don't hesitate to contact me.    Edinson Vázquez MD  Pediatric Otolaryngology and Facial Plastic Surgery  Department of Otolaryngology  [facility] [phone]   Pager [phone]   [email or web address]      The patient was seen in conjunction with Dr. Zane Thompson, Otolaryngology Resident.     -------------------------------------------------------------------------------------------------  Physician Attestation   I, Edinson Vázquez, saw this patient with the resident and agree with the resident s findings and plan of care as documented in the resident s note.      I personally reviewed vital signs, medications, labs and imaging.    Key findings: The note above is edited to reflect my history, physical, assessment and plan and I agree with the documentation    Edinson Vázquez  Date of Service (when I saw the patient): 08/14/17                   No - the patient is unable to be screened due to medical condition

## 2021-10-09 LAB
ALBUMIN SERPL ELPH-MCNC: 4.1 G/DL — SIGNIFICANT CHANGE UP (ref 3.3–5)
ALP SERPL-CCNC: 56 U/L — SIGNIFICANT CHANGE UP (ref 40–120)
ALT FLD-CCNC: 13 U/L — SIGNIFICANT CHANGE UP (ref 10–45)
ANION GAP SERPL CALC-SCNC: 16 MMOL/L — SIGNIFICANT CHANGE UP (ref 5–17)
APPEARANCE UR: CLEAR — SIGNIFICANT CHANGE UP
APTT BLD: 26.5 SEC — LOW (ref 27.5–35.5)
AST SERPL-CCNC: 27 U/L — SIGNIFICANT CHANGE UP (ref 10–40)
BACTERIA # UR AUTO: NEGATIVE — SIGNIFICANT CHANGE UP
BILIRUB SERPL-MCNC: 0.3 MG/DL — SIGNIFICANT CHANGE UP (ref 0.2–1.2)
BILIRUB UR-MCNC: NEGATIVE — SIGNIFICANT CHANGE UP
BUN SERPL-MCNC: 16 MG/DL — SIGNIFICANT CHANGE UP (ref 7–23)
CALCIUM SERPL-MCNC: 8.4 MG/DL — SIGNIFICANT CHANGE UP (ref 8.4–10.5)
CHLORIDE SERPL-SCNC: 100 MMOL/L — SIGNIFICANT CHANGE UP (ref 96–108)
CK SERPL-CCNC: 390 U/L — HIGH (ref 30–200)
CO2 SERPL-SCNC: 17 MMOL/L — LOW (ref 22–31)
COLOR SPEC: SIGNIFICANT CHANGE UP
CREAT SERPL-MCNC: 1.08 MG/DL — SIGNIFICANT CHANGE UP (ref 0.5–1.3)
DIFF PNL FLD: NEGATIVE — SIGNIFICANT CHANGE UP
EPI CELLS # UR: 1 /HPF — SIGNIFICANT CHANGE UP
GAS PNL BLDA: SIGNIFICANT CHANGE UP
GLUCOSE BLDC GLUCOMTR-MCNC: 177 MG/DL — HIGH (ref 70–99)
GLUCOSE SERPL-MCNC: 166 MG/DL — HIGH (ref 70–99)
GLUCOSE UR QL: NEGATIVE — SIGNIFICANT CHANGE UP
GRAM STN FLD: SIGNIFICANT CHANGE UP
HCT VFR BLD CALC: 30.9 % — LOW (ref 39–50)
HGB BLD-MCNC: 8.6 G/DL — LOW (ref 13–17)
HYALINE CASTS # UR AUTO: 2 /LPF — SIGNIFICANT CHANGE UP (ref 0–2)
INR BLD: 0.96 RATIO — SIGNIFICANT CHANGE UP (ref 0.88–1.16)
KETONES UR-MCNC: NEGATIVE — SIGNIFICANT CHANGE UP
LEGIONELLA AG UR QL: NEGATIVE — SIGNIFICANT CHANGE UP
LEUKOCYTE ESTERASE UR-ACNC: ABNORMAL
MAGNESIUM SERPL-MCNC: 2.2 MG/DL — SIGNIFICANT CHANGE UP (ref 1.6–2.6)
MCHC RBC-ENTMCNC: 20.1 PG — LOW (ref 27–34)
MCHC RBC-ENTMCNC: 27.8 GM/DL — LOW (ref 32–36)
MCV RBC AUTO: 72.4 FL — LOW (ref 80–100)
MRSA PCR RESULT.: SIGNIFICANT CHANGE UP
MRSA PCR RESULT.: SIGNIFICANT CHANGE UP
NITRITE UR-MCNC: NEGATIVE — SIGNIFICANT CHANGE UP
NRBC # BLD: 0 /100 WBCS — SIGNIFICANT CHANGE UP (ref 0–0)
PH UR: 6 — SIGNIFICANT CHANGE UP (ref 5–8)
PHOSPHATE SERPL-MCNC: 3.7 MG/DL — SIGNIFICANT CHANGE UP (ref 2.5–4.5)
PLATELET # BLD AUTO: 214 K/UL — SIGNIFICANT CHANGE UP (ref 150–400)
POTASSIUM SERPL-MCNC: 5.2 MMOL/L — SIGNIFICANT CHANGE UP (ref 3.5–5.3)
POTASSIUM SERPL-SCNC: 5.2 MMOL/L — SIGNIFICANT CHANGE UP (ref 3.5–5.3)
PROCALCITONIN SERPL-MCNC: 1.68 NG/ML — HIGH (ref 0.02–0.1)
PROT SERPL-MCNC: 7.3 G/DL — SIGNIFICANT CHANGE UP (ref 6–8.3)
PROT UR-MCNC: ABNORMAL
PROTHROM AB SERPL-ACNC: 11.5 SEC — SIGNIFICANT CHANGE UP (ref 10.6–13.6)
RBC # BLD: 4.27 M/UL — SIGNIFICANT CHANGE UP (ref 4.2–5.8)
RBC # FLD: 23 % — HIGH (ref 10.3–14.5)
RBC CASTS # UR COMP ASSIST: 1 /HPF — SIGNIFICANT CHANGE UP (ref 0–4)
S AUREUS DNA NOSE QL NAA+PROBE: SIGNIFICANT CHANGE UP
S AUREUS DNA NOSE QL NAA+PROBE: SIGNIFICANT CHANGE UP
SODIUM SERPL-SCNC: 133 MMOL/L — LOW (ref 135–145)
SP GR SPEC: 1.02 — SIGNIFICANT CHANGE UP (ref 1.01–1.02)
SPECIMEN SOURCE: SIGNIFICANT CHANGE UP
TROPONIN T, HIGH SENSITIVITY RESULT: 51 NG/L — SIGNIFICANT CHANGE UP (ref 0–51)
UROBILINOGEN FLD QL: NEGATIVE — SIGNIFICANT CHANGE UP
WBC # BLD: 10.86 K/UL — HIGH (ref 3.8–10.5)
WBC # FLD AUTO: 10.86 K/UL — HIGH (ref 3.8–10.5)
WBC UR QL: 3 /HPF — SIGNIFICANT CHANGE UP (ref 0–5)

## 2021-10-09 PROCEDURE — 99291 CRITICAL CARE FIRST HOUR: CPT

## 2021-10-09 RX ORDER — MIDAZOLAM HYDROCHLORIDE 1 MG/ML
2 INJECTION, SOLUTION INTRAMUSCULAR; INTRAVENOUS ONCE
Refills: 0 | Status: DISCONTINUED | OUTPATIENT
Start: 2021-10-09 | End: 2021-10-09

## 2021-10-09 RX ORDER — FENTANYL CITRATE 50 UG/ML
50 INJECTION INTRAVENOUS ONCE
Refills: 0 | Status: DISCONTINUED | OUTPATIENT
Start: 2021-10-09 | End: 2021-10-09

## 2021-10-09 RX ORDER — VANCOMYCIN HCL 1 G
1000 VIAL (EA) INTRAVENOUS ONCE
Refills: 0 | Status: COMPLETED | OUTPATIENT
Start: 2021-10-09 | End: 2021-10-09

## 2021-10-09 RX ORDER — FENTANYL CITRATE 50 UG/ML
100 INJECTION INTRAVENOUS ONCE
Refills: 0 | Status: DISCONTINUED | OUTPATIENT
Start: 2021-10-09 | End: 2021-10-09

## 2021-10-09 RX ORDER — INFLUENZA VIRUS VACCINE 15; 15; 15; 15 UG/.5ML; UG/.5ML; UG/.5ML; UG/.5ML
0.5 SUSPENSION INTRAMUSCULAR ONCE
Refills: 0 | Status: DISCONTINUED | OUTPATIENT
Start: 2021-10-09 | End: 2021-10-28

## 2021-10-09 RX ORDER — ACETAMINOPHEN 500 MG
1000 TABLET ORAL ONCE
Refills: 0 | Status: COMPLETED | OUTPATIENT
Start: 2021-10-09 | End: 2021-10-09

## 2021-10-09 RX ORDER — SODIUM CHLORIDE 9 MG/ML
500 INJECTION, SOLUTION INTRAVENOUS ONCE
Refills: 0 | Status: COMPLETED | OUTPATIENT
Start: 2021-10-09 | End: 2021-10-09

## 2021-10-09 RX ORDER — IPRATROPIUM/ALBUTEROL SULFATE 18-103MCG
3 AEROSOL WITH ADAPTER (GRAM) INHALATION ONCE
Refills: 0 | Status: COMPLETED | OUTPATIENT
Start: 2021-10-09 | End: 2021-10-09

## 2021-10-09 RX ORDER — DEXMEDETOMIDINE HYDROCHLORIDE IN 0.9% SODIUM CHLORIDE 4 UG/ML
1 INJECTION INTRAVENOUS
Qty: 200 | Refills: 0 | Status: DISCONTINUED | OUTPATIENT
Start: 2021-10-09 | End: 2021-10-10

## 2021-10-09 RX ADMIN — Medication 20 MILLIGRAM(S): at 21:12

## 2021-10-09 RX ADMIN — Medication 6.59 MICROGRAM(S)/KG/MIN: at 21:13

## 2021-10-09 RX ADMIN — SODIUM CHLORIDE 3000 MILLILITER(S): 9 INJECTION, SOLUTION INTRAVENOUS at 02:51

## 2021-10-09 RX ADMIN — Medication 400 MILLIGRAM(S): at 04:35

## 2021-10-09 RX ADMIN — HEPARIN SODIUM 5000 UNIT(S): 5000 INJECTION INTRAVENOUS; SUBCUTANEOUS at 21:12

## 2021-10-09 RX ADMIN — FENTANYL CITRATE 3.52 MICROGRAM(S)/KG/HR: 50 INJECTION INTRAVENOUS at 21:13

## 2021-10-09 RX ADMIN — Medication 20 MILLIGRAM(S): at 05:42

## 2021-10-09 RX ADMIN — HEPARIN SODIUM 5000 UNIT(S): 5000 INJECTION INTRAVENOUS; SUBCUTANEOUS at 05:43

## 2021-10-09 RX ADMIN — PROPOFOL 8.44 MICROGRAM(S)/KG/MIN: 10 INJECTION, EMULSION INTRAVENOUS at 21:13

## 2021-10-09 RX ADMIN — FENTANYL CITRATE 50 MICROGRAM(S): 50 INJECTION INTRAVENOUS at 02:45

## 2021-10-09 RX ADMIN — MIDAZOLAM HYDROCHLORIDE 2 MILLIGRAM(S): 1 INJECTION, SOLUTION INTRAMUSCULAR; INTRAVENOUS at 03:29

## 2021-10-09 RX ADMIN — MIDAZOLAM HYDROCHLORIDE 2 MILLIGRAM(S): 1 INJECTION, SOLUTION INTRAMUSCULAR; INTRAVENOUS at 11:44

## 2021-10-09 RX ADMIN — DEXMEDETOMIDINE HYDROCHLORIDE IN 0.9% SODIUM CHLORIDE 15.9 MICROGRAM(S)/KG/HR: 4 INJECTION INTRAVENOUS at 21:13

## 2021-10-09 RX ADMIN — SODIUM CHLORIDE 500 MILLILITER(S): 9 INJECTION, SOLUTION INTRAVENOUS at 16:48

## 2021-10-09 RX ADMIN — CHLORHEXIDINE GLUCONATE 15 MILLILITER(S): 213 SOLUTION TOPICAL at 18:16

## 2021-10-09 RX ADMIN — DEXMEDETOMIDINE HYDROCHLORIDE IN 0.9% SODIUM CHLORIDE 15.9 MICROGRAM(S)/KG/HR: 4 INJECTION INTRAVENOUS at 13:11

## 2021-10-09 RX ADMIN — FENTANYL CITRATE 3.52 MICROGRAM(S)/KG/HR: 50 INJECTION INTRAVENOUS at 02:48

## 2021-10-09 RX ADMIN — FENTANYL CITRATE 3.52 MICROGRAM(S)/KG/HR: 50 INJECTION INTRAVENOUS at 11:45

## 2021-10-09 RX ADMIN — PANTOPRAZOLE SODIUM 40 MILLIGRAM(S): 20 TABLET, DELAYED RELEASE ORAL at 11:46

## 2021-10-09 RX ADMIN — Medication 6.59 MICROGRAM(S)/KG/MIN: at 02:48

## 2021-10-09 RX ADMIN — SODIUM CHLORIDE 3000 MILLILITER(S): 9 INJECTION, SOLUTION INTRAVENOUS at 05:30

## 2021-10-09 RX ADMIN — Medication 1000 MILLIGRAM(S): at 05:30

## 2021-10-09 RX ADMIN — FENTANYL CITRATE 100 MICROGRAM(S): 50 INJECTION INTRAVENOUS at 11:48

## 2021-10-09 RX ADMIN — PROPOFOL 8.44 MICROGRAM(S)/KG/MIN: 10 INJECTION, EMULSION INTRAVENOUS at 11:45

## 2021-10-09 RX ADMIN — CHLORHEXIDINE GLUCONATE 1 APPLICATION(S): 213 SOLUTION TOPICAL at 05:43

## 2021-10-09 RX ADMIN — Medication 250 MILLIGRAM(S): at 04:35

## 2021-10-09 RX ADMIN — CHLORHEXIDINE GLUCONATE 15 MILLILITER(S): 213 SOLUTION TOPICAL at 05:42

## 2021-10-09 RX ADMIN — FENTANYL CITRATE 100 MICROGRAM(S): 50 INJECTION INTRAVENOUS at 11:44

## 2021-10-09 RX ADMIN — FENTANYL CITRATE 50 MICROGRAM(S): 50 INJECTION INTRAVENOUS at 02:35

## 2021-10-09 RX ADMIN — Medication 6.59 MICROGRAM(S)/KG/MIN: at 11:44

## 2021-10-09 RX ADMIN — DEXMEDETOMIDINE HYDROCHLORIDE IN 0.9% SODIUM CHLORIDE 15.9 MICROGRAM(S)/KG/HR: 4 INJECTION INTRAVENOUS at 11:45

## 2021-10-09 RX ADMIN — HEPARIN SODIUM 5000 UNIT(S): 5000 INJECTION INTRAVENOUS; SUBCUTANEOUS at 13:11

## 2021-10-09 RX ADMIN — MIDAZOLAM HYDROCHLORIDE 2 MILLIGRAM(S): 1 INJECTION, SOLUTION INTRAMUSCULAR; INTRAVENOUS at 02:40

## 2021-10-09 RX ADMIN — FENTANYL CITRATE 50 MICROGRAM(S): 50 INJECTION INTRAVENOUS at 02:38

## 2021-10-09 RX ADMIN — Medication 20 MILLIGRAM(S): at 13:11

## 2021-10-09 RX ADMIN — PROPOFOL 8.44 MICROGRAM(S)/KG/MIN: 10 INJECTION, EMULSION INTRAVENOUS at 02:48

## 2021-10-09 NOTE — PROGRESS NOTE ADULT - ASSESSMENT
71M PMH COPD, Asthma, HTN, CAD s/p PCI (LCx, LAD, D1), who presents iso SOB, admitted to MICU iso AHRF requiring intubation for mechanical ventilation    #Neuro  Sedated on propofol and fentanyl while intubated  - continue to monitor per ICU protocol     #Cardiovascular  On pressor support w/ levophed iso hypoTN likely iso vasoplegia from sedation  - wean pressors as tolerated, goal MAP > 65    #Respiratory  Presented w/ SOB x 2 days not improved w/ home nebs  Intubated on mechanical ventilation in ED  - f/u ABG  - c/w duonebs  - c/w solumedrol   - broad spectrum abx coverage w/ CTX and azithromycin  - will send legionella  - f/u RVP  - ctm SpO2% per ICU protocol and adjust vent settings accordingly    #GI/Nutrition  No active issues  NG tube inserted in ED  - start feeds as tolerated    #/Renal  No active issues  - monitor UOP, Strict Is/Os  - monitor BMP, replete lytes prn     #ID  - Broad spectrum abx to coverage CAP as above  - monitor WBC and fever curve   - f/u BCx x 2, RVP, legionella    #Endocrine  No active issues  - obtain TSH and A1c   - monitor FS while NPO and when starting TF    #Hematologic/Oncologic  Microcytic Anemia (MCV 70.4) hgb 9.4  - monitor CBC, transfuse hgb > 7     #PPx  - DVT ppx w/ subq heparin 5000u TID  - Feeds as above    #Ethics  - Will need to obtain further collateral from family  - Will need to clarify GOC 71M PMH COPD, Asthma, HTN, CAD s/p PCI (LCx, LAD, D1), admitted to the MICU for AHRF 2/2 viral PNA c/b likely superimposed bacterial PNA and COPD exacerbation    #Neuro  Sedated on propofol and fentanyl while intubated  - continue to monitor per ICU protocol  - wean as able    #Cardiovascular  off pressors; continue as needed for MAP >65    Respiratory  #AHRF 2/2 viral c/b superimposed bacterial PNA and COPD exacerbation  - on CPAP trial; 5/5; RR 9; tidal volume 400-500/breath  - lungs CTA throughout; c/w duonebs and solumedrol (10/8- )  - CTX/azithro for CAP; s/p vanc x1 yesterday  - f/u MRSA swab; if negative, will hold vanc  - f/u legionella    #GI/Nutrition  No active issues  continue TF    #/Renal  OWEN, baseline Cr 0.8-0.9; improving  - monitor UOP, Strict Is/Os  - monitor BMP, replete lytes prn    ID  #parainfluenza PNA c/b superimposed bacterial PNA  - procalc 1.68  - continue CTX/azithro  - f/u MRSA swab  - f/u BCx x 2, RVP, legionella    #Endocrine  No active issues  - obtain TSH and A1c   - monitor FS while NPO and when starting TF    #Hematologic/Oncologic  Microcytic Anemia (MCV 70.4) hgb 9.4  - monitor CBC, transfuse hgb > 7     #PPx  - DVT ppx w/ subq heparin 5000u TID  - Feeds as above    #Ethics  - Will need to obtain further collateral from family  - Will need to clarify GOC 71M PMH COPD, Asthma, HTN, CAD s/p PCI (LCx, LAD, D1), admitted to the MICU for AHRF 2/2 viral PNA c/b likely superimposed bacterial PNA and COPD exacerbation    #Neuro  Sedated on propofol and fentanyl while intubated  - continue to monitor per ICU protocol  - wean as able    #Cardiovascular  off pressors; continue as needed for MAP >65    Respiratory  #AHRF 2/2 viral c/b superimposed bacterial PNA and COPD exacerbation  - failed CPAP trial 10/9 AM; ABG showing CO2 retention and exam w/nasal flaring and retractions; placed back on sedation and full support  - lungs CTA throughout; c/w duonebs and solumedrol (10/8- )  - CTX/azithro for CAP; s/p vanc x1 yesterday  - switch CTX/azithro to levaquin  - f/u MRSA swab; if negative, will hold vanc  - f/u legionella    #GI/Nutrition  No active issues  continue TF    #/Renal  OWEN, baseline Cr 0.8-0.9; improving  - monitor UOP, Strict Is/Os  - monitor BMP, replete lytes prn    ID  #parainfluenza PNA c/b superimposed bacterial PNA  - procalc 1.68  - continue CTX/azithro  - f/u MRSA swab  - f/u BCx x 2, RVP, legionella    #Endocrine  No active issues  - obtain TSH and A1c   - monitor FS while NPO and when starting TF    #Hematologic/Oncologic  Microcytic Anemia (MCV 70.4) hgb 9.4  - monitor CBC, transfuse hgb > 7     #PPx  - DVT ppx w/ subq heparin 5000u TID  - Feeds as above    #Ethics  - Will need to obtain further collateral from family  - Will need to clarify GOC

## 2021-10-09 NOTE — CHART NOTE - NSCHARTNOTEFT_GEN_A_CORE
: Dr. Tony/ Dr. Nicholson     INDICATION: Resp failure/ Shock     PROCEDURE:  [x ] LIMITED ECHO  [x ] LIMITED CHEST  [ ] LIMITED RETROPERITONEAL  [ ] LIMITED ABDOMINAL  [ ] LIMITED DVT  [ ] NEEDLE GUIDANCE VASCULAR  [ ] NEEDLE GUIDANCE THORACENTESIS  [ ] NEEDLE GUIDANCE PARACENTESIS  [ ] NEEDLE GUIDANCE PERICARDIOCENTESIS  [ ] OTHER    FINDINGS:    A-line predominant b/l anterior lung fields, b/l basilar B-lines with bibasilar consolidation pattern with dynamic air bronchograms, no pleural effusion    Grossly normal LV function, RV < LV in size, no pericardial effusion, IVC indeterminate     INTERPRETATION:    Dry lungs with probable pneumonia   Distributive shock, may be fluid responsive       Images uploaded on [employer]

## 2021-10-09 NOTE — PROGRESS NOTE ADULT - SUBJECTIVE AND OBJECTIVE BOX
Patient is a 71y old  Male who presents with a chief complaint of AHRF (08 Oct 2021 15:17)      SUBJECTIVE / OVERNIGHT EVENTS:    MEDICATIONS  (STANDING):  albuterol/ipratropium for Nebulization 3 milliLiter(s) Nebulizer once  azithromycin  IVPB 500 milliGRAM(s) IV Intermittent every 24 hours  cefTRIAXone   IVPB 1000 milliGRAM(s) IV Intermittent every 24 hours  chlorhexidine 0.12% Liquid 15 milliLiter(s) Oral Mucosa every 12 hours  chlorhexidine 4% Liquid 1 Application(s) Topical <User Schedule>  dexMEDEtomidine Infusion 1 MICROgram(s)/kG/Hr (15.9 mL/Hr) IV Continuous <Continuous>  fentaNYL   Infusion... 1 MICROgram(s)/kG/Hr (3.52 mL/Hr) IV Continuous <Continuous>  heparin   Injectable 5000 Unit(s) SubCutaneous every 8 hours  influenza   Vaccine 0.5 milliLiter(s) IntraMuscular once  methylPREDNISolone sodium succinate Injectable 20 milliGRAM(s) IV Push three times a day  norepinephrine Infusion 0.05 MICROgram(s)/kG/Min (6.59 mL/Hr) IV Continuous <Continuous>  pantoprazole  Injectable 40 milliGRAM(s) IV Push daily  propofol Infusion 20 MICROgram(s)/kG/Min (8.44 mL/Hr) IV Continuous <Continuous>    MEDICATIONS  (PRN):      PHYSICAL EXAM:  Vital Signs Last 24 Hrs  T(C): 36.4 (09 Oct 2021 05:30), Max: 38.3 (08 Oct 2021 12:06)  T(F): 97.5 (09 Oct 2021 05:30), Max: 101 (08 Oct 2021 12:06)  HR: 59 (09 Oct 2021 07:00) (58 - 800)  BP: 107/57 (09 Oct 2021 07:00) (85/50 - 206/93)  BP(mean): 77 (09 Oct 2021 07:00) (62 - 116)  RR: 10 (09 Oct 2021 07:00) (10 - 28)  SpO2: 100% (09 Oct 2021 07:00) (95% - 100%)    CONSTITUTIONAL: NAD, well-developed  EYES: conjunctiva and sclera clear  ENMT: Moist oral mucosa  NECK: Supple, no palpable masses  RESPIRATORY: Normal WOB; lungs are CTA b/l  CARDIOVASCULAR: RRR; S1/S2 present; no m/r/g; No LE edema; Peripheral pulses are 2+ bilaterally  ABDOMEN: Soft, nontender, normoactive BS, no rebound/guarding; No hepatosplenomegaly  MUSCULOSKELETAL:  moving all extremities  NEUROLOGY: awake, A&O to person, place, and time  SKIN: No rashes  ----  I&O's Summary    08 Oct 2021 07:01  -  09 Oct 2021 07:00  --------------------------------------------------------  IN: 1477.9 mL / OUT: 955 mL / NET: 522.9 mL      ----  LABS:                        8.6    10.86 )-----------( 214      ( 09 Oct 2021 02:41 )             30.9     ----  10-    133<L>  |  100  |  16  ----------------------------<  166<H>  5.2   |  17<L>  |  1.08    Ca    8.4      09 Oct 2021 02:41  Phos  3.7     10-  Mg     2.2     10-    TPro  7.3  /  Alb  4.1  /  TBili  0.3  /  DBili  x   /  AST  27  /  ALT  13  /  AlkPhos  56  10-09    ----  PT/INR - ( 09 Oct 2021 04:28 )   PT: 11.5 sec;   INR: 0.96 ratio         PTT - ( 09 Oct 2021 04:28 )  PTT:26.5 sec  ----  CARDIAC MARKERS ( 09 Oct 2021 02:41 )  x     / x     / 390 U/L / x     / x      CARDIAC MARKERS ( 08 Oct 2021 19:24 )  x     / x     / 440 U/L / x     / x          ----  Urinalysis Basic - ( 09 Oct 2021 02:41 )    Color: Light Yellow / Appearance: Clear / S.023 / pH: x  Gluc: x / Ketone: Negative  / Bili: Negative / Urobili: Negative   Blood: x / Protein: 30 mg/dL / Nitrite: Negative   Leuk Esterase: Small / RBC: 1 /hpf / WBC 3 /HPF   Sq Epi: x / Non Sq Epi: 1 /hpf / Bacteria: Negative      ----      RADIOLOGY & ADDITIONAL TESTS:  Results Reviewed:   Imaging Personally Reviewed:  Electrocardiogram Personally Reviewed: Patient is a 71y old  Male who presents with a chief complaint of AHRF (08 Oct 2021 15:17)      SUBJECTIVE / OVERNIGHT EVENTS:  - overnight febrile 100.5; otherwise, no events; sedation being weaned; currently on propofol 25, fentanyl 1.5. precedex 0.4    MEDICATIONS  (STANDING):  albuterol/ipratropium for Nebulization 3 milliLiter(s) Nebulizer once  azithromycin  IVPB 500 milliGRAM(s) IV Intermittent every 24 hours  cefTRIAXone   IVPB 1000 milliGRAM(s) IV Intermittent every 24 hours  chlorhexidine 0.12% Liquid 15 milliLiter(s) Oral Mucosa every 12 hours  chlorhexidine 4% Liquid 1 Application(s) Topical <User Schedule>  dexMEDEtomidine Infusion 1 MICROgram(s)/kG/Hr (15.9 mL/Hr) IV Continuous <Continuous>  fentaNYL   Infusion... 1 MICROgram(s)/kG/Hr (3.52 mL/Hr) IV Continuous <Continuous>  heparin   Injectable 5000 Unit(s) SubCutaneous every 8 hours  influenza   Vaccine 0.5 milliLiter(s) IntraMuscular once  methylPREDNISolone sodium succinate Injectable 20 milliGRAM(s) IV Push three times a day  norepinephrine Infusion 0.05 MICROgram(s)/kG/Min (6.59 mL/Hr) IV Continuous <Continuous>  pantoprazole  Injectable 40 milliGRAM(s) IV Push daily  propofol Infusion 20 MICROgram(s)/kG/Min (8.44 mL/Hr) IV Continuous <Continuous>    MEDICATIONS  (PRN):      PHYSICAL EXAM:  Vital Signs Last 24 Hrs  T(C): 36.4 (09 Oct 2021 05:30), Max: 38.3 (08 Oct 2021 12:06)  T(F): 97.5 (09 Oct 2021 05:30), Max: 101 (08 Oct 2021 12:06)  HR: 59 (09 Oct 2021 07:00) (58 - 800)  BP: 107/57 (09 Oct 2021 07:00) (85/50 - 206/93)  BP(mean): 77 (09 Oct 2021 07:00) (62 - 116)  RR: 10 (09 Oct 2021 07:00) (10 - 28)  SpO2: 100% (09 Oct 2021 07:00) (95% - 100%)    CONSTITUTIONAL: NAD  EYES: conjunctiva and sclera clear  ENMT: Moist oral mucosa  NECK: Supple, no palpable masses  RESPIRATORY: on CPAP; 5/5 RR 9; clear to auscultation throughout; no wheezing  CARDIOVASCULAR: RRR; S1/S2 present; no m/r/g; No LE edema; Peripheral pulses are 2+ bilaterally  ABDOMEN: Soft, nontender, normoactive BS, no rebound/guarding; No hepatosplenomegaly  MUSCULOSKELETAL:  moving all extremities  NEUROLOGY: sedated, not withdrawing to pain  SKIN: No rashes  ----  I&O's Summary    08 Oct 2021 07:01  -  09 Oct 2021 07:00  --------------------------------------------------------  IN: 1477.9 mL / OUT: 955 mL / NET: 522.9 mL      ----  LABS:                        8.6    10.86 )-----------( 214      ( 09 Oct 2021 02:41 )             30.9     ----  10-09    133<L>  |  100  |  16  ----------------------------<  166<H>  5.2   |  17<L>  |  1.08    Ca    8.4      09 Oct 2021 02:41  Phos  3.7     10-09  Mg     2.2     10-09    TPro  7.3  /  Alb  4.1  /  TBili  0.3  /  DBili  x   /  AST  27  /  ALT  13  /  AlkPhos  56  10    ----  PT/INR - ( 09 Oct 2021 04:28 )   PT: 11.5 sec;   INR: 0.96 ratio         PTT - ( 09 Oct 2021 04:28 )  PTT:26.5 sec  ----  CARDIAC MARKERS ( 09 Oct 2021 02:41 )  x     / x     / 390 U/L / x     / x      CARDIAC MARKERS ( 08 Oct 2021 19:24 )  x     / x     / 440 U/L / x     / x          ----  Urinalysis Basic - ( 09 Oct 2021 02:41 )    Color: Light Yellow / Appearance: Clear / S.023 / pH: x  Gluc: x / Ketone: Negative  / Bili: Negative / Urobili: Negative   Blood: x / Protein: 30 mg/dL / Nitrite: Negative   Leuk Esterase: Small / RBC: 1 /hpf / WBC 3 /HPF   Sq Epi: x / Non Sq Epi: 1 /hpf / Bacteria: Negative

## 2021-10-09 NOTE — PROGRESS NOTE ADULT - ATTENDING COMMENTS
Pt seen and examined. 71 M with medical hx as noted above including COPD/ asthma, now with acute hypoxic resp failure, in the setting os parainfluenza infection with possible superimposed bacterial PNA ( b/l lower lobe consolidations on POCUS)  and a COPD exacerbation. [facility] %, PEEP at 5. Failed weaning trial this am due to increased work of breathing and accessory muscle use with desaturation. ABx coverage changed to Levaquin, FUP Cxs, serum procal and urine legionella antigen. Cont Solumedrol 20 mg IV Q8H and Duonebs Q6H. Titrate pressors to keep MAP >65. Mild OWEN likely 2/2 pre-renal ATN, Cr trending down, cont to follow UO/ electrolytes. Overall prognosis guarded.

## 2021-10-09 NOTE — PATIENT PROFILE ADULT - FALL HARM RISK TYPE OF ASSESSMENT
Talked with PT, said that she does not want to be seen no more at all. Does not want to be rescheduled. Per scheduling team.  
daily assessment

## 2021-10-10 LAB
ALBUMIN SERPL ELPH-MCNC: 3.4 G/DL — SIGNIFICANT CHANGE UP (ref 3.3–5)
ALBUMIN SERPL ELPH-MCNC: 4 G/DL — SIGNIFICANT CHANGE UP (ref 3.3–5)
ALP SERPL-CCNC: 70 U/L — SIGNIFICANT CHANGE UP (ref 40–120)
ALP SERPL-CCNC: 74 U/L — SIGNIFICANT CHANGE UP (ref 40–120)
ALT FLD-CCNC: 403 U/L — HIGH (ref 10–45)
ALT FLD-CCNC: 50 U/L — HIGH (ref 10–45)
ANION GAP SERPL CALC-SCNC: 14 MMOL/L — SIGNIFICANT CHANGE UP (ref 5–17)
ANION GAP SERPL CALC-SCNC: 16 MMOL/L — SIGNIFICANT CHANGE UP (ref 5–17)
APTT BLD: 27.1 SEC — LOW (ref 27.5–35.5)
AST SERPL-CCNC: 74 U/L — HIGH (ref 10–40)
AST SERPL-CCNC: 780 U/L — HIGH (ref 10–40)
BASOPHILS # BLD AUTO: 0.01 K/UL — SIGNIFICANT CHANGE UP (ref 0–0.2)
BASOPHILS NFR BLD AUTO: 0.1 % — SIGNIFICANT CHANGE UP (ref 0–2)
BILIRUB SERPL-MCNC: 0.4 MG/DL — SIGNIFICANT CHANGE UP (ref 0.2–1.2)
BILIRUB SERPL-MCNC: 0.4 MG/DL — SIGNIFICANT CHANGE UP (ref 0.2–1.2)
BUN SERPL-MCNC: 28 MG/DL — HIGH (ref 7–23)
BUN SERPL-MCNC: 42 MG/DL — HIGH (ref 7–23)
CALCIUM SERPL-MCNC: 8.3 MG/DL — LOW (ref 8.4–10.5)
CALCIUM SERPL-MCNC: 8.9 MG/DL — SIGNIFICANT CHANGE UP (ref 8.4–10.5)
CHLORIDE SERPL-SCNC: 100 MMOL/L — SIGNIFICANT CHANGE UP (ref 96–108)
CHLORIDE SERPL-SCNC: 98 MMOL/L — SIGNIFICANT CHANGE UP (ref 96–108)
CO2 SERPL-SCNC: 15 MMOL/L — LOW (ref 22–31)
CO2 SERPL-SCNC: 17 MMOL/L — LOW (ref 22–31)
CREAT SERPL-MCNC: 1.35 MG/DL — HIGH (ref 0.5–1.3)
CREAT SERPL-MCNC: 1.44 MG/DL — HIGH (ref 0.5–1.3)
EOSINOPHIL # BLD AUTO: 0 K/UL — SIGNIFICANT CHANGE UP (ref 0–0.5)
EOSINOPHIL NFR BLD AUTO: 0 % — SIGNIFICANT CHANGE UP (ref 0–6)
GAS PNL BLDA: SIGNIFICANT CHANGE UP
GLUCOSE SERPL-MCNC: 171 MG/DL — HIGH (ref 70–99)
GLUCOSE SERPL-MCNC: 178 MG/DL — HIGH (ref 70–99)
HCT VFR BLD CALC: 33.5 % — LOW (ref 39–50)
HGB BLD-MCNC: 9.5 G/DL — LOW (ref 13–17)
IMM GRANULOCYTES NFR BLD AUTO: 0.5 % — SIGNIFICANT CHANGE UP (ref 0–1.5)
INR BLD: 0.96 RATIO — SIGNIFICANT CHANGE UP (ref 0.88–1.16)
LYMPHOCYTES # BLD AUTO: 0.4 K/UL — LOW (ref 1–3.3)
LYMPHOCYTES # BLD AUTO: 3.1 % — LOW (ref 13–44)
MAGNESIUM SERPL-MCNC: 2.4 MG/DL — SIGNIFICANT CHANGE UP (ref 1.6–2.6)
MAGNESIUM SERPL-MCNC: 2.5 MG/DL — SIGNIFICANT CHANGE UP (ref 1.6–2.6)
MCHC RBC-ENTMCNC: 20.7 PG — LOW (ref 27–34)
MCHC RBC-ENTMCNC: 28.4 GM/DL — LOW (ref 32–36)
MCV RBC AUTO: 72.8 FL — LOW (ref 80–100)
MONOCYTES # BLD AUTO: 0.58 K/UL — SIGNIFICANT CHANGE UP (ref 0–0.9)
MONOCYTES NFR BLD AUTO: 4.5 % — SIGNIFICANT CHANGE UP (ref 2–14)
NEUTROPHILS # BLD AUTO: 11.71 K/UL — HIGH (ref 1.8–7.4)
NEUTROPHILS NFR BLD AUTO: 91.8 % — HIGH (ref 43–77)
NRBC # BLD: 0 /100 WBCS — SIGNIFICANT CHANGE UP (ref 0–0)
PHOSPHATE SERPL-MCNC: 4.3 MG/DL — SIGNIFICANT CHANGE UP (ref 2.5–4.5)
PHOSPHATE SERPL-MCNC: 5 MG/DL — HIGH (ref 2.5–4.5)
PLATELET # BLD AUTO: 279 K/UL — SIGNIFICANT CHANGE UP (ref 150–400)
POTASSIUM SERPL-MCNC: 5.1 MMOL/L — SIGNIFICANT CHANGE UP (ref 3.5–5.3)
POTASSIUM SERPL-MCNC: 5.4 MMOL/L — HIGH (ref 3.5–5.3)
POTASSIUM SERPL-SCNC: 5.1 MMOL/L — SIGNIFICANT CHANGE UP (ref 3.5–5.3)
POTASSIUM SERPL-SCNC: 5.4 MMOL/L — HIGH (ref 3.5–5.3)
PROT SERPL-MCNC: 6.1 G/DL — SIGNIFICANT CHANGE UP (ref 6–8.3)
PROT SERPL-MCNC: 6.8 G/DL — SIGNIFICANT CHANGE UP (ref 6–8.3)
PROTHROM AB SERPL-ACNC: 11.5 SEC — SIGNIFICANT CHANGE UP (ref 10.6–13.6)
RBC # BLD: 4.6 M/UL — SIGNIFICANT CHANGE UP (ref 4.2–5.8)
RBC # FLD: 23 % — HIGH (ref 10.3–14.5)
SODIUM SERPL-SCNC: 129 MMOL/L — LOW (ref 135–145)
SODIUM SERPL-SCNC: 131 MMOL/L — LOW (ref 135–145)
WBC # BLD: 12.77 K/UL — HIGH (ref 3.8–10.5)
WBC # FLD AUTO: 12.77 K/UL — HIGH (ref 3.8–10.5)

## 2021-10-10 PROCEDURE — 99291 CRITICAL CARE FIRST HOUR: CPT

## 2021-10-10 RX ORDER — SODIUM CHLORIDE 9 MG/ML
1000 INJECTION, SOLUTION INTRAVENOUS
Refills: 0 | Status: DISCONTINUED | OUTPATIENT
Start: 2021-10-10 | End: 2021-10-29

## 2021-10-10 RX ORDER — BUDESONIDE, MICRONIZED 100 %
0.25 POWDER (GRAM) MISCELLANEOUS
Refills: 0 | Status: DISCONTINUED | OUTPATIENT
Start: 2021-10-10 | End: 2021-10-25

## 2021-10-10 RX ORDER — DEXTROSE 50 % IN WATER 50 %
25 SYRINGE (ML) INTRAVENOUS ONCE
Refills: 0 | Status: DISCONTINUED | OUTPATIENT
Start: 2021-10-10 | End: 2021-10-29

## 2021-10-10 RX ORDER — PROPOFOL 10 MG/ML
22.08 INJECTION, EMULSION INTRAVENOUS
Qty: 1000 | Refills: 0 | Status: DISCONTINUED | OUTPATIENT
Start: 2021-10-10 | End: 2021-10-12

## 2021-10-10 RX ORDER — IPRATROPIUM/ALBUTEROL SULFATE 18-103MCG
3 AEROSOL WITH ADAPTER (GRAM) INHALATION EVERY 4 HOURS
Refills: 0 | Status: DISCONTINUED | OUTPATIENT
Start: 2021-10-10 | End: 2021-10-10

## 2021-10-10 RX ORDER — IPRATROPIUM/ALBUTEROL SULFATE 18-103MCG
3 AEROSOL WITH ADAPTER (GRAM) INHALATION EVERY 6 HOURS
Refills: 0 | Status: DISCONTINUED | OUTPATIENT
Start: 2021-10-10 | End: 2021-10-10

## 2021-10-10 RX ORDER — FENTANYL CITRATE 50 UG/ML
1 INJECTION INTRAVENOUS
Qty: 5000 | Refills: 0 | Status: DISCONTINUED | OUTPATIENT
Start: 2021-10-10 | End: 2021-10-11

## 2021-10-10 RX ORDER — GLUCAGON INJECTION, SOLUTION 0.5 MG/.1ML
1 INJECTION, SOLUTION SUBCUTANEOUS ONCE
Refills: 0 | Status: DISCONTINUED | OUTPATIENT
Start: 2021-10-10 | End: 2021-10-29

## 2021-10-10 RX ORDER — SENNA PLUS 8.6 MG/1
10 TABLET ORAL AT BEDTIME
Refills: 0 | Status: DISCONTINUED | OUTPATIENT
Start: 2021-10-10 | End: 2021-10-21

## 2021-10-10 RX ORDER — SODIUM CHLORIDE 9 MG/ML
1000 INJECTION, SOLUTION INTRAVENOUS
Refills: 0 | Status: DISCONTINUED | OUTPATIENT
Start: 2021-10-10 | End: 2021-10-16

## 2021-10-10 RX ORDER — SODIUM CHLORIDE 9 MG/ML
500 INJECTION, SOLUTION INTRAVENOUS
Refills: 0 | Status: DISCONTINUED | OUTPATIENT
Start: 2021-10-10 | End: 2021-10-11

## 2021-10-10 RX ORDER — IPRATROPIUM/ALBUTEROL SULFATE 18-103MCG
3 AEROSOL WITH ADAPTER (GRAM) INHALATION EVERY 4 HOURS
Refills: 0 | Status: DISCONTINUED | OUTPATIENT
Start: 2021-10-10 | End: 2021-10-25

## 2021-10-10 RX ORDER — DEXTROSE 50 % IN WATER 50 %
12.5 SYRINGE (ML) INTRAVENOUS ONCE
Refills: 0 | Status: DISCONTINUED | OUTPATIENT
Start: 2021-10-10 | End: 2021-10-29

## 2021-10-10 RX ORDER — INSULIN LISPRO 100/ML
VIAL (ML) SUBCUTANEOUS EVERY 6 HOURS
Refills: 0 | Status: DISCONTINUED | OUTPATIENT
Start: 2021-10-10 | End: 2021-10-19

## 2021-10-10 RX ORDER — DEXMEDETOMIDINE HYDROCHLORIDE IN 0.9% SODIUM CHLORIDE 4 UG/ML
1 INJECTION INTRAVENOUS
Qty: 200 | Refills: 0 | Status: DISCONTINUED | OUTPATIENT
Start: 2021-10-10 | End: 2021-10-12

## 2021-10-10 RX ORDER — POLYETHYLENE GLYCOL 3350 17 G/17G
17 POWDER, FOR SOLUTION ORAL ONCE
Refills: 0 | Status: COMPLETED | OUTPATIENT
Start: 2021-10-10 | End: 2021-10-11

## 2021-10-10 RX ORDER — DEXTROSE 50 % IN WATER 50 %
15 SYRINGE (ML) INTRAVENOUS ONCE
Refills: 0 | Status: DISCONTINUED | OUTPATIENT
Start: 2021-10-10 | End: 2021-10-29

## 2021-10-10 RX ADMIN — HEPARIN SODIUM 5000 UNIT(S): 5000 INJECTION INTRAVENOUS; SUBCUTANEOUS at 06:04

## 2021-10-10 RX ADMIN — PROPOFOL 8.44 MICROGRAM(S)/KG/MIN: 10 INJECTION, EMULSION INTRAVENOUS at 21:22

## 2021-10-10 RX ADMIN — Medication 20 MILLIGRAM(S): at 14:32

## 2021-10-10 RX ADMIN — Medication 20 MILLIGRAM(S): at 21:24

## 2021-10-10 RX ADMIN — Medication 1: at 11:36

## 2021-10-10 RX ADMIN — Medication 1: at 18:47

## 2021-10-10 RX ADMIN — Medication 3 MILLILITER(S): at 11:35

## 2021-10-10 RX ADMIN — Medication 20 MILLIGRAM(S): at 06:04

## 2021-10-10 RX ADMIN — HEPARIN SODIUM 5000 UNIT(S): 5000 INJECTION INTRAVENOUS; SUBCUTANEOUS at 21:24

## 2021-10-10 RX ADMIN — Medication 6.59 MICROGRAM(S)/KG/MIN: at 21:22

## 2021-10-10 RX ADMIN — PANTOPRAZOLE SODIUM 40 MILLIGRAM(S): 20 TABLET, DELAYED RELEASE ORAL at 11:35

## 2021-10-10 RX ADMIN — CHLORHEXIDINE GLUCONATE 15 MILLILITER(S): 213 SOLUTION TOPICAL at 18:49

## 2021-10-10 RX ADMIN — Medication 3 MILLILITER(S): at 14:52

## 2021-10-10 RX ADMIN — DEXMEDETOMIDINE HYDROCHLORIDE IN 0.9% SODIUM CHLORIDE 15.9 MICROGRAM(S)/KG/HR: 4 INJECTION INTRAVENOUS at 21:23

## 2021-10-10 RX ADMIN — Medication 3 MILLILITER(S): at 23:07

## 2021-10-10 RX ADMIN — Medication 0.25 MILLIGRAM(S): at 17:55

## 2021-10-10 RX ADMIN — CHLORHEXIDINE GLUCONATE 1 APPLICATION(S): 213 SOLUTION TOPICAL at 06:45

## 2021-10-10 RX ADMIN — CHLORHEXIDINE GLUCONATE 15 MILLILITER(S): 213 SOLUTION TOPICAL at 06:04

## 2021-10-10 RX ADMIN — FENTANYL CITRATE 3.19 MICROGRAM(S)/KG/HR: 50 INJECTION INTRAVENOUS at 21:23

## 2021-10-10 RX ADMIN — Medication 3 MILLILITER(S): at 17:55

## 2021-10-10 RX ADMIN — Medication 0.25 MILLIGRAM(S): at 11:35

## 2021-10-10 RX ADMIN — HEPARIN SODIUM 5000 UNIT(S): 5000 INJECTION INTRAVENOUS; SUBCUTANEOUS at 14:31

## 2021-10-10 NOTE — PROGRESS NOTE ADULT - ASSESSMENT
71M PMH COPD, Asthma, HTN, CAD s/p PCI (LCx, LAD, D1), admitted to the MICU for AHRF 2/2 viral PNA c/b likely superimposed bacterial PNA and COPD exacerbation    #Neuro  Sedated on propofol and fentanyl while intubated  - continue to monitor per ICU protocol  - wean as able    #Cardiovascular  off pressors; continue as needed for MAP >65    Respiratory  #AHRF 2/2 viral c/b superimposed bacterial PNA and COPD exacerbation  - failed CPAP trial 10/9 AM; ABG showing CO2 retention and exam w/nasal flaring and retractions; placed back on sedation and full support  - lungs CTA throughout; c/w duonebs and solumedrol (10/8- )  - CTX/azithro for CAP transitioned to levaquin; s/p vanc x1 10/8  - MRSA negative, hold vanc  - legionella negative    #GI/Nutrition  No active issues  continue TF    #/Renal  OWEN, baseline Cr 0.8-0.9; worsening SCr 1.08 -> 1.35  - monitor UOP, Strict Is/Os  - monitor BMP, replete lytes prn    ID  #parainfluenza PNA c/b superimposed bacterial PNA  - procalc 1.68  - c/w levaquin as above  - MRSA, legionella negative as above  - RVP +parainfluenza, BCx 10/8 NGTD    #Endocrine  No active issues  - obtain TSH and A1c   - monitor FS while NPO and when starting TF    #Hematologic/Oncologic  Microcytic Anemia (MCV 70.4) hgb 9.4, stable  - monitor CBC, transfuse hgb > 7     #PPx  - DVT ppx w/ subq heparin 5000u TID  - Feeds as above    #Ethics  - Will need to obtain further collateral from family  - Will need to clarify GOC

## 2021-10-10 NOTE — PROGRESS NOTE ADULT - ATTENDING COMMENTS
Pt seen and examined on 10/10/21. 71 M with medical hx as noted above including COPD/ asthma, now with acute hypoxic resp failure, in the setting of parainfluenza infection with possible superimposed bacterial PNA ( b/l lower lobe consolidations on POCUS) and a COPD exacerbation, now also with OWEN 2/2 pre-renal ATN. [facility] to 30 %, PEEP at 5. Ongoing bronchospasm with evidence of autopeeping on vent. Vent parameters adjusted to reduce autopeep, FUP ABG. Cont Solumedrol 20 mg IV Q8H, Duonebs increased to Q4H  standing. Cont ABx coverage with Levaquin for 5 days, Cxs NGTD. Titrate pressors to keep MAP >65. Mild OWEN likely 2/2 pre-renal ATN, Cr trending up, start gentle IV hydration, cont to follow UO/ electrolytes. Overall prognosis guarded.

## 2021-10-10 NOTE — PROGRESS NOTE ADULT - SUBJECTIVE AND OBJECTIVE BOX
Angel Hayden MD PGY-2  Internal Medicine Resident      INTERVAL HPI/OVERNIGHT EVENTS: PERRY ON, bradycardic HR 50s-60s, BP 80s-170s, intubated on mechanical ventilation.     SUBJECTIVE: Patient seen and examined at bedside. Intubated, sedated, ROS cannot be obtained.       VITAL SIGNS:  ICU Vital Signs Last 24 Hrs  T(C): 36 (10 Oct 2021 04:00), Max: 37.5 (09 Oct 2021 12:00)  T(F): 96.8 (10 Oct 2021 04:00), Max: 99.5 (09 Oct 2021 12:00)  HR: 61 (10 Oct 2021 07:00) (52 - 82)  BP: 82/54 (10 Oct 2021 07:) (57/36 - 180/81)  BP(mean): 63 (10 Oct 2021 07:) (42 - 132)  ABP: --  ABP(mean): --  RR: 20 (10 Oct 2021 07:) (7 - 28)  SpO2: 96% (10 Oct 2021 07:) (96% - 100%)    Mode: AC/ CMV (Assist Control/ Continuous Mandatory Ventilation), RR (machine): 18, TV (machine): 500, FiO2: 30, PEEP: 5, ITime: 1.05, MAP: 17, PIP: 40  Plateau pressure:   P/F ratio:     10-09 @ 07:01  -  10-10 @ 07:00  --------------------------------------------------------  IN: 2576.7 mL / OUT: 1215 mL / NET: 1361.7 mL      CAPILLARY BLOOD GLUCOSE      POCT Blood Glucose.: 177 mg/dL (09 Oct 2021 13:14)    ECG:    PHYSICAL EXAM:      MEDICATIONS:  MEDICATIONS  (STANDING):  albuterol/ipratropium for Nebulization 3 milliLiter(s) Nebulizer every 6 hours  albuterol/ipratropium for Nebulization 3 milliLiter(s) Nebulizer once  chlorhexidine 0.12% Liquid 15 milliLiter(s) Oral Mucosa every 12 hours  chlorhexidine 4% Liquid 1 Application(s) Topical <User Schedule>  dexMEDEtomidine Infusion 1 MICROgram(s)/kG/Hr (15.9 mL/Hr) IV Continuous <Continuous>  fentaNYL   Infusion... 1 MICROgram(s)/kG/Hr (3.52 mL/Hr) IV Continuous <Continuous>  heparin   Injectable 5000 Unit(s) SubCutaneous every 8 hours  influenza   Vaccine 0.5 milliLiter(s) IntraMuscular once  lactated ringers. 500 milliLiter(s) (500 mL/Hr) IV Continuous <Continuous>  levoFLOXacin IVPB 750 milliGRAM(s) IV Intermittent every 24 hours  methylPREDNISolone sodium succinate Injectable 20 milliGRAM(s) IV Push three times a day  norepinephrine Infusion 0.05 MICROgram(s)/kG/Min (6.59 mL/Hr) IV Continuous <Continuous>  pantoprazole  Injectable 40 milliGRAM(s) IV Push daily  propofol Infusion 20 MICROgram(s)/kG/Min (8.44 mL/Hr) IV Continuous <Continuous>    MEDICATIONS  (PRN):      ALLERGIES:  Allergies    No Known Allergies    Intolerances        LABS:                        9.5    12.77 )-----------( 279      ( 10 Oct 2021 00:44 )             33.5     10-10    131<L>  |  98  |  28<H>  ----------------------------<  178<H>  5.4<H>   |  17<L>  |  1.35<H>    Ca    8.9      10 Oct 2021 00:44  Phos  5.0     10-10  Mg     2.5     10-10    TPro  6.8  /  Alb  4.0  /  TBili  0.4  /  DBili  x   /  AST  74<H>  /  ALT  50<H>  /  AlkPhos  70  10-10    PT/INR - ( 10 Oct 2021 00:44 )   PT: 11.5 sec;   INR: 0.96 ratio         PTT - ( 10 Oct 2021 00:44 )  PTT:27.1 sec  Urinalysis Basic - ( 09 Oct 2021 02:41 )    Color: Light Yellow / Appearance: Clear / S.023 / pH: x  Gluc: x / Ketone: Negative  / Bili: Negative / Urobili: Negative   Blood: x / Protein: 30 mg/dL / Nitrite: Negative   Leuk Esterase: Small / RBC: 1 /hpf / WBC 3 /HPF   Sq Epi: x / Non Sq Epi: 1 /hpf / Bacteria: Negative        RADIOLOGY & ADDITIONAL TESTS: Reviewed. Angel Hayden MD PGY-2  Internal Medicine Resident      INTERVAL HPI/OVERNIGHT EVENTS: PERRY ON, bradycardic HR 50s-60s, BP 80s-170s, intubated on mechanical ventilation.     SUBJECTIVE: Patient seen and examined at bedside. Intubated, sedated, ROS cannot be obtained.       VITAL SIGNS:  ICU Vital Signs Last 24 Hrs  T(C): 36 (10 Oct 2021 04:00), Max: 37.5 (09 Oct 2021 12:00)  T(F): 96.8 (10 Oct 2021 04:00), Max: 99.5 (09 Oct 2021 12:00)  HR: 61 (10 Oct 2021 07:) (52 - 82)  BP: 82/54 (10 Oct 2021 07:) (57/36 - 180/81)  BP(mean): 63 (10 Oct 2021 07:) (42 - 132)  ABP: --  ABP(mean): --  RR: 20 (10 Oct 2021 07:) (7 - 28)  SpO2: 96% (10 Oct 2021 07:) (96% - 100%)    Mode: AC/ CMV (Assist Control/ Continuous Mandatory Ventilation), RR (machine): 18, TV (machine): 500, FiO2: 30, PEEP: 5, ITime: 1.05, MAP: 17, PIP: 40  Plateau pressure:   P/F ratio:     10-09 @ 07:01  -  10-10 @ 07:00  --------------------------------------------------------  IN: 2576.7 mL / OUT: 1215 mL / NET: 1361.7 mL      CAPILLARY BLOOD GLUCOSE      POCT Blood Glucose.: 177 mg/dL (09 Oct 2021 13:14)    ECG:    PHYSICAL EXAM:  GENERAL: Sedated, intubated on mechanical ventilation, not appearing to be in distress, well-groomed, well-developed  HEAD: Atraumatic, Normocephalic  EYES: Pupils small, equal, round, minimally reactive to light, conjunctiva and sclera clear  ENMT: MMM otherwise difficult to appreciate oropharynx iso ET tube  NECK: Supple  NERVOUS SYSTEM: Sedated on mechanical ventilation, pupils as above, otherwise difficult to neurologic exam   CHEST/LUNG: +Crackles at R base, otherwise CTAB no wheezes or rhonchi   HEART: Regular rate and rhythm; No murmurs, rubs, or gallops  ABDOMEN: Soft, Nontender, Nondistended; Bowel sounds present  EXTREMITIES: 2+ Peripheral Pulses, No edema +cool extremities         MEDICATIONS:  MEDICATIONS  (STANDING):  albuterol/ipratropium for Nebulization 3 milliLiter(s) Nebulizer every 6 hours  albuterol/ipratropium for Nebulization 3 milliLiter(s) Nebulizer once  chlorhexidine 0.12% Liquid 15 milliLiter(s) Oral Mucosa every 12 hours  chlorhexidine 4% Liquid 1 Application(s) Topical <User Schedule>  dexMEDEtomidine Infusion 1 MICROgram(s)/kG/Hr (15.9 mL/Hr) IV Continuous <Continuous>  fentaNYL   Infusion... 1 MICROgram(s)/kG/Hr (3.52 mL/Hr) IV Continuous <Continuous>  heparin   Injectable 5000 Unit(s) SubCutaneous every 8 hours  influenza   Vaccine 0.5 milliLiter(s) IntraMuscular once  lactated ringers. 500 milliLiter(s) (500 mL/Hr) IV Continuous <Continuous>  levoFLOXacin IVPB 750 milliGRAM(s) IV Intermittent every 24 hours  methylPREDNISolone sodium succinate Injectable 20 milliGRAM(s) IV Push three times a day  norepinephrine Infusion 0.05 MICROgram(s)/kG/Min (6.59 mL/Hr) IV Continuous <Continuous>  pantoprazole  Injectable 40 milliGRAM(s) IV Push daily  propofol Infusion 20 MICROgram(s)/kG/Min (8.44 mL/Hr) IV Continuous <Continuous>    MEDICATIONS  (PRN):      ALLERGIES:  Allergies    No Known Allergies    Intolerances        LABS:                        9.5    12.77 )-----------( 279      ( 10 Oct 2021 00:44 )             33.5     10-10    131<L>  |  98  |  28<H>  ----------------------------<  178<H>  5.4<H>   |  17<L>  |  1.35<H>    Ca    8.9      10 Oct 2021 00:44  Phos  5.0     10-10  Mg     2.5     10-10    TPro  6.8  /  Alb  4.0  /  TBili  0.4  /  DBili  x   /  AST  74<H>  /  ALT  50<H>  /  AlkPhos  70  10-10    PT/INR - ( 10 Oct 2021 00:44 )   PT: 11.5 sec;   INR: 0.96 ratio         PTT - ( 10 Oct 2021 00:44 )  PTT:27.1 sec  Urinalysis Basic - ( 09 Oct 2021 02:41 )    Color: Light Yellow / Appearance: Clear / S.023 / pH: x  Gluc: x / Ketone: Negative  / Bili: Negative / Urobili: Negative   Blood: x / Protein: 30 mg/dL / Nitrite: Negative   Leuk Esterase: Small / RBC: 1 /hpf / WBC 3 /HPF   Sq Epi: x / Non Sq Epi: 1 /hpf / Bacteria: Negative        RADIOLOGY & ADDITIONAL TESTS: Reviewed.

## 2021-10-11 LAB
A1C WITH ESTIMATED AVERAGE GLUCOSE RESULT: 5.5 % — SIGNIFICANT CHANGE UP (ref 4–5.6)
ALBUMIN SERPL ELPH-MCNC: 3.4 G/DL — SIGNIFICANT CHANGE UP (ref 3.3–5)
ALP SERPL-CCNC: 75 U/L — SIGNIFICANT CHANGE UP (ref 40–120)
ALT FLD-CCNC: 453 U/L — HIGH (ref 10–45)
ANION GAP SERPL CALC-SCNC: 14 MMOL/L — SIGNIFICANT CHANGE UP (ref 5–17)
AST SERPL-CCNC: 699 U/L — HIGH (ref 10–40)
BASOPHILS # BLD AUTO: 0.01 K/UL — SIGNIFICANT CHANGE UP (ref 0–0.2)
BASOPHILS NFR BLD AUTO: 0.1 % — SIGNIFICANT CHANGE UP (ref 0–2)
BILIRUB SERPL-MCNC: 0.3 MG/DL — SIGNIFICANT CHANGE UP (ref 0.2–1.2)
BUN SERPL-MCNC: 46 MG/DL — HIGH (ref 7–23)
CALCIUM SERPL-MCNC: 8.3 MG/DL — LOW (ref 8.4–10.5)
CHLORIDE SERPL-SCNC: 103 MMOL/L — SIGNIFICANT CHANGE UP (ref 96–108)
CHOLEST SERPL-MCNC: 125 MG/DL — SIGNIFICANT CHANGE UP
CO2 SERPL-SCNC: 15 MMOL/L — LOW (ref 22–31)
CREAT SERPL-MCNC: 1.3 MG/DL — SIGNIFICANT CHANGE UP (ref 0.5–1.3)
CULTURE RESULTS: SIGNIFICANT CHANGE UP
EOSINOPHIL # BLD AUTO: 0.27 K/UL — SIGNIFICANT CHANGE UP (ref 0–0.5)
EOSINOPHIL NFR BLD AUTO: 2.4 % — SIGNIFICANT CHANGE UP (ref 0–6)
ESTIMATED AVERAGE GLUCOSE: 111 MG/DL — SIGNIFICANT CHANGE UP (ref 68–114)
GAS PNL BLDA: SIGNIFICANT CHANGE UP
GLUCOSE BLDC GLUCOMTR-MCNC: 157 MG/DL — HIGH (ref 70–99)
GLUCOSE BLDC GLUCOMTR-MCNC: 193 MG/DL — HIGH (ref 70–99)
GLUCOSE SERPL-MCNC: 182 MG/DL — HIGH (ref 70–99)
HCT VFR BLD CALC: 33.7 % — LOW (ref 39–50)
HDLC SERPL-MCNC: 74 MG/DL — SIGNIFICANT CHANGE UP
HGB BLD-MCNC: 9.6 G/DL — LOW (ref 13–17)
IMM GRANULOCYTES NFR BLD AUTO: 0.3 % — SIGNIFICANT CHANGE UP (ref 0–1.5)
LIDOCAIN IGE QN: 32 U/L — SIGNIFICANT CHANGE UP (ref 7–60)
LIPID PNL WITH DIRECT LDL SERPL: 33 MG/DL — SIGNIFICANT CHANGE UP
LYMPHOCYTES # BLD AUTO: 0.51 K/UL — LOW (ref 1–3.3)
LYMPHOCYTES # BLD AUTO: 4.5 % — LOW (ref 13–44)
MAGNESIUM SERPL-MCNC: 2.5 MG/DL — SIGNIFICANT CHANGE UP (ref 1.6–2.6)
MCHC RBC-ENTMCNC: 20.5 PG — LOW (ref 27–34)
MCHC RBC-ENTMCNC: 28.5 GM/DL — LOW (ref 32–36)
MCV RBC AUTO: 72 FL — LOW (ref 80–100)
MONOCYTES # BLD AUTO: 0.69 K/UL — SIGNIFICANT CHANGE UP (ref 0–0.9)
MONOCYTES NFR BLD AUTO: 6 % — SIGNIFICANT CHANGE UP (ref 2–14)
NEUTROPHILS # BLD AUTO: 9.92 K/UL — HIGH (ref 1.8–7.4)
NEUTROPHILS NFR BLD AUTO: 86.7 % — HIGH (ref 43–77)
NON HDL CHOLESTEROL: 51 MG/DL — SIGNIFICANT CHANGE UP
NRBC # BLD: 0 /100 WBCS — SIGNIFICANT CHANGE UP (ref 0–0)
PHOSPHATE SERPL-MCNC: 3.8 MG/DL — SIGNIFICANT CHANGE UP (ref 2.5–4.5)
PLATELET # BLD AUTO: 253 K/UL — SIGNIFICANT CHANGE UP (ref 150–400)
POTASSIUM SERPL-MCNC: 4.6 MMOL/L — SIGNIFICANT CHANGE UP (ref 3.5–5.3)
POTASSIUM SERPL-SCNC: 4.6 MMOL/L — SIGNIFICANT CHANGE UP (ref 3.5–5.3)
PROT SERPL-MCNC: 6.2 G/DL — SIGNIFICANT CHANGE UP (ref 6–8.3)
RBC # BLD: 4.68 M/UL — SIGNIFICANT CHANGE UP (ref 4.2–5.8)
RBC # FLD: 23.3 % — HIGH (ref 10.3–14.5)
SODIUM SERPL-SCNC: 132 MMOL/L — LOW (ref 135–145)
SPECIMEN SOURCE: SIGNIFICANT CHANGE UP
TRIGL SERPL-MCNC: 89 MG/DL — SIGNIFICANT CHANGE UP
TSH SERPL-MCNC: 0.07 UIU/ML — LOW (ref 0.27–4.2)
WBC # BLD: 11.43 K/UL — HIGH (ref 3.8–10.5)
WBC # FLD AUTO: 11.43 K/UL — HIGH (ref 3.8–10.5)

## 2021-10-11 PROCEDURE — 99291 CRITICAL CARE FIRST HOUR: CPT

## 2021-10-11 PROCEDURE — 76705 ECHO EXAM OF ABDOMEN: CPT | Mod: 26,RT

## 2021-10-11 RX ORDER — MULTIVIT-MIN/FERROUS GLUCONATE 9 MG/15 ML
15 LIQUID (ML) ORAL DAILY
Refills: 0 | Status: DISCONTINUED | OUTPATIENT
Start: 2021-10-11 | End: 2021-10-29

## 2021-10-11 RX ORDER — FENTANYL CITRATE 50 UG/ML
0.5 INJECTION INTRAVENOUS
Qty: 2500 | Refills: 0 | Status: DISCONTINUED | OUTPATIENT
Start: 2021-10-11 | End: 2021-10-11

## 2021-10-11 RX ORDER — CEFTRIAXONE 500 MG/1
1000 INJECTION, POWDER, FOR SOLUTION INTRAMUSCULAR; INTRAVENOUS EVERY 24 HOURS
Refills: 0 | Status: COMPLETED | OUTPATIENT
Start: 2021-10-11 | End: 2021-10-12

## 2021-10-11 RX ORDER — FENTANYL CITRATE 50 UG/ML
0.5 INJECTION INTRAVENOUS
Qty: 5000 | Refills: 0 | Status: DISCONTINUED | OUTPATIENT
Start: 2021-10-11 | End: 2021-10-12

## 2021-10-11 RX ADMIN — Medication 3 MILLILITER(S): at 22:59

## 2021-10-11 RX ADMIN — CEFTRIAXONE 100 MILLIGRAM(S): 500 INJECTION, POWDER, FOR SOLUTION INTRAMUSCULAR; INTRAVENOUS at 11:41

## 2021-10-11 RX ADMIN — Medication 20 MILLIGRAM(S): at 22:24

## 2021-10-11 RX ADMIN — CHLORHEXIDINE GLUCONATE 15 MILLILITER(S): 213 SOLUTION TOPICAL at 05:23

## 2021-10-11 RX ADMIN — HEPARIN SODIUM 5000 UNIT(S): 5000 INJECTION INTRAVENOUS; SUBCUTANEOUS at 05:23

## 2021-10-11 RX ADMIN — Medication 3 MILLILITER(S): at 06:22

## 2021-10-11 RX ADMIN — POLYETHYLENE GLYCOL 3350 17 GRAM(S): 17 POWDER, FOR SOLUTION ORAL at 05:32

## 2021-10-11 RX ADMIN — Medication 3 MILLILITER(S): at 17:19

## 2021-10-11 RX ADMIN — Medication 3 MILLILITER(S): at 14:24

## 2021-10-11 RX ADMIN — Medication 1: at 00:49

## 2021-10-11 RX ADMIN — CHLORHEXIDINE GLUCONATE 15 MILLILITER(S): 213 SOLUTION TOPICAL at 17:30

## 2021-10-11 RX ADMIN — Medication 1: at 17:31

## 2021-10-11 RX ADMIN — Medication 0.25 MILLIGRAM(S): at 06:32

## 2021-10-11 RX ADMIN — CHLORHEXIDINE GLUCONATE 1 APPLICATION(S): 213 SOLUTION TOPICAL at 05:24

## 2021-10-11 RX ADMIN — DEXMEDETOMIDINE HYDROCHLORIDE IN 0.9% SODIUM CHLORIDE 15.9 MICROGRAM(S)/KG/HR: 4 INJECTION INTRAVENOUS at 17:34

## 2021-10-11 RX ADMIN — DEXMEDETOMIDINE HYDROCHLORIDE IN 0.9% SODIUM CHLORIDE 15.9 MICROGRAM(S)/KG/HR: 4 INJECTION INTRAVENOUS at 09:53

## 2021-10-11 RX ADMIN — PANTOPRAZOLE SODIUM 40 MILLIGRAM(S): 20 TABLET, DELAYED RELEASE ORAL at 11:41

## 2021-10-11 RX ADMIN — Medication 20 MILLIGRAM(S): at 14:12

## 2021-10-11 RX ADMIN — Medication 1: at 05:24

## 2021-10-11 RX ADMIN — Medication 0.25 MILLIGRAM(S): at 17:19

## 2021-10-11 RX ADMIN — Medication 20 MILLIGRAM(S): at 05:32

## 2021-10-11 RX ADMIN — Medication 15 MILLILITER(S): at 16:45

## 2021-10-11 RX ADMIN — Medication 3 MILLILITER(S): at 10:40

## 2021-10-11 RX ADMIN — PROPOFOL 8.44 MICROGRAM(S)/KG/MIN: 10 INJECTION, EMULSION INTRAVENOUS at 09:53

## 2021-10-11 RX ADMIN — SENNA PLUS 10 MILLILITER(S): 8.6 TABLET ORAL at 22:27

## 2021-10-11 RX ADMIN — HEPARIN SODIUM 5000 UNIT(S): 5000 INJECTION INTRAVENOUS; SUBCUTANEOUS at 22:28

## 2021-10-11 RX ADMIN — PROPOFOL 8.44 MICROGRAM(S)/KG/MIN: 10 INJECTION, EMULSION INTRAVENOUS at 05:22

## 2021-10-11 RX ADMIN — DEXMEDETOMIDINE HYDROCHLORIDE IN 0.9% SODIUM CHLORIDE 15.9 MICROGRAM(S)/KG/HR: 4 INJECTION INTRAVENOUS at 05:23

## 2021-10-11 RX ADMIN — PROPOFOL 8.44 MICROGRAM(S)/KG/MIN: 10 INJECTION, EMULSION INTRAVENOUS at 17:34

## 2021-10-11 RX ADMIN — Medication 3 MILLILITER(S): at 03:34

## 2021-10-11 RX ADMIN — HEPARIN SODIUM 5000 UNIT(S): 5000 INJECTION INTRAVENOUS; SUBCUTANEOUS at 14:12

## 2021-10-11 NOTE — DIETITIAN INITIAL EVALUATION ADULT. - OTHER INFO
Dosing wt (10/9): 155 lbs / 140. 7 lbs --> both weights documented; request re-weight to verify current wt.  [facility] 155 lbs; (4/16): 155 lbs; (10/13/20): 153 lbs. Will monitor.     Pt currently prescribed EN feeds via OGT: Vital AF at 60ml/hr x 18 hrs. Feeds initiated on 10/9; goal rate met 10/10. No documented BM's recorded thus far. Not on bowel regimen at this time.     Propofol infusing at 7.6ml/hr. If to continue x 24 hrs, will provide ~201kcal daily. Continues on insulin lispro to aid in management of BG. Prescribed Solu-Medrol; pt at an additional risk for elevated BG in context of same. A1c 5.5%; good glycemic control PTA.     Skin: no pressure injuries noted  Edema: none noted

## 2021-10-11 NOTE — DIETITIAN INITIAL EVALUATION ADULT. - ORAL INTAKE PTA/DIET HISTORY
Pt intubated/sedated; unable to participate in nutrition evaluation. Baseline tolerance to chewing/swallowing and baseline provision of energy/nutrient intake unclear. No documented food allergies. No indication of prior vitamin/supplement intake as per H&P.

## 2021-10-11 NOTE — DIETITIAN INITIAL EVALUATION ADULT. - ADD RECOMMEND
1. Obtain further subjective wt/diet history from pt/family PRN. 2. Monitor GI tolerance. RD to remain available to adjust EN formulary, volume/rate PRN. Monitor provision of propofol. 3. Monitor wt trends/labs/skin integrity/hydration status/bowel regularity. Bowel regimen as per discretion of team. 4. Consider multivitamin to aid in prevention of micronutrient deficiencies if no medication contraindications noted.

## 2021-10-11 NOTE — PROGRESS NOTE ADULT - ATTENDING COMMENTS
1. Acute hypoxemic respiratory failure due to COPD exacerbation  from  parainfluenza infection/pneumonia with superimposed bacterial pneumonia/infection. Continue steroids and bronchodilators. Change abx to ceftriaxone. Decrease sedation . Trials of pressure support as tolerated.  Pt with significant auto peep on AC ventilation. Decreasing on PS.  2. ATN  slowly improving.  3. Sock liver . LFTS slowly improving.

## 2021-10-11 NOTE — DIETITIAN INITIAL EVALUATION ADULT. - CHIEF COMPLAINT
The patient is a 70 yo M with PMH: COPD, Asthma, HTN, CAD s/p PCI (LCx, LAD, D1). Admitted to MICU for AHRF 2/2 viral pneumonia c/b likely superimposed bacterial PNA and COPD exacerbation. Pt intbuated 10/8. Failed CPAP trial 10/9. Continues on antibiotics as ordered.

## 2021-10-11 NOTE — PROGRESS NOTE ADULT - SUBJECTIVE AND OBJECTIVE BOX
Angel Hayden MD PGY-2      INTERVAL HPI/OVERNIGHT EVENTS: PERRY ON, afebrile, bradycardic HR 58-71, BP /49-85, intubated on mechanical ventilation w/ SpO2% .     SUBJECTIVE: Patient seen and examined at bedside. Intubated, sedated, ROS cannot be obtained.       VITAL SIGNS:  ICU Vital Signs Last 24 Hrs  T(C): 36 (11 Oct 2021 04:00), Max: 37.4 (10 Oct 2021 16:00)  T(F): 96.8 (11 Oct 2021 04:00), Max: 99.3 (10 Oct 2021 16:00)  HR: 63 (11 Oct 2021 06:45) (57 - 76)  BP: 140/85 (11 Oct 2021 06:45) (70/49 - 190/65)  BP(mean): 106 (11 Oct 2021 06:45) (55 - 129)  ABP: --  ABP(mean): --  RR: 20 (11 Oct 2021 06:45) (20 - 37)  SpO2: 100% (11 Oct 2021 06:45) (91% - 100%)    Mode: AC/ CMV (Assist Control/ Continuous Mandatory Ventilation), RR (machine): 20, TV (machine): 500, FiO2: 21, PEEP: 5, ITime: 0.8, MAP: 12, PIP: 35  Plateau pressure:   P/F ratio:     10-10 @ 07:01  -  10-11 @ 07:00  --------------------------------------------------------  IN: 2068.5 mL / OUT: 1430 mL / NET: 638.5 mL      CAPILLARY BLOOD GLUCOSE      POCT Blood Glucose.: 193 mg/dL (11 Oct 2021 05:17)    ECG:    PHYSICAL EXAM:        MEDICATIONS:  MEDICATIONS  (STANDING):  albuterol/ipratropium for Nebulization 3 milliLiter(s) Nebulizer every 4 hours  buDESOnide    Inhalation Suspension 0.25 milliGRAM(s) Nebulizer two times a day  chlorhexidine 0.12% Liquid 15 milliLiter(s) Oral Mucosa every 12 hours  chlorhexidine 4% Liquid 1 Application(s) Topical <User Schedule>  dexMEDEtomidine Infusion 0.998 MICROgram(s)/kG/Hr (15.9 mL/Hr) IV Continuous <Continuous>  dextrose 40% Gel 15 Gram(s) Oral once  dextrose 5%. 1000 milliLiter(s) (50 mL/Hr) IV Continuous <Continuous>  dextrose 5%. 1000 milliLiter(s) (100 mL/Hr) IV Continuous <Continuous>  dextrose 50% Injectable 25 Gram(s) IV Push once  dextrose 50% Injectable 12.5 Gram(s) IV Push once  dextrose 50% Injectable 25 Gram(s) IV Push once  fentaNYL   Infusion... 1 MICROgram(s)/kG/Hr (3.19 mL/Hr) IV Continuous <Continuous>  glucagon  Injectable 1 milliGRAM(s) IntraMuscular once  heparin   Injectable 5000 Unit(s) SubCutaneous every 8 hours  influenza   Vaccine 0.5 milliLiter(s) IntraMuscular once  insulin lispro (ADMELOG) corrective regimen sliding scale   SubCutaneous every 6 hours  lactated ringers. 500 milliLiter(s) (500 mL/Hr) IV Continuous <Continuous>  levoFLOXacin IVPB 250 milliGRAM(s) IV Intermittent every 24 hours  methylPREDNISolone sodium succinate Injectable 20 milliGRAM(s) IV Push three times a day  norepinephrine Infusion 0.05 MICROgram(s)/kG/Min (6.59 mL/Hr) IV Continuous <Continuous>  pantoprazole  Injectable 40 milliGRAM(s) IV Push daily  propofol Infusion 22.083 MICROgram(s)/kG/Min (8.44 mL/Hr) IV Continuous <Continuous>  senna Syrup 10 milliLiter(s) Oral at bedtime    MEDICATIONS  (PRN):      ALLERGIES:  Allergies    No Known Allergies    Intolerances        LABS:                        9.6    11.43 )-----------( 253      ( 11 Oct 2021 00:57 )             33.7     10-11    132<L>  |  103  |  46<H>  ----------------------------<  182<H>  4.6   |  15<L>  |  1.30    Ca    8.3<L>      11 Oct 2021 00:57  Phos  3.8     10-11  Mg     2.5     10-11    TPro  6.2  /  Alb  3.4  /  TBili  0.3  /  DBili  x   /  AST  699<H>  /  ALT  453<H>  /  AlkPhos  75  10-11    PT/INR - ( 10 Oct 2021 00:44 )   PT: 11.5 sec;   INR: 0.96 ratio         PTT - ( 10 Oct 2021 00:44 )  PTT:27.1 sec      RADIOLOGY & ADDITIONAL TESTS: Reviewed. Angel Hayden MD PGY-2      INTERVAL HPI/OVERNIGHT EVENTS: PERRY ON, afebrile, bradycardic HR 58-71, BP /49-85, intubated on mechanical ventilation w/ SpO2% .     SUBJECTIVE: Patient seen and examined at bedside. Intubated, sedated, ROS cannot be obtained.       VITAL SIGNS:  ICU Vital Signs Last 24 Hrs  T(C): 36 (11 Oct 2021 04:00), Max: 37.4 (10 Oct 2021 16:00)  T(F): 96.8 (11 Oct 2021 04:00), Max: 99.3 (10 Oct 2021 16:00)  HR: 63 (11 Oct 2021 06:45) (57 - 76)  BP: 140/85 (11 Oct 2021 06:45) (70/49 - 190/65)  BP(mean): 106 (11 Oct 2021 06:45) (55 - 129)  ABP: --  ABP(mean): --  RR: 20 (11 Oct 2021 06:45) (20 - 37)  SpO2: 100% (11 Oct 2021 06:45) (91% - 100%)    Mode: AC/ CMV (Assist Control/ Continuous Mandatory Ventilation), RR (machine): 20, TV (machine): 500, FiO2: 21, PEEP: 5, ITime: 0.8, MAP: 12, PIP: 35  Plateau pressure:   P/F ratio:     10-10 @ 07:01  -  10-11 @ 07:00  --------------------------------------------------------  IN: 2068.5 mL / OUT: 1430 mL / NET: 638.5 mL      CAPILLARY BLOOD GLUCOSE      POCT Blood Glucose.: 193 mg/dL (11 Oct 2021 05:17)    ECG:    PHYSICAL EXAM:  GENERAL: Sedated, intubated on mechanical ventilation, not appearing to be in distress, well-groomed, well-developed  HEAD: Atraumatic, Normocephalic  EYES: Pupils small, equal, round, minimally reactive to light, conjunctiva and sclera clear  ENMT: MMM otherwise difficult to appreciate oropharynx iso ET tube  NECK: Supple  NERVOUS SYSTEM: Sedated on mechanical ventilation, pupils as above, otherwise difficult to neurologic exam   CHEST/LUNG: Diffuse end inspiratory and expiratory crackles b/l, otherwise no wheezes, rhonchi  HEART: Regular rate and rhythm; No murmurs, rubs, or gallops +heart sounds distant  ABDOMEN: Soft, Nondistended; Bowel sounds present  EXTREMITIES: 2+ Peripheral Pulses, No edema      MEDICATIONS:  MEDICATIONS  (STANDING):  albuterol/ipratropium for Nebulization 3 milliLiter(s) Nebulizer every 4 hours  buDESOnide    Inhalation Suspension 0.25 milliGRAM(s) Nebulizer two times a day  chlorhexidine 0.12% Liquid 15 milliLiter(s) Oral Mucosa every 12 hours  chlorhexidine 4% Liquid 1 Application(s) Topical <User Schedule>  dexMEDEtomidine Infusion 0.998 MICROgram(s)/kG/Hr (15.9 mL/Hr) IV Continuous <Continuous>  dextrose 40% Gel 15 Gram(s) Oral once  dextrose 5%. 1000 milliLiter(s) (50 mL/Hr) IV Continuous <Continuous>  dextrose 5%. 1000 milliLiter(s) (100 mL/Hr) IV Continuous <Continuous>  dextrose 50% Injectable 25 Gram(s) IV Push once  dextrose 50% Injectable 12.5 Gram(s) IV Push once  dextrose 50% Injectable 25 Gram(s) IV Push once  fentaNYL   Infusion... 1 MICROgram(s)/kG/Hr (3.19 mL/Hr) IV Continuous <Continuous>  glucagon  Injectable 1 milliGRAM(s) IntraMuscular once  heparin   Injectable 5000 Unit(s) SubCutaneous every 8 hours  influenza   Vaccine 0.5 milliLiter(s) IntraMuscular once  insulin lispro (ADMELOG) corrective regimen sliding scale   SubCutaneous every 6 hours  lactated ringers. 500 milliLiter(s) (500 mL/Hr) IV Continuous <Continuous>  levoFLOXacin IVPB 250 milliGRAM(s) IV Intermittent every 24 hours  methylPREDNISolone sodium succinate Injectable 20 milliGRAM(s) IV Push three times a day  norepinephrine Infusion 0.05 MICROgram(s)/kG/Min (6.59 mL/Hr) IV Continuous <Continuous>  pantoprazole  Injectable 40 milliGRAM(s) IV Push daily  propofol Infusion 22.083 MICROgram(s)/kG/Min (8.44 mL/Hr) IV Continuous <Continuous>  senna Syrup 10 milliLiter(s) Oral at bedtime    MEDICATIONS  (PRN):      ALLERGIES:  Allergies    No Known Allergies    Intolerances        LABS:                        9.6    11.43 )-----------( 253      ( 11 Oct 2021 00:57 )             33.7     10-11    132<L>  |  103  |  46<H>  ----------------------------<  182<H>  4.6   |  15<L>  |  1.30    Ca    8.3<L>      11 Oct 2021 00:57  Phos  3.8     10-11  Mg     2.5     10-11    TPro  6.2  /  Alb  3.4  /  TBili  0.3  /  DBili  x   /  AST  699<H>  /  ALT  453<H>  /  AlkPhos  75  10-11    PT/INR - ( 10 Oct 2021 00:44 )   PT: 11.5 sec;   INR: 0.96 ratio         PTT - ( 10 Oct 2021 00:44 )  PTT:27.1 sec      RADIOLOGY & ADDITIONAL TESTS: Reviewed.

## 2021-10-11 NOTE — DIETITIAN INITIAL EVALUATION ADULT. - ENTERAL
Continue Vital AF at 60ml/hr x 18 hrs. With provision of propofol (+201kcal), provides: 1080ml, 1497kcal and 81g protein. Based on IBW 70.5kg, provides: 23.5kcal/kg and 1.15kcal/kg.

## 2021-10-11 NOTE — PROGRESS NOTE ADULT - ASSESSMENT
-- IN PROGRESS --     71M PMH COPD, Asthma, HTN, CAD s/p PCI (LCx, LAD, D1), admitted to the MICU for AHRF 2/2 viral PNA c/b likely superimposed bacterial PNA and COPD exacerbation    #Neuro  Sedated on propofol and fentanyl while intubated  - continue to monitor per ICU protocol  - wean as able    #Cardiovascular  off pressors; continue as needed for MAP >65    Respiratory  #AHRF 2/2 viral c/b superimposed bacterial PNA and COPD exacerbation  - failed CPAP trial 10/9 AM; ABG showing CO2 retention and exam w/nasal flaring and retractions; placed back on sedation and full support  - lungs CTA throughout; c/w duonebs and solumedrol (10/8- )  - CTX/azithro for CAP transitioned to levaquin; s/p vanc x1 10/8  - MRSA negative, hold vanc  - legionella negative    #GI/Nutrition  No active issues  continue TF    #/Renal  OWEN, baseline Cr 0.8-0.9; worsening SCr 1.08 -> 1.35  - monitor UOP, Strict Is/Os  - monitor BMP, replete lytes prn    ID  #parainfluenza PNA c/b superimposed bacterial PNA  - procalc 1.68  - c/w levaquin as above  - MRSA, legionella negative as above  - RVP +parainfluenza, BCx 10/8 NGTD    #Endocrine  No active issues  - obtain TSH and A1c   - monitor FS while NPO and when starting TF    #Hematologic/Oncologic  Microcytic Anemia (MCV 70.4) hgb 9.4, stable  - monitor CBC, transfuse hgb > 7     #PPx  - DVT ppx w/ subq heparin 5000u TID  - Feeds as above    #Ethics  - Will need to obtain further collateral from family  - Will need to clarify GOC 71M PMH COPD, Asthma, HTN, CAD s/p PCI (LCx, LAD, D1), admitted to the MICU for AHRF 2/2 viral PNA c/b likely superimposed bacterial PNA and COPD exacerbation    #Neuro  Sedated on propofol and fentanyl while intubated  - continue to monitor per ICU protocol  - wean as able    #Cardiovascular  off pressors; continue as needed for MAP >65    Respiratory  #AHRF 2/2 viral c/b superimposed bacterial PNA and COPD exacerbation  - failed CPAP trial 10/9 AM; ABG showing CO2 retention and exam w/nasal flaring and retractions; placed back on sedation and full support  - lungs CTA throughout; c/w duonebs and solumedrol (10/8- )  - CTX/azithro for CAP transitioned to levaquin; s/p vanc x1 10/8  - MRSA negative, hold vanc  - legionella negative    #GI/Nutrition  No active issues  continue TF    #/Renal  OWEN, baseline Cr 0.8-0.9; worsening SCr 1.08 -> 1.35  - monitor UOP, Strict Is/Os  - monitor BMP, replete lytes prn    ID  #parainfluenza PNA c/b superimposed bacterial PNA  - procalc 1.68  - c/w levaquin as above  - MRSA, legionella negative as above  - RVP +parainfluenza, BCx 10/8 NGTD    #Endocrine  No active issues  - obtain TSH and A1c   - monitor FS while NPO and when starting TF    #Hematologic/Oncologic  Microcytic Anemia (MCV 70.4) hgb 9.4, stable  - monitor CBC, transfuse hgb > 7     #PPx  - DVT ppx w/ subq heparin 5000u TID  - Feeds as above    #Ethics  - Will need to obtain further collateral from family  - Will need to clarify GOC 71M PMH COPD, Asthma, HTN, CAD s/p PCI (LCx, LAD, D1), admitted to the MICU for AHRF 2/2 viral PNA c/b likely superimposed bacterial PNA and COPD exacerbation    #Neuro  Sedated on propofol and fentanyl while intubated  - continue to monitor per ICU protocol  - wean as able    #Cardiovascular  off pressors; continue as needed for MAP >65    Respiratory  #AHRF 2/2 viral c/b superimposed bacterial PNA and COPD exacerbation  - failed CPAP trial 10/9 AM; ABG showing CO2 retention and exam w/nasal flaring and retractions; placed back on sedation and full support  - lungs CTA throughout; c/w duonebs and solumedrol (10/8- ), inhaled budenoside (10/10- )  - CTX/azithro for CAP transitioned to levaquin (dose adjusted for renal failure); s/p vanc x1 10/8  - MRSA negative, hold vanc  - legionella negative    #GI/Nutrition  No active issues  continue TF    #/Renal  OWEN, baseline Cr 0.8-0.9; worsening SCr 1.08 -> 1.35  - monitor UOP, Strict Is/Os  - monitor BMP, replete lytes prn    ID  #parainfluenza PNA c/b superimposed bacterial PNA  - procal 1.68  - c/w levaquin as above  - MRSA, legionella negative as above  - RVP +parainfluenza, BCx 10/8 NGTD    #Endocrine  No active issues  - TSH low 0.07, obtain TFTs  - monitor FS while NPO and when starting TF    #Hematologic/Oncologic  Microcytic Anemia, stable  - monitor CBC, transfuse hgb > 7     #PPx  - DVT ppx w/ subq heparin 5000u TID  - Feeds as above    #Ethics  - Will need to obtain further collateral from family  - Will need to clarify GOC 71M PMH COPD, Asthma, HTN, CAD s/p PCI (LCx, LAD, D1), admitted to the MICU for AHRF 2/2 viral PNA c/b likely superimposed bacterial PNA and COPD exacerbation    #Neuro  Sedated on precedex, propofol and fentanyl while intubated  - continue to monitor per ICU protocol  - wean as able    #Cardiovascular  off pressors; continue as needed for MAP >65    Respiratory  #AHRF 2/2 viral c/b superimposed bacterial PNA and COPD exacerbation  - failed CPAP trial 10/9 AM; ABG showing CO2 retention and exam w/nasal flaring and retractions; placed back on sedation and full support  - lungs CTA throughout; c/w duonebs and solumedrol (10/8- ), inhaled budenoside (10/10- )  - CTX/azithro for CAP transitioned to levaquin (dose adjusted for renal failure); s/p vanc x1 10/8  - MRSA negative, hold vanc  - legionella negative    #GI/Nutrition  No active issues  continue TF    #/Renal  OWEN, baseline Cr 0.8-0.9; worsening SCr 1.08 -> 1.35  - monitor UOP, Strict Is/Os  - monitor BMP, replete lytes prn    ID  #parainfluenza PNA c/b superimposed bacterial PNA  - procal 1.68  - c/w levaquin as above  - MRSA, legionella negative as above  - RVP +parainfluenza, BCx 10/8 NGTD    #Endocrine  No active issues  - TSH low 0.07, obtain TFTs  - monitor FS while NPO and when starting TF    #Hematologic/Oncologic  Microcytic Anemia, stable  - monitor CBC, transfuse hgb > 7     #PPx  - DVT ppx w/ subq heparin 5000u TID  - Feeds as above    #Ethics  - Will need to obtain further collateral from family  - Will need to clarify GOC 71M PMH COPD, Asthma, HTN, CAD s/p PCI (LCx, LAD, D1), admitted to the MICU for AHRF 2/2 viral PNA c/b likely superimposed bacterial PNA and COPD exacerbation    #Neuro  Sedated on precedex, weaning prop and fent  - continue to monitor per ICU protocol  - wean off sedation as able    #Cardiovascular  off pressors; continue as needed for MAP >65    Respiratory  #AHRF 2/2 viral c/b superimposed bacterial PNA and COPD exacerbation  - failed CPAP trial 10/9 AM; ABG showing CO2 retention and exam w/nasal flaring and retractions; placed back on sedation and full support  - 10/11 will trial CPAP 10/5 now that sedation weaning  - lungs CTA throughout; c/w duonebs and solumedrol (10/8- ), inhaled budenoside (10/10- )  - CTX/azithro for CAP transitioned to levaquin (dose adjusted for renal failure); s/p vanc x1 10/8 - will transition back to CTX today 10/11  - MRSA negative, hold vanc  - legionella negative    #GI/Nutrition  No active issues  continue TF    #/Renal  OWEN, baseline Cr 0.8-0.9; worsening SCr 1.08 -> 1.35  - monitor UOP, Strict Is/Os  - monitor BMP, replete lytes prn    ID  #parainfluenza PNA c/b superimposed bacterial PNA  - procal 1.68  - levaquin -> CTX as above  - MRSA, legionella negative as above  - RVP +parainfluenza, BCx 10/8 NGTD    #Endocrine  No active issues  - TSH low 0.07, obtain TFTs  - monitor FS while NPO and when starting TF    #Hematologic/Oncologic  Microcytic Anemia, stable  - monitor CBC, transfuse hgb > 7     #PPx  - DVT ppx w/ subq heparin 5000u TID  - Feeds as above    #Ethics  - Will need to obtain further collateral from family  - Will need to clarify GOC

## 2021-10-11 NOTE — DIETITIAN INITIAL EVALUATION ADULT. - PERTINENT MEDS FT
Heparin, Levaquin, Peridex, Hibiclens, Lactated Ringers, Insulin Lispro, Duoneb, Pulmicort, Precedex, Fentanyl, Propofol, Solu-Medrol, Levophed, Protonix

## 2021-10-11 NOTE — DIETITIAN INITIAL EVALUATION ADULT. - PERTINENT LABORATORY DATA
(10/11): Hgb 9.6, Hct 33.7, POCT Blood Glucose 193, TSH 0.07, Na 132, BUN 46, Glu 182, Ca 8.3, GFR 55, ,

## 2021-10-12 ENCOUNTER — NON-APPOINTMENT (OUTPATIENT)
Age: 71
End: 2021-10-12

## 2021-10-12 LAB
ALBUMIN SERPL ELPH-MCNC: 3.1 G/DL — LOW (ref 3.3–5)
ALP SERPL-CCNC: 65 U/L — SIGNIFICANT CHANGE UP (ref 40–120)
ALT FLD-CCNC: 363 U/L — HIGH (ref 10–45)
ANION GAP SERPL CALC-SCNC: 11 MMOL/L — SIGNIFICANT CHANGE UP (ref 5–17)
APTT BLD: 23 SEC — LOW (ref 27.5–35.5)
AST SERPL-CCNC: 232 U/L — HIGH (ref 10–40)
BILIRUB SERPL-MCNC: 0.2 MG/DL — SIGNIFICANT CHANGE UP (ref 0.2–1.2)
BUN SERPL-MCNC: 39 MG/DL — HIGH (ref 7–23)
CALCIUM SERPL-MCNC: 8.4 MG/DL — SIGNIFICANT CHANGE UP (ref 8.4–10.5)
CHLORIDE SERPL-SCNC: 110 MMOL/L — HIGH (ref 96–108)
CO2 SERPL-SCNC: 20 MMOL/L — LOW (ref 22–31)
CREAT SERPL-MCNC: 0.95 MG/DL — SIGNIFICANT CHANGE UP (ref 0.5–1.3)
GAS PNL BLDA: SIGNIFICANT CHANGE UP
GAS PNL BLDA: SIGNIFICANT CHANGE UP
GLUCOSE BLDC GLUCOMTR-MCNC: 142 MG/DL — HIGH (ref 70–99)
GLUCOSE BLDC GLUCOMTR-MCNC: 162 MG/DL — HIGH (ref 70–99)
GLUCOSE BLDC GLUCOMTR-MCNC: 184 MG/DL — HIGH (ref 70–99)
GLUCOSE BLDC GLUCOMTR-MCNC: 202 MG/DL — HIGH (ref 70–99)
GLUCOSE SERPL-MCNC: 158 MG/DL — HIGH (ref 70–99)
HCT VFR BLD CALC: 30.3 % — LOW (ref 39–50)
HGB BLD-MCNC: 8.9 G/DL — LOW (ref 13–17)
INR BLD: 1.05 RATIO — SIGNIFICANT CHANGE UP (ref 0.88–1.16)
MAGNESIUM SERPL-MCNC: 2.6 MG/DL — SIGNIFICANT CHANGE UP (ref 1.6–2.6)
MCHC RBC-ENTMCNC: 20.7 PG — LOW (ref 27–34)
MCHC RBC-ENTMCNC: 29.4 GM/DL — LOW (ref 32–36)
MCV RBC AUTO: 70.6 FL — LOW (ref 80–100)
NRBC # BLD: 0 /100 WBCS — SIGNIFICANT CHANGE UP (ref 0–0)
PHOSPHATE SERPL-MCNC: 3.3 MG/DL — SIGNIFICANT CHANGE UP (ref 2.5–4.5)
PLATELET # BLD AUTO: 233 K/UL — SIGNIFICANT CHANGE UP (ref 150–400)
POTASSIUM SERPL-MCNC: 4.8 MMOL/L — SIGNIFICANT CHANGE UP (ref 3.5–5.3)
POTASSIUM SERPL-SCNC: 4.8 MMOL/L — SIGNIFICANT CHANGE UP (ref 3.5–5.3)
PROT SERPL-MCNC: 5.9 G/DL — LOW (ref 6–8.3)
PROTHROM AB SERPL-ACNC: 12.6 SEC — SIGNIFICANT CHANGE UP (ref 10.6–13.6)
RBC # BLD: 4.29 M/UL — SIGNIFICANT CHANGE UP (ref 4.2–5.8)
RBC # FLD: 23.4 % — HIGH (ref 10.3–14.5)
SODIUM SERPL-SCNC: 141 MMOL/L — SIGNIFICANT CHANGE UP (ref 135–145)
WBC # BLD: 11.08 K/UL — HIGH (ref 3.8–10.5)
WBC # FLD AUTO: 11.08 K/UL — HIGH (ref 3.8–10.5)

## 2021-10-12 PROCEDURE — 99291 CRITICAL CARE FIRST HOUR: CPT | Mod: 25

## 2021-10-12 PROCEDURE — 31500 INSERT EMERGENCY AIRWAY: CPT | Mod: GC

## 2021-10-12 PROCEDURE — 71045 X-RAY EXAM CHEST 1 VIEW: CPT | Mod: 26

## 2021-10-12 RX ORDER — PROPOFOL 10 MG/ML
22.08 INJECTION, EMULSION INTRAVENOUS
Qty: 1000 | Refills: 0 | Status: DISCONTINUED | OUTPATIENT
Start: 2021-10-12 | End: 2021-10-14

## 2021-10-12 RX ORDER — HALOPERIDOL DECANOATE 100 MG/ML
5 INJECTION INTRAMUSCULAR ONCE
Refills: 0 | Status: COMPLETED | OUTPATIENT
Start: 2021-10-12 | End: 2021-10-12

## 2021-10-12 RX ORDER — ACETAMINOPHEN 500 MG
650 TABLET ORAL ONCE
Refills: 0 | Status: COMPLETED | OUTPATIENT
Start: 2021-10-12 | End: 2021-10-12

## 2021-10-12 RX ORDER — KETAMINE HYDROCHLORIDE 100 MG/ML
0.25 INJECTION INTRAMUSCULAR; INTRAVENOUS
Qty: 1000 | Refills: 0 | Status: DISCONTINUED | OUTPATIENT
Start: 2021-10-12 | End: 2021-10-12

## 2021-10-12 RX ORDER — CEFTRIAXONE 500 MG/1
1000 INJECTION, POWDER, FOR SOLUTION INTRAMUSCULAR; INTRAVENOUS EVERY 24 HOURS
Refills: 0 | Status: DISCONTINUED | OUTPATIENT
Start: 2021-10-12 | End: 2021-10-13

## 2021-10-12 RX ORDER — HYDRALAZINE HCL 50 MG
5 TABLET ORAL ONCE
Refills: 0 | Status: COMPLETED | OUTPATIENT
Start: 2021-10-12 | End: 2021-10-12

## 2021-10-12 RX ORDER — CHLORHEXIDINE GLUCONATE 213 G/1000ML
15 SOLUTION TOPICAL EVERY 12 HOURS
Refills: 0 | Status: DISCONTINUED | OUTPATIENT
Start: 2021-10-12 | End: 2021-10-14

## 2021-10-12 RX ORDER — METOPROLOL TARTRATE 50 MG
1.25 TABLET ORAL EVERY 6 HOURS
Refills: 0 | Status: DISCONTINUED | OUTPATIENT
Start: 2021-10-12 | End: 2021-10-12

## 2021-10-12 RX ORDER — METOPROLOL TARTRATE 50 MG
5 TABLET ORAL ONCE
Refills: 0 | Status: COMPLETED | OUTPATIENT
Start: 2021-10-12 | End: 2021-10-12

## 2021-10-12 RX ORDER — MIDAZOLAM HYDROCHLORIDE 1 MG/ML
4 INJECTION, SOLUTION INTRAMUSCULAR; INTRAVENOUS ONCE
Refills: 0 | Status: DISCONTINUED | OUTPATIENT
Start: 2021-10-12 | End: 2021-10-12

## 2021-10-12 RX ORDER — KETAMINE HYDROCHLORIDE 100 MG/ML
0.25 INJECTION INTRAMUSCULAR; INTRAVENOUS
Qty: 1000 | Refills: 0 | Status: DISCONTINUED | OUTPATIENT
Start: 2021-10-12 | End: 2021-10-14

## 2021-10-12 RX ADMIN — Medication 2: at 05:13

## 2021-10-12 RX ADMIN — CHLORHEXIDINE GLUCONATE 1 APPLICATION(S): 213 SOLUTION TOPICAL at 05:14

## 2021-10-12 RX ADMIN — SENNA PLUS 10 MILLILITER(S): 8.6 TABLET ORAL at 23:08

## 2021-10-12 RX ADMIN — PROPOFOL 8.44 MICROGRAM(S)/KG/MIN: 10 INJECTION, EMULSION INTRAVENOUS at 09:17

## 2021-10-12 RX ADMIN — Medication 3 MILLILITER(S): at 22:06

## 2021-10-12 RX ADMIN — KETAMINE HYDROCHLORIDE 1.59 MG/KG/HR: 100 INJECTION INTRAMUSCULAR; INTRAVENOUS at 18:00

## 2021-10-12 RX ADMIN — Medication 20 MILLIGRAM(S): at 23:08

## 2021-10-12 RX ADMIN — Medication 3 MILLILITER(S): at 02:34

## 2021-10-12 RX ADMIN — Medication 3 MILLILITER(S): at 05:24

## 2021-10-12 RX ADMIN — CEFTRIAXONE 100 MILLIGRAM(S): 500 INJECTION, POWDER, FOR SOLUTION INTRAMUSCULAR; INTRAVENOUS at 11:14

## 2021-10-12 RX ADMIN — Medication 5 MILLIGRAM(S): at 16:34

## 2021-10-12 RX ADMIN — MIDAZOLAM HYDROCHLORIDE 4 MILLIGRAM(S): 1 INJECTION, SOLUTION INTRAMUSCULAR; INTRAVENOUS at 17:30

## 2021-10-12 RX ADMIN — Medication 20 MILLIGRAM(S): at 05:13

## 2021-10-12 RX ADMIN — Medication 3 MILLILITER(S): at 18:28

## 2021-10-12 RX ADMIN — Medication 20 MILLIGRAM(S): at 14:53

## 2021-10-12 RX ADMIN — Medication 0.25 MILLIGRAM(S): at 05:24

## 2021-10-12 RX ADMIN — Medication 1: at 00:17

## 2021-10-12 RX ADMIN — PANTOPRAZOLE SODIUM 40 MILLIGRAM(S): 20 TABLET, DELAYED RELEASE ORAL at 11:12

## 2021-10-12 RX ADMIN — HEPARIN SODIUM 5000 UNIT(S): 5000 INJECTION INTRAVENOUS; SUBCUTANEOUS at 05:14

## 2021-10-12 RX ADMIN — Medication 0.25 MILLIGRAM(S): at 18:29

## 2021-10-12 RX ADMIN — HEPARIN SODIUM 5000 UNIT(S): 5000 INJECTION INTRAVENOUS; SUBCUTANEOUS at 23:08

## 2021-10-12 RX ADMIN — CHLORHEXIDINE GLUCONATE 15 MILLILITER(S): 213 SOLUTION TOPICAL at 05:13

## 2021-10-12 RX ADMIN — Medication 1: at 11:25

## 2021-10-12 RX ADMIN — PROPOFOL 8.44 MICROGRAM(S)/KG/MIN: 10 INJECTION, EMULSION INTRAVENOUS at 17:00

## 2021-10-12 RX ADMIN — HEPARIN SODIUM 5000 UNIT(S): 5000 INJECTION INTRAVENOUS; SUBCUTANEOUS at 14:53

## 2021-10-12 NOTE — PROGRESS NOTE ADULT - ATTENDING COMMENTS
1. Acute hypoxemic respiratory failure due to COPD exacerbation  from  parainfluenza infection/pneumonia with superimposed bacterial pneumonia/infection. Continue steroids and bronchodilators. Change abx to ceftriaxone. Decrease sedation . Trials of extubation to BiPAP today.   2. ATN  slowly improving.  3. Shock liver . LFTS slowly improving.

## 2021-10-12 NOTE — PROGRESS NOTE ADULT - SUBJECTIVE AND OBJECTIVE BOX
Angel Hayden MD PGY-2  Internal Medicine Resident    INTERVAL HPI/OVERNIGHT EVENTS: PERRY ON, bradycardic HR 50s-80s, BPs 100s-160s on pressor (levophed), SpO2%  on mechanical ventilation.    SUBJECTIVE: Patient seen and examined at bedside. Intubated, sedated, ROS cannot be obtained.       VITAL SIGNS:  ICU Vital Signs Last 24 Hrs  T(C): 36.8 (12 Oct 2021 08:00), Max: 37.7 (11 Oct 2021 16:00)  T(F): 98.2 (12 Oct 2021 08:00), Max: 99.9 (11 Oct 2021 16:00)  HR: 75 (12 Oct 2021 08:00) (52 - 99)  BP: 165/83 (12 Oct 2021 08:00) (97/62 - 183/95)  BP(mean): 114 (12 Oct 2021 08:00) (75 - 136)  ABP: --  ABP(mean): --  RR: 16 (12 Oct 2021 08:00) (15 - 54)  SpO2: 98% (12 Oct 2021 08:00) (86% - 100%)    Mode: CPAP with PS, FiO2: 25, PEEP: 5, PS: 5, MAP: 8, PIP: 13  Plateau pressure:   P/F ratio:     10-11 @ 07:01  -  10-12 @ 07:00  --------------------------------------------------------  IN: 1594.2 mL / OUT: 1905 mL / NET: -310.8 mL    10-12 @ 07:01  -  10-12 @ 08:12  --------------------------------------------------------  IN: 20.4 mL / OUT: 0 mL / NET: 20.4 mL      CAPILLARY BLOOD GLUCOSE      POCT Blood Glucose.: 202 mg/dL (12 Oct 2021 05:10)    ECG:    PHYSICAL EXAM:  GENERAL: Sedated, intubated on mechanical ventilation, not appearing to be in distress, well-groomed, well-developed  HEAD: Atraumatic, Normocephalic  EYES: Pupils small, equal, round, nonreactive to light, conjunctiva and sclera clear  ENMT: MM mildly dry otherwise difficult to appreciate oropharynx iso ET tube  NECK: Supple  NERVOUS SYSTEM: Sedated on mechanical ventilation, pupils as above, otherwise difficult to appreciate neurologic exam   CHEST/LUNG: CTAB no wheezes, rales, rhonchi  HEART: Regular rate and rhythm; No murmurs, rubs, or gallops  ABDOMEN: Soft, Nontender, Nondistended; Bowel sounds present  EXTREMITIES: 2+ Peripheral Pulses, No edema, extremities warm      MEDICATIONS:  MEDICATIONS  (STANDING):  albuterol/ipratropium for Nebulization 3 milliLiter(s) Nebulizer every 4 hours  buDESOnide    Inhalation Suspension 0.25 milliGRAM(s) Nebulizer two times a day  cefTRIAXone   IVPB 1000 milliGRAM(s) IV Intermittent every 24 hours  chlorhexidine 0.12% Liquid 15 milliLiter(s) Oral Mucosa every 12 hours  chlorhexidine 4% Liquid 1 Application(s) Topical <User Schedule>  dexMEDEtomidine Infusion 0.998 MICROgram(s)/kG/Hr (15.9 mL/Hr) IV Continuous <Continuous>  dextrose 40% Gel 15 Gram(s) Oral once  dextrose 5%. 1000 milliLiter(s) (50 mL/Hr) IV Continuous <Continuous>  dextrose 5%. 1000 milliLiter(s) (100 mL/Hr) IV Continuous <Continuous>  dextrose 50% Injectable 25 Gram(s) IV Push once  dextrose 50% Injectable 12.5 Gram(s) IV Push once  dextrose 50% Injectable 25 Gram(s) IV Push once  fentaNYL   Infusion... 0.5 MICROgram(s)/kG/Hr (1.59 mL/Hr) IV Continuous <Continuous>  glucagon  Injectable 1 milliGRAM(s) IntraMuscular once  heparin   Injectable 5000 Unit(s) SubCutaneous every 8 hours  influenza   Vaccine 0.5 milliLiter(s) IntraMuscular once  insulin lispro (ADMELOG) corrective regimen sliding scale   SubCutaneous every 6 hours  methylPREDNISolone sodium succinate Injectable 20 milliGRAM(s) IV Push three times a day  multivitamin/minerals/iron Oral Solution (CENTRUM) 15 milliLiter(s) Oral daily  norepinephrine Infusion 0.05 MICROgram(s)/kG/Min (6.59 mL/Hr) IV Continuous <Continuous>  pantoprazole  Injectable 40 milliGRAM(s) IV Push daily  propofol Infusion 22.083 MICROgram(s)/kG/Min (8.44 mL/Hr) IV Continuous <Continuous>  senna Syrup 10 milliLiter(s) Oral at bedtime    MEDICATIONS  (PRN):      ALLERGIES:  Allergies    No Known Allergies    Intolerances        LABS:                        8.9    11.08 )-----------( 233      ( 11 Oct 2021 23:56 )             30.3     10-11    141  |  110<H>  |  39<H>  ----------------------------<  158<H>  4.8   |  20<L>  |  0.95    Ca    8.4      11 Oct 2021 23:56  Phos  3.3     10-11  Mg     2.6     10-11    TPro  5.9<L>  /  Alb  3.1<L>  /  TBili  0.2  /  DBili  x   /  AST  232<H>  /  ALT  363<H>  /  AlkPhos  65  10-11    PT/INR - ( 11 Oct 2021 23:56 )   PT: 12.6 sec;   INR: 1.05 ratio         PTT - ( 11 Oct 2021 23:56 )  PTT:23.0 sec      RADIOLOGY & ADDITIONAL TESTS: Reviewed.

## 2021-10-12 NOTE — PROGRESS NOTE ADULT - ASSESSMENT
71M PMH COPD, Asthma, HTN, CAD s/p PCI (LCx, LAD, D1), admitted to the MICU for AHRF 2/2 viral PNA c/b likely superimposed bacterial PNA and COPD exacerbation    #Neuro  Sedated on fent, precedex, dc'd prop  - continue to monitor per ICU protocol  - wean off sedation as able, gave 5mg haldol x 1 iso restlessness during extubation    #Cardiovascular  off pressors; continue as needed for MAP >65    Respiratory  #AHRF 2/2 viral c/b superimposed bacterial PNA and COPD exacerbation  - failed CPAP trial 10/9 AM; ABG showing CO2 retention and exam w/nasal flaring and retractions; placed back on sedation and full support  - 10/11 failed CPAP trial  - 10/12 CPAP in am, extubated thereafter, will f/u gas  - lungs CTA throughout; c/w duonebs and solumedrol (10/8- ), inhaled budenoside (10/10- )  - CTX/azithro for CAP transitioned to levaquin (dose adjusted for renal failure); s/p vanc x1 10/8 - transitioned back to CTX 10/11  - MRSA negative, hold vanc  - legionella negative    #GI/Nutrition  No active issues  OG tube removed during extubation     #/Renal  OWEN, baseline Cr 0.8-0.9; worsening SCr 1.08 -> 1.35, now resolving SCr 0.95  - monitor UOP, Strict Is/Os  - monitor BMP, replete lytes prn    ID  #parainfluenza PNA c/b superimposed bacterial PNA  - procal 1.68  - levaquin -> CTX as above  - MRSA, legionella negative as above  - RVP +parainfluenza, BCx 10/8 NGTD    #Endocrine  No active issues  - TSH low 0.07, obtain TFTs  - monitor FS while NPO and when starting TF    #Hematologic/Oncologic  Microcytic Anemia, stable  - monitor CBC, transfuse hgb > 7     #PPx  - DVT ppx w/ subq heparin 5000u TID    #Ethics  - Will need to obtain further collateral from family  - Will need to clarify GOC

## 2021-10-12 NOTE — PROCEDURE NOTE - SUPERVISORY STATEMENT
I performed the entire procedure from airway examination to ETT security post intubation pending CXR.

## 2021-10-13 LAB
ALBUMIN SERPL ELPH-MCNC: 3.4 G/DL — SIGNIFICANT CHANGE UP (ref 3.3–5)
ALP SERPL-CCNC: 78 U/L — SIGNIFICANT CHANGE UP (ref 40–120)
ALT FLD-CCNC: 397 U/L — HIGH (ref 10–45)
ANION GAP SERPL CALC-SCNC: 12 MMOL/L — SIGNIFICANT CHANGE UP (ref 5–17)
APTT BLD: 23.3 SEC — LOW (ref 27.5–35.5)
AST SERPL-CCNC: 231 U/L — HIGH (ref 10–40)
BASOPHILS # BLD AUTO: 0.02 K/UL — SIGNIFICANT CHANGE UP (ref 0–0.2)
BASOPHILS NFR BLD AUTO: 0.1 % — SIGNIFICANT CHANGE UP (ref 0–2)
BILIRUB SERPL-MCNC: 0.2 MG/DL — SIGNIFICANT CHANGE UP (ref 0.2–1.2)
BUN SERPL-MCNC: 50 MG/DL — HIGH (ref 7–23)
CALCIUM SERPL-MCNC: 8.9 MG/DL — SIGNIFICANT CHANGE UP (ref 8.4–10.5)
CHLORIDE SERPL-SCNC: 113 MMOL/L — HIGH (ref 96–108)
CO2 SERPL-SCNC: 20 MMOL/L — LOW (ref 22–31)
CREAT SERPL-MCNC: 0.99 MG/DL — SIGNIFICANT CHANGE UP (ref 0.5–1.3)
CULTURE RESULTS: SIGNIFICANT CHANGE UP
CULTURE RESULTS: SIGNIFICANT CHANGE UP
EOSINOPHIL # BLD AUTO: 0 K/UL — SIGNIFICANT CHANGE UP (ref 0–0.5)
EOSINOPHIL NFR BLD AUTO: 0 % — SIGNIFICANT CHANGE UP (ref 0–6)
GAS PNL BLDA: SIGNIFICANT CHANGE UP
GLUCOSE BLDC GLUCOMTR-MCNC: 139 MG/DL — HIGH (ref 70–99)
GLUCOSE BLDC GLUCOMTR-MCNC: 166 MG/DL — HIGH (ref 70–99)
GLUCOSE BLDC GLUCOMTR-MCNC: 186 MG/DL — HIGH (ref 70–99)
GLUCOSE SERPL-MCNC: 156 MG/DL — HIGH (ref 70–99)
HCT VFR BLD CALC: 31 % — LOW (ref 39–50)
HGB BLD-MCNC: 8.7 G/DL — LOW (ref 13–17)
IMM GRANULOCYTES NFR BLD AUTO: 0.6 % — SIGNIFICANT CHANGE UP (ref 0–1.5)
INR BLD: 1.05 RATIO — SIGNIFICANT CHANGE UP (ref 0.88–1.16)
LYMPHOCYTES # BLD AUTO: 1.4 K/UL — SIGNIFICANT CHANGE UP (ref 1–3.3)
LYMPHOCYTES # BLD AUTO: 8.1 % — LOW (ref 13–44)
MAGNESIUM SERPL-MCNC: 2.9 MG/DL — HIGH (ref 1.6–2.6)
MCHC RBC-ENTMCNC: 20.4 PG — LOW (ref 27–34)
MCHC RBC-ENTMCNC: 28.1 GM/DL — LOW (ref 32–36)
MCV RBC AUTO: 72.8 FL — LOW (ref 80–100)
MONOCYTES # BLD AUTO: 1.02 K/UL — HIGH (ref 0–0.9)
MONOCYTES NFR BLD AUTO: 5.9 % — SIGNIFICANT CHANGE UP (ref 2–14)
NEUTROPHILS # BLD AUTO: 14.64 K/UL — HIGH (ref 1.8–7.4)
NEUTROPHILS NFR BLD AUTO: 85.3 % — HIGH (ref 43–77)
NRBC # BLD: 0 /100 WBCS — SIGNIFICANT CHANGE UP (ref 0–0)
PHOSPHATE SERPL-MCNC: 3.9 MG/DL — SIGNIFICANT CHANGE UP (ref 2.5–4.5)
PLATELET # BLD AUTO: 305 K/UL — SIGNIFICANT CHANGE UP (ref 150–400)
POTASSIUM SERPL-MCNC: 4.5 MMOL/L — SIGNIFICANT CHANGE UP (ref 3.5–5.3)
POTASSIUM SERPL-SCNC: 4.5 MMOL/L — SIGNIFICANT CHANGE UP (ref 3.5–5.3)
PROT SERPL-MCNC: 6.3 G/DL — SIGNIFICANT CHANGE UP (ref 6–8.3)
PROTHROM AB SERPL-ACNC: 12.6 SEC — SIGNIFICANT CHANGE UP (ref 10.6–13.6)
RBC # BLD: 4.26 M/UL — SIGNIFICANT CHANGE UP (ref 4.2–5.8)
RBC # FLD: 23.5 % — HIGH (ref 10.3–14.5)
SODIUM SERPL-SCNC: 145 MMOL/L — SIGNIFICANT CHANGE UP (ref 135–145)
SPECIMEN SOURCE: SIGNIFICANT CHANGE UP
SPECIMEN SOURCE: SIGNIFICANT CHANGE UP
WBC # BLD: 17.19 K/UL — HIGH (ref 3.8–10.5)
WBC # FLD AUTO: 17.19 K/UL — HIGH (ref 3.8–10.5)

## 2021-10-13 PROCEDURE — 93308 TTE F-UP OR LMTD: CPT | Mod: 26,GC

## 2021-10-13 PROCEDURE — 76604 US EXAM CHEST: CPT | Mod: 26,GC

## 2021-10-13 PROCEDURE — 99291 CRITICAL CARE FIRST HOUR: CPT

## 2021-10-13 PROCEDURE — 93010 ELECTROCARDIOGRAM REPORT: CPT

## 2021-10-13 RX ORDER — QUETIAPINE FUMARATE 200 MG/1
25 TABLET, FILM COATED ORAL
Refills: 0 | Status: DISCONTINUED | OUTPATIENT
Start: 2021-10-13 | End: 2021-10-14

## 2021-10-13 RX ORDER — MIDAZOLAM HYDROCHLORIDE 1 MG/ML
4 INJECTION, SOLUTION INTRAMUSCULAR; INTRAVENOUS ONCE
Refills: 0 | Status: DISCONTINUED | OUTPATIENT
Start: 2021-10-13 | End: 2021-10-13

## 2021-10-13 RX ORDER — AMIODARONE HYDROCHLORIDE 400 MG/1
150 TABLET ORAL ONCE
Refills: 0 | Status: DISCONTINUED | OUTPATIENT
Start: 2021-10-13 | End: 2021-10-13

## 2021-10-13 RX ADMIN — HEPARIN SODIUM 5000 UNIT(S): 5000 INJECTION INTRAVENOUS; SUBCUTANEOUS at 16:17

## 2021-10-13 RX ADMIN — PROPOFOL 8.44 MICROGRAM(S)/KG/MIN: 10 INJECTION, EMULSION INTRAVENOUS at 22:00

## 2021-10-13 RX ADMIN — Medication 3 MILLILITER(S): at 14:11

## 2021-10-13 RX ADMIN — Medication 20 MILLIGRAM(S): at 21:01

## 2021-10-13 RX ADMIN — KETAMINE HYDROCHLORIDE 1.59 MG/KG/HR: 100 INJECTION INTRAMUSCULAR; INTRAVENOUS at 22:00

## 2021-10-13 RX ADMIN — Medication 20 MILLIGRAM(S): at 16:16

## 2021-10-13 RX ADMIN — PROPOFOL 8.44 MICROGRAM(S)/KG/MIN: 10 INJECTION, EMULSION INTRAVENOUS at 17:34

## 2021-10-13 RX ADMIN — CHLORHEXIDINE GLUCONATE 1 APPLICATION(S): 213 SOLUTION TOPICAL at 06:13

## 2021-10-13 RX ADMIN — Medication 20 MILLIGRAM(S): at 05:07

## 2021-10-13 RX ADMIN — Medication 3 MILLILITER(S): at 17:23

## 2021-10-13 RX ADMIN — Medication 1: at 01:06

## 2021-10-13 RX ADMIN — Medication 1: at 05:08

## 2021-10-13 RX ADMIN — Medication 0.25 MILLIGRAM(S): at 17:22

## 2021-10-13 RX ADMIN — Medication 3 MILLILITER(S): at 06:06

## 2021-10-13 RX ADMIN — PANTOPRAZOLE SODIUM 40 MILLIGRAM(S): 20 TABLET, DELAYED RELEASE ORAL at 11:52

## 2021-10-13 RX ADMIN — MIDAZOLAM HYDROCHLORIDE 4 MILLIGRAM(S): 1 INJECTION, SOLUTION INTRAMUSCULAR; INTRAVENOUS at 13:00

## 2021-10-13 RX ADMIN — Medication 0.25 MILLIGRAM(S): at 05:42

## 2021-10-13 RX ADMIN — Medication 1: at 11:52

## 2021-10-13 RX ADMIN — Medication 3 MILLILITER(S): at 21:40

## 2021-10-13 RX ADMIN — Medication 3 MILLILITER(S): at 02:43

## 2021-10-13 RX ADMIN — Medication 3 MILLILITER(S): at 10:05

## 2021-10-13 RX ADMIN — HEPARIN SODIUM 5000 UNIT(S): 5000 INJECTION INTRAVENOUS; SUBCUTANEOUS at 21:01

## 2021-10-13 RX ADMIN — Medication 15 MILLILITER(S): at 11:58

## 2021-10-13 RX ADMIN — CHLORHEXIDINE GLUCONATE 15 MILLILITER(S): 213 SOLUTION TOPICAL at 06:13

## 2021-10-13 RX ADMIN — HEPARIN SODIUM 5000 UNIT(S): 5000 INJECTION INTRAVENOUS; SUBCUTANEOUS at 05:07

## 2021-10-13 RX ADMIN — QUETIAPINE FUMARATE 25 MILLIGRAM(S): 200 TABLET, FILM COATED ORAL at 17:34

## 2021-10-13 RX ADMIN — CHLORHEXIDINE GLUCONATE 15 MILLILITER(S): 213 SOLUTION TOPICAL at 17:36

## 2021-10-13 NOTE — PROGRESS NOTE ADULT - ATTENDING COMMENTS
1. Acute hypoxemic respiratory failure due to COPD exacerbation  from  parainfluenza infection/pneumonia with superimposed bacterial pneumonia/infection. Continue steroids and bronchodilators. Change abx to ceftriaxone. Pt failed trial of extubation yesterday. Reintubated. ? in part from anxiety. Continue current AC vent settings except change to square wave from ramp wave. This will decrease auto peep.  2. ATN  slowly improving.  3. Shock liver . LFTS slowly improving.

## 2021-10-13 NOTE — CHART NOTE - NSCHARTNOTEFT_GEN_A_CORE
: Keke Garrett MD    INDICATION: Respiratory Failure    PROCEDURE:  [x] LIMITED ECHO  [x] LIMITED CHEST  [ ] LIMITED RETROPERITONEAL  [ ] LIMITED ABDOMINAL  [ ] LIMITED DVT  [ ] NEEDLE GUIDANCE VASCULAR  [ ] NEEDLE GUIDANCE THORACENTESIS  [ ] NEEDLE GUIDANCE PARACENTESIS  [ ] NEEDLE GUIDANCE PERICARDIOCENTESIS  [ ] OTHER    FINDINGS: A lines b/l without consolidation or pleural effusion. Normal LV systolic function. Indeterminate IVC      INTERPRETATION: no evidence of pneumonia or pulmonary edema. Cardiac function preserved. No evidence of hypovolemia.    Keke Garrett MD PGY6 EM Critical Care Fellow : Keke Garrett MD, Alex Breen    INDICATION: Respiratory Failure    PROCEDURE:  [x] LIMITED ECHO  [x] LIMITED CHEST  [ ] LIMITED RETROPERITONEAL  [ ] LIMITED ABDOMINAL  [ ] LIMITED DVT  [ ] NEEDLE GUIDANCE VASCULAR  [ ] NEEDLE GUIDANCE THORACENTESIS  [ ] NEEDLE GUIDANCE PARACENTESIS  [ ] NEEDLE GUIDANCE PERICARDIOCENTESIS  [ ] OTHER    FINDINGS: A lines b/l without consolidation or pleural effusion. Normal LV systolic function. Indeterminate IVC      INTERPRETATION: no evidence of pneumonia or pulmonary edema. Cardiac function preserved. No evidence of hypovolemia.    Keke Garrett MD PGY6 EMIM Critical Care Fellow

## 2021-10-13 NOTE — PROGRESS NOTE ADULT - SUBJECTIVE AND OBJECTIVE BOX
Angel Hayden MD PGY-2  Internal Medicine Resident    INTERVAL HPI/OVERNIGHT EVENTS: Pt weaned to NC evening 10/12, w/ increased WOB nasal flaring and tachypnea requiring BiPAP to be placed, following this pt became hypertensive to SBP 190s-200s, soon after pt appearing very anxious and appearing to gasp requiring reintubation. ON bradycardic HR 50s-80s, SBP 70s-160s, SpO2%  on mechanical ventilation.      SUBJECTIVE: Patient seen and examined at bedside. Intubated, sedated, ROS cannot be obtained.       VITAL SIGNS:  ICU Vital Signs Last 24 Hrs  T(C): 36.6 (13 Oct 2021 04:00), Max: 37.3 (13 Oct 2021 00:00)  T(F): 97.9 (13 Oct 2021 04:00), Max: 99.1 (13 Oct 2021 00:00)  HR: 74 (13 Oct 2021 07:30) (56 - 117)  BP: 130/74 (13 Oct 2021 07:30) (79/50 - 204/117)  BP(mean): 97 (13 Oct 2021 07:30) (60 - 152)  ABP: --  ABP(mean): --  RR: 20 (13 Oct 2021 07:30) (14 - 33)  SpO2: 99% (13 Oct 2021 07:30) (96% - 100%)    Mode: AC/ CMV (Assist Control/ Continuous Mandatory Ventilation), RR (machine): 20, TV (machine): 500, FiO2: 30, PEEP: 5, ITime: 1, MAP: 12, PIP: 29  Plateau pressure:   P/F ratio:     10-12 @ 07:01  -  10-13 @ 07:00  --------------------------------------------------------  IN: 1035 mL / OUT: 1135 mL / NET: -100 mL      CAPILLARY BLOOD GLUCOSE      POCT Blood Glucose.: 186 mg/dL (13 Oct 2021 05:06)    ECG:    PHYSICAL EXAM:        MEDICATIONS:  MEDICATIONS  (STANDING):  albuterol/ipratropium for Nebulization 3 milliLiter(s) Nebulizer every 4 hours  buDESOnide    Inhalation Suspension 0.25 milliGRAM(s) Nebulizer two times a day  cefTRIAXone   IVPB 1000 milliGRAM(s) IV Intermittent every 24 hours  chlorhexidine 0.12% Liquid 15 milliLiter(s) Oral Mucosa every 12 hours  chlorhexidine 4% Liquid 1 Application(s) Topical <User Schedule>  dextrose 40% Gel 15 Gram(s) Oral once  dextrose 5%. 1000 milliLiter(s) (50 mL/Hr) IV Continuous <Continuous>  dextrose 5%. 1000 milliLiter(s) (100 mL/Hr) IV Continuous <Continuous>  dextrose 50% Injectable 25 Gram(s) IV Push once  dextrose 50% Injectable 12.5 Gram(s) IV Push once  dextrose 50% Injectable 25 Gram(s) IV Push once  glucagon  Injectable 1 milliGRAM(s) IntraMuscular once  heparin   Injectable 5000 Unit(s) SubCutaneous every 8 hours  influenza   Vaccine 0.5 milliLiter(s) IntraMuscular once  insulin lispro (ADMELOG) corrective regimen sliding scale   SubCutaneous every 6 hours  ketamine Infusion. 0.25 mG/kG/Hr (1.59 mL/Hr) IV Continuous <Continuous>  methylPREDNISolone sodium succinate Injectable 20 milliGRAM(s) IV Push three times a day  multivitamin/minerals/iron Oral Solution (CENTRUM) 15 milliLiter(s) Oral daily  norepinephrine Infusion 0.05 MICROgram(s)/kG/Min (6.59 mL/Hr) IV Continuous <Continuous>  pantoprazole  Injectable 40 milliGRAM(s) IV Push daily  propofol Infusion 22.083 MICROgram(s)/kG/Min (8.44 mL/Hr) IV Continuous <Continuous>  senna Syrup 10 milliLiter(s) Oral at bedtime    MEDICATIONS  (PRN):      ALLERGIES:  Allergies    No Known Allergies    Intolerances        LABS:                        8.7    17.19 )-----------( 305      ( 13 Oct 2021 00:29 )             31.0     10-13    145  |  113<H>  |  50<H>  ----------------------------<  156<H>  4.5   |  20<L>  |  0.99    Ca    8.9      13 Oct 2021 00:29  Phos  3.9     10-13  Mg     2.9     10-13    TPro  6.3  /  Alb  3.4  /  TBili  0.2  /  DBili  x   /  AST  231<H>  /  ALT  397<H>  /  AlkPhos  78  10-13    PT/INR - ( 13 Oct 2021 00:29 )   PT: 12.6 sec;   INR: 1.05 ratio         PTT - ( 13 Oct 2021 00:29 )  PTT:23.3 sec      RADIOLOGY & ADDITIONAL TESTS: Reviewed. Angel Hayden MD PGY-2  Internal Medicine Resident    INTERVAL HPI/OVERNIGHT EVENTS: Pt weaned to NC evening 10/12, w/ increased WOB nasal flaring and tachypnea requiring BiPAP to be placed, following this pt became hypertensive to SBP 190s-200s, soon after pt appearing very anxious and appearing to gasp requiring reintubation. ON bradycardic HR 50s-80s, SBP 70s-160s, SpO2%  on mechanical ventilation.      SUBJECTIVE: Patient seen and examined at bedside. Intubated, sedated, ROS cannot be obtained.       VITAL SIGNS:  ICU Vital Signs Last 24 Hrs  T(C): 36.6 (13 Oct 2021 04:00), Max: 37.3 (13 Oct 2021 00:00)  T(F): 97.9 (13 Oct 2021 04:00), Max: 99.1 (13 Oct 2021 00:00)  HR: 74 (13 Oct 2021 07:30) (56 - 117)  BP: 130/74 (13 Oct 2021 07:30) (79/50 - 204/117)  BP(mean): 97 (13 Oct 2021 07:30) (60 - 152)  ABP: --  ABP(mean): --  RR: 20 (13 Oct 2021 07:30) (14 - 33)  SpO2: 99% (13 Oct 2021 07:30) (96% - 100%)    Mode: AC/ CMV (Assist Control/ Continuous Mandatory Ventilation), RR (machine): 20, TV (machine): 500, FiO2: 30, PEEP: 5, ITime: 1, MAP: 12, PIP: 29  Plateau pressure:   P/F ratio:     10-12 @ 07:01  -  10-13 @ 07:00  --------------------------------------------------------  IN: 1035 mL / OUT: 1135 mL / NET: -100 mL      CAPILLARY BLOOD GLUCOSE      POCT Blood Glucose.: 186 mg/dL (13 Oct 2021 05:06)    ECG:    PHYSICAL EXAM:  GENERAL: Sedated, intubated on mechanical ventilation, not appearing to be in distress, well-groomed, well-developed  HEAD: Atraumatic, Normocephalic  EYES: Pupils small, equal, round, minimal reaction to light, conjunctiva and sclera clear  ENMT: MMM otherwise difficult to appreciate oropharynx iso ET tube  NECK: Supple  NERVOUS SYSTEM: Sedated on mechanical ventilation, pupils as above, otherwise difficult to appreciate neurologic exam   CHEST/LUNG: Mild crackles at bases, otherwise CTAB b/l no wheezes or rhonchi  HEART: Regular rate and rhythm; No murmurs, rubs, or gallops  ABDOMEN: Soft, Nontender, Nondistended; Bowel sounds present  EXTREMITIES: 2+ Peripheral Pulses, No edema, extremities warm        MEDICATIONS:  MEDICATIONS  (STANDING):  albuterol/ipratropium for Nebulization 3 milliLiter(s) Nebulizer every 4 hours  buDESOnide    Inhalation Suspension 0.25 milliGRAM(s) Nebulizer two times a day  cefTRIAXone   IVPB 1000 milliGRAM(s) IV Intermittent every 24 hours  chlorhexidine 0.12% Liquid 15 milliLiter(s) Oral Mucosa every 12 hours  chlorhexidine 4% Liquid 1 Application(s) Topical <User Schedule>  dextrose 40% Gel 15 Gram(s) Oral once  dextrose 5%. 1000 milliLiter(s) (50 mL/Hr) IV Continuous <Continuous>  dextrose 5%. 1000 milliLiter(s) (100 mL/Hr) IV Continuous <Continuous>  dextrose 50% Injectable 25 Gram(s) IV Push once  dextrose 50% Injectable 12.5 Gram(s) IV Push once  dextrose 50% Injectable 25 Gram(s) IV Push once  glucagon  Injectable 1 milliGRAM(s) IntraMuscular once  heparin   Injectable 5000 Unit(s) SubCutaneous every 8 hours  influenza   Vaccine 0.5 milliLiter(s) IntraMuscular once  insulin lispro (ADMELOG) corrective regimen sliding scale   SubCutaneous every 6 hours  ketamine Infusion. 0.25 mG/kG/Hr (1.59 mL/Hr) IV Continuous <Continuous>  methylPREDNISolone sodium succinate Injectable 20 milliGRAM(s) IV Push three times a day  multivitamin/minerals/iron Oral Solution (CENTRUM) 15 milliLiter(s) Oral daily  norepinephrine Infusion 0.05 MICROgram(s)/kG/Min (6.59 mL/Hr) IV Continuous <Continuous>  pantoprazole  Injectable 40 milliGRAM(s) IV Push daily  propofol Infusion 22.083 MICROgram(s)/kG/Min (8.44 mL/Hr) IV Continuous <Continuous>  senna Syrup 10 milliLiter(s) Oral at bedtime    MEDICATIONS  (PRN):      ALLERGIES:  Allergies    No Known Allergies    Intolerances        LABS:                        8.7    17.19 )-----------( 305      ( 13 Oct 2021 00:29 )             31.0     10-13    145  |  113<H>  |  50<H>  ----------------------------<  156<H>  4.5   |  20<L>  |  0.99    Ca    8.9      13 Oct 2021 00:29  Phos  3.9     10-13  Mg     2.9     10-13    TPro  6.3  /  Alb  3.4  /  TBili  0.2  /  DBili  x   /  AST  231<H>  /  ALT  397<H>  /  AlkPhos  78  10-13    PT/INR - ( 13 Oct 2021 00:29 )   PT: 12.6 sec;   INR: 1.05 ratio         PTT - ( 13 Oct 2021 00:29 )  PTT:23.3 sec      RADIOLOGY & ADDITIONAL TESTS: Reviewed.

## 2021-10-13 NOTE — PROGRESS NOTE ADULT - ASSESSMENT
71M PMH COPD, Asthma, HTN, CAD s/p PCI (LCx, LAD, D1), admitted to the MICU for AHRF 2/2 viral PNA c/b likely superimposed bacterial PNA and COPD exacerbation, extubated 10/12 am and reintubated in evening     #Neuro  Sedated on fentanyl, ketamine  - continue to monitor per ICU protocol  - wean as able, starting seroquel 25 BID iso anxiety after previous extubation    #Cardiovascular  off pressors; continue as needed for MAP >65    Respiratory  #AHRF 2/2 viral c/b superimposed bacterial PNA and COPD exacerbation  - failed CPAP trial 10/9 AM; ABG showing CO2 retention and exam w/nasal flaring and retractions; placed back on sedation and full support  - 10/11 failed CPAP trial  - 10/12 CPAP in am, extubated and reintubated iso likely anxiety   - lungs CTA throughout; c/w duonebs and solumedrol (10/8- ), inhaled budenoside (10/10- )  - completed 5 day abx course (CTX -> Levaquin -> CTX) 10/8-10/12  - MRSA negative, hold vanc  - legionella negative    #GI/Nutrition  No active issues  OG tube removed during extubation     #/Renal  OWEN, baseline Cr 0.8-0.9; worsening SCr 1.08 -> 1.35, now resolving SCr 0.95  - monitor UOP, Strict Is/Os  - monitor BMP, replete lytes prn    ID  #parainfluenza PNA c/b superimposed bacterial PNA  - procal 1.68  - levaquin -> CTX as above  - MRSA, legionella negative as above  - RVP +parainfluenza, BCx 10/8 NGTD    #Endocrine  No active issues  - TSH low 0.07, obtain TFTs  - monitor FS while NPO and when starting TF    #Hematologic/Oncologic  Microcytic Anemia, stable  - monitor CBC, transfuse hgb > 7     #PPx  - DVT ppx w/ subq heparin 5000u TID    #Ethics  - Will need to obtain further collateral from family  - Will need to clarify GOC 71M PMH COPD, Asthma, HTN, CAD s/p PCI (LCx, LAD, D1), admitted to the MICU for AHRF 2/2 viral PNA c/b likely superimposed bacterial PNA and COPD exacerbation, extubated 10/12 am and reintubated in evening     #Neuro  Sedated on fentanyl, ketamine  - continue to monitor per ICU protocol  - wean as able, starting seroquel 25 BID iso anxiety after previous extubation    #Cardiovascular  off pressors; continue as needed for MAP >65    Respiratory  #AHRF 2/2 viral c/b superimposed bacterial PNA and COPD exacerbation  - failed CPAP trial 10/9 AM; ABG showing CO2 retention and exam w/nasal flaring and retractions; placed back on sedation and full support  - 10/11 failed CPAP trial  - 10/12 CPAP in am, extubated and reintubated iso likely anxiety   - lungs CTA throughout; c/w duonebs and solumedrol (10/8- ), inhaled budenoside (10/10- )  - completed 5 day abx course (CTX -> Levaquin -> CTX) 10/8-10/12  - MRSA negative, hold vanc  - legionella negative    #GI/Nutrition  No active issues  C/w feeds    #/Renal  OWEN, baseline Cr 0.8-0.9; worsening SCr 1.08 -> 1.35, now resolving SCr 0.99  - monitor UOP, Strict Is/Os  - monitor BMP, replete lytes prn    ID  #parainfluenza PNA c/b superimposed bacterial PNA  - completed 5 day abx course (CTX -> Levaquin -> CTX) 10/8-10/12  - procal 1.68  - MRSA, legionella negative as above  - RVP +parainfluenza, BCx 10/8 NGTD    #Endocrine  No active issues  - TSH low 0.07, obtain TFTs  - monitor FS while NPO and when starting TF    #Hematologic/Oncologic  Microcytic Anemia, stable  - monitor CBC, transfuse hgb > 7     #PPx  - DVT ppx w/ subq heparin 5000u TID    #Ethics  - clarify GOC w/ family

## 2021-10-14 LAB
ALBUMIN SERPL ELPH-MCNC: 3.4 G/DL — SIGNIFICANT CHANGE UP (ref 3.3–5)
ALP SERPL-CCNC: 81 U/L — SIGNIFICANT CHANGE UP (ref 40–120)
ALT FLD-CCNC: 377 U/L — HIGH (ref 10–45)
ANION GAP SERPL CALC-SCNC: 13 MMOL/L — SIGNIFICANT CHANGE UP (ref 5–17)
APTT BLD: 23.9 SEC — LOW (ref 27.5–35.5)
AST SERPL-CCNC: 169 U/L — HIGH (ref 10–40)
BASOPHILS # BLD AUTO: 0.01 K/UL — SIGNIFICANT CHANGE UP (ref 0–0.2)
BASOPHILS NFR BLD AUTO: 0.1 % — SIGNIFICANT CHANGE UP (ref 0–2)
BILIRUB SERPL-MCNC: 0.1 MG/DL — LOW (ref 0.2–1.2)
BUN SERPL-MCNC: 52 MG/DL — HIGH (ref 7–23)
CALCIUM SERPL-MCNC: 8.7 MG/DL — SIGNIFICANT CHANGE UP (ref 8.4–10.5)
CHLORIDE SERPL-SCNC: 117 MMOL/L — HIGH (ref 96–108)
CO2 SERPL-SCNC: 20 MMOL/L — LOW (ref 22–31)
CREAT SERPL-MCNC: 0.88 MG/DL — SIGNIFICANT CHANGE UP (ref 0.5–1.3)
EOSINOPHIL # BLD AUTO: 0 K/UL — SIGNIFICANT CHANGE UP (ref 0–0.5)
EOSINOPHIL NFR BLD AUTO: 0 % — SIGNIFICANT CHANGE UP (ref 0–6)
GAS PNL BLDA: SIGNIFICANT CHANGE UP
GLUCOSE BLDC GLUCOMTR-MCNC: 129 MG/DL — HIGH (ref 70–99)
GLUCOSE BLDC GLUCOMTR-MCNC: 143 MG/DL — HIGH (ref 70–99)
GLUCOSE BLDC GLUCOMTR-MCNC: 171 MG/DL — HIGH (ref 70–99)
GLUCOSE SERPL-MCNC: 164 MG/DL — HIGH (ref 70–99)
HCT VFR BLD CALC: 29.5 % — LOW (ref 39–50)
HGB BLD-MCNC: 8.3 G/DL — LOW (ref 13–17)
IMM GRANULOCYTES NFR BLD AUTO: 0.5 % — SIGNIFICANT CHANGE UP (ref 0–1.5)
INR BLD: 0.95 RATIO — SIGNIFICANT CHANGE UP (ref 0.88–1.16)
LYMPHOCYTES # BLD AUTO: 0.96 K/UL — LOW (ref 1–3.3)
LYMPHOCYTES # BLD AUTO: 7.3 % — LOW (ref 13–44)
MAGNESIUM SERPL-MCNC: 2.6 MG/DL — SIGNIFICANT CHANGE UP (ref 1.6–2.6)
MCHC RBC-ENTMCNC: 20.5 PG — LOW (ref 27–34)
MCHC RBC-ENTMCNC: 28.1 GM/DL — LOW (ref 32–36)
MCV RBC AUTO: 72.8 FL — LOW (ref 80–100)
MONOCYTES # BLD AUTO: 0.86 K/UL — SIGNIFICANT CHANGE UP (ref 0–0.9)
MONOCYTES NFR BLD AUTO: 6.5 % — SIGNIFICANT CHANGE UP (ref 2–14)
NEUTROPHILS # BLD AUTO: 11.34 K/UL — HIGH (ref 1.8–7.4)
NEUTROPHILS NFR BLD AUTO: 85.6 % — HIGH (ref 43–77)
NRBC # BLD: 0 /100 WBCS — SIGNIFICANT CHANGE UP (ref 0–0)
PHOSPHATE SERPL-MCNC: 3.2 MG/DL — SIGNIFICANT CHANGE UP (ref 2.5–4.5)
PLATELET # BLD AUTO: 246 K/UL — SIGNIFICANT CHANGE UP (ref 150–400)
POTASSIUM SERPL-MCNC: 5 MMOL/L — SIGNIFICANT CHANGE UP (ref 3.5–5.3)
POTASSIUM SERPL-SCNC: 5 MMOL/L — SIGNIFICANT CHANGE UP (ref 3.5–5.3)
PROT SERPL-MCNC: 6.1 G/DL — SIGNIFICANT CHANGE UP (ref 6–8.3)
PROTHROM AB SERPL-ACNC: 11.4 SEC — SIGNIFICANT CHANGE UP (ref 10.6–13.6)
RBC # BLD: 4.05 M/UL — LOW (ref 4.2–5.8)
RBC # FLD: 23.6 % — HIGH (ref 10.3–14.5)
SODIUM SERPL-SCNC: 150 MMOL/L — HIGH (ref 135–145)
WBC # BLD: 13.24 K/UL — HIGH (ref 3.8–10.5)
WBC # FLD AUTO: 13.24 K/UL — HIGH (ref 3.8–10.5)

## 2021-10-14 PROCEDURE — 99291 CRITICAL CARE FIRST HOUR: CPT

## 2021-10-14 RX ORDER — QUETIAPINE FUMARATE 200 MG/1
25 TABLET, FILM COATED ORAL AT BEDTIME
Refills: 0 | Status: DISCONTINUED | OUTPATIENT
Start: 2021-10-14 | End: 2021-10-15

## 2021-10-14 RX ORDER — DEXMEDETOMIDINE HYDROCHLORIDE IN 0.9% SODIUM CHLORIDE 4 UG/ML
0.01 INJECTION INTRAVENOUS
Qty: 200 | Refills: 0 | Status: DISCONTINUED | OUTPATIENT
Start: 2021-10-14 | End: 2021-10-18

## 2021-10-14 RX ORDER — MORPHINE SULFATE 50 MG/1
1.5 CAPSULE, EXTENDED RELEASE ORAL EVERY 4 HOURS
Refills: 0 | Status: DISCONTINUED | OUTPATIENT
Start: 2021-10-14 | End: 2021-10-21

## 2021-10-14 RX ORDER — PROPOFOL 10 MG/ML
22.08 INJECTION, EMULSION INTRAVENOUS
Qty: 1000 | Refills: 0 | Status: DISCONTINUED | OUTPATIENT
Start: 2021-10-14 | End: 2021-10-14

## 2021-10-14 RX ORDER — MORPHINE SULFATE 50 MG/1
1 CAPSULE, EXTENDED RELEASE ORAL ONCE
Refills: 0 | Status: DISCONTINUED | OUTPATIENT
Start: 2021-10-14 | End: 2021-10-14

## 2021-10-14 RX ADMIN — Medication 3 MILLILITER(S): at 09:02

## 2021-10-14 RX ADMIN — Medication 15 MILLILITER(S): at 18:15

## 2021-10-14 RX ADMIN — Medication 3 MILLILITER(S): at 13:01

## 2021-10-14 RX ADMIN — CHLORHEXIDINE GLUCONATE 15 MILLILITER(S): 213 SOLUTION TOPICAL at 18:15

## 2021-10-14 RX ADMIN — Medication 3 MILLILITER(S): at 05:35

## 2021-10-14 RX ADMIN — Medication 0.25 MILLIGRAM(S): at 17:13

## 2021-10-14 RX ADMIN — HEPARIN SODIUM 5000 UNIT(S): 5000 INJECTION INTRAVENOUS; SUBCUTANEOUS at 13:06

## 2021-10-14 RX ADMIN — PANTOPRAZOLE SODIUM 40 MILLIGRAM(S): 20 TABLET, DELAYED RELEASE ORAL at 13:06

## 2021-10-14 RX ADMIN — Medication 0.25 MILLIGRAM(S): at 05:35

## 2021-10-14 RX ADMIN — HEPARIN SODIUM 5000 UNIT(S): 5000 INJECTION INTRAVENOUS; SUBCUTANEOUS at 22:24

## 2021-10-14 RX ADMIN — CHLORHEXIDINE GLUCONATE 15 MILLILITER(S): 213 SOLUTION TOPICAL at 05:20

## 2021-10-14 RX ADMIN — Medication 1: at 00:21

## 2021-10-14 RX ADMIN — Medication 3 MILLILITER(S): at 02:57

## 2021-10-14 RX ADMIN — Medication 3 MILLILITER(S): at 21:07

## 2021-10-14 RX ADMIN — QUETIAPINE FUMARATE 25 MILLIGRAM(S): 200 TABLET, FILM COATED ORAL at 05:11

## 2021-10-14 RX ADMIN — DEXMEDETOMIDINE HYDROCHLORIDE IN 0.9% SODIUM CHLORIDE 0.16 MICROGRAM(S)/KG/HR: 4 INJECTION INTRAVENOUS at 13:06

## 2021-10-14 RX ADMIN — CHLORHEXIDINE GLUCONATE 1 APPLICATION(S): 213 SOLUTION TOPICAL at 05:43

## 2021-10-14 RX ADMIN — HEPARIN SODIUM 5000 UNIT(S): 5000 INJECTION INTRAVENOUS; SUBCUTANEOUS at 05:10

## 2021-10-14 RX ADMIN — MORPHINE SULFATE 1 MILLIGRAM(S): 50 CAPSULE, EXTENDED RELEASE ORAL at 18:19

## 2021-10-14 RX ADMIN — Medication 3 MILLILITER(S): at 17:13

## 2021-10-14 RX ADMIN — Medication 20 MILLIGRAM(S): at 05:07

## 2021-10-14 NOTE — PROGRESS NOTE ADULT - SUBJECTIVE AND OBJECTIVE BOX
Angel Hayden MD PGY-2  Internal Medicine Resident    INTERVAL HPI/OVERNIGHT EVENTS:    SUBJECTIVE: Patient seen and examined at bedside. Intubated, sedated, ROS cannot be obtained.       VITAL SIGNS:  ICU Vital Signs Last 24 Hrs  T(C): 37.5 (14 Oct 2021 04:00), Max: 37.8 (13 Oct 2021 16:00)  T(F): 99.5 (14 Oct 2021 04:00), Max: 100 (13 Oct 2021 16:00)  HR: 93 (14 Oct 2021 07:00) (72 - 115)  BP: 155/78 (14 Oct 2021 07:00) (108/64 - 211/99)  BP(mean): 107 (14 Oct 2021 07:00) (80 - 142)  ABP: --  ABP(mean): --  RR: 24 (14 Oct 2021 07:00) (20 - 26)  SpO2: 93% (14 Oct 2021 07:00) (90% - 100%)    Mode: AC/ CMV (Assist Control/ Continuous Mandatory Ventilation), RR (machine): 20, TV (machine): 500, FiO2: 21, PEEP: 5, ITime: 1, MAP: 10, PIP: 29  Plateau pressure:   P/F ratio:     10-13 @ 07:01  -  10-14 @ 07:00  --------------------------------------------------------  IN: 2184.5 mL / OUT: 2555 mL / NET: -370.5 mL      CAPILLARY BLOOD GLUCOSE      POCT Blood Glucose.: 129 mg/dL (14 Oct 2021 05:06)    ECG:    PHYSICAL EXAM:    General:   HEENT:   Neck:   Respiratory:   Cardiovascular:   Abdomen:   Extremities:  Neurological:    MEDICATIONS:  MEDICATIONS  (STANDING):  albuterol/ipratropium for Nebulization 3 milliLiter(s) Nebulizer every 4 hours  buDESOnide    Inhalation Suspension 0.25 milliGRAM(s) Nebulizer two times a day  chlorhexidine 0.12% Liquid 15 milliLiter(s) Oral Mucosa every 12 hours  chlorhexidine 4% Liquid 1 Application(s) Topical <User Schedule>  dextrose 40% Gel 15 Gram(s) Oral once  dextrose 5%. 1000 milliLiter(s) (50 mL/Hr) IV Continuous <Continuous>  dextrose 5%. 1000 milliLiter(s) (100 mL/Hr) IV Continuous <Continuous>  dextrose 50% Injectable 25 Gram(s) IV Push once  dextrose 50% Injectable 12.5 Gram(s) IV Push once  dextrose 50% Injectable 25 Gram(s) IV Push once  glucagon  Injectable 1 milliGRAM(s) IntraMuscular once  heparin   Injectable 5000 Unit(s) SubCutaneous every 8 hours  influenza   Vaccine 0.5 milliLiter(s) IntraMuscular once  insulin lispro (ADMELOG) corrective regimen sliding scale   SubCutaneous every 6 hours  ketamine Infusion. 0.25 mG/kG/Hr (1.59 mL/Hr) IV Continuous <Continuous>  methylPREDNISolone sodium succinate Injectable 20 milliGRAM(s) IV Push three times a day  multivitamin/minerals/iron Oral Solution (CENTRUM) 15 milliLiter(s) Oral daily  pantoprazole  Injectable 40 milliGRAM(s) IV Push daily  propofol Infusion 22.083 MICROgram(s)/kG/Min (8.44 mL/Hr) IV Continuous <Continuous>  QUEtiapine 25 milliGRAM(s) Oral two times a day  senna Syrup 10 milliLiter(s) Oral at bedtime    MEDICATIONS  (PRN):      ALLERGIES:  Allergies    No Known Allergies    Intolerances        LABS:                        8.3    13.24 )-----------( 246      ( 14 Oct 2021 00:20 )             29.5     10-14    150<H>  |  117<H>  |  52<H>  ----------------------------<  164<H>  5.0   |  20<L>  |  0.88    Ca    8.7      14 Oct 2021 00:20  Phos  3.2     10-14  Mg     2.6     10-14    TPro  6.1  /  Alb  3.4  /  TBili  0.1<L>  /  DBili  x   /  AST  169<H>  /  ALT  377<H>  /  AlkPhos  81  10-14    PT/INR - ( 14 Oct 2021 00:20 )   PT: 11.4 sec;   INR: 0.95 ratio         PTT - ( 14 Oct 2021 00:20 )  PTT:23.9 sec      RADIOLOGY & ADDITIONAL TESTS: Reviewed. Angel Hayden MD PGY-2  Internal Medicine Resident    INTERVAL HPI/OVERNIGHT EVENTS: PERRY ON, tachycardic HR 90s-100s, hypertensive SBP 90s-150s, otherwise VSS on mechanical ventilation.     SUBJECTIVE: Patient seen and examined at bedside. Intubated, sedated, ROS cannot be obtained.       VITAL SIGNS:  ICU Vital Signs Last 24 Hrs  T(C): 37.5 (14 Oct 2021 04:00), Max: 37.8 (13 Oct 2021 16:00)  T(F): 99.5 (14 Oct 2021 04:00), Max: 100 (13 Oct 2021 16:00)  HR: 93 (14 Oct 2021 07:00) (72 - 115)  BP: 155/78 (14 Oct 2021 07:00) (108/64 - 211/99)  BP(mean): 107 (14 Oct 2021 07:00) (80 - 142)  ABP: --  ABP(mean): --  RR: 24 (14 Oct 2021 07:00) (20 - 26)  SpO2: 93% (14 Oct 2021 07:00) (90% - 100%)    Mode: AC/ CMV (Assist Control/ Continuous Mandatory Ventilation), RR (machine): 20, TV (machine): 500, FiO2: 21, PEEP: 5, ITime: 1, MAP: 10, PIP: 29  Plateau pressure:   P/F ratio:     10-13 @ 07:01  -  10-14 @ 07:00  --------------------------------------------------------  IN: 2184.5 mL / OUT: 2555 mL / NET: -370.5 mL      CAPILLARY BLOOD GLUCOSE      POCT Blood Glucose.: 129 mg/dL (14 Oct 2021 05:06)    PHYSICAL EXAM:  GENERAL: Sedated, intubated on mechanical ventilation, not appearing to be in distress, well-groomed, well-developed  HEAD: Atraumatic, Normocephalic  EYES: Pupils round, reactive to light, R pupil slightly larger than L, conjunctiva and sclera clear  ENMT: MMM otherwise difficult to appreciate oropharynx iso ET tube  NECK: Supple  NERVOUS SYSTEM: Sedated on mechanical ventilation, pupils as above, otherwise difficult to appreciate neurologic exam   CHEST/LUNG: Mild crackles at bases, otherwise CTAB b/l no wheezes or rhonchi  HEART: Regular rate and rhythm; No murmurs, rubs, or gallops  ABDOMEN: Soft, Nontender, Nondistended; Bowel sounds present  EXTREMITIES: 2+ Peripheral Pulses, No edema, extremities warm    MEDICATIONS:  MEDICATIONS  (STANDING):  albuterol/ipratropium for Nebulization 3 milliLiter(s) Nebulizer every 4 hours  buDESOnide    Inhalation Suspension 0.25 milliGRAM(s) Nebulizer two times a day  chlorhexidine 0.12% Liquid 15 milliLiter(s) Oral Mucosa every 12 hours  chlorhexidine 4% Liquid 1 Application(s) Topical <User Schedule>  dextrose 40% Gel 15 Gram(s) Oral once  dextrose 5%. 1000 milliLiter(s) (50 mL/Hr) IV Continuous <Continuous>  dextrose 5%. 1000 milliLiter(s) (100 mL/Hr) IV Continuous <Continuous>  dextrose 50% Injectable 25 Gram(s) IV Push once  dextrose 50% Injectable 12.5 Gram(s) IV Push once  dextrose 50% Injectable 25 Gram(s) IV Push once  glucagon  Injectable 1 milliGRAM(s) IntraMuscular once  heparin   Injectable 5000 Unit(s) SubCutaneous every 8 hours  influenza   Vaccine 0.5 milliLiter(s) IntraMuscular once  insulin lispro (ADMELOG) corrective regimen sliding scale   SubCutaneous every 6 hours  ketamine Infusion. 0.25 mG/kG/Hr (1.59 mL/Hr) IV Continuous <Continuous>  methylPREDNISolone sodium succinate Injectable 20 milliGRAM(s) IV Push three times a day  multivitamin/minerals/iron Oral Solution (CENTRUM) 15 milliLiter(s) Oral daily  pantoprazole  Injectable 40 milliGRAM(s) IV Push daily  propofol Infusion 22.083 MICROgram(s)/kG/Min (8.44 mL/Hr) IV Continuous <Continuous>  QUEtiapine 25 milliGRAM(s) Oral two times a day  senna Syrup 10 milliLiter(s) Oral at bedtime    MEDICATIONS  (PRN):      ALLERGIES:  Allergies    No Known Allergies    Intolerances        LABS:                        8.3    13.24 )-----------( 246      ( 14 Oct 2021 00:20 )             29.5     10-14    150<H>  |  117<H>  |  52<H>  ----------------------------<  164<H>  5.0   |  20<L>  |  0.88    Ca    8.7      14 Oct 2021 00:20  Phos  3.2     10-14  Mg     2.6     10-14    TPro  6.1  /  Alb  3.4  /  TBili  0.1<L>  /  DBili  x   /  AST  169<H>  /  ALT  377<H>  /  AlkPhos  81  10-14    PT/INR - ( 14 Oct 2021 00:20 )   PT: 11.4 sec;   INR: 0.95 ratio         PTT - ( 14 Oct 2021 00:20 )  PTT:23.9 sec      RADIOLOGY & ADDITIONAL TESTS: Reviewed.

## 2021-10-14 NOTE — AIRWAY REMOVAL NOTE  ADULT & PEDS - ARTIFICAL AIRWAY REMOVAL COMMENTS
Written order for extubation verified. The patient was identified by full name and birth date compared to the identification band. Present during the procedure was MD Jay and TIM Castellon.

## 2021-10-14 NOTE — PROGRESS NOTE ADULT - ATTENDING COMMENTS
1. Acute hypoxemic respiratory failure due to COPD exacerbation  from  parainfluenza infection/pneumonia with superimposed bacterial pneumonia/infection. Continue steroids and bronchodilators. Continue current AC vent settings. No auto peep today. Taper of sedation and try weaning again. Poosible tiral of extubation to BiPAP again.  2. ATN  slowly improving.  3. Shock liver . LFTS slowly improving. 1. Acute hypoxemic respiratory failure due to COPD exacerbation  from  parainfluenza infection/pneumonia with superimposed bacterial pneumonia/infection. Continue steroids and bronchodilators.  Extubated last night to BiPAP. Dyspnea and anxiety relieved with morphine IVP. Will continue prn morphine but also start on Fentanyl patch 12 mcg daily. Titrate as needed.  2. ATN  slowly improving.  3. Shock liver . LFTS slowly improving. 1. Acute hypoxemic respiratory failure due to COPD exacerbation  from  parainfluenza infection/pneumonia with superimposed bacterial pneumonia/infection. Continue steroids and bronchodilators. Pt tolerating AC vent settings.  Taper off sedation and attempt extubation  to BiPAP. again. Seroquel on board. Haldol prn.  2. ATN  slowly improving.  3. Shock liver . LFTS slowly improving.

## 2021-10-14 NOTE — PROGRESS NOTE ADULT - ASSESSMENT
71M PMH COPD, Asthma, HTN, CAD s/p PCI (LCx, LAD, D1), admitted to the MICU for AHRF 2/2 viral PNA c/b likely superimposed bacterial PNA and COPD exacerbation, extubated 10/12 am and reintubated in evening     #Neuro  Sedated on prop   - continue to monitor per ICU protocol  - wean as able, c/w seroquel 25 BID iso anxiety in order to optimize when extubating    #Cardiovascular  off pressors; continue as needed for MAP >65    Respiratory  #AHRF 2/2 viral c/b superimposed bacterial PNA and COPD exacerbation  - failed CPAP trials 10/9/ 10/11. Successful CPAP trial 10/12, extubated, weaned to NC however required BiPAP again followed by reintubation   - will wean to CPAP  - c/w duonebs and solumedrol (10/8- ), inhaled budenoside (10/10- )  - completed 5 day abx course (CTX -> Levaquin -> CTX) 10/8-10/12  - MRSA negative, hold vanc  - legionella negative    #GI/Nutrition  No active issues  C/w feeds    #/Renal  OWEN, baseline Cr 0.8-0.9; worsening SCr 1.08 -> 1.35, now resolving SCr 0.88  - monitor UOP, Strict Is/Os  - monitor BMP, replete lytes prn    ID  #parainfluenza PNA c/b superimposed bacterial PNA  - completed 5 day abx course (CTX -> Levaquin -> CTX) 10/8-10/12  - procal 1.68  - MRSA, legionella negative as above  - RVP +parainfluenza, BCx 10/8 NGTD    #Endocrine  No active issues  - TSH low 0.07, obtain TFTs  - monitor FS while NPO and when starting TF    #Hematologic/Oncologic  Microcytic Anemia, stable  - monitor CBC, transfuse hgb > 7     #PPx  - DVT ppx w/ subq heparin 5000u TID    #Ethics  - clarify GOC w/ family 71M PMH COPD, Asthma, HTN, CAD s/p PCI (LCx, LAD, D1), admitted to the MICU for AHRF 2/2 viral PNA c/b likely superimposed bacterial PNA and COPD exacerbation, extubated 10/12 am and reintubated in evening    #Neuro  Sedated on prop   - continue to monitor per ICU protocol  - wean as able, c/w seroquel 25 BID iso anxiety in order to optimize when extubating, once OGT removed can give haldol    #Cardiovascular  off pressors; continue as needed for MAP >65    Respiratory  #AHRF 2/2 viral c/b superimposed bacterial PNA and COPD exacerbation  - failed CPAP trials 10/9/ 10/11. Successful CPAP trial 10/12, extubated, weaned to NC however required BiPAP again followed by reintubation   - will wean to CPAP  - c/w duonebs and solumedrol (10/8- ), inhaled budenoside (10/10- )  - completed 5 day abx course (CTX -> Levaquin -> CTX) 10/8-10/12  - MRSA negative, hold vanc  - legionella negative    #GI/Nutrition  No active issues  C/w feeds    #/Renal  OWEN, baseline Cr 0.8-0.9; worsening SCr 1.08 -> 1.35, now resolving SCr 0.88  - monitor UOP, Strict Is/Os  - monitor BMP, replete lytes prn    ID  #parainfluenza PNA c/b superimposed bacterial PNA  - completed 5 day abx course (CTX -> Levaquin -> CTX) 10/8-10/12  - procal 1.68  - MRSA, legionella negative as above  - RVP +parainfluenza, BCx 10/8 NGTD    #Endocrine  No active issues  - TSH low 0.07, obtain TFTs  - monitor FS while NPO and when starting TF    #Hematologic/Oncologic  Microcytic Anemia, stable  - monitor CBC, transfuse hgb > 7     #PPx  - DVT ppx w/ subq heparin 5000u TID    #Ethics  - clarify GOC w/ family 71M PMH COPD, Asthma, HTN, CAD s/p PCI (LCx, LAD, D1), admitted to the MICU for AHRF 2/2 viral PNA c/b likely superimposed bacterial PNA and COPD exacerbation, extubated 10/12 am and reintubated in evening    #Neuro  Sedated on prop   - continue to monitor per ICU protocol  - wean as able, c/w seroquel 25 BID iso anxiety in order to optimize when extubating, once OGT removed can give haldol    #Cardiovascular  off pressors; continue as needed for MAP >65    Respiratory  #AHRF 2/2 viral c/b superimposed bacterial PNA and COPD exacerbation  - failed CPAP trials 10/9/ 10/11. Successful CPAP trial 10/12, extubated, weaned to NC however required BiPAP again followed by reintubation   - will wean to CPAP and consider extubation  - c/w duonebs and solumedrol (10/8-10/16), inhaled budenoside (10/10- )  - completed 5 day abx course (CTX -> Levaquin -> CTX) 10/8-10/12  - MRSA negative, hold vanc  - legionella negative    #GI/Nutrition  No active issues  C/w feeds    #/Renal  OWEN, baseline Cr 0.8-0.9; worsening SCr 1.08 -> 1.35, now resolving SCr 0.88  - monitor UOP, Strict Is/Os  - monitor BMP, replete lytes prn    ID  #parainfluenza PNA c/b superimposed bacterial PNA  - completed 5 day abx course (CTX -> Levaquin -> CTX) 10/8-10/12  - procal 1.68  - MRSA, legionella negative as above  - RVP +parainfluenza, BCx 10/8 NGTD    #Endocrine  No active issues  - TSH low 0.07, TFTs received  - monitor FS while NPO and when starting TF    #Hematologic/Oncologic  Microcytic Anemia, stable  - monitor CBC, transfuse hgb > 7     #PPx  - DVT ppx w/ subq heparin 5000u TID    #Ethics  - clarify GOC w/ family

## 2021-10-15 LAB
ALBUMIN SERPL ELPH-MCNC: 3.6 G/DL — SIGNIFICANT CHANGE UP (ref 3.3–5)
ALP SERPL-CCNC: 72 U/L — SIGNIFICANT CHANGE UP (ref 40–120)
ALT FLD-CCNC: 289 U/L — HIGH (ref 10–45)
ANION GAP SERPL CALC-SCNC: 10 MMOL/L — SIGNIFICANT CHANGE UP (ref 5–17)
ANION GAP SERPL CALC-SCNC: 13 MMOL/L — SIGNIFICANT CHANGE UP (ref 5–17)
APTT BLD: 28.6 SEC — SIGNIFICANT CHANGE UP (ref 27.5–35.5)
AST SERPL-CCNC: 61 U/L — HIGH (ref 10–40)
BASE EXCESS BLDV CALC-SCNC: 4 MMOL/L — HIGH (ref -2–2)
BILIRUB SERPL-MCNC: 0.2 MG/DL — SIGNIFICANT CHANGE UP (ref 0.2–1.2)
BUN SERPL-MCNC: 36 MG/DL — HIGH (ref 7–23)
BUN SERPL-MCNC: 47 MG/DL — HIGH (ref 7–23)
CALCIUM SERPL-MCNC: 8.7 MG/DL — SIGNIFICANT CHANGE UP (ref 8.4–10.5)
CALCIUM SERPL-MCNC: 8.9 MG/DL — SIGNIFICANT CHANGE UP (ref 8.4–10.5)
CHLORIDE SERPL-SCNC: 113 MMOL/L — HIGH (ref 96–108)
CHLORIDE SERPL-SCNC: 119 MMOL/L — HIGH (ref 96–108)
CO2 BLDV-SCNC: 32 MMOL/L — HIGH (ref 22–26)
CO2 SERPL-SCNC: 24 MMOL/L — SIGNIFICANT CHANGE UP (ref 22–31)
CO2 SERPL-SCNC: 25 MMOL/L — SIGNIFICANT CHANGE UP (ref 22–31)
CREAT SERPL-MCNC: 0.73 MG/DL — SIGNIFICANT CHANGE UP (ref 0.5–1.3)
CREAT SERPL-MCNC: 0.88 MG/DL — SIGNIFICANT CHANGE UP (ref 0.5–1.3)
GAS PNL BLDA: SIGNIFICANT CHANGE UP
GAS PNL BLDV: SIGNIFICANT CHANGE UP
GLUCOSE BLDC GLUCOMTR-MCNC: 130 MG/DL — HIGH (ref 70–99)
GLUCOSE SERPL-MCNC: 121 MG/DL — HIGH (ref 70–99)
GLUCOSE SERPL-MCNC: 136 MG/DL — HIGH (ref 70–99)
HCO3 BLDV-SCNC: 31 MMOL/L — HIGH (ref 22–29)
HCT VFR BLD CALC: 28.1 % — LOW (ref 39–50)
HGB BLD-MCNC: 8.1 G/DL — LOW (ref 13–17)
INR BLD: 1.06 RATIO — SIGNIFICANT CHANGE UP (ref 0.88–1.16)
MAGNESIUM SERPL-MCNC: 2.9 MG/DL — HIGH (ref 1.6–2.6)
MCHC RBC-ENTMCNC: 21 PG — LOW (ref 27–34)
MCHC RBC-ENTMCNC: 28.8 GM/DL — LOW (ref 32–36)
MCV RBC AUTO: 73 FL — LOW (ref 80–100)
NRBC # BLD: 0 /100 WBCS — SIGNIFICANT CHANGE UP (ref 0–0)
PCO2 BLDV: 53 MMHG — SIGNIFICANT CHANGE UP (ref 42–55)
PH BLDV: 7.37 — SIGNIFICANT CHANGE UP (ref 7.32–7.43)
PHOSPHATE SERPL-MCNC: 4.1 MG/DL — SIGNIFICANT CHANGE UP (ref 2.5–4.5)
PLATELET # BLD AUTO: 264 K/UL — SIGNIFICANT CHANGE UP (ref 150–400)
PO2 BLDV: 47 MMHG — HIGH (ref 25–45)
POTASSIUM SERPL-MCNC: 4.3 MMOL/L — SIGNIFICANT CHANGE UP (ref 3.5–5.3)
POTASSIUM SERPL-MCNC: 4.5 MMOL/L — SIGNIFICANT CHANGE UP (ref 3.5–5.3)
POTASSIUM SERPL-SCNC: 4.3 MMOL/L — SIGNIFICANT CHANGE UP (ref 3.5–5.3)
POTASSIUM SERPL-SCNC: 4.5 MMOL/L — SIGNIFICANT CHANGE UP (ref 3.5–5.3)
PROT SERPL-MCNC: 6.4 G/DL — SIGNIFICANT CHANGE UP (ref 6–8.3)
PROTHROM AB SERPL-ACNC: 12.7 SEC — SIGNIFICANT CHANGE UP (ref 10.6–13.6)
RBC # BLD: 3.85 M/UL — LOW (ref 4.2–5.8)
RBC # FLD: 23.9 % — HIGH (ref 10.3–14.5)
SAO2 % BLDV: 75.9 % — SIGNIFICANT CHANGE UP (ref 67–88)
SODIUM SERPL-SCNC: 150 MMOL/L — HIGH (ref 135–145)
SODIUM SERPL-SCNC: 154 MMOL/L — HIGH (ref 135–145)
WBC # BLD: 10.96 K/UL — HIGH (ref 3.8–10.5)
WBC # FLD AUTO: 10.96 K/UL — HIGH (ref 3.8–10.5)

## 2021-10-15 PROCEDURE — 99291 CRITICAL CARE FIRST HOUR: CPT

## 2021-10-15 RX ORDER — HYDRALAZINE HCL 50 MG
5 TABLET ORAL ONCE
Refills: 0 | Status: COMPLETED | OUTPATIENT
Start: 2021-10-15 | End: 2021-10-15

## 2021-10-15 RX ORDER — FENTANYL CITRATE 50 UG/ML
1 INJECTION INTRAVENOUS
Refills: 0 | Status: DISCONTINUED | OUTPATIENT
Start: 2021-10-15 | End: 2021-10-15

## 2021-10-15 RX ORDER — NICARDIPINE HYDROCHLORIDE 30 MG/1
5 CAPSULE, EXTENDED RELEASE ORAL
Qty: 40 | Refills: 0 | Status: DISCONTINUED | OUTPATIENT
Start: 2021-10-15 | End: 2021-10-16

## 2021-10-15 RX ORDER — METOPROLOL TARTRATE 50 MG
5 TABLET ORAL ONCE
Refills: 0 | Status: COMPLETED | OUTPATIENT
Start: 2021-10-15 | End: 2021-10-15

## 2021-10-15 RX ORDER — SODIUM CHLORIDE 9 MG/ML
1000 INJECTION, SOLUTION INTRAVENOUS
Refills: 0 | Status: COMPLETED | OUTPATIENT
Start: 2021-10-15 | End: 2021-10-15

## 2021-10-15 RX ORDER — MORPHINE SULFATE 50 MG/1
1.5 CAPSULE, EXTENDED RELEASE ORAL ONCE
Refills: 0 | Status: DISCONTINUED | OUTPATIENT
Start: 2021-10-15 | End: 2021-10-15

## 2021-10-15 RX ORDER — OLANZAPINE 15 MG/1
5 TABLET, FILM COATED ORAL EVERY 6 HOURS
Refills: 0 | Status: DISCONTINUED | OUTPATIENT
Start: 2021-10-15 | End: 2021-10-27

## 2021-10-15 RX ORDER — NICARDIPINE HYDROCHLORIDE 30 MG/1
5 CAPSULE, EXTENDED RELEASE ORAL
Qty: 40 | Refills: 0 | Status: DISCONTINUED | OUTPATIENT
Start: 2021-10-15 | End: 2021-10-15

## 2021-10-15 RX ORDER — HYDRALAZINE HCL 50 MG
5 TABLET ORAL EVERY 6 HOURS
Refills: 0 | Status: DISCONTINUED | OUTPATIENT
Start: 2021-10-15 | End: 2021-10-16

## 2021-10-15 RX ORDER — LABETALOL HCL 100 MG
5 TABLET ORAL ONCE
Refills: 0 | Status: COMPLETED | OUTPATIENT
Start: 2021-10-15 | End: 2021-10-15

## 2021-10-15 RX ORDER — FENTANYL CITRATE 50 UG/ML
1 INJECTION INTRAVENOUS
Refills: 0 | Status: DISCONTINUED | OUTPATIENT
Start: 2021-10-15 | End: 2021-10-21

## 2021-10-15 RX ORDER — ACETAMINOPHEN 500 MG
1000 TABLET ORAL ONCE
Refills: 0 | Status: COMPLETED | OUTPATIENT
Start: 2021-10-15 | End: 2021-10-15

## 2021-10-15 RX ORDER — METOPROLOL TARTRATE 50 MG
1.25 TABLET ORAL EVERY 6 HOURS
Refills: 0 | Status: DISCONTINUED | OUTPATIENT
Start: 2021-10-15 | End: 2021-10-15

## 2021-10-15 RX ADMIN — MORPHINE SULFATE 1.5 MILLIGRAM(S): 50 CAPSULE, EXTENDED RELEASE ORAL at 17:20

## 2021-10-15 RX ADMIN — MORPHINE SULFATE 1.5 MILLIGRAM(S): 50 CAPSULE, EXTENDED RELEASE ORAL at 05:30

## 2021-10-15 RX ADMIN — NICARDIPINE HYDROCHLORIDE 25 MG/HR: 30 CAPSULE, EXTENDED RELEASE ORAL at 21:41

## 2021-10-15 RX ADMIN — HEPARIN SODIUM 5000 UNIT(S): 5000 INJECTION INTRAVENOUS; SUBCUTANEOUS at 05:22

## 2021-10-15 RX ADMIN — NICARDIPINE HYDROCHLORIDE 25 MG/HR: 30 CAPSULE, EXTENDED RELEASE ORAL at 18:23

## 2021-10-15 RX ADMIN — Medication 400 MILLIGRAM(S): at 18:29

## 2021-10-15 RX ADMIN — Medication 1.25 MILLIGRAM(S): at 09:00

## 2021-10-15 RX ADMIN — Medication 3 MILLILITER(S): at 21:52

## 2021-10-15 RX ADMIN — Medication 3 MILLILITER(S): at 09:22

## 2021-10-15 RX ADMIN — SODIUM CHLORIDE 75 MILLILITER(S): 9 INJECTION, SOLUTION INTRAVENOUS at 03:05

## 2021-10-15 RX ADMIN — HEPARIN SODIUM 5000 UNIT(S): 5000 INJECTION INTRAVENOUS; SUBCUTANEOUS at 13:48

## 2021-10-15 RX ADMIN — MORPHINE SULFATE 1.5 MILLIGRAM(S): 50 CAPSULE, EXTENDED RELEASE ORAL at 23:59

## 2021-10-15 RX ADMIN — Medication 5 MILLIGRAM(S): at 17:30

## 2021-10-15 RX ADMIN — Medication 400 MILLIGRAM(S): at 12:49

## 2021-10-15 RX ADMIN — MORPHINE SULFATE 1.5 MILLIGRAM(S): 50 CAPSULE, EXTENDED RELEASE ORAL at 05:00

## 2021-10-15 RX ADMIN — DEXMEDETOMIDINE HYDROCHLORIDE IN 0.9% SODIUM CHLORIDE 0.16 MICROGRAM(S)/KG/HR: 4 INJECTION INTRAVENOUS at 23:41

## 2021-10-15 RX ADMIN — FENTANYL CITRATE 1 PATCH: 50 INJECTION INTRAVENOUS at 19:19

## 2021-10-15 RX ADMIN — Medication 3 MILLILITER(S): at 17:18

## 2021-10-15 RX ADMIN — Medication 0.25 MILLIGRAM(S): at 05:15

## 2021-10-15 RX ADMIN — Medication 0.25 MILLIGRAM(S): at 17:17

## 2021-10-15 RX ADMIN — MORPHINE SULFATE 1.5 MILLIGRAM(S): 50 CAPSULE, EXTENDED RELEASE ORAL at 19:47

## 2021-10-15 RX ADMIN — MORPHINE SULFATE 1.5 MILLIGRAM(S): 50 CAPSULE, EXTENDED RELEASE ORAL at 10:00

## 2021-10-15 RX ADMIN — SODIUM CHLORIDE 75 MILLILITER(S): 9 INJECTION, SOLUTION INTRAVENOUS at 21:40

## 2021-10-15 RX ADMIN — MORPHINE SULFATE 1.5 MILLIGRAM(S): 50 CAPSULE, EXTENDED RELEASE ORAL at 02:00

## 2021-10-15 RX ADMIN — Medication 5 MILLIGRAM(S): at 17:59

## 2021-10-15 RX ADMIN — CHLORHEXIDINE GLUCONATE 1 APPLICATION(S): 213 SOLUTION TOPICAL at 04:00

## 2021-10-15 RX ADMIN — Medication 3 MILLILITER(S): at 05:15

## 2021-10-15 RX ADMIN — Medication 3 MILLILITER(S): at 00:03

## 2021-10-15 RX ADMIN — Medication 5 MILLIGRAM(S): at 04:00

## 2021-10-15 RX ADMIN — HEPARIN SODIUM 5000 UNIT(S): 5000 INJECTION INTRAVENOUS; SUBCUTANEOUS at 21:40

## 2021-10-15 RX ADMIN — Medication 3 MILLILITER(S): at 10:26

## 2021-10-15 RX ADMIN — Medication 5 MILLIGRAM(S): at 10:37

## 2021-10-15 RX ADMIN — Medication 1000 MILLIGRAM(S): at 13:35

## 2021-10-15 RX ADMIN — Medication 5 MILLIGRAM(S): at 06:13

## 2021-10-15 RX ADMIN — Medication 20 MILLIGRAM(S): at 05:21

## 2021-10-15 RX ADMIN — MORPHINE SULFATE 1.5 MILLIGRAM(S): 50 CAPSULE, EXTENDED RELEASE ORAL at 00:59

## 2021-10-15 RX ADMIN — MORPHINE SULFATE 1.5 MILLIGRAM(S): 50 CAPSULE, EXTENDED RELEASE ORAL at 20:33

## 2021-10-15 RX ADMIN — PANTOPRAZOLE SODIUM 40 MILLIGRAM(S): 20 TABLET, DELAYED RELEASE ORAL at 11:44

## 2021-10-15 RX ADMIN — FENTANYL CITRATE 1 PATCH: 50 INJECTION INTRAVENOUS at 11:44

## 2021-10-15 RX ADMIN — MORPHINE SULFATE 1.5 MILLIGRAM(S): 50 CAPSULE, EXTENDED RELEASE ORAL at 14:14

## 2021-10-15 NOTE — PROGRESS NOTE ADULT - SUBJECTIVE AND OBJECTIVE BOX
Angel Hayden MD PGY-2  Internal Medicine Resident    INTERVAL HPI/OVERNIGHT EVENTS/SUBJECTIVE: Extubated ON to BiPAP. Tachycardic HR 60s-100s, hypertensive BP 130s-200s/60s-100s, tachypneic RR 18-42, SpO2%  on supplemental O2.       VITAL SIGNS:  ICU Vital Signs Last 24 Hrs  T(C): 36.5 (15 Oct 2021 04:00), Max: 37.6 (14 Oct 2021 16:00)  T(F): 97.7 (15 Oct 2021 04:00), Max: 99.7 (14 Oct 2021 16:00)  HR: 104 (15 Oct 2021 06:00) (61 - 108)  BP: 203/90 (15 Oct 2021 06:00) (83/50 - 203/90)  BP(mean): 129 (15 Oct 2021 06:00) (61 - 140)  ABP: --  ABP(mean): --  RR: 31 (15 Oct 2021 06:00) (18 - 42)  SpO2: 100% (15 Oct 2021 06:00) (92% - 100%)    Mode: CPAP with PS, FiO2: 30, PEEP: 5, PS: 5, MAP: 8  Plateau pressure:   P/F ratio:     10-14 @ 07:01  -  10-15 @ 07:00  --------------------------------------------------------  IN: 773.8 mL / OUT: 825 mL / NET: -51.2 mL    10-15 @ 07:01  -  10-15 @ 07:25  --------------------------------------------------------  IN: 0 mL / OUT: 375 mL / NET: -375 mL      CAPILLARY BLOOD GLUCOSE      POCT Blood Glucose.: 130 mg/dL (15 Oct 2021 05:19)    ECG:    PHYSICAL EXAM:    General:   HEENT:   Neck:   Respiratory:   Cardiovascular:   Abdomen:   Extremities:  Neurological:    MEDICATIONS:  MEDICATIONS  (STANDING):  albuterol/ipratropium for Nebulization 3 milliLiter(s) Nebulizer every 4 hours  buDESOnide    Inhalation Suspension 0.25 milliGRAM(s) Nebulizer two times a day  chlorhexidine 4% Liquid 1 Application(s) Topical <User Schedule>  dexMEDEtomidine Infusion 0.01 MICROgram(s)/kG/Hr (0.16 mL/Hr) IV Continuous <Continuous>  dextrose 40% Gel 15 Gram(s) Oral once  dextrose 5%. 1000 milliLiter(s) (50 mL/Hr) IV Continuous <Continuous>  dextrose 5%. 1000 milliLiter(s) (100 mL/Hr) IV Continuous <Continuous>  dextrose 5%. 1000 milliLiter(s) (75 mL/Hr) IV Continuous <Continuous>  dextrose 50% Injectable 25 Gram(s) IV Push once  dextrose 50% Injectable 12.5 Gram(s) IV Push once  dextrose 50% Injectable 25 Gram(s) IV Push once  glucagon  Injectable 1 milliGRAM(s) IntraMuscular once  heparin   Injectable 5000 Unit(s) SubCutaneous every 8 hours  influenza   Vaccine 0.5 milliLiter(s) IntraMuscular once  insulin lispro (ADMELOG) corrective regimen sliding scale   SubCutaneous every 6 hours  methylPREDNISolone sodium succinate Injectable 20 milliGRAM(s) IV Push daily  multivitamin/minerals/iron Oral Solution (CENTRUM) 15 milliLiter(s) Oral daily  pantoprazole  Injectable 40 milliGRAM(s) IV Push daily  senna Syrup 10 milliLiter(s) Oral at bedtime    MEDICATIONS  (PRN):  morphine  - Injectable 1.5 milliGRAM(s) IV Push every 4 hours PRN dyspnea      ALLERGIES:  Allergies    No Known Allergies    Intolerances        LABS:                        8.1    10.96 )-----------( 264      ( 15 Oct 2021 00:49 )             28.1     10-15    154<H>  |  119<H>  |  47<H>  ----------------------------<  136<H>  4.5   |  25  |  0.88    Ca    8.7      15 Oct 2021 00:49  Phos  4.1     10-15  Mg     2.9     10-15    TPro  6.4  /  Alb  3.6  /  TBili  0.2  /  DBili  x   /  AST  61<H>  /  ALT  289<H>  /  AlkPhos  72  10-15    PT/INR - ( 15 Oct 2021 00:49 )   PT: 12.7 sec;   INR: 1.06 ratio         PTT - ( 15 Oct 2021 00:49 )  PTT:28.6 sec      RADIOLOGY & ADDITIONAL TESTS: Reviewed. Angel Hayden MD PGY-2  Internal Medicine Resident    INTERVAL HPI/OVERNIGHT EVENTS/SUBJECTIVE: Extubated ON to BiPAP. Tachycardic HR 60s-100s, hypertensive BP 130s-200s/60s-100s, tachypneic RR 18-42, SpO2%  on supplemental O2. This am pt feeling generally well, denies complaints however expresses that he wants to drink/eat.       VITAL SIGNS:  ICU Vital Signs Last 24 Hrs  T(C): 36.5 (15 Oct 2021 04:00), Max: 37.6 (14 Oct 2021 16:00)  T(F): 97.7 (15 Oct 2021 04:00), Max: 99.7 (14 Oct 2021 16:00)  HR: 104 (15 Oct 2021 06:00) (61 - 108)  BP: 203/90 (15 Oct 2021 06:00) (83/50 - 203/90)  BP(mean): 129 (15 Oct 2021 06:00) (61 - 140)  ABP: --  ABP(mean): --  RR: 31 (15 Oct 2021 06:00) (18 - 42)  SpO2: 100% (15 Oct 2021 06:00) (92% - 100%)    Mode: CPAP with PS, FiO2: 30, PEEP: 5, PS: 5, MAP: 8  Plateau pressure:   P/F ratio:     10-14 @ 07:01  -  10-15 @ 07:00  --------------------------------------------------------  IN: 773.8 mL / OUT: 825 mL / NET: -51.2 mL    10-15 @ 07:01  -  10-15 @ 07:25  --------------------------------------------------------  IN: 0 mL / OUT: 375 mL / NET: -375 mL      CAPILLARY BLOOD GLUCOSE      POCT Blood Glucose.: 130 mg/dL (15 Oct 2021 05:19)    ECG:    PHYSICAL EXAM:  GENERAL: Elderly appearing man, not in distress, well-groomed, well-developed  HEAD: Atraumatic, Normocephalic  EYES: PERRL, conjunctiva and sclera clear  ENMT: MM dry on BiPAP  NECK: Supple  NERVOUS SYSTEM: A&Ox3, pupils as above, moving all extremities  CHEST/LUNG: CTAB no w/r/r  HEART: Regular rate and rhythm; No murmurs, rubs, or gallops  ABDOMEN: Soft, Nontender, Nondistended; Bowel sounds present  EXTREMITIES: 2+ Peripheral Pulses, No edema, extremities warm      MEDICATIONS:  MEDICATIONS  (STANDING):  albuterol/ipratropium for Nebulization 3 milliLiter(s) Nebulizer every 4 hours  buDESOnide    Inhalation Suspension 0.25 milliGRAM(s) Nebulizer two times a day  chlorhexidine 4% Liquid 1 Application(s) Topical <User Schedule>  dexMEDEtomidine Infusion 0.01 MICROgram(s)/kG/Hr (0.16 mL/Hr) IV Continuous <Continuous>  dextrose 40% Gel 15 Gram(s) Oral once  dextrose 5%. 1000 milliLiter(s) (50 mL/Hr) IV Continuous <Continuous>  dextrose 5%. 1000 milliLiter(s) (100 mL/Hr) IV Continuous <Continuous>  dextrose 5%. 1000 milliLiter(s) (75 mL/Hr) IV Continuous <Continuous>  dextrose 50% Injectable 25 Gram(s) IV Push once  dextrose 50% Injectable 12.5 Gram(s) IV Push once  dextrose 50% Injectable 25 Gram(s) IV Push once  glucagon  Injectable 1 milliGRAM(s) IntraMuscular once  heparin   Injectable 5000 Unit(s) SubCutaneous every 8 hours  influenza   Vaccine 0.5 milliLiter(s) IntraMuscular once  insulin lispro (ADMELOG) corrective regimen sliding scale   SubCutaneous every 6 hours  methylPREDNISolone sodium succinate Injectable 20 milliGRAM(s) IV Push daily  multivitamin/minerals/iron Oral Solution (CENTRUM) 15 milliLiter(s) Oral daily  pantoprazole  Injectable 40 milliGRAM(s) IV Push daily  senna Syrup 10 milliLiter(s) Oral at bedtime    MEDICATIONS  (PRN):  morphine  - Injectable 1.5 milliGRAM(s) IV Push every 4 hours PRN dyspnea      ALLERGIES:  Allergies    No Known Allergies    Intolerances        LABS:                        8.1    10.96 )-----------( 264      ( 15 Oct 2021 00:49 )             28.1     10-15    154<H>  |  119<H>  |  47<H>  ----------------------------<  136<H>  4.5   |  25  |  0.88    Ca    8.7      15 Oct 2021 00:49  Phos  4.1     10-15  Mg     2.9     10-15    TPro  6.4  /  Alb  3.6  /  TBili  0.2  /  DBili  x   /  AST  61<H>  /  ALT  289<H>  /  AlkPhos  72  10-15    PT/INR - ( 15 Oct 2021 00:49 )   PT: 12.7 sec;   INR: 1.06 ratio         PTT - ( 15 Oct 2021 00:49 )  PTT:28.6 sec      RADIOLOGY & ADDITIONAL TESTS: Reviewed.

## 2021-10-15 NOTE — PROGRESS NOTE ADULT - ATTENDING COMMENTS
1. Acute hypoxemic respiratory failure due to COPD exacerbation  from  parainfluenza infection/pneumonia with superimposed bacterial pneumonia/infection. Continue steroids and bronchodilators.  Extubated last night to BiPAP. Dyspnea and anxiety relieved with morphine IVP. Will continue prn morphine but also start on Fentanyl patch 12 mcg daily. Titrate as needed.  2. ATN  slowly improving.  3. Shock liver . LFTS slowly improving.

## 2021-10-15 NOTE — PROGRESS NOTE ADULT - ASSESSMENT
-- IN PROGRESS --    71M PMH COPD, Asthma, HTN, CAD s/p PCI (LCx, LAD, D1), admitted to the MICU for AHRF 2/2 viral PNA c/b likely superimposed bacterial PNA and COPD exacerbation, extubated 10/12 am and reintubated in evening    #Neuro  Sedated on prop   - continue to monitor per ICU protocol  - wean as able, c/w seroquel 25 BID iso anxiety in order to optimize when extubating, once OGT removed can give haldol    #Cardiovascular  off pressors; continue as needed for MAP >65    Respiratory  #AHRF 2/2 viral c/b superimposed bacterial PNA and COPD exacerbation  - failed CPAP trials 10/9/ 10/11. Successful CPAP trial 10/12, extubated, weaned to NC however required BiPAP again followed by reintubation   - will wean to CPAP and consider extubation  - c/w duonebs and solumedrol (10/8-10/16), inhaled budenoside (10/10- )  - completed 5 day abx course (CTX -> Levaquin -> CTX) 10/8-10/12  - MRSA negative, hold vanc  - legionella negative    #GI/Nutrition  No active issues  C/w feeds    #/Renal  OWEN, baseline Cr 0.8-0.9; worsening SCr 1.08 -> 1.35, now resolving SCr 0.88  - monitor UOP, Strict Is/Os  - monitor BMP, replete lytes prn    ID  #parainfluenza PNA c/b superimposed bacterial PNA  - completed 5 day abx course (CTX -> Levaquin -> CTX) 10/8-10/12  - procal 1.68  - MRSA, legionella negative as above  - RVP +parainfluenza, BCx 10/8 NGTD    #Endocrine  No active issues  - TSH low 0.07, TFTs received  - monitor FS while NPO and when starting TF    #Hematologic/Oncologic  Microcytic Anemia, stable  - monitor CBC, transfuse hgb > 7     #PPx  - DVT ppx w/ subq heparin 5000u TID    #Ethics  - clarify GOC w/ family 71M PMH COPD, Asthma, HTN, CAD s/p PCI (LCx, LAD, D1), admit to MICU intubated 10/8 2/2 AHRF iso viral PNA parainfluenza+, c/f superimposed bacterial PNA, and COPD exacerbation, extubated 10/14 to BiPAP    #Neuro  Sedated on prop   - continue to monitor per ICU protocol  - wean as able, c/w seroquel 25 BID iso anxiety in order to optimize when extubating, once OGT removed can give haldol    #Cardiovascular  off pressors; continue as needed for MAP >65    Respiratory  #AHRF 2/2 viral c/b superimposed bacterial PNA and COPD exacerbation  - failed CPAP trials 10/9/ 10/11. Successful CPAP trial 10/12, extubated, weaned to NC however required BiPAP again followed by reintubation   - will wean to CPAP and consider extubation  - c/w duonebs and solumedrol (10/8-10/16), inhaled budenoside (10/10- )  - completed 5 day abx course (CTX -> Levaquin -> CTX) 10/8-10/12  - MRSA negative, hold vanc  - legionella negative    #GI/Nutrition  No active issues  C/w feeds    #/Renal  OWEN, baseline Cr 0.8-0.9; worsening SCr 1.08 -> 1.35, now resolving SCr 0.88  - monitor UOP, Strict Is/Os  - monitor BMP, replete lytes prn    ID  #parainfluenza PNA c/b superimposed bacterial PNA  - completed 5 day abx course (CTX -> Levaquin -> CTX) 10/8-10/12  - procal 1.68  - MRSA, legionella negative as above  - RVP +parainfluenza, BCx 10/8 NGTD    #Endocrine  No active issues  - TSH low 0.07, TFTs received  - monitor FS while NPO and when starting TF    #Hematologic/Oncologic  Microcytic Anemia, stable  - monitor CBC, transfuse hgb > 7     #PPx  - DVT ppx w/ subq heparin 5000u TID    #Ethics  - clarify GOC w/ family 71M PMH COPD, Asthma, HTN, CAD s/p PCI (LCx, LAD, D1), admit to MICU intubated 10/8 2/2 AHRF iso viral PNA parainfluenza+, c/f superimposed bacterial PNA, and COPD exacerbation, extubated 10/14 to BiPAP    #Neuro  Extubated, off sedation  - fentanyl 12mcg patch, morphine 1.5mg q4h prn iso dyspnea     #Cardiovascular  HTN 150s-180s  - avoiding BB iso COPD, will give IV hydralazine 5mg q6h prn SBP > 180  - MAP goal > 65  off pressors; continue as needed for MAP >65    Respiratory  #AHRF 2/2 viral PNA (parainfluenza+) c/b possible superimposed bacterial PNA and COPD exacerbation  - intubated 10/8, extubated and reintubated 10/12, extubated 10/14 to BiPAP   - completed 5 day abx course (CTX -> Levaquin -> CTX) 10/8-10/12  - c/w duonebs, inhaled budesonideand  - solumedrol 10/8-10/16  - MRSA negative  - legionella negative    #GI/Nutrition  No active issues  NPO on BiPAP    #/Renal  OWEN resolved, SCr at bl   - monitor UOP, Strict Is/Os  - monitor BMP, replete lytes prn    ID  AHRF 2/2 viral PNA (parainfluenza+) c/b possible superimposed bacterial PNA  - completed 5 day abx course (CTX -> Levaquin -> CTX) 10/8-10/12  - MRSA, legionella negative as above  - RVP +parainfluenza, BCx 10/8 NGTD    #Endocrine  No active issues  - TSH low 0.07, TFTs received  - monitor FS q6h while NPO    #Hematologic/Oncologic  Microcytic Anemia, stable  - monitor CBC, transfuse hgb > 7     #PPx  - DVT ppx w/ subq heparin 5000u TID    #Ethics  - clarify GOC w/ family

## 2021-10-16 LAB
ALBUMIN SERPL ELPH-MCNC: 3.7 G/DL — SIGNIFICANT CHANGE UP (ref 3.3–5)
ALP SERPL-CCNC: 75 U/L — SIGNIFICANT CHANGE UP (ref 40–120)
ALT FLD-CCNC: 218 U/L — HIGH (ref 10–45)
ANION GAP SERPL CALC-SCNC: 15 MMOL/L — SIGNIFICANT CHANGE UP (ref 5–17)
ANION GAP SERPL CALC-SCNC: 15 MMOL/L — SIGNIFICANT CHANGE UP (ref 5–17)
APTT BLD: 25 SEC — LOW (ref 27.5–35.5)
AST SERPL-CCNC: 44 U/L — HIGH (ref 10–40)
BASOPHILS # BLD AUTO: 0.01 K/UL — SIGNIFICANT CHANGE UP (ref 0–0.2)
BASOPHILS NFR BLD AUTO: 0.1 % — SIGNIFICANT CHANGE UP (ref 0–2)
BILIRUB SERPL-MCNC: 0.3 MG/DL — SIGNIFICANT CHANGE UP (ref 0.2–1.2)
BUN SERPL-MCNC: 31 MG/DL — HIGH (ref 7–23)
BUN SERPL-MCNC: 36 MG/DL — HIGH (ref 7–23)
CALCIUM SERPL-MCNC: 8.9 MG/DL — SIGNIFICANT CHANGE UP (ref 8.4–10.5)
CALCIUM SERPL-MCNC: 9 MG/DL — SIGNIFICANT CHANGE UP (ref 8.4–10.5)
CHLORIDE SERPL-SCNC: 111 MMOL/L — HIGH (ref 96–108)
CHLORIDE SERPL-SCNC: 113 MMOL/L — HIGH (ref 96–108)
CO2 SERPL-SCNC: 23 MMOL/L — SIGNIFICANT CHANGE UP (ref 22–31)
CO2 SERPL-SCNC: 23 MMOL/L — SIGNIFICANT CHANGE UP (ref 22–31)
CREAT SERPL-MCNC: 0.69 MG/DL — SIGNIFICANT CHANGE UP (ref 0.5–1.3)
CREAT SERPL-MCNC: 0.91 MG/DL — SIGNIFICANT CHANGE UP (ref 0.5–1.3)
EOSINOPHIL # BLD AUTO: 0.04 K/UL — SIGNIFICANT CHANGE UP (ref 0–0.5)
EOSINOPHIL NFR BLD AUTO: 0.3 % — SIGNIFICANT CHANGE UP (ref 0–6)
GLUCOSE BLDC GLUCOMTR-MCNC: 126 MG/DL — HIGH (ref 70–99)
GLUCOSE BLDC GLUCOMTR-MCNC: 129 MG/DL — HIGH (ref 70–99)
GLUCOSE SERPL-MCNC: 125 MG/DL — HIGH (ref 70–99)
GLUCOSE SERPL-MCNC: 134 MG/DL — HIGH (ref 70–99)
HCT VFR BLD CALC: 30.1 % — LOW (ref 39–50)
HGB BLD-MCNC: 8.6 G/DL — LOW (ref 13–17)
IMM GRANULOCYTES NFR BLD AUTO: 0.8 % — SIGNIFICANT CHANGE UP (ref 0–1.5)
INR BLD: 1.14 RATIO — SIGNIFICANT CHANGE UP (ref 0.88–1.16)
LYMPHOCYTES # BLD AUTO: 1.72 K/UL — SIGNIFICANT CHANGE UP (ref 1–3.3)
LYMPHOCYTES # BLD AUTO: 11.1 % — LOW (ref 13–44)
MAGNESIUM SERPL-MCNC: 2.5 MG/DL — SIGNIFICANT CHANGE UP (ref 1.6–2.6)
MAGNESIUM SERPL-MCNC: 2.5 MG/DL — SIGNIFICANT CHANGE UP (ref 1.6–2.6)
MCHC RBC-ENTMCNC: 20.9 PG — LOW (ref 27–34)
MCHC RBC-ENTMCNC: 28.6 GM/DL — LOW (ref 32–36)
MCV RBC AUTO: 73.1 FL — LOW (ref 80–100)
MONOCYTES # BLD AUTO: 1.25 K/UL — HIGH (ref 0–0.9)
MONOCYTES NFR BLD AUTO: 8.1 % — SIGNIFICANT CHANGE UP (ref 2–14)
NEUTROPHILS # BLD AUTO: 12.31 K/UL — HIGH (ref 1.8–7.4)
NEUTROPHILS NFR BLD AUTO: 79.6 % — HIGH (ref 43–77)
NRBC # BLD: 0 /100 WBCS — SIGNIFICANT CHANGE UP (ref 0–0)
PHOSPHATE SERPL-MCNC: 3.6 MG/DL — SIGNIFICANT CHANGE UP (ref 2.5–4.5)
PHOSPHATE SERPL-MCNC: 4.7 MG/DL — HIGH (ref 2.5–4.5)
PLATELET # BLD AUTO: 346 K/UL — SIGNIFICANT CHANGE UP (ref 150–400)
POTASSIUM SERPL-MCNC: 4.2 MMOL/L — SIGNIFICANT CHANGE UP (ref 3.5–5.3)
POTASSIUM SERPL-MCNC: 4.8 MMOL/L — SIGNIFICANT CHANGE UP (ref 3.5–5.3)
POTASSIUM SERPL-SCNC: 4.2 MMOL/L — SIGNIFICANT CHANGE UP (ref 3.5–5.3)
POTASSIUM SERPL-SCNC: 4.8 MMOL/L — SIGNIFICANT CHANGE UP (ref 3.5–5.3)
PROT SERPL-MCNC: 7 G/DL — SIGNIFICANT CHANGE UP (ref 6–8.3)
PROTHROM AB SERPL-ACNC: 13.6 SEC — SIGNIFICANT CHANGE UP (ref 10.6–13.6)
RBC # BLD: 4.12 M/UL — LOW (ref 4.2–5.8)
RBC # FLD: 23.9 % — HIGH (ref 10.3–14.5)
SODIUM SERPL-SCNC: 149 MMOL/L — HIGH (ref 135–145)
SODIUM SERPL-SCNC: 151 MMOL/L — HIGH (ref 135–145)
T3 SERPL-MCNC: 65 NG/DL — SIGNIFICANT CHANGE UP
T4 FREE SERPL-MCNC: 1.22 NG/DL — SIGNIFICANT CHANGE UP
WBC # BLD: 15.46 K/UL — HIGH (ref 3.8–10.5)
WBC # FLD AUTO: 15.46 K/UL — HIGH (ref 3.8–10.5)

## 2021-10-16 PROCEDURE — 93306 TTE W/DOPPLER COMPLETE: CPT | Mod: 26

## 2021-10-16 PROCEDURE — 99291 CRITICAL CARE FIRST HOUR: CPT

## 2021-10-16 RX ORDER — HYDRALAZINE HCL 50 MG
10 TABLET ORAL EVERY 6 HOURS
Refills: 0 | Status: DISCONTINUED | OUTPATIENT
Start: 2021-10-16 | End: 2021-10-29

## 2021-10-16 RX ORDER — LANOLIN ALCOHOL/MO/W.PET/CERES
5 CREAM (GRAM) TOPICAL AT BEDTIME
Refills: 0 | Status: DISCONTINUED | OUTPATIENT
Start: 2021-10-16 | End: 2021-10-29

## 2021-10-16 RX ORDER — METOPROLOL TARTRATE 50 MG
2.5 TABLET ORAL ONCE
Refills: 0 | Status: COMPLETED | OUTPATIENT
Start: 2021-10-16 | End: 2021-10-16

## 2021-10-16 RX ORDER — AMLODIPINE BESYLATE 2.5 MG/1
10 TABLET ORAL DAILY
Refills: 0 | Status: DISCONTINUED | OUTPATIENT
Start: 2021-10-17 | End: 2021-10-29

## 2021-10-16 RX ORDER — METOPROLOL TARTRATE 50 MG
12.5 TABLET ORAL EVERY 12 HOURS
Refills: 0 | Status: DISCONTINUED | OUTPATIENT
Start: 2021-10-16 | End: 2021-10-29

## 2021-10-16 RX ORDER — SODIUM CHLORIDE 9 MG/ML
1000 INJECTION, SOLUTION INTRAVENOUS
Refills: 0 | Status: COMPLETED | OUTPATIENT
Start: 2021-10-16 | End: 2021-10-16

## 2021-10-16 RX ADMIN — Medication 0.25 MILLIGRAM(S): at 06:02

## 2021-10-16 RX ADMIN — Medication 20 MILLIGRAM(S): at 05:02

## 2021-10-16 RX ADMIN — MORPHINE SULFATE 1.5 MILLIGRAM(S): 50 CAPSULE, EXTENDED RELEASE ORAL at 04:15

## 2021-10-16 RX ADMIN — Medication 3 MILLILITER(S): at 06:02

## 2021-10-16 RX ADMIN — HEPARIN SODIUM 5000 UNIT(S): 5000 INJECTION INTRAVENOUS; SUBCUTANEOUS at 21:18

## 2021-10-16 RX ADMIN — Medication 3 MILLILITER(S): at 18:13

## 2021-10-16 RX ADMIN — Medication 3 MILLILITER(S): at 21:51

## 2021-10-16 RX ADMIN — Medication 3 MILLILITER(S): at 13:02

## 2021-10-16 RX ADMIN — Medication 3 MILLILITER(S): at 09:14

## 2021-10-16 RX ADMIN — HEPARIN SODIUM 5000 UNIT(S): 5000 INJECTION INTRAVENOUS; SUBCUTANEOUS at 05:03

## 2021-10-16 RX ADMIN — Medication 10 MILLIGRAM(S): at 16:07

## 2021-10-16 RX ADMIN — FENTANYL CITRATE 1 PATCH: 50 INJECTION INTRAVENOUS at 07:46

## 2021-10-16 RX ADMIN — Medication 3 MILLILITER(S): at 00:13

## 2021-10-16 RX ADMIN — Medication 2.5 MILLIGRAM(S): at 21:18

## 2021-10-16 RX ADMIN — Medication 5 MILLIGRAM(S): at 21:19

## 2021-10-16 RX ADMIN — MORPHINE SULFATE 1.5 MILLIGRAM(S): 50 CAPSULE, EXTENDED RELEASE ORAL at 22:11

## 2021-10-16 RX ADMIN — CHLORHEXIDINE GLUCONATE 1 APPLICATION(S): 213 SOLUTION TOPICAL at 05:02

## 2021-10-16 RX ADMIN — Medication 0.25 MILLIGRAM(S): at 18:14

## 2021-10-16 RX ADMIN — SODIUM CHLORIDE 75 MILLILITER(S): 9 INJECTION, SOLUTION INTRAVENOUS at 21:17

## 2021-10-16 RX ADMIN — MORPHINE SULFATE 1.5 MILLIGRAM(S): 50 CAPSULE, EXTENDED RELEASE ORAL at 09:03

## 2021-10-16 RX ADMIN — PANTOPRAZOLE SODIUM 40 MILLIGRAM(S): 20 TABLET, DELAYED RELEASE ORAL at 13:29

## 2021-10-16 RX ADMIN — Medication 10 MILLIGRAM(S): at 04:38

## 2021-10-16 RX ADMIN — HEPARIN SODIUM 5000 UNIT(S): 5000 INJECTION INTRAVENOUS; SUBCUTANEOUS at 13:29

## 2021-10-16 RX ADMIN — MORPHINE SULFATE 1.5 MILLIGRAM(S): 50 CAPSULE, EXTENDED RELEASE ORAL at 22:14

## 2021-10-16 RX ADMIN — FENTANYL CITRATE 1 PATCH: 50 INJECTION INTRAVENOUS at 19:43

## 2021-10-16 RX ADMIN — MORPHINE SULFATE 1.5 MILLIGRAM(S): 50 CAPSULE, EXTENDED RELEASE ORAL at 04:06

## 2021-10-16 RX ADMIN — MORPHINE SULFATE 1.5 MILLIGRAM(S): 50 CAPSULE, EXTENDED RELEASE ORAL at 17:51

## 2021-10-16 NOTE — PROGRESS NOTE ADULT - ASSESSMENT
71M PMH COPD, Asthma, HTN, CAD s/p PCI (LCx, LAD, D1), admit to MICU intubated 10/8 2/2 AHRF iso viral PNA parainfluenza+, c/f superimposed bacterial PNA, and COPD exacerbation, extubated 10/14 to BiPAP    #Neuro  Extubated, off sedation  - fentanyl 12mcg patch, morphine 1.5mg q4h prn iso dyspnea     #Cardiovascular  HTN 150s-180s  - avoiding BB iso COPD, will give IV hydralazine 5mg q6h prn SBP > 180  - MAP goal > 65  off pressors; continue as needed for MAP >65    Respiratory  #AHRF 2/2 viral PNA (parainfluenza+) c/b possible superimposed bacterial PNA and COPD exacerbation  - intubated 10/8, extubated and reintubated 10/12, extubated 10/14 to BiPAP   - completed 5 day abx course (CTX -> Levaquin -> CTX) 10/8-10/12  - c/w duonebs, inhaled budesonideand  - solumedrol 10/8-10/16  - MRSA negative  - legionella negative    #GI/Nutrition  No active issues  NPO on BiPAP    #/Renal  OWEN resolved, SCr at bl   - monitor UOP, Strict Is/Os  - monitor BMP, replete lytes prn    ID  AHRF 2/2 viral PNA (parainfluenza+) c/b possible superimposed bacterial PNA  - completed 5 day abx course (CTX -> Levaquin -> CTX) 10/8-10/12  - MRSA, legionella negative as above  - RVP +parainfluenza, BCx 10/8 NGTD    #Endocrine  No active issues  - TSH low 0.07, TFTs received  - monitor FS q6h while NPO    #Hematologic/Oncologic  Microcytic Anemia, stable  - monitor CBC, transfuse hgb > 7     #PPx  - DVT ppx w/ subq heparin 5000u TID    #Ethics  - clarify GOC w/ family   71M PMH COPD, Asthma, HTN, CAD s/p PCI (LCx, LAD, D1), admit to MICU intubated 10/8 2/2 AHRF iso viral PNA parainfluenza+, c/f superimposed bacterial PNA, and COPD exacerbation, extubated 10/14 to BiPAP    #Neuro  Extubated, off sedation  - fentanyl 12mcg patch, morphine 1.5mg q4h prn iso dyspnea, anxiety      #Cardiovascular  HTN 150s-180s  - avoiding BB iso COPD, will give IV hydralazine 5mg q6h prn SBP > 180  - MAP goal > 65  off pressors; continue as needed for MAP >65    Respiratory  #AHRF 2/2 viral PNA (parainfluenza+) c/b possible superimposed bacterial PNA and COPD exacerbation  - intubated 10/8, extubated and reintubated 10/12, extubated 10/14 to BiPAP, 10/16 will trial off BiPAP on face tent   - completed 5 day abx course (CTX -> Levaquin -> CTX) 10/8-10/12  - c/w duonebs, inhaled budesonide  - s/p solumedrol 10/8-10/16  - MRSA negative  - legionella negative    #GI/Nutrition  No active issues  NPO on BiPAP    #/Renal  OWEN resolved, SCr at bl   - monitor UOP, Strict Is/Os  - monitor BMP, replete lytes prn    ID  AHRF 2/2 viral PNA (parainfluenza+) c/b possible superimposed bacterial PNA  - completed 5 day abx course (CTX -> Levaquin -> CTX) 10/8-10/12  - febrile 10/15, cultures sent, monitoring off antibiotics   - MRSA, legionella negative as above  - RVP +parainfluenza, BCx 10/8 NGTD    #Endocrine  No active issues  - TSH low 0.07, TFTs received  - monitor FS q6h while NPO    #Hematologic/Oncologic  Microcytic Anemia, stable  - monitor CBC, transfuse hgb > 7     #PPx  - DVT ppx w/ subq heparin 5000u TID    #Ethics  - clarify GOC w/ family

## 2021-10-16 NOTE — PROGRESS NOTE ADULT - SUBJECTIVE AND OBJECTIVE BOX
Shital Blas, PGY-2    Patient is a 71y old  Male who presents with a chief complaint of AHRF (15 Oct 2021 07:24)      INTERVAL HPI/OVERNIGHT EVENTS: Overnight, patient with 6-7 runs of VT, non sustained on cardizem gtt, cardizem gtt d/c, started on hydralazine prn. Patient continued on D5 for Na of 151.     SUBJECTIVE: Patient seen and examined at bedside.       VITAL SIGNS:  ICU Vital Signs Last 24 Hrs  T(C): 37.1 (16 Oct 2021 04:00), Max: 38.4 (15 Oct 2021 16:00)  T(F): 98.8 (16 Oct 2021 04:00), Max: 101.1 (15 Oct 2021 16:00)  HR: 98 (16 Oct 2021 07:00) (87 - 125)  BP: 173/82 (16 Oct 2021 07:00) (114/70 - 213/110)  BP(mean): 114 (16 Oct 2021 07:00) (81 - 152)  ABP: --  ABP(mean): --  RR: 24 (16 Oct 2021 07:00) (17 - 30)  SpO2: 99% (16 Oct 2021 07:00) (91% - 100%)      Plateau pressure:   P/F ratio:     10-15 @ 07:01  -  10-16 @ 07:00  --------------------------------------------------------  IN: 2121.7 mL / OUT: 1050 mL / NET: 1071.7 mL      CAPILLARY BLOOD GLUCOSE      POCT Blood Glucose.: 126 mg/dL (16 Oct 2021 04:58)    ECG:    PHYSICAL EXAM:        MEDICATIONS:  MEDICATIONS  (STANDING):  albuterol/ipratropium for Nebulization 3 milliLiter(s) Nebulizer every 4 hours  buDESOnide    Inhalation Suspension 0.25 milliGRAM(s) Nebulizer two times a day  chlorhexidine 4% Liquid 1 Application(s) Topical <User Schedule>  dexMEDEtomidine Infusion 0.01 MICROgram(s)/kG/Hr (0.16 mL/Hr) IV Continuous <Continuous>  dextrose 40% Gel 15 Gram(s) Oral once  dextrose 5%. 1000 milliLiter(s) (100 mL/Hr) IV Continuous <Continuous>  dextrose 5%. 1000 milliLiter(s) (75 mL/Hr) IV Continuous <Continuous>  dextrose 50% Injectable 25 Gram(s) IV Push once  dextrose 50% Injectable 12.5 Gram(s) IV Push once  dextrose 50% Injectable 25 Gram(s) IV Push once  fentaNYL   Patch  12 MICROgram(s)/Hr 1 Patch Transdermal every 72 hours  glucagon  Injectable 1 milliGRAM(s) IntraMuscular once  heparin   Injectable 5000 Unit(s) SubCutaneous every 8 hours  influenza   Vaccine 0.5 milliLiter(s) IntraMuscular once  insulin lispro (ADMELOG) corrective regimen sliding scale   SubCutaneous every 6 hours  multivitamin/minerals/iron Oral Solution (CENTRUM) 15 milliLiter(s) Oral daily  pantoprazole  Injectable 40 milliGRAM(s) IV Push daily  senna Syrup 10 milliLiter(s) Oral at bedtime    MEDICATIONS  (PRN):  hydrALAZINE Injectable 10 milliGRAM(s) IV Push every 6 hours PRN SBP>180  morphine  - Injectable 1.5 milliGRAM(s) IV Push every 4 hours PRN dyspnea  OLANZapine Injectable 5 milliGRAM(s) IntraMuscular every 6 hours PRN agitation      ALLERGIES:  Allergies    No Known Allergies    Intolerances        LABS:                        8.6    15.46 )-----------( 346      ( 16 Oct 2021 00:08 )             30.1     10-16    151<H>  |  113<H>  |  36<H>  ----------------------------<  125<H>  4.2   |  23  |  0.91    Ca    9.0      16 Oct 2021 00:08  Phos  3.6     10-16  Mg     2.5     10-16    TPro  7.0  /  Alb  3.7  /  TBili  0.3  /  DBili  x   /  AST  44<H>  /  ALT  218<H>  /  AlkPhos  75  10-16    PT/INR - ( 16 Oct 2021 00:08 )   PT: 13.6 sec;   INR: 1.14 ratio         PTT - ( 16 Oct 2021 00:08 )  PTT:25.0 sec      RADIOLOGY & ADDITIONAL TESTS: Reviewed. Shital Blas, PGY-2    Patient is a 71y old  Male who presents with a chief complaint of AHRF (15 Oct 2021 07:24)      INTERVAL HPI/OVERNIGHT EVENTS: Overnight, patient with 6-7 runs of VT, non sustained on cardizem gtt, cardizem gtt d/c, started on hydralazine prn. Patient continued on D5 for Na of 151.     SUBJECTIVE: Patient seen and examined at bedside. Patient on BiPAP, fatigued appearing, follows commands.       VITAL SIGNS:  ICU Vital Signs Last 24 Hrs  T(C): 37.1 (16 Oct 2021 04:00), Max: 38.4 (15 Oct 2021 16:00)  T(F): 98.8 (16 Oct 2021 04:00), Max: 101.1 (15 Oct 2021 16:00)  HR: 98 (16 Oct 2021 07:00) (87 - 125)  BP: 173/82 (16 Oct 2021 07:00) (114/70 - 213/110)  BP(mean): 114 (16 Oct 2021 07:00) (81 - 152)  ABP: --  ABP(mean): --  RR: 24 (16 Oct 2021 07:00) (17 - 30)  SpO2: 99% (16 Oct 2021 07:00) (91% - 100%)      Plateau pressure:   P/F ratio:     10-15 @ 07:01  -  10-16 @ 07:00  --------------------------------------------------------  IN: 2121.7 mL / OUT: 1050 mL / NET: 1071.7 mL      CAPILLARY BLOOD GLUCOSE      POCT Blood Glucose.: 126 mg/dL (16 Oct 2021 04:58)    ECG:    PHYSICAL EXAM:  GENERAL: Elderly appearing man, not in distress, well-groomed, well-developed  HEAD: Atraumatic, Normocephalic  EYES: PERRL, conjunctiva and sclera clear  ENMT: MM dry on BiPAP  NECK: Supple  NERVOUS SYSTEM: A&Ox3, pupils as above, moving all extremities  CHEST/LUNG: CTAB no w/r/r  HEART: Regular rate and rhythm; No murmurs, rubs, or gallops  ABDOMEN: Soft, Nontender, Nondistended; Bowel sounds present  EXTREMITIES: 2+ Peripheral Pulses, No edema, extremities warm      MEDICATIONS:  MEDICATIONS  (STANDING):  albuterol/ipratropium for Nebulization 3 milliLiter(s) Nebulizer every 4 hours  buDESOnide    Inhalation Suspension 0.25 milliGRAM(s) Nebulizer two times a day  chlorhexidine 4% Liquid 1 Application(s) Topical <User Schedule>  dexMEDEtomidine Infusion 0.01 MICROgram(s)/kG/Hr (0.16 mL/Hr) IV Continuous <Continuous>  dextrose 40% Gel 15 Gram(s) Oral once  dextrose 5%. 1000 milliLiter(s) (100 mL/Hr) IV Continuous <Continuous>  dextrose 5%. 1000 milliLiter(s) (75 mL/Hr) IV Continuous <Continuous>  dextrose 50% Injectable 25 Gram(s) IV Push once  dextrose 50% Injectable 12.5 Gram(s) IV Push once  dextrose 50% Injectable 25 Gram(s) IV Push once  fentaNYL   Patch  12 MICROgram(s)/Hr 1 Patch Transdermal every 72 hours  glucagon  Injectable 1 milliGRAM(s) IntraMuscular once  heparin   Injectable 5000 Unit(s) SubCutaneous every 8 hours  influenza   Vaccine 0.5 milliLiter(s) IntraMuscular once  insulin lispro (ADMELOG) corrective regimen sliding scale   SubCutaneous every 6 hours  multivitamin/minerals/iron Oral Solution (CENTRUM) 15 milliLiter(s) Oral daily  pantoprazole  Injectable 40 milliGRAM(s) IV Push daily  senna Syrup 10 milliLiter(s) Oral at bedtime    MEDICATIONS  (PRN):  hydrALAZINE Injectable 10 milliGRAM(s) IV Push every 6 hours PRN SBP>180  morphine  - Injectable 1.5 milliGRAM(s) IV Push every 4 hours PRN dyspnea  OLANZapine Injectable 5 milliGRAM(s) IntraMuscular every 6 hours PRN agitation      ALLERGIES:  Allergies    No Known Allergies    Intolerances        LABS:                        8.6    15.46 )-----------( 346      ( 16 Oct 2021 00:08 )             30.1     10-16    151<H>  |  113<H>  |  36<H>  ----------------------------<  125<H>  4.2   |  23  |  0.91    Ca    9.0      16 Oct 2021 00:08  Phos  3.6     10-16  Mg     2.5     10-16    TPro  7.0  /  Alb  3.7  /  TBili  0.3  /  DBili  x   /  AST  44<H>  /  ALT  218<H>  /  AlkPhos  75  10-16    PT/INR - ( 16 Oct 2021 00:08 )   PT: 13.6 sec;   INR: 1.14 ratio         PTT - ( 16 Oct 2021 00:08 )  PTT:25.0 sec      RADIOLOGY & ADDITIONAL TESTS: Reviewed. Shital Bainaudrey, PGY-2    Patient is a 71y old  Male who presents with a chief complaint of AHRF (15 Oct 2021 07:24)      INTERVAL HPI/OVERNIGHT EVENTS: Overnight, patient with 6-7 runs of VT, non sustained on cardizem gtt, cardizem gtt d/c, started on hydralazine prn. Patient continued on D5 for Na of 151. Yesterday, patient febrile, cultures send, monitored off antibiotics.     SUBJECTIVE: Patient seen and examined at bedside. Patient on BiPAP, fatigued appearing, follows commands.       VITAL SIGNS:  ICU Vital Signs Last 24 Hrs  T(C): 37.1 (16 Oct 2021 04:00), Max: 38.4 (15 Oct 2021 16:00)  T(F): 98.8 (16 Oct 2021 04:00), Max: 101.1 (15 Oct 2021 16:00)  HR: 98 (16 Oct 2021 07:00) (87 - 125)  BP: 173/82 (16 Oct 2021 07:00) (114/70 - 213/110)  BP(mean): 114 (16 Oct 2021 07:00) (81 - 152)  ABP: --  ABP(mean): --  RR: 24 (16 Oct 2021 07:00) (17 - 30)  SpO2: 99% (16 Oct 2021 07:00) (91% - 100%)      Plateau pressure:   P/F ratio:     10-15 @ 07:01  -  10-16 @ 07:00  --------------------------------------------------------  IN: 2121.7 mL / OUT: 1050 mL / NET: 1071.7 mL      CAPILLARY BLOOD GLUCOSE      POCT Blood Glucose.: 126 mg/dL (16 Oct 2021 04:58)    ECG:    PHYSICAL EXAM:  GENERAL: Elderly appearing man, not in distress, on BiPAP  HEAD: Atraumatic, Normocephalic  EYES: PERRL, conjunctiva and sclera clear  ENMT: MM dry on BiPAP  NECK: Supple  NERVOUS SYSTEM: A&Ox3, pupils as above, moving all extremities  CHEST/LUNG: CTAB no w/r/r  HEART: Regular rate and rhythm; No murmurs, rubs, or gallops  ABDOMEN: Soft, Nontender, Nondistended; Bowel sounds present  EXTREMITIES: 2+ Peripheral Pulses, No edema, extremities warm      MEDICATIONS:  MEDICATIONS  (STANDING):  albuterol/ipratropium for Nebulization 3 milliLiter(s) Nebulizer every 4 hours  buDESOnide    Inhalation Suspension 0.25 milliGRAM(s) Nebulizer two times a day  chlorhexidine 4% Liquid 1 Application(s) Topical <User Schedule>  dexMEDEtomidine Infusion 0.01 MICROgram(s)/kG/Hr (0.16 mL/Hr) IV Continuous <Continuous>  dextrose 40% Gel 15 Gram(s) Oral once  dextrose 5%. 1000 milliLiter(s) (100 mL/Hr) IV Continuous <Continuous>  dextrose 5%. 1000 milliLiter(s) (75 mL/Hr) IV Continuous <Continuous>  dextrose 50% Injectable 25 Gram(s) IV Push once  dextrose 50% Injectable 12.5 Gram(s) IV Push once  dextrose 50% Injectable 25 Gram(s) IV Push once  fentaNYL   Patch  12 MICROgram(s)/Hr 1 Patch Transdermal every 72 hours  glucagon  Injectable 1 milliGRAM(s) IntraMuscular once  heparin   Injectable 5000 Unit(s) SubCutaneous every 8 hours  influenza   Vaccine 0.5 milliLiter(s) IntraMuscular once  insulin lispro (ADMELOG) corrective regimen sliding scale   SubCutaneous every 6 hours  multivitamin/minerals/iron Oral Solution (CENTRUM) 15 milliLiter(s) Oral daily  pantoprazole  Injectable 40 milliGRAM(s) IV Push daily  senna Syrup 10 milliLiter(s) Oral at bedtime    MEDICATIONS  (PRN):  hydrALAZINE Injectable 10 milliGRAM(s) IV Push every 6 hours PRN SBP>180  morphine  - Injectable 1.5 milliGRAM(s) IV Push every 4 hours PRN dyspnea  OLANZapine Injectable 5 milliGRAM(s) IntraMuscular every 6 hours PRN agitation      ALLERGIES:  Allergies    No Known Allergies    Intolerances        LABS:                        8.6    15.46 )-----------( 346      ( 16 Oct 2021 00:08 )             30.1     10-16    151<H>  |  113<H>  |  36<H>  ----------------------------<  125<H>  4.2   |  23  |  0.91    Ca    9.0      16 Oct 2021 00:08  Phos  3.6     10-16  Mg     2.5     10-16    TPro  7.0  /  Alb  3.7  /  TBili  0.3  /  DBili  x   /  AST  44<H>  /  ALT  218<H>  /  AlkPhos  75  10-16    PT/INR - ( 16 Oct 2021 00:08 )   PT: 13.6 sec;   INR: 1.14 ratio         PTT - ( 16 Oct 2021 00:08 )  PTT:25.0 sec      RADIOLOGY & ADDITIONAL TESTS: Reviewed.

## 2021-10-16 NOTE — PROGRESS NOTE ADULT - ATTENDING COMMENTS
Critically ill patient with frequent bedside visits. Patient seen and examined on rounds and plan of care discussed with team.     Patient has acute hypoxemic respiratory failure due to COPD exacerbation  from  parainfluenza infection/pneumonia with superimposed bacterial pneumonia/infection. Continue inhaled steroids and bronchodilators.  Extubated 2 days ago BiPAP. Unable to wean off, likely combination of work of breathing and anxiety. Dyspnea and anxiety relieved with morphine IVP. Will continue prn morphine but also start on Fentanyl patch 12 mcg daily. Will try again to give break off bipap if tolerated. Titrate as needed. ATN is slowly improving/ Shock liver . LFTS slowly improving.

## 2021-10-17 LAB
ALBUMIN SERPL ELPH-MCNC: 3.6 G/DL — SIGNIFICANT CHANGE UP (ref 3.3–5)
ALP SERPL-CCNC: 64 U/L — SIGNIFICANT CHANGE UP (ref 40–120)
ALT FLD-CCNC: 148 U/L — HIGH (ref 10–45)
ANION GAP SERPL CALC-SCNC: 12 MMOL/L — SIGNIFICANT CHANGE UP (ref 5–17)
APTT BLD: 25.8 SEC — LOW (ref 27.5–35.5)
AST SERPL-CCNC: 26 U/L — SIGNIFICANT CHANGE UP (ref 10–40)
BILIRUB SERPL-MCNC: 0.3 MG/DL — SIGNIFICANT CHANGE UP (ref 0.2–1.2)
BUN SERPL-MCNC: 34 MG/DL — HIGH (ref 7–23)
CALCIUM SERPL-MCNC: 8.7 MG/DL — SIGNIFICANT CHANGE UP (ref 8.4–10.5)
CHLORIDE SERPL-SCNC: 110 MMOL/L — HIGH (ref 96–108)
CO2 SERPL-SCNC: 25 MMOL/L — SIGNIFICANT CHANGE UP (ref 22–31)
CREAT SERPL-MCNC: 0.83 MG/DL — SIGNIFICANT CHANGE UP (ref 0.5–1.3)
CULTURE RESULTS: SIGNIFICANT CHANGE UP
CULTURE RESULTS: SIGNIFICANT CHANGE UP
GAS PNL BLDA: SIGNIFICANT CHANGE UP
GLUCOSE BLDC GLUCOMTR-MCNC: 109 MG/DL — HIGH (ref 70–99)
GLUCOSE BLDC GLUCOMTR-MCNC: 123 MG/DL — HIGH (ref 70–99)
GLUCOSE BLDC GLUCOMTR-MCNC: 139 MG/DL — HIGH (ref 70–99)
GLUCOSE SERPL-MCNC: 124 MG/DL — HIGH (ref 70–99)
HCT VFR BLD CALC: 29.5 % — LOW (ref 39–50)
HGB BLD-MCNC: 8.5 G/DL — LOW (ref 13–17)
INR BLD: 1.11 RATIO — SIGNIFICANT CHANGE UP (ref 0.88–1.16)
MAGNESIUM SERPL-MCNC: 2.6 MG/DL — SIGNIFICANT CHANGE UP (ref 1.6–2.6)
MCHC RBC-ENTMCNC: 21.2 PG — LOW (ref 27–34)
MCHC RBC-ENTMCNC: 28.8 GM/DL — LOW (ref 32–36)
MCV RBC AUTO: 73.6 FL — LOW (ref 80–100)
NRBC # BLD: 0 /100 WBCS — SIGNIFICANT CHANGE UP (ref 0–0)
PHOSPHATE SERPL-MCNC: 4.5 MG/DL — SIGNIFICANT CHANGE UP (ref 2.5–4.5)
PLATELET # BLD AUTO: 292 K/UL — SIGNIFICANT CHANGE UP (ref 150–400)
POTASSIUM SERPL-MCNC: 4.6 MMOL/L — SIGNIFICANT CHANGE UP (ref 3.5–5.3)
POTASSIUM SERPL-SCNC: 4.6 MMOL/L — SIGNIFICANT CHANGE UP (ref 3.5–5.3)
PROT SERPL-MCNC: 6.6 G/DL — SIGNIFICANT CHANGE UP (ref 6–8.3)
PROTHROM AB SERPL-ACNC: 13.3 SEC — SIGNIFICANT CHANGE UP (ref 10.6–13.6)
RBC # BLD: 4.01 M/UL — LOW (ref 4.2–5.8)
RBC # FLD: 23.7 % — HIGH (ref 10.3–14.5)
SODIUM SERPL-SCNC: 147 MMOL/L — HIGH (ref 135–145)
SPECIMEN SOURCE: SIGNIFICANT CHANGE UP
SPECIMEN SOURCE: SIGNIFICANT CHANGE UP
WBC # BLD: 13.93 K/UL — HIGH (ref 3.8–10.5)
WBC # FLD AUTO: 13.93 K/UL — HIGH (ref 3.8–10.5)

## 2021-10-17 PROCEDURE — 99291 CRITICAL CARE FIRST HOUR: CPT

## 2021-10-17 RX ORDER — POLYETHYLENE GLYCOL 3350 17 G/17G
17 POWDER, FOR SOLUTION ORAL DAILY
Refills: 0 | Status: DISCONTINUED | OUTPATIENT
Start: 2021-10-17 | End: 2021-10-29

## 2021-10-17 RX ORDER — BACITRACIN ZINC 500 UNIT/G
1 OINTMENT IN PACKET (EA) TOPICAL DAILY
Refills: 0 | Status: DISCONTINUED | OUTPATIENT
Start: 2021-10-17 | End: 2021-10-29

## 2021-10-17 RX ORDER — SODIUM CHLORIDE 9 MG/ML
1000 INJECTION, SOLUTION INTRAVENOUS
Refills: 0 | Status: DISCONTINUED | OUTPATIENT
Start: 2021-10-17 | End: 2021-10-18

## 2021-10-17 RX ADMIN — DEXMEDETOMIDINE HYDROCHLORIDE IN 0.9% SODIUM CHLORIDE 0.16 MICROGRAM(S)/KG/HR: 4 INJECTION INTRAVENOUS at 05:31

## 2021-10-17 RX ADMIN — PANTOPRAZOLE SODIUM 40 MILLIGRAM(S): 20 TABLET, DELAYED RELEASE ORAL at 11:45

## 2021-10-17 RX ADMIN — Medication 15 MILLILITER(S): at 12:42

## 2021-10-17 RX ADMIN — Medication 3 MILLILITER(S): at 21:24

## 2021-10-17 RX ADMIN — POLYETHYLENE GLYCOL 3350 17 GRAM(S): 17 POWDER, FOR SOLUTION ORAL at 12:43

## 2021-10-17 RX ADMIN — MORPHINE SULFATE 1.5 MILLIGRAM(S): 50 CAPSULE, EXTENDED RELEASE ORAL at 21:42

## 2021-10-17 RX ADMIN — Medication 10 MILLIGRAM(S): at 21:14

## 2021-10-17 RX ADMIN — Medication 0.25 MILLIGRAM(S): at 05:17

## 2021-10-17 RX ADMIN — MORPHINE SULFATE 1.5 MILLIGRAM(S): 50 CAPSULE, EXTENDED RELEASE ORAL at 05:31

## 2021-10-17 RX ADMIN — FENTANYL CITRATE 1 PATCH: 50 INJECTION INTRAVENOUS at 06:44

## 2021-10-17 RX ADMIN — DEXMEDETOMIDINE HYDROCHLORIDE IN 0.9% SODIUM CHLORIDE 0.16 MICROGRAM(S)/KG/HR: 4 INJECTION INTRAVENOUS at 15:26

## 2021-10-17 RX ADMIN — HEPARIN SODIUM 5000 UNIT(S): 5000 INJECTION INTRAVENOUS; SUBCUTANEOUS at 15:26

## 2021-10-17 RX ADMIN — MORPHINE SULFATE 1.5 MILLIGRAM(S): 50 CAPSULE, EXTENDED RELEASE ORAL at 21:41

## 2021-10-17 RX ADMIN — Medication 3 MILLILITER(S): at 09:47

## 2021-10-17 RX ADMIN — HEPARIN SODIUM 5000 UNIT(S): 5000 INJECTION INTRAVENOUS; SUBCUTANEOUS at 05:30

## 2021-10-17 RX ADMIN — MORPHINE SULFATE 1.5 MILLIGRAM(S): 50 CAPSULE, EXTENDED RELEASE ORAL at 06:18

## 2021-10-17 RX ADMIN — Medication 12.5 MILLIGRAM(S): at 05:30

## 2021-10-17 RX ADMIN — Medication 3 MILLILITER(S): at 14:52

## 2021-10-17 RX ADMIN — HEPARIN SODIUM 5000 UNIT(S): 5000 INJECTION INTRAVENOUS; SUBCUTANEOUS at 21:15

## 2021-10-17 RX ADMIN — AMLODIPINE BESYLATE 10 MILLIGRAM(S): 2.5 TABLET ORAL at 05:30

## 2021-10-17 RX ADMIN — Medication 0.25 MILLIGRAM(S): at 17:21

## 2021-10-17 RX ADMIN — CHLORHEXIDINE GLUCONATE 1 APPLICATION(S): 213 SOLUTION TOPICAL at 05:50

## 2021-10-17 RX ADMIN — Medication 12.5 MILLIGRAM(S): at 17:49

## 2021-10-17 RX ADMIN — Medication 3 MILLILITER(S): at 17:21

## 2021-10-17 RX ADMIN — Medication 3 MILLILITER(S): at 05:17

## 2021-10-17 RX ADMIN — Medication 1 APPLICATION(S): at 17:49

## 2021-10-17 RX ADMIN — FENTANYL CITRATE 1 PATCH: 50 INJECTION INTRAVENOUS at 18:42

## 2021-10-17 NOTE — PROGRESS NOTE ADULT - SUBJECTIVE AND OBJECTIVE BOX
Angel Hayden MD PGY-2  Internal Medicine Resident    INTERVAL HPI/OVERNIGHT EVENTS:    SUBJECTIVE: Patient seen and examined at bedside. Intubated, sedated, ROS cannot be obtained.       VITAL SIGNS:  ICU Vital Signs Last 24 Hrs  T(C): 36.6 (17 Oct 2021 04:00), Max: 37.2 (16 Oct 2021 20:00)  T(F): 97.9 (17 Oct 2021 04:00), Max: 99 (16 Oct 2021 20:00)  HR: 68 (17 Oct 2021 07:00) (64 - 104)  BP: 170/74 (17 Oct 2021 07:00) (122/68 - 195/92)  BP(mean): 96 (17 Oct 2021 07:00) (87 - 128)  ABP: --  ABP(mean): --  RR: 25 (17 Oct 2021 07:00) (20 - 47)  SpO2: 100% (17 Oct 2021 07:00) (95% - 100%)      Plateau pressure:   P/F ratio:     10-16 @ 07:01  -  10-17 @ 07:00  --------------------------------------------------------  IN: 1709.4 mL / OUT: 2400 mL / NET: -690.6 mL      CAPILLARY BLOOD GLUCOSE      POCT Blood Glucose.: 109 mg/dL (17 Oct 2021 06:14)    ECG:    PHYSICAL EXAM:    General:   HEENT:   Neck:   Respiratory:   Cardiovascular:   Abdomen:   Extremities:  Neurological:    MEDICATIONS:  MEDICATIONS  (STANDING):  albuterol/ipratropium for Nebulization 3 milliLiter(s) Nebulizer every 4 hours  amLODIPine   Tablet 10 milliGRAM(s) Oral daily  buDESOnide    Inhalation Suspension 0.25 milliGRAM(s) Nebulizer two times a day  chlorhexidine 4% Liquid 1 Application(s) Topical <User Schedule>  dexMEDEtomidine Infusion 0.01 MICROgram(s)/kG/Hr (0.16 mL/Hr) IV Continuous <Continuous>  dextrose 40% Gel 15 Gram(s) Oral once  dextrose 5%. 1000 milliLiter(s) (100 mL/Hr) IV Continuous <Continuous>  dextrose 5%. 1000 milliLiter(s) (75 mL/Hr) IV Continuous <Continuous>  dextrose 50% Injectable 25 Gram(s) IV Push once  dextrose 50% Injectable 12.5 Gram(s) IV Push once  dextrose 50% Injectable 25 Gram(s) IV Push once  fentaNYL   Patch  12 MICROgram(s)/Hr 1 Patch Transdermal every 72 hours  glucagon  Injectable 1 milliGRAM(s) IntraMuscular once  heparin   Injectable 5000 Unit(s) SubCutaneous every 8 hours  influenza   Vaccine 0.5 milliLiter(s) IntraMuscular once  insulin lispro (ADMELOG) corrective regimen sliding scale   SubCutaneous every 6 hours  melatonin 5 milliGRAM(s) Oral at bedtime  metoprolol tartrate 12.5 milliGRAM(s) Oral every 12 hours  multivitamin/minerals/iron Oral Solution (CENTRUM) 15 milliLiter(s) Oral daily  pantoprazole  Injectable 40 milliGRAM(s) IV Push daily  polyethylene glycol 3350 17 Gram(s) Oral daily  senna Syrup 10 milliLiter(s) Oral at bedtime    MEDICATIONS  (PRN):  hydrALAZINE Injectable 10 milliGRAM(s) IV Push every 6 hours PRN SBP>180  morphine  - Injectable 1.5 milliGRAM(s) IV Push every 4 hours PRN dyspnea  OLANZapine Injectable 5 milliGRAM(s) IntraMuscular every 6 hours PRN agitation      ALLERGIES:  Allergies    No Known Allergies    Intolerances        LABS:                        8.5    13.93 )-----------( 292      ( 17 Oct 2021 00:20 )             29.5     10-17    147<H>  |  110<H>  |  34<H>  ----------------------------<  124<H>  4.6   |  25  |  0.83    Ca    8.7      17 Oct 2021 00:20  Phos  4.5     10-17  Mg     2.6     10-17    TPro  6.6  /  Alb  3.6  /  TBili  0.3  /  DBili  x   /  AST  26  /  ALT  148<H>  /  AlkPhos  64  10-17    PT/INR - ( 17 Oct 2021 00:20 )   PT: 13.3 sec;   INR: 1.11 ratio         PTT - ( 17 Oct 2021 00:20 )  PTT:25.8 sec      RADIOLOGY & ADDITIONAL TESTS: Reviewed. Angel Hayden MD PGY-2  Internal Medicine Resident    INTERVAL HPI/OVERNIGHT EVENTS: PERRY ON, borderline tachycardic HR 60s-90s, hypertensive SBP 120s-180s, tachypneic RR 22-27, SpO2%  on BiPAP. Pt reports feeling generally improved w/ regard to breathing, otherwise denies sx including HA, CP, back pain, abd pain, n/v/d, melena/hematochezia, dysuria/hematuria, numbness/weakness/tingling.       VITAL SIGNS:  ICU Vital Signs Last 24 Hrs  T(C): 36.6 (17 Oct 2021 04:00), Max: 37.2 (16 Oct 2021 20:00)  T(F): 97.9 (17 Oct 2021 04:00), Max: 99 (16 Oct 2021 20:00)  HR: 68 (17 Oct 2021 07:00) (64 - 104)  BP: 170/74 (17 Oct 2021 07:00) (122/68 - 195/92)  BP(mean): 96 (17 Oct 2021 07:00) (87 - 128)  ABP: --  ABP(mean): --  RR: 25 (17 Oct 2021 07:00) (20 - 47)  SpO2: 100% (17 Oct 2021 07:00) (95% - 100%)      Plateau pressure:   P/F ratio:     10-16 @ 07:01  -  10-17 @ 07:00  --------------------------------------------------------  IN: 1709.4 mL / OUT: 2400 mL / NET: -690.6 mL      CAPILLARY BLOOD GLUCOSE      POCT Blood Glucose.: 109 mg/dL (17 Oct 2021 06:14)      PHYSICAL EXAM:  GENERAL: Elderly appearing man, not in distress, well-groomed, well-developed  HEAD: Atraumatic, Normocephalic  EYES: PERRL, conjunctiva and sclera clear  ENMT: MM dry on BiPAP  NECK: Supple  NERVOUS SYSTEM: A&Ox3, pupils as above, moving all extremities  CHEST/LUNG: CTAB no w/r/r on BiPAP  HEART: Regular rate and rhythm; No murmurs, rubs, or gallops. +Heart sounds distant   ABDOMEN: Soft, Nontender, Nondistended; Bowel sounds present  EXTREMITIES: 2+ Peripheral Pulses, No edema, extremities warm    MEDICATIONS:  MEDICATIONS  (STANDING):  albuterol/ipratropium for Nebulization 3 milliLiter(s) Nebulizer every 4 hours  amLODIPine   Tablet 10 milliGRAM(s) Oral daily  buDESOnide    Inhalation Suspension 0.25 milliGRAM(s) Nebulizer two times a day  chlorhexidine 4% Liquid 1 Application(s) Topical <User Schedule>  dexMEDEtomidine Infusion 0.01 MICROgram(s)/kG/Hr (0.16 mL/Hr) IV Continuous <Continuous>  dextrose 40% Gel 15 Gram(s) Oral once  dextrose 5%. 1000 milliLiter(s) (100 mL/Hr) IV Continuous <Continuous>  dextrose 5%. 1000 milliLiter(s) (75 mL/Hr) IV Continuous <Continuous>  dextrose 50% Injectable 25 Gram(s) IV Push once  dextrose 50% Injectable 12.5 Gram(s) IV Push once  dextrose 50% Injectable 25 Gram(s) IV Push once  fentaNYL   Patch  12 MICROgram(s)/Hr 1 Patch Transdermal every 72 hours  glucagon  Injectable 1 milliGRAM(s) IntraMuscular once  heparin   Injectable 5000 Unit(s) SubCutaneous every 8 hours  influenza   Vaccine 0.5 milliLiter(s) IntraMuscular once  insulin lispro (ADMELOG) corrective regimen sliding scale   SubCutaneous every 6 hours  melatonin 5 milliGRAM(s) Oral at bedtime  metoprolol tartrate 12.5 milliGRAM(s) Oral every 12 hours  multivitamin/minerals/iron Oral Solution (CENTRUM) 15 milliLiter(s) Oral daily  pantoprazole  Injectable 40 milliGRAM(s) IV Push daily  polyethylene glycol 3350 17 Gram(s) Oral daily  senna Syrup 10 milliLiter(s) Oral at bedtime    MEDICATIONS  (PRN):  hydrALAZINE Injectable 10 milliGRAM(s) IV Push every 6 hours PRN SBP>180  morphine  - Injectable 1.5 milliGRAM(s) IV Push every 4 hours PRN dyspnea  OLANZapine Injectable 5 milliGRAM(s) IntraMuscular every 6 hours PRN agitation      ALLERGIES:  Allergies    No Known Allergies    Intolerances        LABS:                        8.5    13.93 )-----------( 292      ( 17 Oct 2021 00:20 )             29.5     10-17    147<H>  |  110<H>  |  34<H>  ----------------------------<  124<H>  4.6   |  25  |  0.83    Ca    8.7      17 Oct 2021 00:20  Phos  4.5     10-17  Mg     2.6     10-17    TPro  6.6  /  Alb  3.6  /  TBili  0.3  /  DBili  x   /  AST  26  /  ALT  148<H>  /  AlkPhos  64  10-17    PT/INR - ( 17 Oct 2021 00:20 )   PT: 13.3 sec;   INR: 1.11 ratio         PTT - ( 17 Oct 2021 00:20 )  PTT:25.8 sec      RADIOLOGY & ADDITIONAL TESTS: Reviewed. Angel Hayden MD PGY-2  Internal Medicine Resident    INTERVAL HPI/OVERNIGHT EVENTS: PERRY ON, borderline tachycardic HR 60s-90s, hypertensive SBP 120s-180s, tachypneic RR 22-27, SpO2%  on BiPAP. Pt reports feeling generally improved w/ regard to breathing, otherwise denies sx including HA, CP, back pain, abd pain, n/v/d, melena/hematochezia, dysuria/hematuria, numbness/weakness/tingling.       VITAL SIGNS:  ICU Vital Signs Last 24 Hrs  T(C): 36.6 (17 Oct 2021 04:00), Max: 37.2 (16 Oct 2021 20:00)  T(F): 97.9 (17 Oct 2021 04:00), Max: 99 (16 Oct 2021 20:00)  HR: 68 (17 Oct 2021 07:00) (64 - 104)  BP: 170/74 (17 Oct 2021 07:00) (122/68 - 195/92)  BP(mean): 96 (17 Oct 2021 07:00) (87 - 128)  ABP: --  ABP(mean): --  RR: 25 (17 Oct 2021 07:00) (20 - 47)  SpO2: 100% (17 Oct 2021 07:00) (95% - 100%)      Plateau pressure:   P/F ratio:     10-16 @ 07:01  -  10-17 @ 07:00  --------------------------------------------------------  IN: 1709.4 mL / OUT: 2400 mL / NET: -690.6 mL      CAPILLARY BLOOD GLUCOSE      POCT Blood Glucose.: 109 mg/dL (17 Oct 2021 06:14)      PHYSICAL EXAM:  GENERAL: Elderly appearing man, not in distress, well-groomed, well-developed  HEAD: Atraumatic, Normocephalic  EYES: PERRL, conjunctiva and sclera clear  ENMT: MM dry on BiPAP  NECK: Supple  NERVOUS SYSTEM: A&Ox3, pupils as above, moving all extremities  CHEST/LUNG: CTAB no w/r/r on BiPAP  HEART: Regular rate and rhythm; No murmurs, rubs, or gallops. +Heart sounds distant   ABDOMEN: Soft, Nontender, Nondistended; Bowel sounds present  EXTREMITIES: 2+ Peripheral Pulses, No edema, extremities warm    MEDICATIONS:  MEDICATIONS  (STANDING):  albuterol/ipratropium for Nebulization 3 milliLiter(s) Nebulizer every 4 hours  amLODIPine   Tablet 10 milliGRAM(s) Oral daily  buDESOnide    Inhalation Suspension 0.25 milliGRAM(s) Nebulizer two times a day  chlorhexidine 4% Liquid 1 Application(s) Topical <User Schedule>  dexMEDEtomidine Infusion 0.01 MICROgram(s)/kG/Hr (0.16 mL/Hr) IV Continuous <Continuous>  dextrose 40% Gel 15 Gram(s) Oral once  dextrose 5%. 1000 milliLiter(s) (100 mL/Hr) IV Continuous <Continuous>  dextrose 5%. 1000 milliLiter(s) (75 mL/Hr) IV Continuous <Continuous>  dextrose 50% Injectable 25 Gram(s) IV Push once  dextrose 50% Injectable 12.5 Gram(s) IV Push once  dextrose 50% Injectable 25 Gram(s) IV Push once  fentaNYL   Patch  12 MICROgram(s)/Hr 1 Patch Transdermal every 72 hours  glucagon  Injectable 1 milliGRAM(s) IntraMuscular once  heparin   Injectable 5000 Unit(s) SubCutaneous every 8 hours  influenza   Vaccine 0.5 milliLiter(s) IntraMuscular once  insulin lispro (ADMELOG) corrective regimen sliding scale   SubCutaneous every 6 hours  melatonin 5 milliGRAM(s) Oral at bedtime  metoprolol tartrate 12.5 milliGRAM(s) Oral every 12 hours  multivitamin/minerals/iron Oral Solution (CENTRUM) 15 milliLiter(s) Oral daily  pantoprazole  Injectable 40 milliGRAM(s) IV Push daily  polyethylene glycol 3350 17 Gram(s) Oral daily  senna Syrup 10 milliLiter(s) Oral at bedtime    MEDICATIONS  (PRN):  hydrALAZINE Injectable 10 milliGRAM(s) IV Push every 6 hours PRN SBP>180  morphine  - Injectable 1.5 milliGRAM(s) IV Push every 4 hours PRN dyspnea  OLANZapine Injectable 5 milliGRAM(s) IntraMuscular every 6 hours PRN agitation      ALLERGIES:  Allergies    No Known Allergies    Intolerances        LABS:                        8.5    13.93 )-----------( 292      ( 17 Oct 2021 00:20 )             29.5     10-17    147<H>  |  110<H>  |  34<H>  ----------------------------<  124<H>  4.6   |  25  |  0.83    Ca    8.7      17 Oct 2021 00:20  Phos  4.5     10-17  Mg     2.6     10-17    TPro  6.6  /  Alb  3.6  /  TBili  0.3  /  DBili  x   /  AST  26  /  ALT  148<H>  /  AlkPhos  64  10-17    PT/INR - ( 17 Oct 2021 00:20 )   PT: 13.3 sec;   INR: 1.11 ratio         PTT - ( 17 Oct 2021 00:20 )  PTT:25.8 sec      RADIOLOGY & ADDITIONAL TESTS: Reviewed

## 2021-10-17 NOTE — PROGRESS NOTE ADULT - ATTENDING COMMENTS
Critically ill patient with frequent bedside visits. Patient seen and examined on rounds and plan of care discussed with team.     Patient has acute hypoxemic respiratory failure due to COPD exacerbation  from  parainfluenza infection/pneumonia with superimposed bacterial pneumonia/infection. Continue inhaled steroids and bronchodilators.  Extubated 3 days ago BiPAP. Unable to wean off, likely combination of work of breathing and anxiety. Dyspnea and anxiety relieved with morphine IVP. Will continue prn morphine but also start on Fentanyl patch 12 mcg daily. Today was able to titrate off BIPAP to HFNC at 40%. Tolerating well. Able to eat and drink. Will continue to monitor and use BIPAP HS. ATS resolved.     Overall prognosis guarded.

## 2021-10-17 NOTE — PROGRESS NOTE ADULT - ASSESSMENT
71M PMH COPD, Asthma, HTN, CAD s/p PCI (LCx, LAD, D1), admit to MICU intubated 10/8 2/2 AHRF iso viral PNA parainfluenza+, c/f superimposed bacterial PNA, and COPD exacerbation, extubated 10/14 to BiPAP    #Neuro  Extubated, off sedation  - fentanyl 12mcg patch, morphine 1.5mg q4h prn iso dyspnea, anxiety      #Cardiovascular  HTN 150s-180s  - avoiding BB iso COPD, will give IV hydralazine 5mg q6h prn SBP > 180  - MAP goal > 65    Respiratory  #AHRF 2/2 viral PNA (parainfluenza+) c/b possible superimposed bacterial PNA and COPD exacerbation  - intubated 10/8, extubated and reintubated 10/12, extubated 10/14 to BiPAP, 10/7 will trial HFNC   - completed 5 day abx course (CTX -> Levaquin -> CTX) 10/8-10/12  - c/w duonebs, inhaled budesonide  - s/p solumedrol 10/8-10/16  - MRSA negative  - legionella negative    #GI/Nutrition  No active issues  NPO on BiPAP, will start CLD today given passed bedside S&S    #/Renal  OWEN resolved, SCr at bl   - monitor UOP, Strict Is/Os  - monitor BMP, replete lytes prn    ID  AHRF 2/2 viral PNA (parainfluenza+) c/b possible superimposed bacterial PNA  - completed 5 day abx course (CTX -> Levaquin -> CTX) 10/8-10/12  - febrile 10/15, cultures sent, monitoring off antibiotics   - MRSA, legionella negative as above  - RVP +parainfluenza, BCx 10/8 NGTD    #Endocrine  No active issues  - TSH low 0.07, TFTs received  - monitor FS q6h while NPO    #Hematologic/Oncologic  Microcytic Anemia, stable  - monitor CBC, transfuse hgb > 7     #PPx  - DVT ppx w/ subq heparin 5000u TID    #Ethics  - clarify [facility] w/ family

## 2021-10-18 LAB
ALBUMIN SERPL ELPH-MCNC: 3.5 G/DL — SIGNIFICANT CHANGE UP (ref 3.3–5)
ALP SERPL-CCNC: 75 U/L — SIGNIFICANT CHANGE UP (ref 40–120)
ALT FLD-CCNC: 123 U/L — HIGH (ref 10–45)
ANION GAP SERPL CALC-SCNC: 12 MMOL/L — SIGNIFICANT CHANGE UP (ref 5–17)
ANION GAP SERPL CALC-SCNC: 12 MMOL/L — SIGNIFICANT CHANGE UP (ref 5–17)
APPEARANCE UR: CLEAR — SIGNIFICANT CHANGE UP
APTT BLD: 26.8 SEC — LOW (ref 27.5–35.5)
APTT BLD: 26.9 SEC — LOW (ref 27.5–35.5)
APTT BLD: 27.7 SEC — SIGNIFICANT CHANGE UP (ref 27.5–35.5)
AST SERPL-CCNC: 37 U/L — SIGNIFICANT CHANGE UP (ref 10–40)
BACTERIA # UR AUTO: NEGATIVE — SIGNIFICANT CHANGE UP
BASE EXCESS BLDV CALC-SCNC: 3.8 MMOL/L — HIGH (ref -2–2)
BASE EXCESS BLDV CALC-SCNC: 5.3 MMOL/L — HIGH (ref -2–2)
BASOPHILS # BLD AUTO: 0.02 K/UL — SIGNIFICANT CHANGE UP (ref 0–0.2)
BASOPHILS NFR BLD AUTO: 0.1 % — SIGNIFICANT CHANGE UP (ref 0–2)
BILIRUB SERPL-MCNC: 0.3 MG/DL — SIGNIFICANT CHANGE UP (ref 0.2–1.2)
BILIRUB UR-MCNC: NEGATIVE — SIGNIFICANT CHANGE UP
BUN SERPL-MCNC: 26 MG/DL — HIGH (ref 7–23)
BUN SERPL-MCNC: 28 MG/DL — HIGH (ref 7–23)
CA-I SERPL-SCNC: 1.21 MMOL/L — SIGNIFICANT CHANGE UP (ref 1.15–1.33)
CA-I SERPL-SCNC: 1.22 MMOL/L — SIGNIFICANT CHANGE UP (ref 1.15–1.33)
CALCIUM SERPL-MCNC: 8.8 MG/DL — SIGNIFICANT CHANGE UP (ref 8.4–10.5)
CALCIUM SERPL-MCNC: 8.9 MG/DL — SIGNIFICANT CHANGE UP (ref 8.4–10.5)
CHLORIDE BLDV-SCNC: 107 MMOL/L — SIGNIFICANT CHANGE UP (ref 96–108)
CHLORIDE BLDV-SCNC: 111 MMOL/L — HIGH (ref 96–108)
CHLORIDE SERPL-SCNC: 106 MMOL/L — SIGNIFICANT CHANGE UP (ref 96–108)
CHLORIDE SERPL-SCNC: 109 MMOL/L — HIGH (ref 96–108)
CO2 BLDV-SCNC: 32 MMOL/L — HIGH (ref 22–26)
CO2 BLDV-SCNC: 32 MMOL/L — HIGH (ref 22–26)
CO2 SERPL-SCNC: 22 MMOL/L — SIGNIFICANT CHANGE UP (ref 22–31)
CO2 SERPL-SCNC: 27 MMOL/L — SIGNIFICANT CHANGE UP (ref 22–31)
COLOR SPEC: SIGNIFICANT CHANGE UP
CREAT SERPL-MCNC: 0.89 MG/DL — SIGNIFICANT CHANGE UP (ref 0.5–1.3)
CREAT SERPL-MCNC: 0.96 MG/DL — SIGNIFICANT CHANGE UP (ref 0.5–1.3)
DIFF PNL FLD: NEGATIVE — SIGNIFICANT CHANGE UP
EOSINOPHIL # BLD AUTO: 0.03 K/UL — SIGNIFICANT CHANGE UP (ref 0–0.5)
EOSINOPHIL NFR BLD AUTO: 0.2 % — SIGNIFICANT CHANGE UP (ref 0–6)
EPI CELLS # UR: 1 /HPF — SIGNIFICANT CHANGE UP
GAS PNL BLDA: SIGNIFICANT CHANGE UP
GAS PNL BLDV: 140 MMOL/L — SIGNIFICANT CHANGE UP (ref 136–145)
GAS PNL BLDV: 143 MMOL/L — SIGNIFICANT CHANGE UP (ref 136–145)
GAS PNL BLDV: SIGNIFICANT CHANGE UP
GLUCOSE BLDC GLUCOMTR-MCNC: 134 MG/DL — HIGH (ref 70–99)
GLUCOSE BLDC GLUCOMTR-MCNC: 137 MG/DL — HIGH (ref 70–99)
GLUCOSE BLDC GLUCOMTR-MCNC: 149 MG/DL — HIGH (ref 70–99)
GLUCOSE BLDV-MCNC: 105 MG/DL — HIGH (ref 70–99)
GLUCOSE BLDV-MCNC: 124 MG/DL — HIGH (ref 70–99)
GLUCOSE SERPL-MCNC: 128 MG/DL — HIGH (ref 70–99)
GLUCOSE SERPL-MCNC: 144 MG/DL — HIGH (ref 70–99)
GLUCOSE UR QL: NEGATIVE — SIGNIFICANT CHANGE UP
HCO3 BLDV-SCNC: 31 MMOL/L — HIGH (ref 22–29)
HCO3 BLDV-SCNC: 31 MMOL/L — HIGH (ref 22–29)
HCT VFR BLD CALC: 29 % — LOW (ref 39–50)
HCT VFR BLD CALC: 32 % — LOW (ref 39–50)
HCT VFR BLDA CALC: 24 % — LOW (ref 39–51)
HCT VFR BLDA CALC: 28 % — LOW (ref 39–51)
HGB BLD CALC-MCNC: 7.9 G/DL — LOW (ref 12.6–17.4)
HGB BLD CALC-MCNC: 9.3 G/DL — LOW (ref 12.6–17.4)
HGB BLD-MCNC: 7.8 G/DL — LOW (ref 13–17)
HGB BLD-MCNC: 8.7 G/DL — LOW (ref 13–17)
HYALINE CASTS # UR AUTO: 0 /LPF — SIGNIFICANT CHANGE UP (ref 0–7)
IMM GRANULOCYTES NFR BLD AUTO: 0.6 % — SIGNIFICANT CHANGE UP (ref 0–1.5)
INR BLD: 1.04 RATIO — SIGNIFICANT CHANGE UP (ref 0.88–1.16)
INR BLD: 1.11 RATIO — SIGNIFICANT CHANGE UP (ref 0.88–1.16)
INR BLD: 1.19 RATIO — HIGH (ref 0.88–1.16)
KETONES UR-MCNC: NEGATIVE — SIGNIFICANT CHANGE UP
LACTATE BLDV-MCNC: 0.5 MMOL/L — LOW (ref 0.7–2)
LACTATE BLDV-MCNC: 1.3 MMOL/L — SIGNIFICANT CHANGE UP (ref 0.7–2)
LEUKOCYTE ESTERASE UR-ACNC: NEGATIVE — SIGNIFICANT CHANGE UP
LYMPHOCYTES # BLD AUTO: 0.99 K/UL — LOW (ref 1–3.3)
LYMPHOCYTES # BLD AUTO: 5.5 % — LOW (ref 13–44)
MAGNESIUM SERPL-MCNC: 2.4 MG/DL — SIGNIFICANT CHANGE UP (ref 1.6–2.6)
MAGNESIUM SERPL-MCNC: 2.4 MG/DL — SIGNIFICANT CHANGE UP (ref 1.6–2.6)
MCHC RBC-ENTMCNC: 20.3 PG — LOW (ref 27–34)
MCHC RBC-ENTMCNC: 20.4 PG — LOW (ref 27–34)
MCHC RBC-ENTMCNC: 26.9 GM/DL — LOW (ref 32–36)
MCHC RBC-ENTMCNC: 27.2 GM/DL — LOW (ref 32–36)
MCV RBC AUTO: 74.8 FL — LOW (ref 80–100)
MCV RBC AUTO: 75.7 FL — LOW (ref 80–100)
MONOCYTES # BLD AUTO: 0.67 K/UL — SIGNIFICANT CHANGE UP (ref 0–0.9)
MONOCYTES NFR BLD AUTO: 3.7 % — SIGNIFICANT CHANGE UP (ref 2–14)
NEUTROPHILS # BLD AUTO: 16.26 K/UL — HIGH (ref 1.8–7.4)
NEUTROPHILS NFR BLD AUTO: 89.9 % — HIGH (ref 43–77)
NITRITE UR-MCNC: NEGATIVE — SIGNIFICANT CHANGE UP
NRBC # BLD: 0 /100 WBCS — SIGNIFICANT CHANGE UP (ref 0–0)
NRBC # BLD: 0 /100 WBCS — SIGNIFICANT CHANGE UP (ref 0–0)
PCO2 BLDV: 51 MMHG — SIGNIFICANT CHANGE UP (ref 42–55)
PCO2 BLDV: 58 MMHG — HIGH (ref 42–55)
PH BLDV: 7.33 — SIGNIFICANT CHANGE UP (ref 7.32–7.43)
PH BLDV: 7.39 — SIGNIFICANT CHANGE UP (ref 7.32–7.43)
PH UR: 6 — SIGNIFICANT CHANGE UP (ref 5–8)
PHOSPHATE SERPL-MCNC: 3.3 MG/DL — SIGNIFICANT CHANGE UP (ref 2.5–4.5)
PHOSPHATE SERPL-MCNC: 3.7 MG/DL — SIGNIFICANT CHANGE UP (ref 2.5–4.5)
PLATELET # BLD AUTO: 279 K/UL — SIGNIFICANT CHANGE UP (ref 150–400)
PLATELET # BLD AUTO: 325 K/UL — SIGNIFICANT CHANGE UP (ref 150–400)
PO2 BLDV: 151 MMHG — HIGH (ref 25–45)
PO2 BLDV: 26 MMHG — SIGNIFICANT CHANGE UP (ref 25–45)
POTASSIUM BLDV-SCNC: 3.7 MMOL/L — SIGNIFICANT CHANGE UP (ref 3.5–5.1)
POTASSIUM BLDV-SCNC: 4.6 MMOL/L — SIGNIFICANT CHANGE UP (ref 3.5–5.1)
POTASSIUM SERPL-MCNC: 4.3 MMOL/L — SIGNIFICANT CHANGE UP (ref 3.5–5.3)
POTASSIUM SERPL-MCNC: 4.8 MMOL/L — SIGNIFICANT CHANGE UP (ref 3.5–5.3)
POTASSIUM SERPL-SCNC: 4.3 MMOL/L — SIGNIFICANT CHANGE UP (ref 3.5–5.3)
POTASSIUM SERPL-SCNC: 4.8 MMOL/L — SIGNIFICANT CHANGE UP (ref 3.5–5.3)
PROT SERPL-MCNC: 6.8 G/DL — SIGNIFICANT CHANGE UP (ref 6–8.3)
PROT UR-MCNC: ABNORMAL
PROTHROM AB SERPL-ACNC: 12.5 SEC — SIGNIFICANT CHANGE UP (ref 10.6–13.6)
PROTHROM AB SERPL-ACNC: 13.3 SEC — SIGNIFICANT CHANGE UP (ref 10.6–13.6)
PROTHROM AB SERPL-ACNC: 14.2 SEC — HIGH (ref 10.6–13.6)
RBC # BLD: 3.83 M/UL — LOW (ref 4.2–5.8)
RBC # BLD: 4.28 M/UL — SIGNIFICANT CHANGE UP (ref 4.2–5.8)
RBC # FLD: 23.3 % — HIGH (ref 10.3–14.5)
RBC # FLD: 23.5 % — HIGH (ref 10.3–14.5)
RBC CASTS # UR COMP ASSIST: 1 /HPF — SIGNIFICANT CHANGE UP (ref 0–4)
SAO2 % BLDV: 39.2 % — LOW (ref 67–88)
SAO2 % BLDV: 99.1 % — HIGH (ref 67–88)
SODIUM SERPL-SCNC: 143 MMOL/L — SIGNIFICANT CHANGE UP (ref 135–145)
SODIUM SERPL-SCNC: 145 MMOL/L — SIGNIFICANT CHANGE UP (ref 135–145)
SP GR SPEC: 1.02 — SIGNIFICANT CHANGE UP (ref 1.01–1.02)
UROBILINOGEN FLD QL: NEGATIVE — SIGNIFICANT CHANGE UP
WBC # BLD: 16.24 K/UL — HIGH (ref 3.8–10.5)
WBC # BLD: 18.08 K/UL — HIGH (ref 3.8–10.5)
WBC # FLD AUTO: 16.24 K/UL — HIGH (ref 3.8–10.5)
WBC # FLD AUTO: 18.08 K/UL — HIGH (ref 3.8–10.5)
WBC UR QL: 4 /HPF — SIGNIFICANT CHANGE UP (ref 0–5)

## 2021-10-18 PROCEDURE — 99291 CRITICAL CARE FIRST HOUR: CPT

## 2021-10-18 RX ORDER — ACETAMINOPHEN 500 MG
1000 TABLET ORAL ONCE
Refills: 0 | Status: COMPLETED | OUTPATIENT
Start: 2021-10-18 | End: 2021-10-18

## 2021-10-18 RX ORDER — PIPERACILLIN AND TAZOBACTAM 4; .5 G/20ML; G/20ML
3.38 INJECTION, POWDER, LYOPHILIZED, FOR SOLUTION INTRAVENOUS ONCE
Refills: 0 | Status: COMPLETED | OUTPATIENT
Start: 2021-10-18 | End: 2021-10-18

## 2021-10-18 RX ORDER — AMIODARONE HYDROCHLORIDE 400 MG/1
150 TABLET ORAL ONCE
Refills: 0 | Status: DISCONTINUED | OUTPATIENT
Start: 2021-10-18 | End: 2021-10-18

## 2021-10-18 RX ORDER — PIPERACILLIN AND TAZOBACTAM 4; .5 G/20ML; G/20ML
3.38 INJECTION, POWDER, LYOPHILIZED, FOR SOLUTION INTRAVENOUS EVERY 8 HOURS
Refills: 0 | Status: DISCONTINUED | OUTPATIENT
Start: 2021-10-18 | End: 2021-10-22

## 2021-10-18 RX ORDER — SODIUM CHLORIDE 9 MG/ML
1000 INJECTION, SOLUTION INTRAVENOUS
Refills: 0 | Status: DISCONTINUED | OUTPATIENT
Start: 2021-10-18 | End: 2021-10-19

## 2021-10-18 RX ADMIN — PIPERACILLIN AND TAZOBACTAM 25 GRAM(S): 4; .5 INJECTION, POWDER, LYOPHILIZED, FOR SOLUTION INTRAVENOUS at 15:22

## 2021-10-18 RX ADMIN — Medication 1000 MILLIGRAM(S): at 03:45

## 2021-10-18 RX ADMIN — AMLODIPINE BESYLATE 10 MILLIGRAM(S): 2.5 TABLET ORAL at 05:14

## 2021-10-18 RX ADMIN — PIPERACILLIN AND TAZOBACTAM 25 GRAM(S): 4; .5 INJECTION, POWDER, LYOPHILIZED, FOR SOLUTION INTRAVENOUS at 23:13

## 2021-10-18 RX ADMIN — Medication 3 MILLILITER(S): at 13:11

## 2021-10-18 RX ADMIN — Medication 0.25 MILLIGRAM(S): at 17:21

## 2021-10-18 RX ADMIN — Medication 3 MILLILITER(S): at 09:56

## 2021-10-18 RX ADMIN — PANTOPRAZOLE SODIUM 40 MILLIGRAM(S): 20 TABLET, DELAYED RELEASE ORAL at 11:15

## 2021-10-18 RX ADMIN — FENTANYL CITRATE 1 PATCH: 50 INJECTION INTRAVENOUS at 11:34

## 2021-10-18 RX ADMIN — Medication 1000 MILLIGRAM(S): at 12:00

## 2021-10-18 RX ADMIN — SODIUM CHLORIDE 75 MILLILITER(S): 9 INJECTION, SOLUTION INTRAVENOUS at 20:00

## 2021-10-18 RX ADMIN — PIPERACILLIN AND TAZOBACTAM 200 GRAM(S): 4; .5 INJECTION, POWDER, LYOPHILIZED, FOR SOLUTION INTRAVENOUS at 03:33

## 2021-10-18 RX ADMIN — Medication 3 MILLILITER(S): at 21:40

## 2021-10-18 RX ADMIN — FENTANYL CITRATE 1 PATCH: 50 INJECTION INTRAVENOUS at 05:06

## 2021-10-18 RX ADMIN — Medication 400 MILLIGRAM(S): at 11:13

## 2021-10-18 RX ADMIN — POLYETHYLENE GLYCOL 3350 17 GRAM(S): 17 POWDER, FOR SOLUTION ORAL at 11:14

## 2021-10-18 RX ADMIN — Medication 0.25 MILLIGRAM(S): at 05:16

## 2021-10-18 RX ADMIN — CHLORHEXIDINE GLUCONATE 1 APPLICATION(S): 213 SOLUTION TOPICAL at 05:14

## 2021-10-18 RX ADMIN — PIPERACILLIN AND TAZOBACTAM 25 GRAM(S): 4; .5 INJECTION, POWDER, LYOPHILIZED, FOR SOLUTION INTRAVENOUS at 08:03

## 2021-10-18 RX ADMIN — Medication 5 MILLIGRAM(S): at 21:09

## 2021-10-18 RX ADMIN — FENTANYL CITRATE 1 PATCH: 50 INJECTION INTRAVENOUS at 11:14

## 2021-10-18 RX ADMIN — HEPARIN SODIUM 5000 UNIT(S): 5000 INJECTION INTRAVENOUS; SUBCUTANEOUS at 05:25

## 2021-10-18 RX ADMIN — Medication 400 MILLIGRAM(S): at 03:33

## 2021-10-18 RX ADMIN — Medication 12.5 MILLIGRAM(S): at 17:07

## 2021-10-18 RX ADMIN — HEPARIN SODIUM 5000 UNIT(S): 5000 INJECTION INTRAVENOUS; SUBCUTANEOUS at 21:10

## 2021-10-18 RX ADMIN — SODIUM CHLORIDE 75 MILLILITER(S): 9 INJECTION, SOLUTION INTRAVENOUS at 08:03

## 2021-10-18 RX ADMIN — Medication 12.5 MILLIGRAM(S): at 05:14

## 2021-10-18 RX ADMIN — Medication 15 MILLILITER(S): at 11:15

## 2021-10-18 RX ADMIN — Medication 3 MILLILITER(S): at 05:15

## 2021-10-18 RX ADMIN — MORPHINE SULFATE 1.5 MILLIGRAM(S): 50 CAPSULE, EXTENDED RELEASE ORAL at 10:37

## 2021-10-18 RX ADMIN — Medication 400 MILLIGRAM(S): at 18:16

## 2021-10-18 RX ADMIN — MORPHINE SULFATE 1.5 MILLIGRAM(S): 50 CAPSULE, EXTENDED RELEASE ORAL at 18:31

## 2021-10-18 RX ADMIN — MORPHINE SULFATE 1.5 MILLIGRAM(S): 50 CAPSULE, EXTENDED RELEASE ORAL at 19:15

## 2021-10-18 RX ADMIN — HEPARIN SODIUM 5000 UNIT(S): 5000 INJECTION INTRAVENOUS; SUBCUTANEOUS at 13:12

## 2021-10-18 RX ADMIN — Medication 1000 MILLIGRAM(S): at 19:15

## 2021-10-18 RX ADMIN — FENTANYL CITRATE 1 PATCH: 50 INJECTION INTRAVENOUS at 19:01

## 2021-10-18 RX ADMIN — Medication 1 APPLICATION(S): at 11:34

## 2021-10-18 RX ADMIN — Medication 3 MILLILITER(S): at 17:21

## 2021-10-18 RX ADMIN — Medication 3 MILLILITER(S): at 04:29

## 2021-10-18 NOTE — PROGRESS NOTE ADULT - ASSESSMENT
70yo M w/ PMH of COPD, Asthma, HTN, CAD s/p PCI (LCx, LAD, D1), admit to MICU intubated 10/8 2/2 AHRF i/s/o viral PNA d/t parainfluenza, c/f superimposed bacterial PNA, and COPD exacerbation, extubated 10/14 to BiPAP    #Neuro  Extubated, off sedation  - fentanyl 12mcg patch, morphine 1.5mg q4h prn iso dyspnea, anxiety      #Cardiovascular  HTN 150s-180s  - avoiding BB iso COPD, will give IV hydralazine 5mg q6h prn SBP > 180  - MAP goal > 65    Respiratory  #AHRF 2/2 viral PNA (parainfluenza+) c/b possible superimposed bacterial PNA and COPD exacerbation  - intubated 10/8, extubated and reintubated 10/12, extubated 10/14 to BiPAP, 10/7 will trial HFNC   - completed 5 day abx course (CTX -> Levaquin -> CTX) 10/8-10/12  - c/w duonebs, inhaled budesonide  - s/p solumedrol 10/8-10/16  - MRSA negative  - legionella negative    #GI/Nutrition  No active issues  NPO on BiPAP, will start CLD today given passed bedside S&S    #/Renal  OWEN resolved, SCr at bl   - monitor UOP, Strict Is/Os  - monitor BMP, replete lytes prn    ID  AHRF 2/2 viral PNA (parainfluenza+) c/b possible superimposed bacterial PNA  - completed 5 day abx course (CTX -> Levaquin -> CTX) 10/8-10/12  - febrile 10/15, cultures sent, monitoring off antibiotics   - MRSA, legionella negative as above  - RVP +parainfluenza, BCx 10/8 NGTD    #Endocrine  No active issues  - TSH low 0.07, TFTs received  - monitor FS q6h while NPO    #Hematologic/Oncologic  Microcytic Anemia, stable  - monitor CBC, transfuse hgb > 7     #PPx  - DVT ppx w/ subq heparin 5000u TID    #Ethics  - clarify GOC w/ family   72yo M w/ PMH of COPD, Asthma, HTN, CAD s/p PCI (LCx, LAD, D1), admit to MICU intubated 10/8 2/2 AHRF i/s/o viral PNA d/t parainfluenza, c/f superimposed bacterial PNA, and COPD exacerbation, extubated 10/14 to BiPAP now on HFNC    # Neuro  Extubated, off sedation  - fentanyl 12mcg patch, morphine 1.5mg q4h prn iso dyspnea, anxiety      # Cardiovascular  HTN 150s-180s  - avoiding BB iso COPD, will give IV hydralazine 5mg q6h prn SBP > 180  - MAP goal > 65    # Respiratory  // AHRF 2/2 viral PNA (parainfluenza+) c/b possible superimposed bacterial PNA and COPD exacerbation  - intubated 10/8, extubated and reintubated 10/12, extubated 10/14 to BiPAP now on HFNC, tolerating well  - completed 5 day abx course (CTX -> Levaquin -> CTX) 10/8-10/12   - Febrile ON 10/18 w/ increasing leukocytosis, restarted on Zosyn  - c/w duonebs, inhaled budesonide  - s/p solumedrol 10/8-10/16  - MRSA negative  - legionella negative    # GI/Nutrition  No active issues  - Diet: Full Liquid    #/Renal  // OWEN   - Resolved, SCr at bl   - monitor UOP, Strict Is/Os  - monitor BMP, replete lytes prn    # ID  // Viral PNA (parainfluenza+) c/b possible superimposed bacterial PNA  - completed 5 day abx course (CTX -> Levaquin -> CTX) 10/8-10/12  - febrile 10/15, cultures sent, negative thus far   - MRSA, legionella negative as above  - RVP +parainfluenza, BCx 10/8 NGTD  - Febrile ON 10/18 w/ leukocytosis, started Zosyn  - Blood Cx pending again    # Endocrine  No active issues  - TSH low 0.07, TFTs received  - monitor FS TID    # Hematologic/Oncologic  - Microcytic Anemia, stable  - monitor CBC, transfuse hgb > 7     #PPx  - DVT ppx w/ subq heparin 5000u TID    #Ethics  - clarify GOC w/ family   72yo M w/ PMH of COPD, Asthma, HTN, CAD s/p PCI (LCx, LAD, D1), admit to MICU intubated 10/8 2/2 AHRF i/s/o viral PNA d/t parainfluenza, c/f superimposed bacterial PNA, and COPD exacerbation, extubated 10/14 to BiPAP now on HFNC    # Neuro  Extubated, off sedation  - fentanyl 12mcg patch, morphine 1.5mg q4h prn iso dyspnea, anxiety     # Cardiovascular  HTN 150s-180s  - avoiding BB iso COPD, will give IV hydralazine 5mg q6h prn SBP > 180  - MAP goal > 65    # Respiratory  // AHRF 2/2 viral PNA (parainfluenza+) c/b possible superimposed bacterial PNA and COPD exacerbation  - intubated 10/8, extubated and reintubated 10/12, extubated 10/14 to BiPAP now on HFNC, tolerating well will wean to NC   - completed 5 day abx course (CTX -> Levaquin -> CTX) 10/8-10/12   - Febrile ON 10/18 w/ increasing leukocytosis, restarted on Zosyn  - c/w duonebs, inhaled budesonide  - s/p solumedrol 10/8-10/16  - MRSA negative  - legionella negative    # GI/Nutrition  No active issues  - Diet: Full Liquid, will advance as tolerated    #/Renal  // OWEN   - Resolved, SCr at bl   - monitor UOP, Strict Is/Os  - monitor BMP, replete lytes prn    # ID  // Viral PNA (parainfluenza+) c/b possible superimposed bacterial PNA  - completed 5 day abx course (CTX -> Levaquin -> CTX) 10/8-10/12  - febrile 10/15, cultures sent, negative thus far   - MRSA, legionella negative as above  - RVP +parainfluenza, BCx 10/8 NGTD  - Febrile ON 10/18 w/ leukocytosis, started Zosyn  - Blood Cx pending again, will c/w abx until cultures result. If neg can dc    # Endocrine  No active issues  - TSH low 0.07, TFTs received  - monitor FS TID    # Hematologic/Oncologic  - Microcytic Anemia, stable  - monitor CBC, transfuse hgb > 7     #PPx  - DVT ppx w/ subq heparin 5000u TID    #Ethics  - clarify GOC w/ family   70yo M w/ PMH of COPD, Asthma, HTN, CAD s/p PCI (LCx, LAD, D1), admit to MICU intubated 10/8 2/2 AHRF i/s/o viral PNA d/t parainfluenza, c/f superimposed bacterial PNA, and COPD exacerbation, extubated 10/14 to BiPAP now on [id number] M w/ PMH of COPD, Asthma, HTN, CAD s/p PCI (LCx, LAD, D1), admit to MICU intubated 10/8 2/2 AHRF i/s/o viral PNA d/t parainfluenza, c/f superimposed bacterial PNA, and COPD exacerbation, extubated 10/14 to BiPAP now on HFNC    # Neuro  Extubated, off sedation  - fentanyl 12mcg patch, morphine 1.5mg q4h prn iso dyspnea, anxiety     # Cardiovascular  HTN 150s-180s  - avoiding BB iso COPD, will give IV hydralazine 5mg q6h prn SBP > 180  - MAP goal > 65    # Respiratory  // AHRF 2/2 viral PNA (parainfluenza+) c/b possible superimposed bacterial PNA and COPD exacerbation  - intubated 10/8, extubated and reintubated 10/12, extubated 10/14 to BiPAP now on HFNC, tolerating well will wean to NC   - completed 5 day abx course (CTX -> Levaquin -> CTX) 10/8-10/12   - Febrile ON 10/18 w/ increasing leukocytosis, restarted on Zosyn  - c/w duonebs, inhaled budesonide  - s/p solumedrol 10/8-10/16  - MRSA negative  - legionella negative    # GI/Nutrition  No active issues  - Diet: Full Liquid, will advance as tolerated    #/Renal  // OWEN   - Resolved, SCr at bl   - monitor UOP, Strict Is/Os  - monitor BMP, replete lytes prn    # ID  // Viral PNA (parainfluenza+) c/b possible superimposed bacterial PNA  - completed 5 day abx course (CTX -> Levaquin -> CTX) 10/8-10/12  - febrile 10/15, cultures sent, negative thus far   - MRSA, legionella negative as above  - RVP +parainfluenza, BCx 10/8 NGTD  - Febrile ON 10/18 w/ leukocytosis, started Zosyn  - Blood Cx pending again, will c/w abx until cultures result. If neg can dc    # Endocrine  No active issues  - TSH low 0.07, TFTs received  - monitor FS TID    # Hematologic/Oncologic  - Microcytic Anemia, stable  - monitor CBC, transfuse hgb > 7     #PPx  - DVT ppx w/ subq heparin 5000u TID    #Ethics  - clarify GOC w/ family

## 2021-10-18 NOTE — PROGRESS NOTE ADULT - SUBJECTIVE AND OBJECTIVE BOX
Diane Rose MD  Internal Medicine, PGY-3  Pager: [phone] ([facility]) / [phone] ([facility])    INTERVAL HPI/OVERNIGHT EVENTS:  - Patient seen and examined at bedside.     MEDICATIONS  (STANDING):  albuterol/ipratropium for Nebulization 3 milliLiter(s) Nebulizer every 4 hours  amLODIPine   Tablet 10 milliGRAM(s) Oral daily  BACItracin   Ointment 1 Application(s) Topical daily  buDESOnide    Inhalation Suspension 0.25 milliGRAM(s) Nebulizer two times a day  chlorhexidine 4% Liquid 1 Application(s) Topical <User Schedule>  dextrose 40% Gel 15 Gram(s) Oral once  dextrose 5%. 1000 milliLiter(s) (100 mL/Hr) IV Continuous <Continuous>  dextrose 5%. 1000 milliLiter(s) (75 mL/Hr) IV Continuous <Continuous>  dextrose 50% Injectable 25 Gram(s) IV Push once  dextrose 50% Injectable 12.5 Gram(s) IV Push once  dextrose 50% Injectable 25 Gram(s) IV Push once  fentaNYL   Patch  12 MICROgram(s)/Hr 1 Patch Transdermal every 72 hours  glucagon  Injectable 1 milliGRAM(s) IntraMuscular once  heparin   Injectable 5000 Unit(s) SubCutaneous every 8 hours  influenza   Vaccine 0.5 milliLiter(s) IntraMuscular once  insulin lispro (ADMELOG) corrective regimen sliding scale   SubCutaneous every 6 hours  melatonin 5 milliGRAM(s) Oral at bedtime  metoprolol tartrate 12.5 milliGRAM(s) Oral every 12 hours  multivitamin/minerals/iron Oral Solution (CENTRUM) 15 milliLiter(s) Oral daily  pantoprazole  Injectable 40 milliGRAM(s) IV Push daily  piperacillin/tazobactam IVPB.. 3.375 Gram(s) IV Intermittent every 8 hours  polyethylene glycol 3350 17 Gram(s) Oral daily  senna Syrup 10 milliLiter(s) Oral at bedtime    MEDICATIONS  (PRN):  hydrALAZINE Injectable 10 milliGRAM(s) IV Push every 6 hours PRN SBP>180  morphine  - Injectable 1.5 milliGRAM(s) IV Push every 4 hours PRN dyspnea  OLANZapine Injectable 5 milliGRAM(s) IntraMuscular every 6 hours PRN agitation    OBJECTIVE:  VITAL SIGNS:  ICU Vital Signs Last 24 Hrs  T(C): 37.4 (18 Oct 2021 06:00), Max: 38 (18 Oct 2021 03:00)  T(F): 99.3 (18 Oct 2021 06:00), Max: 100.4 (18 Oct 2021 03:00)  HR: 81 (18 Oct 2021 06:00) (58 - 110)  BP: 106/73 (18 Oct 2021 06:00) (98/71 - 187/84)  BP(mean): 85 (18 Oct 2021 06:00) (74 - 121)  RR: 30 (18 Oct 2021 06:00) (21 - 40)  SpO2: 97% (18 Oct 2021 06:00) (96% - 100%)    I&O's Summary  10-17 @ 07:01  -  10-18 @ 07:00  --------------------------------------------------------  IN: 2226.4 mL / OUT: 1800 mL / NET: 426.4 mL    CAPILLARY BLOOD GLUCOSE  POCT Blood Glucose.: 134 mg/dL (18 Oct 2021 05:12)    PHYSICAL EXAM:  GENERAL: Elderly appearing man, not in distress, well-groomed, well-developed  HEAD: Atraumatic, Normocephalic  EYES: PERRL, conjunctiva and sclera clear  ENMT: MM dry on BiPAP  NECK: Supple  NERVOUS SYSTEM: A&Ox3, pupils as above, moving all extremities  CHEST/LUNG: CTAB no w/r/r on BiPAP  HEART: Regular rate and rhythm; No murmurs, rubs, or gallops. +Heart sounds distant   ABDOMEN: Soft, Nontender, Nondistended; Bowel sounds present  EXTREMITIES: 2+ Peripheral Pulses, No edema, extremities warm    LABS:                      8.7    18.08 )-----------( 325      ( 18 Oct 2021 01:44 )             32.0     10-18    143  |  109<H>  |  28<H>  ----------------------------<  128<H>  4.8   |  22  |  0.89    Ca    8.8      18 Oct 2021 00:30  Phos  3.7     10-18  Mg     2.4     10-18    TPro  6.8  /  Alb  3.5  /  TBili  0.3  /  DBili  x   /  AST  37  /  ALT  123<H>  /  AlkPhos  75  10-18    PT/INR - ( 18 Oct 2021 01:45 )   PT: 13.3 sec;   INR: 1.11 ratio    PTT - ( 18 Oct 2021 01:45 )  PTT:26.8 sec    Urinalysis Basic - ( 18 Oct 2021 04:19 )  Color: Light Yellow / Appearance: Clear / S.016 / pH: x  Gluc: x / Ketone: Negative  / Bili: Negative / Urobili: Negative   Blood: x / Protein: Trace / Nitrite: Negative   Leuk Esterase: Negative / RBC: 1 /hpf / WBC 4 /HPF   Sq Epi: x / Non Sq Epi: 1 /hpf / Bacteria: Negative   Diane Rose MD  Internal Medicine, PGY-3  Pager: [phone] ([facility]) / [phone] ([facility])    INTERVAL HPI/OVERNIGHT EVENTS:  - Patient seen and examined at bedside  - Temp of 100.4 ON, restarted on Zosyn    MEDICATIONS  (STANDING):  albuterol/ipratropium for Nebulization 3 milliLiter(s) Nebulizer every 4 hours  amLODIPine   Tablet 10 milliGRAM(s) Oral daily  BACItracin   Ointment 1 Application(s) Topical daily  buDESOnide    Inhalation Suspension 0.25 milliGRAM(s) Nebulizer two times a day  chlorhexidine 4% Liquid 1 Application(s) Topical <User Schedule>  dextrose 40% Gel 15 Gram(s) Oral once  dextrose 5%. 1000 milliLiter(s) (100 mL/Hr) IV Continuous <Continuous>  dextrose 5%. 1000 milliLiter(s) (75 mL/Hr) IV Continuous <Continuous>  dextrose 50% Injectable 25 Gram(s) IV Push once  dextrose 50% Injectable 12.5 Gram(s) IV Push once  dextrose 50% Injectable 25 Gram(s) IV Push once  fentaNYL   Patch  12 MICROgram(s)/Hr 1 Patch Transdermal every 72 hours  glucagon  Injectable 1 milliGRAM(s) IntraMuscular once  heparin   Injectable 5000 Unit(s) SubCutaneous every 8 hours  influenza   Vaccine 0.5 milliLiter(s) IntraMuscular once  insulin lispro (ADMELOG) corrective regimen sliding scale   SubCutaneous every 6 hours  melatonin 5 milliGRAM(s) Oral at bedtime  metoprolol tartrate 12.5 milliGRAM(s) Oral every 12 hours  multivitamin/minerals/iron Oral Solution (CENTRUM) 15 milliLiter(s) Oral daily  pantoprazole  Injectable 40 milliGRAM(s) IV Push daily  piperacillin/tazobactam IVPB.. 3.375 Gram(s) IV Intermittent every 8 hours  polyethylene glycol 3350 17 Gram(s) Oral daily  senna Syrup 10 milliLiter(s) Oral at bedtime    MEDICATIONS  (PRN):  hydrALAZINE Injectable 10 milliGRAM(s) IV Push every 6 hours PRN SBP>180  morphine  - Injectable 1.5 milliGRAM(s) IV Push every 4 hours PRN dyspnea  OLANZapine Injectable 5 milliGRAM(s) IntraMuscular every 6 hours PRN agitation    OBJECTIVE:  VITAL SIGNS:  ICU Vital Signs Last 24 Hrs  T(C): 37.4 (18 Oct 2021 06:00), Max: 38 (18 Oct 2021 03:00)  T(F): 99.3 (18 Oct 2021 06:00), Max: 100.4 (18 Oct 2021 03:00)  HR: 81 (18 Oct 2021 06:00) (58 - 110)  BP: 106/73 (18 Oct 2021 06:00) (98/71 - 187/84)  BP(mean): 85 (18 Oct 2021 06:00) (74 - 121)  RR: 30 (18 Oct 2021 06:00) (21 - 40)  SpO2: 97% (18 Oct 2021 06:00) (96% - 100%)    I&O's Summary  10-17 @ 07:01  -  10-18 @ 07:00  --------------------------------------------------------  IN: 2226.4 mL / OUT: 1800 mL / NET: 426.4 mL    CAPILLARY BLOOD GLUCOSE  POCT Blood Glucose.: 134 mg/dL (18 Oct 2021 05:12)    PHYSICAL EXAM:  GENERAL: Elderly appearing man, not in distress, well-groomed, well-developed  HEAD: Atraumatic, Normocephalic  EYES: PERRL, conjunctiva and sclera clear  ENMT: MM dry on HFNC  NECK: Supple  NERVOUS SYSTEM: A&Ox3, pupils as above, moving all extremities  CHEST/LUNG: CTABL  HEART: RRR. No M/R/G +Heart sounds distant   ABDOMEN: Soft, Nontender, Nondistended; Bowel sounds present  EXTREMITIES: 2+ Peripheral Pulses, No edema, extremities warm    LABS:             8.7    18.08 )-----------( 325      ( 18 Oct 2021 01:44 )             32.0     10-18    143  |  109<H>  |  28<H>  ----------------------------<  128<H>  4.8   |  22  |  0.89    Ca    8.8      18 Oct 2021 00:30  Phos  3.7     10-18  Mg     2.4     10-18    TPro  6.8  /  Alb  3.5  /  TBili  0.3  /  DBili  x   /  AST  37  /  ALT  123<H>  /  AlkPhos  75  10-18    PT/INR - ( 18 Oct 2021 01:45 )   PT: 13.3 sec;   INR: 1.11 ratio    PTT - ( 18 Oct 2021 01:45 )  PTT:26.8 sec    Urinalysis Basic - ( 18 Oct 2021 04:19 )  Color: Light Yellow / Appearance: Clear / S.016 / pH: x  Gluc: x / Ketone: Negative  / Bili: Negative / Urobili: Negative   Blood: x / Protein: Trace / Nitrite: Negative   Leuk Esterase: Negative / RBC: 1 /hpf / WBC 4 /HPF   Sq Epi: x / Non Sq Epi: 1 /hpf / Bacteria: Negative    IMAGING:  Xray Chest 1 View- PORTABLE-Urgent (10.12.21 @ 18:21)  IMPRESSION:  Postintubation. Clear lungs.

## 2021-10-18 NOTE — PROGRESS NOTE ADULT - ATTENDING COMMENTS
Pt is a 72 yo BM with h/o COPD vs asthma, CAD s/p PCI (LCx, LAD, D1) and HTN presented in respiratory distress, using accessory muscles and tachypneic. Pt emergently placed on BiPAP, but continued to decline and required to be intubated (10/8/2021).  ICU dx: acute resp failure/ COPD exacerbation 2 to parainfluenza virus +/- superimposed bacterial infection. Pt, extubated and reintubated 10/12, extubated 10/14 to BiPAP and then transitioned HFnc    Resp/Psych: Pt on HFnc sat'ing 99%; place on nc with goal O2 sat >92%/ Cont Nebs/ For sensation of dyspnea +/- anxiety may cont low dose Opiates  ID: Finish course of Zosyn  CVS: Cont antiHTN meds  HEME: DVT prophylaxis with sq Heparin  FEN: Po diet  Endo: Follow FS and cover with Lispro Pt is a 72 yo BM with h/o COPD vs asthma, CAD s/p PCI (LCx, LAD, D1) and HTN presented in respiratory distress, using accessory muscles and tachypneic. Pt emergently placed on BiPAP, but continued to decline and required to be intubated (10/8/2021).  ICU dx: acute hypoxic resp failure/ COPD exacerbation 2 to parainfluenza virus +/- superimposed bacterial infection. Pt extubated and reintubated 10/12, extubated 10/14 to BiPAP and then transitioned HFnc    Resp/Psych: Pt on HFnc sat'ing 99%; place on nc with goal O2 sat >92%/ Cont Nebs/ For sensation of dyspnea +/- anxiety may cont low dose Opiates  ID: Finish course of Zosyn  CVS: Cont antiHTN meds  HEME: DVT prophylaxis with sq Heparin  FEN: Po diet  Endo: Follow FS and cover with Lispro

## 2021-10-19 LAB
ALBUMIN SERPL ELPH-MCNC: 3.3 G/DL — SIGNIFICANT CHANGE UP (ref 3.3–5)
ALP SERPL-CCNC: 71 U/L — SIGNIFICANT CHANGE UP (ref 40–120)
ALT FLD-CCNC: 95 U/L — HIGH (ref 10–45)
ANION GAP SERPL CALC-SCNC: 11 MMOL/L — SIGNIFICANT CHANGE UP (ref 5–17)
ANION GAP SERPL CALC-SCNC: 12 MMOL/L — SIGNIFICANT CHANGE UP (ref 5–17)
AST SERPL-CCNC: 34 U/L — SIGNIFICANT CHANGE UP (ref 10–40)
BILIRUB SERPL-MCNC: 0.4 MG/DL — SIGNIFICANT CHANGE UP (ref 0.2–1.2)
BUN SERPL-MCNC: 20 MG/DL — SIGNIFICANT CHANGE UP (ref 7–23)
BUN SERPL-MCNC: 22 MG/DL — SIGNIFICANT CHANGE UP (ref 7–23)
CALCIUM SERPL-MCNC: 8.6 MG/DL — SIGNIFICANT CHANGE UP (ref 8.4–10.5)
CALCIUM SERPL-MCNC: 8.6 MG/DL — SIGNIFICANT CHANGE UP (ref 8.4–10.5)
CHLORIDE SERPL-SCNC: 105 MMOL/L — SIGNIFICANT CHANGE UP (ref 96–108)
CHLORIDE SERPL-SCNC: 106 MMOL/L — SIGNIFICANT CHANGE UP (ref 96–108)
CO2 SERPL-SCNC: 24 MMOL/L — SIGNIFICANT CHANGE UP (ref 22–31)
CO2 SERPL-SCNC: 25 MMOL/L — SIGNIFICANT CHANGE UP (ref 22–31)
CREAT SERPL-MCNC: 0.98 MG/DL — SIGNIFICANT CHANGE UP (ref 0.5–1.3)
CREAT SERPL-MCNC: 1.09 MG/DL — SIGNIFICANT CHANGE UP (ref 0.5–1.3)
GAS PNL BLDA: SIGNIFICANT CHANGE UP
GAS PNL BLDA: SIGNIFICANT CHANGE UP
GLUCOSE SERPL-MCNC: 105 MG/DL — HIGH (ref 70–99)
GLUCOSE SERPL-MCNC: 141 MG/DL — HIGH (ref 70–99)
HCT VFR BLD CALC: 27.8 % — LOW (ref 39–50)
HGB BLD-MCNC: 7.9 G/DL — LOW (ref 13–17)
MAGNESIUM SERPL-MCNC: 2.2 MG/DL — SIGNIFICANT CHANGE UP (ref 1.6–2.6)
MAGNESIUM SERPL-MCNC: 2.2 MG/DL — SIGNIFICANT CHANGE UP (ref 1.6–2.6)
MCHC RBC-ENTMCNC: 21 PG — LOW (ref 27–34)
MCHC RBC-ENTMCNC: 28.4 GM/DL — LOW (ref 32–36)
MCV RBC AUTO: 73.9 FL — LOW (ref 80–100)
NRBC # BLD: 0 /100 WBCS — SIGNIFICANT CHANGE UP (ref 0–0)
PHOSPHATE SERPL-MCNC: 2.1 MG/DL — LOW (ref 2.5–4.5)
PHOSPHATE SERPL-MCNC: 2.2 MG/DL — LOW (ref 2.5–4.5)
PLATELET # BLD AUTO: 300 K/UL — SIGNIFICANT CHANGE UP (ref 150–400)
POTASSIUM SERPL-MCNC: 3.7 MMOL/L — SIGNIFICANT CHANGE UP (ref 3.5–5.3)
POTASSIUM SERPL-MCNC: 3.8 MMOL/L — SIGNIFICANT CHANGE UP (ref 3.5–5.3)
POTASSIUM SERPL-SCNC: 3.7 MMOL/L — SIGNIFICANT CHANGE UP (ref 3.5–5.3)
POTASSIUM SERPL-SCNC: 3.8 MMOL/L — SIGNIFICANT CHANGE UP (ref 3.5–5.3)
PROT SERPL-MCNC: 6.2 G/DL — SIGNIFICANT CHANGE UP (ref 6–8.3)
RBC # BLD: 3.76 M/UL — LOW (ref 4.2–5.8)
RBC # FLD: 23.1 % — HIGH (ref 10.3–14.5)
SODIUM SERPL-SCNC: 140 MMOL/L — SIGNIFICANT CHANGE UP (ref 135–145)
SODIUM SERPL-SCNC: 143 MMOL/L — SIGNIFICANT CHANGE UP (ref 135–145)
WBC # BLD: 17.45 K/UL — HIGH (ref 3.8–10.5)
WBC # FLD AUTO: 17.45 K/UL — HIGH (ref 3.8–10.5)

## 2021-10-19 PROCEDURE — 93010 ELECTROCARDIOGRAM REPORT: CPT

## 2021-10-19 PROCEDURE — 99291 CRITICAL CARE FIRST HOUR: CPT

## 2021-10-19 RX ORDER — DEXMEDETOMIDINE HYDROCHLORIDE IN 0.9% SODIUM CHLORIDE 4 UG/ML
0.2 INJECTION INTRAVENOUS
Qty: 200 | Refills: 0 | Status: DISCONTINUED | OUTPATIENT
Start: 2021-10-19 | End: 2021-10-21

## 2021-10-19 RX ORDER — ACETAMINOPHEN 500 MG
650 TABLET ORAL ONCE
Refills: 0 | Status: COMPLETED | OUTPATIENT
Start: 2021-10-19 | End: 2021-10-19

## 2021-10-19 RX ORDER — OLANZAPINE 15 MG/1
5 TABLET, FILM COATED ORAL ONCE
Refills: 0 | Status: COMPLETED | OUTPATIENT
Start: 2021-10-19 | End: 2021-10-19

## 2021-10-19 RX ORDER — SODIUM,POTASSIUM PHOSPHATES 278-250MG
1 POWDER IN PACKET (EA) ORAL ONCE
Refills: 0 | Status: COMPLETED | OUTPATIENT
Start: 2021-10-19 | End: 2021-10-19

## 2021-10-19 RX ADMIN — PIPERACILLIN AND TAZOBACTAM 25 GRAM(S): 4; .5 INJECTION, POWDER, LYOPHILIZED, FOR SOLUTION INTRAVENOUS at 08:42

## 2021-10-19 RX ADMIN — OLANZAPINE 5 MILLIGRAM(S): 15 TABLET, FILM COATED ORAL at 04:44

## 2021-10-19 RX ADMIN — Medication 3 MILLILITER(S): at 23:08

## 2021-10-19 RX ADMIN — Medication 3 MILLILITER(S): at 05:10

## 2021-10-19 RX ADMIN — MORPHINE SULFATE 1.5 MILLIGRAM(S): 50 CAPSULE, EXTENDED RELEASE ORAL at 17:04

## 2021-10-19 RX ADMIN — HEPARIN SODIUM 5000 UNIT(S): 5000 INJECTION INTRAVENOUS; SUBCUTANEOUS at 05:01

## 2021-10-19 RX ADMIN — HEPARIN SODIUM 5000 UNIT(S): 5000 INJECTION INTRAVENOUS; SUBCUTANEOUS at 21:19

## 2021-10-19 RX ADMIN — FENTANYL CITRATE 1 PATCH: 50 INJECTION INTRAVENOUS at 08:00

## 2021-10-19 RX ADMIN — DEXMEDETOMIDINE HYDROCHLORIDE IN 0.9% SODIUM CHLORIDE 3.19 MICROGRAM(S)/KG/HR: 4 INJECTION INTRAVENOUS at 17:05

## 2021-10-19 RX ADMIN — PANTOPRAZOLE SODIUM 40 MILLIGRAM(S): 20 TABLET, DELAYED RELEASE ORAL at 11:32

## 2021-10-19 RX ADMIN — PIPERACILLIN AND TAZOBACTAM 25 GRAM(S): 4; .5 INJECTION, POWDER, LYOPHILIZED, FOR SOLUTION INTRAVENOUS at 23:59

## 2021-10-19 RX ADMIN — OLANZAPINE 5 MILLIGRAM(S): 15 TABLET, FILM COATED ORAL at 07:15

## 2021-10-19 RX ADMIN — Medication 1 APPLICATION(S): at 11:21

## 2021-10-19 RX ADMIN — AMLODIPINE BESYLATE 10 MILLIGRAM(S): 2.5 TABLET ORAL at 05:00

## 2021-10-19 RX ADMIN — OLANZAPINE 5 MILLIGRAM(S): 15 TABLET, FILM COATED ORAL at 16:15

## 2021-10-19 RX ADMIN — HEPARIN SODIUM 5000 UNIT(S): 5000 INJECTION INTRAVENOUS; SUBCUTANEOUS at 13:02

## 2021-10-19 RX ADMIN — Medication 15 MILLILITER(S): at 11:31

## 2021-10-19 RX ADMIN — Medication 12.5 MILLIGRAM(S): at 05:00

## 2021-10-19 RX ADMIN — PIPERACILLIN AND TAZOBACTAM 25 GRAM(S): 4; .5 INJECTION, POWDER, LYOPHILIZED, FOR SOLUTION INTRAVENOUS at 15:15

## 2021-10-19 RX ADMIN — Medication 1 TABLET(S): at 05:01

## 2021-10-19 RX ADMIN — FENTANYL CITRATE 1 PATCH: 50 INJECTION INTRAVENOUS at 05:15

## 2021-10-19 RX ADMIN — Medication 3 MILLILITER(S): at 14:16

## 2021-10-19 RX ADMIN — MORPHINE SULFATE 1.5 MILLIGRAM(S): 50 CAPSULE, EXTENDED RELEASE ORAL at 08:13

## 2021-10-19 RX ADMIN — MORPHINE SULFATE 1.5 MILLIGRAM(S): 50 CAPSULE, EXTENDED RELEASE ORAL at 01:20

## 2021-10-19 RX ADMIN — MORPHINE SULFATE 1.5 MILLIGRAM(S): 50 CAPSULE, EXTENDED RELEASE ORAL at 04:55

## 2021-10-19 RX ADMIN — MORPHINE SULFATE 1.5 MILLIGRAM(S): 50 CAPSULE, EXTENDED RELEASE ORAL at 00:55

## 2021-10-19 RX ADMIN — Medication 650 MILLIGRAM(S): at 00:17

## 2021-10-19 RX ADMIN — Medication 0.25 MILLIGRAM(S): at 05:11

## 2021-10-19 RX ADMIN — Medication 0.25 MILLIGRAM(S): at 17:48

## 2021-10-19 RX ADMIN — Medication 3 MILLILITER(S): at 17:48

## 2021-10-19 RX ADMIN — CHLORHEXIDINE GLUCONATE 1 APPLICATION(S): 213 SOLUTION TOPICAL at 05:01

## 2021-10-19 RX ADMIN — Medication 3 MILLILITER(S): at 10:18

## 2021-10-19 RX ADMIN — Medication 650 MILLIGRAM(S): at 00:44

## 2021-10-19 RX ADMIN — Medication 5 MILLIGRAM(S): at 21:20

## 2021-10-19 RX ADMIN — Medication 12.5 MILLIGRAM(S): at 17:07

## 2021-10-19 RX ADMIN — MORPHINE SULFATE 1.5 MILLIGRAM(S): 50 CAPSULE, EXTENDED RELEASE ORAL at 04:57

## 2021-10-19 NOTE — PHYSICAL THERAPY INITIAL EVALUATION ADULT - ACTIVE RANGE OF MOTION EXAMINATION, REHAB EVAL
grossly assessed as patient unwilling to fully cooperate/bilateral upper extremity Active ROM was WFL (within functional limits)/bilateral  lower extremity Active ROM was WFL (within functional limits)

## 2021-10-19 NOTE — PROGRESS NOTE ADULT - ATTENDING COMMENTS
Pt is a 70 yo BM with h/o COPD vs asthma, CAD s/p PCI (LCx, LAD, D1) and HTN presented in respiratory distress, using accessory muscles and tachypneic. Pt emergently placed on BiPAP, but continued to decline and required to be intubated (10/8/2021).  ICU dx: acute hypoxic resp failure/ COPD exacerbation 2 to parainfluenza virus +/- superimposed bacterial infection. Pt extubated and reintubated 10/12, extubated 10/14 to BiPAP and then transitioned HFnc. Yesterday placed on nc O2 and then later on needed to be placed back on HFnc    Resp/Psych: Cont HFnc with goal O2 sat >92%/ Cont Nebs/ For sensation of dyspnea +/- anxiety may cont low dose Opiates  ID: Finish course of Zosyn  CVS: Cont antiHTN meds; may need to increase  HEME: DVT prophylaxis with sq Heparin  FEN: Po diet/ Replace Phos; pt hypophosphatemic   Endo: Follow FS and cover with Lispro  Social: OOB->chair/ Pt to remain in the ICU 2 to tenuous resp status
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
## 2021-10-19 NOTE — PROGRESS NOTE ADULT - SUBJECTIVE AND OBJECTIVE BOX
Diane Rose MD  Internal Medicine, PGY-3  Pager: [phone] ([facility]) / [phone] ([facility])    INTERVAL HPI/OVERNIGHT EVENTS:  - Patient seen and examined at bedside  - Temp of 100.4 ON  - Agitated and delirious, removing HFNC   - Redirected  - Hypernatremia resolved, discontinued fluids    MEDICATIONS  (STANDING):  albuterol/ipratropium for Nebulization 3 milliLiter(s) Nebulizer every 4 hours  amLODIPine   Tablet 10 milliGRAM(s) Oral daily  BACItracin   Ointment 1 Application(s) Topical daily  buDESOnide    Inhalation Suspension 0.25 milliGRAM(s) Nebulizer two times a day  chlorhexidine 4% Liquid 1 Application(s) Topical <User Schedule>  dextrose 40% Gel 15 Gram(s) Oral once  dextrose 5%. 1000 milliLiter(s) (100 mL/Hr) IV Continuous <Continuous>  dextrose 50% Injectable 25 Gram(s) IV Push once  dextrose 50% Injectable 12.5 Gram(s) IV Push once  dextrose 50% Injectable 25 Gram(s) IV Push once  fentaNYL   Patch  12 MICROgram(s)/Hr 1 Patch Transdermal every 72 hours  glucagon  Injectable 1 milliGRAM(s) IntraMuscular once  heparin   Injectable 5000 Unit(s) SubCutaneous every 8 hours  influenza   Vaccine 0.5 milliLiter(s) IntraMuscular once  melatonin 5 milliGRAM(s) Oral at bedtime  metoprolol tartrate 12.5 milliGRAM(s) Oral every 12 hours  multivitamin/minerals/iron Oral Solution (CENTRUM) 15 milliLiter(s) Oral daily  pantoprazole  Injectable 40 milliGRAM(s) IV Push daily  piperacillin/tazobactam IVPB.. 3.375 Gram(s) IV Intermittent every 8 hours  polyethylene glycol 3350 17 Gram(s) Oral daily  senna Syrup 10 milliLiter(s) Oral at bedtime    MEDICATIONS  (PRN):  hydrALAZINE Injectable 10 milliGRAM(s) IV Push every 6 hours PRN SBP>180  morphine  - Injectable 1.5 milliGRAM(s) IV Push every 4 hours PRN dyspnea  OLANZapine Injectable 5 milliGRAM(s) IntraMuscular every 6 hours PRN agitation    OBJECTIVE:  ICU Vital Signs Last 24 Hrs  T(C): 37.6 (19 Oct 2021 04:00), Max: 38.4 (18 Oct 2021 18:00)  T(F): 99.7 (19 Oct 2021 04:00), Max: 101.1 (18 Oct 2021 18:00)  HR: 79 (19 Oct 2021 07:00) (75 - 120)  BP: 149/65 (19 Oct 2021 07:00) (112/56 - 180/79)  BP(mean): 94 (19 Oct 2021 07:00) (79 - 131)  RR: 24 (19 Oct 2021 07:00) (23 - 36)  SpO2: 94% (19 Oct 2021 07:00) (91% - 100%)    I&O's Summary  18 Oct 2021 07:01  -  19 Oct 2021 07:00  --------------------------------------------------------  IN: 2330 mL / OUT: 1500 mL / NET: 830 mL    CAPILLARY BLOOD GLUCOSE  POCT Blood Glucose.: 137 mg/dL (18 Oct 2021 17:08)  POCT Blood Glucose.: 149 mg/dL (18 Oct 2021 11:37)    PHYSICAL EXAM:  GENERAL: Elderly appearing man, not in distress, well-groomed, well-developed  HEAD: Atraumatic, Normocephalic  EYES: PERRL, conjunctiva and sclera clear  ENMT: MM dry on HFNC  NECK: Supple  NERVOUS SYSTEM: A&Ox3, pupils as above, moving all extremities  CHEST/LUNG: CTABL  HEART: RRR. No M/R/G +Heart sounds distant   ABDOMEN: Soft, Nontender, Nondistended; Bowel sounds present  EXTREMITIES: 2+ Peripheral Pulses, No edema, extremities warm    LABS:             7.9    17.45 )-----------( 300      ( 19 Oct 2021 00:46 )             27.8     10-19    140  |  105  |  20  ----------------------------<  141<H>  3.7   |  24  |  0.98    Ca    8.6      19 Oct 2021 00:46  Phos  2.2     10-19  Mg     2.2     10-19    TPro  6.2  /  Alb  3.3  /  TBili  0.4  /  DBili  x   /  AST  34  /  ALT  95<H>  /  AlkPhos  71  10-18    ABG - ( 19 Oct 2021 00:37 )  pH, Arterial: 7.45  pH, Blood: x     /  pCO2: 43    /  pO2: 74    / HCO3: 30    / Base Excess: 5.4   /  SaO2: 96.8      LIVER FUNCTIONS - ( 18 Oct 2021 22:42 )  Alb: 3.3 g/dL / Pro: 6.2 g/dL / ALK PHOS: 71 U/L / ALT: 95 U/L / AST: 34 U/L / GGT: x           Urinalysis Basic - ( 18 Oct 2021 04:19 )  Color: Light Yellow / Appearance: Clear / S.016 / pH: x  Gluc: x / Ketone: Negative  / Bili: Negative / Urobili: Negative   Blood: x / Protein: Trace / Nitrite: Negative   Leuk Esterase: Negative / RBC: 1 /hpf / WBC 4 /HPF   Sq Epi: x / Non Sq Epi: 1 /hpf / Bacteria: Negative    PT/INR - ( 18 Oct 2021 22:42 )   PT: 14.2 sec;   INR: 1.19 ratio    PTT - ( 18 Oct 2021 22:42 )  PTT:27.7 sec    IMAGING:  Xray Chest 1 View- PORTABLE-Urgent (10.12.21 @ 18:21)  IMPRESSION:  Postintubation. Clear lungs.

## 2021-10-19 NOTE — PHYSICAL THERAPY INITIAL EVALUATION ADULT - MANUAL MUSCLE TESTING RESULTS, REHAB EVAL
grossly 3-/5 throughout determined via observation of spontaneous movement as patient not willing to fully cooperate

## 2021-10-19 NOTE — PROGRESS NOTE ADULT - ASSESSMENT
72yo M w/ PMH of COPD, Asthma, HTN, CAD s/p PCI (LCx, LAD, D1), admit to MICU intubated 10/8 2/2 AHRF i/s/o viral PNA d/t parainfluenza, c/f superimposed bacterial PNA, and COPD exacerbation, extubated 10/14 to BiPAP now on [id number] M w/ PMH of COPD, Asthma, HTN, CAD s/p PCI (LCx, LAD, D1), admit to MICU intubated 10/8 2/2 AHRF i/s/o viral PNA d/t parainfluenza, c/f superimposed bacterial PNA, and COPD exacerbation, extubated 10/14 to BiPAP now on HFNC    # Neuro  Extubated, off sedation  - fentanyl 12mcg patch, morphine 1.5mg q4h prn iso dyspnea, anxiety     # Cardiovascular  HTN 150s-180s  - avoiding BB iso COPD, will give IV hydralazine 5mg q6h prn SBP > 180  - MAP goal > 65    # Respiratory  // AHRF 2/2 viral PNA (parainfluenza+) c/b possible superimposed bacterial PNA and COPD exacerbation  - intubated 10/8, extubated and reintubated 10/12, extubated 10/14 to BiPAP now on HFNC, tolerating well will wean to NC   - completed 5 day abx course (CTX -> Levaquin -> CTX) 10/8-10/12   - Febrile ON 10/18 w/ increasing leukocytosis, restarted on Zosyn  - c/w duonebs, inhaled budesonide  - s/p solumedrol 10/8-10/16  - MRSA negative  - legionella negative    # GI/Nutrition  No active issues  - Diet: Full Liquid, will advance as tolerated    #/Renal  // OWEN   - Resolved, SCr at bl   - monitor UOP, Strict Is/Os  - monitor BMP, replete lytes prn    # ID  // Viral PNA (parainfluenza+) c/b possible superimposed bacterial PNA  - completed 5 day abx course (CTX -> Levaquin -> CTX) 10/8-10/12  - febrile 10/15, cultures sent, negative thus far   - MRSA, legionella negative as above  - RVP +parainfluenza, BCx 10/8 NGTD  - Febrile ON 10/18 w/ leukocytosis, started Zosyn  - Blood Cx pending again, will c/w abx until cultures result. If neg can dc    # Endocrine  No active issues  - TSH low 0.07, TFTs received  - monitor FS TID    # Hematologic/Oncologic  - Microcytic Anemia, stable  - monitor CBC, transfuse hgb > 7     #PPx  - DVT ppx w/ subq heparin 5000u TID    #Ethics  - clarify GOC w/ family

## 2021-10-20 LAB
ALBUMIN SERPL ELPH-MCNC: 3.2 G/DL — LOW (ref 3.3–5)
ALP SERPL-CCNC: 69 U/L — SIGNIFICANT CHANGE UP (ref 40–120)
ALT FLD-CCNC: 74 U/L — HIGH (ref 10–45)
ANION GAP SERPL CALC-SCNC: 11 MMOL/L — SIGNIFICANT CHANGE UP (ref 5–17)
AST SERPL-CCNC: 28 U/L — SIGNIFICANT CHANGE UP (ref 10–40)
BILIRUB SERPL-MCNC: 0.3 MG/DL — SIGNIFICANT CHANGE UP (ref 0.2–1.2)
BUN SERPL-MCNC: 26 MG/DL — HIGH (ref 7–23)
CALCIUM SERPL-MCNC: 8.7 MG/DL — SIGNIFICANT CHANGE UP (ref 8.4–10.5)
CHLORIDE SERPL-SCNC: 108 MMOL/L — SIGNIFICANT CHANGE UP (ref 96–108)
CO2 SERPL-SCNC: 24 MMOL/L — SIGNIFICANT CHANGE UP (ref 22–31)
CREAT SERPL-MCNC: 1.49 MG/DL — HIGH (ref 0.5–1.3)
CULTURE RESULTS: SIGNIFICANT CHANGE UP
CULTURE RESULTS: SIGNIFICANT CHANGE UP
GAS PNL BLDA: SIGNIFICANT CHANGE UP
GLUCOSE BLDC GLUCOMTR-MCNC: 119 MG/DL — HIGH (ref 70–99)
GLUCOSE SERPL-MCNC: 125 MG/DL — HIGH (ref 70–99)
HCT VFR BLD CALC: 26.2 % — LOW (ref 39–50)
HGB BLD-MCNC: 7.7 G/DL — LOW (ref 13–17)
INR BLD: 1.07 RATIO — SIGNIFICANT CHANGE UP (ref 0.88–1.16)
MAGNESIUM SERPL-MCNC: 2.2 MG/DL — SIGNIFICANT CHANGE UP (ref 1.6–2.6)
MCHC RBC-ENTMCNC: 21.1 PG — LOW (ref 27–34)
MCHC RBC-ENTMCNC: 29.4 GM/DL — LOW (ref 32–36)
MCV RBC AUTO: 71.8 FL — LOW (ref 80–100)
NRBC # BLD: 0 /100 WBCS — SIGNIFICANT CHANGE UP (ref 0–0)
PHOSPHATE SERPL-MCNC: 3.8 MG/DL — SIGNIFICANT CHANGE UP (ref 2.5–4.5)
PLATELET # BLD AUTO: 217 K/UL — SIGNIFICANT CHANGE UP (ref 150–400)
POTASSIUM SERPL-MCNC: 4.1 MMOL/L — SIGNIFICANT CHANGE UP (ref 3.5–5.3)
POTASSIUM SERPL-SCNC: 4.1 MMOL/L — SIGNIFICANT CHANGE UP (ref 3.5–5.3)
PROT SERPL-MCNC: 6 G/DL — SIGNIFICANT CHANGE UP (ref 6–8.3)
PROTHROM AB SERPL-ACNC: 12.8 SEC — SIGNIFICANT CHANGE UP (ref 10.6–13.6)
RBC # BLD: 3.65 M/UL — LOW (ref 4.2–5.8)
RBC # FLD: 23.3 % — HIGH (ref 10.3–14.5)
SODIUM SERPL-SCNC: 143 MMOL/L — SIGNIFICANT CHANGE UP (ref 135–145)
SPECIMEN SOURCE: SIGNIFICANT CHANGE UP
SPECIMEN SOURCE: SIGNIFICANT CHANGE UP
WBC # BLD: 12.02 K/UL — HIGH (ref 3.8–10.5)
WBC # FLD AUTO: 12.02 K/UL — HIGH (ref 3.8–10.5)

## 2021-10-20 PROCEDURE — 99291 CRITICAL CARE FIRST HOUR: CPT

## 2021-10-20 RX ADMIN — FENTANYL CITRATE 1 PATCH: 50 INJECTION INTRAVENOUS at 19:25

## 2021-10-20 RX ADMIN — Medication 3 MILLILITER(S): at 21:08

## 2021-10-20 RX ADMIN — MORPHINE SULFATE 1.5 MILLIGRAM(S): 50 CAPSULE, EXTENDED RELEASE ORAL at 13:58

## 2021-10-20 RX ADMIN — Medication 1 APPLICATION(S): at 11:25

## 2021-10-20 RX ADMIN — Medication 0.25 MILLIGRAM(S): at 05:28

## 2021-10-20 RX ADMIN — PIPERACILLIN AND TAZOBACTAM 25 GRAM(S): 4; .5 INJECTION, POWDER, LYOPHILIZED, FOR SOLUTION INTRAVENOUS at 17:03

## 2021-10-20 RX ADMIN — Medication 12.5 MILLIGRAM(S): at 17:02

## 2021-10-20 RX ADMIN — Medication 12.5 MILLIGRAM(S): at 05:16

## 2021-10-20 RX ADMIN — Medication 3 MILLILITER(S): at 09:02

## 2021-10-20 RX ADMIN — HEPARIN SODIUM 5000 UNIT(S): 5000 INJECTION INTRAVENOUS; SUBCUTANEOUS at 05:33

## 2021-10-20 RX ADMIN — AMLODIPINE BESYLATE 10 MILLIGRAM(S): 2.5 TABLET ORAL at 05:15

## 2021-10-20 RX ADMIN — PIPERACILLIN AND TAZOBACTAM 25 GRAM(S): 4; .5 INJECTION, POWDER, LYOPHILIZED, FOR SOLUTION INTRAVENOUS at 07:40

## 2021-10-20 RX ADMIN — PANTOPRAZOLE SODIUM 40 MILLIGRAM(S): 20 TABLET, DELAYED RELEASE ORAL at 11:26

## 2021-10-20 RX ADMIN — Medication 0.25 MILLIGRAM(S): at 13:56

## 2021-10-20 RX ADMIN — Medication 3 MILLILITER(S): at 17:01

## 2021-10-20 RX ADMIN — OLANZAPINE 5 MILLIGRAM(S): 15 TABLET, FILM COATED ORAL at 04:36

## 2021-10-20 RX ADMIN — Medication 3 MILLILITER(S): at 13:01

## 2021-10-20 RX ADMIN — CHLORHEXIDINE GLUCONATE 1 APPLICATION(S): 213 SOLUTION TOPICAL at 05:26

## 2021-10-20 RX ADMIN — Medication 15 MILLILITER(S): at 11:29

## 2021-10-20 RX ADMIN — FENTANYL CITRATE 1 PATCH: 50 INJECTION INTRAVENOUS at 09:30

## 2021-10-20 RX ADMIN — MORPHINE SULFATE 1.5 MILLIGRAM(S): 50 CAPSULE, EXTENDED RELEASE ORAL at 14:30

## 2021-10-20 RX ADMIN — Medication 3 MILLILITER(S): at 05:28

## 2021-10-20 RX ADMIN — MORPHINE SULFATE 1.5 MILLIGRAM(S): 50 CAPSULE, EXTENDED RELEASE ORAL at 21:57

## 2021-10-20 RX ADMIN — Medication 5 MILLIGRAM(S): at 21:57

## 2021-10-20 NOTE — PROGRESS NOTE ADULT - ATTENDING COMMENTS
Pt is a 72 yo BM with h/o COPD vs asthma, CAD s/p PCI (LCx, LAD, D1) and HTN presented in respiratory distress, using accessory muscles and tachypneic. Pt emergently placed on BiPAP, but continued to decline and required to be intubated (10/8/2021).  ICU dx: acute hypoxic resp failure/ COPD exacerbation 2 to parainfluenza virus +/- superimposed bacterial infection. Pt extubated and reintubated 10/12, extubated 10/14 to BiPAP and then transitioned HFnc. Was agitated last pm and today required to be placed on BiPAP    Resp/Psych: Goal O2 sat >92%/ Required to be placed on BiPAP/Cont Nebs/ For sensation of dyspnea +/- anxiety may cont low dose Opiates  ID: Finish course of Zosyn  CVS: Cont antiHTN meds  HEME: DVT prophylaxis with sq Heparin  FEN: NPO while on BiPAP  Endo: Follow FS and cover with Lispro  Social: OOB->chair/ Pt to remain in the ICU 2 to tenuous resp status and placed on BiPAP/ Family at the bedside

## 2021-10-20 NOTE — PROGRESS NOTE ADULT - ASSESSMENT
70yo M w/ PMH of COPD, Asthma, HTN, CAD s/p PCI (LCx, LAD, D1), admit to MICU intubated 10/8 2/2 AHRF i/s/o viral PNA d/t parainfluenza, c/f superimposed bacterial PNA, and COPD exacerbation, extubated 10/14 to BiPAP now on [id number] M w/ PMH of COPD, Asthma, HTN, CAD s/p PCI (LCx, LAD, D1), admit to MICU intubated 10/8 2/2 AHRF i/s/o viral PNA d/t parainfluenza, c/f superimposed bacterial PNA, and COPD exacerbation, extubated 10/14 to BiPAP now on HFNC    # Neuro  - fentanyl 12mcg patch, morphine 1.5mg q4h prn iso dyspnea, anxiety   - Requiring Precedex for agitation    # Cardiovascular  HTN 150s-180s  - avoiding BB iso COPD, will give IV hydralazine 5mg q6h prn SBP > 180  - MAP goal > 65    # Respiratory  // AHRF 2/2 viral PNA (parainfluenza+) c/b possible superimposed bacterial PNA and COPD exacerbation  - intubated 10/8, extubated and reintubated 10/12, extubated 10/14 to BiPAP now on HFNC, tolerating well will wean to NC   - completed 5 day abx course (CTX -> Levaquin -> CTX) 10/8-10/12   - Febrile ON 10/18 w/ increasing leukocytosis, restarted on Zosyn  - c/w duonebs, inhaled budesonide  - s/p solumedrol 10/8-10/16  - MRSA negative  - legionella negative    # GI/Nutrition  No active issues  - Diet: Full Liquid, will advance as tolerated    #/Renal  // OWEN   - Resolved, SCr at bl   - monitor UOP, Strict Is/Os  - monitor BMP, replete lytes prn    # ID  // Viral PNA (parainfluenza+) c/b possible superimposed bacterial PNA  - completed 5 day abx course (CTX -> Levaquin -> CTX) 10/8-10/12  - febrile 10/15, cultures sent, negative thus far   - MRSA, legionella negative as above  - RVP +parainfluenza, BCx 10/8 NGTD  - Febrile ON 10/18 w/ leukocytosis, started Zosyn  - Blood Cx pending again, will c/w abx until cultures result. If neg can dc    # Endocrine  No active issues  - TSH low 0.07, TFTs received  - monitor FS TID    # Hematologic/Oncologic  - Microcytic Anemia, stable  - monitor CBC, transfuse hgb > 7     #PPx  - DVT ppx w/ subq heparin 5000u TID    #Ethics  - clarify GOC w/ family

## 2021-10-20 NOTE — PROGRESS NOTE ADULT - SUBJECTIVE AND OBJECTIVE BOX
Diane Rose MD  Internal Medicine, PGY-3  Pager: [phone] ([facility]) / [phone] ([facility])    INTERVAL HPI/OVERNIGHT EVENTS:  - Slightly confused ON still requiring Precedex for agitation  - Bump in Cr on AM labs    MEDICATIONS  (STANDING):  albuterol/ipratropium for Nebulization 3 milliLiter(s) Nebulizer every 4 hours  amLODIPine   Tablet 10 milliGRAM(s) Oral daily  BACItracin   Ointment 1 Application(s) Topical daily  buDESOnide    Inhalation Suspension 0.25 milliGRAM(s) Nebulizer two times a day  chlorhexidine 4% Liquid 1 Application(s) Topical <User Schedule>  dexMEDEtomidine Infusion 0.2 MICROgram(s)/kG/Hr (3.19 mL/Hr) IV Continuous <Continuous>  dextrose 40% Gel 15 Gram(s) Oral once  dextrose 5%. 1000 milliLiter(s) (100 mL/Hr) IV Continuous <Continuous>  dextrose 50% Injectable 25 Gram(s) IV Push once  dextrose 50% Injectable 12.5 Gram(s) IV Push once  dextrose 50% Injectable 25 Gram(s) IV Push once  fentaNYL   Patch  12 MICROgram(s)/Hr 1 Patch Transdermal every 72 hours  glucagon  Injectable 1 milliGRAM(s) IntraMuscular once  heparin   Injectable 5000 Unit(s) SubCutaneous every 8 hours  influenza   Vaccine 0.5 milliLiter(s) IntraMuscular once  melatonin 5 milliGRAM(s) Oral at bedtime  metoprolol tartrate 12.5 milliGRAM(s) Oral every 12 hours  multivitamin/minerals/iron Oral Solution (CENTRUM) 15 milliLiter(s) Oral daily  pantoprazole  Injectable 40 milliGRAM(s) IV Push daily  piperacillin/tazobactam IVPB.. 3.375 Gram(s) IV Intermittent every 8 hours  polyethylene glycol 3350 17 Gram(s) Oral daily  senna Syrup 10 milliLiter(s) Oral at bedtime    MEDICATIONS  (PRN):  hydrALAZINE Injectable 10 milliGRAM(s) IV Push every 6 hours PRN SBP>180  morphine  - Injectable 1.5 milliGRAM(s) IV Push every 4 hours PRN dyspnea  OLANZapine Injectable 5 milliGRAM(s) IntraMuscular every 6 hours PRN agitation    OBJECTIVE:  ICU Vital Signs Last 24 Hrs  T(C): 36.6 (20 Oct 2021 04:00), Max: 37 (19 Oct 2021 12:00)  T(F): 97.8 (20 Oct 2021 04:00), Max: 98.6 (19 Oct 2021 12:00)  HR: 49 (20 Oct 2021 07:50) (49 - 118)  BP: 101/50 (20 Oct 2021 07:00) (82/47 - 190/78)  BP(mean): 72 (20 Oct 2021 07:00) (60 - 138)  RR: 25 (20 Oct 2021 07:49) (16 - 70)  SpO2: 100% (20 Oct 2021 07:50) (86% - 100%)    I&O's Summary  19 Oct 2021 07:01  -  20 Oct 2021 07:00  --------------------------------------------------------  IN: 632 mL / OUT: 600 mL / NET: 32 mL    PHYSICAL EXAM:  GENERAL: Elderly appearing man, not in distress, well-groomed, well-developed  HEAD: Atraumatic, Normocephalic  EYES: PERRL, conjunctiva and sclera clear  ENMT: MM dry on HFNC  NECK: Supple  NERVOUS SYSTEM: A&Ox3, pupils as above, moving all extremities  CHEST/LUNG: CTABL  HEART: RRR. No M/R/G +Heart sounds distant   ABDOMEN: Soft, Nontender, Nondistended; Bowel sounds present  EXTREMITIES: 2+ Peripheral Pulses, No edema, extremities warm    LABS:             7.7    12.02 )-----------( 217      ( 20 Oct 2021 00:27 )             26.2     10-20    143  |  108  |  26<H>  ----------------------------<  125<H>  4.1   |  24  |  1.49<H>    Ca    8.7      20 Oct 2021 00:27  Phos  3.8     10-20  Mg     2.2     10-20    TPro  6.0  /  Alb  3.2<L>  /  TBili  0.3  /  DBili  x   /  AST  28  /  ALT  74<H>  /  AlkPhos  69  10-20    ABG - ( 20 Oct 2021 00:19 )  pH, Arterial: 7.44  pH, Blood: x     /  pCO2: 44    /  pO2: 106   / HCO3: 30    / Base Excess: 5.2   /  SaO2: 98.8      LIVER FUNCTIONS - ( 20 Oct 2021 00:27 )  Alb: 3.2 g/dL / Pro: 6.0 g/dL / ALK PHOS: 69 U/L / ALT: 74 U/L / AST: 28 U/L / GGT: x           PT/INR - ( 20 Oct 2021 00:27 )   PT: 12.8 sec;   INR: 1.07 ratio    PTT - ( 18 Oct 2021 22:42 )  PTT:27.7 sec    IMAGING:  Xray Chest 1 View- PORTABLE-Urgent (10.12.21 @ 18:21)  IMPRESSION:  Postintubation. Clear lungs.

## 2021-10-20 NOTE — OCCUPATIONAL THERAPY INITIAL EVALUATION ADULT - LIVES WITH, PROFILE
Pt lives in apartment with elevator access. Pt reports independence with fxnl mobility and all ADLs without AD.
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
## 2021-10-20 NOTE — OCCUPATIONAL THERAPY INITIAL EVALUATION ADULT - RANGE OF MOTION EXAMINATION, LOWER EXTREMITY
190 bilateral LE Active ROM was WFL  (within functional limits)/bilateral LE Passive ROM was WFL  (within functional limits)

## 2021-10-20 NOTE — OCCUPATIONAL THERAPY INITIAL EVALUATION ADULT - PERTINENT HX OF CURRENT PROBLEM, REHAB EVAL
71M w/ PMH of COPD, Asthma, HTN, CAD s/p PCI (LCx, LAD, D1), admit to MICU intubated 10/8 2/2 AHRF i/s/o viral PNA d/t parainfluenza, c/f superimposed bacterial PNA, and COPD exacerbation, extubated 10/14 to BiPAP now on [id number] M w/ PMH of COPD, Asthma, HTN, CAD s/p PCI (LCx, LAD, D1), admit to MICU intubated 10/8 2/2 AHRF i/s/o viral PNA d/t parainfluenza, c/f superimposed bacterial PNA, and COPD exacerbation, extubated 10/14 to BiPAP now on HFNC

## 2021-10-21 DIAGNOSIS — D50.9 IRON DEFICIENCY ANEMIA, UNSPECIFIED: ICD-10-CM

## 2021-10-21 DIAGNOSIS — I10 ESSENTIAL (PRIMARY) HYPERTENSION: ICD-10-CM

## 2021-10-21 DIAGNOSIS — J12.9 VIRAL PNEUMONIA, UNSPECIFIED: ICD-10-CM

## 2021-10-21 DIAGNOSIS — F41.9 ANXIETY DISORDER, UNSPECIFIED: ICD-10-CM

## 2021-10-21 DIAGNOSIS — N17.9 ACUTE KIDNEY FAILURE, UNSPECIFIED: ICD-10-CM

## 2021-10-21 DIAGNOSIS — J96.90 RESPIRATORY FAILURE, UNSPECIFIED, UNSPECIFIED WHETHER WITH HYPOXIA OR HYPERCAPNIA: ICD-10-CM

## 2021-10-21 DIAGNOSIS — Z29.9 ENCOUNTER FOR PROPHYLACTIC MEASURES, UNSPECIFIED: ICD-10-CM

## 2021-10-21 DIAGNOSIS — R74.01 ELEVATION OF LEVELS OF LIVER TRANSAMINASE LEVELS: ICD-10-CM

## 2021-10-21 DIAGNOSIS — J96.01 ACUTE RESPIRATORY FAILURE WITH HYPOXIA: ICD-10-CM

## 2021-10-21 LAB
ALBUMIN SERPL ELPH-MCNC: 3.2 G/DL — LOW (ref 3.3–5)
ALP SERPL-CCNC: 78 U/L — SIGNIFICANT CHANGE UP (ref 40–120)
ALT FLD-CCNC: 68 U/L — HIGH (ref 10–45)
ANION GAP SERPL CALC-SCNC: 12 MMOL/L — SIGNIFICANT CHANGE UP (ref 5–17)
APTT BLD: 21.8 SEC — LOW (ref 27.5–35.5)
AST SERPL-CCNC: 35 U/L — SIGNIFICANT CHANGE UP (ref 10–40)
BASOPHILS # BLD AUTO: 0.02 K/UL — SIGNIFICANT CHANGE UP (ref 0–0.2)
BASOPHILS NFR BLD AUTO: 0.1 % — SIGNIFICANT CHANGE UP (ref 0–2)
BILIRUB SERPL-MCNC: 0.3 MG/DL — SIGNIFICANT CHANGE UP (ref 0.2–1.2)
BUN SERPL-MCNC: 22 MG/DL — SIGNIFICANT CHANGE UP (ref 7–23)
CALCIUM SERPL-MCNC: 8.3 MG/DL — LOW (ref 8.4–10.5)
CHLORIDE SERPL-SCNC: 107 MMOL/L — SIGNIFICANT CHANGE UP (ref 96–108)
CO2 SERPL-SCNC: 25 MMOL/L — SIGNIFICANT CHANGE UP (ref 22–31)
CREAT SERPL-MCNC: 1.26 MG/DL — SIGNIFICANT CHANGE UP (ref 0.5–1.3)
EOSINOPHIL # BLD AUTO: 0.13 K/UL — SIGNIFICANT CHANGE UP (ref 0–0.5)
EOSINOPHIL NFR BLD AUTO: 0.9 % — SIGNIFICANT CHANGE UP (ref 0–6)
GAS PNL BLDA: SIGNIFICANT CHANGE UP
GLUCOSE BLDC GLUCOMTR-MCNC: 125 MG/DL — HIGH (ref 70–99)
GLUCOSE SERPL-MCNC: 98 MG/DL — SIGNIFICANT CHANGE UP (ref 70–99)
HCT VFR BLD CALC: 26.4 % — LOW (ref 39–50)
HGB BLD-MCNC: 7.6 G/DL — LOW (ref 13–17)
IMM GRANULOCYTES NFR BLD AUTO: 0.5 % — SIGNIFICANT CHANGE UP (ref 0–1.5)
INR BLD: 1.11 RATIO — SIGNIFICANT CHANGE UP (ref 0.88–1.16)
LYMPHOCYTES # BLD AUTO: 1.57 K/UL — SIGNIFICANT CHANGE UP (ref 1–3.3)
LYMPHOCYTES # BLD AUTO: 11.2 % — LOW (ref 13–44)
MAGNESIUM SERPL-MCNC: 2 MG/DL — SIGNIFICANT CHANGE UP (ref 1.6–2.6)
MCHC RBC-ENTMCNC: 20.8 PG — LOW (ref 27–34)
MCHC RBC-ENTMCNC: 28.8 GM/DL — LOW (ref 32–36)
MCV RBC AUTO: 72.1 FL — LOW (ref 80–100)
MONOCYTES # BLD AUTO: 0.84 K/UL — SIGNIFICANT CHANGE UP (ref 0–0.9)
MONOCYTES NFR BLD AUTO: 6 % — SIGNIFICANT CHANGE UP (ref 2–14)
NEUTROPHILS # BLD AUTO: 11.34 K/UL — HIGH (ref 1.8–7.4)
NEUTROPHILS NFR BLD AUTO: 81.3 % — HIGH (ref 43–77)
NRBC # BLD: 0 /100 WBCS — SIGNIFICANT CHANGE UP (ref 0–0)
PHOSPHATE SERPL-MCNC: 3.4 MG/DL — SIGNIFICANT CHANGE UP (ref 2.5–4.5)
PLATELET # BLD AUTO: 261 K/UL — SIGNIFICANT CHANGE UP (ref 150–400)
POTASSIUM SERPL-MCNC: 3.8 MMOL/L — SIGNIFICANT CHANGE UP (ref 3.5–5.3)
POTASSIUM SERPL-SCNC: 3.8 MMOL/L — SIGNIFICANT CHANGE UP (ref 3.5–5.3)
PROT SERPL-MCNC: 6.1 G/DL — SIGNIFICANT CHANGE UP (ref 6–8.3)
PROTHROM AB SERPL-ACNC: 13.2 SEC — SIGNIFICANT CHANGE UP (ref 10.6–13.6)
RBC # BLD: 3.66 M/UL — LOW (ref 4.2–5.8)
RBC # FLD: 23.4 % — HIGH (ref 10.3–14.5)
SODIUM SERPL-SCNC: 144 MMOL/L — SIGNIFICANT CHANGE UP (ref 135–145)
WBC # BLD: 13.97 K/UL — HIGH (ref 3.8–10.5)
WBC # FLD AUTO: 13.97 K/UL — HIGH (ref 3.8–10.5)

## 2021-10-21 PROCEDURE — 99233 SBSQ HOSP IP/OBS HIGH 50: CPT | Mod: GC

## 2021-10-21 PROCEDURE — 99233 SBSQ HOSP IP/OBS HIGH 50: CPT

## 2021-10-21 RX ORDER — SENNA PLUS 8.6 MG/1
2 TABLET ORAL AT BEDTIME
Refills: 0 | Status: DISCONTINUED | OUTPATIENT
Start: 2021-10-21 | End: 2021-10-29

## 2021-10-21 RX ORDER — MORPHINE SULFATE 50 MG/1
1.5 CAPSULE, EXTENDED RELEASE ORAL EVERY 4 HOURS
Refills: 0 | Status: DISCONTINUED | OUTPATIENT
Start: 2021-10-21 | End: 2021-10-22

## 2021-10-21 RX ORDER — TAMSULOSIN HYDROCHLORIDE 0.4 MG/1
0.4 CAPSULE ORAL AT BEDTIME
Refills: 0 | Status: DISCONTINUED | OUTPATIENT
Start: 2021-10-21 | End: 2021-10-29

## 2021-10-21 RX ADMIN — PIPERACILLIN AND TAZOBACTAM 25 GRAM(S): 4; .5 INJECTION, POWDER, LYOPHILIZED, FOR SOLUTION INTRAVENOUS at 08:06

## 2021-10-21 RX ADMIN — POLYETHYLENE GLYCOL 3350 17 GRAM(S): 17 POWDER, FOR SOLUTION ORAL at 11:06

## 2021-10-21 RX ADMIN — FENTANYL CITRATE 1 PATCH: 50 INJECTION INTRAVENOUS at 11:51

## 2021-10-21 RX ADMIN — Medication 5 MILLIGRAM(S): at 22:07

## 2021-10-21 RX ADMIN — Medication 3 MILLILITER(S): at 18:10

## 2021-10-21 RX ADMIN — FENTANYL CITRATE 1 PATCH: 50 INJECTION INTRAVENOUS at 10:51

## 2021-10-21 RX ADMIN — Medication 12.5 MILLIGRAM(S): at 05:47

## 2021-10-21 RX ADMIN — Medication 0.25 MILLIGRAM(S): at 06:05

## 2021-10-21 RX ADMIN — Medication 3 MILLILITER(S): at 13:27

## 2021-10-21 RX ADMIN — AMLODIPINE BESYLATE 10 MILLIGRAM(S): 2.5 TABLET ORAL at 05:47

## 2021-10-21 RX ADMIN — HEPARIN SODIUM 5000 UNIT(S): 5000 INJECTION INTRAVENOUS; SUBCUTANEOUS at 15:13

## 2021-10-21 RX ADMIN — Medication 3 MILLILITER(S): at 22:07

## 2021-10-21 RX ADMIN — Medication 15 MILLILITER(S): at 11:52

## 2021-10-21 RX ADMIN — Medication 12.5 MILLIGRAM(S): at 18:10

## 2021-10-21 RX ADMIN — TAMSULOSIN HYDROCHLORIDE 0.4 MILLIGRAM(S): 0.4 CAPSULE ORAL at 22:08

## 2021-10-21 RX ADMIN — MORPHINE SULFATE 1.5 MILLIGRAM(S): 50 CAPSULE, EXTENDED RELEASE ORAL at 04:12

## 2021-10-21 RX ADMIN — Medication 3 MILLILITER(S): at 09:26

## 2021-10-21 RX ADMIN — Medication 3 MILLILITER(S): at 00:05

## 2021-10-21 RX ADMIN — PANTOPRAZOLE SODIUM 40 MILLIGRAM(S): 20 TABLET, DELAYED RELEASE ORAL at 11:39

## 2021-10-21 RX ADMIN — FENTANYL CITRATE 1 PATCH: 50 INJECTION INTRAVENOUS at 19:45

## 2021-10-21 RX ADMIN — FENTANYL CITRATE 1 PATCH: 50 INJECTION INTRAVENOUS at 12:02

## 2021-10-21 RX ADMIN — PIPERACILLIN AND TAZOBACTAM 25 GRAM(S): 4; .5 INJECTION, POWDER, LYOPHILIZED, FOR SOLUTION INTRAVENOUS at 00:22

## 2021-10-21 RX ADMIN — Medication 1 APPLICATION(S): at 11:38

## 2021-10-21 RX ADMIN — PIPERACILLIN AND TAZOBACTAM 25 GRAM(S): 4; .5 INJECTION, POWDER, LYOPHILIZED, FOR SOLUTION INTRAVENOUS at 18:09

## 2021-10-21 RX ADMIN — HEPARIN SODIUM 5000 UNIT(S): 5000 INJECTION INTRAVENOUS; SUBCUTANEOUS at 22:07

## 2021-10-21 RX ADMIN — CHLORHEXIDINE GLUCONATE 1 APPLICATION(S): 213 SOLUTION TOPICAL at 05:47

## 2021-10-21 RX ADMIN — Medication 3 MILLILITER(S): at 06:04

## 2021-10-21 NOTE — PROGRESS NOTE ADULT - PROBLEM SELECTOR PLAN 3
- Resolved, SCr at bl   - monitor UOP, Strict Is/Os  - monitor BMP, replete lytes prn Improved/resolving. Pt's SCr worsened up to 1.49 on 10/20 (baseline SCr appears to be 0.8-1.1)  - monitor I/Os, avoid nephrotoxins, dose meds per eGFR  - monitor BMP, replete lytes prn

## 2021-10-21 NOTE — PROGRESS NOTE ADULT - PROBLEM SELECTOR PLAN 5
- fentanyl 12mcg patch, morphine 1.5mg q4h prn iso dyspnea, anxiety Pt presented Microcytic Anemia to Hgb 9.4 (baseline Hgb 11-12 and normocytic in 2019)  - possibly 2/2 sepsis vs iron deficiency  - f/u iron studies  - monitor CBC, transfuse to goal Hgb>7

## 2021-10-21 NOTE — PROGRESS NOTE ADULT - ATTENDING COMMENTS
71M w/ hx of COPD, Asthma, HTN, CAD s/p PCI (LCx, LAD, D1), who presented with acute hypoxic respiratory failure 2/2 parainfluenza viral PNA + COPD exacerbation requiring intubation. C/b superimposed bacterial PNA s/p 5 day course abx. Extubated to Bipap->HFNC, now weaned to NC.   -F 10/18, started on zosyn. Currently culture data negative.  -CXR given inability to fully wean O2 and to complete F w/u  -nebs  -PT/OT given debilitation

## 2021-10-21 NOTE — PROGRESS NOTE ADULT - ATTENDING COMMENTS
Pt is a 70 yo BM with h/o COPD vs asthma, CAD s/p PCI (LCx, LAD, D1) and HTN presented in respiratory distress, using accessory muscles and tachypneic. Pt emergently placed on BiPAP, but continued to decline and required to be intubated (10/8/2021).  ICU dx: acute hypoxic resp failure/ COPD exacerbation 2 to parainfluenza virus +/- superimposed bacterial infection. Pt extubated and reintubated 10/12, extubated 10/14 to BiPAP and then transitioned HFnc. Yesterday afternoon required to be placed on BiPAP today is on nc O2    Resp/Psych: nc O2 to maintain goal O2 sat >92%/Cont Nebs/ For sensation of dyspnea +/- anxiety may cont low dose Opiates/ May consider Pulm consult  ID: Finish course of Zosyn  CVS: Cont antiHTN meds  HEME: DVT prophylaxis with sq Heparin  FEN: Pureed diet  Endo: Follow FS and cover with Lispro  Social: OOB->chair/ May transfer to [facility]

## 2021-10-21 NOTE — PROGRESS NOTE ADULT - ASSESSMENT
70yo M w/ PMH of COPD, Asthma, HTN, CAD s/p PCI (LCx, LAD, D1), admit to MICU intubated 10/8 2/2 AHRF i/s/o viral PNA d/t parainfluenza, c/f superimposed bacterial PNA, and COPD exacerbation, extubated 10/14 to BiPAP now on HFNC 71M w/ hx of COPD, Asthma, HTN, CAD s/p PCI (LCx, LAD, D1), initially presented with SOB and was admitted to MICU for AHRF requiring intubation 2/2 parainfluenza viral PNA c/b possible COPD exacerbation and superimposed bacterial PNA. Now extubated and completing course of IV Zosyn.

## 2021-10-21 NOTE — PROGRESS NOTE ADULT - ASSESSMENT
72yo M w/ PMH of COPD, Asthma, HTN, CAD s/p PCI (LCx, LAD, D1), admit to MICU intubated 10/8 2/2 AHRF i/s/o viral PNA d/t parainfluenza, c/f superimposed bacterial PNA, and COPD exacerbation, extubated 10/14 to BiPAP now on [id number] M w/ PMH of COPD, Asthma, HTN, CAD s/p PCI (LCx, LAD, D1), admit to MICU intubated 10/8 2/2 AHRF i/s/o viral PNA d/t parainfluenza, c/f superimposed bacterial PNA, and COPD exacerbation, extubated 10/14 to BiPAP now on HFNC    # Neuro  - fentanyl 12mcg patch, morphine 1.5mg q4h prn iso dyspnea, anxiety   - Requiring Precedex for agitation    # Cardiovascular  HTN 150s-180s  - avoiding BB iso COPD, will give IV hydralazine 5mg q6h prn SBP > 180  - MAP goal > 65    # Respiratory  // AHRF 2/2 viral PNA (parainfluenza+) c/b possible superimposed bacterial PNA and COPD exacerbation  - intubated 10/8, extubated and reintubated 10/12, extubated 10/14 to BiPAP now on HFNC, tolerating well will wean to NC   - completed 5 day abx course (CTX -> Levaquin -> CTX) 10/8-10/12   - Febrile ON 10/18 w/ increasing leukocytosis, restarted on Zosyn  - c/w duonebs, inhaled budesonide  - s/p solumedrol 10/8-10/16  - MRSA negative  - legionella negative    # GI/Nutrition  No active issues  - Diet: Full Liquid, will advance as tolerated    #/Renal  // OWEN   - Resolved, SCr at bl   - monitor UOP, Strict Is/Os  - monitor BMP, replete lytes prn    # ID  // Viral PNA (parainfluenza+) c/b possible superimposed bacterial PNA  - completed 5 day abx course (CTX -> Levaquin -> CTX) 10/8-10/12  - febrile 10/15, cultures sent, negative thus far   - MRSA, legionella negative as above  - RVP +parainfluenza, BCx 10/8 NGTD  - Febrile ON 10/18 w/ leukocytosis, started Zosyn  - Blood Cx pending again, will c/w abx until cultures result. If neg can dc    # Endocrine  No active issues  - TSH low 0.07, TFTs received  - monitor FS TID    # Hematologic/Oncologic  - Microcytic Anemia, stable  - monitor CBC, transfuse hgb > 7     #PPx  - DVT ppx w/ subq heparin 5000u TID    #Ethics  - clarify GOC w/ family

## 2021-10-21 NOTE — PROGRESS NOTE ADULT - PROBLEM SELECTOR PLAN 2
// Viral PNA (parainfluenza+) c/b possible superimposed bacterial PNA  - completed 5 day abx course (CTX -> Levaquin -> CTX) 10/8-10/12  - febrile 10/15, cultures sent, negative thus far   - MRSA, legionella negative as above  - RVP +parainfluenza, BCx 10/8 NGTD  - Febrile ON 10/18 w/ leukocytosis, started Zosyn  - Blood Cx pending again, will c/w abx until cultures result. If neg can dc Viral PNA 2/2 parainfluenza c/b AHRF and possible superimposed bacterial PNA  - s/p 5-day abx course of CTX->Levaquin ->CTX (10/8-10/12)   - MRSA negative, legionella negative  - RVP (10/8): +parainfluenza  - Multiple BCx neg to date; f/u most recent BCx (10/18); if neg, will consider dcing abx  - c/w Zosyn (10/18- ), was started for concern for superimposed bacterial PNA given fever overnight on 10/18 and worsened leukocytosis

## 2021-10-21 NOTE — PROGRESS NOTE ADULT - PROBLEM SELECTOR PLAN 1
AHRF 2/2 viral PNA (parainfluenza+) c/b possible superimposed bacterial PNA and COPD exacerbation  - intubated 10/8, extubated and reintubated 10/12, extubated 10/14 to BiPAP now on HFNC, tolerating well will wean to NC   - completed 5 day abx course (CTX -> Levaquin -> CTX) 10/8-10/12   - Febrile ON 10/18 w/ increasing leukocytosis, restarted on Zosyn  - c/w duonebs, inhaled budesonide  - s/p solumedrol 10/8-10/16  - MRSA negative  - legionella negative Improved. Pt initially presented with AHRF 2/2 viral PNA (parainfluenza+) c/b possible superimposed bacterial PNA and COPD exacerbation  - s/p intubation on 10/8, extubation/reintubation on 10/12, extubation on 10/14 to BiPAP and transitioning to O2NC  - s/p solumedrol (10/8-10/16)  - s/p 5-day abx course of CTX->Levaquin ->CTX (10/8-10/12)   - c/w Zosyn (10/18- ), was started for concern for superimposed bacterial PNA given fever overnight on 10/18 and worsened leukocytosis   - c/w duonebs and inhaled budesonide 0.25mg BID  - c/w morphine 1.5mg q4h PRN for dyspnea/anxiety  - continue to monitor respiratory status, currently sating well on O2NC, wean as tolerated Improved. Pt initially presented with AHRF 2/2 viral PNA (parainfluenza+) c/b possible superimposed bacterial PNA and COPD exacerbation. At home, for his COPD/Asthma, pt is on Daliresp 500mcg daily, spiriva daily PRN, symbicort BID PRN, and Ventolin QID PRN.  - s/p intubation on 10/8, extubation/reintubation on 10/12, extubation on 10/14 to BiPAP and transitioning to O2NC  - s/p solumedrol (10/8-10/16)  - s/p 5-day abx course of CTX->Levaquin ->CTX (10/8-10/12)   - c/w Zosyn (10/18- ), was started for concern for superimposed bacterial PNA given fever overnight on 10/18 and worsened leukocytosis   - c/w duonebs q4h and inhaled budesonide 0.25mg BID  - c/w morphine 1.5mg q4h PRN for dyspnea/anxiety  - continue to monitor respiratory status, currently sating well on O2NC, wean as tolerated  - f/u repeat CXR in AM (last on 10/12 was clear); per most recent MICU attending attestation, consider Pulm consult
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
## 2021-10-21 NOTE — PROGRESS NOTE ADULT - PROBLEM SELECTOR PLAN 7
- DVT ppx w/ subq heparin 5000u TID - DVT ppx: HSQ  - Diet: Pureed  - Dispo: pending medical optimization; per PT/OT, recommending GARY - DVT ppx: HSQ  - Diet: Pureed, advance as tolerated  - Dispo: pending medical optimization; per PT/OT, recommending GARY

## 2021-10-21 NOTE — PROGRESS NOTE ADULT - PROBLEM SELECTOR PLAN 4
HTN 150s-180s  - avoiding BB iso COPD, will give IV hydralazine 5mg q6h prn SBP > 180  - MAP goal > 65 Pt with hx of HTN. At home, pt is on amlodipine 10mg daily, losartan 100mg daily, Toprol XL 25mg  - c/w metoprolol tartrate 12.5mg BID, amlodipine 10mg daily; holding losartan in setting of OWEN  - c/w hydralazine IV 10mg q6h PRN for SBP>180  - continue to monitor BP, most recently SBP 140s-170s Pt with hx of HTN. At home, pt is on amlodipine 10mg daily, losartan 100mg daily, Toprol XL 25mg  - c/w metoprolol tartrate 12.5mg BID, amlodipine 10mg daily; holding losartan 100mg daily in setting of OWEN  - c/w hydralazine IV 10mg q6h PRN for SBP>180  - continue to monitor BP, most recently SBP 140s-170s  - plan to resume home losartan 100mg daily when renal function improves

## 2021-10-21 NOTE — PROGRESS NOTE ADULT - SUBJECTIVE AND OBJECTIVE BOX
Diane Rose MD  Internal Medicine, PGY-3  Pager: [phone] ([facility]) / [phone] ([facility])    INTERVAL HPI/OVERNIGHT EVENTS:  - Pt seen and examined at bedside  - NAEON  - Continues to remain on nasal cannula, satting well    MEDICATIONS  (STANDING):  albuterol/ipratropium for Nebulization 3 milliLiter(s) Nebulizer every 4 hours  amLODIPine   Tablet 10 milliGRAM(s) Oral daily  BACItracin   Ointment 1 Application(s) Topical daily  buDESOnide    Inhalation Suspension 0.25 milliGRAM(s) Nebulizer two times a day  chlorhexidine 4% Liquid 1 Application(s) Topical <User Schedule>  dexMEDEtomidine Infusion 0.2 MICROgram(s)/kG/Hr (3.19 mL/Hr) IV Continuous <Continuous>  dextrose 40% Gel 15 Gram(s) Oral once  dextrose 5%. 1000 milliLiter(s) (100 mL/Hr) IV Continuous <Continuous>  dextrose 50% Injectable 25 Gram(s) IV Push once  dextrose 50% Injectable 12.5 Gram(s) IV Push once  dextrose 50% Injectable 25 Gram(s) IV Push once  fentaNYL   Patch  12 MICROgram(s)/Hr 1 Patch Transdermal every 72 hours  glucagon  Injectable 1 milliGRAM(s) IntraMuscular once  heparin   Injectable 5000 Unit(s) SubCutaneous every 8 hours  influenza   Vaccine 0.5 milliLiter(s) IntraMuscular once  melatonin 5 milliGRAM(s) Oral at bedtime  metoprolol tartrate 12.5 milliGRAM(s) Oral every 12 hours  multivitamin/minerals/iron Oral Solution (CENTRUM) 15 milliLiter(s) Oral daily  pantoprazole  Injectable 40 milliGRAM(s) IV Push daily  piperacillin/tazobactam IVPB.. 3.375 Gram(s) IV Intermittent every 8 hours  polyethylene glycol 3350 17 Gram(s) Oral daily  senna Syrup 10 milliLiter(s) Oral at bedtime    MEDICATIONS  (PRN):  hydrALAZINE Injectable 10 milliGRAM(s) IV Push every 6 hours PRN SBP>180  morphine  - Injectable 1.5 milliGRAM(s) IV Push every 4 hours PRN dyspnea  OLANZapine Injectable 5 milliGRAM(s) IntraMuscular every 6 hours PRN agitation    OBJECTIVE:  ICU Vital Signs Last 24 Hrs  T(C): 36.8 (21 Oct 2021 04:00), Max: 37.2 (20 Oct 2021 20:00)  T(F): 98.2 (21 Oct 2021 04:00), Max: 99 (20 Oct 2021 20:00)  HR: 86 (21 Oct 2021 07:00) (49 - 108)  BP: 177/79 (21 Oct 2021 06:00) (103/51 - 177/79)  BP(mean): 113 (21 Oct 2021 06:00) (73 - 113)  RR: 20 (21 Oct 2021 07:00) (18 - 49)  SpO2: 100% (21 Oct 2021 07:00) (89% - 100%)    I&O's Summary  20 Oct 2021 07:01  -  21 Oct 2021 07:00  --------------------------------------------------------  IN: 500 mL / OUT: 750 mL / NET: -250 mL    PHYSICAL EXAM:  GENERAL: Elderly appearing man, not in distress, well-groomed, well-developed  HEAD: Atraumatic, Normocephalic  EYES: PERRL, conjunctiva and sclera clear  ENMT: MM dry on HFNC  NECK: Supple  NERVOUS SYSTEM: A&Ox3, pupils as above, moving all extremities  CHEST/LUNG: CTABL  HEART: RRR. No M/R/G +Heart sounds distant   ABDOMEN: Soft, Nontender, Nondistended; Bowel sounds present  EXTREMITIES: 2+ Peripheral Pulses, No edema, extremities warm    LABS:                 7.6    13.97 )-----------( 261      ( 21 Oct 2021 00:22 )             26.4     10-21    144  |  107  |  22  ----------------------------<  98  3.8   |  25  |  1.26    Ca    8.3<L>      21 Oct 2021 00:22  Phos  3.4     10-21  Mg     2.0     10-21    TPro  6.1  /  Alb  3.2<L>  /  TBili  0.3  /  DBili  x   /  AST  35  /  ALT  68<H>  /  AlkPhos  78  10-21    ABG - ( 21 Oct 2021 00:18 )  pH, Arterial: 7.37  pH, Blood: x     /  pCO2: 51    /  pO2: 37    / HCO3: 30    / Base Excess: 3.7   /  SaO2: 58.6      LIVER FUNCTIONS - ( 21 Oct 2021 00:22 )  Alb: 3.2 g/dL / Pro: 6.1 g/dL / ALK PHOS: 78 U/L / ALT: 68 U/L / AST: 35 U/L / GGT: x           PT/INR - ( 21 Oct 2021 00:22 )   PT: 13.2 sec;   INR: 1.11 ratio    PTT - ( 21 Oct 2021 00:22 )  PTT:21.8 sec    IMAGING:  Xray Chest 1 View- PORTABLE-Urgent (10.12.21 @ 18:21)  IMPRESSION:  Postintubation. Clear lungs.

## 2021-10-21 NOTE — PROGRESS NOTE ADULT - PROBLEM SELECTOR PLAN 6
- Microcytic Anemia, stable  - monitor CBC, transfuse hgb > 7 Improved. Pt found to have elevated LFTs during hospitalization (AST to 700s, ALT to 400s)  - likely 2/2 sepsis in setting of PNA  - currently without abdominal pain  - continue to monitor Improved. Pt found to have elevated LFTs during hospitalization (AST to 700s, ALT to 400s)  - likely 2/2 sepsis in setting of PNA  - currently without abdominal pain  - continue to monitor, currently downtrending to ~wnl

## 2021-10-22 LAB
ALBUMIN SERPL ELPH-MCNC: 3.8 G/DL — SIGNIFICANT CHANGE UP (ref 3.3–5)
ALP SERPL-CCNC: 94 U/L — SIGNIFICANT CHANGE UP (ref 40–120)
ALT FLD-CCNC: 71 U/L — HIGH (ref 10–45)
ANION GAP SERPL CALC-SCNC: 14 MMOL/L — SIGNIFICANT CHANGE UP (ref 5–17)
APTT BLD: 30.8 SEC — SIGNIFICANT CHANGE UP (ref 27.5–35.5)
AST SERPL-CCNC: 36 U/L — SIGNIFICANT CHANGE UP (ref 10–40)
BASE EXCESS BLDV CALC-SCNC: 0.1 MMOL/L — SIGNIFICANT CHANGE UP (ref -2–2)
BASOPHILS # BLD AUTO: 0.04 K/UL — SIGNIFICANT CHANGE UP (ref 0–0.2)
BASOPHILS NFR BLD AUTO: 0.3 % — SIGNIFICANT CHANGE UP (ref 0–2)
BILIRUB SERPL-MCNC: 0.5 MG/DL — SIGNIFICANT CHANGE UP (ref 0.2–1.2)
BUN SERPL-MCNC: 15 MG/DL — SIGNIFICANT CHANGE UP (ref 7–23)
CA-I SERPL-SCNC: 1.2 MMOL/L — SIGNIFICANT CHANGE UP (ref 1.15–1.33)
CALCIUM SERPL-MCNC: 8.7 MG/DL — SIGNIFICANT CHANGE UP (ref 8.4–10.5)
CHLORIDE BLDV-SCNC: 106 MMOL/L — SIGNIFICANT CHANGE UP (ref 96–108)
CHLORIDE SERPL-SCNC: 103 MMOL/L — SIGNIFICANT CHANGE UP (ref 96–108)
CO2 BLDV-SCNC: 28 MMOL/L — HIGH (ref 22–26)
CO2 SERPL-SCNC: 24 MMOL/L — SIGNIFICANT CHANGE UP (ref 22–31)
CREAT SERPL-MCNC: 1.05 MG/DL — SIGNIFICANT CHANGE UP (ref 0.5–1.3)
EOSINOPHIL # BLD AUTO: 0.04 K/UL — SIGNIFICANT CHANGE UP (ref 0–0.5)
EOSINOPHIL NFR BLD AUTO: 0.3 % — SIGNIFICANT CHANGE UP (ref 0–6)
FERRITIN SERPL-MCNC: 97 NG/ML — SIGNIFICANT CHANGE UP (ref 30–400)
GAS PNL BLDV: 141 MMOL/L — SIGNIFICANT CHANGE UP (ref 136–145)
GAS PNL BLDV: SIGNIFICANT CHANGE UP
GAS PNL BLDV: SIGNIFICANT CHANGE UP
GLUCOSE BLDV-MCNC: 97 MG/DL — SIGNIFICANT CHANGE UP (ref 70–99)
GLUCOSE SERPL-MCNC: 100 MG/DL — HIGH (ref 70–99)
HCO3 BLDV-SCNC: 27 MMOL/L — SIGNIFICANT CHANGE UP (ref 22–29)
HCT VFR BLD CALC: 28.1 % — LOW (ref 39–50)
HCT VFR BLDA CALC: 25 % — LOW (ref 39–51)
HGB BLD CALC-MCNC: 8.4 G/DL — LOW (ref 12.6–17.4)
HGB BLD-MCNC: 7.8 G/DL — LOW (ref 13–17)
IMM GRANULOCYTES NFR BLD AUTO: 0.6 % — SIGNIFICANT CHANGE UP (ref 0–1.5)
INR BLD: 1.21 RATIO — HIGH (ref 0.88–1.16)
IRON SATN MFR SERPL: 18 % — SIGNIFICANT CHANGE UP (ref 16–55)
IRON SATN MFR SERPL: 50 UG/DL — SIGNIFICANT CHANGE UP (ref 45–165)
LACTATE BLDV-MCNC: 1.9 MMOL/L — SIGNIFICANT CHANGE UP (ref 0.7–2)
LYMPHOCYTES # BLD AUTO: 0.99 K/UL — LOW (ref 1–3.3)
LYMPHOCYTES # BLD AUTO: 7.2 % — LOW (ref 13–44)
MAGNESIUM SERPL-MCNC: 2 MG/DL — SIGNIFICANT CHANGE UP (ref 1.6–2.6)
MCHC RBC-ENTMCNC: 20.6 PG — LOW (ref 27–34)
MCHC RBC-ENTMCNC: 27.8 GM/DL — LOW (ref 32–36)
MCV RBC AUTO: 74.3 FL — LOW (ref 80–100)
MONOCYTES # BLD AUTO: 0.64 K/UL — SIGNIFICANT CHANGE UP (ref 0–0.9)
MONOCYTES NFR BLD AUTO: 4.6 % — SIGNIFICANT CHANGE UP (ref 2–14)
NEUTROPHILS # BLD AUTO: 12.02 K/UL — HIGH (ref 1.8–7.4)
NEUTROPHILS NFR BLD AUTO: 87 % — HIGH (ref 43–77)
NRBC # BLD: 0 /100 WBCS — SIGNIFICANT CHANGE UP (ref 0–0)
PCO2 BLDV: 53 MMHG — SIGNIFICANT CHANGE UP (ref 42–55)
PH BLDV: 7.31 — LOW (ref 7.32–7.43)
PHOSPHATE SERPL-MCNC: 2.3 MG/DL — LOW (ref 2.5–4.5)
PLATELET # BLD AUTO: 289 K/UL — SIGNIFICANT CHANGE UP (ref 150–400)
PO2 BLDV: 33 MMHG — SIGNIFICANT CHANGE UP (ref 25–45)
POTASSIUM BLDV-SCNC: 3.6 MMOL/L — SIGNIFICANT CHANGE UP (ref 3.5–5.1)
POTASSIUM SERPL-MCNC: 3.7 MMOL/L — SIGNIFICANT CHANGE UP (ref 3.5–5.3)
POTASSIUM SERPL-SCNC: 3.7 MMOL/L — SIGNIFICANT CHANGE UP (ref 3.5–5.3)
PROT SERPL-MCNC: 6.8 G/DL — SIGNIFICANT CHANGE UP (ref 6–8.3)
PROTHROM AB SERPL-ACNC: 14.4 SEC — HIGH (ref 10.6–13.6)
RBC # BLD: 3.78 M/UL — LOW (ref 4.2–5.8)
RBC # FLD: 23.9 % — HIGH (ref 10.3–14.5)
SAO2 % BLDV: 49.3 % — LOW (ref 67–88)
SODIUM SERPL-SCNC: 141 MMOL/L — SIGNIFICANT CHANGE UP (ref 135–145)
TIBC SERPL-MCNC: 278 UG/DL — SIGNIFICANT CHANGE UP (ref 220–430)
TRANSFERRIN SERPL-MCNC: 237 MG/DL — SIGNIFICANT CHANGE UP (ref 200–360)
UIBC SERPL-MCNC: 228 UG/DL — SIGNIFICANT CHANGE UP (ref 110–370)
WBC # BLD: 13.81 K/UL — HIGH (ref 3.8–10.5)
WBC # FLD AUTO: 13.81 K/UL — HIGH (ref 3.8–10.5)

## 2021-10-22 PROCEDURE — 99223 1ST HOSP IP/OBS HIGH 75: CPT | Mod: GC

## 2021-10-22 PROCEDURE — 99233 SBSQ HOSP IP/OBS HIGH 50: CPT | Mod: GC

## 2021-10-22 PROCEDURE — 71045 X-RAY EXAM CHEST 1 VIEW: CPT | Mod: 26

## 2021-10-22 PROCEDURE — 71250 CT THORAX DX C-: CPT | Mod: 26

## 2021-10-22 RX ORDER — LOSARTAN POTASSIUM 100 MG/1
50 TABLET, FILM COATED ORAL DAILY
Refills: 0 | Status: DISCONTINUED | OUTPATIENT
Start: 2021-10-22 | End: 2021-10-23

## 2021-10-22 RX ORDER — PIPERACILLIN AND TAZOBACTAM 4; .5 G/20ML; G/20ML
3.38 INJECTION, POWDER, LYOPHILIZED, FOR SOLUTION INTRAVENOUS EVERY 8 HOURS
Refills: 0 | Status: DISCONTINUED | OUTPATIENT
Start: 2021-10-23 | End: 2021-10-23

## 2021-10-22 RX ADMIN — PIPERACILLIN AND TAZOBACTAM 25 GRAM(S): 4; .5 INJECTION, POWDER, LYOPHILIZED, FOR SOLUTION INTRAVENOUS at 16:44

## 2021-10-22 RX ADMIN — PIPERACILLIN AND TAZOBACTAM 25 GRAM(S): 4; .5 INJECTION, POWDER, LYOPHILIZED, FOR SOLUTION INTRAVENOUS at 07:59

## 2021-10-22 RX ADMIN — HEPARIN SODIUM 5000 UNIT(S): 5000 INJECTION INTRAVENOUS; SUBCUTANEOUS at 22:12

## 2021-10-22 RX ADMIN — TAMSULOSIN HYDROCHLORIDE 0.4 MILLIGRAM(S): 0.4 CAPSULE ORAL at 22:12

## 2021-10-22 RX ADMIN — Medication 0.25 MILLIGRAM(S): at 17:29

## 2021-10-22 RX ADMIN — Medication 3 MILLILITER(S): at 17:27

## 2021-10-22 RX ADMIN — PIPERACILLIN AND TAZOBACTAM 25 GRAM(S): 4; .5 INJECTION, POWDER, LYOPHILIZED, FOR SOLUTION INTRAVENOUS at 01:17

## 2021-10-22 RX ADMIN — Medication 15 MILLILITER(S): at 13:26

## 2021-10-22 RX ADMIN — Medication 5 MILLIGRAM(S): at 22:12

## 2021-10-22 RX ADMIN — HEPARIN SODIUM 5000 UNIT(S): 5000 INJECTION INTRAVENOUS; SUBCUTANEOUS at 14:30

## 2021-10-22 RX ADMIN — Medication 3 MILLILITER(S): at 13:25

## 2021-10-22 RX ADMIN — Medication 1 APPLICATION(S): at 13:27

## 2021-10-22 RX ADMIN — FENTANYL CITRATE 1 PATCH: 50 INJECTION INTRAVENOUS at 19:55

## 2021-10-22 RX ADMIN — Medication 0.25 MILLIGRAM(S): at 06:13

## 2021-10-22 RX ADMIN — Medication 12.5 MILLIGRAM(S): at 17:27

## 2021-10-22 RX ADMIN — Medication 12.5 MILLIGRAM(S): at 06:05

## 2021-10-22 RX ADMIN — PANTOPRAZOLE SODIUM 40 MILLIGRAM(S): 20 TABLET, DELAYED RELEASE ORAL at 13:26

## 2021-10-22 RX ADMIN — Medication 3 MILLILITER(S): at 01:18

## 2021-10-22 RX ADMIN — HEPARIN SODIUM 5000 UNIT(S): 5000 INJECTION INTRAVENOUS; SUBCUTANEOUS at 06:06

## 2021-10-22 RX ADMIN — LOSARTAN POTASSIUM 50 MILLIGRAM(S): 100 TABLET, FILM COATED ORAL at 17:27

## 2021-10-22 RX ADMIN — Medication 200 MILLIGRAM(S): at 13:26

## 2021-10-22 RX ADMIN — Medication 3 MILLILITER(S): at 22:13

## 2021-10-22 RX ADMIN — AMLODIPINE BESYLATE 10 MILLIGRAM(S): 2.5 TABLET ORAL at 06:05

## 2021-10-22 RX ADMIN — Medication 3 MILLILITER(S): at 06:05

## 2021-10-22 RX ADMIN — FENTANYL CITRATE 1 PATCH: 50 INJECTION INTRAVENOUS at 00:13

## 2021-10-22 NOTE — PROGRESS NOTE ADULT - PROBLEM SELECTOR PLAN 4
Pt with hx of HTN. At home, pt is on amlodipine 10mg daily, losartan 100mg daily, Toprol XL 25mg  - c/w metoprolol tartrate 12.5mg BID, amlodipine 10mg daily; holding losartan 100mg daily in setting of OWEN  - c/w hydralazine IV 10mg q6h PRN for SBP>180  - continue to monitor BP, most recently SBP 140s-170s  - plan to resume home losartan 100mg daily when renal function improves Pt with hx of HTN. At home, pt is on amlodipine 10mg daily, losartan 100mg daily, Toprol XL 25mg  - c/w metoprolol tartrate 12.5mg BID, amlodipine 10mg daily  - resume home losartan 100mg daily given improved renal function  - continue to monitor BP, currently improved Pt with hx of HTN. At home, pt is on amlodipine 10mg daily, losartan 100mg daily, Toprol XL 25mg  - c/w metoprolol tartrate 12.5mg BID, amlodipine 10mg daily  - resumed home losartan at half dose given improved renal function, can uptitrate back to 100mg daily if tolerated  - continue to monitor BP, currently improved

## 2021-10-22 NOTE — CONSULT NOTE ADULT - ASSESSMENT
71M with PMHx of COPD< asthma, HTN, CAD s/p PCI x2, TAVR, presents to the hospital with SOB, admitted for respiratory failure 2/2 to parainfluenza virus, s/p intubationx2 due to failed extubation, now improving, s/p extubation on 10/14. Pulmonary consulted for management of NIV.    #Parainfluenza Pneumonia  - patient admitted for resp fail 2/2 paraflu with failed extubation x1  - s/p extubation x2 on 10/14 to BiPAP--> deescalated to high flow then NC and now on room air  - continues to use NIV in the PM  - concern for superimposed bacterial PNA s/p abx 10/8- 10/12 (ctx) and suspected worsening infectious etiology on 10/18, started on zosyn (10/18-)  - last VBG on 10/21 showing pH 7.37 with PCO2 51    **incomplete** 71M with PMHx of COPD< asthma, HTN, CAD s/p PCI x2, TAVR, presents to the hospital with SOB, admitted for respiratory failure 2/2 to parainfluenza virus, s/p intubationx2 due to failed extubation, now improving, s/p extubation on 10/14. Pulmonary consulted for management of NIV.    #Parainfluenza Pneumonia  - patient admitted for resp fail 2/2 paraflu with failed extubation x1  - s/p extubation x2 on 10/14 to BiPAP--> deescalated to high flow then NC and now on room air  - continues to use NIV in the PM  - concern for superimposed bacterial PNA s/p abx 10/8- 10/12 (ctx) and suspected worsening infectious etiology on 10/18, started on zosyn (10/18-)  - last VBG on 10/21 showing pH 7.37 with PCO2 51    Please order acapella to aid in airway clearance  **incomplete** 71M with PMHx of COPD, asthma, HTN, CAD s/p PCI x2, TAVR, presents to the hospital with SOB, admitted for respiratory failure 2/2 to parainfluenza virus, s/p intubationx2 due to failed extubation, now improving, s/p extubation on 10/14. Pulmonary consulted for management of NIV.    #Parainfluenza Pneumonia  - patient admitted for resp fail 2/2 paraflu with failed extubation x1  - s/p extubation x2 on 10/14 to BiPAP--> deescalated to high flow then NC and now on room air  - continues to use NIV in the PM  - concern for superimposed bacterial PNA s/p abx 10/8- 10/12 (ctx) and suspected worsening infectious etiology on 10/18, started on zosyn (10/18-)  - last VBG on 10/21 showing pH 7.37 with PCO2 51    Recommendations:  - continue with NIV qHS, ongoing evaluation if patient will require it at home  - patient taken off O2 NC saturating 96% on room air  - please order acapella to aid in airway clearance  - continue with duonebs and pulmicort for now--> will eventually transition to home inhalers  - re-emphasized need to ensure compliance with inhaler as an outpatient     Discussed with Dr. Mills 71M with PMHx of COPD, asthma, HTN, CAD s/p PCI x2, TAVR, presents to the hospital with SOB, admitted for respiratory failure 2/2 to parainfluenza virus, s/p intubationx2 due to failed extubation, now improving, s/p extubation on 10/14. Pulmonary consulted for management of NIV.    #Parainfluenza Pneumonia  - patient admitted for resp fail 2/2 paraflu with failed extubation x1  - s/p extubation x2 on 10/14 to BiPAP--> deescalated to high flow then NC and now on room air  - continues to use NIV in the PM  - concern for superimposed bacterial PNA s/p abx 10/8- 10/12 (ctx) and suspected worsening infectious etiology on 10/18, started on zosyn (10/18-)  - last VBG on 10/21 showing pH 7.37 with PCO2 51    Recommendations:  - continue with NIV qHS, ongoing evaluation if patient will require it at home  - patient taken off O2 NC saturating 96% on room air  - please order acapella to aid in airway clearance  - continue with duonebs and pulmicort for now--> will eventually transition to home inhalers  - re-emphasized need to ensure compliance with inhaler as an outpatient   - patient also with microcytic anemia (new from prior) and elevated RDW concerning for iron deficiency anemia--> pending iron studies    Discussed with Dr. Mills

## 2021-10-22 NOTE — PROGRESS NOTE ADULT - SUBJECTIVE AND OBJECTIVE BOX
PROGRESS NOTE:   Authored by Dr. Reddy Mera MD  Pager [phone] [facility], [id number] [facility]     Patient is a 71y old  Male who presents with a chief complaint of AHRF (21 Oct 2021 16:01)      SUBJECTIVE / OVERNIGHT EVENTS:  - Overnight, no acute events. Yesterday, pt was downgraded from MICU to Medicine floors.  - Today, pt seen and examined at bedside in AM.    MEDICATIONS  (STANDING):  albuterol/ipratropium for Nebulization 3 milliLiter(s) Nebulizer every 4 hours  amLODIPine   Tablet 10 milliGRAM(s) Oral daily  BACItracin   Ointment 1 Application(s) Topical daily  buDESOnide    Inhalation Suspension 0.25 milliGRAM(s) Nebulizer two times a day  chlorhexidine 4% Liquid 1 Application(s) Topical <User Schedule>  dextrose 40% Gel 15 Gram(s) Oral once  dextrose 5%. 1000 milliLiter(s) (100 mL/Hr) IV Continuous <Continuous>  dextrose 50% Injectable 25 Gram(s) IV Push once  dextrose 50% Injectable 12.5 Gram(s) IV Push once  dextrose 50% Injectable 25 Gram(s) IV Push once  glucagon  Injectable 1 milliGRAM(s) IntraMuscular once  heparin   Injectable 5000 Unit(s) SubCutaneous every 8 hours  influenza   Vaccine 0.5 milliLiter(s) IntraMuscular once  melatonin 5 milliGRAM(s) Oral at bedtime  metoprolol tartrate 12.5 milliGRAM(s) Oral every 12 hours  multivitamin/minerals/iron Oral Solution (CENTRUM) 15 milliLiter(s) Oral daily  pantoprazole  Injectable 40 milliGRAM(s) IV Push daily  piperacillin/tazobactam IVPB.. 3.375 Gram(s) IV Intermittent every 8 hours  polyethylene glycol 3350 17 Gram(s) Oral daily  senna 2 Tablet(s) Oral at bedtime  tamsulosin 0.4 milliGRAM(s) Oral at bedtime    MEDICATIONS  (PRN):  hydrALAZINE Injectable 10 milliGRAM(s) IV Push every 6 hours PRN SBP>180  morphine  - Injectable 1.5 milliGRAM(s) IV Push every 4 hours PRN dyspnea  OLANZapine Injectable 5 milliGRAM(s) IntraMuscular every 6 hours PRN agitation      CAPILLARY BLOOD GLUCOSE        I&O's Summary    21 Oct 2021 07:01  -  22 Oct 2021 07:00  --------------------------------------------------------  IN: 900 mL / OUT: 1325 mL / NET: -425 mL    22 Oct 2021 07:01  -  22 Oct 2021 08:23  --------------------------------------------------------  IN: 240 mL / OUT: 0 mL / NET: 240 mL        PHYSICAL EXAM:  Vital Signs Last 24 Hrs  T(C): 36.7 (22 Oct 2021 05:15), Max: 36.8 (21 Oct 2021 11:00)  T(F): 98 (22 Oct 2021 05:15), Max: 98.2 (21 Oct 2021 11:00)  HR: 88 (22 Oct 2021 08:04) (87 - 104)  BP: 142/56 (22 Oct 2021 08:04) (130/67 - 171/65)  BP(mean): 94 (21 Oct 2021 12:00) (90 - 95)  RR: 18 (22 Oct 2021 08:04) (8 - 24)  SpO2: 97% (22 Oct 2021 08:04) (92% - 99%)    GENERAL: NAD, well-developed, sitting up in bed  HEENT: PERRL, sclera clear  CHEST/LUNG: Decreased breath sounds bilaterally with mild bibasilar crackles; No wheezes  HEART: Regular rate and rhythm; No murmurs, rubs, or gallops  ABDOMEN: Soft, Nontender, Nondistended; Bowel sounds present  EXTREMITIES:  2+ Peripheral Pulses, No clubbing, cyanosis, or edema  PSYCH: AAOx3  NEUROLOGY: non-focal  SKIN: scab on nose, otherwise no rashes or lesions    LABS:                        7.6    13.97 )-----------( 261      ( 21 Oct 2021 00:22 )             26.4     10-21    144  |  107  |  22  ----------------------------<  98  3.8   |  25  |  1.26    Ca    8.3<L>      21 Oct 2021 00:22  Phos  3.4     10-21  Mg     2.0     10-21    TPro  6.1  /  Alb  3.2<L>  /  TBili  0.3  /  DBili  x   /  AST  35  /  ALT  68<H>  /  AlkPhos  78  10-21    PT/INR - ( 21 Oct 2021 00:22 )   PT: 13.2 sec;   INR: 1.11 ratio         PTT - ( 21 Oct 2021 00:22 )  PTT:21.8 sec            RADIOLOGY & ADDITIONAL TESTS:  Results Reviewed:   Imaging Personally Reviewed:  Electrocardiogram Personally Reviewed:    COORDINATION OF CARE:  Care Discussed with Consultants/Other Providers [Y/N]:  Prior or Outpatient Records Reviewed [Y/N]:   PROGRESS NOTE:   Authored by Dr. Reddy Mera MD  Pager [phone] [facility], [id number] [facility]     Patient is a 71y old  Male who presents with a chief complaint of AHRF (21 Oct 2021 16:01)      SUBJECTIVE / OVERNIGHT EVENTS:  - Overnight, no acute events. Yesterday, pt was downgraded from MICU to Medicine floors.  - Today, pt seen and examined at bedside in AM. Pt reported feeling better, endorses SOB with exertion but not at rest. Pt states he "tripped" last night while walking back from the restroom; reports falling but did not hit head or injure himself. RN states he almost fell but did not. Notes feeling urinating as frequently and possibly emptying bladder more with the flomax. Pt wishes to advance his diet and denies any trouble swallowing. Denied f/c/n/v, CP, abdominal pain, dysuria, hematuria, hematochezia, melena, constipation.      MEDICATIONS  (STANDING):  albuterol/ipratropium for Nebulization 3 milliLiter(s) Nebulizer every 4 hours  amLODIPine   Tablet 10 milliGRAM(s) Oral daily  BACItracin   Ointment 1 Application(s) Topical daily  buDESOnide    Inhalation Suspension 0.25 milliGRAM(s) Nebulizer two times a day  chlorhexidine 4% Liquid 1 Application(s) Topical <User Schedule>  dextrose 40% Gel 15 Gram(s) Oral once  dextrose 5%. 1000 milliLiter(s) (100 mL/Hr) IV Continuous <Continuous>  dextrose 50% Injectable 25 Gram(s) IV Push once  dextrose 50% Injectable 12.5 Gram(s) IV Push once  dextrose 50% Injectable 25 Gram(s) IV Push once  glucagon  Injectable 1 milliGRAM(s) IntraMuscular once  heparin   Injectable 5000 Unit(s) SubCutaneous every 8 hours  influenza   Vaccine 0.5 milliLiter(s) IntraMuscular once  melatonin 5 milliGRAM(s) Oral at bedtime  metoprolol tartrate 12.5 milliGRAM(s) Oral every 12 hours  multivitamin/minerals/iron Oral Solution (CENTRUM) 15 milliLiter(s) Oral daily  pantoprazole  Injectable 40 milliGRAM(s) IV Push daily  piperacillin/tazobactam IVPB.. 3.375 Gram(s) IV Intermittent every 8 hours  polyethylene glycol 3350 17 Gram(s) Oral daily  senna 2 Tablet(s) Oral at bedtime  tamsulosin 0.4 milliGRAM(s) Oral at bedtime    MEDICATIONS  (PRN):  hydrALAZINE Injectable 10 milliGRAM(s) IV Push every 6 hours PRN SBP>180  morphine  - Injectable 1.5 milliGRAM(s) IV Push every 4 hours PRN dyspnea  OLANZapine Injectable 5 milliGRAM(s) IntraMuscular every 6 hours PRN agitation      CAPILLARY BLOOD GLUCOSE        I&O's Summary    21 Oct 2021 07:01  -  22 Oct 2021 07:00  --------------------------------------------------------  IN: 900 mL / OUT: 1325 mL / NET: -425 mL    22 Oct 2021 07:01  -  22 Oct 2021 08:23  --------------------------------------------------------  IN: 240 mL / OUT: 0 mL / NET: 240 mL        PHYSICAL EXAM:  Vital Signs Last 24 Hrs  T(C): 36.7 (22 Oct 2021 05:15), Max: 36.8 (21 Oct 2021 11:00)  T(F): 98 (22 Oct 2021 05:15), Max: 98.2 (21 Oct 2021 11:00)  HR: 88 (22 Oct 2021 08:04) (87 - 104)  BP: 142/56 (22 Oct 2021 08:04) (130/67 - 171/65)  BP(mean): 94 (21 Oct 2021 12:00) (90 - 95)  RR: 18 (22 Oct 2021 08:04) (8 - 24)  SpO2: 97% (22 Oct 2021 08:04) (92% - 99%)    GENERAL: NAD, well-developed, laying in bed  HEENT: PERRL, sclera clear  CHEST/LUNG: Decreased breath sounds bilaterally with mild bibasilar crackles; No wheezes  HEART: Regular rate and rhythm; No murmurs, rubs, or gallops  ABDOMEN: Soft, Nontender, Nondistended; Bowel sounds present  EXTREMITIES:  2+ Peripheral Pulses, No clubbing, cyanosis, or edema  PSYCH: AAOx3  NEUROLOGY: non-focal  SKIN: scab on nose, otherwise no rashes or lesions    LABS:                        7.6    13.97 )-----------( 261      ( 21 Oct 2021 00:22 )             26.4     10-21    144  |  107  |  22  ----------------------------<  98  3.8   |  25  |  1.26    Ca    8.3<L>      21 Oct 2021 00:22  Phos  3.4     10-21  Mg     2.0     10-21    TPro  6.1  /  Alb  3.2<L>  /  TBili  0.3  /  DBili  x   /  AST  35  /  ALT  68<H>  /  AlkPhos  78  10-21    PT/INR - ( 21 Oct 2021 00:22 )   PT: 13.2 sec;   INR: 1.11 ratio         PTT - ( 21 Oct 2021 00:22 )  PTT:21.8 sec            RADIOLOGY & ADDITIONAL TESTS:  CXR (10/22): no focal consolidation noted, appears largely similar to prior

## 2021-10-22 NOTE — PROGRESS NOTE ADULT - PROBLEM SELECTOR PLAN 7
- DVT ppx: HSQ  - Diet: Pureed, advance as tolerated  - Dispo: pending medical optimization; per PT/OT, recommending GARY - DVT ppx: HSQ  - Diet: Mechanical soft, advance as tolerated  - Dispo: pending medical optimization; per PT/OT, recommending GARY

## 2021-10-22 NOTE — PROGRESS NOTE ADULT - PROBLEM SELECTOR PLAN 5
Pt presented Microcytic Anemia to Hgb 9.4 (baseline Hgb 11-12 and normocytic in 2019)  - possibly 2/2 sepsis vs iron deficiency  - f/u iron studies  - monitor CBC, transfuse to goal Hgb>7

## 2021-10-22 NOTE — PROGRESS NOTE ADULT - PROBLEM SELECTOR PLAN 3
Improved/resolving. Pt's SCr worsened up to 1.49 on 10/20 (baseline SCr appears to be 0.8-1.1)  - monitor I/Os, avoid nephrotoxins, dose meds per eGFR  - monitor BMP, replete lytes prn

## 2021-10-22 NOTE — PROGRESS NOTE ADULT - PROBLEM SELECTOR PLAN 6
Improved. Pt found to have elevated LFTs during hospitalization (AST to 700s, ALT to 400s)  - likely 2/2 sepsis in setting of PNA  - currently without abdominal pain  - continue to monitor, currently downtrending to ~wnl Improved. Pt found to have elevated LFTs during hospitalization (AST to 700s, ALT to 400s)  - likely 2/2 sepsis in setting of PNA  - currently without abdominal pain  - continue to monitor, currently downtrending to wnl

## 2021-10-22 NOTE — PROGRESS NOTE ADULT - ATTENDING COMMENTS
Seen, examined the patient this am, daughter at bedside  Sitting on a chair, c/o exertional SOB, using O2 2L NC, afebrile, has some cough, no chest pain  Reviewed labs, imaging personally    This is a 71M w/ hx of COPD, Asthma, HTN, CAD s/p PCI (LCx, LAD, D1), initially presented with SOB and was admitted to MICU for AHRF requiring intubation 2/2 parainfluenza viral PNA c/b possible COPD exacerbation and superimposed bacterial PNA. Now extubated and completing course of IV Zosyn    - Afebrile, WBC 13.8, blood c/s neg, RVP positive for parainfluenza. negative Covid pcr    Has been on IV Zosyn for possible secondary bacterial Pna post Parainfluenza as rec by Pulmonary    CT chest ordered, added incentive spirometer    c/w Bronchodilators, inhaled steroid  - Echo EF 60-65%  - PT in progress  ** Plan of care is d/w daughter at bedside

## 2021-10-22 NOTE — CONSULT NOTE ADULT - ATTENDING COMMENTS
71M with PMHx of COPD< asthma, HTN, CAD s/p PCI x2, TAVR, presents to the hospital with SOB, admitted for respiratory failure 2/2 to parainfluenza virus, s/p intubationx2 due to failed extubation, now improving, s/p extubation on 10/14. Pulmonary consulted for management of NIV. He has mild hypercapnea, evidence of hyperinflation.  appears comfortable at rest.  Agree with current plan as outlined above.

## 2021-10-22 NOTE — PROGRESS NOTE ADULT - PROBLEM SELECTOR PLAN 2
Viral PNA 2/2 parainfluenza c/b AHRF and possible superimposed bacterial PNA  - s/p 5-day abx course of CTX->Levaquin ->CTX (10/8-10/12)   - MRSA negative, legionella negative  - RVP (10/8): +parainfluenza  - Multiple BCx neg to date; f/u most recent BCx (10/18); if neg, will consider dcing abx  - c/w Zosyn (10/18- ), was started for concern for superimposed bacterial PNA given fever overnight on 10/18 and worsened leukocytosis

## 2021-10-22 NOTE — PROGRESS NOTE ADULT - ASSESSMENT
71M w/ hx of COPD, Asthma, HTN, CAD s/p PCI (LCx, LAD, D1), initially presented with SOB and was admitted to MICU for AHRF requiring intubation 2/2 parainfluenza viral PNA c/b possible COPD exacerbation and superimposed bacterial PNA. Now extubated and completing course of IV Zosyn.

## 2021-10-22 NOTE — PROGRESS NOTE ADULT - PROBLEM SELECTOR PLAN 1
Improved. Pt initially presented with AHRF 2/2 viral PNA (parainfluenza+) c/b possible superimposed bacterial PNA and COPD exacerbation. At home, for his COPD/Asthma, pt is on Daliresp 500mcg daily, spiriva daily PRN, symbicort BID PRN, and Ventolin QID PRN.  - s/p intubation on 10/8, extubation/reintubation on 10/12, extubation on 10/14 to BiPAP and transitioning to O2NC  - s/p solumedrol (10/8-10/16)  - s/p 5-day abx course of CTX->Levaquin ->CTX (10/8-10/12)   - c/w Zosyn (10/18- ), was started for concern for superimposed bacterial PNA given fever overnight on 10/18 and worsened leukocytosis   - c/w duonebs q4h and inhaled budesonide 0.25mg BID  - continue to monitor respiratory status, currently sating well on O2NC, wean as tolerated  - f/u repeat CXR in AM (last on 10/12 was clear); per most recent MICU attending attestation, consider Pulm consult Improved. Pt initially presented with AHRF 2/2 viral PNA (parainfluenza+) c/b possible superimposed bacterial PNA and COPD exacerbation. At home, for his COPD/Asthma, pt is on Daliresp 500mcg daily, spiriva daily PRN, symbicort BID PRN, and Ventolin QID PRN.  - s/p intubation on 10/8, extubation/reintubation on 10/12, extubation on 10/14 to BiPAP and transitioning to O2NC  - s/p solumedrol (10/8-10/16)  - s/p 5-day abx course of CTX->Levaquin ->CTX (10/8-10/12)   - c/w Zosyn (10/18- ), was started for concern for superimposed bacterial PNA given fever overnight on 10/18 and worsened leukocytosis   - c/w duonebs q4h and inhaled budesonide 0.25mg BID  - c/w guaifenesin PRN for cough and acapella device for airway clearance  - continue to monitor respiratory status, currently sating well on O2NC, wean as tolerated  - repeat CXR on 10/18 appears largely unchanged compared to prior on 10/12  - Appreciate Pulm recs Improved. Pt initially presented with AHRF 2/2 viral PNA (parainfluenza+) c/b possible superimposed bacterial PNA and COPD exacerbation. At home, for his COPD/Asthma, pt is on Daliresp 500mcg daily, spiriva daily PRN, symbicort BID PRN, and Ventolin QID PRN.  - s/p intubation on 10/8, extubation/reintubation on 10/12, extubation on 10/14 to BiPAP and transitioning to O2NC  - s/p solumedrol (10/8-10/16)  - s/p 5-day abx course of CTX->Levaquin ->CTX (10/8-10/12)   - c/w Zosyn (10/18- ), was started for concern for superimposed bacterial PNA given fever overnight on 10/18 and worsened leukocytosis   - c/w duonebs q4h and inhaled budesonide 0.25mg BID  - c/w guaifenesin PRN for cough and acapella device for airway clearance  - continue to monitor respiratory status, currently sating well on O2NC, wean as tolerated  - c/w BIPAP qHS for now  - repeat CXR on 10/18 appears largely unchanged compared to prior on 10/12  - Appreciate Pulm recs
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
## 2021-10-23 DIAGNOSIS — I25.10 ATHEROSCLEROTIC HEART DISEASE OF NATIVE CORONARY ARTERY WITHOUT ANGINA PECTORIS: ICD-10-CM

## 2021-10-23 LAB
ALBUMIN SERPL ELPH-MCNC: 3.1 G/DL — LOW (ref 3.3–5)
ALP SERPL-CCNC: 68 U/L — SIGNIFICANT CHANGE UP (ref 40–120)
ALT FLD-CCNC: 51 U/L — HIGH (ref 10–45)
ANION GAP SERPL CALC-SCNC: 13 MMOL/L — SIGNIFICANT CHANGE UP (ref 5–17)
APTT BLD: 26.4 SEC — LOW (ref 27.5–35.5)
AST SERPL-CCNC: 22 U/L — SIGNIFICANT CHANGE UP (ref 10–40)
BASE EXCESS BLDV CALC-SCNC: 1.6 MMOL/L — SIGNIFICANT CHANGE UP (ref -2–2)
BILIRUB SERPL-MCNC: 0.3 MG/DL — SIGNIFICANT CHANGE UP (ref 0.2–1.2)
BUN SERPL-MCNC: 13 MG/DL — SIGNIFICANT CHANGE UP (ref 7–23)
CA-I SERPL-SCNC: 1.19 MMOL/L — SIGNIFICANT CHANGE UP (ref 1.15–1.33)
CALCIUM SERPL-MCNC: 8.3 MG/DL — LOW (ref 8.4–10.5)
CHLORIDE BLDV-SCNC: 112 MMOL/L — HIGH (ref 96–108)
CHLORIDE SERPL-SCNC: 106 MMOL/L — SIGNIFICANT CHANGE UP (ref 96–108)
CO2 BLDV-SCNC: 28 MMOL/L — HIGH (ref 22–26)
CO2 SERPL-SCNC: 22 MMOL/L — SIGNIFICANT CHANGE UP (ref 22–31)
CREAT SERPL-MCNC: 1.13 MG/DL — SIGNIFICANT CHANGE UP (ref 0.5–1.3)
CULTURE RESULTS: SIGNIFICANT CHANGE UP
CULTURE RESULTS: SIGNIFICANT CHANGE UP
GAS PNL BLDV: 141 MMOL/L — SIGNIFICANT CHANGE UP (ref 136–145)
GAS PNL BLDV: SIGNIFICANT CHANGE UP
GAS PNL BLDV: SIGNIFICANT CHANGE UP
GLUCOSE BLDV-MCNC: 93 MG/DL — SIGNIFICANT CHANGE UP (ref 70–99)
GLUCOSE SERPL-MCNC: 89 MG/DL — SIGNIFICANT CHANGE UP (ref 70–99)
HAPTOGLOB SERPL-MCNC: 155 MG/DL — SIGNIFICANT CHANGE UP (ref 34–200)
HCO3 BLDV-SCNC: 27 MMOL/L — SIGNIFICANT CHANGE UP (ref 22–29)
HCT VFR BLD CALC: 23.3 % — LOW (ref 39–50)
HCT VFR BLD CALC: 26.2 % — LOW (ref 39–50)
HCT VFR BLDA CALC: 21 % — CRITICAL LOW (ref 39–51)
HGB BLD CALC-MCNC: 7 G/DL — CRITICAL LOW (ref 12.6–17.4)
HGB BLD-MCNC: 6.7 G/DL — CRITICAL LOW (ref 13–17)
HGB BLD-MCNC: 7.3 G/DL — LOW (ref 13–17)
LACTATE BLDV-MCNC: 1.1 MMOL/L — SIGNIFICANT CHANGE UP (ref 0.7–2)
LDH SERPL L TO P-CCNC: 711 U/L — HIGH (ref 50–242)
MAGNESIUM SERPL-MCNC: 1.9 MG/DL — SIGNIFICANT CHANGE UP (ref 1.6–2.6)
MCHC RBC-ENTMCNC: 20.9 PG — LOW (ref 27–34)
MCHC RBC-ENTMCNC: 21.3 PG — LOW (ref 27–34)
MCHC RBC-ENTMCNC: 27.9 GM/DL — LOW (ref 32–36)
MCHC RBC-ENTMCNC: 28.8 GM/DL — LOW (ref 32–36)
MCV RBC AUTO: 74 FL — LOW (ref 80–100)
MCV RBC AUTO: 74.9 FL — LOW (ref 80–100)
NRBC # BLD: 0 /100 WBCS — SIGNIFICANT CHANGE UP (ref 0–0)
NRBC # BLD: 0 /100 WBCS — SIGNIFICANT CHANGE UP (ref 0–0)
PCO2 BLDV: 43 MMHG — SIGNIFICANT CHANGE UP (ref 42–55)
PH BLDV: 7.4 — SIGNIFICANT CHANGE UP (ref 7.32–7.43)
PHOSPHATE SERPL-MCNC: 2.7 MG/DL — SIGNIFICANT CHANGE UP (ref 2.5–4.5)
PLATELET # BLD AUTO: 306 K/UL — SIGNIFICANT CHANGE UP (ref 150–400)
PLATELET # BLD AUTO: 332 K/UL — SIGNIFICANT CHANGE UP (ref 150–400)
PO2 BLDV: 142 MMHG — HIGH (ref 25–45)
POTASSIUM BLDV-SCNC: 3.6 MMOL/L — SIGNIFICANT CHANGE UP (ref 3.5–5.1)
POTASSIUM SERPL-MCNC: 3.5 MMOL/L — SIGNIFICANT CHANGE UP (ref 3.5–5.3)
POTASSIUM SERPL-SCNC: 3.5 MMOL/L — SIGNIFICANT CHANGE UP (ref 3.5–5.3)
PROT SERPL-MCNC: 5.9 G/DL — LOW (ref 6–8.3)
RBC # BLD: 3.15 M/UL — LOW (ref 4.2–5.8)
RBC # BLD: 3.5 M/UL — LOW (ref 4.2–5.8)
RBC # FLD: 23.9 % — HIGH (ref 10.3–14.5)
RBC # FLD: 24 % — HIGH (ref 10.3–14.5)
RETICS #: 39.1 K/UL — SIGNIFICANT CHANGE UP (ref 25–125)
RETICS/RBC NFR: 1.1 % — SIGNIFICANT CHANGE UP (ref 0.5–2.5)
SAO2 % BLDV: 98.5 % — HIGH (ref 67–88)
SODIUM SERPL-SCNC: 141 MMOL/L — SIGNIFICANT CHANGE UP (ref 135–145)
SPECIMEN SOURCE: SIGNIFICANT CHANGE UP
SPECIMEN SOURCE: SIGNIFICANT CHANGE UP
WBC # BLD: 10.09 K/UL — SIGNIFICANT CHANGE UP (ref 3.8–10.5)
WBC # BLD: 9.03 K/UL — SIGNIFICANT CHANGE UP (ref 3.8–10.5)
WBC # FLD AUTO: 10.09 K/UL — SIGNIFICANT CHANGE UP (ref 3.8–10.5)
WBC # FLD AUTO: 9.03 K/UL — SIGNIFICANT CHANGE UP (ref 3.8–10.5)

## 2021-10-23 PROCEDURE — 99233 SBSQ HOSP IP/OBS HIGH 50: CPT

## 2021-10-23 RX ORDER — ASPIRIN/CALCIUM CARB/MAGNESIUM 324 MG
81 TABLET ORAL DAILY
Refills: 0 | Status: DISCONTINUED | OUTPATIENT
Start: 2021-10-23 | End: 2021-10-29

## 2021-10-23 RX ORDER — LOSARTAN POTASSIUM 100 MG/1
50 TABLET, FILM COATED ORAL DAILY
Refills: 0 | Status: DISCONTINUED | OUTPATIENT
Start: 2021-10-23 | End: 2021-10-29

## 2021-10-23 RX ORDER — LOSARTAN POTASSIUM 100 MG/1
50 TABLET, FILM COATED ORAL DAILY
Refills: 0 | Status: DISCONTINUED | OUTPATIENT
Start: 2021-10-23 | End: 2021-10-23

## 2021-10-23 RX ORDER — ATORVASTATIN CALCIUM 80 MG/1
80 TABLET, FILM COATED ORAL AT BEDTIME
Refills: 0 | Status: DISCONTINUED | OUTPATIENT
Start: 2021-10-23 | End: 2021-10-29

## 2021-10-23 RX ADMIN — Medication 0.25 MILLIGRAM(S): at 17:58

## 2021-10-23 RX ADMIN — Medication 12.5 MILLIGRAM(S): at 06:43

## 2021-10-23 RX ADMIN — PANTOPRAZOLE SODIUM 40 MILLIGRAM(S): 20 TABLET, DELAYED RELEASE ORAL at 11:59

## 2021-10-23 RX ADMIN — Medication 3 MILLILITER(S): at 05:31

## 2021-10-23 RX ADMIN — PIPERACILLIN AND TAZOBACTAM 25 GRAM(S): 4; .5 INJECTION, POWDER, LYOPHILIZED, FOR SOLUTION INTRAVENOUS at 01:14

## 2021-10-23 RX ADMIN — Medication 0.25 MILLIGRAM(S): at 06:44

## 2021-10-23 RX ADMIN — HEPARIN SODIUM 5000 UNIT(S): 5000 INJECTION INTRAVENOUS; SUBCUTANEOUS at 06:43

## 2021-10-23 RX ADMIN — Medication 3 MILLILITER(S): at 14:36

## 2021-10-23 RX ADMIN — Medication 3 MILLILITER(S): at 11:59

## 2021-10-23 RX ADMIN — PIPERACILLIN AND TAZOBACTAM 25 GRAM(S): 4; .5 INJECTION, POWDER, LYOPHILIZED, FOR SOLUTION INTRAVENOUS at 08:46

## 2021-10-23 RX ADMIN — FENTANYL CITRATE 1 PATCH: 50 INJECTION INTRAVENOUS at 08:21

## 2021-10-23 RX ADMIN — Medication 3 MILLILITER(S): at 17:58

## 2021-10-23 RX ADMIN — AMLODIPINE BESYLATE 10 MILLIGRAM(S): 2.5 TABLET ORAL at 06:43

## 2021-10-23 RX ADMIN — CHLORHEXIDINE GLUCONATE 1 APPLICATION(S): 213 SOLUTION TOPICAL at 08:21

## 2021-10-23 RX ADMIN — FENTANYL CITRATE 1 PATCH: 50 INJECTION INTRAVENOUS at 20:00

## 2021-10-23 RX ADMIN — HEPARIN SODIUM 5000 UNIT(S): 5000 INJECTION INTRAVENOUS; SUBCUTANEOUS at 14:36

## 2021-10-23 RX ADMIN — TAMSULOSIN HYDROCHLORIDE 0.4 MILLIGRAM(S): 0.4 CAPSULE ORAL at 22:06

## 2021-10-23 RX ADMIN — Medication 12.5 MILLIGRAM(S): at 17:58

## 2021-10-23 RX ADMIN — HEPARIN SODIUM 5000 UNIT(S): 5000 INJECTION INTRAVENOUS; SUBCUTANEOUS at 22:06

## 2021-10-23 RX ADMIN — LOSARTAN POTASSIUM 50 MILLIGRAM(S): 100 TABLET, FILM COATED ORAL at 06:43

## 2021-10-23 RX ADMIN — Medication 5 MILLIGRAM(S): at 22:06

## 2021-10-23 RX ADMIN — Medication 1 APPLICATION(S): at 11:59

## 2021-10-23 RX ADMIN — Medication 15 MILLILITER(S): at 11:59

## 2021-10-23 NOTE — PROGRESS NOTE ADULT - ASSESSMENT
71M w/ hx of COPD, Asthma, HTN, CAD s/p PCI (LCx, LAD, D1), initially presented with SOB and was admitted to MICU for AHRF requiring intubation 2/2 parainfluenza viral PNA c/b possible COPD exacerbation and superimposed bacterial PNA. Now extubated and completing course of IV Zosyn. 71M w/ hx of COPD, Asthma, HTN, CAD s/p PCI (LCx, LAD w/ RISSA x2, D1 w/ RISSA x1), initially presented with SOB and was admitted to MICU for AHRF requiring intubation 2/2 parainfluenza viral PNA c/b possible COPD exacerbation and superimposed bacterial PNA. Now extubated and completing course of IV Zosyn.

## 2021-10-23 NOTE — PROGRESS NOTE ADULT - PROBLEM SELECTOR PLAN 5
Pt presented Microcytic Anemia to Hgb 9.4 (baseline Hgb 11-12 and normocytic in 2019)  - possibly 2/2 sepsis vs iron deficiency  - f/u iron studies  - monitor CBC, transfuse to goal Hgb>7 Pt presented Microcytic Anemia to Hgb 9.4 (baseline Hgb 11-12 and normocytic in 2019)  - no signs of active bleeding  - possibly 2/2 sepsis vs iron deficiency  - iron studies wnl (though lower end)  - f/u retic count, LDH, haptoglobin  - monitor CBC, transfuse to goal Hgb>8 for hx of CAD w/ multiple stents

## 2021-10-23 NOTE — PROGRESS NOTE ADULT - PROBLEM SELECTOR PLAN 4
Pt with hx of HTN. At home, pt is on amlodipine 10mg daily, losartan 100mg daily, Toprol XL 25mg  - c/w metoprolol tartrate 12.5mg BID, amlodipine 10mg daily  - resumed home losartan at half dose given improved renal function, can uptitrate back to 100mg daily if tolerated  - continue to monitor BP, currently improved Pt with hx of HTN. At home, pt is on amlodipine 10mg daily, losartan 100mg daily, Toprol XL 25mg  - c/w metoprolol tartrate 12.5mg BID, amlodipine 10mg daily  - c/w home losartan at half home dose given improved renal function, can uptitrate back to 100mg daily if tolerated  - continue to monitor BP

## 2021-10-23 NOTE — PROGRESS NOTE ADULT - SUBJECTIVE AND OBJECTIVE BOX
PROGRESS NOTE:   Authored by Dr. Reddy Mera MD  Pager [phone] [facility], [address] [place]     Patient is a 71y old  Male who presents with a chief complaint of AHRF (22 Oct 2021 09:00)      SUBJECTIVE / OVERNIGHT EVENTS:  - Overnight, no acute events.  - Today, pt seen and examined at bedside in AM. Denied f/c/n/v, CP, SOB, abdominal pain, dysuria, hematuria, hematochezia, melena, diarrhea, constipation.    MEDICATIONS  (STANDING):  albuterol/ipratropium for Nebulization 3 milliLiter(s) Nebulizer every 4 hours  amLODIPine   Tablet 10 milliGRAM(s) Oral daily  BACItracin   Ointment 1 Application(s) Topical daily  buDESOnide    Inhalation Suspension 0.25 milliGRAM(s) Nebulizer two times a day  chlorhexidine 4% Liquid 1 Application(s) Topical <User Schedule>  dextrose 40% Gel 15 Gram(s) Oral once  dextrose 5%. 1000 milliLiter(s) (100 mL/Hr) IV Continuous <Continuous>  dextrose 50% Injectable 25 Gram(s) IV Push once  dextrose 50% Injectable 12.5 Gram(s) IV Push once  dextrose 50% Injectable 25 Gram(s) IV Push once  glucagon  Injectable 1 milliGRAM(s) IntraMuscular once  heparin   Injectable 5000 Unit(s) SubCutaneous every 8 hours  influenza   Vaccine 0.5 milliLiter(s) IntraMuscular once  losartan 50 milliGRAM(s) Oral daily  melatonin 5 milliGRAM(s) Oral at bedtime  metoprolol tartrate 12.5 milliGRAM(s) Oral every 12 hours  multivitamin/minerals/iron Oral Solution (CENTRUM) 15 milliLiter(s) Oral daily  pantoprazole  Injectable 40 milliGRAM(s) IV Push daily  piperacillin/tazobactam IVPB.. 3.375 Gram(s) IV Intermittent every 8 hours  polyethylene glycol 3350 17 Gram(s) Oral daily  senna 2 Tablet(s) Oral at bedtime  tamsulosin 0.4 milliGRAM(s) Oral at bedtime    MEDICATIONS  (PRN):  guaiFENesin Oral Liquid (Sugar-Free) 200 milliGRAM(s) Oral every 6 hours PRN Cough  hydrALAZINE Injectable 10 milliGRAM(s) IV Push every 6 hours PRN SBP>180  OLANZapine Injectable 5 milliGRAM(s) IntraMuscular every 6 hours PRN agitation      CAPILLARY BLOOD GLUCOSE        I&O's Summary    22 Oct 2021 07:01  -  23 Oct 2021 07:00  --------------------------------------------------------  IN: 980 mL / OUT: 950 mL / NET: 30 mL        PHYSICAL EXAM:  Vital Signs Last 24 Hrs  T(C): 37.3 (23 Oct 2021 05:30), Max: 37.3 (23 Oct 2021 05:30)  T(F): 99.1 (23 Oct 2021 05:30), Max: 99.1 (23 Oct 2021 05:30)  HR: 92 (23 Oct 2021 05:30) (80 - 99)  BP: 150/55 (23 Oct 2021 05:30) (122/70 - 156/64)  BP(mean): --  RR: 18 (23 Oct 2021 05:30) (18 - 18)  SpO2: 97% (23 Oct 2021 05:30) (95% - 100%)    GENERAL: NAD, well-developed, laying in bed  HEENT: sclera clear  CHEST/LUNG: Decreased breath sounds bilaterally with mild bibasilar crackles; No wheezes  HEART: Regular rate and rhythm; No murmurs, rubs, or gallops  ABDOMEN: Soft, Nontender, Nondistended; Bowel sounds present  EXTREMITIES:  2+ Peripheral Pulses, No clubbing, cyanosis, or edema  PSYCH: AAOx3  NEUROLOGY: non-focal  SKIN: scab on nose, otherwise no rashes or lesions    LABS:                        7.8    13.81 )-----------( 289      ( 22 Oct 2021 08:38 )             28.1     10-22    141  |  103  |  15  ----------------------------<  100<H>  3.7   |  24  |  1.05    Ca    8.7      22 Oct 2021 08:38  Phos  2.3     10-22  Mg     2.0     10-22    TPro  6.8  /  Alb  3.8  /  TBili  0.5  /  DBili  x   /  AST  36  /  ALT  71<H>  /  AlkPhos  94  10-22    PT/INR - ( 22 Oct 2021 08:38 )   PT: 14.4 sec;   INR: 1.21 ratio         PTT - ( 22 Oct 2021 08:38 )  PTT:30.8 sec            RADIOLOGY & ADDITIONAL TESTS:  < from: CT Chest No Cont (10.22.21 @ 19:13) >  ******PRELIMINARY REPORT******        INTERPRETATION:  1. Patchy opacity in the right lower lobe is of unclear etiology. A primary consideration is pneumonia.  2. Please f/u official read in the AM.  < end of copied text >    < from: Xray Chest 1 View AP/PA (10.22.21 @ 08:48) >  IMPRESSION:  Small pleural effusions.  < end of copied text >   PROGRESS NOTE:   Authored by Dr. Reddy Mera MD  Pager [phone] [facility], [address] [place]     Patient is a 71y old  Male who presents with a chief complaint of AHRF (22 Oct 2021 09:00)      SUBJECTIVE / OVERNIGHT EVENTS:  - Overnight, no acute events.  - Today, pt seen and examined at bedside in AM. Endorses urinating less often with the flomax. Continues to report SOB on exertion/movement, but none at rest. Denied f/c/n/v, CP, abdominal pain.    MEDICATIONS  (STANDING):  albuterol/ipratropium for Nebulization 3 milliLiter(s) Nebulizer every 4 hours  amLODIPine   Tablet 10 milliGRAM(s) Oral daily  BACItracin   Ointment 1 Application(s) Topical daily  buDESOnide    Inhalation Suspension 0.25 milliGRAM(s) Nebulizer two times a day  chlorhexidine 4% Liquid 1 Application(s) Topical <User Schedule>  dextrose 40% Gel 15 Gram(s) Oral once  dextrose 5%. 1000 milliLiter(s) (100 mL/Hr) IV Continuous <Continuous>  dextrose 50% Injectable 25 Gram(s) IV Push once  dextrose 50% Injectable 12.5 Gram(s) IV Push once  dextrose 50% Injectable 25 Gram(s) IV Push once  glucagon  Injectable 1 milliGRAM(s) IntraMuscular once  heparin   Injectable 5000 Unit(s) SubCutaneous every 8 hours  influenza   Vaccine 0.5 milliLiter(s) IntraMuscular once  losartan 50 milliGRAM(s) Oral daily  melatonin 5 milliGRAM(s) Oral at bedtime  metoprolol tartrate 12.5 milliGRAM(s) Oral every 12 hours  multivitamin/minerals/iron Oral Solution (CENTRUM) 15 milliLiter(s) Oral daily  pantoprazole  Injectable 40 milliGRAM(s) IV Push daily  piperacillin/tazobactam IVPB.. 3.375 Gram(s) IV Intermittent every 8 hours  polyethylene glycol 3350 17 Gram(s) Oral daily  senna 2 Tablet(s) Oral at bedtime  tamsulosin 0.4 milliGRAM(s) Oral at bedtime    MEDICATIONS  (PRN):  guaiFENesin Oral Liquid (Sugar-Free) 200 milliGRAM(s) Oral every 6 hours PRN Cough  hydrALAZINE Injectable 10 milliGRAM(s) IV Push every 6 hours PRN SBP>180  OLANZapine Injectable 5 milliGRAM(s) IntraMuscular every 6 hours PRN agitation      CAPILLARY BLOOD GLUCOSE        I&O's Summary    22 Oct 2021 07:01  -  23 Oct 2021 07:00  --------------------------------------------------------  IN: 980 mL / OUT: 950 mL / NET: 30 mL        PHYSICAL EXAM:  Vital Signs Last 24 Hrs  T(C): 37.3 (23 Oct 2021 05:30), Max: 37.3 (23 Oct 2021 05:30)  T(F): 99.1 (23 Oct 2021 05:30), Max: 99.1 (23 Oct 2021 05:30)  HR: 92 (23 Oct 2021 05:30) (80 - 99)  BP: 150/55 (23 Oct 2021 05:30) (122/70 - 156/64)  BP(mean): --  RR: 18 (23 Oct 2021 05:30) (18 - 18)  SpO2: 97% (23 Oct 2021 05:30) (95% - 100%)    GENERAL: NAD, well-developed, laying in bed  HEENT: sclera clear  CHEST/LUNG: Decreased breath sounds bilaterally with mild bibasilar crackles; No wheezes  HEART: Regular rate and rhythm; No murmurs, rubs, or gallops  ABDOMEN: Soft, Nontender, Nondistended; Bowel sounds present  EXTREMITIES:  2+ Peripheral Pulses, No clubbing, cyanosis, or edema  PSYCH: AAOx3  NEUROLOGY: non-focal  SKIN: scab on nose, otherwise no rashes or lesions    LABS:                        7.8    13.81 )-----------( 289      ( 22 Oct 2021 08:38 )             28.1     10-22    141  |  103  |  15  ----------------------------<  100<H>  3.7   |  24  |  1.05    Ca    8.7      22 Oct 2021 08:38  Phos  2.3     10-22  Mg     2.0     10-22    TPro  6.8  /  Alb  3.8  /  TBili  0.5  /  DBili  x   /  AST  36  /  ALT  71<H>  /  AlkPhos  94  10-22    PT/INR - ( 22 Oct 2021 08:38 )   PT: 14.4 sec;   INR: 1.21 ratio         PTT - ( 22 Oct 2021 08:38 )  PTT:30.8 sec            RADIOLOGY & ADDITIONAL TESTS:  < from: CT Chest No Cont (10.22.21 @ 19:13) >  ******PRELIMINARY REPORT******        INTERPRETATION:  1. Patchy opacity in the right lower lobe is of unclear etiology. A primary consideration is pneumonia.  2. Please f/u official read in the AM.  < end of copied text >    < from: Xray Chest 1 View AP/PA (10.22.21 @ 08:48) >  IMPRESSION:  Small pleural effusions.  < end of copied text >

## 2021-10-23 NOTE — PROGRESS NOTE ADULT - PROBLEM SELECTOR PLAN 8
- DVT ppx: HSQ  - Diet: Mechanical soft, advance as tolerated  - Dispo: pending medical optimization; per PT/OT, recommending GARY

## 2021-10-23 NOTE — PROGRESS NOTE ADULT - PROBLEM SELECTOR PLAN 7
- DVT ppx: HSQ  - Diet: Mechanical soft, advance as tolerated  - Dispo: pending medical optimization; per PT/OT, recommending GARY Improved. Pt found to have elevated LFTs during hospitalization (AST to 700s, ALT to 400s)  - likely 2/2 sepsis in setting of PNA  - currently without abdominal pain  - continue to monitor, currently downtrending to wnl

## 2021-10-23 NOTE — PROGRESS NOTE ADULT - PROBLEM SELECTOR PLAN 2
Viral PNA 2/2 parainfluenza c/b AHRF and possible superimposed bacterial PNA  - s/p 5-day abx course of CTX->Levaquin ->CTX (10/8-10/12)   - MRSA negative, legionella negative  - RVP (10/8): +parainfluenza  - Multiple BCx neg to date; f/u most recent BCx (10/18); if neg, will consider dcing abx  - c/w Zosyn (10/18- ), was started for concern for superimposed bacterial PNA given fever overnight on 10/18 and worsened leukocytosis Viral PNA 2/2 parainfluenza c/b AHRF and possible superimposed bacterial PNA  - s/p 5-day abx course of CTX->Levaquin ->CTX (10/8-10/12)   - MRSA negative, legionella negative  - RVP (10/8): +parainfluenza  - Multiple BCx neg to date; f/u most recent BCx (10/18); if neg, will consider dcing abx  - s/p Zosyn (10/18-10/23), was started for concern for superimposed bacterial PNA given fever overnight on 10/18 and worsened leukocytosis, but CT chest only found thick linear consolidation in the right lower lobe most likely atelectasis.

## 2021-10-23 NOTE — PROGRESS NOTE ADULT - ATTENDING COMMENTS
Seen, examined the patient this afternoon  Feels ok, c/o exertional SOB, afebrile, using O2 2L NC, has some cough, no chest pain  Reviewed labs, imaging personally    This is a 71M w/ hx of COPD, Asthma, HTN, CAD s/p PCI (LCx, LAD, D1), initially presented with SOB and was admitted to MICU for AHRF requiring intubation 2/2 parainfluenza viral PNA c/b possible COPD exacerbation and superimposed bacterial PNA. Now extubated and completing course of IV Zosyn    - Afebrile, WBC 9.0, blood c/s neg, RVP positive for parainfluenza on admission. negative Covid pcr    Pulmonary rec to stop IV Zosynat this point ( total 5 days), Suspected secondary bacterial Pna post Parainfluenza.    CT chest showed no consolidation but LLL atelectasis     added incentive spirometer and c/w Bronchodilators, inhaled steroid  - Echo EF 60-65%  - PT in progress  ** Plan of care is d/w daughter yesterday Seen, examined the patient this afternoon  Feels ok, c/o exertional SOB, afebrile, using O2 2L NC, has some cough, no chest pain  Reviewed labs, imaging personally    This is a 71M w/ hx of COPD, Asthma, HTN, CAD s/p PCI (LCx, LAD, D1), initially presented with SOB and was admitted to MICU for AHRF requiring intubation 2/2 parainfluenza viral PNA c/b possible COPD exacerbation and superimposed bacterial PNA. Now extubated and completing course of IV Zosyn    - Afebrile, WBC 9.0, blood c/s neg, RVP positive for parainfluenza on admission. negative Covid pcr    Pulmonary rec to stop IV Zosyn at this point ( total 5 days), Suspected secondary bacterial Pna post Parainfluenza.    CT chest showed no consolidation but LLL atelectasis     added incentive spirometer and c/w Bronchodilators, inhaled steroid  - Noted Hb 7.3, will transfuse 1 PRBC and do anemia w/u prior to Tx    stool occult and hemolysis w/u ordered.   - Echo EF 60-65%  - PT in progress  ** Plan of care is d/w daughter yesterday

## 2021-10-23 NOTE — PROGRESS NOTE ADULT - PROBLEM SELECTOR PLAN 1
Improved. Pt initially presented with AHRF 2/2 viral PNA (parainfluenza+) c/b possible superimposed bacterial PNA and COPD exacerbation. At home, for his COPD/Asthma, pt is on Daliresp 500mcg daily, spiriva daily PRN, symbicort BID PRN, and Ventolin QID PRN.  - s/p intubation on 10/8, extubation/reintubation on 10/12, extubation on 10/14 to BiPAP and transitioning to O2NC  - s/p solumedrol (10/8-10/16)  - s/p 5-day abx course of CTX->Levaquin ->CTX (10/8-10/12)   - c/w Zosyn (10/18- ), was started for concern for superimposed bacterial PNA given fever overnight on 10/18 and worsened leukocytosis   - c/w duonebs q4h and inhaled budesonide 0.25mg BID  - c/w guaifenesin PRN for cough  - c/w acapella device for airway clearance  - continue to monitor respiratory status, currently sating well on O2NC, wean as tolerated  - c/w BIPAP qHS for now  - CXR (10/18): small pleural effusions  - CT chest (10/22): (prelim read) Patchy opacity in the right lower lobe  - Appreciate Pulm recs Improved. Pt initially presented with AHRF 2/2 viral PNA (parainfluenza+) c/b possible superimposed bacterial PNA and COPD exacerbation. At home, for his COPD/Asthma, pt is on Daliresp 500mcg daily, spiriva daily PRN, symbicort BID PRN, and Ventolin QID PRN.  - s/p intubation on 10/8, extubation/reintubation on 10/12, extubation on 10/14 to BiPAP and transitioning to O2NC  - TTE (10/16): EF 60-65%; minimal MR; Small pericardial effusion measuring 0.2cm anterior to RV; no evidence of RA or RV collapse.  - CXR (10/18): small pleural effusions  - CT chest (10/22): thick linear consolidation in the right lower lobe most likely represents atelectasis.  - c/w duonebs q4h and inhaled budesonide 0.25mg BID  - c/w guaifenesin PRN for cough; c/w incentive spirometry and acapella device for airway clearance  - continue to monitor respiratory status, currently sating well on O2NC, wean as tolerated  - c/w BIPAP qHS for now  - s/p solumedrol (10/8-10/16)  - s/p 5-day abx course of CTX->Levaquin ->CTX (10/8-10/12)   - s/p Zosyn (10/18-10/23), was started for concern for superimposed bacterial PNA given fever overnight on 10/18 and worsened leukocytosis, but CT chest only found thick linear consolidation in the right lower lobe most likely atelectasis.  - Appreciate Pulm recs

## 2021-10-23 NOTE — PROGRESS NOTE ADULT - PROBLEM SELECTOR PLAN 6
Improved. Pt found to have elevated LFTs during hospitalization (AST to 700s, ALT to 400s)  - likely 2/2 sepsis in setting of PNA  - currently without abdominal pain  - continue to monitor, currently downtrending to wnl Hx of CAD w/ multivessel dz s/p PCI (LCx, LAD w/ RISSA x2, D1 w/ RISSA x1 in 2019)  - TTE (10/16): EF 60-65%; minimal MR; Small pericardial effusion measuring 0.2cm anterior to RV; no evidence of RA or RV collapse.  - c/w metoprolol tartrate 12.5mg BID (on Toprol XL 25mg daily at home)  - c/w atorvastatin 80mg qhs  - c/w home ASA 81mg daily

## 2021-10-24 LAB
ALBUMIN SERPL ELPH-MCNC: 3.2 G/DL — LOW (ref 3.3–5)
ALP SERPL-CCNC: 64 U/L — SIGNIFICANT CHANGE UP (ref 40–120)
ALT FLD-CCNC: 44 U/L — SIGNIFICANT CHANGE UP (ref 10–45)
ANION GAP SERPL CALC-SCNC: 12 MMOL/L — SIGNIFICANT CHANGE UP (ref 5–17)
AST SERPL-CCNC: 22 U/L — SIGNIFICANT CHANGE UP (ref 10–40)
BILIRUB SERPL-MCNC: 0.4 MG/DL — SIGNIFICANT CHANGE UP (ref 0.2–1.2)
BUN SERPL-MCNC: 10 MG/DL — SIGNIFICANT CHANGE UP (ref 7–23)
CALCIUM SERPL-MCNC: 8.1 MG/DL — LOW (ref 8.4–10.5)
CHLORIDE SERPL-SCNC: 107 MMOL/L — SIGNIFICANT CHANGE UP (ref 96–108)
CO2 SERPL-SCNC: 23 MMOL/L — SIGNIFICANT CHANGE UP (ref 22–31)
CREAT SERPL-MCNC: 0.94 MG/DL — SIGNIFICANT CHANGE UP (ref 0.5–1.3)
GLUCOSE SERPL-MCNC: 89 MG/DL — SIGNIFICANT CHANGE UP (ref 70–99)
HAPTOGLOB SERPL-MCNC: 163 MG/DL — SIGNIFICANT CHANGE UP (ref 34–200)
HCT VFR BLD CALC: 27.4 % — LOW (ref 39–50)
HGB BLD-MCNC: 8.1 G/DL — LOW (ref 13–17)
LDH SERPL L TO P-CCNC: 247 U/L — HIGH (ref 50–242)
MAGNESIUM SERPL-MCNC: 1.9 MG/DL — SIGNIFICANT CHANGE UP (ref 1.6–2.6)
MCHC RBC-ENTMCNC: 22.3 PG — LOW (ref 27–34)
MCHC RBC-ENTMCNC: 29.6 GM/DL — LOW (ref 32–36)
MCV RBC AUTO: 75.3 FL — LOW (ref 80–100)
NRBC # BLD: 0 /100 WBCS — SIGNIFICANT CHANGE UP (ref 0–0)
PHOSPHATE SERPL-MCNC: 2.7 MG/DL — SIGNIFICANT CHANGE UP (ref 2.5–4.5)
PLATELET # BLD AUTO: 304 K/UL — SIGNIFICANT CHANGE UP (ref 150–400)
POTASSIUM SERPL-MCNC: 3.5 MMOL/L — SIGNIFICANT CHANGE UP (ref 3.5–5.3)
POTASSIUM SERPL-SCNC: 3.5 MMOL/L — SIGNIFICANT CHANGE UP (ref 3.5–5.3)
PROT SERPL-MCNC: 5.9 G/DL — LOW (ref 6–8.3)
RBC # BLD: 3.64 M/UL — LOW (ref 4.2–5.8)
RBC # BLD: 3.64 M/UL — LOW (ref 4.2–5.8)
RBC # FLD: 24.5 % — HIGH (ref 10.3–14.5)
RETICS #: 26.6 K/UL — SIGNIFICANT CHANGE UP (ref 25–125)
RETICS/RBC NFR: 0.7 % — SIGNIFICANT CHANGE UP (ref 0.5–2.5)
SODIUM SERPL-SCNC: 142 MMOL/L — SIGNIFICANT CHANGE UP (ref 135–145)
WBC # BLD: 9.67 K/UL — SIGNIFICANT CHANGE UP (ref 3.8–10.5)
WBC # FLD AUTO: 9.67 K/UL — SIGNIFICANT CHANGE UP (ref 3.8–10.5)

## 2021-10-24 PROCEDURE — 99233 SBSQ HOSP IP/OBS HIGH 50: CPT

## 2021-10-24 RX ORDER — FENTANYL CITRATE 50 UG/ML
1 INJECTION INTRAVENOUS
Refills: 0 | Status: DISCONTINUED | OUTPATIENT
Start: 2021-10-24 | End: 2021-10-24

## 2021-10-24 RX ADMIN — Medication 5 MILLIGRAM(S): at 21:43

## 2021-10-24 RX ADMIN — ATORVASTATIN CALCIUM 80 MILLIGRAM(S): 80 TABLET, FILM COATED ORAL at 00:07

## 2021-10-24 RX ADMIN — Medication 1 APPLICATION(S): at 12:51

## 2021-10-24 RX ADMIN — FENTANYL CITRATE 1 PATCH: 50 INJECTION INTRAVENOUS at 16:49

## 2021-10-24 RX ADMIN — FENTANYL CITRATE 1 PATCH: 50 INJECTION INTRAVENOUS at 14:17

## 2021-10-24 RX ADMIN — Medication 81 MILLIGRAM(S): at 12:51

## 2021-10-24 RX ADMIN — Medication 12.5 MILLIGRAM(S): at 06:28

## 2021-10-24 RX ADMIN — FENTANYL CITRATE 1 PATCH: 50 INJECTION INTRAVENOUS at 19:36

## 2021-10-24 RX ADMIN — Medication 0.25 MILLIGRAM(S): at 05:59

## 2021-10-24 RX ADMIN — ATORVASTATIN CALCIUM 80 MILLIGRAM(S): 80 TABLET, FILM COATED ORAL at 21:44

## 2021-10-24 RX ADMIN — Medication 3 MILLILITER(S): at 01:29

## 2021-10-24 RX ADMIN — Medication 3 MILLILITER(S): at 21:44

## 2021-10-24 RX ADMIN — CHLORHEXIDINE GLUCONATE 1 APPLICATION(S): 213 SOLUTION TOPICAL at 12:52

## 2021-10-24 RX ADMIN — Medication 3 MILLILITER(S): at 05:58

## 2021-10-24 RX ADMIN — FENTANYL CITRATE 1 PATCH: 50 INJECTION INTRAVENOUS at 08:00

## 2021-10-24 RX ADMIN — Medication 15 MILLILITER(S): at 12:53

## 2021-10-24 RX ADMIN — PANTOPRAZOLE SODIUM 40 MILLIGRAM(S): 20 TABLET, DELAYED RELEASE ORAL at 12:52

## 2021-10-24 RX ADMIN — HEPARIN SODIUM 5000 UNIT(S): 5000 INJECTION INTRAVENOUS; SUBCUTANEOUS at 21:44

## 2021-10-24 RX ADMIN — TAMSULOSIN HYDROCHLORIDE 0.4 MILLIGRAM(S): 0.4 CAPSULE ORAL at 21:44

## 2021-10-24 RX ADMIN — HEPARIN SODIUM 5000 UNIT(S): 5000 INJECTION INTRAVENOUS; SUBCUTANEOUS at 05:57

## 2021-10-24 RX ADMIN — AMLODIPINE BESYLATE 10 MILLIGRAM(S): 2.5 TABLET ORAL at 05:56

## 2021-10-24 RX ADMIN — Medication 12.5 MILLIGRAM(S): at 17:17

## 2021-10-24 RX ADMIN — Medication 3 MILLILITER(S): at 17:17

## 2021-10-24 RX ADMIN — LOSARTAN POTASSIUM 50 MILLIGRAM(S): 100 TABLET, FILM COATED ORAL at 05:56

## 2021-10-24 RX ADMIN — Medication 3 MILLILITER(S): at 10:11

## 2021-10-24 RX ADMIN — Medication 0.25 MILLIGRAM(S): at 17:18

## 2021-10-24 RX ADMIN — Medication 200 MILLIGRAM(S): at 17:18

## 2021-10-24 RX ADMIN — HEPARIN SODIUM 5000 UNIT(S): 5000 INJECTION INTRAVENOUS; SUBCUTANEOUS at 12:53

## 2021-10-24 RX ADMIN — Medication 3 MILLILITER(S): at 12:52

## 2021-10-24 NOTE — PROGRESS NOTE ADULT - ATTENDING COMMENTS
Seen, examined the patient this afternoon  Feels ok, c/o exertional SOB, afebrile, using O2 2L NC, has some cough, no chest pain  Reviewed labs, imaging personally    This is a 71M w/ hx of COPD, Asthma, HTN, CAD s/p PCI (LCx, LAD, D1), initially presented with SOB and was admitted to MICU for AHRF requiring intubation 2/2 parainfluenza viral PNA c/b possible COPD exacerbation and superimposed bacterial PNA. Now extubated and completing course of IV Zosyn    - Afebrile, WBC wnl, blood c/s neg, RVP positive for parainfluenza on admission. negative Covid pcr    Pulmonary rec to stop IV Zosyn at this point ( total 5 days), Suspected secondary bacterial Pna post Parainfluenza.    CT chest showed no consolidation but LLL atelectasis     added incentive spirometer and c/w Bronchodilators, inhaled steroid  - Transfused 1 PRBC, Hb 8.3. No c/o blood in stool, hemolysis w/u neg    stool occult ordered  - Echo EF 60-65%  - PT in progress

## 2021-10-24 NOTE — PROGRESS NOTE ADULT - SUBJECTIVE AND OBJECTIVE BOX
ANABELLE GOODSON  71y  MRN: [id number]    Patient is a 71y old  Male who presents with a chief complaint of AHRF (23 Oct 2021 08:13)      Subjective: no events ON. Denies fever, CP, SOB, abn pain, N/V, dysuria. Tolerating diet.      MEDICATIONS  (STANDING):  albuterol/ipratropium for Nebulization 3 milliLiter(s) Nebulizer every 4 hours  amLODIPine   Tablet 10 milliGRAM(s) Oral daily  aspirin  chewable 81 milliGRAM(s) Oral daily  atorvastatin 80 milliGRAM(s) Oral at bedtime  BACItracin   Ointment 1 Application(s) Topical daily  buDESOnide    Inhalation Suspension 0.25 milliGRAM(s) Nebulizer two times a day  chlorhexidine 4% Liquid 1 Application(s) Topical <User Schedule>  dextrose 40% Gel 15 Gram(s) Oral once  dextrose 5%. 1000 milliLiter(s) (100 mL/Hr) IV Continuous <Continuous>  dextrose 50% Injectable 25 Gram(s) IV Push once  dextrose 50% Injectable 12.5 Gram(s) IV Push once  dextrose 50% Injectable 25 Gram(s) IV Push once  glucagon  Injectable 1 milliGRAM(s) IntraMuscular once  heparin   Injectable 5000 Unit(s) SubCutaneous every 8 hours  influenza   Vaccine 0.5 milliLiter(s) IntraMuscular once  losartan 50 milliGRAM(s) Oral daily  melatonin 5 milliGRAM(s) Oral at bedtime  metoprolol tartrate 12.5 milliGRAM(s) Oral every 12 hours  multivitamin/minerals/iron Oral Solution (CENTRUM) 15 milliLiter(s) Oral daily  pantoprazole  Injectable 40 milliGRAM(s) IV Push daily  polyethylene glycol 3350 17 Gram(s) Oral daily  senna 2 Tablet(s) Oral at bedtime  tamsulosin 0.4 milliGRAM(s) Oral at bedtime    MEDICATIONS  (PRN):  guaiFENesin Oral Liquid (Sugar-Free) 200 milliGRAM(s) Oral every 6 hours PRN Cough  hydrALAZINE Injectable 10 milliGRAM(s) IV Push every 6 hours PRN SBP>180  OLANZapine Injectable 5 milliGRAM(s) IntraMuscular every 6 hours PRN agitation      Objective:    Vitals: Vital Signs Last 24 Hrs  T(C): 37.2 (10-24-21 @ 05:52), Max: 37.6 (10-23-21 @ 21:57)  T(F): 99 (10-24-21 @ 05:52), Max: 99.6 (10-23-21 @ 21:57)  HR: 72 (10-24-21 @ 06:01) (69 - 84)  BP: 138/65 (10-24-21 @ 05:52) (100/58 - 138/65)  BP(mean): --  RR: 18 (10-24-21 @ 05:52) (17 - 19)  SpO2: 98% (10-24-21 @ 06:01) (94% - 100%)            I&O's Summary    23 Oct 2021 07:01  -  24 Oct 2021 07:00  --------------------------------------------------------  IN: 780 mL / OUT: 250 mL / NET: 530 mL        PHYSICAL EXAM:  GENERAL: NAD  HEAD:  Atraumatic, Normocephalic  EYES: EOMI, conjunctiva and sclera clear  CHEST/LUNG: Clear to percussion bilaterally; No rales, rhonchi, wheezing, or rubs  HEART: Regular rate and rhythm; No murmurs, rubs, or gallops  ABDOMEN: Soft, Nontender, Nondistended;   SKIN: No rashes or lesions  NERVOUS SYSTEM:  Alert & Oriented X3, no focal deficit    LABS:  10-23    141  |  106  |  13  ----------------------------<  89  3.5   |  22  |  1.13  10-22    141  |  103  |  15  ----------------------------<  100<H>  3.7   |  24  |  1.05    Ca    8.3<L>      23 Oct 2021 09:05  Ca    8.7      22 Oct 2021 08:38  Phos  2.7     10-23  Mg     1.9     10-23    TPro  5.9<L>  /  Alb  3.1<L>  /  TBili  0.3  /  DBili  x   /  AST  22  /  ALT  51<H>  /  AlkPhos  68  10-23  TPro  6.8  /  Alb  3.8  /  TBili  0.5  /  DBili  x   /  AST  36  /  ALT  71<H>  /  AlkPhos  94  10-22      PT/INR - ( 22 Oct 2021 08:38 )   PT: 14.4 sec;   INR: 1.21 ratio         PTT - ( 23 Oct 2021 09:05 )  PTT:26.4 sec                                        7.3    9.03  )-----------( 332      ( 23 Oct 2021 13:46 )             26.2                         6.7    10.09 )-----------( 306      ( 23 Oct 2021 09:05 )             23.3                         7.8    13.81 )-----------( 289      ( 22 Oct 2021 08:38 )             28.1     CAPILLARY BLOOD GLUCOSE

## 2021-10-24 NOTE — PROGRESS NOTE ADULT - ASSESSMENT
71M w/ hx of COPD, Asthma, HTN, CAD s/p PCI (LCx, LAD w/ RISSA x2, D1 w/ RISSA x1), initially presented with SOB and was admitted to MICU for AHRF requiring intubation 2/2 parainfluenza viral PNA c/b possible COPD exacerbation and superimposed bacterial PNA. Now extubated and completing course of IV Zosyn.
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
## 2021-10-24 NOTE — PROGRESS NOTE ADULT - PROBLEM SELECTOR PLAN 1
Improved. Pt initially presented with AHRF 2/2 viral PNA (parainfluenza+) c/b possible superimposed bacterial PNA and COPD exacerbation. At home, for his COPD/Asthma, pt is on Daliresp 500mcg daily, spiriva daily PRN, symbicort BID PRN, and Ventolin QID PRN.  - s/p intubation on 10/8, extubation/reintubation on 10/12, extubation on 10/14 to BiPAP and transitioning to O2NC  - TTE (10/16): EF 60-65%; minimal MR; Small pericardial effusion measuring 0.2cm anterior to RV; no evidence of RA or RV collapse.  - CXR (10/18): small pleural effusions  - CT chest (10/22): thick linear consolidation in the right lower lobe most likely represents atelectasis.  - c/w duonebs q4h and inhaled budesonide 0.25mg BID  - c/w guaifenesin PRN for cough; c/w incentive spirometry and acapella device for airway clearance  - continue to monitor respiratory status, currently sating well on O2NC, wean as tolerated  - c/w BIPAP qHS for now  - s/p solumedrol (10/8-10/16)  - s/p 5-day abx course of CTX->Levaquin ->CTX (10/8-10/12)   - s/p Zosyn (10/18-10/23), was started for concern for superimposed bacterial PNA given fever overnight on 10/18 and worsened leukocytosis, but CT chest only found thick linear consolidation in the right lower lobe most likely atelectasis.  - Appreciate Pulm recs

## 2021-10-24 NOTE — PROGRESS NOTE ADULT - PROBLEM SELECTOR PLAN 6
Hx of CAD w/ multivessel dz s/p PCI (LCx, LAD w/ RISSA x2, D1 w/ RISSA x1 in 2019)  - TTE (10/16): EF 60-65%; minimal MR; Small pericardial effusion measuring 0.2cm anterior to RV; no evidence of RA or RV collapse.  - c/w metoprolol tartrate 12.5mg BID (on Toprol XL 25mg daily at home)  - c/w atorvastatin 80mg qhs  - c/w home ASA 81mg daily

## 2021-10-24 NOTE — DISCHARGE NOTE PROVIDER - NSDCMRMEDTOKEN_GEN_ALL_CORE_FT
amLODIPine 10 mg oral tablet: 1 tab(s) orally once a day  aspirin 81 mg oral delayed release tablet: 1 tab(s) orally once a day  atorvastatin 80 mg oral tablet: 1 tab(s) orally once a day (at bedtime)  Daliresp 500 mcg oral tablet: 1 tab(s) orally once a day  losartan 100 mg oral tablet: 1 tab(s) orally once a day  metoprolol succinate 25 mg oral tablet, extended release: 1 tab(s) orally once a day  Mucinex:   Spiriva HandiHaler 18 mcg inhalation capsule: 1 cap(s) inhaled once a day, As Needed  Symbicort 160 mcg-4.5 mcg/inh inhalation aerosol: 2 puff(s) inhaled 2 times a day, As Needed  Ventolin HFA 90 mcg/inh inhalation aerosol: 2 puff(s) inhaled 4 times a day, As Needed   albuterol 90 mcg/inh inhalation aerosol: 2 puff(s) inhaled every 4 hours, As needed, Shortness of Breath and/or Wheezing  amLODIPine 10 mg oral tablet: 1 tab(s) orally once a day  aspirin 81 mg oral delayed release tablet: 1 tab(s) orally once a day  atorvastatin 80 mg oral tablet: 1 tab(s) orally once a day (at bedtime)  bacitracin 500 units/g topical ointment: 1 application topically once a day  bacitracin/neomycin/polymyxin B 400 units-3.5 mg-5000 units/g topical ointment: 1 application topically 2 times a day to the back of the neck lesion.   guaiFENesin 100 mg/5 mL oral liquid: 10 milliliter(s) orally every 6 hours, As needed, Cough  losartan 50 mg oral tablet: 1 tab(s) orally once a day  Metoprolol Tartrate 25 mg oral tablet: 0.5 tab(s) orally 2 times a day   Multiple Vitamins with Minerals oral liquid: 1 unit(s) orally once a day  pantoprazole 40 mg oral delayed release tablet: 1 tab(s) orally once a day   polyethylene glycol 3350 oral powder for reconstitution: 17 gram(s) orally once a day  senna oral tablet: 2 tab(s) orally once a day (at bedtime)  Spiriva HandiHaler 18 mcg inhalation capsule: 1 cap(s) inhaled once a day, As Needed  Symbicort 160 mcg-4.5 mcg/inh inhalation aerosol: 2 puff(s) inhaled 2 times a day, As Needed  tamsulosin 0.4 mg oral capsule: 1 cap(s) orally once a day (at bedtime)

## 2021-10-24 NOTE — PROGRESS NOTE ADULT - PROBLEM SELECTOR PLAN 7
Improved. Pt found to have elevated LFTs during hospitalization (AST to 700s, ALT to 400s)  - likely 2/2 sepsis in setting of PNA  - currently without abdominal pain  - continue to monitor, currently downtrending to wnl

## 2021-10-24 NOTE — PROGRESS NOTE ADULT - PROBLEM SELECTOR PLAN 4
Pt with hx of HTN. At home, pt is on amlodipine 10mg daily, losartan 100mg daily, Toprol XL 25mg  - c/w metoprolol tartrate 12.5mg BID, amlodipine 10mg daily  - c/w home losartan at half home dose given improved renal function, can uptitrate back to 100mg daily if tolerated  - continue to monitor BP

## 2021-10-24 NOTE — PROGRESS NOTE ADULT - PROBLEM SELECTOR PLAN 2
Viral PNA 2/2 parainfluenza c/b AHRF and possible superimposed bacterial PNA  - s/p 5-day abx course of CTX->Levaquin ->CTX (10/8-10/12)   - MRSA negative, legionella negative  - RVP (10/8): +parainfluenza  - Multiple BCx neg to date; f/u most recent BCx (10/18); if neg, will consider dcing abx  - s/p Zosyn (10/18-10/23), was started for concern for superimposed bacterial PNA given fever overnight on 10/18 and worsened leukocytosis, but CT chest only found thick linear consolidation in the right lower lobe most likely atelectasis.

## 2021-10-24 NOTE — DISCHARGE NOTE PROVIDER - NSDCCPCAREPLAN_GEN_ALL_CORE_FT
PRINCIPAL DISCHARGE DIAGNOSIS  Diagnosis: Acute respiratory failure  Assessment and Plan of Treatment: You came to the hospital due to shortness of breath. You were found to have a viral pneumonia caused by the "parainfluenza" virus. Because of your worsening difficulty breathing, you were intubated on 10/8/21 and then finally extubated on 10/14/21. You were given supportive care for your viral infection. You also completed a course of steroids given concern of a possible COPD exacerbation from the viral illness. Additionally, you completed a course of antibiotics to treat a possible bacterial pneumonia on top of your viral pneumonia. You improved after treatment. Please see your Primary Care doctor / Pulmonary doctor within 1-2 weeks after discharge for follow up and to review your medication regimen. If you experience worsening symptoms, have new symptoms, or have any concerns, please see your doctor as soon as possible or go the Emergency Room.        SECONDARY DISCHARGE DIAGNOSES  Diagnosis: Microcytic anemia  Assessment and Plan of Treatment: You were found to have a low blood count (anemia) during your hospitalization. There were no signs of bleeding during hospitalization and tests to determine the underlying reason for your new anemia were unrevealing. It is possible that the anemia is a result from your infection. You were given 1 unit of blood and you improved. Please see your Primary Care doctor within 1-2 weeks after discharge for follow up to continue monitoring your blood count. If you experience worsening symptoms, have new symptoms, or have any concerns, please see your doctor as soon as possible or go the Emergency Room.       PRINCIPAL DISCHARGE DIAGNOSIS  Diagnosis: Acute respiratory failure  Assessment and Plan of Treatment: You came to the hospital due to shortness of breath. You were found to have a viral pneumonia caused by the "parainfluenza" virus. Because of your worsening difficulty breathing, you were intubated on 10/8/21 and then extubated on 10/14/21. You were given supportive care for your viral infection. You also completed a course of steroids to treat a possible COPD exacerbation from the viral illness. Additionally, you completed a course of antibiotics to treat a possible bacterial pneumonia on top of your viral pneumonia. You improved after treatment. Please see your Primary Care doctor / Pulmonary doctor within 1-2 weeks after discharge for follow up and to review your medication regimen. If you experience worsening symptoms, have new symptoms, or have any concerns, please see your doctor as soon as possible or go the Emergency Room.        SECONDARY DISCHARGE DIAGNOSES  Diagnosis: Microcytic anemia  Assessment and Plan of Treatment: You were found to have a low blood count (anemia) during your hospitalization. There were no signs of bleeding during hospitalization and tests to determine the reason for your new anemia did not show a definite cause. It is possible that the anemia is a result from your infection. You were given 1 unit of blood and you improved. Please see your Primary Care doctor within 1-2 weeks after discharge for follow up to continue monitoring your blood count. If you experience worsening symptoms, have new symptoms, or have any concerns, please see your doctor as soon as possible or go the Emergency Room.

## 2021-10-24 NOTE — PROGRESS NOTE ADULT - PROBLEM SELECTOR PLAN 5
Pt presented Microcytic Anemia to Hgb 9.4 (baseline Hgb 11-12 and normocytic in 2019)  - no signs of active bleeding  - possibly 2/2 sepsis vs iron deficiency  - iron studies wnl (though lower end)  - f/u retic count, LDH, haptoglobin  - monitor CBC, transfuse to goal Hgb>8 for hx of CAD w/ multiple stents

## 2021-10-25 LAB
ANION GAP SERPL CALC-SCNC: 11 MMOL/L — SIGNIFICANT CHANGE UP (ref 5–17)
BUN SERPL-MCNC: 11 MG/DL — SIGNIFICANT CHANGE UP (ref 7–23)
CALCIUM SERPL-MCNC: 8.4 MG/DL — SIGNIFICANT CHANGE UP (ref 8.4–10.5)
CHLORIDE SERPL-SCNC: 107 MMOL/L — SIGNIFICANT CHANGE UP (ref 96–108)
CO2 SERPL-SCNC: 23 MMOL/L — SIGNIFICANT CHANGE UP (ref 22–31)
CREAT SERPL-MCNC: 0.92 MG/DL — SIGNIFICANT CHANGE UP (ref 0.5–1.3)
GLUCOSE SERPL-MCNC: 100 MG/DL — HIGH (ref 70–99)
HCT VFR BLD CALC: 27.9 % — LOW (ref 39–50)
HGB BLD-MCNC: 8.3 G/DL — LOW (ref 13–17)
MAGNESIUM SERPL-MCNC: 1.9 MG/DL — SIGNIFICANT CHANGE UP (ref 1.6–2.6)
MCHC RBC-ENTMCNC: 22.8 PG — LOW (ref 27–34)
MCHC RBC-ENTMCNC: 29.7 GM/DL — LOW (ref 32–36)
MCV RBC AUTO: 76.6 FL — LOW (ref 80–100)
NRBC # BLD: 0 /100 WBCS — SIGNIFICANT CHANGE UP (ref 0–0)
PHOSPHATE SERPL-MCNC: 2.7 MG/DL — SIGNIFICANT CHANGE UP (ref 2.5–4.5)
PLATELET # BLD AUTO: 327 K/UL — SIGNIFICANT CHANGE UP (ref 150–400)
POTASSIUM SERPL-MCNC: 4 MMOL/L — SIGNIFICANT CHANGE UP (ref 3.5–5.3)
POTASSIUM SERPL-SCNC: 4 MMOL/L — SIGNIFICANT CHANGE UP (ref 3.5–5.3)
RBC # BLD: 3.64 M/UL — LOW (ref 4.2–5.8)
RBC # FLD: 24.9 % — HIGH (ref 10.3–14.5)
SARS-COV-2 RNA SPEC QL NAA+PROBE: SIGNIFICANT CHANGE UP
SODIUM SERPL-SCNC: 141 MMOL/L — SIGNIFICANT CHANGE UP (ref 135–145)
WBC # BLD: 7.82 K/UL — SIGNIFICANT CHANGE UP (ref 3.8–10.5)
WBC # FLD AUTO: 7.82 K/UL — SIGNIFICANT CHANGE UP (ref 3.8–10.5)

## 2021-10-25 PROCEDURE — 99233 SBSQ HOSP IP/OBS HIGH 50: CPT | Mod: GC

## 2021-10-25 RX ORDER — BUDESONIDE AND FORMOTEROL FUMARATE DIHYDRATE 160; 4.5 UG/1; UG/1
2 AEROSOL RESPIRATORY (INHALATION)
Refills: 0 | Status: DISCONTINUED | OUTPATIENT
Start: 2021-10-25 | End: 2021-10-29

## 2021-10-25 RX ORDER — IPRATROPIUM/ALBUTEROL SULFATE 18-103MCG
3 AEROSOL WITH ADAPTER (GRAM) INHALATION EVERY 4 HOURS
Refills: 0 | Status: DISCONTINUED | OUTPATIENT
Start: 2021-10-25 | End: 2021-10-27

## 2021-10-25 RX ORDER — TIOTROPIUM BROMIDE 18 UG/1
1 CAPSULE ORAL; RESPIRATORY (INHALATION) DAILY
Refills: 0 | Status: DISCONTINUED | OUTPATIENT
Start: 2021-10-25 | End: 2021-10-29

## 2021-10-25 RX ADMIN — Medication 81 MILLIGRAM(S): at 12:26

## 2021-10-25 RX ADMIN — BUDESONIDE AND FORMOTEROL FUMARATE DIHYDRATE 2 PUFF(S): 160; 4.5 AEROSOL RESPIRATORY (INHALATION) at 17:29

## 2021-10-25 RX ADMIN — HEPARIN SODIUM 5000 UNIT(S): 5000 INJECTION INTRAVENOUS; SUBCUTANEOUS at 06:23

## 2021-10-25 RX ADMIN — HEPARIN SODIUM 5000 UNIT(S): 5000 INJECTION INTRAVENOUS; SUBCUTANEOUS at 21:28

## 2021-10-25 RX ADMIN — CHLORHEXIDINE GLUCONATE 1 APPLICATION(S): 213 SOLUTION TOPICAL at 12:25

## 2021-10-25 RX ADMIN — FENTANYL CITRATE 1 PATCH: 50 INJECTION INTRAVENOUS at 19:27

## 2021-10-25 RX ADMIN — Medication 200 MILLIGRAM(S): at 12:35

## 2021-10-25 RX ADMIN — Medication 12.5 MILLIGRAM(S): at 17:29

## 2021-10-25 RX ADMIN — ATORVASTATIN CALCIUM 80 MILLIGRAM(S): 80 TABLET, FILM COATED ORAL at 21:27

## 2021-10-25 RX ADMIN — Medication 0.25 MILLIGRAM(S): at 06:24

## 2021-10-25 RX ADMIN — FENTANYL CITRATE 1 PATCH: 50 INJECTION INTRAVENOUS at 08:50

## 2021-10-25 RX ADMIN — TIOTROPIUM BROMIDE 1 CAPSULE(S): 18 CAPSULE ORAL; RESPIRATORY (INHALATION) at 17:29

## 2021-10-25 RX ADMIN — Medication 3 MILLILITER(S): at 06:23

## 2021-10-25 RX ADMIN — HEPARIN SODIUM 5000 UNIT(S): 5000 INJECTION INTRAVENOUS; SUBCUTANEOUS at 12:25

## 2021-10-25 RX ADMIN — Medication 12.5 MILLIGRAM(S): at 06:22

## 2021-10-25 RX ADMIN — Medication 15 MILLILITER(S): at 12:25

## 2021-10-25 RX ADMIN — AMLODIPINE BESYLATE 10 MILLIGRAM(S): 2.5 TABLET ORAL at 06:22

## 2021-10-25 RX ADMIN — PANTOPRAZOLE SODIUM 40 MILLIGRAM(S): 20 TABLET, DELAYED RELEASE ORAL at 12:26

## 2021-10-25 RX ADMIN — Medication 5 MILLIGRAM(S): at 21:27

## 2021-10-25 RX ADMIN — TAMSULOSIN HYDROCHLORIDE 0.4 MILLIGRAM(S): 0.4 CAPSULE ORAL at 21:27

## 2021-10-25 RX ADMIN — Medication 3 MILLILITER(S): at 12:27

## 2021-10-25 RX ADMIN — Medication 1 APPLICATION(S): at 12:27

## 2021-10-25 RX ADMIN — LOSARTAN POTASSIUM 50 MILLIGRAM(S): 100 TABLET, FILM COATED ORAL at 06:22

## 2021-10-25 NOTE — PROGRESS NOTE ADULT - SUBJECTIVE AND OBJECTIVE BOX
CHIEF COMPLAINT: shortness of breath    Interval Events: Patient seen and examined at bedside. No acute events overnight.     REVIEW OF SYSTEMS:  Constitutional: [ ] negative [ ] fevers [ ] chills [ ] weight loss [ ] weight gain  HEENT: [ ] negative [ ] dry eyes [ ] eye irritation [ ] postnasal drip [ ] nasal congestion  CV: [ ] negative  [ ] chest pain [ ] orthopnea [ ] palpitations [ ] murmur  Resp: [ ] negative [ ] cough [ ] shortness of breath [ ] dyspnea [ ] wheezing [ ] sputum [ ] hemoptysis  GI: [ ] negative [ ] nausea [ ] vomiting [ ] diarrhea [ ] constipation [ ] abd pain [ ] dysphagia   : [ ] negative [ ] dysuria [ ] nocturia [ ] hematuria [ ] increased urinary frequency  Musculoskeletal: [ ] negative [ ] back pain [ ] myalgias [ ] arthralgias [ ] fracture  Skin: [ ] negative [ ] rash [ ] itch  Neurological: [ ] negative [ ] headache [ ] dizziness [ ] syncope [ ] weakness [ ] numbness  Psychiatric: [ ] negative [ ] anxiety [ ] depression  Endocrine: [ ] negative [ ] diabetes [ ] thyroid problem  Hematologic/Lymphatic: [ ] negative [ ] anemia [ ] bleeding problem  Allergic/Immunologic: [ ] negative [ ] itchy eyes [ ] nasal discharge [ ] hives [ ] angioedema  [ ] All other systems negative  [ ] Unable to assess ROS because ________    OBJECTIVE:  ICU Vital Signs Last 24 Hrs  T(C): 37.2 (25 Oct 2021 06:26), Max: 37.2 (24 Oct 2021 17:15)  T(F): 98.9 (25 Oct 2021 06:26), Max: 98.9 (24 Oct 2021 17:15)  HR: 81 (25 Oct 2021 06:26) (77 - 97)  BP: 145/70 (25 Oct 2021 06:26) (118/58 - 145/70)  BP(mean): --  ABP: --  ABP(mean): --  RR: 18 (25 Oct 2021 06:26) (18 - 18)  SpO2: 96% (25 Oct 2021 06:26) (92% - 98%)        10-24 @ 07:01  -  10-25 @ 07:00  --------------------------------------------------------  IN: 1310 mL / OUT: 850 mL / NET: 460 mL      CAPILLARY BLOOD GLUCOSE          PHYSICAL EXAM:  General:   HEENT:   Lymph Nodes:  Neck:   Respiratory:   Cardiovascular:   Abdomen:   Extremities:   Skin:   Neurological:  Psychiatry:    HOSPITAL MEDICATIONS:  aspirin  chewable 81 milliGRAM(s) Oral daily  heparin   Injectable 5000 Unit(s) SubCutaneous every 8 hours      amLODIPine   Tablet 10 milliGRAM(s) Oral daily  hydrALAZINE Injectable 10 milliGRAM(s) IV Push every 6 hours PRN  losartan 50 milliGRAM(s) Oral daily  metoprolol tartrate 12.5 milliGRAM(s) Oral every 12 hours  tamsulosin 0.4 milliGRAM(s) Oral at bedtime    atorvastatin 80 milliGRAM(s) Oral at bedtime  dextrose 40% Gel 15 Gram(s) Oral once  dextrose 50% Injectable 25 Gram(s) IV Push once  dextrose 50% Injectable 12.5 Gram(s) IV Push once  dextrose 50% Injectable 25 Gram(s) IV Push once  glucagon  Injectable 1 milliGRAM(s) IntraMuscular once    albuterol/ipratropium for Nebulization 3 milliLiter(s) Nebulizer every 4 hours  buDESOnide    Inhalation Suspension 0.25 milliGRAM(s) Nebulizer two times a day  guaiFENesin Oral Liquid (Sugar-Free) 200 milliGRAM(s) Oral every 6 hours PRN    melatonin 5 milliGRAM(s) Oral at bedtime  OLANZapine Injectable 5 milliGRAM(s) IntraMuscular every 6 hours PRN    pantoprazole  Injectable 40 milliGRAM(s) IV Push daily  polyethylene glycol 3350 17 Gram(s) Oral daily  senna 2 Tablet(s) Oral at bedtime        dextrose 5%. 1000 milliLiter(s) IV Continuous <Continuous>  multivitamin/minerals/iron Oral Solution (CENTRUM) 15 milliLiter(s) Oral daily    influenza   Vaccine 0.5 milliLiter(s) IntraMuscular once    BACItracin   Ointment 1 Application(s) Topical daily  chlorhexidine 4% Liquid 1 Application(s) Topical <User Schedule>        LABS:                        8.3    7.82  )-----------( 327      ( 25 Oct 2021 08:30 )             27.9     Hgb Trend: 8.3<--, 8.1<--, 7.3<--, 6.7<--, 7.8<--  10-25    141  |  107  |  11  ----------------------------<  100<H>  4.0   |  23  |  0.92    Ca    8.4      25 Oct 2021 08:30  Phos  2.7     10-25  Mg     1.9     10-25    TPro  5.9<L>  /  Alb  3.2<L>  /  TBili  0.4  /  DBili  x   /  AST  22  /  ALT  44  /  AlkPhos  64  10-24    Creatinine Trend: 0.92<--, 0.94<--, 1.13<--, 1.05<--, 1.26<--, 1.49<--            MICROBIOLOGY:     RADIOLOGY:  [ ] Reviewed and interpreted by me    PULMONARY FUNCTION TESTS:    EKG: CHIEF COMPLAINT: shortness of breath    Interval Events: Patient seen and examined at bedside. No acute events overnight.     REVIEW OF SYSTEMS:  Constitutional: [ x] negative [ ] fevers [ ] chills [ ] weight loss [ ] weight gain  HEENT: [x ] negative [ ] dry eyes [ ] eye irritation [ ] postnasal drip [ ] nasal congestion  CV: [ x] negative  [ ] chest pain [ ] orthopnea [ ] palpitations [ ] murmur; no murmurs or orthopnea  Resp: no resting sob, does have some WYMAN, no cough or wheezing  GI: [x ] negative [ ] nausea [ ] vomiting [ ] diarrhea [ ] constipation [ ] abd pain [ ] dysphagia   : [x ] negative [ ] dysuria [ ] nocturia [ ] hematuria [ ] increased urinary frequency  Musculoskeletal: [x ] negative [ ] back pain [ ] myalgias [ ] arthralgias [ ] fracture  Skin: [x ] negative [ ] rash [ ] itch  Neurological: [x ] negative [ ] headache [ ] dizziness [ ] syncope [ ] weakness [ ] numbness  Psychiatric: [x ] negative [ ] anxiety [ ] depression  Endocrine: [ x] negative [ ] diabetes [ ] thyroid problem  Hematologic/Lymphatic: [ x] negative [ ] anemia [ ] bleeding problem  Allergic/Immunologic: [x ] negative [ ] itchy eyes [ ] nasal discharge [ ] hives [ ] angioedema  [ ] All other systems negative  [ ] Unable to assess ROS because ________    OBJECTIVE:  ICU Vital Signs Last 24 Hrs  T(C): 37.2 (25 Oct 2021 06:26), Max: 37.2 (24 Oct 2021 17:15)  T(F): 98.9 (25 Oct 2021 06:26), Max: 98.9 (24 Oct 2021 17:15)  HR: 81 (25 Oct 2021 06:26) (77 - 97)  BP: 145/70 (25 Oct 2021 06:26) (118/58 - 145/70)  BP(mean): --  ABP: --  ABP(mean): --  RR: 18 (25 Oct 2021 06:26) (18 - 18)  SpO2: 96% (25 Oct 2021 06:26) (92% - 98%)        10-24 @ 07:01  -  10-25 @ 07:00  --------------------------------------------------------  IN: 1310 mL / OUT: 850 mL / NET: 460 mL      CAPILLARY BLOOD GLUCOSE          PHYSICAL EXAM:  General:   HEENT:   Lymph Nodes:  Neck:   Respiratory:   Cardiovascular:   Abdomen:   Extremities:   Skin:   Neurological:  Psychiatry:    HOSPITAL MEDICATIONS:  aspirin  chewable 81 milliGRAM(s) Oral daily  heparin   Injectable 5000 Unit(s) SubCutaneous every 8 hours      amLODIPine   Tablet 10 milliGRAM(s) Oral daily  hydrALAZINE Injectable 10 milliGRAM(s) IV Push every 6 hours PRN  losartan 50 milliGRAM(s) Oral daily  metoprolol tartrate 12.5 milliGRAM(s) Oral every 12 hours  tamsulosin 0.4 milliGRAM(s) Oral at bedtime    atorvastatin 80 milliGRAM(s) Oral at bedtime  dextrose 40% Gel 15 Gram(s) Oral once  dextrose 50% Injectable 25 Gram(s) IV Push once  dextrose 50% Injectable 12.5 Gram(s) IV Push once  dextrose 50% Injectable 25 Gram(s) IV Push once  glucagon  Injectable 1 milliGRAM(s) IntraMuscular once    albuterol/ipratropium for Nebulization 3 milliLiter(s) Nebulizer every 4 hours  buDESOnide    Inhalation Suspension 0.25 milliGRAM(s) Nebulizer two times a day  guaiFENesin Oral Liquid (Sugar-Free) 200 milliGRAM(s) Oral every 6 hours PRN    melatonin 5 milliGRAM(s) Oral at bedtime  OLANZapine Injectable 5 milliGRAM(s) IntraMuscular every 6 hours PRN    pantoprazole  Injectable 40 milliGRAM(s) IV Push daily  polyethylene glycol 3350 17 Gram(s) Oral daily  senna 2 Tablet(s) Oral at bedtime        dextrose 5%. 1000 milliLiter(s) IV Continuous <Continuous>  multivitamin/minerals/iron Oral Solution (CENTRUM) 15 milliLiter(s) Oral daily    influenza   Vaccine 0.5 milliLiter(s) IntraMuscular once    BACItracin   Ointment 1 Application(s) Topical daily  chlorhexidine 4% Liquid 1 Application(s) Topical <User Schedule>        LABS:                        8.3    7.82  )-----------( 327      ( 25 Oct 2021 08:30 )             27.9     Hgb Trend: 8.3<--, 8.1<--, 7.3<--, 6.7<--, 7.8<--  10-25    141  |  107  |  11  ----------------------------<  100<H>  4.0   |  23  |  0.92    Ca    8.4      25 Oct 2021 08:30  Phos  2.7     10-25  Mg     1.9     10-25    TPro  5.9<L>  /  Alb  3.2<L>  /  TBili  0.4  /  DBili  x   /  AST  22  /  ALT  44  /  AlkPhos  64  10-24    Creatinine Trend: 0.92<--, 0.94<--, 1.13<--, 1.05<--, 1.26<--, 1.49<--            MICROBIOLOGY:     RADIOLOGY:  [ ] Reviewed and interpreted by me    PULMONARY FUNCTION TESTS:    EKG: CHIEF COMPLAINT: shortness of breath    Interval Events: Patient seen and examined at bedside. No acute events overnight.     REVIEW OF SYSTEMS:  Constitutional: [ x] negative [ ] fevers [ ] chills [ ] weight loss [ ] weight gain  HEENT: [x ] negative [ ] dry eyes [ ] eye irritation [ ] postnasal drip [ ] nasal congestion  CV: [ x] negative  [ ] chest pain [ ] orthopnea [ ] palpitations [ ] murmur; no murmurs or orthopnea  Resp: no resting sob, does have some WYMAN, no cough or wheezing  GI: [x ] negative [ ] nausea [ ] vomiting [ ] diarrhea [ ] constipation [ ] abd pain [ ] dysphagia   : [x ] negative [ ] dysuria [ ] nocturia [ ] hematuria [ ] increased urinary frequency  Musculoskeletal: [x ] negative [ ] back pain [ ] myalgias [ ] arthralgias [ ] fracture  Skin: [x ] negative [ ] rash [ ] itch  Neurological: [x ] negative [ ] headache [ ] dizziness [ ] syncope [ ] weakness [ ] numbness  Psychiatric: [x ] negative [ ] anxiety [ ] depression  Endocrine: [ x] negative [ ] diabetes [ ] thyroid problem  Hematologic/Lymphatic: [ x] negative [ ] anemia [ ] bleeding problem  Allergic/Immunologic: [x ] negative [ ] itchy eyes [ ] nasal discharge [ ] hives [ ] angioedema  [ ] All other systems negative  [ ] Unable to assess ROS because ________    OBJECTIVE:  ICU Vital Signs Last 24 Hrs  T(C): 37.2 (25 Oct 2021 06:26), Max: 37.2 (24 Oct 2021 17:15)  T(F): 98.9 (25 Oct 2021 06:26), Max: 98.9 (24 Oct 2021 17:15)  HR: 81 (25 Oct 2021 06:26) (77 - 97)  BP: 145/70 (25 Oct 2021 06:26) (118/58 - 145/70)  BP(mean): --  ABP: --  ABP(mean): --  RR: 18 (25 Oct 2021 06:26) (18 - 18)  SpO2: 96% (25 Oct 2021 06:26) (92% - 98%)        10-24 @ 07:01  -  10-25 @ 07:00  --------------------------------------------------------  IN: 1310 mL / OUT: 850 mL / NET: 460 mL      CAPILLARY BLOOD GLUCOSE          PHYSICAL EXAM:  General: elderly male, sitting in bed with headphones, NAD, resting comfortably on 2L NC  HEENT: no scleral icterus  Neck: supple  Respiratory: clear to auscultation bilaterally  Cardiovascular: s1/s2, rrr, no murmurs  Abdomen: soft, nontender, nondistended  Extremities: no LE edema  Skin: good turgor  Neurological: AxOx3  Psychiatry: normal mood and affect      HOSPITAL MEDICATIONS:  aspirin  chewable 81 milliGRAM(s) Oral daily  heparin   Injectable 5000 Unit(s) SubCutaneous every 8 hours      amLODIPine   Tablet 10 milliGRAM(s) Oral daily  hydrALAZINE Injectable 10 milliGRAM(s) IV Push every 6 hours PRN  losartan 50 milliGRAM(s) Oral daily  metoprolol tartrate 12.5 milliGRAM(s) Oral every 12 hours  tamsulosin 0.4 milliGRAM(s) Oral at bedtime    atorvastatin 80 milliGRAM(s) Oral at bedtime  dextrose 40% Gel 15 Gram(s) Oral once  dextrose 50% Injectable 25 Gram(s) IV Push once  dextrose 50% Injectable 12.5 Gram(s) IV Push once  dextrose 50% Injectable 25 Gram(s) IV Push once  glucagon  Injectable 1 milliGRAM(s) IntraMuscular once    albuterol/ipratropium for Nebulization 3 milliLiter(s) Nebulizer every 4 hours  buDESOnide    Inhalation Suspension 0.25 milliGRAM(s) Nebulizer two times a day  guaiFENesin Oral Liquid (Sugar-Free) 200 milliGRAM(s) Oral every 6 hours PRN    melatonin 5 milliGRAM(s) Oral at bedtime  OLANZapine Injectable 5 milliGRAM(s) IntraMuscular every 6 hours PRN    pantoprazole  Injectable 40 milliGRAM(s) IV Push daily  polyethylene glycol 3350 17 Gram(s) Oral daily  senna 2 Tablet(s) Oral at bedtime        dextrose 5%. 1000 milliLiter(s) IV Continuous <Continuous>  multivitamin/minerals/iron Oral Solution (CENTRUM) 15 milliLiter(s) Oral daily    influenza   Vaccine 0.5 milliLiter(s) IntraMuscular once    BACItracin   Ointment 1 Application(s) Topical daily  chlorhexidine 4% Liquid 1 Application(s) Topical <User Schedule>        LABS:                        8.3    7.82  )-----------( 327      ( 25 Oct 2021 08:30 )             27.9     Hgb Trend: 8.3<--, 8.1<--, 7.3<--, 6.7<--, 7.8<--  10-25    141  |  107  |  11  ----------------------------<  100<H>  4.0   |  23  |  0.92    Ca    8.4      25 Oct 2021 08:30  Phos  2.7     10-25  Mg     1.9     10-25    TPro  5.9<L>  /  Alb  3.2<L>  /  TBili  0.4  /  DBili  x   /  AST  22  /  ALT  44  /  AlkPhos  64  10-24    Creatinine Trend: 0.92<--, 0.94<--, 1.13<--, 1.05<--, 1.26<--, 1.49<--            MICROBIOLOGY:     RADIOLOGY:  [ ] Reviewed and interpreted by me    PULMONARY FUNCTION TESTS:    EKG:

## 2021-10-25 NOTE — PROGRESS NOTE ADULT - PROBLEM SELECTOR PLAN 5
Pt presented Microcytic Anemia to Hgb 9.4 (baseline Hgb 11-12 and normocytic in 2019)  - no signs of active bleeding  - possibly 2/2 sepsis vs iron deficiency  - iron studies wnl (though lower end)  - f/u retic count, LDH, haptoglobin  - monitor CBC, transfuse to goal Hgb>8 for hx of CAD w/ multiple stents Pt presented Microcytic Anemia to Hgb 9.4 (baseline Hgb 11-12 and normocytic in 2019)  - no signs of active bleeding  - possibly 2/2 sepsis vs iron deficiency  - iron studies wnl (though lower end)  - Reticulocyte % 0.7, Absolute retic 26.6, haptoglobin wnl, LDH mildly elevated (10/24/21)   - monitor CBC, transfuse to goal Hgb>8 for hx of CAD w/ multiple stents Pt presented Microcytic Anemia to Hgb 9.4 (baseline Hgb 11-12 and normocytic in 2019)  - no signs of active bleeding  - possibly 2/2 sepsis vs iron deficiency  - iron studies wnl (though lower end)  - Reticulocyte % 0.7 (corrected = 0.4%), Absolute retic 26.6 (10/24/21)   - haptoglobin wnl, LDH mildly elevated to 247 (10/24/21)   - monitor CBC, transfuse to goal Hgb>8 for hx of CAD w/ multiple stents Manual Repair Warning Statement: We plan on removing the manually selected variable below in favor of our much easier automatic structured text blocks found in the previous tab. We decided to do this to help make the flow better and give you the full power of structured data. Manual selection is never going to be ideal in our platform and I would encourage you to avoid using manual selection from this point on, especially since I will be sunsetting this feature. It is important that you do one of two things with the customized text below. First, you can save all of the text in a word file so you can have it for future reference. Second, transfer the text to the appropriate area in the Library tab. Lastly, if there is a flap or graft type which we do not have you need to let us know right away so I can add it in before the variable is hidden. No need to panic, we plan to give you roughly 6 months to make the change.

## 2021-10-25 NOTE — PROGRESS NOTE ADULT - ATTENDING COMMENTS
71M w/ hx of COPD, Asthma, HTN, CAD s/p PCI (LCx, LAD w/ RISSA x2, D1 w/ RISSA x1), initially presented with SOB and was admitted to MICU for AHRF requiring intubation 2/2 parainfluenza viral PNA c/b possible COPD exacerbation and superimposed bacterial PNA. Now extubated and abx completed.    Acute hypoxic respiratory failure due to Acute Exacerbation of COPD from Viral Pneumonia due to Parainfluenza virus requiring intubation with superimposed bacterial PNA, now extubated and off abx, on 2LNC  HTN  Microcytic Anemia s/p 1U PRBC  Multivessel CAD s/p LAD and D1 RISSA 2019  Transaminitis due to Sepsis, Resolved    Change nebulizers to inhalers per pulm  Wean O2  Hb stable for now  Start Symbicort and Spiriva  Incentive spirometry    Discussed influenza vaccination and pneumovax with the patient and partner in detail at the bedside who are in agreement. Will arrange prior to discharge. 71M w/ hx of COPD, Asthma, HTN, CAD s/p PCI (LCx, LAD w/ RISSA x2, D1 w/ RISSA x1), initially presented with SOB and was admitted to MICU for AHRF requiring intubation 2/2 parainfluenza viral PNA c/b possible COPD exacerbation and superimposed bacterial PNA. Now extubated and abx completed.    Acute hypoxic respiratory failure due to Acute Exacerbation of COPD from Viral Pneumonia due to Parainfluenza virus requiring intubation with superimposed bacterial PNA, now extubated and off abx, on 2LNC  Acute on Chronic Hypercapnic Respiratory Failure  HTN  Microcytic Anemia s/p 1U PRBC  Multivessel CAD s/p LAD and D1 RISSA 2019  Transaminitis due to Sepsis, Resolved    Change nebulizers to inhalers per pulm  Wean O2  Hb stable for now  Start Symbicort and Spiriva  Incentive spirometry    Discussed influenza vaccination and pneumovax with the patient and partner in detail at the bedside who are in agreement. Will arrange prior to discharge.

## 2021-10-25 NOTE — PROGRESS NOTE ADULT - ASSESSMENT
71M with PMHx of COPD, asthma, HTN, CAD s/p PCI x2, TAVR, presents to the hospital with SOB, admitted for respiratory failure 2/2 to parainfluenza virus, s/p intubationx2 due to failed extubation, now improving, s/p extubation on 10/14. Pulmonary consulted for management of NIV.    #Parainfluenza Pneumonia  - patient admitted for resp fail 2/2 paraflu with failed extubation x1  - s/p extubation x2 on 10/14 to BiPAP--> deescalated to high flow then NC and now on room air; patient refused NIV over weekend  - concern for superimposed bacterial PNA s/p abx 10/8- 10/12 (ctx) and suspected worsening infectious etiology on 10/18, started on zosyn (10/18-10/23)    Recommendations:  - patient appears comfortable off NIV--> please check VBG on 10/26 AM to ensure no continued hypercapnea  - can change standing nebulizers to inhalers--> symbicort and spiriva  -  c/w acapella to aid in airway clearance  - re-emphasized need to ensure compliance with inhaler as an outpatient

## 2021-10-25 NOTE — PROGRESS NOTE ADULT - TIME BILLING
reviewing chart and coordinating care with primary team/staff, as well as reviewing vitals, radiology, medication list, and recent labs.

## 2021-10-25 NOTE — PROGRESS NOTE ADULT - SUBJECTIVE AND OBJECTIVE BOX
ANABELLE GOODSON  71y  MRN: [id number]    Patient is a 71y old  Male who presents with a chief complaint of AHRF (23 Oct 2021 08:13)      Subjective: no events ON. Denies fever, CP, SOB, abn pain, N/V, dysuria. Tolerating diet.      MEDICATIONS  (STANDING):  albuterol/ipratropium for Nebulization 3 milliLiter(s) Nebulizer every 4 hours  amLODIPine   Tablet 10 milliGRAM(s) Oral daily  aspirin  chewable 81 milliGRAM(s) Oral daily  atorvastatin 80 milliGRAM(s) Oral at bedtime  BACItracin   Ointment 1 Application(s) Topical daily  buDESOnide    Inhalation Suspension 0.25 milliGRAM(s) Nebulizer two times a day  chlorhexidine 4% Liquid 1 Application(s) Topical <User Schedule>  dextrose 40% Gel 15 Gram(s) Oral once  dextrose 5%. 1000 milliLiter(s) (100 mL/Hr) IV Continuous <Continuous>  dextrose 50% Injectable 25 Gram(s) IV Push once  dextrose 50% Injectable 12.5 Gram(s) IV Push once  dextrose 50% Injectable 25 Gram(s) IV Push once  glucagon  Injectable 1 milliGRAM(s) IntraMuscular once  heparin   Injectable 5000 Unit(s) SubCutaneous every 8 hours  influenza   Vaccine 0.5 milliLiter(s) IntraMuscular once  losartan 50 milliGRAM(s) Oral daily  melatonin 5 milliGRAM(s) Oral at bedtime  metoprolol tartrate 12.5 milliGRAM(s) Oral every 12 hours  multivitamin/minerals/iron Oral Solution (CENTRUM) 15 milliLiter(s) Oral daily  pantoprazole  Injectable 40 milliGRAM(s) IV Push daily  polyethylene glycol 3350 17 Gram(s) Oral daily  senna 2 Tablet(s) Oral at bedtime  tamsulosin 0.4 milliGRAM(s) Oral at bedtime    MEDICATIONS  (PRN):  guaiFENesin Oral Liquid (Sugar-Free) 200 milliGRAM(s) Oral every 6 hours PRN Cough  hydrALAZINE Injectable 10 milliGRAM(s) IV Push every 6 hours PRN SBP>180  OLANZapine Injectable 5 milliGRAM(s) IntraMuscular every 6 hours PRN agitation      Objective:    Vitals: Vital Signs Last 24 Hrs  T(C): 37.2 (10-24-21 @ 05:52), Max: 37.6 (10-23-21 @ 21:57)  T(F): 99 (10-24-21 @ 05:52), Max: 99.6 (10-23-21 @ 21:57)  HR: 72 (10-24-21 @ 06:01) (69 - 84)  BP: 138/65 (10-24-21 @ 05:52) (100/58 - 138/65)  BP(mean): --  RR: 18 (10-24-21 @ 05:52) (17 - 19)  SpO2: 98% (10-24-21 @ 06:01) (94% - 100%)            I&O's Summary    23 Oct 2021 07:01  -  24 Oct 2021 07:00  --------------------------------------------------------  IN: 780 mL / OUT: 250 mL / NET: 530 mL        PHYSICAL EXAM:  GENERAL: NAD  HEAD:  Atraumatic, Normocephalic  EYES: EOMI, conjunctiva and sclera clear  CHEST/LUNG: Clear to percussion bilaterally; No rales, rhonchi, wheezing, or rubs  HEART: Regular rate and rhythm; No murmurs, rubs, or gallops  ABDOMEN: Soft, Nontender, Nondistended;   SKIN: No rashes or lesions  NERVOUS SYSTEM:  Alert & Oriented X3, no focal deficit    LABS:  10-23    141  |  106  |  13  ----------------------------<  89  3.5   |  22  |  1.13  10-22    141  |  103  |  15  ----------------------------<  100<H>  3.7   |  24  |  1.05    Ca    8.3<L>      23 Oct 2021 09:05  Ca    8.7      22 Oct 2021 08:38  Phos  2.7     10-23  Mg     1.9     10-23    TPro  5.9<L>  /  Alb  3.1<L>  /  TBili  0.3  /  DBili  x   /  AST  22  /  ALT  51<H>  /  AlkPhos  68  10-23  TPro  6.8  /  Alb  3.8  /  TBili  0.5  /  DBili  x   /  AST  36  /  ALT  71<H>  /  AlkPhos  94  10-22      PT/INR - ( 22 Oct 2021 08:38 )   PT: 14.4 sec;   INR: 1.21 ratio         PTT - ( 23 Oct 2021 09:05 )  PTT:26.4 sec                                        7.3    9.03  )-----------( 332      ( 23 Oct 2021 13:46 )             26.2                         6.7    10.09 )-----------( 306      ( 23 Oct 2021 09:05 )             23.3                         7.8    13.81 )-----------( 289      ( 22 Oct 2021 08:38 )             28.1     CAPILLARY BLOOD GLUCOSE             ANABELLE GOODSON  71y  MRN: [id number]    Patient is a 71y old  Male who presents with a chief complaint of AHRF (23 Oct 2021 08:13)      Subjective: no events ON. Denies fever, CP, SOB, abn pain, N/V, dysuria. Tolerating diet.      MEDICATIONS  (STANDING):  albuterol/ipratropium for Nebulization 3 milliLiter(s) Nebulizer every 4 hours  amLODIPine   Tablet 10 milliGRAM(s) Oral daily  aspirin  chewable 81 milliGRAM(s) Oral daily  atorvastatin 80 milliGRAM(s) Oral at bedtime  BACItracin   Ointment 1 Application(s) Topical daily  buDESOnide    Inhalation Suspension 0.25 milliGRAM(s) Nebulizer two times a day  chlorhexidine 4% Liquid 1 Application(s) Topical <User Schedule>  dextrose 40% Gel 15 Gram(s) Oral once  dextrose 5%. 1000 milliLiter(s) (100 mL/Hr) IV Continuous <Continuous>  dextrose 50% Injectable 25 Gram(s) IV Push once  dextrose 50% Injectable 12.5 Gram(s) IV Push once  dextrose 50% Injectable 25 Gram(s) IV Push once  glucagon  Injectable 1 milliGRAM(s) IntraMuscular once  heparin   Injectable 5000 Unit(s) SubCutaneous every 8 hours  influenza   Vaccine 0.5 milliLiter(s) IntraMuscular once  losartan 50 milliGRAM(s) Oral daily  melatonin 5 milliGRAM(s) Oral at bedtime  metoprolol tartrate 12.5 milliGRAM(s) Oral every 12 hours  multivitamin/minerals/iron Oral Solution (CENTRUM) 15 milliLiter(s) Oral daily  pantoprazole  Injectable 40 milliGRAM(s) IV Push daily  polyethylene glycol 3350 17 Gram(s) Oral daily  senna 2 Tablet(s) Oral at bedtime  tamsulosin 0.4 milliGRAM(s) Oral at bedtime    MEDICATIONS  (PRN):  guaiFENesin Oral Liquid (Sugar-Free) 200 milliGRAM(s) Oral every 6 hours PRN Cough  hydrALAZINE Injectable 10 milliGRAM(s) IV Push every 6 hours PRN SBP>180  OLANZapine Injectable 5 milliGRAM(s) IntraMuscular every 6 hours PRN agitation      Objective:    Vitals: Vital Signs Last 24 Hrs  T(C): 37.2 (25 Oct 2021 06:26), Max: 37.2 (24 Oct 2021 17:15)  T(F): 98.9 (25 Oct 2021 06:26), Max: 98.9 (24 Oct 2021 17:15)  HR: 81 (25 Oct 2021 06:26) (77 - 97)  BP: 145/70 (25 Oct 2021 06:26) (118/58 - 145/70)  RR: 18 (25 Oct 2021 06:26) (18 - 18)  SpO2: 96% (25 Oct 2021 06:26) (92% - 98%)    I&O's Summary  24 Oct 2021 07:01  -  25 Oct 2021 07:00  --------------------------------------------------------  IN: 1310 mL / OUT: 850 mL / NET: 460 mL      PHYSICAL EXAM:  GENERAL: NAD  HEAD:  Atraumatic, Normocephalic  EYES: EOMI, conjunctiva and sclera clear  CHEST/LUNG: Clear to percussion bilaterally; No rales, rhonchi, wheezing, or rubs  HEART: Regular rate and rhythm; No murmurs, rubs, or gallops  ABDOMEN: Soft, Nontender, Nondistended;   SKIN: No rashes or lesions  NERVOUS SYSTEM:  Alert & Oriented X3, no focal deficit    LABS:    10-24    142  |  107  |  10  ----------------------------<  89  3.5   |  23  |  0.94    Ca    8.1<L>      24 Oct 2021 07:14  Phos  2.7     10-24        Mg     1.9     10-24    TPro  5.9<L>  /  Alb  3.2<L>  /  TBili  0.4  /  DBili  x   /  AST  22  /  ALT  44  /  AlkPhos  64  10-24               8.1    9.67  )-----------( 304      ( 24 Oct 2021 07:17 )             27.4                       7.3    9.03  )-----------( 332      ( 23 Oct 2021 13:46 )             26.2                         6.7    10.09 )-----------( 306      ( 23 Oct 2021 09:05 )             23.3     PTT - ( 23 Oct 2021 09:05 )  PTT:26.4 sec                          ANABELLE GOODSON  71y  MRN: [id number]    Patient is a 71y old  Male who presents with a chief complaint of AHRF (23 Oct 2021 08:13)      Subjective: no events ON. Denies fever, CP, SOB, abn pain, N/V, dysuria. Tolerating diet.      MEDICATIONS  (STANDING):  albuterol/ipratropium for Nebulization 3 milliLiter(s) Nebulizer every 4 hours  amLODIPine   Tablet 10 milliGRAM(s) Oral daily  aspirin  chewable 81 milliGRAM(s) Oral daily  atorvastatin 80 milliGRAM(s) Oral at bedtime  BACItracin   Ointment 1 Application(s) Topical daily  buDESOnide    Inhalation Suspension 0.25 milliGRAM(s) Nebulizer two times a day  chlorhexidine 4% Liquid 1 Application(s) Topical <User Schedule>  dextrose 40% Gel 15 Gram(s) Oral once  dextrose 5%. 1000 milliLiter(s) (100 mL/Hr) IV Continuous <Continuous>  dextrose 50% Injectable 25 Gram(s) IV Push once  dextrose 50% Injectable 12.5 Gram(s) IV Push once  dextrose 50% Injectable 25 Gram(s) IV Push once  glucagon  Injectable 1 milliGRAM(s) IntraMuscular once  heparin   Injectable 5000 Unit(s) SubCutaneous every 8 hours  influenza   Vaccine 0.5 milliLiter(s) IntraMuscular once  losartan 50 milliGRAM(s) Oral daily  melatonin 5 milliGRAM(s) Oral at bedtime  metoprolol tartrate 12.5 milliGRAM(s) Oral every 12 hours  multivitamin/minerals/iron Oral Solution (CENTRUM) 15 milliLiter(s) Oral daily  pantoprazole  Injectable 40 milliGRAM(s) IV Push daily  polyethylene glycol 3350 17 Gram(s) Oral daily  senna 2 Tablet(s) Oral at bedtime  tamsulosin 0.4 milliGRAM(s) Oral at bedtime    MEDICATIONS  (PRN):  guaiFENesin Oral Liquid (Sugar-Free) 200 milliGRAM(s) Oral every 6 hours PRN Cough  hydrALAZINE Injectable 10 milliGRAM(s) IV Push every 6 hours PRN SBP>180  OLANZapine Injectable 5 milliGRAM(s) IntraMuscular every 6 hours PRN agitation      Objective:    Vitals: Vital Signs Last 24 Hrs  T(C): 37.2 (25 Oct 2021 06:26), Max: 37.2 (24 Oct 2021 17:15)  T(F): 98.9 (25 Oct 2021 06:26), Max: 98.9 (24 Oct 2021 17:15)  HR: 81 (25 Oct 2021 06:26) (77 - 97)  BP: 145/70 (25 Oct 2021 06:26) (118/58 - 145/70)  RR: 18 (25 Oct 2021 06:26) (18 - 18)  SpO2: 96% (25 Oct 2021 06:26) (92% - 98%)    I&O's Summary  24 Oct 2021 07:01  -  25 Oct 2021 07:00  --------------------------------------------------------  IN: 1310 mL / OUT: 850 mL / NET: 460 mL      PHYSICAL EXAM:  GENERAL: NAD, with NC in place.  HEAD:  Atraumatic, Normocephalic  EYES: EOMI, conjunctiva and sclera clear  CHEST/LUNG: Clear to percussion bilaterally; No rales, rhonchi, or rubs. Mild expiratory wheeze.   HEART: Regular rate and rhythm; No murmurs, rubs, or gallops  ABDOMEN: Soft, Nontender, Nondistended;   SKIN: No rashes or lesions  NERVOUS SYSTEM:  Alert & Oriented X3, no focal deficit    LABS:    10-24    142  |  107  |  10  ----------------------------<  89  3.5   |  23  |  0.94    Ca    8.1<L>      24 Oct 2021 07:14  Phos  2.7     10-24        Mg     1.9     10-24    TPro  5.9<L>  /  Alb  3.2<L>  /  TBili  0.4  /  DBili  x   /  AST  22  /  ALT  44  /  AlkPhos  64  10-24               8.1    9.67  )-----------( 304      ( 24 Oct 2021 07:17 )             27.4                       7.3    9.03  )-----------( 332      ( 23 Oct 2021 13:46 )             26.2                         6.7    10.09 )-----------( 306      ( 23 Oct 2021 09:05 )             23.3     PTT - ( 23 Oct 2021 09:05 )  PTT:26.4 sec

## 2021-10-26 LAB
ANION GAP SERPL CALC-SCNC: 13 MMOL/L — SIGNIFICANT CHANGE UP (ref 5–17)
BASE EXCESS BLDV CALC-SCNC: -0.2 MMOL/L — SIGNIFICANT CHANGE UP (ref -2–2)
BASOPHILS # BLD AUTO: 0.02 K/UL — SIGNIFICANT CHANGE UP (ref 0–0.2)
BASOPHILS NFR BLD AUTO: 0.3 % — SIGNIFICANT CHANGE UP (ref 0–2)
BUN SERPL-MCNC: 13 MG/DL — SIGNIFICANT CHANGE UP (ref 7–23)
CA-I SERPL-SCNC: 1.21 MMOL/L — SIGNIFICANT CHANGE UP (ref 1.15–1.33)
CALCIUM SERPL-MCNC: 8.6 MG/DL — SIGNIFICANT CHANGE UP (ref 8.4–10.5)
CHLORIDE BLDV-SCNC: 111 MMOL/L — HIGH (ref 96–108)
CHLORIDE SERPL-SCNC: 108 MMOL/L — SIGNIFICANT CHANGE UP (ref 96–108)
CO2 BLDV-SCNC: 26 MMOL/L — SIGNIFICANT CHANGE UP (ref 22–26)
CO2 SERPL-SCNC: 21 MMOL/L — LOW (ref 22–31)
CREAT SERPL-MCNC: 0.96 MG/DL — SIGNIFICANT CHANGE UP (ref 0.5–1.3)
EOSINOPHIL # BLD AUTO: 0.14 K/UL — SIGNIFICANT CHANGE UP (ref 0–0.5)
EOSINOPHIL NFR BLD AUTO: 1.9 % — SIGNIFICANT CHANGE UP (ref 0–6)
GAS PNL BLDV: 139 MMOL/L — SIGNIFICANT CHANGE UP (ref 136–145)
GAS PNL BLDV: SIGNIFICANT CHANGE UP
GAS PNL BLDV: SIGNIFICANT CHANGE UP
GLUCOSE BLDV-MCNC: 92 MG/DL — SIGNIFICANT CHANGE UP (ref 70–99)
GLUCOSE SERPL-MCNC: 89 MG/DL — SIGNIFICANT CHANGE UP (ref 70–99)
HCO3 BLDV-SCNC: 25 MMOL/L — SIGNIFICANT CHANGE UP (ref 22–29)
HCT VFR BLD CALC: 26.7 % — LOW (ref 39–50)
HCT VFR BLDA CALC: 24 % — LOW (ref 39–51)
HGB BLD CALC-MCNC: 8 G/DL — LOW (ref 12.6–17.4)
HGB BLD-MCNC: 7.8 G/DL — LOW (ref 13–17)
IMM GRANULOCYTES NFR BLD AUTO: 0.7 % — SIGNIFICANT CHANGE UP (ref 0–1.5)
LACTATE BLDV-MCNC: 1.5 MMOL/L — SIGNIFICANT CHANGE UP (ref 0.7–2)
LYMPHOCYTES # BLD AUTO: 0.99 K/UL — LOW (ref 1–3.3)
LYMPHOCYTES # BLD AUTO: 13.3 % — SIGNIFICANT CHANGE UP (ref 13–44)
MAGNESIUM SERPL-MCNC: 1.8 MG/DL — SIGNIFICANT CHANGE UP (ref 1.6–2.6)
MCHC RBC-ENTMCNC: 22.5 PG — LOW (ref 27–34)
MCHC RBC-ENTMCNC: 29.2 GM/DL — LOW (ref 32–36)
MCV RBC AUTO: 76.9 FL — LOW (ref 80–100)
MONOCYTES # BLD AUTO: 0.42 K/UL — SIGNIFICANT CHANGE UP (ref 0–0.9)
MONOCYTES NFR BLD AUTO: 5.6 % — SIGNIFICANT CHANGE UP (ref 2–14)
NEUTROPHILS # BLD AUTO: 5.84 K/UL — SIGNIFICANT CHANGE UP (ref 1.8–7.4)
NEUTROPHILS NFR BLD AUTO: 78.2 % — HIGH (ref 43–77)
NRBC # BLD: 0 /100 WBCS — SIGNIFICANT CHANGE UP (ref 0–0)
PCO2 BLDV: 41 MMHG — LOW (ref 42–55)
PH BLDV: 7.39 — SIGNIFICANT CHANGE UP (ref 7.32–7.43)
PHOSPHATE SERPL-MCNC: 2.5 MG/DL — SIGNIFICANT CHANGE UP (ref 2.5–4.5)
PLATELET # BLD AUTO: 318 K/UL — SIGNIFICANT CHANGE UP (ref 150–400)
PO2 BLDV: 87 MMHG — HIGH (ref 25–45)
POTASSIUM BLDV-SCNC: 4.2 MMOL/L — SIGNIFICANT CHANGE UP (ref 3.5–5.1)
POTASSIUM SERPL-MCNC: 4.1 MMOL/L — SIGNIFICANT CHANGE UP (ref 3.5–5.3)
POTASSIUM SERPL-SCNC: 4.1 MMOL/L — SIGNIFICANT CHANGE UP (ref 3.5–5.3)
RBC # BLD: 3.47 M/UL — LOW (ref 4.2–5.8)
RBC # FLD: 25.6 % — HIGH (ref 10.3–14.5)
SAO2 % BLDV: 96.7 % — HIGH (ref 67–88)
SODIUM SERPL-SCNC: 142 MMOL/L — SIGNIFICANT CHANGE UP (ref 135–145)
WBC # BLD: 7.46 K/UL — SIGNIFICANT CHANGE UP (ref 3.8–10.5)
WBC # FLD AUTO: 7.46 K/UL — SIGNIFICANT CHANGE UP (ref 3.8–10.5)

## 2021-10-26 PROCEDURE — 99233 SBSQ HOSP IP/OBS HIGH 50: CPT | Mod: GC

## 2021-10-26 RX ADMIN — Medication 1 APPLICATION(S): at 12:08

## 2021-10-26 RX ADMIN — ATORVASTATIN CALCIUM 80 MILLIGRAM(S): 80 TABLET, FILM COATED ORAL at 21:08

## 2021-10-26 RX ADMIN — Medication 3 MILLILITER(S): at 10:38

## 2021-10-26 RX ADMIN — Medication 15 MILLILITER(S): at 12:06

## 2021-10-26 RX ADMIN — Medication 12.5 MILLIGRAM(S): at 17:19

## 2021-10-26 RX ADMIN — HEPARIN SODIUM 5000 UNIT(S): 5000 INJECTION INTRAVENOUS; SUBCUTANEOUS at 12:05

## 2021-10-26 RX ADMIN — Medication 5 MILLIGRAM(S): at 21:07

## 2021-10-26 RX ADMIN — FENTANYL CITRATE 1 PATCH: 50 INJECTION INTRAVENOUS at 19:30

## 2021-10-26 RX ADMIN — BUDESONIDE AND FORMOTEROL FUMARATE DIHYDRATE 2 PUFF(S): 160; 4.5 AEROSOL RESPIRATORY (INHALATION) at 17:19

## 2021-10-26 RX ADMIN — BUDESONIDE AND FORMOTEROL FUMARATE DIHYDRATE 2 PUFF(S): 160; 4.5 AEROSOL RESPIRATORY (INHALATION) at 05:40

## 2021-10-26 RX ADMIN — HEPARIN SODIUM 5000 UNIT(S): 5000 INJECTION INTRAVENOUS; SUBCUTANEOUS at 21:08

## 2021-10-26 RX ADMIN — TAMSULOSIN HYDROCHLORIDE 0.4 MILLIGRAM(S): 0.4 CAPSULE ORAL at 21:08

## 2021-10-26 RX ADMIN — Medication 12.5 MILLIGRAM(S): at 05:41

## 2021-10-26 RX ADMIN — AMLODIPINE BESYLATE 10 MILLIGRAM(S): 2.5 TABLET ORAL at 05:41

## 2021-10-26 RX ADMIN — PANTOPRAZOLE SODIUM 40 MILLIGRAM(S): 20 TABLET, DELAYED RELEASE ORAL at 12:06

## 2021-10-26 RX ADMIN — LOSARTAN POTASSIUM 50 MILLIGRAM(S): 100 TABLET, FILM COATED ORAL at 05:41

## 2021-10-26 RX ADMIN — SENNA PLUS 1 TABLET(S): 8.6 TABLET ORAL at 21:07

## 2021-10-26 RX ADMIN — Medication 81 MILLIGRAM(S): at 12:06

## 2021-10-26 RX ADMIN — TIOTROPIUM BROMIDE 1 CAPSULE(S): 18 CAPSULE ORAL; RESPIRATORY (INHALATION) at 12:06

## 2021-10-26 RX ADMIN — CHLORHEXIDINE GLUCONATE 1 APPLICATION(S): 213 SOLUTION TOPICAL at 12:06

## 2021-10-26 RX ADMIN — HEPARIN SODIUM 5000 UNIT(S): 5000 INJECTION INTRAVENOUS; SUBCUTANEOUS at 05:40

## 2021-10-26 RX ADMIN — FENTANYL CITRATE 1 PATCH: 50 INJECTION INTRAVENOUS at 07:45

## 2021-10-26 NOTE — PROGRESS NOTE ADULT - ATTENDING COMMENTS
71M w/ hx of COPD, Asthma, HTN, CAD s/p PCI (LCx, LAD w/ RISSA x2, D1 w/ RISSA x1), initially presented with SOB and was admitted to MICU for AHRF requiring intubation 2/2 parainfluenza viral PNA c/b possible COPD exacerbation and superimposed bacterial PNA. Now extubated and abx completed.    Acute hypoxic respiratory failure due to Acute Exacerbation of COPD from Viral Pneumonia due to Parainfluenza virus requiring intubation with superimposed bacterial PNA, now extubated and off abx, on 2LNC  Acute on Chronic Hypercapnic Respiratory Failure  HTN  Microcytic Anemia s/p 1U PRBC  Multivessel CAD s/p LAD and D1 RISSA 2019  Transaminitis due to Sepsis, Resolved    Patient became hypoxic at rest of 89%. Will check ambulatory oxygen needs. Encouraged incentive spirometry. Poor air movement on exam.  Change nebulizers to inhalers per pulm  Hb stable for now  continue Symbicort and Spiriva  Incentive spirometry    Discussed influenza vaccination and pneumovax with the patient and partner in detail at the bedside who are in agreement. Will arrange prior to discharge.

## 2021-10-26 NOTE — PROGRESS NOTE ADULT - ASSESSMENT
71M w/ hx of COPD, Asthma, HTN, CAD s/p PCI (LCx, LAD w/ RISSA x2, D1 w/ RISSA x1), initially presented with SOB and was admitted to MICU for AHRF requiring intubation 2/2 parainfluenza viral PNA c/b possible COPD exacerbation and superimposed bacterial PNA. Now extubated and completing course of IV Zosyn. Planning for discharge to [facility]. 71M w/ hx of COPD, Asthma, HTN, CAD s/p PCI (LCx, LAD w/ RISSA x2, D1 w/ RISSA x1), initially presented with SOB and was admitted to MICU for AHRF requiring intubation 2/2 parainfluenza viral PNA c/b possible COPD exacerbation and superimposed bacterial PNA. Now extubated and completing course of IV Zosyn. Patient continues to require 2L NC. Planning for discharge to [facility].

## 2021-10-26 NOTE — PROGRESS NOTE ADULT - PROBLEM SELECTOR PLAN 8
- DVT ppx: HSQ  - Diet: Mechanical soft, advance as tolerated  - Dispo:  planning for [facility]. Repeat COVID-19 PCR negative on 10/25.

## 2021-10-26 NOTE — PROGRESS NOTE ADULT - SUBJECTIVE AND OBJECTIVE BOX
Internal Medicine Team Progress Note    HAMZAH ANABELLE  Patient is a 71y old  Male who presents with a chief complaint of AHRF (25 Oct 2021 09:34)      INTERVAL HPI / SUBJECTIVE:  No acute events overnight.     REVIEW OF SYSTEMS:   Constitutional: no fatigue, fever, chills, generalized weakness  HEENT: no eye pain, vision changes, rhinorrhea, sore throat  Respiratory: no cough, wheezing, shortness of breath  CV: no chest pain, palpitations, dyspnea on exertion, orthopnea, PND  GI: no decreased appetite, nausea, vomiting, abdominal pain, diarrhea, constipation, melena  : no dysuria, hematuria, urinary frequency, incontinence, nocturia  MSK: no joint or muscle pain, muscle weakness, joint swelling  Skin: no rashes, bruising, hair loss, color change  Neuro: no headache, dizziness, fainting, paresthesias, memory loss  Endo: no heat or cold intolerance, increased thirst, hot flashes  Psych: no changes in mood      MEDICATIONS:   MEDICATIONS  (STANDING):  amLODIPine   Tablet 10 milliGRAM(s) Oral daily  aspirin  chewable 81 milliGRAM(s) Oral daily  atorvastatin 80 milliGRAM(s) Oral at bedtime  BACItracin   Ointment 1 Application(s) Topical daily  budesonide  80 MICROgram(s)/formoterol 4.5 MICROgram(s) Inhaler 2 Puff(s) Inhalation two times a day  chlorhexidine 4% Liquid 1 Application(s) Topical <User Schedule>  dextrose 40% Gel 15 Gram(s) Oral once  dextrose 5%. 1000 milliLiter(s) (100 mL/Hr) IV Continuous <Continuous>  dextrose 50% Injectable 12.5 Gram(s) IV Push once  dextrose 50% Injectable 25 Gram(s) IV Push once  dextrose 50% Injectable 25 Gram(s) IV Push once  glucagon  Injectable 1 milliGRAM(s) IntraMuscular once  heparin   Injectable 5000 Unit(s) SubCutaneous every 8 hours  influenza   Vaccine 0.5 milliLiter(s) IntraMuscular once  losartan 50 milliGRAM(s) Oral daily  melatonin 5 milliGRAM(s) Oral at bedtime  metoprolol tartrate 12.5 milliGRAM(s) Oral every 12 hours  multivitamin/minerals/iron Oral Solution (CENTRUM) 15 milliLiter(s) Oral daily  pantoprazole  Injectable 40 milliGRAM(s) IV Push daily  polyethylene glycol 3350 17 Gram(s) Oral daily  senna 2 Tablet(s) Oral at bedtime  tamsulosin 0.4 milliGRAM(s) Oral at bedtime  tiotropium 18 MICROgram(s) Capsule 1 Capsule(s) Inhalation daily    MEDICATIONS  (PRN):  albuterol/ipratropium for Nebulization 3 milliLiter(s) Nebulizer every 4 hours PRN Shortness of Breath and/or Wheezing  guaiFENesin Oral Liquid (Sugar-Free) 200 milliGRAM(s) Oral every 6 hours PRN Cough  hydrALAZINE Injectable 10 milliGRAM(s) IV Push every 6 hours PRN SBP>180  OLANZapine Injectable 5 milliGRAM(s) IntraMuscular every 6 hours PRN agitation      ALLERGIES:   Allergies No Known Allergies  Intolerances None    OBJECTIVE:  Vital Signs Last 24 Hrs  T(C): 36.9 (26 Oct 2021 05:56), Max: 36.9 (25 Oct 2021 14:27)  T(F): 98.5 (26 Oct 2021 05:56), Max: 98.5 (25 Oct 2021 14:27)  HR: 75 (26 Oct 2021 05:56) (75 - 88)  BP: 126/65 (26 Oct 2021 05:56) (104/61 - 132/82)  BP(mean): --  RR: 18 (26 Oct 2021 05:56) (17 - 18)  SpO2: 96% (26 Oct 2021 05:56) (93% - 97%)    I&O's Summary    25 Oct 2021 07:01  -  26 Oct 2021 07:00  --------------------------------------------------------  IN: 1200 mL / OUT: 825 mL / NET: 375 mL      PHYSICAL EXAM:  GENERAL: NAD, with NC in place.  HEAD:  Atraumatic, Normocephalic  EYES: EOMI, conjunctiva and sclera clear  CHEST/LUNG: Clear to percussion bilaterally; No rales, rhonchi, or rubs. Mild expiratory wheeze.   HEART: Regular rate and rhythm; No murmurs, rubs, or gallops  ABDOMEN: Soft, Nontender, Nondistended;   SKIN: No rashes or lesions  NERVOUS SYSTEM:  Alert & Oriented X3, no focal deficit      LABS:             8.3    7.82  )-----------( 327      ( 25 Oct 2021 08:30 )             27.9     141  |  107  |  11  ----------------------------<  100<H>    (10-)  4.0   |  23  |  0.92    Ca    8.4      25 Oct 2021 08:30  Phos  2.7     10-25  Mg     1.9     10-25        IMAGING:       Labs and imaging personally reviewed and interpreted [ x ]  Consult notes reviewed [ x ]  Case discussed with consultants [ x ]   Internal Medicine Team Progress Note    ANABELLE GOODSON  Patient is a 71y old  Male who presents with a chief complaint of AHRF (25 Oct 2021 09:34)      INTERVAL HPI / SUBJECTIVE:  No acute events overnight.   This morning, patient trialed off of 2L NC, became tachypneic with o2 sat of 89% at rest. Pt requested to be put back on 2L NC after ambulating to restroom. Patient denies f/c, n/v, chest pain, abdominal pain, dysuria.     REVIEW OF SYSTEMS:   Constitutional: no fatigue, fever, chills, generalized weakness  HEENT: no eye pain, vision changes, rhinorrhea, sore throat  Respiratory: no cough, wheezing, shortness of breath  CV: no chest pain, palpitations, dyspnea on exertion, orthopnea, PND  GI: no decreased appetite, nausea, vomiting, abdominal pain, diarrhea, constipation, melena  : no dysuria, hematuria, urinary frequency, incontinence, nocturia  MSK: no joint or muscle pain, muscle weakness, joint swelling  Skin: no rashes, bruising, hair loss, color change  Neuro: no headache, dizziness, fainting, paresthesias, memory loss  Endo: no heat or cold intolerance, increased thirst, hot flashes  Psych: no changes in mood      MEDICATIONS:   MEDICATIONS  (STANDING):  amLODIPine   Tablet 10 milliGRAM(s) Oral daily  aspirin  chewable 81 milliGRAM(s) Oral daily  atorvastatin 80 milliGRAM(s) Oral at bedtime  BACItracin   Ointment 1 Application(s) Topical daily  budesonide  80 MICROgram(s)/formoterol 4.5 MICROgram(s) Inhaler 2 Puff(s) Inhalation two times a day  chlorhexidine 4% Liquid 1 Application(s) Topical <User Schedule>  dextrose 40% Gel 15 Gram(s) Oral once  dextrose 5%. 1000 milliLiter(s) (100 mL/Hr) IV Continuous <Continuous>  dextrose 50% Injectable 12.5 Gram(s) IV Push once  dextrose 50% Injectable 25 Gram(s) IV Push once  dextrose 50% Injectable 25 Gram(s) IV Push once  glucagon  Injectable 1 milliGRAM(s) IntraMuscular once  heparin   Injectable 5000 Unit(s) SubCutaneous every 8 hours  influenza   Vaccine 0.5 milliLiter(s) IntraMuscular once  losartan 50 milliGRAM(s) Oral daily  melatonin 5 milliGRAM(s) Oral at bedtime  metoprolol tartrate 12.5 milliGRAM(s) Oral every 12 hours  multivitamin/minerals/iron Oral Solution (CENTRUM) 15 milliLiter(s) Oral daily  pantoprazole  Injectable 40 milliGRAM(s) IV Push daily  polyethylene glycol 3350 17 Gram(s) Oral daily  senna 2 Tablet(s) Oral at bedtime  tamsulosin 0.4 milliGRAM(s) Oral at bedtime  tiotropium 18 MICROgram(s) Capsule 1 Capsule(s) Inhalation daily    MEDICATIONS  (PRN):  albuterol/ipratropium for Nebulization 3 milliLiter(s) Nebulizer every 4 hours PRN Shortness of Breath and/or Wheezing  guaiFENesin Oral Liquid (Sugar-Free) 200 milliGRAM(s) Oral every 6 hours PRN Cough  hydrALAZINE Injectable 10 milliGRAM(s) IV Push every 6 hours PRN SBP>180  OLANZapine Injectable 5 milliGRAM(s) IntraMuscular every 6 hours PRN agitation      ALLERGIES:   Allergies No Known Allergies  Intolerances None    OBJECTIVE:  Vital Signs Last 24 Hrs  T(C): 36.9 (26 Oct 2021 05:56), Max: 36.9 (25 Oct 2021 14:27)  T(F): 98.5 (26 Oct 2021 05:56), Max: 98.5 (25 Oct 2021 14:27)  HR: 75 (26 Oct 2021 05:56) (75 - 88)  BP: 126/65 (26 Oct 2021 05:56) (104/61 - 132/82)  BP(mean): --  RR: 18 (26 Oct 2021 05:56) (17 - 18)  SpO2: 96% (26 Oct 2021 05:56) (93% - 97%)    I&O's Summary    25 Oct 2021 07:01  -  26 Oct 2021 07:00  --------------------------------------------------------  IN: 1200 mL / OUT: 825 mL / NET: 375 mL      PHYSICAL EXAM:  GENERAL: NAD, with NC in place.  HEAD:  Atraumatic, Normocephalic  EYES: EOMI, conjunctiva and sclera clear  CHEST/LUNG: Clear to percussion bilaterally; No rales, rhonchi, or rubs. Mild expiratory wheeze.   HEART: Regular rate and rhythm; No murmurs, rubs, or gallops  ABDOMEN: Soft, Nontender, Nondistended;   SKIN: No rashes or lesions  NERVOUS SYSTEM:  Alert & Oriented X3, no focal deficit      LABS:             8.3    7.82  )-----------( 327      ( 25 Oct 2021 08:30 )             27.9     141  |  107  |  11  ----------------------------<  100<H>    (10-)  4.0   |  23  |  0.92    Ca    8.4      25 Oct 2021 08:30  Phos  2.7     10-25  Mg     1.9     10-25    IMAGING:   No new imaging    Labs and imaging personally reviewed and interpreted [ x ]  Consult notes reviewed [ x ]  Case discussed with consultants [ x ]   Internal Medicine Team Progress Note    HAMZAH ANABELLE  Patient is a 71y old  Male who presents with a chief complaint of AHRF (25 Oct 2021 09:34)      INTERVAL HPI / SUBJECTIVE:  No acute events overnight.   This morning, patient trialed off of 2L NC, became tachypneic using accessory muscles of respiration per RN with o2 sat of 89% at rest. Pt put back on 2L NC. Patient denies f/c, n/v, chest pain, abdominal pain, dysuria.     REVIEW OF SYSTEMS:   Constitutional: no fatigue, fever, chills, generalized weakness  HEENT: no eye pain, vision changes, rhinorrhea, sore throat  Respiratory: no cough, wheezing  CV: no chest pain, palpitations, dyspnea on exertion, orthopnea, PND  GI: no decreased appetite, nausea, vomiting, abdominal pain, diarrhea, constipation, melena  : no dysuria, hematuria, urinary frequency, incontinence, nocturia  MSK: no joint or muscle pain, muscle weakness, joint swelling  Skin: no rashes, bruising, hair loss, color change  Neuro: no headache, dizziness, fainting, paresthesias, memory loss  Endo: no heat or cold intolerance, increased thirst, hot flashes  Psych: no changes in mood      MEDICATIONS:   MEDICATIONS  (STANDING):  amLODIPine   Tablet 10 milliGRAM(s) Oral daily  aspirin  chewable 81 milliGRAM(s) Oral daily  atorvastatin 80 milliGRAM(s) Oral at bedtime  BACItracin   Ointment 1 Application(s) Topical daily  budesonide  80 MICROgram(s)/formoterol 4.5 MICROgram(s) Inhaler 2 Puff(s) Inhalation two times a day  chlorhexidine 4% Liquid 1 Application(s) Topical <User Schedule>  dextrose 40% Gel 15 Gram(s) Oral once  dextrose 5%. 1000 milliLiter(s) (100 mL/Hr) IV Continuous <Continuous>  dextrose 50% Injectable 12.5 Gram(s) IV Push once  dextrose 50% Injectable 25 Gram(s) IV Push once  dextrose 50% Injectable 25 Gram(s) IV Push once  glucagon  Injectable 1 milliGRAM(s) IntraMuscular once  heparin   Injectable 5000 Unit(s) SubCutaneous every 8 hours  influenza   Vaccine 0.5 milliLiter(s) IntraMuscular once  losartan 50 milliGRAM(s) Oral daily  melatonin 5 milliGRAM(s) Oral at bedtime  metoprolol tartrate 12.5 milliGRAM(s) Oral every 12 hours  multivitamin/minerals/iron Oral Solution (CENTRUM) 15 milliLiter(s) Oral daily  pantoprazole  Injectable 40 milliGRAM(s) IV Push daily  polyethylene glycol 3350 17 Gram(s) Oral daily  senna 2 Tablet(s) Oral at bedtime  tamsulosin 0.4 milliGRAM(s) Oral at bedtime  tiotropium 18 MICROgram(s) Capsule 1 Capsule(s) Inhalation daily    MEDICATIONS  (PRN):  albuterol/ipratropium for Nebulization 3 milliLiter(s) Nebulizer every 4 hours PRN Shortness of Breath and/or Wheezing  guaiFENesin Oral Liquid (Sugar-Free) 200 milliGRAM(s) Oral every 6 hours PRN Cough  hydrALAZINE Injectable 10 milliGRAM(s) IV Push every 6 hours PRN SBP>180  OLANZapine Injectable 5 milliGRAM(s) IntraMuscular every 6 hours PRN agitation      ALLERGIES:   Allergies No Known Allergies  Intolerances None    OBJECTIVE:  Vital Signs Last 24 Hrs  T(C): 36.9 (26 Oct 2021 05:56), Max: 36.9 (25 Oct 2021 14:27)  T(F): 98.5 (26 Oct 2021 05:56), Max: 98.5 (25 Oct 2021 14:27)  HR: 75 (26 Oct 2021 05:56) (75 - 88)  BP: 126/65 (26 Oct 2021 05:56) (104/61 - 132/82)  BP(mean): --  RR: 18 (26 Oct 2021 05:56) (17 - 18)  SpO2: 96% (26 Oct 2021 05:56) (93% - 97%)    I&O's Summary    25 Oct 2021 07:01  -  26 Oct 2021 07:00  --------------------------------------------------------  IN: 1200 mL / OUT: 825 mL / NET: 375 mL      PHYSICAL EXAM:  GENERAL: NAD, with NC in place.  HEAD:  Atraumatic, Normocephalic  EYES: EOMI, conjunctiva and sclera clear  CHEST/LUNG: Clear to percussion bilaterally; No rales, rhonchi, or rubs. Mild expiratory wheeze.   HEART: Regular rate and rhythm; No murmurs, rubs, or gallops  ABDOMEN: Soft, Nontender, Nondistended;   SKIN: No rashes or lesions  NERVOUS SYSTEM:  Alert & Oriented X3, no focal deficit      LABS:             8.3    7.82  )-----------( 327      ( 25 Oct 2021 08:30 )             27.9     141  |  107  |  11  ----------------------------<  100<H>    (10-)  4.0   |  23  |  0.92    Ca    8.4      25 Oct 2021 08:30  Phos  2.7     10-25  Mg     1.9     10-25    IMAGING:   No new imaging    Labs and imaging personally reviewed and interpreted [ x ]  Consult notes reviewed [ x ]  Case discussed with consultants [ x ]

## 2021-10-26 NOTE — PROGRESS NOTE ADULT - PROBLEM SELECTOR PLAN 1
Improved. Pt initially presented with AHRF 2/2 viral PNA (parainfluenza+) c/b possible superimposed bacterial PNA and COPD exacerbation. At home, for his COPD/Asthma, pt is on Daliresp 500mcg daily, spiriva daily PRN, symbicort BID PRN, and Ventolin QID PRN.  - s/p intubation on 10/8, extubation/reintubation on 10/12, extubation on 10/14 to BiPAP and transitioning to O2NC  - TTE (10/16): EF 60-65%; minimal MR; Small pericardial effusion measuring 0.2cm anterior to RV; no evidence of RA or RV collapse.  - CXR (10/18): small pleural effusions  - CT chest (10/22): thick linear consolidation in the right lower lobe most likely represents atelectasis.  - c/w duonebs q4h and inhaled budesonide 0.25mg BID  - c/w guaifenesin PRN for cough; c/w incentive spirometry and acapella device for airway clearance  - continue to monitor respiratory status, currently sating well on O2NC, wean as tolerated  - c/w BIPAP qHS for now  - s/p solumedrol (10/8-10/16)  - s/p 5-day abx course of CTX->Levaquin ->CTX (10/8-10/12)   - s/p Zosyn (10/18-10/23), was started for concern for superimposed bacterial PNA given fever overnight on 10/18 and worsened leukocytosis, but CT chest only found thick linear consolidation in the right lower lobe most likely atelectasis.  - Appreciate Pulm recs  - NC d/c o 10/25 Improved. Pt initially presented with AHRF 2/2 viral PNA (parainfluenza+) c/b possible superimposed bacterial PNA and COPD exacerbation. At home, for his COPD/Asthma, pt is on Daliresp 500mcg daily, spiriva daily PRN, symbicort BID PRN, and Ventolin QID PRN.  - s/p intubation on 10/8, extubation/reintubation on 10/12, extubation on 10/14 to BiPAP and transitioning to O2NC. Patient trialed off of 2L nC on morning of 10/26, became tachypneic, saturating 89% at rest. Put back on 2L NC.   - TTE (10/16): EF 60-65%; minimal MR; Small pericardial effusion measuring 0.2cm anterior to RV; no evidence of RA or RV collapse.  - CXR (10/18): small pleural effusions  - CT chest (10/22): thick linear consolidation in the right lower lobe most likely represents atelectasis.  - c/w duonebs q4h and inhaled budesonide 0.25mg BID  - c/w guaifenesin PRN for cough; c/w incentive spirometry and acapella device for airway clearance  - continue to monitor respiratory status, currently sating well on O2NC, wean as tolerated  - c/w BIPAP qHS for now  - s/p solumedrol (10/8-10/16)  - s/p 5-day abx course of CTX->Levaquin ->CTX (10/8-10/12)   - s/p Zosyn (10/18-10/23), was started for concern for superimposed bacterial PNA given fever overnight on 10/18 and worsened leukocytosis, but CT chest only found thick linear consolidation in the right lower lobe most likely atelectasis.  - Appreciate Pulm recs  - NC d/c o 10/25 Improved. Pt initially presented with AHRF 2/2 viral PNA (parainfluenza+) c/b possible superimposed bacterial PNA and COPD exacerbation. At home, for his COPD/Asthma, pt is on Daliresp 500mcg daily, spiriva daily PRN, symbicort BID PRN, and Ventolin QID PRN.  - s/p intubation on 10/8, extubation/reintubation on 10/12, extubation on 10/14 to BiPAP and transitioning to O2NC. Patient trialed off of NC on morning of 10/26, became tachypneic, saturating 89% at rest. Put back on 2L NC.   - TTE (10/16): EF 60-65%; minimal MR; Small pericardial effusion measuring 0.2cm anterior to RV; no evidence of RA or RV collapse.  - CXR (10/18): small pleural effusions  - CT chest (10/22): thick linear consolidation in the right lower lobe most likely represents atelectasis.  - duonebs changed to 3mL q4 hrs PRN, continue Symbicort 2 puffs BID, spiriva 18ug capsule daily  - c/w guaifenesin PRN for cough; c/w incentive spirometry and acapella device for airway clearance  - continue to monitor respiratory status, currently sating well on O2NC, wean as tolerated  - s/p solumedrol (10/8-10/16)  - s/p 5-day abx course of CTX->Levaquin ->CTX (10/8-10/12)   - s/p Zosyn (10/18-10/23), was started for concern for superimposed bacterial PNA given fever overnight on 10/18 and worsened leukocytosis, but CT chest only found thick linear consolidation in the right lower lobe most likely atelectasis.  - Appreciate Pulm recs

## 2021-10-26 NOTE — PROGRESS NOTE ADULT - PROBLEM SELECTOR PLAN 5
Pt presented Microcytic Anemia to Hgb 9.4 (baseline Hgb 11-12 and normocytic in 2019)  - no signs of active bleeding  - possibly 2/2 sepsis vs iron deficiency  - iron studies wnl (though lower end)  - Reticulocyte % 0.7 (corrected = 0.4%), Absolute retic 26.6 (10/24/21)   - haptoglobin wnl, LDH mildly elevated to 247 (10/24/21)   - monitor CBC, transfuse to goal Hgb>8 for hx of CAD w/ multiple stents

## 2021-10-27 ENCOUNTER — TRANSCRIPTION ENCOUNTER (OUTPATIENT)
Age: 71
End: 2021-10-27

## 2021-10-27 DIAGNOSIS — A41.9 SEPSIS, UNSPECIFIED ORGANISM: ICD-10-CM

## 2021-10-27 LAB
ANION GAP SERPL CALC-SCNC: 12 MMOL/L — SIGNIFICANT CHANGE UP (ref 5–17)
BASOPHILS # BLD AUTO: 0.02 K/UL — SIGNIFICANT CHANGE UP (ref 0–0.2)
BASOPHILS NFR BLD AUTO: 0.3 % — SIGNIFICANT CHANGE UP (ref 0–2)
BLD GP AB SCN SERPL QL: NEGATIVE — SIGNIFICANT CHANGE UP
BUN SERPL-MCNC: 12 MG/DL — SIGNIFICANT CHANGE UP (ref 7–23)
CALCIUM SERPL-MCNC: 8.6 MG/DL — SIGNIFICANT CHANGE UP (ref 8.4–10.5)
CHLORIDE SERPL-SCNC: 104 MMOL/L — SIGNIFICANT CHANGE UP (ref 96–108)
CO2 SERPL-SCNC: 22 MMOL/L — SIGNIFICANT CHANGE UP (ref 22–31)
CREAT SERPL-MCNC: 0.88 MG/DL — SIGNIFICANT CHANGE UP (ref 0.5–1.3)
EOSINOPHIL # BLD AUTO: 0.17 K/UL — SIGNIFICANT CHANGE UP (ref 0–0.5)
EOSINOPHIL NFR BLD AUTO: 2.4 % — SIGNIFICANT CHANGE UP (ref 0–6)
GLUCOSE SERPL-MCNC: 97 MG/DL — SIGNIFICANT CHANGE UP (ref 70–99)
HCT VFR BLD CALC: 27.4 % — LOW (ref 39–50)
HGB BLD-MCNC: 8 G/DL — LOW (ref 13–17)
IMM GRANULOCYTES NFR BLD AUTO: 0.4 % — SIGNIFICANT CHANGE UP (ref 0–1.5)
LYMPHOCYTES # BLD AUTO: 0.94 K/UL — LOW (ref 1–3.3)
LYMPHOCYTES # BLD AUTO: 13.3 % — SIGNIFICANT CHANGE UP (ref 13–44)
MAGNESIUM SERPL-MCNC: 1.7 MG/DL — SIGNIFICANT CHANGE UP (ref 1.6–2.6)
MCHC RBC-ENTMCNC: 22.5 PG — LOW (ref 27–34)
MCHC RBC-ENTMCNC: 29.2 GM/DL — LOW (ref 32–36)
MCV RBC AUTO: 77 FL — LOW (ref 80–100)
MONOCYTES # BLD AUTO: 0.41 K/UL — SIGNIFICANT CHANGE UP (ref 0–0.9)
MONOCYTES NFR BLD AUTO: 5.8 % — SIGNIFICANT CHANGE UP (ref 2–14)
NEUTROPHILS # BLD AUTO: 5.48 K/UL — SIGNIFICANT CHANGE UP (ref 1.8–7.4)
NEUTROPHILS NFR BLD AUTO: 77.8 % — HIGH (ref 43–77)
NRBC # BLD: 0 /100 WBCS — SIGNIFICANT CHANGE UP (ref 0–0)
PHOSPHATE SERPL-MCNC: 2.5 MG/DL — SIGNIFICANT CHANGE UP (ref 2.5–4.5)
PLATELET # BLD AUTO: 358 K/UL — SIGNIFICANT CHANGE UP (ref 150–400)
POTASSIUM SERPL-MCNC: 4 MMOL/L — SIGNIFICANT CHANGE UP (ref 3.5–5.3)
POTASSIUM SERPL-SCNC: 4 MMOL/L — SIGNIFICANT CHANGE UP (ref 3.5–5.3)
RBC # BLD: 3.56 M/UL — LOW (ref 4.2–5.8)
RBC # FLD: 25.8 % — HIGH (ref 10.3–14.5)
RH IG SCN BLD-IMP: POSITIVE — SIGNIFICANT CHANGE UP
SARS-COV-2 RNA SPEC QL NAA+PROBE: SIGNIFICANT CHANGE UP
SODIUM SERPL-SCNC: 138 MMOL/L — SIGNIFICANT CHANGE UP (ref 135–145)
WBC # BLD: 7.05 K/UL — SIGNIFICANT CHANGE UP (ref 3.8–10.5)
WBC # FLD AUTO: 7.05 K/UL — SIGNIFICANT CHANGE UP (ref 3.8–10.5)

## 2021-10-27 PROCEDURE — 99232 SBSQ HOSP IP/OBS MODERATE 35: CPT | Mod: GC

## 2021-10-27 RX ORDER — ALBUTEROL 90 UG/1
2 AEROSOL, METERED ORAL EVERY 4 HOURS
Refills: 0 | Status: DISCONTINUED | OUTPATIENT
Start: 2021-10-27 | End: 2021-10-29

## 2021-10-27 RX ADMIN — FENTANYL CITRATE 1 PATCH: 50 INJECTION INTRAVENOUS at 07:28

## 2021-10-27 RX ADMIN — Medication 5 MILLIGRAM(S): at 21:08

## 2021-10-27 RX ADMIN — PANTOPRAZOLE SODIUM 40 MILLIGRAM(S): 20 TABLET, DELAYED RELEASE ORAL at 11:28

## 2021-10-27 RX ADMIN — HEPARIN SODIUM 5000 UNIT(S): 5000 INJECTION INTRAVENOUS; SUBCUTANEOUS at 06:03

## 2021-10-27 RX ADMIN — HEPARIN SODIUM 5000 UNIT(S): 5000 INJECTION INTRAVENOUS; SUBCUTANEOUS at 14:03

## 2021-10-27 RX ADMIN — BUDESONIDE AND FORMOTEROL FUMARATE DIHYDRATE 2 PUFF(S): 160; 4.5 AEROSOL RESPIRATORY (INHALATION) at 06:02

## 2021-10-27 RX ADMIN — CHLORHEXIDINE GLUCONATE 1 APPLICATION(S): 213 SOLUTION TOPICAL at 11:24

## 2021-10-27 RX ADMIN — Medication 15 MILLILITER(S): at 13:57

## 2021-10-27 RX ADMIN — HEPARIN SODIUM 5000 UNIT(S): 5000 INJECTION INTRAVENOUS; SUBCUTANEOUS at 21:08

## 2021-10-27 RX ADMIN — Medication 81 MILLIGRAM(S): at 11:28

## 2021-10-27 RX ADMIN — ATORVASTATIN CALCIUM 80 MILLIGRAM(S): 80 TABLET, FILM COATED ORAL at 21:08

## 2021-10-27 RX ADMIN — TAMSULOSIN HYDROCHLORIDE 0.4 MILLIGRAM(S): 0.4 CAPSULE ORAL at 21:08

## 2021-10-27 RX ADMIN — Medication 1 APPLICATION(S): at 11:30

## 2021-10-27 RX ADMIN — LOSARTAN POTASSIUM 50 MILLIGRAM(S): 100 TABLET, FILM COATED ORAL at 06:03

## 2021-10-27 RX ADMIN — BUDESONIDE AND FORMOTEROL FUMARATE DIHYDRATE 2 PUFF(S): 160; 4.5 AEROSOL RESPIRATORY (INHALATION) at 17:01

## 2021-10-27 RX ADMIN — FENTANYL CITRATE 1 PATCH: 50 INJECTION INTRAVENOUS at 17:00

## 2021-10-27 RX ADMIN — TIOTROPIUM BROMIDE 1 CAPSULE(S): 18 CAPSULE ORAL; RESPIRATORY (INHALATION) at 11:29

## 2021-10-27 RX ADMIN — Medication 12.5 MILLIGRAM(S): at 17:01

## 2021-10-27 RX ADMIN — Medication 12.5 MILLIGRAM(S): at 06:02

## 2021-10-27 RX ADMIN — AMLODIPINE BESYLATE 10 MILLIGRAM(S): 2.5 TABLET ORAL at 06:02

## 2021-10-27 NOTE — PROGRESS NOTE ADULT - PROBLEM SELECTOR PLAN 5
Pt presented Microcytic Anemia to Hgb 9.4 (baseline Hgb 11-12 and normocytic in 2019)  - no signs of active bleeding  - possibly 2/2 sepsis vs iron deficiency  - iron studies wnl (though lower end)  - Reticulocyte % 0.7 (corrected = 0.4%), Absolute retic 26.6 (10/24/21)   - haptoglobin wnl, LDH mildly elevated to 247 (10/24/21)   - monitor CBC, transfuse to goal Hgb>8 for hx of CAD w/ multiple stents Pt with hx of HTN. At home, pt is on amlodipine 10mg daily, losartan 100mg daily, Toprol XL 25mg  - c/w metoprolol tartrate 12.5mg BID, amlodipine 10mg daily  - c/w home losartan at half home dose given improved renal function, can uptitrate back to 100mg daily if tolerated  - continue to monitor BP

## 2021-10-27 NOTE — PROGRESS NOTE ADULT - SUBJECTIVE AND OBJECTIVE BOX
Internal Medicine Team Progress Note    HAMZAH ANABELLE  Patient is a 71y old  Male who presents with a chief complaint of AHRF (27 Oct 2021 12:04)      INTERVAL HPI / SUBJECTIVE:  No acute events overnight. Patient encountered on 2L NC this morning. NC removed, patient revisited later and reports breathing comfortably on RA with no SOB.     REVIEW OF SYSTEMS:   Constitutional: no fatigue, fever, chills, generalized weakness  HEENT: no eye pain, vision changes, rhinorrhea, sore throat  Respiratory: no cough, wheezing, shortness of breath  CV: no chest pain, palpitations, dyspnea on exertion, orthopnea, PND  GI: no decreased appetite, nausea, vomiting, abdominal pain, diarrhea, constipation, melena  : no dysuria, hematuria, urinary frequency, incontinence, nocturia  MSK: no joint or muscle pain, muscle weakness, joint swelling  Skin: no rashes, bruising, hair loss, color change  Neuro: no headache, dizziness, fainting, paresthesias, memory loss  Endo: no heat or cold intolerance, increased thirst, hot flashes  Psych: no changes in mood      MEDICATIONS:   MEDICATIONS  (STANDING):  amLODIPine   Tablet 10 milliGRAM(s) Oral daily  aspirin  chewable 81 milliGRAM(s) Oral daily  atorvastatin 80 milliGRAM(s) Oral at bedtime  BACItracin   Ointment 1 Application(s) Topical daily  budesonide  80 MICROgram(s)/formoterol 4.5 MICROgram(s) Inhaler 2 Puff(s) Inhalation two times a day  chlorhexidine 4% Liquid 1 Application(s) Topical <User Schedule>  dextrose 40% Gel 15 Gram(s) Oral once  dextrose 5%. 1000 milliLiter(s) (100 mL/Hr) IV Continuous <Continuous>  dextrose 50% Injectable 25 Gram(s) IV Push once  dextrose 50% Injectable 12.5 Gram(s) IV Push once  dextrose 50% Injectable 25 Gram(s) IV Push once  glucagon  Injectable 1 milliGRAM(s) IntraMuscular once  heparin   Injectable 5000 Unit(s) SubCutaneous every 8 hours  influenza   Vaccine 0.5 milliLiter(s) IntraMuscular once  losartan 50 milliGRAM(s) Oral daily  melatonin 5 milliGRAM(s) Oral at bedtime  metoprolol tartrate 12.5 milliGRAM(s) Oral every 12 hours  multivitamin/minerals/iron Oral Solution (CENTRUM) 15 milliLiter(s) Oral daily  pantoprazole  Injectable 40 milliGRAM(s) IV Push daily  polyethylene glycol 3350 17 Gram(s) Oral daily  senna 2 Tablet(s) Oral at bedtime  tamsulosin 0.4 milliGRAM(s) Oral at bedtime  tiotropium 18 MICROgram(s) Capsule 1 Capsule(s) Inhalation daily    MEDICATIONS  (PRN):  ALBUTerol    90 MICROgram(s) HFA Inhaler 2 Puff(s) Inhalation every 4 hours PRN Shortness of Breath and/or Wheezing  guaiFENesin Oral Liquid (Sugar-Free) 200 milliGRAM(s) Oral every 6 hours PRN Cough  hydrALAZINE Injectable 10 milliGRAM(s) IV Push every 6 hours PRN SBP>180      ALLERGIES:   Allergies    No Known Allergies    Intolerances        OBJECTIVE:  Vital Signs Last 24 Hrs  T(C): 37.1 (27 Oct 2021 05:35), Max: 37.1 (27 Oct 2021 05:35)  T(F): 98.7 (27 Oct 2021 05:35), Max: 98.7 (27 Oct 2021 05:35)  HR: 80 (27 Oct 2021 05:35) (77 - 83)  BP: 150/72 (27 Oct 2021 05:35) (109/67 - 150/72)  BP(mean): --  RR: 18 (27 Oct 2021 05:35) (18 - 20)  SpO2: 97% (27 Oct 2021 05:35) (89% - 100%)    I&O's Summary    26 Oct 2021 07:01  -  27 Oct 2021 07:00  --------------------------------------------------------  IN: 680 mL / OUT: 2075 mL / NET: -1395 mL    27 Oct 2021 07:01  -  27 Oct 2021 12:27  --------------------------------------------------------  IN: 360 mL / OUT: 525 mL / NET: -165 mL        PHYSICAL EXAM:  General: Well-appearing, NAD  HEENT:  EOMI, PERRL, conjunctiva and sclera clear, normal oropharynx  Neck:  Supple, no JVD, normal thyroid  Chest/Lungs: CTA B/L, no rales, rhonchi, rub or wheezing  Heart: RRR, normal S1, S2, no murmurs or gallops  Abdomen: Soft, nondistended, NTTP, normoactive bowel sounds  Extremities: Peripheral pulses 2+ B/L, no edema, cyanosis or clubbing  Skin: Warm, well-perfused, no rashes or lesions  Neurological: A&Ox3, no focal deficits      LABS:      IMAGING:       Labs and imaging personally reviewed and interpreted [  ]  Consult notes reviewed [  ]  Case discussed with consultants [  ]   Internal Medicine Team Progress Note    ANABELLE GOODSON  Patient is a 71y old  Male who presents with a chief complaint of AHRF (27 Oct 2021 12:04)      INTERVAL HPI / SUBJECTIVE:  No acute events overnight. Patient encountered on 2L NC this morning. NC removed, patient revisited later and reports breathing comfortably on RA with no SOB. Patient amenable to plan for discharge to [facility]. Patient denies f/c, n/v, chest pain, abdominal pain, dysuria.       REVIEW OF SYSTEMS:   Constitutional: no fatigue, fever, chills, generalized weakness  HEENT: no eye pain, vision changes, rhinorrhea, sore throat  Respiratory: no cough, wheezing, shortness of breath  CV: no chest pain, palpitations, dyspnea on exertion, orthopnea, PND  GI: no decreased appetite, nausea, vomiting, abdominal pain, diarrhea, constipation, melena  : no dysuria, hematuria, urinary frequency, incontinence, nocturia  MSK: no joint or muscle pain, muscle weakness, joint swelling  Skin: no rashes, bruising, hair loss, color change  Neuro: no headache, dizziness, fainting, paresthesias, memory loss  Endo: no heat or cold intolerance, increased thirst, hot flashes  Psych: no changes in mood      MEDICATIONS:   MEDICATIONS  (STANDING):  amLODIPine   Tablet 10 milliGRAM(s) Oral daily  aspirin  chewable 81 milliGRAM(s) Oral daily  atorvastatin 80 milliGRAM(s) Oral at bedtime  BACItracin   Ointment 1 Application(s) Topical daily  budesonide  80 MICROgram(s)/formoterol 4.5 MICROgram(s) Inhaler 2 Puff(s) Inhalation two times a day  chlorhexidine 4% Liquid 1 Application(s) Topical <User Schedule>  dextrose 40% Gel 15 Gram(s) Oral once  dextrose 5%. 1000 milliLiter(s) (100 mL/Hr) IV Continuous <Continuous>  dextrose 50% Injectable 25 Gram(s) IV Push once  dextrose 50% Injectable 12.5 Gram(s) IV Push once  dextrose 50% Injectable 25 Gram(s) IV Push once  glucagon  Injectable 1 milliGRAM(s) IntraMuscular once  heparin   Injectable 5000 Unit(s) SubCutaneous every 8 hours  influenza   Vaccine 0.5 milliLiter(s) IntraMuscular once  losartan 50 milliGRAM(s) Oral daily  melatonin 5 milliGRAM(s) Oral at bedtime  metoprolol tartrate 12.5 milliGRAM(s) Oral every 12 hours  multivitamin/minerals/iron Oral Solution (CENTRUM) 15 milliLiter(s) Oral daily  pantoprazole  Injectable 40 milliGRAM(s) IV Push daily  polyethylene glycol 3350 17 Gram(s) Oral daily  senna 2 Tablet(s) Oral at bedtime  tamsulosin 0.4 milliGRAM(s) Oral at bedtime  tiotropium 18 MICROgram(s) Capsule 1 Capsule(s) Inhalation daily    MEDICATIONS  (PRN):  ALBUTerol    90 MICROgram(s) HFA Inhaler 2 Puff(s) Inhalation every 4 hours PRN Shortness of Breath and/or Wheezing  guaiFENesin Oral Liquid (Sugar-Free) 200 milliGRAM(s) Oral every 6 hours PRN Cough  hydrALAZINE Injectable 10 milliGRAM(s) IV Push every 6 hours PRN SBP>180      ALLERGIES:   Allergies No Known Allergies  Intolerances None      OBJECTIVE:  Vital Signs Last 24 Hrs  T(C): 37.1 (27 Oct 2021 05:35), Max: 37.1 (27 Oct 2021 05:35)  T(F): 98.7 (27 Oct 2021 05:35), Max: 98.7 (27 Oct 2021 05:35)  HR: 80 (27 Oct 2021 05:35) (77 - 83)  BP: 150/72 (27 Oct 2021 05:35) (109/67 - 150/72)  BP(mean): --  RR: 18 (27 Oct 2021 05:35) (18 - 20)  SpO2: 97% (27 Oct 2021 05:35) (89% - 100%)    I&O's Summary    27 Oct 2021 07:01  -  27 Oct 2021 12:27  --------------------------------------------------------  IN: 360 mL / OUT: 525 mL / NET: -165 mL      PHYSICAL EXAM:  General: Well-appearing, NAD  HEENT:  EOMI, PERRL, conjunctiva and sclera clear, normal oropharynx  Neck:  Supple, no JVD, normal thyroid  Chest/Lungs: CTA B/L, no rales, rhonchi, rub. Mild expiratory wheezing audible most prominent in upper lung fields.  Heart: RRR, normal S1, S2, no murmurs or gallops  Abdomen: Soft, nondistended, NTTP, normoactive bowel sounds  Extremities: Peripheral pulses 2+ B/L, no edema, cyanosis or clubbing  Skin: Warm, well-perfused, no rashes or lesions  Neurological: A&Ox3, no focal deficits      LABS:                      8.0    7.05  )-----------( 358      ( 27 Oct 2021 07:58 )             27.4     138  |  104  |  12  ----------------------------<  97   (10-)  4.0   |  22  |  0.88    Ca    8.6      27 Oct 2021 07:58  Phos  2.5     10-27  Mg     1.7     10-27      IMAGING:   No new imaging    Labs and imaging personally reviewed and interpreted [  ]  Consult notes reviewed [  ]  Case discussed with consultants [  ]

## 2021-10-27 NOTE — PROGRESS NOTE ADULT - PROBLEM SELECTOR PLAN 6
Hx of CAD w/ multivessel dz s/p PCI (LCx, LAD w/ RISSA x2, D1 w/ RISSA x1 in 2019)  - TTE (10/16): EF 60-65%; minimal MR; Small pericardial effusion measuring 0.2cm anterior to RV; no evidence of RA or RV collapse.  - c/w metoprolol tartrate 12.5mg BID (on Toprol XL 25mg daily at home)  - c/w atorvastatin 80mg qhs  - c/w home ASA 81mg daily Pt presented Microcytic Anemia to Hgb 9.4 (baseline Hgb 11-12 and normocytic in 2019)  - no signs of active bleeding  - possibly 2/2 sepsis vs iron deficiency  - iron studies wnl (though lower end)  - Reticulocyte % 0.7 (corrected = 0.4%), Absolute retic 26.6 (10/24/21)   - haptoglobin wnl, LDH mildly elevated to 247 (10/24/21)   - monitor CBC, transfuse to goal Hgb>8 for hx of CAD w/ multiple stents

## 2021-10-27 NOTE — PROGRESS NOTE ADULT - ATTENDING COMMENTS
71M w/ hx of COPD, Asthma, HTN, CAD s/p PCI (LCx, LAD w/ RISSA x2, D1 w/ RISSA x1), initially presented with SOB and was admitted to MICU for AHRF requiring intubation 2/2 parainfluenza viral PNA c/b possible COPD exacerbation and superimposed bacterial PNA. Now extubated and abx completed.    Sepsis due to Parainfluenza Virus, Present on Admission  Acute hypoxic respiratory failure due to Acute Exacerbation of COPD from Viral Pneumonia due to Parainfluenza virus requiring intubation with superimposed bacterial PNA, now extubated and off abx, off 2LNC  Acute on Chronic Hypercapnic Respiratory Failure  HTN  Microcytic Anemia s/p 1U PRBC  Multivessel CAD s/p LAD and D1 RISSA 2019  Transaminitis due to Sepsis, Resolved    Off Oxygen  Awaiting authorization for [facility]  Continue inhalers per pulm  Hb stable for now  continue Symbicort and Spiriva  Incentive spirometry    Discussed influenza vaccination and pneumovax with the patient and partner in detail at the bedside who are in agreement. Will arrange prior to discharge to [facility].

## 2021-10-27 NOTE — PROGRESS NOTE ADULT - PROBLEM SELECTOR PLAN 8
- DVT ppx: HSQ  - Diet: Mechanical soft, advance as tolerated  - Dispo:  planning for [facility]. Repeat COVID-19 PCR negative on 10/25. Improved. Pt found to have elevated LFTs during hospitalization (AST to 700s, ALT to 400s)  - likely 2/2 sepsis in setting of PNA  - currently without abdominal pain  - continue to monitor, currently downtrending to wnl

## 2021-10-27 NOTE — PROGRESS NOTE ADULT - PROBLEM SELECTOR PLAN 1
Improved. Pt initially presented with AHRF 2/2 viral PNA (parainfluenza+) c/b possible superimposed bacterial PNA and COPD exacerbation. At home, for his COPD/Asthma, pt is on Daliresp 500mcg daily, spiriva daily PRN, symbicort BID PRN, and Ventolin QID PRN.  - s/p intubation on 10/8, extubation/reintubation on 10/12, extubation on 10/14 to BiPAP and transitioning to O2NC. Patient trialed off of NC on morning of 10/26, became tachypneic, saturating 89% at rest. Put back on 2L NC.   - TTE (10/16): EF 60-65%; minimal MR; Small pericardial effusion measuring 0.2cm anterior to RV; no evidence of RA or RV collapse.  - CXR (10/18): small pleural effusions  - CT chest (10/22): thick linear consolidation in the right lower lobe most likely represents atelectasis.  - duonebs changed to 3mL q4 hrs PRN, continue Symbicort 2 puffs BID, spiriva 18ug capsule daily  - c/w guaifenesin PRN for cough; c/w incentive spirometry and acapella device for airway clearance  - continue to monitor respiratory status, currently sating well on O2NC, wean as tolerated  - s/p solumedrol (10/8-10/16)  - s/p 5-day abx course of CTX->Levaquin ->CTX (10/8-10/12)   - s/p Zosyn (10/18-10/23), was started for concern for superimposed bacterial PNA given fever overnight on 10/18 and worsened leukocytosis, but CT chest only found thick linear consolidation in the right lower lobe most likely atelectasis.  - Appreciate Pulm recs Improved. Pt initially presented with AHRF 2/2 viral PNA (parainfluenza+) c/b possible superimposed bacterial PNA and COPD exacerbation. At home, for his COPD/Asthma, pt is on Daliresp 500mcg daily, spiriva daily PRN, symbicort BID PRN, and Ventolin QID PRN.  - s/p intubation on 10/8, extubation/reintubation on 10/12, extubation on 10/14 to BiPAP and transitioning to O2NC. Patient trialed off of NC again on morning of 10/27, tolerating well without tachypnea.   - TTE (10/16): EF 60-65%; minimal MR; Small pericardial effusion measuring 0.2cm anterior to RV; no evidence of RA or RV collapse.  - CXR (10/18): small pleural effusions  - CT chest (10/22): thick linear consolidation in the right lower lobe most likely represents atelectasis.  - s/p solumedrol (10/8-10/16)  - s/p 5-day abx course of CTX->Levaquin ->CTX (10/8-10/12)   - s/p Zosyn (10/18-10/23), was started for concern for superimposed bacterial PNA given fever overnight on 10/18 and worsened leukocytosis, but CT chest only found thick linear consolidation in the right lower lobe most likely atelectasis.  - Appreciate Pulm recs  - duoneb PRN discontinued and changed to Albuterol inhaler PRN on 10/27; continue Symbicort 2 puffs BID, spiriva 18ug capsule daily  - c/w guaifenesin PRN for cough; c/w incentive spirometry and acapella device for airway clearance  - incentive spirometry  - continue to monitor respiratory status, continue to wean NC as tolerated

## 2021-10-27 NOTE — PROGRESS NOTE ADULT - ASSESSMENT
71M w/ hx of COPD, Asthma, HTN, CAD s/p PCI (LCx, LAD w/ RISSA x2, D1 w/ RISSA x1), initially presented with SOB and was admitted to MICU for AHRF requiring intubation 2/2 parainfluenza viral PNA c/b possible COPD exacerbation and superimposed bacterial PNA. Now extubated and completing course of IV Zosyn. Patient continues to require 2L NC. Planning for discharge to [facility]. 71M w/ hx of COPD, Asthma, HTN, CAD s/p PCI (LCx, LAD w/ RISSA x2, D1 w/ RISSA x1), initially presented with SOB and was admitted to MICU for AHRF requiring intubation 2/2 parainfluenza viral PNA c/b possible COPD exacerbation and superimposed bacterial PNA. Now extubated and completing course of IV Zosyn. Patient weaned off 2L NC on 10/27. Planning for discharge to [facility].

## 2021-10-27 NOTE — PROGRESS NOTE ADULT - PROBLEM SELECTOR PLAN 4
Pt with hx of HTN. At home, pt is on amlodipine 10mg daily, losartan 100mg daily, Toprol XL 25mg  - c/w metoprolol tartrate 12.5mg BID, amlodipine 10mg daily  - c/w home losartan at half home dose given improved renal function, can uptitrate back to 100mg daily if tolerated  - continue to monitor BP - Resolved  - Patient met SIRS criteria on 10/9 with leukocytosis, tachycardia, fever in setting of known source of infection (RVP on 10/8 +parainfluenza)  - Multiple BCx neg   - MRSA negative, legionella negative  -- s/p 5-day abx course of CTX->Levaquin ->CTX (10/8-10/12)   - s/p Zosyn (10/18-10/23)  - continue to monitor vitals

## 2021-10-28 LAB
ANION GAP SERPL CALC-SCNC: 11 MMOL/L — SIGNIFICANT CHANGE UP (ref 5–17)
BASOPHILS # BLD AUTO: 0.03 K/UL — SIGNIFICANT CHANGE UP (ref 0–0.2)
BASOPHILS NFR BLD AUTO: 0.5 % — SIGNIFICANT CHANGE UP (ref 0–2)
BUN SERPL-MCNC: 11 MG/DL — SIGNIFICANT CHANGE UP (ref 7–23)
CALCIUM SERPL-MCNC: 8.2 MG/DL — LOW (ref 8.4–10.5)
CHLORIDE SERPL-SCNC: 105 MMOL/L — SIGNIFICANT CHANGE UP (ref 96–108)
CO2 SERPL-SCNC: 23 MMOL/L — SIGNIFICANT CHANGE UP (ref 22–31)
CREAT SERPL-MCNC: 0.94 MG/DL — SIGNIFICANT CHANGE UP (ref 0.5–1.3)
EOSINOPHIL # BLD AUTO: 0.15 K/UL — SIGNIFICANT CHANGE UP (ref 0–0.5)
EOSINOPHIL NFR BLD AUTO: 2.7 % — SIGNIFICANT CHANGE UP (ref 0–6)
GLUCOSE SERPL-MCNC: 99 MG/DL — SIGNIFICANT CHANGE UP (ref 70–99)
HCT VFR BLD CALC: 26.2 % — LOW (ref 39–50)
HGB BLD-MCNC: 7.9 G/DL — LOW (ref 13–17)
IMM GRANULOCYTES NFR BLD AUTO: 0.5 % — SIGNIFICANT CHANGE UP (ref 0–1.5)
LYMPHOCYTES # BLD AUTO: 0.79 K/UL — LOW (ref 1–3.3)
LYMPHOCYTES # BLD AUTO: 14.3 % — SIGNIFICANT CHANGE UP (ref 13–44)
MAGNESIUM SERPL-MCNC: 1.7 MG/DL — SIGNIFICANT CHANGE UP (ref 1.6–2.6)
MCHC RBC-ENTMCNC: 23 PG — LOW (ref 27–34)
MCHC RBC-ENTMCNC: 30.2 GM/DL — LOW (ref 32–36)
MCV RBC AUTO: 76.4 FL — LOW (ref 80–100)
MONOCYTES # BLD AUTO: 0.34 K/UL — SIGNIFICANT CHANGE UP (ref 0–0.9)
MONOCYTES NFR BLD AUTO: 6.1 % — SIGNIFICANT CHANGE UP (ref 2–14)
NEUTROPHILS # BLD AUTO: 4.19 K/UL — SIGNIFICANT CHANGE UP (ref 1.8–7.4)
NEUTROPHILS NFR BLD AUTO: 75.9 % — SIGNIFICANT CHANGE UP (ref 43–77)
NRBC # BLD: 0 /100 WBCS — SIGNIFICANT CHANGE UP (ref 0–0)
PHOSPHATE SERPL-MCNC: 2.5 MG/DL — SIGNIFICANT CHANGE UP (ref 2.5–4.5)
PLATELET # BLD AUTO: 358 K/UL — SIGNIFICANT CHANGE UP (ref 150–400)
POTASSIUM SERPL-MCNC: 4 MMOL/L — SIGNIFICANT CHANGE UP (ref 3.5–5.3)
POTASSIUM SERPL-SCNC: 4 MMOL/L — SIGNIFICANT CHANGE UP (ref 3.5–5.3)
RBC # BLD: 3.43 M/UL — LOW (ref 4.2–5.8)
RBC # FLD: 25.9 % — HIGH (ref 10.3–14.5)
SODIUM SERPL-SCNC: 139 MMOL/L — SIGNIFICANT CHANGE UP (ref 135–145)
WBC # BLD: 5.53 K/UL — SIGNIFICANT CHANGE UP (ref 3.8–10.5)
WBC # FLD AUTO: 5.53 K/UL — SIGNIFICANT CHANGE UP (ref 3.8–10.5)

## 2021-10-28 PROCEDURE — 99232 SBSQ HOSP IP/OBS MODERATE 35: CPT | Mod: GC

## 2021-10-28 RX ORDER — BACITRACIN ZINC NEOMYCIN SULFATE POLYMYXIN B SULFATE 400; 3.5; 5 [IU]/G; MG/G; [IU]/G
1 OINTMENT TOPICAL
Refills: 0 | Status: DISCONTINUED | OUTPATIENT
Start: 2021-10-28 | End: 2021-10-29

## 2021-10-28 RX ORDER — PNEUMOCOCCAL 23-VAL P-SAC VAC 25MCG/0.5
0.5 VIAL (ML) INJECTION ONCE
Refills: 0 | Status: COMPLETED | OUTPATIENT
Start: 2021-10-28 | End: 2021-10-28

## 2021-10-28 RX ORDER — INFLUENZA VIRUS VACCINE 15; 15; 15; 15 UG/.5ML; UG/.5ML; UG/.5ML; UG/.5ML
0.5 SUSPENSION INTRAMUSCULAR ONCE
Refills: 0 | Status: COMPLETED | OUTPATIENT
Start: 2021-10-28 | End: 2021-10-28

## 2021-10-28 RX ADMIN — Medication 1 APPLICATION(S): at 11:06

## 2021-10-28 RX ADMIN — TIOTROPIUM BROMIDE 1 CAPSULE(S): 18 CAPSULE ORAL; RESPIRATORY (INHALATION) at 11:08

## 2021-10-28 RX ADMIN — HEPARIN SODIUM 5000 UNIT(S): 5000 INJECTION INTRAVENOUS; SUBCUTANEOUS at 15:22

## 2021-10-28 RX ADMIN — Medication 81 MILLIGRAM(S): at 11:08

## 2021-10-28 RX ADMIN — BUDESONIDE AND FORMOTEROL FUMARATE DIHYDRATE 2 PUFF(S): 160; 4.5 AEROSOL RESPIRATORY (INHALATION) at 05:27

## 2021-10-28 RX ADMIN — INFLUENZA VIRUS VACCINE 0.5 MILLILITER(S): 15; 15; 15; 15 SUSPENSION INTRAMUSCULAR at 10:13

## 2021-10-28 RX ADMIN — BUDESONIDE AND FORMOTEROL FUMARATE DIHYDRATE 2 PUFF(S): 160; 4.5 AEROSOL RESPIRATORY (INHALATION) at 17:29

## 2021-10-28 RX ADMIN — Medication 12.5 MILLIGRAM(S): at 17:29

## 2021-10-28 RX ADMIN — Medication 0.5 MILLILITER(S): at 15:21

## 2021-10-28 RX ADMIN — BACITRACIN ZINC NEOMYCIN SULFATE POLYMYXIN B SULFATE 1 APPLICATION(S): 400; 3.5; 5 OINTMENT TOPICAL at 10:18

## 2021-10-28 RX ADMIN — BACITRACIN ZINC NEOMYCIN SULFATE POLYMYXIN B SULFATE 1 APPLICATION(S): 400; 3.5; 5 OINTMENT TOPICAL at 17:27

## 2021-10-28 RX ADMIN — Medication 12.5 MILLIGRAM(S): at 05:27

## 2021-10-28 RX ADMIN — HEPARIN SODIUM 5000 UNIT(S): 5000 INJECTION INTRAVENOUS; SUBCUTANEOUS at 21:38

## 2021-10-28 RX ADMIN — HEPARIN SODIUM 5000 UNIT(S): 5000 INJECTION INTRAVENOUS; SUBCUTANEOUS at 05:27

## 2021-10-28 RX ADMIN — Medication 15 MILLILITER(S): at 11:07

## 2021-10-28 RX ADMIN — ATORVASTATIN CALCIUM 80 MILLIGRAM(S): 80 TABLET, FILM COATED ORAL at 21:37

## 2021-10-28 RX ADMIN — TAMSULOSIN HYDROCHLORIDE 0.4 MILLIGRAM(S): 0.4 CAPSULE ORAL at 21:37

## 2021-10-28 RX ADMIN — AMLODIPINE BESYLATE 10 MILLIGRAM(S): 2.5 TABLET ORAL at 05:28

## 2021-10-28 RX ADMIN — CHLORHEXIDINE GLUCONATE 1 APPLICATION(S): 213 SOLUTION TOPICAL at 15:22

## 2021-10-28 RX ADMIN — Medication 5 MILLIGRAM(S): at 21:37

## 2021-10-28 RX ADMIN — PANTOPRAZOLE SODIUM 40 MILLIGRAM(S): 20 TABLET, DELAYED RELEASE ORAL at 11:06

## 2021-10-28 RX ADMIN — LOSARTAN POTASSIUM 50 MILLIGRAM(S): 100 TABLET, FILM COATED ORAL at 05:27

## 2021-10-28 RX ADMIN — ALBUTEROL 2 PUFF(S): 90 AEROSOL, METERED ORAL at 17:26

## 2021-10-28 NOTE — PROGRESS NOTE ADULT - ATTENDING COMMENTS
71M w/ hx of COPD, Asthma, HTN, CAD s/p PCI (LCx, LAD w/ RISSA x2, D1 w/ RISSA x1), initially presented with SOB and was admitted to MICU for AHRF requiring intubation 2/2 parainfluenza viral PNA c/b possible COPD exacerbation and superimposed bacterial PNA. Now extubated and abx completed.    Sepsis due to Parainfluenza Virus, Present on Admission  Acute hypoxic respiratory failure due to Acute Exacerbation of COPD from Viral Pneumonia due to Parainfluenza virus requiring intubation with superimposed bacterial PNA, now extubated and off abx, off 2LNC  Acute on Chronic Hypercapnic Respiratory Failure  HTN  Microcytic Anemia s/p 1U PRBC  Multivessel CAD s/p LAD and D1 RISSA 2019  Transaminitis due to Sepsis, Resolved    Give Influenza vaccine and Pneumovax today and opposite arms  Off Oxygen  Awaiting authorization for [facility]  Continue inhalers per pulm  Hb stable  continue Symbicort and Spiriva  Incentive spirometry

## 2021-10-28 NOTE — PROGRESS NOTE ADULT - PROBLEM SELECTOR PLAN 7
Hx of CAD w/ multivessel dz s/p PCI (LCx, LAD w/ RISSA x2, D1 w/ RISAS x1 in 2019)  - TTE (10/16): EF 60-65%; minimal MR; Small pericardial effusion measuring 0.2cm anterior to RV; no evidence of RA or RV collapse.  - c/w metoprolol tartrate 12.5mg BID (on Toprol XL 25mg daily at home)  - c/w atorvastatin 80mg qhs  - c/w home ASA 81mg daily

## 2021-10-28 NOTE — PROGRESS NOTE ADULT - PROBLEM SELECTOR PLAN 4
- Resolved  - Patient met SIRS criteria on 10/9 with leukocytosis, tachycardia, fever in setting of known source of infection (RVP on 10/8 +parainfluenza)  - Multiple BCx neg   - MRSA negative, legionella negative  -- s/p 5-day abx course of CTX->Levaquin ->CTX (10/8-10/12)   - s/p Zosyn (10/18-10/23)  - continue to monitor vitals

## 2021-10-28 NOTE — PROGRESS NOTE ADULT - PROBLEM SELECTOR PLAN 1
Improved. Pt initially presented with AHRF 2/2 viral PNA (parainfluenza+) c/b possible superimposed bacterial PNA and COPD exacerbation. At home, for his COPD/Asthma, pt is on Daliresp 500mcg daily, spiriva daily PRN, symbicort BID PRN, and Ventolin QID PRN.  - s/p intubation on 10/8, extubation/reintubation on 10/12, extubation on 10/14 to BiPAP and transitioning to O2NC. Patient trialed off of NC again on morning of 10/27, tolerating well without tachypnea.   - TTE (10/16): EF 60-65%; minimal MR; Small pericardial effusion measuring 0.2cm anterior to RV; no evidence of RA or RV collapse.  - CXR (10/18): small pleural effusions  - CT chest (10/22): thick linear consolidation in the right lower lobe most likely represents atelectasis.  - s/p solumedrol (10/8-10/16)  - s/p 5-day abx course of CTX->Levaquin ->CTX (10/8-10/12)   - s/p Zosyn (10/18-10/23), was started for concern for superimposed bacterial PNA given fever overnight on 10/18 and worsened leukocytosis, but CT chest only found thick linear consolidation in the right lower lobe most likely atelectasis.  - Appreciate Pulm recs  - duoneb PRN discontinued and changed to Albuterol inhaler PRN on 10/27; continue Symbicort 2 puffs BID, spiriva 18ug capsule daily  - c/w guaifenesin PRN for cough; c/w incentive spirometry and acapella device for airway clearance  - incentive spirometry  - continue to monitor respiratory status, continue to wean NC as tolerated

## 2021-10-28 NOTE — PROGRESS NOTE ADULT - ASSESSMENT
71M w/ hx of COPD, Asthma, HTN, CAD s/p PCI (LCx, LAD w/ RISSA x2, D1 w/ RISSA x1), initially presented with SOB and was admitted to MICU for AHRF requiring intubation 2/2 parainfluenza viral PNA c/b possible COPD exacerbation and superimposed bacterial PNA. Now extubated and completing course of IV Zosyn. Patient weaned off 2L NC on 10/27. Planning for discharge to [facility].

## 2021-10-28 NOTE — PROGRESS NOTE ADULT - PROBLEM SELECTOR PLAN 9
- DVT ppx: HSQ  - Diet: Mechanical soft, advance as tolerated  - Dispo:  planning for [facility]. Repeat COVID-19 PCR negative on 10/25. - DVT ppx: HSQ  - Diet: Mechanical soft, advance as tolerated  - Dispo:  planning for [facility]. Repeat COVID-19 PCR negative on 10/25.  - Health maintenance: patient received influenza vaccine and pneumovax on 10/28. patient requested covid booster but not eligible since 1st dose received in september 21.

## 2021-10-28 NOTE — PROGRESS NOTE ADULT - SUBJECTIVE AND OBJECTIVE BOX
Internal Medicine Team Progress Note    ANABELLE GOODSON  Patient is a 71y old  Male who presents with a chief complaint of AHRF (27 Oct 2021 12:04)      INTERVAL HPI / SUBJECTIVE:  No acute events overnight.   Patient denies f/c, n/v, chest pain, abdominal pain, dysuria.     REVIEW OF SYSTEMS:   Constitutional: no fatigue, fever, chills, generalized weakness  HEENT: no eye pain, vision changes, rhinorrhea, sore throat  Respiratory: no cough, wheezing  CV: no chest pain, palpitations, dyspnea on exertion, orthopnea, PND  GI: no decreased appetite, nausea, vomiting, abdominal pain, diarrhea, constipation, melena  : no dysuria, hematuria, urinary frequency, incontinence, nocturia  MSK: no joint or muscle pain, muscle weakness, joint swelling  Skin: no rashes, bruising, hair loss, color change  Neuro: no headache, dizziness, fainting, paresthesias, memory loss  Endo: no heat or cold intolerance, increased thirst, hot flashes  Psych: no changes in mood    MEDICATIONS:   MEDICATIONS  (STANDING):  amLODIPine   Tablet 10 milliGRAM(s) Oral daily  aspirin  chewable 81 milliGRAM(s) Oral daily  atorvastatin 80 milliGRAM(s) Oral at bedtime  BACItracin   Ointment 1 Application(s) Topical daily  budesonide  80 MICROgram(s)/formoterol 4.5 MICROgram(s) Inhaler 2 Puff(s) Inhalation two times a day  chlorhexidine 4% Liquid 1 Application(s) Topical <User Schedule>  dextrose 40% Gel 15 Gram(s) Oral once  dextrose 5%. 1000 milliLiter(s) (100 mL/Hr) IV Continuous <Continuous>  dextrose 50% Injectable 25 Gram(s) IV Push once  dextrose 50% Injectable 12.5 Gram(s) IV Push once  dextrose 50% Injectable 25 Gram(s) IV Push once  glucagon  Injectable 1 milliGRAM(s) IntraMuscular once  heparin   Injectable 5000 Unit(s) SubCutaneous every 8 hours  influenza   Vaccine 0.5 milliLiter(s) IntraMuscular once  losartan 50 milliGRAM(s) Oral daily  melatonin 5 milliGRAM(s) Oral at bedtime  metoprolol tartrate 12.5 milliGRAM(s) Oral every 12 hours  multivitamin/minerals/iron Oral Solution (CENTRUM) 15 milliLiter(s) Oral daily  pantoprazole  Injectable 40 milliGRAM(s) IV Push daily  polyethylene glycol 3350 17 Gram(s) Oral daily  senna 2 Tablet(s) Oral at bedtime  tamsulosin 0.4 milliGRAM(s) Oral at bedtime  tiotropium 18 MICROgram(s) Capsule 1 Capsule(s) Inhalation daily    MEDICATIONS  (PRN):  ALBUTerol    90 MICROgram(s) HFA Inhaler 2 Puff(s) Inhalation every 4 hours PRN Shortness of Breath and/or Wheezing  guaiFENesin Oral Liquid (Sugar-Free) 200 milliGRAM(s) Oral every 6 hours PRN Cough  hydrALAZINE Injectable 10 milliGRAM(s) IV Push every 6 hours PRN SBP>180      ALLERGIES:   Allergies No Known Allergies     OBJECTIVE:  Vital Signs Last 24 Hrs  T(C): 36.7 (28 Oct 2021 05:24), Max: 37 (27 Oct 2021 16:58)  T(F): 98.1 (28 Oct 2021 05:24), Max: 98.6 (27 Oct 2021 16:58)  HR: 90 (28 Oct 2021 05:24) (74 - 90)  BP: 125/71 (28 Oct 2021 05:24) (120/75 - 147/66)  BP(mean): --  RR: 18 (28 Oct 2021 05:24) (18 - 18)  SpO2: 94% (28 Oct 2021 05:24) (93% - 96%)    I&O's Summary    27 Oct 2021 07:01  -  28 Oct 2021 07:00  --------------------------------------------------------  IN: 1255 mL / OUT: 2850 mL / NET: -1595 mL        PHYSICAL EXAM:  GENERAL: NAD, with NC in place.  HEAD:  Atraumatic, Normocephalic  EYES: EOMI, conjunctiva and sclera clear  CHEST/LUNG: Clear to percussion bilaterally; No rales, rhonchi, or rubs. Mild expiratory wheeze.   HEART: Regular rate and rhythm; No murmurs, rubs, or gallops  ABDOMEN: Soft, Nontender, Nondistended;   SKIN: No rashes or lesions  NERVOUS SYSTEM:  Alert & Oriented X3, no focal deficit      LABS:                      8.0    7.05  )-----------( 358      ( 27 Oct 2021 07:58 )             27.4     138  |  104  |  12  ----------------------------<  97 (10-27)  4.0   |  22  |  0.88    Ca    8.6      27 Oct 2021 07:58  Phos  2.5     10-27  Mg     1.7     10-27      IMAGING:   No new imaging    Labs and imaging personally reviewed and interpreted [ x ]  Consult notes reviewed [ x ]  Case discussed with consultants [ x ]   Internal Medicine Team Progress Note    ANABELLE GOODSON  Patient is a 71y old  Male who presents with a chief complaint of AHRF (27 Oct 2021 12:04)      INTERVAL HPI / SUBJECTIVE:  No acute events overnight. Patient breathing comfortably on RA. Patient denies f/c, n/v, chest pain, abdominal pain, dysuria.     REVIEW OF SYSTEMS:   Constitutional: no fatigue, fever, chills, generalized weakness  HEENT: no eye pain, vision changes, rhinorrhea, sore throat  Respiratory: no cough, wheezing  CV: no chest pain, palpitations, dyspnea on exertion, orthopnea, PND  GI: no decreased appetite, nausea, vomiting, abdominal pain, diarrhea, constipation, melena  : no dysuria, hematuria, urinary frequency, incontinence, nocturia  MSK: no joint or muscle pain, muscle weakness, joint swelling  Skin: no rashes, bruising, hair loss, color change  Neuro: no headache, dizziness, fainting, paresthesias, memory loss  Endo: no heat or cold intolerance, increased thirst, hot flashes  Psych: no changes in mood    MEDICATIONS:   MEDICATIONS  (STANDING):  amLODIPine   Tablet 10 milliGRAM(s) Oral daily  aspirin  chewable 81 milliGRAM(s) Oral daily  atorvastatin 80 milliGRAM(s) Oral at bedtime  BACItracin   Ointment 1 Application(s) Topical daily  budesonide  80 MICROgram(s)/formoterol 4.5 MICROgram(s) Inhaler 2 Puff(s) Inhalation two times a day  chlorhexidine 4% Liquid 1 Application(s) Topical <User Schedule>  dextrose 40% Gel 15 Gram(s) Oral once  dextrose 5%. 1000 milliLiter(s) (100 mL/Hr) IV Continuous <Continuous>  dextrose 50% Injectable 25 Gram(s) IV Push once  dextrose 50% Injectable 12.5 Gram(s) IV Push once  dextrose 50% Injectable 25 Gram(s) IV Push once  glucagon  Injectable 1 milliGRAM(s) IntraMuscular once  heparin   Injectable 5000 Unit(s) SubCutaneous every 8 hours  influenza   Vaccine 0.5 milliLiter(s) IntraMuscular once  losartan 50 milliGRAM(s) Oral daily  melatonin 5 milliGRAM(s) Oral at bedtime  metoprolol tartrate 12.5 milliGRAM(s) Oral every 12 hours  multivitamin/minerals/iron Oral Solution (CENTRUM) 15 milliLiter(s) Oral daily  pantoprazole  Injectable 40 milliGRAM(s) IV Push daily  polyethylene glycol 3350 17 Gram(s) Oral daily  senna 2 Tablet(s) Oral at bedtime  tamsulosin 0.4 milliGRAM(s) Oral at bedtime  tiotropium 18 MICROgram(s) Capsule 1 Capsule(s) Inhalation daily    MEDICATIONS  (PRN):  ALBUTerol    90 MICROgram(s) HFA Inhaler 2 Puff(s) Inhalation every 4 hours PRN Shortness of Breath and/or Wheezing  guaiFENesin Oral Liquid (Sugar-Free) 200 milliGRAM(s) Oral every 6 hours PRN Cough  hydrALAZINE Injectable 10 milliGRAM(s) IV Push every 6 hours PRN SBP>180      ALLERGIES:   Allergies No Known Allergies     OBJECTIVE:  Vital Signs Last 24 Hrs  T(C): 36.7 (28 Oct 2021 05:24), Max: 37 (27 Oct 2021 16:58)  T(F): 98.1 (28 Oct 2021 05:24), Max: 98.6 (27 Oct 2021 16:58)  HR: 90 (28 Oct 2021 05:24) (74 - 90)  BP: 125/71 (28 Oct 2021 05:24) (120/75 - 147/66)  BP(mean): --  RR: 18 (28 Oct 2021 05:24) (18 - 18)  SpO2: 94% (28 Oct 2021 05:24) (93% - 96%)    I&O's Summary    27 Oct 2021 07:01  -  28 Oct 2021 07:00  --------------------------------------------------------  IN: 1255 mL / OUT: 2850 mL / NET: -1595 mL        PHYSICAL EXAM:  GENERAL: NAD, with NC in place.  HEAD:  Atraumatic, Normocephalic  EYES: EOMI, conjunctiva and sclera clear  CHEST/LUNG: Clear to percussion bilaterally; No rales, rhonchi, or rubs. Mild expiratory wheeze.   HEART: Regular rate and rhythm; No murmurs, rubs, or gallops  ABDOMEN: Soft, Nontender, Nondistended;   SKIN: No rashes or lesions  NERVOUS SYSTEM:  Alert & Oriented X3, no focal deficit      LABS:                      8.0    7.05  )-----------( 358      ( 27 Oct 2021 07:58 )             27.4     138  |  104  |  12  ----------------------------<  97 (10-27)  4.0   |  22  |  0.88    Ca    8.6      27 Oct 2021 07:58  Phos  2.5     10-27  Mg     1.7     10-27      IMAGING:   No new imaging    Labs and imaging personally reviewed and interpreted [ x ]  Consult notes reviewed [ x ]  Case discussed with consultants [ x ]   Internal Medicine Team Progress Note    ANABELLE GOODSON  Patient is a 71y old  Male who presents with a chief complaint of AHRF (27 Oct 2021 12:04)      INTERVAL HPI / SUBJECTIVE:  No acute events overnight. Patient breathing comfortably on RA. Patient denies f/c, SOB, n/v, chest pain, abdominal pain, dysuria.     REVIEW OF SYSTEMS:   Constitutional: no fatigue, fever, chills, generalized weakness  HEENT: no eye pain, vision changes, rhinorrhea, sore throat  Respiratory: no cough, wheezing  CV: no chest pain, palpitations, + mild dyspnea on exertion, orthopnea, PND  GI: no decreased appetite, nausea, vomiting, abdominal pain, diarrhea, constipation, melena  : no dysuria, hematuria, urinary frequency, incontinence, nocturia  MSK: no joint or muscle pain, muscle weakness, joint swelling  Skin: no rashes, bruising, hair loss, color change  Neuro: no headache, dizziness, fainting, paresthesias, memory loss  Endo: no heat or cold intolerance, increased thirst, hot flashes  Psych: no changes in mood    MEDICATIONS:   MEDICATIONS  (STANDING):  amLODIPine   Tablet 10 milliGRAM(s) Oral daily  aspirin  chewable 81 milliGRAM(s) Oral daily  atorvastatin 80 milliGRAM(s) Oral at bedtime  BACItracin   Ointment 1 Application(s) Topical daily  budesonide  80 MICROgram(s)/formoterol 4.5 MICROgram(s) Inhaler 2 Puff(s) Inhalation two times a day  chlorhexidine 4% Liquid 1 Application(s) Topical <User Schedule>  dextrose 40% Gel 15 Gram(s) Oral once  dextrose 5%. 1000 milliLiter(s) (100 mL/Hr) IV Continuous <Continuous>  dextrose 50% Injectable 25 Gram(s) IV Push once  dextrose 50% Injectable 12.5 Gram(s) IV Push once  dextrose 50% Injectable 25 Gram(s) IV Push once  glucagon  Injectable 1 milliGRAM(s) IntraMuscular once  heparin   Injectable 5000 Unit(s) SubCutaneous every 8 hours  influenza   Vaccine 0.5 milliLiter(s) IntraMuscular once  losartan 50 milliGRAM(s) Oral daily  melatonin 5 milliGRAM(s) Oral at bedtime  metoprolol tartrate 12.5 milliGRAM(s) Oral every 12 hours  multivitamin/minerals/iron Oral Solution (CENTRUM) 15 milliLiter(s) Oral daily  pantoprazole  Injectable 40 milliGRAM(s) IV Push daily  polyethylene glycol 3350 17 Gram(s) Oral daily  senna 2 Tablet(s) Oral at bedtime  tamsulosin 0.4 milliGRAM(s) Oral at bedtime  tiotropium 18 MICROgram(s) Capsule 1 Capsule(s) Inhalation daily    MEDICATIONS  (PRN):  ALBUTerol    90 MICROgram(s) HFA Inhaler 2 Puff(s) Inhalation every 4 hours PRN Shortness of Breath and/or Wheezing  guaiFENesin Oral Liquid (Sugar-Free) 200 milliGRAM(s) Oral every 6 hours PRN Cough  hydrALAZINE Injectable 10 milliGRAM(s) IV Push every 6 hours PRN SBP>180      ALLERGIES:   Allergies No Known Allergies     OBJECTIVE:  Vital Signs Last 24 Hrs  T(C): 36.7 (28 Oct 2021 05:24), Max: 37 (27 Oct 2021 16:58)  T(F): 98.1 (28 Oct 2021 05:24), Max: 98.6 (27 Oct 2021 16:58)  HR: 90 (28 Oct 2021 05:24) (74 - 90)  BP: 125/71 (28 Oct 2021 05:24) (120/75 - 147/66)  RR: 18 (28 Oct 2021 05:24) (18 - 18)  SpO2: 94% (28 Oct 2021 05:24) (93% - 96%)    I&O's Summary    27 Oct 2021 07:01  -  28 Oct 2021 07:00  --------------------------------------------------------  IN: 1255 mL / OUT: 2850 mL / NET: -1595 mL      PHYSICAL EXAM:  GENERAL: NAD, breathing room air  HEAD:  Atraumatic, Normocephalic  EYES: EOMI, conjunctiva and sclera clear  CHEST/LUNG: Clear to percussion bilaterally; No rales, rhonchi, or rubs. Mild expiratory wheeze.   HEART: Regular rate and rhythm; No murmurs, rubs, or gallops  ABDOMEN: Soft, Nontender, Nondistended;   SKIN: No rashes or lesions  NERVOUS SYSTEM:  Alert & Oriented X3, no focal deficit      LABS:                      8.0    7.05  )-----------( 358      ( 27 Oct 2021 07:58 )             27.4     138  |  104  |  12  ----------------------------<  97 (10-27)  4.0   |  22  |  0.88    Ca    8.6      27 Oct 2021 07:58  Phos  2.5     10-27  Mg     1.7     10-27      IMAGING:   No new imaging    Labs and imaging personally reviewed and interpreted [ x ]  Consult notes reviewed [ x ]  Case discussed with consultants [ x ]

## 2021-10-28 NOTE — PROGRESS NOTE ADULT - PROBLEM SELECTOR PLAN 3
No changes or discrepancies in medication or quantity.     Pharmacy set up and verified- [facility].   Improved/resolving. Pt's SCr worsened up to 1.49 on 10/20 (baseline SCr appears to be 0.8-1.1)  - monitor I/Os, avoid nephrotoxins, dose meds per eGFR  - monitor BMP, replete lytes prn

## 2021-10-29 ENCOUNTER — TRANSCRIPTION ENCOUNTER (OUTPATIENT)
Age: 71
End: 2021-10-29

## 2021-10-29 ENCOUNTER — APPOINTMENT (OUTPATIENT)
Dept: CARDIOLOGY | Facility: CLINIC | Age: 71
End: 2021-10-29

## 2021-10-29 VITALS
SYSTOLIC BLOOD PRESSURE: 126 MMHG | OXYGEN SATURATION: 93 % | TEMPERATURE: 98 F | RESPIRATION RATE: 18 BRPM | DIASTOLIC BLOOD PRESSURE: 72 MMHG | HEART RATE: 86 BPM

## 2021-10-29 PROBLEM — J44.9 CHRONIC OBSTRUCTIVE PULMONARY DISEASE, UNSPECIFIED: Chronic | Status: ACTIVE | Noted: 2017-12-12

## 2021-10-29 LAB
ANION GAP SERPL CALC-SCNC: 13 MMOL/L — SIGNIFICANT CHANGE UP (ref 5–17)
BASOPHILS # BLD AUTO: 0.03 K/UL — SIGNIFICANT CHANGE UP (ref 0–0.2)
BASOPHILS NFR BLD AUTO: 0.5 % — SIGNIFICANT CHANGE UP (ref 0–2)
BUN SERPL-MCNC: 14 MG/DL — SIGNIFICANT CHANGE UP (ref 7–23)
CALCIUM SERPL-MCNC: 8.7 MG/DL — SIGNIFICANT CHANGE UP (ref 8.4–10.5)
CHLORIDE SERPL-SCNC: 104 MMOL/L — SIGNIFICANT CHANGE UP (ref 96–108)
CO2 SERPL-SCNC: 22 MMOL/L — SIGNIFICANT CHANGE UP (ref 22–31)
CREAT SERPL-MCNC: 0.91 MG/DL — SIGNIFICANT CHANGE UP (ref 0.5–1.3)
EOSINOPHIL # BLD AUTO: 0.13 K/UL — SIGNIFICANT CHANGE UP (ref 0–0.5)
EOSINOPHIL NFR BLD AUTO: 2.1 % — SIGNIFICANT CHANGE UP (ref 0–6)
GLUCOSE SERPL-MCNC: 93 MG/DL — SIGNIFICANT CHANGE UP (ref 70–99)
HCT VFR BLD CALC: 27.3 % — LOW (ref 39–50)
HGB BLD-MCNC: 8.2 G/DL — LOW (ref 13–17)
IMM GRANULOCYTES NFR BLD AUTO: 0.3 % — SIGNIFICANT CHANGE UP (ref 0–1.5)
LYMPHOCYTES # BLD AUTO: 0.93 K/UL — LOW (ref 1–3.3)
LYMPHOCYTES # BLD AUTO: 14.7 % — SIGNIFICANT CHANGE UP (ref 13–44)
MAGNESIUM SERPL-MCNC: 1.7 MG/DL — SIGNIFICANT CHANGE UP (ref 1.6–2.6)
MCHC RBC-ENTMCNC: 23.1 PG — LOW (ref 27–34)
MCHC RBC-ENTMCNC: 30 GM/DL — LOW (ref 32–36)
MCV RBC AUTO: 76.9 FL — LOW (ref 80–100)
MONOCYTES # BLD AUTO: 0.4 K/UL — SIGNIFICANT CHANGE UP (ref 0–0.9)
MONOCYTES NFR BLD AUTO: 6.3 % — SIGNIFICANT CHANGE UP (ref 2–14)
NEUTROPHILS # BLD AUTO: 4.81 K/UL — SIGNIFICANT CHANGE UP (ref 1.8–7.4)
NEUTROPHILS NFR BLD AUTO: 76.1 % — SIGNIFICANT CHANGE UP (ref 43–77)
NRBC # BLD: 0 /100 WBCS — SIGNIFICANT CHANGE UP (ref 0–0)
PHOSPHATE SERPL-MCNC: 3 MG/DL — SIGNIFICANT CHANGE UP (ref 2.5–4.5)
PLATELET # BLD AUTO: 379 K/UL — SIGNIFICANT CHANGE UP (ref 150–400)
POTASSIUM SERPL-MCNC: 4.2 MMOL/L — SIGNIFICANT CHANGE UP (ref 3.5–5.3)
POTASSIUM SERPL-SCNC: 4.2 MMOL/L — SIGNIFICANT CHANGE UP (ref 3.5–5.3)
RBC # BLD: 3.55 M/UL — LOW (ref 4.2–5.8)
RBC # FLD: 26.2 % — HIGH (ref 10.3–14.5)
SODIUM SERPL-SCNC: 139 MMOL/L — SIGNIFICANT CHANGE UP (ref 135–145)
WBC # BLD: 6.32 K/UL — SIGNIFICANT CHANGE UP (ref 3.8–10.5)
WBC # FLD AUTO: 6.32 K/UL — SIGNIFICANT CHANGE UP (ref 3.8–10.5)

## 2021-10-29 PROCEDURE — 83735 ASSAY OF MAGNESIUM: CPT

## 2021-10-29 PROCEDURE — 84295 ASSAY OF SERUM SODIUM: CPT

## 2021-10-29 PROCEDURE — 97161 PT EVAL LOW COMPLEX 20 MIN: CPT

## 2021-10-29 PROCEDURE — 82803 BLOOD GASES ANY COMBINATION: CPT

## 2021-10-29 PROCEDURE — 94660 CPAP INITIATION&MGMT: CPT

## 2021-10-29 PROCEDURE — 81001 URINALYSIS AUTO W/SCOPE: CPT

## 2021-10-29 PROCEDURE — 97116 GAIT TRAINING THERAPY: CPT

## 2021-10-29 PROCEDURE — P9016: CPT

## 2021-10-29 PROCEDURE — 86901 BLOOD TYPING SEROLOGIC RH(D): CPT

## 2021-10-29 PROCEDURE — 85730 THROMBOPLASTIN TIME PARTIAL: CPT

## 2021-10-29 PROCEDURE — 84145 PROCALCITONIN (PCT): CPT

## 2021-10-29 PROCEDURE — 94003 VENT MGMT INPAT SUBQ DAY: CPT

## 2021-10-29 PROCEDURE — 83605 ASSAY OF LACTIC ACID: CPT

## 2021-10-29 PROCEDURE — 76705 ECHO EXAM OF ABDOMEN: CPT

## 2021-10-29 PROCEDURE — 82728 ASSAY OF FERRITIN: CPT

## 2021-10-29 PROCEDURE — 85014 HEMATOCRIT: CPT

## 2021-10-29 PROCEDURE — 87640 STAPH A DNA AMP PROBE: CPT

## 2021-10-29 PROCEDURE — 71045 X-RAY EXAM CHEST 1 VIEW: CPT

## 2021-10-29 PROCEDURE — 84480 ASSAY TRIIODOTHYRONINE (T3): CPT

## 2021-10-29 PROCEDURE — 87070 CULTURE OTHR SPECIMN AEROBIC: CPT

## 2021-10-29 PROCEDURE — 90686 IIV4 VACC NO PRSV 0.5 ML IM: CPT

## 2021-10-29 PROCEDURE — 80048 BASIC METABOLIC PNL TOTAL CA: CPT

## 2021-10-29 PROCEDURE — 97166 OT EVAL MOD COMPLEX 45 MIN: CPT

## 2021-10-29 PROCEDURE — 87040 BLOOD CULTURE FOR BACTERIA: CPT

## 2021-10-29 PROCEDURE — 90732 PPSV23 VACC 2 YRS+ SUBQ/IM: CPT

## 2021-10-29 PROCEDURE — 85610 PROTHROMBIN TIME: CPT

## 2021-10-29 PROCEDURE — 87641 MR-STAPH DNA AMP PROBE: CPT

## 2021-10-29 PROCEDURE — 85045 AUTOMATED RETICULOCYTE COUNT: CPT

## 2021-10-29 PROCEDURE — 97530 THERAPEUTIC ACTIVITIES: CPT

## 2021-10-29 PROCEDURE — U0003: CPT

## 2021-10-29 PROCEDURE — 82947 ASSAY GLUCOSE BLOOD QUANT: CPT

## 2021-10-29 PROCEDURE — 80061 LIPID PANEL: CPT

## 2021-10-29 PROCEDURE — 86923 COMPATIBILITY TEST ELECTRIC: CPT

## 2021-10-29 PROCEDURE — 83550 IRON BINDING TEST: CPT

## 2021-10-29 PROCEDURE — 83880 ASSAY OF NATRIURETIC PEPTIDE: CPT

## 2021-10-29 PROCEDURE — 97129 THER IVNTJ 1ST 15 MIN: CPT

## 2021-10-29 PROCEDURE — 0225U NFCT DS DNA&RNA 21 SARSCOV2: CPT

## 2021-10-29 PROCEDURE — 83615 LACTATE (LD) (LDH) ENZYME: CPT

## 2021-10-29 PROCEDURE — 86850 RBC ANTIBODY SCREEN: CPT

## 2021-10-29 PROCEDURE — 82565 ASSAY OF CREATININE: CPT

## 2021-10-29 PROCEDURE — U0005: CPT

## 2021-10-29 PROCEDURE — 84466 ASSAY OF TRANSFERRIN: CPT

## 2021-10-29 PROCEDURE — 71250 CT THORAX DX C-: CPT

## 2021-10-29 PROCEDURE — 97535 SELF CARE MNGMENT TRAINING: CPT

## 2021-10-29 PROCEDURE — 84484 ASSAY OF TROPONIN QUANT: CPT

## 2021-10-29 PROCEDURE — 93005 ELECTROCARDIOGRAM TRACING: CPT

## 2021-10-29 PROCEDURE — 82330 ASSAY OF CALCIUM: CPT

## 2021-10-29 PROCEDURE — 85027 COMPLETE CBC AUTOMATED: CPT

## 2021-10-29 PROCEDURE — 93306 TTE W/DOPPLER COMPLETE: CPT

## 2021-10-29 PROCEDURE — 84443 ASSAY THYROID STIM HORMONE: CPT

## 2021-10-29 PROCEDURE — 80053 COMPREHEN METABOLIC PANEL: CPT

## 2021-10-29 PROCEDURE — 99291 CRITICAL CARE FIRST HOUR: CPT | Mod: 25

## 2021-10-29 PROCEDURE — 84100 ASSAY OF PHOSPHORUS: CPT

## 2021-10-29 PROCEDURE — 83690 ASSAY OF LIPASE: CPT

## 2021-10-29 PROCEDURE — 94002 VENT MGMT INPAT INIT DAY: CPT

## 2021-10-29 PROCEDURE — 82962 GLUCOSE BLOOD TEST: CPT

## 2021-10-29 PROCEDURE — 87449 NOS EACH ORGANISM AG IA: CPT

## 2021-10-29 PROCEDURE — 36415 COLL VENOUS BLD VENIPUNCTURE: CPT

## 2021-10-29 PROCEDURE — 83036 HEMOGLOBIN GLYCOSYLATED A1C: CPT

## 2021-10-29 PROCEDURE — 84132 ASSAY OF SERUM POTASSIUM: CPT

## 2021-10-29 PROCEDURE — 36430 TRANSFUSION BLD/BLD COMPNT: CPT

## 2021-10-29 PROCEDURE — 83010 ASSAY OF HAPTOGLOBIN QUANT: CPT

## 2021-10-29 PROCEDURE — 86900 BLOOD TYPING SEROLOGIC ABO: CPT

## 2021-10-29 PROCEDURE — 82550 ASSAY OF CK (CPK): CPT

## 2021-10-29 PROCEDURE — 83540 ASSAY OF IRON: CPT

## 2021-10-29 PROCEDURE — 85025 COMPLETE CBC W/AUTO DIFF WBC: CPT

## 2021-10-29 PROCEDURE — 85018 HEMOGLOBIN: CPT

## 2021-10-29 PROCEDURE — 97110 THERAPEUTIC EXERCISES: CPT

## 2021-10-29 PROCEDURE — 84439 ASSAY OF FREE THYROXINE: CPT

## 2021-10-29 PROCEDURE — 94640 AIRWAY INHALATION TREATMENT: CPT

## 2021-10-29 PROCEDURE — 99239 HOSP IP/OBS DSCHRG MGMT >30: CPT | Mod: GC

## 2021-10-29 PROCEDURE — 82435 ASSAY OF BLOOD CHLORIDE: CPT

## 2021-10-29 RX ORDER — PANTOPRAZOLE SODIUM 20 MG/1
1 TABLET, DELAYED RELEASE ORAL
Qty: 30 | Refills: 0
Start: 2021-10-29 | End: 2021-11-27

## 2021-10-29 RX ORDER — POLYETHYLENE GLYCOL 3350 17 G/17G
17 POWDER, FOR SOLUTION ORAL
Qty: 0 | Refills: 0 | DISCHARGE
Start: 2021-10-29

## 2021-10-29 RX ORDER — ALBUTEROL 90 UG/1
2 AEROSOL, METERED ORAL
Qty: 0 | Refills: 0 | DISCHARGE
Start: 2021-10-29

## 2021-10-29 RX ORDER — MULTIVIT-MIN/FERROUS GLUCONATE 9 MG/15 ML
1 LIQUID (ML) ORAL
Qty: 0 | Refills: 0 | DISCHARGE
Start: 2021-10-29

## 2021-10-29 RX ORDER — ASPIRIN/CALCIUM CARB/MAGNESIUM 324 MG
81 TABLET ORAL DAILY
Refills: 0 | Status: DISCONTINUED | OUTPATIENT
Start: 2021-10-29 | End: 2021-10-29

## 2021-10-29 RX ORDER — SENNA PLUS 8.6 MG/1
2 TABLET ORAL
Qty: 0 | Refills: 0 | DISCHARGE
Start: 2021-10-29

## 2021-10-29 RX ORDER — LOSARTAN POTASSIUM 100 MG/1
1 TABLET, FILM COATED ORAL
Qty: 0 | Refills: 0 | DISCHARGE
Start: 2021-10-29

## 2021-10-29 RX ORDER — BACITRACIN ZINC 500 UNIT/G
1 OINTMENT IN PACKET (EA) TOPICAL
Qty: 0 | Refills: 0 | DISCHARGE
Start: 2021-10-29

## 2021-10-29 RX ORDER — METOPROLOL TARTRATE 50 MG
1 TABLET ORAL
Qty: 0 | Refills: 0 | DISCHARGE

## 2021-10-29 RX ORDER — ALBUTEROL 90 UG/1
2 AEROSOL, METERED ORAL
Qty: 0 | Refills: 0 | DISCHARGE

## 2021-10-29 RX ORDER — TAMSULOSIN HYDROCHLORIDE 0.4 MG/1
1 CAPSULE ORAL
Qty: 0 | Refills: 0 | DISCHARGE
Start: 2021-10-29

## 2021-10-29 RX ORDER — ASPIRIN/CALCIUM CARB/MAGNESIUM 324 MG
1 TABLET ORAL
Qty: 0 | Refills: 0 | DISCHARGE

## 2021-10-29 RX ORDER — LOSARTAN POTASSIUM 100 MG/1
1 TABLET, FILM COATED ORAL
Qty: 0 | Refills: 0 | DISCHARGE

## 2021-10-29 RX ORDER — METOPROLOL TARTRATE 50 MG
0.5 TABLET ORAL
Qty: 30 | Refills: 0
Start: 2021-10-29 | End: 2021-11-27

## 2021-10-29 RX ORDER — METOPROLOL TARTRATE 50 MG
1 TABLET ORAL
Qty: 0 | Refills: 0 | DISCHARGE
Start: 2021-10-29 | End: 2021-11-27

## 2021-10-29 RX ORDER — BACITRACIN ZINC NEOMYCIN SULFATE POLYMYXIN B SULFATE 400; 3.5; 5 [IU]/G; MG/G; [IU]/G
1 OINTMENT TOPICAL
Qty: 1 | Refills: 0
Start: 2021-10-29 | End: 2021-11-27

## 2021-10-29 RX ORDER — LOSARTAN POTASSIUM 100 MG/1
1 TABLET, FILM COATED ORAL
Qty: 30 | Refills: 0
Start: 2021-10-29 | End: 2021-11-27

## 2021-10-29 RX ORDER — ATORVASTATIN CALCIUM 80 MG/1
1 TABLET, FILM COATED ORAL
Qty: 0 | Refills: 0 | DISCHARGE
Start: 2021-10-29

## 2021-10-29 RX ORDER — ATORVASTATIN CALCIUM 80 MG/1
1 TABLET, FILM COATED ORAL
Qty: 30 | Refills: 0

## 2021-10-29 RX ORDER — ASPIRIN/CALCIUM CARB/MAGNESIUM 324 MG
1 TABLET ORAL
Qty: 0 | Refills: 0 | DISCHARGE
Start: 2021-10-29

## 2021-10-29 RX ORDER — ROFLUMILAST 500 UG/1
1 TABLET ORAL
Qty: 0 | Refills: 0 | DISCHARGE

## 2021-10-29 RX ADMIN — HEPARIN SODIUM 5000 UNIT(S): 5000 INJECTION INTRAVENOUS; SUBCUTANEOUS at 06:00

## 2021-10-29 RX ADMIN — PANTOPRAZOLE SODIUM 40 MILLIGRAM(S): 20 TABLET, DELAYED RELEASE ORAL at 13:10

## 2021-10-29 RX ADMIN — Medication 15 MILLILITER(S): at 13:10

## 2021-10-29 RX ADMIN — AMLODIPINE BESYLATE 10 MILLIGRAM(S): 2.5 TABLET ORAL at 06:00

## 2021-10-29 RX ADMIN — Medication 81 MILLIGRAM(S): at 13:16

## 2021-10-29 RX ADMIN — BACITRACIN ZINC NEOMYCIN SULFATE POLYMYXIN B SULFATE 1 APPLICATION(S): 400; 3.5; 5 OINTMENT TOPICAL at 06:00

## 2021-10-29 RX ADMIN — HEPARIN SODIUM 5000 UNIT(S): 5000 INJECTION INTRAVENOUS; SUBCUTANEOUS at 13:10

## 2021-10-29 RX ADMIN — Medication 1 APPLICATION(S): at 13:11

## 2021-10-29 RX ADMIN — LOSARTAN POTASSIUM 50 MILLIGRAM(S): 100 TABLET, FILM COATED ORAL at 06:00

## 2021-10-29 RX ADMIN — TIOTROPIUM BROMIDE 1 CAPSULE(S): 18 CAPSULE ORAL; RESPIRATORY (INHALATION) at 13:10

## 2021-10-29 RX ADMIN — BUDESONIDE AND FORMOTEROL FUMARATE DIHYDRATE 2 PUFF(S): 160; 4.5 AEROSOL RESPIRATORY (INHALATION) at 06:01

## 2021-10-29 RX ADMIN — Medication 12.5 MILLIGRAM(S): at 06:00

## 2021-10-29 NOTE — PROGRESS NOTE ADULT - PROBLEM SELECTOR PLAN 9
- DVT ppx: HSQ  - Diet: Mechanical soft, advance as tolerated  - Dispo:  planning for [facility]. Repeat COVID-19 PCR negative on 10/25.  - Health maintenance: patient received influenza vaccine and pneumovax on 10/28. patient requested covid booster but not eligible since 1st dose received in september 21.

## 2021-10-29 NOTE — PROGRESS NOTE ADULT - PROVIDER SPECIALTY LIST ADULT
MICU
Pulmonology
Internal Medicine
MICU
Internal Medicine
MICU
MICU
Internal Medicine

## 2021-10-29 NOTE — PROGRESS NOTE ADULT - ATTENDING COMMENTS
71M w/ hx of COPD, Asthma, HTN, CAD s/p PCI (LCx, LAD w/ RISSA x2, D1 w/ RISSA x1), initially presented with SOB and was admitted to MICU for AHRF requiring intubation 2/2 parainfluenza viral PNA c/b possible COPD exacerbation and superimposed bacterial PNA. Now extubated and abx completed.    NAD. He appears well.     Sepsis due to Parainfluenza Virus, Present on Admission  Acute hypoxic respiratory failure due to Acute Exacerbation of COPD from Viral Pneumonia due to Parainfluenza virus requiring intubation with superimposed bacterial PNA, now extubated and off abx, off 2LNC  Acute on Chronic Hypercapnic Respiratory Failure  HTN  Microcytic Anemia s/p 1U PRBC  Multivessel CAD s/p LAD and D1 RISSA 2019  Transaminitis due to Sepsis, Resolved    Given Influenza vaccine and Pneumovax yesterday  Off Oxygen  dc to [facility] today  Continue inhalers per pulm  Hb stable  continue Symbicort and Spiriva  Incentive spirometry  35 minutes spent on dc planning

## 2021-10-29 NOTE — PROGRESS NOTE ADULT - PROBLEM SELECTOR PROBLEM 2
Viral pneumonia

## 2021-10-29 NOTE — PROGRESS NOTE ADULT - REASON FOR ADMISSION
AHRF

## 2021-10-29 NOTE — DISCHARGE NOTE NURSING/CASE MANAGEMENT/SOCIAL WORK - PATIENT PORTAL LINK FT
You can access the FollowMyHealth Patient Portal offered by [facility] by registering at the following website: [email or web address]. By joining [facility]’s FollowMyHealth portal, you will also be able to view your health information using other applications (apps) compatible with our system.

## 2021-10-29 NOTE — DISCHARGE NOTE NURSING/CASE MANAGEMENT/SOCIAL WORK - NSDCVIVACCINE_GEN_ALL_CORE_FT
influenza, injectable, quadrivalent, preservative free; 28-Oct-2021 10:13; Bonnie Hodgson (RN); [employer]; [id number] (Exp. Date: 30-Jun-2022); IntraMuscular; Deltoid Right.; 0.5 milliLiter(s); VIS (VIS Published: 06-Aug-2021, VIS Presented: 28-Oct-2021);   pneumococcal polysaccharide PPV23; 28-Oct-2021 15:21; Bonnie Hodgson (RN); [employer] [id number] (Exp. Date: 14-Mar-2023); IntraMuscular; Deltoid Left.; 0.5 milliLiter(s); VIS (VIS Published: 06-Oct-2009, VIS Presented: 28-Oct-2021);

## 2021-10-29 NOTE — PROGRESS NOTE ADULT - SUBJECTIVE AND OBJECTIVE BOX
Internal Medicine Team Progress Note    ANABELLE GOODSON  Patient is a 71y old  Male who presents with a chief complaint of AHRF (28 Oct 2021 08:18)      INTERVAL HPI / SUBJECTIVE:      REVIEW OF SYSTEMS:   Constitutional: no fatigue, fever, chills, generalized weakness  HEENT: no eye pain, vision changes, rhinorrhea, sore throat  Respiratory: no cough, wheezing, shortness of breath  CV: no chest pain, palpitations, dyspnea on exertion, orthopnea, PND  GI: no decreased appetite, nausea, vomiting, abdominal pain, diarrhea, constipation, melena  : no dysuria, hematuria, urinary frequency, incontinence, nocturia  MSK: no joint or muscle pain, muscle weakness, joint swelling  Skin: no rashes, bruising, hair loss, color change  Neuro: no headache, dizziness, fainting, paresthesias, memory loss  Endo: no heat or cold intolerance, increased thirst, hot flashes  Psych: no changes in mood      MEDICATIONS:   MEDICATIONS  (STANDING):  amLODIPine   Tablet 10 milliGRAM(s) Oral daily  aspirin  chewable 81 milliGRAM(s) Oral daily  atorvastatin 80 milliGRAM(s) Oral at bedtime  BACItracin   Ointment 1 Application(s) Topical daily  budesonide  80 MICROgram(s)/formoterol 4.5 MICROgram(s) Inhaler 2 Puff(s) Inhalation two times a day  chlorhexidine 4% Liquid 1 Application(s) Topical <User Schedule>  dextrose 40% Gel 15 Gram(s) Oral once  dextrose 5%. 1000 milliLiter(s) (100 mL/Hr) IV Continuous <Continuous>  dextrose 50% Injectable 25 Gram(s) IV Push once  dextrose 50% Injectable 12.5 Gram(s) IV Push once  dextrose 50% Injectable 25 Gram(s) IV Push once  glucagon  Injectable 1 milliGRAM(s) IntraMuscular once  heparin   Injectable 5000 Unit(s) SubCutaneous every 8 hours  losartan 50 milliGRAM(s) Oral daily  melatonin 5 milliGRAM(s) Oral at bedtime  metoprolol tartrate 12.5 milliGRAM(s) Oral every 12 hours  multivitamin/minerals/iron Oral Solution (CENTRUM) 15 milliLiter(s) Oral daily  neomycin/bacitracin/polymyxin Topical Ointment 1 Application(s) Topical two times a day  pantoprazole  Injectable 40 milliGRAM(s) IV Push daily  polyethylene glycol 3350 17 Gram(s) Oral daily  senna 2 Tablet(s) Oral at bedtime  tamsulosin 0.4 milliGRAM(s) Oral at bedtime  tiotropium 18 MICROgram(s) Capsule 1 Capsule(s) Inhalation daily    MEDICATIONS  (PRN):  ALBUTerol    90 MICROgram(s) HFA Inhaler 2 Puff(s) Inhalation every 4 hours PRN Shortness of Breath and/or Wheezing  guaiFENesin Oral Liquid (Sugar-Free) 200 milliGRAM(s) Oral every 6 hours PRN Cough  hydrALAZINE Injectable 10 milliGRAM(s) IV Push every 6 hours PRN SBP>180      ALLERGIES:   Allergies    No Known Allergies    Intolerances        OBJECTIVE:  Vital Signs Last 24 Hrs  T(C): 37 (29 Oct 2021 05:47), Max: 37 (29 Oct 2021 05:47)  T(F): 98.6 (29 Oct 2021 05:47), Max: 98.6 (29 Oct 2021 05:47)  HR: 77 (29 Oct 2021 05:47) (75 - 83)  BP: 130/70 (29 Oct 2021 05:47) (127/75 - 139/68)  BP(mean): --  RR: 18 (29 Oct 2021 05:47) (16 - 18)  SpO2: 93% (29 Oct 2021 05:47) (91% - 94%)    I&O's Summary    28 Oct 2021 07:01  -  29 Oct 2021 07:00  --------------------------------------------------------  IN: 1020 mL / OUT: 2325 mL / NET: -1305 mL        PHYSICAL EXAM:  General: Well-appearing, NAD  HEENT:  EOMI, PERRL, conjunctiva and sclera clear, normal oropharynx  Neck:  Supple, no JVD, normal thyroid  Chest/Lungs: CTA B/L, no rales, rhonchi, rub or wheezing  Heart: RRR, normal S1, S2, no murmurs or gallops  Abdomen: Soft, nondistended, NTTP, normoactive bowel sounds  Extremities: Peripheral pulses 2+ B/L, no edema, cyanosis or clubbing  Skin: Warm, well-perfused, no rashes or lesions  Neurological: A&Ox3, no focal deficits      LABS:      IMAGING:       Labs and imaging personally reviewed and interpreted [  ]  Consult notes reviewed [  ]  Case discussed with consultants [  ]   Internal Medicine Team Progress Note    ANABELLE GOODSON  Patient is a 71y old  Male who presents with a chief complaint of AHRF (28 Oct 2021 08:18)      INTERVAL HPI / SUBJECTIVE:  No acute events overnight. Patient reports he has been ambulating in his room  on room air without experiencing SOB, still requiring use of albuterol inhaler after walking down the hallway which is not his previous baseline. Patient denies f/c,  n/v, chest pain, abdominal pain, diarrhea, constipation, dysuria.     REVIEW OF SYSTEMS:   Constitutional: no fatigue, fever, chills, generalized weakness  HEENT: no eye pain, vision changes, rhinorrhea, sore throat  Respiratory: no cough, wheezing, shortness of breath  CV: no chest pain, palpitations, dyspnea on exertion, orthopnea, PND  GI: no decreased appetite, nausea, vomiting, abdominal pain, diarrhea, constipation, melena  : no dysuria, hematuria, urinary frequency, incontinence, nocturia  MSK: no joint or muscle pain, muscle weakness, joint swelling  Skin: no rashes, bruising, hair loss, color change  Neuro: no headache, dizziness, fainting, paresthesias, memory loss  Endo: no heat or cold intolerance, increased thirst, hot flashes  Psych: no changes in mood      MEDICATIONS:   MEDICATIONS  (STANDING):  amLODIPine   Tablet 10 milliGRAM(s) Oral daily  aspirin  chewable 81 milliGRAM(s) Oral daily  atorvastatin 80 milliGRAM(s) Oral at bedtime  BACItracin   Ointment 1 Application(s) Topical daily  budesonide  80 MICROgram(s)/formoterol 4.5 MICROgram(s) Inhaler 2 Puff(s) Inhalation two times a day  chlorhexidine 4% Liquid 1 Application(s) Topical <User Schedule>  dextrose 40% Gel 15 Gram(s) Oral once  dextrose 5%. 1000 milliLiter(s) (100 mL/Hr) IV Continuous <Continuous>  dextrose 50% Injectable 25 Gram(s) IV Push once  dextrose 50% Injectable 12.5 Gram(s) IV Push once  dextrose 50% Injectable 25 Gram(s) IV Push once  glucagon  Injectable 1 milliGRAM(s) IntraMuscular once  heparin   Injectable 5000 Unit(s) SubCutaneous every 8 hours  losartan 50 milliGRAM(s) Oral daily  melatonin 5 milliGRAM(s) Oral at bedtime  metoprolol tartrate 12.5 milliGRAM(s) Oral every 12 hours  multivitamin/minerals/iron Oral Solution (CENTRUM) 15 milliLiter(s) Oral daily  neomycin/bacitracin/polymyxin Topical Ointment 1 Application(s) Topical two times a day  pantoprazole  Injectable 40 milliGRAM(s) IV Push daily  polyethylene glycol 3350 17 Gram(s) Oral daily  senna 2 Tablet(s) Oral at bedtime  tamsulosin 0.4 milliGRAM(s) Oral at bedtime  tiotropium 18 MICROgram(s) Capsule 1 Capsule(s) Inhalation daily    MEDICATIONS  (PRN):  ALBUTerol    90 MICROgram(s) HFA Inhaler 2 Puff(s) Inhalation every 4 hours PRN Shortness of Breath and/or Wheezing  guaiFENesin Oral Liquid (Sugar-Free) 200 milliGRAM(s) Oral every 6 hours PRN Cough  hydrALAZINE Injectable 10 milliGRAM(s) IV Push every 6 hours PRN SBP>180      ALLERGIES:   Allergies No Known Allergies  Intolerances None      OBJECTIVE:  Vital Signs Last 24 Hrs  T(C): 37 (29 Oct 2021 05:47), Max: 37 (29 Oct 2021 05:47)  T(F): 98.6 (29 Oct 2021 05:47), Max: 98.6 (29 Oct 2021 05:47)  HR: 77 (29 Oct 2021 05:47) (75 - 83)  BP: 130/70 (29 Oct 2021 05:47) (127/75 - 139/68)  RR: 18 (29 Oct 2021 05:47) (16 - 18)  SpO2: 93% (29 Oct 2021 05:47) (91% - 94%)    I&O's Summary    28 Oct 2021 07:01  -  29 Oct 2021 07:00  --------------------------------------------------------  IN: 1020 mL / OUT: 2325 mL / NET: -1305 mL      PHYSICAL EXAM:  General: Well-appearing, NAD  HEENT:  EOMI, PERRL, conjunctiva and sclera clear, normal oropharynx  Neck:  Supple, no JVD, normal thyroid  Chest/Lungs: CTA B/L, no rales, rhonchi, rub or wheezing  Heart: RRR, normal S1, S2, no murmurs or gallops  Abdomen: Soft, nondistended, NTTP, normoactive bowel sounds  Extremities: Peripheral pulses 2+ B/L, no edema, cyanosis or clubbing  Skin: Warm, well-perfused, no rashes or lesions  Neurological: A&Ox3, no focal deficits      LABS:                      7.9    5.53  )-----------( 358      ( 28 Oct 2021 08:36 )             26.2     139  |  105  |  11  ----------------------------<  99     (28 Oct 2021)  4.0   |  23  |  0.94    Ca    8.2<L>      28 Oct 2021 08:36  Phos  2.5     10-28  Mg     1.7     10-28      IMAGING:   No new imaging    Labs and imaging personally reviewed and interpreted [ x ]  Consult notes reviewed [ ]  Case discussed with consultants [  ]

## 2021-10-29 NOTE — PROGRESS NOTE ADULT - ASSESSMENT
71M w/ hx of COPD, Asthma, HTN, CAD s/p PCI (LCx, LAD w/ RISSA x2, D1 w/ RISSA x1), initially presented with SOB and was admitted to MICU for AHRF requiring intubation 2/2 parainfluenza viral PNA c/b possible COPD exacerbation and superimposed bacterial PNA. Now extubated and completing course of IV Zosyn. Patient weaned off 2L NC on 10/27. Planning for discharge to [facility]. 71M w/ hx of COPD, Asthma, HTN, CAD s/p PCI (LCx, LAD w/ RISSA x2, D1 w/ RISSA x1), initially presented with SOB and was admitted to MICU for AHRF requiring intubation 2/2 parainfluenza viral PNA c/b possible COPD exacerbation and superimposed bacterial PNA. Now extubated and completing course of IV Zosyn. Patient weaned off 2L NC on 10/27. Pending insurance authorization for GARY, accepted at [facility].

## 2021-10-29 NOTE — PROGRESS NOTE ADULT - ATTENDING SUPERVISION STATEMENT
Resident
Resident
Fellow
Resident

## 2021-10-29 NOTE — PROGRESS NOTE ADULT - PROBLEM SELECTOR PROBLEM 3
OWEN (acute kidney injury)

## 2021-12-03 NOTE — ED ADULT TRIAGE NOTE - IDEAL BODY WEIGHT(KG)
[facility] EMERGENCY DEPT  [id number] [id number] [phone]    Work/School Note    Date: 12/3/2021     To Whom It May concern:    Britney Pacheco was evaluated by the following provider(s):  Attending Provider: Estrella Mcfadden MD  Nurse Practitioner: Gerry Jameson NP.   COVID19 virus is suspected. Per the CDC guidelines we recommend home isolation until the following conditions are all met:    1. At least 10 days have passed since symptoms first appeared and  2. At least 24 hours have passed since last fever without the use of fever-reducing medications and  3.  Symptoms (e.g., cough, shortness of breath) have improved      Sincerely,          Suraj Dow, NP 80

## 2022-12-10 ENCOUNTER — INPATIENT (INPATIENT)
Facility: HOSPITAL | Age: 72
LOS: 10 days | Discharge: HOME HEALTH | DRG: 871 | End: 2022-12-21
Attending: HOSPITALIST | Admitting: STUDENT IN AN ORGANIZED HEALTH CARE EDUCATION/TRAINING PROGRAM
Payer: COMMERCIAL

## 2022-12-10 VITALS
DIASTOLIC BLOOD PRESSURE: 120 MMHG | TEMPERATURE: 98 F | HEIGHT: 73 IN | RESPIRATION RATE: 28 BRPM | OXYGEN SATURATION: 98 % | SYSTOLIC BLOOD PRESSURE: 229 MMHG | HEART RATE: 118 BPM | WEIGHT: 145.06 LBS

## 2022-12-10 DIAGNOSIS — J44.1 CHRONIC OBSTRUCTIVE PULMONARY DISEASE WITH (ACUTE) EXACERBATION: ICD-10-CM

## 2022-12-10 DIAGNOSIS — I10 ESSENTIAL (PRIMARY) HYPERTENSION: ICD-10-CM

## 2022-12-10 DIAGNOSIS — J96.01 ACUTE RESPIRATORY FAILURE WITH HYPOXIA: ICD-10-CM

## 2022-12-10 DIAGNOSIS — Z29.9 ENCOUNTER FOR PROPHYLACTIC MEASURES, UNSPECIFIED: ICD-10-CM

## 2022-12-10 DIAGNOSIS — K21.9 GASTRO-ESOPHAGEAL REFLUX DISEASE WITHOUT ESOPHAGITIS: ICD-10-CM

## 2022-12-10 DIAGNOSIS — I25.10 ATHEROSCLEROTIC HEART DISEASE OF NATIVE CORONARY ARTERY WITHOUT ANGINA PECTORIS: ICD-10-CM

## 2022-12-10 DIAGNOSIS — J44.9 CHRONIC OBSTRUCTIVE PULMONARY DISEASE, UNSPECIFIED: ICD-10-CM

## 2022-12-10 LAB
ALBUMIN SERPL ELPH-MCNC: 4 G/DL — SIGNIFICANT CHANGE UP (ref 3.3–5)
ALP SERPL-CCNC: 64 U/L — SIGNIFICANT CHANGE UP (ref 40–120)
ALT FLD-CCNC: 14 U/L — SIGNIFICANT CHANGE UP (ref 10–45)
ANION GAP SERPL CALC-SCNC: 22 MMOL/L — HIGH (ref 5–17)
ANISOCYTOSIS BLD QL: SLIGHT — SIGNIFICANT CHANGE UP
APTT BLD: 30.8 SEC — SIGNIFICANT CHANGE UP (ref 27.5–35.5)
AST SERPL-CCNC: 49 U/L — HIGH (ref 10–40)
BASE EXCESS BLDV CALC-SCNC: -0.9 MMOL/L — SIGNIFICANT CHANGE UP (ref -2–3)
BASE EXCESS BLDV CALC-SCNC: -1 MMOL/L — SIGNIFICANT CHANGE UP (ref -2–3)
BASOPHILS # BLD AUTO: 0 K/UL — SIGNIFICANT CHANGE UP (ref 0–0.2)
BASOPHILS NFR BLD AUTO: 0 % — SIGNIFICANT CHANGE UP (ref 0–2)
BILIRUB SERPL-MCNC: 0.6 MG/DL — SIGNIFICANT CHANGE UP (ref 0.2–1.2)
BUN SERPL-MCNC: 14 MG/DL — SIGNIFICANT CHANGE UP (ref 7–23)
BURR CELLS BLD QL SMEAR: PRESENT — SIGNIFICANT CHANGE UP
CA-I SERPL-SCNC: 1.18 MMOL/L — SIGNIFICANT CHANGE UP (ref 1.15–1.33)
CA-I SERPL-SCNC: 1.19 MMOL/L — SIGNIFICANT CHANGE UP (ref 1.15–1.33)
CALCIUM SERPL-MCNC: 9.3 MG/DL — SIGNIFICANT CHANGE UP (ref 8.4–10.5)
CHLORIDE BLDV-SCNC: 99 MMOL/L — SIGNIFICANT CHANGE UP (ref 96–108)
CHLORIDE BLDV-SCNC: 99 MMOL/L — SIGNIFICANT CHANGE UP (ref 96–108)
CHLORIDE SERPL-SCNC: 98 MMOL/L — SIGNIFICANT CHANGE UP (ref 96–108)
CO2 BLDV-SCNC: 28 MMOL/L — HIGH (ref 22–26)
CO2 BLDV-SCNC: 28 MMOL/L — HIGH (ref 22–26)
CO2 SERPL-SCNC: 17 MMOL/L — LOW (ref 22–31)
CREAT SERPL-MCNC: 0.67 MG/DL — SIGNIFICANT CHANGE UP (ref 0.5–1.3)
EGFR: 99 ML/MIN/1.73M2 — SIGNIFICANT CHANGE UP
EOSINOPHIL # BLD AUTO: 0.08 K/UL — SIGNIFICANT CHANGE UP (ref 0–0.5)
EOSINOPHIL NFR BLD AUTO: 0.9 % — SIGNIFICANT CHANGE UP (ref 0–6)
GAS PNL BLDV: 134 MMOL/L — LOW (ref 136–145)
GAS PNL BLDV: 135 MMOL/L — LOW (ref 136–145)
GAS PNL BLDV: SIGNIFICANT CHANGE UP
GLUCOSE BLDV-MCNC: 119 MG/DL — HIGH (ref 70–99)
GLUCOSE BLDV-MCNC: 144 MG/DL — HIGH (ref 70–99)
GLUCOSE SERPL-MCNC: 133 MG/DL — HIGH (ref 70–99)
HCO3 BLDV-SCNC: 26 MMOL/L — SIGNIFICANT CHANGE UP (ref 22–29)
HCO3 BLDV-SCNC: 27 MMOL/L — SIGNIFICANT CHANGE UP (ref 22–29)
HCT VFR BLD CALC: 48.7 % — SIGNIFICANT CHANGE UP (ref 39–50)
HCT VFR BLDA CALC: 45 % — SIGNIFICANT CHANGE UP (ref 39–51)
HCT VFR BLDA CALC: 47 % — SIGNIFICANT CHANGE UP (ref 39–51)
HGB BLD CALC-MCNC: 14.9 G/DL — SIGNIFICANT CHANGE UP (ref 12.6–17.4)
HGB BLD CALC-MCNC: 15.7 G/DL — SIGNIFICANT CHANGE UP (ref 12.6–17.4)
HGB BLD-MCNC: 15.8 G/DL — SIGNIFICANT CHANGE UP (ref 13–17)
INR BLD: 1.09 RATIO — SIGNIFICANT CHANGE UP (ref 0.88–1.16)
LACTATE BLDV-MCNC: 1.7 MMOL/L — SIGNIFICANT CHANGE UP (ref 0.5–2)
LACTATE BLDV-MCNC: 2.2 MMOL/L — HIGH (ref 0.5–2)
LYMPHOCYTES # BLD AUTO: 1.52 K/UL — SIGNIFICANT CHANGE UP (ref 1–3.3)
LYMPHOCYTES # BLD AUTO: 17.4 % — SIGNIFICANT CHANGE UP (ref 13–44)
MAGNESIUM SERPL-MCNC: 1.9 MG/DL — SIGNIFICANT CHANGE UP (ref 1.6–2.6)
MANUAL SMEAR VERIFICATION: SIGNIFICANT CHANGE UP
MCHC RBC-ENTMCNC: 29.9 PG — SIGNIFICANT CHANGE UP (ref 27–34)
MCHC RBC-ENTMCNC: 32.4 GM/DL — SIGNIFICANT CHANGE UP (ref 32–36)
MCV RBC AUTO: 92.1 FL — SIGNIFICANT CHANGE UP (ref 80–100)
MONOCYTES # BLD AUTO: 0.76 K/UL — SIGNIFICANT CHANGE UP (ref 0–0.9)
MONOCYTES NFR BLD AUTO: 8.7 % — SIGNIFICANT CHANGE UP (ref 2–14)
NEUTROPHILS # BLD AUTO: 6.38 K/UL — SIGNIFICANT CHANGE UP (ref 1.8–7.4)
NEUTROPHILS NFR BLD AUTO: 73 % — SIGNIFICANT CHANGE UP (ref 43–77)
NT-PROBNP SERPL-SCNC: 2641 PG/ML — HIGH (ref 0–300)
OTHER CELLS CSF MANUAL: 20.7 ML/DL — SIGNIFICANT CHANGE UP (ref 18–22)
PCO2 BLDV: 52 MMHG — SIGNIFICANT CHANGE UP (ref 42–55)
PCO2 BLDV: 54 MMHG — SIGNIFICANT CHANGE UP (ref 42–55)
PH BLDV: 7.3 — LOW (ref 7.32–7.43)
PH BLDV: 7.31 — LOW (ref 7.32–7.43)
PLAT MORPH BLD: NORMAL — SIGNIFICANT CHANGE UP
PLATELET # BLD AUTO: 253 K/UL — SIGNIFICANT CHANGE UP (ref 150–400)
PO2 BLDV: 51 MMHG — HIGH (ref 25–45)
PO2 BLDV: 74 MMHG — HIGH (ref 25–45)
POIKILOCYTOSIS BLD QL AUTO: SLIGHT — SIGNIFICANT CHANGE UP
POTASSIUM BLDV-SCNC: 3.7 MMOL/L — SIGNIFICANT CHANGE UP (ref 3.5–5.1)
POTASSIUM BLDV-SCNC: 3.8 MMOL/L — SIGNIFICANT CHANGE UP (ref 3.5–5.1)
POTASSIUM SERPL-MCNC: 5.2 MMOL/L — SIGNIFICANT CHANGE UP (ref 3.5–5.3)
POTASSIUM SERPL-SCNC: 5.2 MMOL/L — SIGNIFICANT CHANGE UP (ref 3.5–5.3)
PROCALCITONIN SERPL-MCNC: 0.08 NG/ML — SIGNIFICANT CHANGE UP (ref 0.02–0.1)
PROT SERPL-MCNC: 8.5 G/DL — HIGH (ref 6–8.3)
PROTHROM AB SERPL-ACNC: 12.6 SEC — SIGNIFICANT CHANGE UP (ref 10.5–13.4)
RAPID RVP RESULT: SIGNIFICANT CHANGE UP
RBC # BLD: 5.29 M/UL — SIGNIFICANT CHANGE UP (ref 4.2–5.8)
RBC # FLD: 13.9 % — SIGNIFICANT CHANGE UP (ref 10.3–14.5)
RBC BLD AUTO: SIGNIFICANT CHANGE UP
SAO2 % BLDV: 78.1 % — SIGNIFICANT CHANGE UP (ref 67–88)
SAO2 % BLDV: 95.1 % — HIGH (ref 67–88)
SARS-COV-2 RNA SPEC QL NAA+PROBE: SIGNIFICANT CHANGE UP
SCHISTOCYTES BLD QL AUTO: SLIGHT — SIGNIFICANT CHANGE UP
SODIUM SERPL-SCNC: 137 MMOL/L — SIGNIFICANT CHANGE UP (ref 135–145)
TROPONIN T, HIGH SENSITIVITY RESULT: 27 NG/L — SIGNIFICANT CHANGE UP (ref 0–51)
WBC # BLD: 8.74 K/UL — SIGNIFICANT CHANGE UP (ref 3.8–10.5)
WBC # FLD AUTO: 8.74 K/UL — SIGNIFICANT CHANGE UP (ref 3.8–10.5)

## 2022-12-10 PROCEDURE — 99291 CRITICAL CARE FIRST HOUR: CPT

## 2022-12-10 PROCEDURE — 99223 1ST HOSP IP/OBS HIGH 75: CPT

## 2022-12-10 PROCEDURE — 93010 ELECTROCARDIOGRAM REPORT: CPT

## 2022-12-10 PROCEDURE — 12345: CPT | Mod: NC

## 2022-12-10 PROCEDURE — 99292 CRITICAL CARE ADDL 30 MIN: CPT

## 2022-12-10 PROCEDURE — 71045 X-RAY EXAM CHEST 1 VIEW: CPT | Mod: 26

## 2022-12-10 PROCEDURE — 99497 ADVNCD CARE PLAN 30 MIN: CPT | Mod: 25

## 2022-12-10 RX ORDER — IPRATROPIUM/ALBUTEROL SULFATE 18-103MCG
3 AEROSOL WITH ADAPTER (GRAM) INHALATION ONCE
Refills: 0 | Status: COMPLETED | OUTPATIENT
Start: 2022-12-10 | End: 2022-12-10

## 2022-12-10 RX ORDER — METOPROLOL TARTRATE 50 MG
25 TABLET ORAL ONCE
Refills: 0 | Status: COMPLETED | OUTPATIENT
Start: 2022-12-10 | End: 2022-12-10

## 2022-12-10 RX ORDER — IPRATROPIUM/ALBUTEROL SULFATE 18-103MCG
3 AEROSOL WITH ADAPTER (GRAM) INHALATION EVERY 4 HOURS
Refills: 0 | Status: DISCONTINUED | OUTPATIENT
Start: 2022-12-10 | End: 2022-12-17

## 2022-12-10 RX ORDER — MONTELUKAST 4 MG/1
10 TABLET, CHEWABLE ORAL DAILY
Refills: 0 | Status: DISCONTINUED | OUTPATIENT
Start: 2022-12-10 | End: 2022-12-16

## 2022-12-10 RX ORDER — HEPARIN SODIUM 5000 [USP'U]/ML
5000 INJECTION INTRAVENOUS; SUBCUTANEOUS EVERY 8 HOURS
Refills: 0 | Status: DISCONTINUED | OUTPATIENT
Start: 2022-12-10 | End: 2022-12-21

## 2022-12-10 RX ORDER — CLOPIDOGREL BISULFATE 75 MG/1
75 TABLET, FILM COATED ORAL DAILY
Refills: 0 | Status: DISCONTINUED | OUTPATIENT
Start: 2022-12-10 | End: 2022-12-21

## 2022-12-10 RX ORDER — IPRATROPIUM/ALBUTEROL SULFATE 18-103MCG
3 AEROSOL WITH ADAPTER (GRAM) INHALATION EVERY 6 HOURS
Refills: 0 | Status: DISCONTINUED | OUTPATIENT
Start: 2022-12-10 | End: 2022-12-10

## 2022-12-10 RX ORDER — AMLODIPINE BESYLATE 2.5 MG/1
10 TABLET ORAL DAILY
Refills: 0 | Status: DISCONTINUED | OUTPATIENT
Start: 2022-12-10 | End: 2022-12-16

## 2022-12-10 RX ORDER — LOSARTAN POTASSIUM 100 MG/1
50 TABLET, FILM COATED ORAL DAILY
Refills: 0 | Status: DISCONTINUED | OUTPATIENT
Start: 2022-12-10 | End: 2022-12-10

## 2022-12-10 RX ORDER — ATORVASTATIN CALCIUM 80 MG/1
80 TABLET, FILM COATED ORAL AT BEDTIME
Refills: 0 | Status: DISCONTINUED | OUTPATIENT
Start: 2022-12-10 | End: 2022-12-16

## 2022-12-10 RX ORDER — SODIUM CHLORIDE 9 MG/ML
1000 INJECTION, SOLUTION INTRAVENOUS ONCE
Refills: 0 | Status: COMPLETED | OUTPATIENT
Start: 2022-12-10 | End: 2022-12-10

## 2022-12-10 RX ORDER — METOPROLOL TARTRATE 50 MG
25 TABLET ORAL
Refills: 0 | Status: DISCONTINUED | OUTPATIENT
Start: 2022-12-10 | End: 2022-12-16

## 2022-12-10 RX ORDER — MULTIVIT-MIN/FERROUS GLUCONATE 9 MG/15 ML
15 LIQUID (ML) ORAL DAILY
Refills: 0 | Status: DISCONTINUED | OUTPATIENT
Start: 2022-12-10 | End: 2022-12-16

## 2022-12-10 RX ORDER — TAMSULOSIN HYDROCHLORIDE 0.4 MG/1
0.4 CAPSULE ORAL AT BEDTIME
Refills: 0 | Status: DISCONTINUED | OUTPATIENT
Start: 2022-12-10 | End: 2022-12-16

## 2022-12-10 RX ORDER — ASPIRIN/CALCIUM CARB/MAGNESIUM 324 MG
81 TABLET ORAL DAILY
Refills: 0 | Status: DISCONTINUED | OUTPATIENT
Start: 2022-12-10 | End: 2022-12-21

## 2022-12-10 RX ORDER — LOSARTAN POTASSIUM 100 MG/1
100 TABLET, FILM COATED ORAL DAILY
Refills: 0 | Status: DISCONTINUED | OUTPATIENT
Start: 2022-12-10 | End: 2022-12-12

## 2022-12-10 RX ORDER — PANTOPRAZOLE SODIUM 20 MG/1
40 TABLET, DELAYED RELEASE ORAL
Refills: 0 | Status: DISCONTINUED | OUTPATIENT
Start: 2022-12-10 | End: 2022-12-16

## 2022-12-10 RX ADMIN — Medication 40 MILLIGRAM(S): at 00:35

## 2022-12-10 RX ADMIN — Medication 3 MILLILITER(S): at 09:03

## 2022-12-10 RX ADMIN — HEPARIN SODIUM 5000 UNIT(S): 5000 INJECTION INTRAVENOUS; SUBCUTANEOUS at 13:50

## 2022-12-10 RX ADMIN — LOSARTAN POTASSIUM 50 MILLIGRAM(S): 100 TABLET, FILM COATED ORAL at 08:59

## 2022-12-10 RX ADMIN — Medication 81 MILLIGRAM(S): at 11:17

## 2022-12-10 RX ADMIN — Medication 25 MILLIGRAM(S): at 14:24

## 2022-12-10 RX ADMIN — CLOPIDOGREL BISULFATE 75 MILLIGRAM(S): 75 TABLET, FILM COATED ORAL at 11:17

## 2022-12-10 RX ADMIN — Medication 15 MILLILITER(S): at 12:49

## 2022-12-10 RX ADMIN — Medication 3 MILLILITER(S): at 00:35

## 2022-12-10 RX ADMIN — MONTELUKAST 10 MILLIGRAM(S): 4 TABLET, CHEWABLE ORAL at 11:16

## 2022-12-10 RX ADMIN — Medication 3 MILLILITER(S): at 23:01

## 2022-12-10 RX ADMIN — SODIUM CHLORIDE 2000 MILLILITER(S): 9 INJECTION, SOLUTION INTRAVENOUS at 00:35

## 2022-12-10 RX ADMIN — Medication 40 MILLIGRAM(S): at 11:17

## 2022-12-10 RX ADMIN — Medication 3 MILLILITER(S): at 06:38

## 2022-12-10 RX ADMIN — Medication 3 MILLILITER(S): at 17:51

## 2022-12-10 RX ADMIN — PANTOPRAZOLE SODIUM 40 MILLIGRAM(S): 20 TABLET, DELAYED RELEASE ORAL at 09:03

## 2022-12-10 RX ADMIN — ATORVASTATIN CALCIUM 80 MILLIGRAM(S): 80 TABLET, FILM COATED ORAL at 21:46

## 2022-12-10 RX ADMIN — TAMSULOSIN HYDROCHLORIDE 0.4 MILLIGRAM(S): 0.4 CAPSULE ORAL at 21:46

## 2022-12-10 RX ADMIN — Medication 25 MILLIGRAM(S): at 17:51

## 2022-12-10 RX ADMIN — Medication 3 MILLILITER(S): at 13:50

## 2022-12-10 RX ADMIN — HEPARIN SODIUM 5000 UNIT(S): 5000 INJECTION INTRAVENOUS; SUBCUTANEOUS at 21:46

## 2022-12-10 RX ADMIN — AMLODIPINE BESYLATE 10 MILLIGRAM(S): 2.5 TABLET ORAL at 08:59

## 2022-12-10 NOTE — ED PROVIDER NOTE - ATTENDING CONTRIBUTION TO CARE
------------ATTENDING NOTE------------  pt w/ wife c/o several days of nasal congestion, clear rhinorrhea, unproductive cough w/ wheezing, constant chest tightness, taking OTC Pseudo fed, mild improvements w/ albuterol, complicated by COPD, dehydrated on exam, concerned that his blood pressure has been elevated (likely decongestant), no fevers, awaiting labs/imaging and close reassessments -->  - Jaidel Holland MD   ------------------------------------------------

## 2022-12-10 NOTE — ED PROVIDER NOTE - OBJECTIVE STATEMENT
72M pmh COPD (not on home O2, last exacerbation 1y ago october), CAD (s/p PCI), asthma who presents with SOB.  Patient in acute respiratory distress taking gasping breaths, sitting upright 90 degrees, speaking in 1 word sentences.  Accompanied by wife who helps provide history.  Patient has been having several days nasal congestion, clear rhinorrhea, non-productive cough with worsening wheezing and constant chest tightness.  He has been taking over the counter sudafed and albuterol nebulizer treatments at home with mild improvement.  Has elevated BP readings at home.    No fevers, no change in sputum, no leg swelling. 72M pmh COPD (not on home O2, last exacerbation 1y ago october), CAD (s/p PCI), asthma who presents with SOB.  Patient in acute respiratory distress taking gasping breaths, sitting upright 90 degrees, speaking in 1 word sentences.  Accompanied by wife who helps provide history.  Patient has been having several days nasal congestion, clear rhinorrhea, non-productive cough with worsening wheezing and constant chest tightness.  He has been taking over the counter sudafed and albuterol nebulizer treatments at home with mild improvement.  Has elevated BP readings at home.    Pulm: [place]    No fevers, no change in sputum, no leg swelling.

## 2022-12-10 NOTE — ED PROVIDER NOTE - SEVERE SEPSIS ALERT DETAILS
BETO Thomas MD:  I have seen and evaluated this patient and do not believe the patient is septic at this time.  I will continue to evaluate.

## 2022-12-10 NOTE — H&P ADULT - ASSESSMENT
73 y/o M COPD/asthma, HTN, CAD s/p PCI, BPH, GERD p/w worsening SOB a/w acute hypoxic respiratory failure due to respiratory infection

## 2022-12-10 NOTE — H&P ADULT - NSHPSOCIALHISTORY_GEN_ALL_CORE
, lives with wife, retired   former 20 pack year tobacco smoker, quit 20 yrs ago, no regular alcohol use  ambulates w/o assistance at home, has a walker

## 2022-12-10 NOTE — H&P ADULT - PROBLEM SELECTOR PLAN 1
likely due to URI causing asthma/COPD exacerbation  - will treat with BIPAP transition to Hi Flow  - s/p Prednisone 40 mg in ED, will continue for now  - Nebs ATC  - RVP negative, afebrile

## 2022-12-10 NOTE — H&P ADULT - HISTORY OF PRESENT ILLNESS
72M pmh COPD/asthma (not on home O2, last exacerbation 1y ago october), CAD (s/p PCI), HTN p/w worsening SOB.  Patient saw his son (works for [employer]) and daughter-in-law recently, + sick contact but their RVP was reported negative.  He started to have nasal congestion/non-productive cough, running nose for the past 5days, treated with OTC Sudafed and Albuterol nebs with some improvement.  He began to have wheezing for 1 day, refractory to nebs, symbicort, and started to use 6L NC (had home O2 after Oct 2021 hospital discharge but never used it).  Wife brought him to ED due to worsening SOB, constant chest tightness, unable to complete a sentence "speaking in 1 word sentences" and elevated BP.    Currently on BIPAP s/p Duonebs x3 and Prednisone 40mg with improvement

## 2022-12-10 NOTE — PROGRESS NOTE ADULT - PROBLEM SELECTOR PLAN 1
- Likely due to URI causing asthma/COPD exacerbation  - RVP negative  - Currently on NC 2L, wean to RA  - C/w prednisone 40  - C/w albuteral Q6H  - Tessalon perles for cough  - Monitor respiratory status

## 2022-12-10 NOTE — ED PROVIDER NOTE - CARE PLAN
1 Principal Discharge DX:	COPD with asthma and status asthmaticus  Secondary Diagnosis:	URI with cough and congestion  Secondary Diagnosis:	Moderate dehydration  Secondary Diagnosis:	Asymptomatic hypertension

## 2022-12-10 NOTE — PROGRESS NOTE ADULT - ASSESSMENT
71 y/o M COPD/asthma, HTN, CAD s/p PCI, BPH, GERD p/w worsening SOB a/w acute hypoxic respiratory failure due to URI.

## 2022-12-10 NOTE — ED ADULT NURSE NOTE - OBJECTIVE STATEMENT
Pt is 72Y M, pmhx COPD, CAD, asthma, c/o SOB this evening. Pt presents to the ED unable to speak in complete sentences, gasping for breath. Pt placed on 15L NRB, RT called- pt placed on BIPAP 40% O2, pt endorses comfort and O2 sat currently 95% and greater. Pt denies fever, chills, NVD, chest pain, abdominal pain, or any other issues. Pt placed on cardiac monitor- sinus tachycardia, pt A&Ox3, ambulatory at baseline, MD at bedside to assess, pt wife at bedside, pt updated on plan of care, comfort and safety secured

## 2022-12-10 NOTE — H&P ADULT - NSICDXFAMILYHX_GEN_ALL_CORE_FT
FAMILY HISTORY:  Mother  Still living? Unknown  FH: diabetes mellitus, Age at diagnosis: Age Unknown  FH: HTN (hypertension), Age at diagnosis: Age Unknown    Sibling  Still living? Unknown  FH: HTN (hypertension), Age at diagnosis: Age Unknown

## 2022-12-10 NOTE — ED PROVIDER NOTE - PROGRESS NOTE DETAILS
BETO Thomas MD - PGY-4: patient reassessed after 1 neb, now speaking in 2 word sentences says breathing slightly improved.  Moving better air on left side of chest (wheezing and rhonchi better appreciated).  BIPAP in room with respiratory tech about to be started.

## 2022-12-10 NOTE — PROGRESS NOTE ADULT - SUBJECTIVE AND OBJECTIVE BOX
Contact Information:  Ron Lo II, MD, MPH  Chief Resident, Internal Medicine  Pager: [phone] ([facility]) /// [phone] ([facility])    ANABELLE GOODSON, MRN-[id number]    Patient is a 72y old  Male who presents with a chief complaint of worsening SOB (10 Dec 2022 09:28)      OVERNIGHT EVENTS/INTERVAL/SUBJECTIVE: Patient evaluated at bedside, lying in bed comfortably. He states that his SOB is much improved and that his breathing is much easier; he also endorses a slight cough. Otherwise, he denies CP, ABD pain, numbness, tingling, HA, lightheadedness, dizziness, fatigue.    CONSTITUTIONAL: No weakness. No fatigue. No fever.  HEAD: No head trauma.   EYES: No vision changes.  ENT: No hearing changes or tinnitus. No ear pain. No changes in smell. No nasal congestion or discharge. No sore throat. No voice hoarseness.   NECK: No neck pain or stiffness. No lumps.  RESPIRATORY: +slight cough. +resolving SOB. No wheezing. No hemoptysis.   CARDIOVASCULAR: No chest pain. No palpitations.   GASTROINTESTINAL: No dysphagia. No ABD pain. No distension. No constipation. No diarrhea. No pain with defecation. No hematemesis. No hematochezia or melena.  BACK: No back pain.  GENITOURINARY: No dysuria. No frequency or urgency. No hesitancy. No incontinence. No urinary retention. No suprapubic pain. No hematuria.  EXTREMITY: No swelling.  MUSCULOSKELETAL: No joint pain or swelling. No fractures. No stiffness.    SKIN: No rashes. No itching. No skin, hair, or nail changes.  NEUROLOGICAL: No weakness or paralysis. No lightheadedness or dizziness. No HA. No numbness or tingling.   PSYCHIATRIC: No depression.       OBJECTIVE:  Vital Signs Last 24 Hrs  T(C): 36.3 (10 Dec 2022 15:40), Max: 36.6 (10 Dec 2022 10:30)  T(F): 97.3 (10 Dec 2022 15:40), Max: 97.9 (10 Dec 2022 10:30)  HR: 84 (10 Dec 2022 17:45) (79 - 118)  BP: 150/87 (10 Dec 2022 17:45) (129/78 - 229/120)  BP(mean): --  RR: 16 (10 Dec 2022 17:45) (16 - 28)  SpO2: 97% (10 Dec 2022 17:45) (96% - 100%)    Parameters below as of 10 Dec 2022 17:45  Patient On (Oxygen Delivery Method): nasal cannula  O2 Flow (L/min): 2    I&O's Summary      MEDICATIONS  (STANDING):  albuterol/ipratropium for Nebulization 3 milliLiter(s) Nebulizer every 4 hours  amLODIPine   Tablet 10 milliGRAM(s) Oral daily  aspirin enteric coated 81 milliGRAM(s) Oral daily  atorvastatin 80 milliGRAM(s) Oral at bedtime  clopidogrel Tablet 75 milliGRAM(s) Oral daily  heparin   Injectable 5000 Unit(s) SubCutaneous every 8 hours  losartan 100 milliGRAM(s) Oral daily  metoprolol tartrate 25 milliGRAM(s) Oral two times a day  montelukast 10 milliGRAM(s) Oral daily  multivitamin/minerals/iron Oral Solution (CENTRUM) 15 milliLiter(s) Oral daily  pantoprazole    Tablet 40 milliGRAM(s) Oral before breakfast  predniSONE   Tablet 40 milliGRAM(s) Oral daily  tamsulosin 0.4 milliGRAM(s) Oral at bedtime    MEDICATIONS  (PRN):    Allergies    No Known Allergies    Intolerances        CONSTITUTIONAL: No acute distress. Awake and alert.  RESPIRATORY: CTAB. No wheezes, rales, or rhonchi. No accessory muscle use. No apparent respiratory distress. On 2L NC.  CARDIOVASCULAR: +S1/S2. No audible S3/S4. Regular rate and rhythm. No murmurs, rubs, or gallops. No LE swelling or edema.  GASTROINTESTINAL: Soft, nontender, nondistended. +BS. No rebound or guarding.   MUSCULOSKELETAL: Spontaneous movement in all extremities.  DERMATOLOGICAL: No abnormal rashes or lesions.  NEUROLOGICAL: CN 2-12 grossly intact. No focal deficits.    PSYCHIATRIC: Appropriate affect. A&Ox3 (oriented to person, place, and time).                              15.8   8.74  )-----------( 253      ( 10 Dec 2022 00:36 )             48.7     PT/INR - ( 10 Dec 2022 00:50 )   PT: 12.6 sec;   INR: 1.09 ratio         PTT - ( 10 Dec 2022 00:50 )  PTT:30.8 sec  12-10    137  |  98  |  14  ----------------------------<  133<H>  5.2   |  17<L>  |  0.67    Ca    9.3      10 Dec 2022 00:36  Mg     1.9     12-10    TPro  8.5<H>  /  Alb  4.0  /  TBili  0.6  /  DBili  x   /  AST  49<H>  /  ALT  14  /  AlkPhos  64  12-10    CAPILLARY BLOOD GLUCOSE        LIVER FUNCTIONS - ( 10 Dec 2022 00:36 )  Alb: 4.0 g/dL / Pro: 8.5 g/dL / ALK PHOS: 64 U/L / ALT: 14 U/L / AST: 49 U/L / GGT: x                       RADIOLOGY AND ADDITIONAL TESTS:    CONSULTANT NOTES REVIEWED:    CARE DISCUSSED WITH THE FOLLOWING CONSULTANTS/PROVIDERS:

## 2022-12-10 NOTE — ED PROVIDER NOTE - PHYSICAL EXAMINATION
Vital signs reviewed.  CONSTITUTIONAL: +in significant acute respiratory distress  HEAD: Normocephalic; atraumatic  NECK/ENT: Trachea midline.  Dry mucus membranes  CV: Normal S1, S2; extremities WWP  RESP: increased work of breathing, speaking in 1 word sentences, poor air movement with silent chest without wheezing or rhonchi initially; no stridor  ABD: soft, non-distended; non-tender  MSK/EXT: no edema, no deformities  SKIN: Warm and dry as visualized  NEURO: A&O, appears non-focal  Psych: Appropriate mood and affect

## 2022-12-10 NOTE — ED PROVIDER NOTE - NSICDXPASTMEDICALHX_GEN_ALL_CORE_FT
PAST MEDICAL HISTORY:  Asthma     CAD (coronary artery disease) s/p pci    COPD (chronic obstructive pulmonary disease) s/p PCI

## 2022-12-10 NOTE — H&P ADULT - PROBLEM SELECTOR PLAN 3
uncontrolled due to respiratory distress  - BIPAP to Hi flow taper as tolerated  - will continue home BP meds, Lopressor, Norvasc and Losartan with parameters

## 2022-12-10 NOTE — H&P ADULT - ADDITIONAL PE
T(F): 97.5 (10 Dec 2022 07:40), Max: 97.6 (10 Dec 2022 04:30)  HR: 92 (10 Dec 2022 07:40) (79 - 118)  BP: 197/108 (10 Dec 2022 07:40) (160/99 - 229/120)  RR: 18 (10 Dec 2022 07:40) (16 - 28)  SpO2: 99% (10 Dec 2022 08:19) (97% - 100%) on BiPAP

## 2022-12-10 NOTE — PROGRESS NOTE ADULT - PROBLEM SELECTOR PLAN 2
- C/w albuterol and prednisone  - Monitor respiratory status  - Wean supplemental oxygen  - Tessalon perles for cough

## 2022-12-10 NOTE — PROGRESS NOTE ADULT - PROBLEM SELECTOR PLAN 3
- Uncontrolled due to respiratory distress  - C/w amlodipine and Lopressor; Losartan increased to 100 mg

## 2022-12-10 NOTE — H&P ADULT - NSICDXPASTMEDICALHX_GEN_ALL_CORE_FT
PAST MEDICAL HISTORY:  Asthma     BPH (benign prostatic hyperplasia)     CAD (coronary artery disease) s/p pci    COPD (chronic obstructive pulmonary disease) s/p PCI    GERD (gastroesophageal reflux disease)

## 2022-12-10 NOTE — H&P ADULT - CONVERSATION DETAILS
I personally spent 16 min in discussion of his diagnosis, plans of care, and advance directives with patient at bedside.  Patient is FULL CODE and wife, Xin is HCP

## 2022-12-10 NOTE — H&P ADULT - CRANIAL NERVE
[FreeTextEntry1] : The patient is a 49-year-old female s/p COVID post vaccine 12/2021, back pain that has worsened with negative workup this far and palpitations.\par #1 CV- sinus rhythm, ECHO and event monitor\par #2 COVID- labs including inflammatory markers\par #3 Neuro- MRI pending, use advil or tylenol since those do provide some relief\par #4 General- consider ENT evaluation if above negative. 
symmetric

## 2022-12-11 LAB
ANION GAP SERPL CALC-SCNC: 13 MMOL/L — SIGNIFICANT CHANGE UP (ref 5–17)
BUN SERPL-MCNC: 21 MG/DL — SIGNIFICANT CHANGE UP (ref 7–23)
CALCIUM SERPL-MCNC: 9 MG/DL — SIGNIFICANT CHANGE UP (ref 8.4–10.5)
CHLORIDE SERPL-SCNC: 104 MMOL/L — SIGNIFICANT CHANGE UP (ref 96–108)
CO2 SERPL-SCNC: 25 MMOL/L — SIGNIFICANT CHANGE UP (ref 22–31)
CREAT SERPL-MCNC: 0.96 MG/DL — SIGNIFICANT CHANGE UP (ref 0.5–1.3)
EGFR: 84 ML/MIN/1.73M2 — SIGNIFICANT CHANGE UP
GLUCOSE SERPL-MCNC: 117 MG/DL — HIGH (ref 70–99)
HCT VFR BLD CALC: 39.3 % — SIGNIFICANT CHANGE UP (ref 39–50)
HGB BLD-MCNC: 12.8 G/DL — LOW (ref 13–17)
MCHC RBC-ENTMCNC: 30 PG — SIGNIFICANT CHANGE UP (ref 27–34)
MCHC RBC-ENTMCNC: 32.6 GM/DL — SIGNIFICANT CHANGE UP (ref 32–36)
MCV RBC AUTO: 92.3 FL — SIGNIFICANT CHANGE UP (ref 80–100)
MRSA PCR RESULT.: SIGNIFICANT CHANGE UP
NRBC # BLD: 0 /100 WBCS — SIGNIFICANT CHANGE UP (ref 0–0)
PLATELET # BLD AUTO: 292 K/UL — SIGNIFICANT CHANGE UP (ref 150–400)
POTASSIUM SERPL-MCNC: 3.3 MMOL/L — LOW (ref 3.5–5.3)
POTASSIUM SERPL-SCNC: 3.3 MMOL/L — LOW (ref 3.5–5.3)
RBC # BLD: 4.26 M/UL — SIGNIFICANT CHANGE UP (ref 4.2–5.8)
RBC # FLD: 14 % — SIGNIFICANT CHANGE UP (ref 10.3–14.5)
S AUREUS DNA NOSE QL NAA+PROBE: SIGNIFICANT CHANGE UP
SODIUM SERPL-SCNC: 142 MMOL/L — SIGNIFICANT CHANGE UP (ref 135–145)
WBC # BLD: 9.34 K/UL — SIGNIFICANT CHANGE UP (ref 3.8–10.5)
WBC # FLD AUTO: 9.34 K/UL — SIGNIFICANT CHANGE UP (ref 3.8–10.5)

## 2022-12-11 PROCEDURE — 99233 SBSQ HOSP IP/OBS HIGH 50: CPT

## 2022-12-11 RX ORDER — INFLUENZA VIRUS VACCINE 15; 15; 15; 15 UG/.5ML; UG/.5ML; UG/.5ML; UG/.5ML
0.7 SUSPENSION INTRAMUSCULAR ONCE
Refills: 0 | Status: DISCONTINUED | OUTPATIENT
Start: 2022-12-11 | End: 2022-12-21

## 2022-12-11 RX ORDER — CHLORHEXIDINE GLUCONATE 213 G/1000ML
1 SOLUTION TOPICAL DAILY
Refills: 0 | Status: DISCONTINUED | OUTPATIENT
Start: 2022-12-11 | End: 2022-12-21

## 2022-12-11 RX ORDER — POTASSIUM CHLORIDE 20 MEQ
20 PACKET (EA) ORAL
Refills: 0 | Status: COMPLETED | OUTPATIENT
Start: 2022-12-11 | End: 2022-12-11

## 2022-12-11 RX ADMIN — Medication 40 MILLIGRAM(S): at 06:22

## 2022-12-11 RX ADMIN — Medication 3 MILLILITER(S): at 22:24

## 2022-12-11 RX ADMIN — LOSARTAN POTASSIUM 100 MILLIGRAM(S): 100 TABLET, FILM COATED ORAL at 06:23

## 2022-12-11 RX ADMIN — HEPARIN SODIUM 5000 UNIT(S): 5000 INJECTION INTRAVENOUS; SUBCUTANEOUS at 06:23

## 2022-12-11 RX ADMIN — Medication 15 MILLILITER(S): at 14:12

## 2022-12-11 RX ADMIN — Medication 3 MILLILITER(S): at 00:00

## 2022-12-11 RX ADMIN — HEPARIN SODIUM 5000 UNIT(S): 5000 INJECTION INTRAVENOUS; SUBCUTANEOUS at 14:13

## 2022-12-11 RX ADMIN — Medication 25 MILLIGRAM(S): at 06:23

## 2022-12-11 RX ADMIN — Medication 20 MILLIEQUIVALENT(S): at 12:28

## 2022-12-11 RX ADMIN — Medication 3 MILLILITER(S): at 17:13

## 2022-12-11 RX ADMIN — MONTELUKAST 10 MILLIGRAM(S): 4 TABLET, CHEWABLE ORAL at 11:07

## 2022-12-11 RX ADMIN — Medication 3 MILLILITER(S): at 07:42

## 2022-12-11 RX ADMIN — ATORVASTATIN CALCIUM 80 MILLIGRAM(S): 80 TABLET, FILM COATED ORAL at 22:23

## 2022-12-11 RX ADMIN — Medication 81 MILLIGRAM(S): at 11:07

## 2022-12-11 RX ADMIN — Medication 3 MILLILITER(S): at 10:12

## 2022-12-11 RX ADMIN — Medication 25 MILLIGRAM(S): at 17:12

## 2022-12-11 RX ADMIN — HEPARIN SODIUM 5000 UNIT(S): 5000 INJECTION INTRAVENOUS; SUBCUTANEOUS at 22:24

## 2022-12-11 RX ADMIN — Medication 3 MILLILITER(S): at 14:13

## 2022-12-11 RX ADMIN — CHLORHEXIDINE GLUCONATE 1 APPLICATION(S): 213 SOLUTION TOPICAL at 12:28

## 2022-12-11 RX ADMIN — CLOPIDOGREL BISULFATE 75 MILLIGRAM(S): 75 TABLET, FILM COATED ORAL at 11:07

## 2022-12-11 RX ADMIN — AMLODIPINE BESYLATE 10 MILLIGRAM(S): 2.5 TABLET ORAL at 06:23

## 2022-12-11 RX ADMIN — PANTOPRAZOLE SODIUM 40 MILLIGRAM(S): 20 TABLET, DELAYED RELEASE ORAL at 06:23

## 2022-12-11 RX ADMIN — Medication 20 MILLIEQUIVALENT(S): at 10:12

## 2022-12-11 RX ADMIN — TAMSULOSIN HYDROCHLORIDE 0.4 MILLIGRAM(S): 0.4 CAPSULE ORAL at 22:23

## 2022-12-11 NOTE — PHYSICAL THERAPY INITIAL EVALUATION ADULT - PERTINENT HX OF CURRENT PROBLEM, REHAB EVAL
73 y/o M COPD/asthma, HTN, CAD s/p PCI, BPH, GERD p/w worsening SOB a/w acute hypoxic respiratory failure due to respiratory infection. CXR: clear lungs. RVP negative, afebrile.  Likely due to URI causing asthma/COPD exacerbation

## 2022-12-11 NOTE — PHYSICAL THERAPY INITIAL EVALUATION ADULT - ADDITIONAL COMMENTS
Per pt, he lives in an apt with wife and son. Has ~16 steps to enter and +elevator inside. Reports being independent with functional mobility/ADLs without AD. Pt does not own any DME. +drive.

## 2022-12-11 NOTE — PATIENT PROFILE ADULT - FALL HARM RISK - HARM RISK INTERVENTIONS

## 2022-12-11 NOTE — PROGRESS NOTE ADULT - SUBJECTIVE AND OBJECTIVE BOX
Contact Information:  Ron Lo II, MD, MPH  Chief Resident, Internal Medicine  Pager: [phone] ([facility]) /// [phone] ([facility])    ANABELLE GOODSON, MRN-[id number]    Patient is a 72y old  Male who presents with a chief complaint of worsening SOB (10 Dec 2022 09:28)      OVERNIGHT EVENTS/INTERVAL/SUBJECTIVE: Patient evaluated at bedside, states that SOB continues to improve and that it worsens only with activity. Otherwise, patient denies CP, ABD pain, cough, numbness, tingling, N/V, HA, cough.    CONSTITUTIONAL: No weakness. No fatigue. No fever.  HEAD: No head trauma.   EYES: No vision changes.  ENT: No hearing changes or tinnitus. No ear pain. No changes in smell. No nasal congestion or discharge. No sore throat. No voice hoarseness.   NECK: No neck pain or stiffness. No lumps.  RESPIRATORY: +Improving residual SOB. No cough. No wheezing. No hemoptysis.   CARDIOVASCULAR: No chest pain. No palpitations.   GASTROINTESTINAL: No dysphagia. No ABD pain. No distension. No constipation. No diarrhea. No pain with defecation. No hematemesis. No hematochezia or melena.  BACK: No back pain.  GENITOURINARY: No dysuria. No frequency or urgency. No hesitancy. No incontinence. No urinary retention. No suprapubic pain. No hematuria.  EXTREMITY: No swelling.  MUSCULOSKELETAL: No joint pain or swelling. No fractures. No stiffness.    SKIN: No rashes. No itching. No skin, hair, or nail changes.  NEUROLOGICAL: No weakness or paralysis. No lightheadedness or dizziness. No HA. No numbness or tingling.   PSYCHIATRIC: No depression.       OBJECTIVE:  Vital Signs Last 24 Hrs  T(C): 36.7 (11 Dec 2022 19:15), Max: 36.7 (11 Dec 2022 19:15)  T(F): 98 (11 Dec 2022 19:15), Max: 98 (11 Dec 2022 19:15)  HR: 82 (11 Dec 2022 19:15) (73 - 110)  BP: 128/71 (11 Dec 2022 19:15) (112/73 - 166/81)  BP(mean): --  RR: 18 (11 Dec 2022 19:15) (16 - 18)  SpO2: 97% (11 Dec 2022 19:15) (93% - 99%)    Parameters below as of 11 Dec 2022 19:15  Patient On (Oxygen Delivery Method): nasal cannula  O2 Flow (L/min): 2    I&O's Summary    11 Dec 2022 07:01  -  11 Dec 2022 19:41  --------------------------------------------------------  IN: 720 mL / OUT: 500 mL / NET: 220 mL        MEDICATIONS  (STANDING):  albuterol/ipratropium for Nebulization 3 milliLiter(s) Nebulizer every 4 hours  amLODIPine   Tablet 10 milliGRAM(s) Oral daily  aspirin enteric coated 81 milliGRAM(s) Oral daily  atorvastatin 80 milliGRAM(s) Oral at bedtime  chlorhexidine 2% Cloths 1 Application(s) Topical daily  clopidogrel Tablet 75 milliGRAM(s) Oral daily  heparin   Injectable 5000 Unit(s) SubCutaneous every 8 hours  influenza  Vaccine (HIGH DOSE) 0.7 milliLiter(s) IntraMuscular once  losartan 100 milliGRAM(s) Oral daily  metoprolol tartrate 25 milliGRAM(s) Oral two times a day  montelukast 10 milliGRAM(s) Oral daily  multivitamin/minerals/iron Oral Solution (CENTRUM) 15 milliLiter(s) Oral daily  pantoprazole    Tablet 40 milliGRAM(s) Oral before breakfast  predniSONE   Tablet 40 milliGRAM(s) Oral daily  tamsulosin 0.4 milliGRAM(s) Oral at bedtime    MEDICATIONS  (PRN):  benzonatate 100 milliGRAM(s) Oral three times a day PRN Cough    Allergies    No Known Allergies    Intolerances        CONSTITUTIONAL: No acute distress. Awake and alert.  RESPIRATORY: CTAB. No wheezes, rales, or rhonchi. No accessory muscle use. No apparent respiratory distress.  CARDIOVASCULAR: +S1/S2. No audible S3/S4. Regular rate and rhythm. No murmurs, rubs, or gallops. No LE swelling or edema.  GASTROINTESTINAL: Soft, nontender, nondistended. +BS. No rebound or guarding.   BACK: No spinal or paraspinal tenderness. No CVA tenderness.  MUSCULOSKELETAL: Spontaneous movement in all extremities.  NEUROLOGICAL: CN 2-12 grossly intact. No focal deficits. Sensation intact x 4EXT.   PSYCHIATRIC: Appropriate affect. A&Ox3 (oriented to person, place, and time).                              12.8   9.34  )-----------( 292      ( 11 Dec 2022 06:31 )             39.3     PT/INR - ( 10 Dec 2022 00:50 )   PT: 12.6 sec;   INR: 1.09 ratio         PTT - ( 10 Dec 2022 00:50 )  PTT:30.8 sec  12-11    142  |  104  |  21  ----------------------------<  117<H>  3.3<L>   |  25  |  0.96    Ca    9.0      11 Dec 2022 06:31  Mg     1.9     12-10    TPro  8.5<H>  /  Alb  4.0  /  TBili  0.6  /  DBili  x   /  AST  49<H>  /  ALT  14  /  AlkPhos  64  12-10    CAPILLARY BLOOD GLUCOSE        LIVER FUNCTIONS - ( 10 Dec 2022 00:36 )  Alb: 4.0 g/dL / Pro: 8.5 g/dL / ALK PHOS: 64 U/L / ALT: 14 U/L / AST: 49 U/L / GGT: x                       RADIOLOGY AND ADDITIONAL TESTS:    CONSULTANT NOTES REVIEWED:    CARE DISCUSSED WITH THE FOLLOWING CONSULTANTS/PROVIDERS:

## 2022-12-11 NOTE — PROGRESS NOTE ADULT - PROBLEM SELECTOR PLAN 3
- Uncontrolled due to respiratory distress  - C/w amlodipine and Lopressor; Losartan increased to 100 mg (blood pressure improved)

## 2022-12-12 DIAGNOSIS — N17.9 ACUTE KIDNEY FAILURE, UNSPECIFIED: ICD-10-CM

## 2022-12-12 LAB
ANION GAP SERPL CALC-SCNC: 13 MMOL/L — SIGNIFICANT CHANGE UP (ref 5–17)
ANION GAP SERPL CALC-SCNC: 13 MMOL/L — SIGNIFICANT CHANGE UP (ref 5–17)
BUN SERPL-MCNC: 40 MG/DL — HIGH (ref 7–23)
BUN SERPL-MCNC: 43 MG/DL — HIGH (ref 7–23)
CALCIUM SERPL-MCNC: 9.4 MG/DL — SIGNIFICANT CHANGE UP (ref 8.4–10.5)
CALCIUM SERPL-MCNC: 9.7 MG/DL — SIGNIFICANT CHANGE UP (ref 8.4–10.5)
CHLORIDE SERPL-SCNC: 102 MMOL/L — SIGNIFICANT CHANGE UP (ref 96–108)
CHLORIDE SERPL-SCNC: 102 MMOL/L — SIGNIFICANT CHANGE UP (ref 96–108)
CO2 SERPL-SCNC: 24 MMOL/L — SIGNIFICANT CHANGE UP (ref 22–31)
CO2 SERPL-SCNC: 26 MMOL/L — SIGNIFICANT CHANGE UP (ref 22–31)
CREAT ?TM UR-MCNC: 108 MG/DL — SIGNIFICANT CHANGE UP
CREAT SERPL-MCNC: 2.87 MG/DL — HIGH (ref 0.5–1.3)
CREAT SERPL-MCNC: 3.05 MG/DL — HIGH (ref 0.5–1.3)
EGFR: 21 ML/MIN/1.73M2 — LOW
EGFR: 23 ML/MIN/1.73M2 — LOW
GLUCOSE SERPL-MCNC: 106 MG/DL — HIGH (ref 70–99)
GLUCOSE SERPL-MCNC: 145 MG/DL — HIGH (ref 70–99)
HCT VFR BLD CALC: 38.7 % — LOW (ref 39–50)
HGB BLD-MCNC: 12.7 G/DL — LOW (ref 13–17)
MAGNESIUM SERPL-MCNC: 2.1 MG/DL — SIGNIFICANT CHANGE UP (ref 1.6–2.6)
MCHC RBC-ENTMCNC: 29.7 PG — SIGNIFICANT CHANGE UP (ref 27–34)
MCHC RBC-ENTMCNC: 32.8 GM/DL — SIGNIFICANT CHANGE UP (ref 32–36)
MCV RBC AUTO: 90.6 FL — SIGNIFICANT CHANGE UP (ref 80–100)
NRBC # BLD: 0 /100 WBCS — SIGNIFICANT CHANGE UP (ref 0–0)
PHOSPHATE SERPL-MCNC: 3.5 MG/DL — SIGNIFICANT CHANGE UP (ref 2.5–4.5)
PLATELET # BLD AUTO: 305 K/UL — SIGNIFICANT CHANGE UP (ref 150–400)
POTASSIUM SERPL-MCNC: 3.4 MMOL/L — LOW (ref 3.5–5.3)
POTASSIUM SERPL-MCNC: 4.3 MMOL/L — SIGNIFICANT CHANGE UP (ref 3.5–5.3)
POTASSIUM SERPL-SCNC: 3.4 MMOL/L — LOW (ref 3.5–5.3)
POTASSIUM SERPL-SCNC: 4.3 MMOL/L — SIGNIFICANT CHANGE UP (ref 3.5–5.3)
RBC # BLD: 4.27 M/UL — SIGNIFICANT CHANGE UP (ref 4.2–5.8)
RBC # FLD: 14.1 % — SIGNIFICANT CHANGE UP (ref 10.3–14.5)
SODIUM SERPL-SCNC: 139 MMOL/L — SIGNIFICANT CHANGE UP (ref 135–145)
SODIUM SERPL-SCNC: 141 MMOL/L — SIGNIFICANT CHANGE UP (ref 135–145)
SODIUM UR-SCNC: 27 MMOL/L — SIGNIFICANT CHANGE UP
UUN UR-MCNC: 429 MG/DL — SIGNIFICANT CHANGE UP
WBC # BLD: 10.27 K/UL — SIGNIFICANT CHANGE UP (ref 3.8–10.5)
WBC # FLD AUTO: 10.27 K/UL — SIGNIFICANT CHANGE UP (ref 3.8–10.5)

## 2022-12-12 PROCEDURE — 99233 SBSQ HOSP IP/OBS HIGH 50: CPT

## 2022-12-12 RX ORDER — BUDESONIDE AND FORMOTEROL FUMARATE DIHYDRATE 160; 4.5 UG/1; UG/1
2 AEROSOL RESPIRATORY (INHALATION)
Refills: 0 | Status: DISCONTINUED | OUTPATIENT
Start: 2022-12-12 | End: 2022-12-21

## 2022-12-12 RX ORDER — SODIUM CHLORIDE 9 MG/ML
1000 INJECTION, SOLUTION INTRAVENOUS ONCE
Refills: 0 | Status: COMPLETED | OUTPATIENT
Start: 2022-12-12 | End: 2022-12-12

## 2022-12-12 RX ADMIN — LOSARTAN POTASSIUM 100 MILLIGRAM(S): 100 TABLET, FILM COATED ORAL at 06:15

## 2022-12-12 RX ADMIN — Medication 81 MILLIGRAM(S): at 12:14

## 2022-12-12 RX ADMIN — AMLODIPINE BESYLATE 10 MILLIGRAM(S): 2.5 TABLET ORAL at 06:16

## 2022-12-12 RX ADMIN — CLOPIDOGREL BISULFATE 75 MILLIGRAM(S): 75 TABLET, FILM COATED ORAL at 12:14

## 2022-12-12 RX ADMIN — SODIUM CHLORIDE 1000 MILLILITER(S): 9 INJECTION, SOLUTION INTRAVENOUS at 12:14

## 2022-12-12 RX ADMIN — Medication 25 MILLIGRAM(S): at 06:16

## 2022-12-12 RX ADMIN — Medication 3 MILLILITER(S): at 21:12

## 2022-12-12 RX ADMIN — Medication 40 MILLIGRAM(S): at 06:16

## 2022-12-12 RX ADMIN — Medication 3 MILLILITER(S): at 02:17

## 2022-12-12 RX ADMIN — Medication 25 MILLIGRAM(S): at 17:09

## 2022-12-12 RX ADMIN — HEPARIN SODIUM 5000 UNIT(S): 5000 INJECTION INTRAVENOUS; SUBCUTANEOUS at 06:15

## 2022-12-12 RX ADMIN — PANTOPRAZOLE SODIUM 40 MILLIGRAM(S): 20 TABLET, DELAYED RELEASE ORAL at 06:15

## 2022-12-12 RX ADMIN — HEPARIN SODIUM 5000 UNIT(S): 5000 INJECTION INTRAVENOUS; SUBCUTANEOUS at 14:06

## 2022-12-12 RX ADMIN — TAMSULOSIN HYDROCHLORIDE 0.4 MILLIGRAM(S): 0.4 CAPSULE ORAL at 21:12

## 2022-12-12 RX ADMIN — Medication 15 MILLILITER(S): at 12:26

## 2022-12-12 RX ADMIN — BUDESONIDE AND FORMOTEROL FUMARATE DIHYDRATE 2 PUFF(S): 160; 4.5 AEROSOL RESPIRATORY (INHALATION) at 17:13

## 2022-12-12 RX ADMIN — CHLORHEXIDINE GLUCONATE 1 APPLICATION(S): 213 SOLUTION TOPICAL at 12:14

## 2022-12-12 RX ADMIN — Medication 3 MILLILITER(S): at 14:06

## 2022-12-12 RX ADMIN — HEPARIN SODIUM 5000 UNIT(S): 5000 INJECTION INTRAVENOUS; SUBCUTANEOUS at 21:12

## 2022-12-12 RX ADMIN — Medication 3 MILLILITER(S): at 10:10

## 2022-12-12 RX ADMIN — ATORVASTATIN CALCIUM 80 MILLIGRAM(S): 80 TABLET, FILM COATED ORAL at 21:12

## 2022-12-12 RX ADMIN — MONTELUKAST 10 MILLIGRAM(S): 4 TABLET, CHEWABLE ORAL at 12:14

## 2022-12-12 NOTE — PROGRESS NOTE ADULT - PROBLEM SELECTOR PLAN 2
- C/w albuterol and prednisone  - Monitor respiratory status  - Wean supplemental oxygen  - Tessalon perles for cough - C/w albuterol and prednisone  - Monitor respiratory status  - Wean supplemental oxygen  - Tessalon perles for cough  - will try to wean off of BiPAP HS, check VBG in AM

## 2022-12-12 NOTE — PROGRESS NOTE ADULT - PROBLEM SELECTOR PLAN 1
- Likely due to URI causing asthma/COPD exacerbation  - RVP negative  - Currently on NC 2L, wean to RA  - C/w prednisone 40  - C/w albuteral Q6H  - Tessalon perles for cough  - Monitor respiratory status - Likely due to URI causing asthma/COPD exacerbation  - RVP negative  - Currently on NC 2L, wean to RA (home 2L O2)  - C/w prednisone 40mg x 5 days till 12/15  - C/w albuteral Q6H  - Tessalon perles for cough  - Monitor respiratory status

## 2022-12-12 NOTE — PROGRESS NOTE ADULT - ASSESSMENT
71 y/o M COPD/asthma, HTN, CAD s/p PCI, BPH, GERD p/w worsening SOB a/w acute hypoxic respiratory failure due to URI. 73 y/o M COPD/asthma, HTN, CAD s/p PCI, BPH, GERD p/w worsening SOB a/w acute hypoxic respiratory failure, COPD exacerbation due to URI.

## 2022-12-12 NOTE — PROGRESS NOTE ADULT - SUBJECTIVE AND OBJECTIVE BOX
Patient is a 72y old  Male who presents with a chief complaint of worsening SOB (11 Dec 2022 11:00)      SUBJECTIVE / OVERNIGHT EVENTS: Patient seen and examined at bedside. States he feels ok continues to have cough, and WYMAN. SOB at rest has improved. No acute events overnight     ROS:  All other review of systems negative    Allergies    No Known Allergies    Intolerances        MEDICATIONS  (STANDING):  albuterol/ipratropium for Nebulization 3 milliLiter(s) Nebulizer every 4 hours  amLODIPine   Tablet 10 milliGRAM(s) Oral daily  aspirin enteric coated 81 milliGRAM(s) Oral daily  atorvastatin 80 milliGRAM(s) Oral at bedtime  chlorhexidine 2% Cloths 1 Application(s) Topical daily  clopidogrel Tablet 75 milliGRAM(s) Oral daily  heparin   Injectable 5000 Unit(s) SubCutaneous every 8 hours  influenza  Vaccine (HIGH DOSE) 0.7 milliLiter(s) IntraMuscular once  lactated ringers Bolus 1000 milliLiter(s) IV Bolus once  metoprolol tartrate 25 milliGRAM(s) Oral two times a day  montelukast 10 milliGRAM(s) Oral daily  multivitamin/minerals/iron Oral Solution (CENTRUM) 15 milliLiter(s) Oral daily  pantoprazole    Tablet 40 milliGRAM(s) Oral before breakfast  predniSONE   Tablet 40 milliGRAM(s) Oral daily  tamsulosin 0.4 milliGRAM(s) Oral at bedtime    MEDICATIONS  (PRN):  benzonatate 100 milliGRAM(s) Oral three times a day PRN Cough      Vital Signs Last 24 Hrs  T(C): 36.6 (12 Dec 2022 04:30), Max: 36.9 (11 Dec 2022 20:44)  T(F): 97.9 (12 Dec 2022 04:30), Max: 98.4 (11 Dec 2022 20:44)  HR: 96 (12 Dec 2022 09:15) (80 - 104)  BP: 154/77 (12 Dec 2022 06:13) (128/71 - 154/77)  BP(mean): --  RR: 19 (12 Dec 2022 04:30) (18 - 25)  SpO2: 94% (12 Dec 2022 09:15) (94% - 99%)    Parameters below as of 12 Dec 2022 04:30  Patient On (Oxygen Delivery Method): room air      CAPILLARY BLOOD GLUCOSE        I&O's Summary    11 Dec 2022 07:01  -  12 Dec 2022 07:00  --------------------------------------------------------  IN: 920 mL / OUT: 700 mL / NET: 220 mL    12 Dec 2022 07:01  -  12 Dec 2022 11:42  --------------------------------------------------------  IN: 480 mL / OUT: 0 mL / NET: 480 mL        PHYSICAL EXAM:  GENERAL: NAD, well-developed +2L NC  HEAD:  Atraumatic, Normocephalic, dry mucus membranes   EYES: EOMI, PERRLA, conjunctiva and sclera clear  NECK: Supple, No JVD  CHEST/LUNG: Clear to auscultation bilaterally; No wheeze  HEART: Regular rate and rhythm; No murmurs, rubs, or gallops  ABDOMEN: Soft, Nontender, Nondistended; Bowel sounds present  EXTREMITIES:  2+ Peripheral Pulses, No clubbing, cyanosis, or edema  NEUROLOGY: AAOx3, non-focal  PSYCH: calm  SKIN: No rashes or lesions    LABS:                        12.7   10.27 )-----------( 305      ( 12 Dec 2022 07:05 )             38.7     12-12    139  |  102  |  43<H>  ----------------------------<  145<H>  4.3   |  24  |  3.05<H>    Ca    9.7      12 Dec 2022 10:23  Phos  3.5     12-12  Mg     2.1     12-12                RADIOLOGY & ADDITIONAL TESTS:  Care Discussed with Consultants/Other Providers: Medicine ACP

## 2022-12-12 NOTE — PROGRESS NOTE ADULT - PROBLEM SELECTOR PLAN 4
- s/p stents  - C/w DAPT, statin, and Lopressor - Uncontrolled due to respiratory distress  - C/w amlodipine and Lopressor;  - holding Losartan given new OWEN

## 2022-12-13 LAB
ANION GAP SERPL CALC-SCNC: 17 MMOL/L — SIGNIFICANT CHANGE UP (ref 5–17)
APPEARANCE UR: CLEAR — SIGNIFICANT CHANGE UP
BILIRUB UR-MCNC: NEGATIVE — SIGNIFICANT CHANGE UP
BUN SERPL-MCNC: 60 MG/DL — HIGH (ref 7–23)
CALCIUM SERPL-MCNC: 9.7 MG/DL — SIGNIFICANT CHANGE UP (ref 8.4–10.5)
CHLORIDE SERPL-SCNC: 100 MMOL/L — SIGNIFICANT CHANGE UP (ref 96–108)
CO2 SERPL-SCNC: 23 MMOL/L — SIGNIFICANT CHANGE UP (ref 22–31)
COLOR SPEC: COLORLESS — SIGNIFICANT CHANGE UP
CREAT SERPL-MCNC: 3.34 MG/DL — HIGH (ref 0.5–1.3)
DIFF PNL FLD: NEGATIVE — SIGNIFICANT CHANGE UP
EGFR: 19 ML/MIN/1.73M2 — LOW
GLUCOSE SERPL-MCNC: 97 MG/DL — SIGNIFICANT CHANGE UP (ref 70–99)
GLUCOSE UR QL: NEGATIVE — SIGNIFICANT CHANGE UP
KETONES UR-MCNC: NEGATIVE — SIGNIFICANT CHANGE UP
LEUKOCYTE ESTERASE UR-ACNC: NEGATIVE — SIGNIFICANT CHANGE UP
NITRITE UR-MCNC: NEGATIVE — SIGNIFICANT CHANGE UP
PH UR: 5 — SIGNIFICANT CHANGE UP (ref 5–8)
POTASSIUM SERPL-MCNC: 3.9 MMOL/L — SIGNIFICANT CHANGE UP (ref 3.5–5.3)
POTASSIUM SERPL-SCNC: 3.9 MMOL/L — SIGNIFICANT CHANGE UP (ref 3.5–5.3)
PROT UR-MCNC: NEGATIVE — SIGNIFICANT CHANGE UP
SODIUM SERPL-SCNC: 140 MMOL/L — SIGNIFICANT CHANGE UP (ref 135–145)
SP GR SPEC: 1.01 — LOW (ref 1.01–1.02)
UROBILINOGEN FLD QL: NEGATIVE — SIGNIFICANT CHANGE UP

## 2022-12-13 PROCEDURE — 76770 US EXAM ABDO BACK WALL COMP: CPT | Mod: 26

## 2022-12-13 PROCEDURE — 99223 1ST HOSP IP/OBS HIGH 75: CPT

## 2022-12-13 PROCEDURE — 99233 SBSQ HOSP IP/OBS HIGH 50: CPT

## 2022-12-13 RX ORDER — SODIUM CHLORIDE 9 MG/ML
1000 INJECTION INTRAMUSCULAR; INTRAVENOUS; SUBCUTANEOUS
Refills: 0 | Status: DISCONTINUED | OUTPATIENT
Start: 2022-12-13 | End: 2022-12-14

## 2022-12-13 RX ADMIN — HEPARIN SODIUM 5000 UNIT(S): 5000 INJECTION INTRAVENOUS; SUBCUTANEOUS at 21:39

## 2022-12-13 RX ADMIN — BUDESONIDE AND FORMOTEROL FUMARATE DIHYDRATE 2 PUFF(S): 160; 4.5 AEROSOL RESPIRATORY (INHALATION) at 17:13

## 2022-12-13 RX ADMIN — HEPARIN SODIUM 5000 UNIT(S): 5000 INJECTION INTRAVENOUS; SUBCUTANEOUS at 13:17

## 2022-12-13 RX ADMIN — SODIUM CHLORIDE 100 MILLILITER(S): 9 INJECTION INTRAMUSCULAR; INTRAVENOUS; SUBCUTANEOUS at 09:37

## 2022-12-13 RX ADMIN — Medication 15 MILLILITER(S): at 13:16

## 2022-12-13 RX ADMIN — Medication 25 MILLIGRAM(S): at 05:13

## 2022-12-13 RX ADMIN — CLOPIDOGREL BISULFATE 75 MILLIGRAM(S): 75 TABLET, FILM COATED ORAL at 13:16

## 2022-12-13 RX ADMIN — Medication 81 MILLIGRAM(S): at 13:16

## 2022-12-13 RX ADMIN — Medication 3 MILLILITER(S): at 21:39

## 2022-12-13 RX ADMIN — CHLORHEXIDINE GLUCONATE 1 APPLICATION(S): 213 SOLUTION TOPICAL at 13:19

## 2022-12-13 RX ADMIN — Medication 40 MILLIGRAM(S): at 05:13

## 2022-12-13 RX ADMIN — Medication 25 MILLIGRAM(S): at 17:13

## 2022-12-13 RX ADMIN — AMLODIPINE BESYLATE 10 MILLIGRAM(S): 2.5 TABLET ORAL at 05:13

## 2022-12-13 RX ADMIN — ATORVASTATIN CALCIUM 80 MILLIGRAM(S): 80 TABLET, FILM COATED ORAL at 21:39

## 2022-12-13 RX ADMIN — HEPARIN SODIUM 5000 UNIT(S): 5000 INJECTION INTRAVENOUS; SUBCUTANEOUS at 05:13

## 2022-12-13 RX ADMIN — PANTOPRAZOLE SODIUM 40 MILLIGRAM(S): 20 TABLET, DELAYED RELEASE ORAL at 05:13

## 2022-12-13 RX ADMIN — BUDESONIDE AND FORMOTEROL FUMARATE DIHYDRATE 2 PUFF(S): 160; 4.5 AEROSOL RESPIRATORY (INHALATION) at 05:14

## 2022-12-13 RX ADMIN — MONTELUKAST 10 MILLIGRAM(S): 4 TABLET, CHEWABLE ORAL at 13:17

## 2022-12-13 RX ADMIN — TAMSULOSIN HYDROCHLORIDE 0.4 MILLIGRAM(S): 0.4 CAPSULE ORAL at 21:39

## 2022-12-13 NOTE — PROGRESS NOTE ADULT - PROBLEM SELECTOR PLAN 3
bl Cr ~0.6, currently 3.34, urine studies reviewed   FeNA: pre-renal  c/w IVF   losartan d/c'ed  UA  pending  renal u/s ordered   d/w RN to check post void bladder scan   - Nephrology consulted

## 2022-12-13 NOTE — PROGRESS NOTE ADULT - PROBLEM SELECTOR PLAN 2
- C/w albuterol and prednisone  - Monitor respiratory status  - Wean supplemental oxygen  - Tessalon perles for cough  - Wean off of BIPAP at night, satting well on 2L NC

## 2022-12-13 NOTE — CONSULT NOTE ADULT - SUBJECTIVE AND OBJECTIVE BOX
[facility] DIVISION OF KIDNEY DISEASES AND HYPERTENSION -- INITIAL CONSULT NOTE  --------------------------------------------------------------------------------    --------------------------------------------------------------------------------  HPI:        PAST HISTORY  --------------------------------------------------------------------------------  PAST MEDICAL & SURGICAL HISTORY:  COPD (chronic obstructive pulmonary disease)  s/p PCI      Asthma      CAD (coronary artery disease)  s/p pci      BPH (benign prostatic hyperplasia)      GERD (gastroesophageal reflux disease)      No significant past surgical history        FAMILY HISTORY:  FH: diabetes mellitus (Mother)    FH: HTN (hypertension) (Mother, Sibling)      PAST SOCIAL HISTORY:    ALLERGIES & MEDICATIONS  --------------------------------------------------------------------------------  Allergies    No Known Allergies    Intolerances      Standing Inpatient Medications  albuterol/ipratropium for Nebulization 3 milliLiter(s) Nebulizer every 4 hours  amLODIPine   Tablet 10 milliGRAM(s) Oral daily  aspirin enteric coated 81 milliGRAM(s) Oral daily  atorvastatin 80 milliGRAM(s) Oral at bedtime  budesonide  80 MICROgram(s)/formoterol 4.5 MICROgram(s) Inhaler 2 Puff(s) Inhalation two times a day  chlorhexidine 2% Cloths 1 Application(s) Topical daily  clopidogrel Tablet 75 milliGRAM(s) Oral daily  heparin   Injectable 5000 Unit(s) SubCutaneous every 8 hours  influenza  Vaccine (HIGH DOSE) 0.7 milliLiter(s) IntraMuscular once  metoprolol tartrate 25 milliGRAM(s) Oral two times a day  montelukast 10 milliGRAM(s) Oral daily  multivitamin/minerals/iron Oral Solution (CENTRUM) 15 milliLiter(s) Oral daily  pantoprazole    Tablet 40 milliGRAM(s) Oral before breakfast  predniSONE   Tablet 40 milliGRAM(s) Oral daily  sodium chloride 0.9%. 1000 milliLiter(s) IV Continuous <Continuous>  tamsulosin 0.4 milliGRAM(s) Oral at bedtime    PRN Inpatient Medications  benzonatate 100 milliGRAM(s) Oral three times a day PRN      REVIEW OF SYSTEMS  --------------------------------------------------------------------------------  Constitutional:No Fever,Chills , Fatigue , Weight change   HEENT:No Blurred vision,Eye Pain ,Headache, Runny nose,Sore Throat   Respiratory: No Cough, Wheezing ,Shortness of breath  Cardiovascular: No Chest Pain, Palpitations, WYMAN, PND, Orthopnea  Gastrointestinal:No Abdominal Pain, Diarrhea, Constipation, Hemorrhoids, Nausea,  Vomiting  Genitourinary: No Nocturia, Dysuria, Incontinence  Extremities: No Swelling, Joint Pain  Neurologic:No Focal deficit, Paresthesia,  Syncope  Lymphatic: No Swelling, Lymphadenopathy   Skin: No Rash, Ecchymoses, Wounds, Lesions  Psychiatry: No Depression ,  Suicidal/Homicidal Ideation, Anxiety, Sleep Disturbances        All other systems were reviewed and are negative, except as noted.    VITALS/PHYSICAL EXAM  --------------------------------------------------------------------------------  T(C): 36.4 (12-13-22 @ 04:17), Max: 36.6 (12-12-22 @ 11:58)  HR: 88 (12-13-22 @ 04:17) (76 - 105)  BP: 144/81 (12-13-22 @ 04:17) (143/77 - 179/88)  RR: 18 (12-13-22 @ 04:17) (18 - 18)  SpO2: 88% (12-13-22 @ 08:36) (88% - 97%)  Wt(kg): --      Daily     Daily   I&O's Summary    12 Dec 2022 07:01  -  13 Dec 2022 07:00  --------------------------------------------------------  IN: 1200 mL / OUT: 620 mL / NET: 580 mL    13 Dec 2022 07:01  -  13 Dec 2022 10:10  --------------------------------------------------------  IN: 0 mL / OUT: 300 mL / NET: -300 mL          12-12-22 @ 07:01  -  12-13-22 @ 07:00  --------------------------------------------------------  IN: 1200 mL / OUT: 620 mL / NET: 580 mL    12-13-22 @ 07:01  -  12-13-22 @ 10:10  --------------------------------------------------------  IN: 0 mL / OUT: 300 mL / NET: -300 mL        Physical Exam:  Gen: NAD   HEENT: anicteric  Pulm: CTA B/L  CV: RRR, Normal S1 S2  Abd: soft, nontender, nondistended  CNS: AAO x 3, No asterixis  : No Schmidt  LE: Warm, no noel  Skin: Warm, without rashes  Vascular access: none        LABS/STUDIES  --------------------------------------------------------------------------------              12.7   10.27 >-----------<  305      [12-12-22 @ 07:05]              38.7     140  |  100  |  60  ----------------------------<  97      [12-13-22 @ 06:28]  3.9   |  23  |  3.34        Ca     9.7     [12-13-22 @ 06:28]      Mg     2.1     [12-12-22 @ 07:05]      Phos  3.5     [12-12-22 @ 07:05]            Creatinine Trend:  SCr 3.34 [12-13 @ 06:28]  SCr 3.05 [12-12 @ 10:23]  SCr 2.87 [12-12 @ 07:05]  SCr 0.96 [12-11 @ 06:31]  SCr 0.67 [12-10 @ 00:36]    Urinalysis - [10-18-21 @ 04:19]      Color Light Yellow / Appearance Clear / SG 1.016 / pH 6.0      Gluc Negative / Ketone Negative  / Bili Negative / Urobili Negative       Blood Negative / Protein Trace / Leuk Est Negative / Nitrite Negative      RBC 1 / WBC 4 / Hyaline 0 / Gran  / Sq Epi  / Non Sq Epi 1 / Bacteria Negative    Urine Creatinine 108      [12-12-22 @ 10:26]  Urine Sodium 27      [12-12-22 @ 10:26]  Urine Urea Nitrogen 429      [12-12-22 @ 10:25]    HbA1c 5.3      [09-23-19 @ 08:58]    HCV 0.14, Nonreact      [09-23-19 @ 08:33]        Radiology  --------------------------------------------------------------------------------    --------------------------------------------------------------------------------  Thad Santana  [id number]       [facility] DIVISION OF KIDNEY DISEASES AND HYPERTENSION -- INITIAL CONSULT NOTE  --------------------------------------------------------------------------------    --------------------------------------------------------------------------------  HPI:    This is a 72 year old male with PMH BPH  COPD/ Asthma (not on home oxygen) with previous history of exacerbations on home oxygen. Patient noted to have a normal creatinine on admission Cr: 0.67 (12/10). Patient creatinine most recently however noted to be 3.34. (12/13). Patient reports being compliant on his home Tamsulosin denies taking any supplements OTC medications.   Currently feeling better with his breathing however still on oxygen         PAST HISTORY  --------------------------------------------------------------------------------  PAST MEDICAL & SURGICAL HISTORY:  COPD (chronic obstructive pulmonary disease)  s/p PCI      Asthma      CAD (coronary artery disease)  s/p pci      BPH (benign prostatic hyperplasia)      GERD (gastroesophageal reflux disease)      No significant past surgical history        FAMILY HISTORY:  FH: diabetes mellitus (Mother)    FH: HTN (hypertension) (Mother, Sibling)      PAST SOCIAL HISTORY:    ALLERGIES & MEDICATIONS  --------------------------------------------------------------------------------  Allergies    No Known Allergies    Intolerances      Standing Inpatient Medications  albuterol/ipratropium for Nebulization 3 milliLiter(s) Nebulizer every 4 hours  amLODIPine   Tablet 10 milliGRAM(s) Oral daily  aspirin enteric coated 81 milliGRAM(s) Oral daily  atorvastatin 80 milliGRAM(s) Oral at bedtime  budesonide  80 MICROgram(s)/formoterol 4.5 MICROgram(s) Inhaler 2 Puff(s) Inhalation two times a day  chlorhexidine 2% Cloths 1 Application(s) Topical daily  clopidogrel Tablet 75 milliGRAM(s) Oral daily  heparin   Injectable 5000 Unit(s) SubCutaneous every 8 hours  influenza  Vaccine (HIGH DOSE) 0.7 milliLiter(s) IntraMuscular once  metoprolol tartrate 25 milliGRAM(s) Oral two times a day  montelukast 10 milliGRAM(s) Oral daily  multivitamin/minerals/iron Oral Solution (CENTRUM) 15 milliLiter(s) Oral daily  pantoprazole    Tablet 40 milliGRAM(s) Oral before breakfast  predniSONE   Tablet 40 milliGRAM(s) Oral daily  sodium chloride 0.9%. 1000 milliLiter(s) IV Continuous <Continuous>  tamsulosin 0.4 milliGRAM(s) Oral at bedtime    PRN Inpatient Medications  benzonatate 100 milliGRAM(s) Oral three times a day PRN      REVIEW OF SYSTEMS  --------------------------------------------------------------------------------  Constitutional:No Fever,Chills , Fatigue , Weight change   HEENT:No Blurred vision,Eye Pain ,Headache, Runny nose,Sore Throat   Respiratory: No Cough, Wheezing ,Shortness of breath  Cardiovascular: No Chest Pain, Palpitations, WYMAN, PND, Orthopnea  Gastrointestinal:No Abdominal Pain, Diarrhea, Constipation, Hemorrhoids, Nausea,  Vomiting  Genitourinary: No Nocturia, Dysuria, Incontinence  Extremities: No Swelling, Joint Pain  Neurologic:No Focal deficit, Paresthesia,  Syncope  Lymphatic: No Swelling, Lymphadenopathy   Skin: No Rash, Ecchymoses, Wounds, Lesions  Psychiatry: No Depression ,  Suicidal/Homicidal Ideation, Anxiety, Sleep Disturbances        All other systems were reviewed and are negative, except as noted.    VITALS/PHYSICAL EXAM  --------------------------------------------------------------------------------  T(C): 36.4 (12-13-22 @ 04:17), Max: 36.6 (12-12-22 @ 11:58)  HR: 88 (12-13-22 @ 04:17) (76 - 105)  BP: 144/81 (12-13-22 @ 04:17) (143/77 - 179/88)  RR: 18 (12-13-22 @ 04:17) (18 - 18)  SpO2: 88% (12-13-22 @ 08:36) (88% - 97%)  Wt(kg): --      Daily     Daily   I&O's Summary    12 Dec 2022 07:01  -  13 Dec 2022 07:00  --------------------------------------------------------  IN: 1200 mL / OUT: 620 mL / NET: 580 mL    13 Dec 2022 07:01  -  13 Dec 2022 10:10  --------------------------------------------------------  IN: 0 mL / OUT: 300 mL / NET: -300 mL          12-12-22 @ 07:01  -  12-13-22 @ 07:00  --------------------------------------------------------  IN: 1200 mL / OUT: 620 mL / NET: 580 mL    12-13-22 @ 07:01  -  12-13-22 @ 10:10  --------------------------------------------------------  IN: 0 mL / OUT: 300 mL / NET: -300 mL        Physical Exam:  Gen: NAD   HEENT: anicteric  Pulm: CTA B/L  CV: RRR, Normal S1 S2  Abd: soft, nontender, nondistended  CNS: AAO x 3, No asterixis  : No Schmidt  LE: Warm, no noel  Skin: Warm, without rashes  Vascular access: none        LABS/STUDIES  --------------------------------------------------------------------------------              12.7   10.27 >-----------<  305      [12-12-22 @ 07:05]              38.7     140  |  100  |  60  ----------------------------<  97      [12-13-22 @ 06:28]  3.9   |  23  |  3.34        Ca     9.7     [12-13-22 @ 06:28]      Mg     2.1     [12-12-22 @ 07:05]      Phos  3.5     [12-12-22 @ 07:05]            Creatinine Trend:  SCr 3.34 [12-13 @ 06:28]  SCr 3.05 [12-12 @ 10:23]  SCr 2.87 [12-12 @ 07:05]  SCr 0.96 [12-11 @ 06:31]  SCr 0.67 [12-10 @ 00:36]    Urinalysis - [10-18-21 @ 04:19]      Color Light Yellow / Appearance Clear / SG 1.016 / pH 6.0      Gluc Negative / Ketone Negative  / Bili Negative / Urobili Negative       Blood Negative / Protein Trace / Leuk Est Negative / Nitrite Negative      RBC 1 / WBC 4 / Hyaline 0 / Gran  / Sq Epi  / Non Sq Epi 1 / Bacteria Negative    Urine Creatinine 108      [12-12-22 @ 10:26]  Urine Sodium 27      [12-12-22 @ 10:26]  Urine Urea Nitrogen 429      [12-12-22 @ 10:25]    HbA1c 5.3      [09-23-19 @ 08:58]    HCV 0.14, Nonreact      [09-23-19 @ 08:33]        Radiology  --------------------------------------------------------------------------------    --------------------------------------------------------------------------------  Thad Santana  [phone]

## 2022-12-13 NOTE — PROGRESS NOTE ADULT - ASSESSMENT
73 y/o M COPD/asthma, HTN, CAD s/p PCI, BPH, GERD p/w worsening SOB a/w acute hypoxic respiratory failure, COPD exacerbation due to URI.

## 2022-12-13 NOTE — PROGRESS NOTE ADULT - PROBLEM SELECTOR PLAN 1
- Likely due to URI causing asthma/COPD exacerbation  - RVP negative  - Currently on NC 2L, wean to RA (home 2L O2)  - C/w prednisone 40mg x 5 days till 12/15  - C/w albuteral Q6H  - Tessalon perles for cough  - Monitor respiratory status

## 2022-12-13 NOTE — PROGRESS NOTE ADULT - SUBJECTIVE AND OBJECTIVE BOX
Patient is a 72y old  Male who presents with a chief complaint of worsening SOB (13 Dec 2022 10:09)      SUBJECTIVE / OVERNIGHT EVENTS: Patient seen and examined at bedside. States that he feels well denies any CP, abd pain and n.v. states SOB is better and cough is improving     ROS:  All other review of systems negative    Allergies    No Known Allergies    Intolerances        MEDICATIONS  (STANDING):  albuterol/ipratropium for Nebulization 3 milliLiter(s) Nebulizer every 4 hours  amLODIPine   Tablet 10 milliGRAM(s) Oral daily  aspirin enteric coated 81 milliGRAM(s) Oral daily  atorvastatin 80 milliGRAM(s) Oral at bedtime  budesonide  80 MICROgram(s)/formoterol 4.5 MICROgram(s) Inhaler 2 Puff(s) Inhalation two times a day  chlorhexidine 2% Cloths 1 Application(s) Topical daily  clopidogrel Tablet 75 milliGRAM(s) Oral daily  heparin   Injectable 5000 Unit(s) SubCutaneous every 8 hours  influenza  Vaccine (HIGH DOSE) 0.7 milliLiter(s) IntraMuscular once  metoprolol tartrate 25 milliGRAM(s) Oral two times a day  montelukast 10 milliGRAM(s) Oral daily  multivitamin/minerals/iron Oral Solution (CENTRUM) 15 milliLiter(s) Oral daily  pantoprazole    Tablet 40 milliGRAM(s) Oral before breakfast  predniSONE   Tablet 40 milliGRAM(s) Oral daily  sodium chloride 0.9%. 1000 milliLiter(s) (100 mL/Hr) IV Continuous <Continuous>  tamsulosin 0.4 milliGRAM(s) Oral at bedtime    MEDICATIONS  (PRN):  benzonatate 100 milliGRAM(s) Oral three times a day PRN Cough      Vital Signs Last 24 Hrs  T(C): 36.4 (13 Dec 2022 04:17), Max: 36.5 (12 Dec 2022 20:08)  T(F): 97.6 (13 Dec 2022 04:17), Max: 97.7 (12 Dec 2022 20:08)  HR: 88 (13 Dec 2022 04:17) (76 - 105)  BP: 144/81 (13 Dec 2022 04:17) (144/81 - 179/88)  BP(mean): --  RR: 18 (13 Dec 2022 04:17) (18 - 18)  SpO2: 88% (13 Dec 2022 08:36) (88% - 97%)    Parameters below as of 13 Dec 2022 08:36  Patient On (Oxygen Delivery Method): room air      CAPILLARY BLOOD GLUCOSE        I&O's Summary    12 Dec 2022 07:01  -  13 Dec 2022 07:00  --------------------------------------------------------  IN: 1200 mL / OUT: 620 mL / NET: 580 mL    13 Dec 2022 07:01  -  13 Dec 2022 12:16  --------------------------------------------------------  IN: 0 mL / OUT: 600 mL / NET: -600 mL        PHYSICAL EXAM:  GENERAL: NAD, well-developed+2L NC  HEAD:  Atraumatic, Normocephalic  EYES: EOMI, PERRLA, conjunctiva and sclera clear  NECK: Supple, No JVD  CHEST/LUNG: Clear to auscultation bilaterally; No wheeze  HEART: Regular rate and rhythm; No murmurs, rubs, or gallops  ABDOMEN: Soft, Nontender, Nondistended; Bowel sounds present  EXTREMITIES:  2+ Peripheral Pulses, No clubbing, cyanosis, or edema  NEUROLOGY: AAOx3, non-focal  PSYCH: calm  SKIN: No rashes or lesions    LABS:                        12.7   10.27 )-----------( 305      ( 12 Dec 2022 07:05 )             38.7     12-13    140  |  100  |  60<H>  ----------------------------<  97  3.9   |  23  |  3.34<H>    Ca    9.7      13 Dec 2022 06:28  Phos  3.5     12-12  Mg     2.1     12-12            Urinalysis Basic - ( 13 Dec 2022 10:34 )    Color: Colorless / Appearance: Clear / S.008 / pH: x  Gluc: x / Ketone: Negative  / Bili: Negative / Urobili: Negative   Blood: x / Protein: Negative / Nitrite: Negative   Leuk Esterase: Negative / RBC: x / WBC x   Sq Epi: x / Non Sq Epi: x / Bacteria: x        RADIOLOGY & ADDITIONAL TESTS:    Care Discussed with Consultants/Other Providers: Medicine ACP

## 2022-12-13 NOTE — PROGRESS NOTE ADULT - PROBLEM SELECTOR PLAN 6
Subjective: Jermain Nguyen is a 6 C/w PPI

## 2022-12-13 NOTE — CONSULT NOTE ADULT - PROBLEM SELECTOR RECOMMENDATION 2
Patient with serum creatinine of 0.67 on 12/10 noted to have been uptrending since 12/12 to 2.87 and most recently 3.34.  Patient blood pressure noted to be fluctuating 229/120 on admission most recently 144/81. no documented episodes of hypotension. No documented contrast administration.  UOP: 600 <- 620. Patient with borderline Anisa: 27 FeNA Prerenal.       Please perform bladder sonogram, Please perform renal ultrasound, Please obtain urine studies (Anisa, Ucl, UK, Uurea, UA, UPC). Patient with serum creatinine of 0.67 on 12/10 noted to have been uptrending since 12/12 to 2.87 and most recently 3.34.. Roberta likely in the setting of hemodynamic changes.   Patient blood pressure noted to be fluctuating 229/120 on admission most recently 144/81. no documented episodes of hypotension. No documented contrast administration.  UOP: 600 <- 620. Patient with borderline Anisa: 27 FeNA Prerenal.     Please perform bladder sonogram, Please perform renal ultrasound, Please obtain urine studies (Anisa, Ucl, UK, Uurea, UA, UPC).    Given patient euvolemic presentation, pre renal FeNa would given trial of fluids with saline 500 ml.

## 2022-12-13 NOTE — CONSULT NOTE ADULT - PROBLEM SELECTOR RECOMMENDATION 9
/120 -> 144/81, Patient on amlodipine 10mg, Losartan 50 m, Metoprolol 25. BP uncontrolled on admission, now improving.  Agree holding losartan, Continue Amlodipine and Metoprolol /120 -> 144/81, Patient on amlodipine 10mg, Losartan 50 mg, Metoprolol 25. BP uncontrolled on admission, now improving.    Agree holding losartan, Continue Amlodipine and Metoprolol

## 2022-12-13 NOTE — PROGRESS NOTE ADULT - PROBLEM SELECTOR PLAN 4
- Uncontrolled due to respiratory distress  - C/w amlodipine and Lopressor;  - holding Losartan given new OWEN

## 2022-12-14 DIAGNOSIS — J44.1 CHRONIC OBSTRUCTIVE PULMONARY DISEASE WITH (ACUTE) EXACERBATION: ICD-10-CM

## 2022-12-14 LAB
ANION GAP SERPL CALC-SCNC: 12 MMOL/L — SIGNIFICANT CHANGE UP (ref 5–17)
BUN SERPL-MCNC: 58 MG/DL — HIGH (ref 7–23)
CALCIUM SERPL-MCNC: 9.1 MG/DL — SIGNIFICANT CHANGE UP (ref 8.4–10.5)
CHLORIDE SERPL-SCNC: 105 MMOL/L — SIGNIFICANT CHANGE UP (ref 96–108)
CO2 SERPL-SCNC: 25 MMOL/L — SIGNIFICANT CHANGE UP (ref 22–31)
CREAT SERPL-MCNC: 2.42 MG/DL — HIGH (ref 0.5–1.3)
EGFR: 28 ML/MIN/1.73M2 — LOW
GLUCOSE SERPL-MCNC: 103 MG/DL — HIGH (ref 70–99)
HCT VFR BLD CALC: 37.7 % — LOW (ref 39–50)
HGB BLD-MCNC: 12.1 G/DL — LOW (ref 13–17)
MCHC RBC-ENTMCNC: 29.1 PG — SIGNIFICANT CHANGE UP (ref 27–34)
MCHC RBC-ENTMCNC: 32.1 GM/DL — SIGNIFICANT CHANGE UP (ref 32–36)
MCV RBC AUTO: 90.6 FL — SIGNIFICANT CHANGE UP (ref 80–100)
NRBC # BLD: 0 /100 WBCS — SIGNIFICANT CHANGE UP (ref 0–0)
PLATELET # BLD AUTO: 318 K/UL — SIGNIFICANT CHANGE UP (ref 150–400)
POTASSIUM SERPL-MCNC: 3.9 MMOL/L — SIGNIFICANT CHANGE UP (ref 3.5–5.3)
POTASSIUM SERPL-SCNC: 3.9 MMOL/L — SIGNIFICANT CHANGE UP (ref 3.5–5.3)
RBC # BLD: 4.16 M/UL — LOW (ref 4.2–5.8)
RBC # FLD: 14.3 % — SIGNIFICANT CHANGE UP (ref 10.3–14.5)
SODIUM SERPL-SCNC: 142 MMOL/L — SIGNIFICANT CHANGE UP (ref 135–145)
WBC # BLD: 9.49 K/UL — SIGNIFICANT CHANGE UP (ref 3.8–10.5)
WBC # FLD AUTO: 9.49 K/UL — SIGNIFICANT CHANGE UP (ref 3.8–10.5)

## 2022-12-14 PROCEDURE — 99233 SBSQ HOSP IP/OBS HIGH 50: CPT | Mod: GC

## 2022-12-14 PROCEDURE — 99233 SBSQ HOSP IP/OBS HIGH 50: CPT

## 2022-12-14 RX ADMIN — Medication 3 MILLILITER(S): at 01:33

## 2022-12-14 RX ADMIN — Medication 3 MILLILITER(S): at 13:15

## 2022-12-14 RX ADMIN — AMLODIPINE BESYLATE 10 MILLIGRAM(S): 2.5 TABLET ORAL at 05:10

## 2022-12-14 RX ADMIN — ATORVASTATIN CALCIUM 80 MILLIGRAM(S): 80 TABLET, FILM COATED ORAL at 21:10

## 2022-12-14 RX ADMIN — BUDESONIDE AND FORMOTEROL FUMARATE DIHYDRATE 2 PUFF(S): 160; 4.5 AEROSOL RESPIRATORY (INHALATION) at 05:09

## 2022-12-14 RX ADMIN — Medication 81 MILLIGRAM(S): at 11:14

## 2022-12-14 RX ADMIN — MONTELUKAST 10 MILLIGRAM(S): 4 TABLET, CHEWABLE ORAL at 11:14

## 2022-12-14 RX ADMIN — CLOPIDOGREL BISULFATE 75 MILLIGRAM(S): 75 TABLET, FILM COATED ORAL at 11:14

## 2022-12-14 RX ADMIN — Medication 25 MILLIGRAM(S): at 17:13

## 2022-12-14 RX ADMIN — Medication 3 MILLILITER(S): at 21:09

## 2022-12-14 RX ADMIN — Medication 20 MILLIGRAM(S): at 21:10

## 2022-12-14 RX ADMIN — Medication 3 MILLILITER(S): at 09:34

## 2022-12-14 RX ADMIN — Medication 40 MILLIGRAM(S): at 05:10

## 2022-12-14 RX ADMIN — BUDESONIDE AND FORMOTEROL FUMARATE DIHYDRATE 2 PUFF(S): 160; 4.5 AEROSOL RESPIRATORY (INHALATION) at 17:14

## 2022-12-14 RX ADMIN — PANTOPRAZOLE SODIUM 40 MILLIGRAM(S): 20 TABLET, DELAYED RELEASE ORAL at 05:10

## 2022-12-14 RX ADMIN — Medication 15 MILLILITER(S): at 13:15

## 2022-12-14 RX ADMIN — HEPARIN SODIUM 5000 UNIT(S): 5000 INJECTION INTRAVENOUS; SUBCUTANEOUS at 13:15

## 2022-12-14 RX ADMIN — CHLORHEXIDINE GLUCONATE 1 APPLICATION(S): 213 SOLUTION TOPICAL at 14:16

## 2022-12-14 RX ADMIN — HEPARIN SODIUM 5000 UNIT(S): 5000 INJECTION INTRAVENOUS; SUBCUTANEOUS at 05:11

## 2022-12-14 RX ADMIN — Medication 3 MILLILITER(S): at 17:14

## 2022-12-14 RX ADMIN — HEPARIN SODIUM 5000 UNIT(S): 5000 INJECTION INTRAVENOUS; SUBCUTANEOUS at 21:10

## 2022-12-14 RX ADMIN — Medication 3 MILLILITER(S): at 05:11

## 2022-12-14 RX ADMIN — TAMSULOSIN HYDROCHLORIDE 0.4 MILLIGRAM(S): 0.4 CAPSULE ORAL at 21:10

## 2022-12-14 RX ADMIN — Medication 25 MILLIGRAM(S): at 05:10

## 2022-12-14 NOTE — CONSULT NOTE ADULT - PROBLEM SELECTOR RECOMMENDATION 9
Pt with COPD/emphysema, asthma overlap  -B/l expiratory wheezes on exam, also with + forced end expiratory wheeze  -D/c Prednisone, start Solumedrol 20 mg IVP q8h for now. Will re-evaluate in AM for further taper to PO  -C/w Symbicort 160/4.5 mcg 2 puffs BID  -C/w Singulair  -C/w Duoneb q6h  -Repeat VBG in AM. Can use Bipap 10/5/30% PRN for increased WOB  -Keep sats >90% with O2 PRN (currently 2LNC). Wean O2 as tolerated.

## 2022-12-14 NOTE — PROGRESS NOTE ADULT - PROBLEM SELECTOR PLAN 2
Patient with serum creatinine of 0.67 on 12/10 noted to have uptrended to 3.34 )12/13/22), OWEN likely in the setting of hemodynamic changes.   OWEN improving. Creatinine: 2.42 <- 3.34, UOP: 1995 ml   continue to monitor urine output and serum chemistry and monitor for renal recovery. Patient can continue to receive IV fluids. No need for renal replacement fluids at this time.

## 2022-12-14 NOTE — PROGRESS NOTE ADULT - PROBLEM SELECTOR PLAN 1
Patient blood pressure improving, fluctuating in the 138/66 - 168/ 74.   Patient on home BP medications, amlodipine 10mg, Losartan 50 mg, Metoprolol 25.  Agree with hold the losartan now in the setting of OWEN

## 2022-12-14 NOTE — PROGRESS NOTE ADULT - PROBLEM SELECTOR PLAN 3
improving 2.42  FeNA: pre-renal  s/p IVF  losartan d/c'ed  renal u/s ordered unremarkable   Nephrology rec noted

## 2022-12-14 NOTE — CONSULT NOTE ADULT - SUBJECTIVE AND OBJECTIVE BOX
PULMONARY CONSULT    HPI: 71 y/o M with PMH of COPD/asthma, CAD s/p PCI, HTN. Presents with SOB, cough, wheezing x few days after being exposed to sick relative. RVP/COVID negative. CXR with hyperinflated lungs but no clear PNA. Pulmonary called to consult for COPD exacerbation. Pt endorses hx of asthma since childhood. Former smoker also with hx of COPD/emphysema. Most recent exacerbation 1 year ago. Outpatient pulmonologist Dr. Bajwa. +SOB, chest tightness, wheezing on exam. O2 sats 96% on 2LNC.     PAST MEDICAL & SURGICAL HISTORY:  COPD (chronic obstructive pulmonary disease)  Asthma  CAD (coronary artery disease) s/p pci  BPH (benign prostatic hyperplasia)  GERD (gastroesophageal reflux disease)  No significant past surgical history      No Known Allergies      FAMILY HISTORY:  FH: diabetes mellitus (Mother)    FH: HTN (hypertension) (Mother, Sibling)    Social history: former smoker    Review of Systems:  CONSTITUTIONAL: No fever, chills, or fatigue  EYES: No eye pain, visual disturbances, or discharge  ENMT:  No difficulty hearing, tinnitus, vertigo; No sinus or throat pain  NECK: No pain or stiffness  RESPIRATORY: Per above  CARDIOVASCULAR: No chest pain, palpitations, dizziness, or leg swelling  GASTROINTESTINAL: No abdominal or epigastric pain. No nausea, vomiting, or hematemesis; No diarrhea or constipation. No melena or hematochezia.  GENITOURINARY: No dysuria, frequency, hematuria, or incontinence  NEUROLOGICAL: No headaches, memory loss, loss of strength, numbness, or tremors  SKIN: No itching, burning, rashes, or lesions   MUSCULOSKELETAL: No joint pain or swelling; No muscle, back, or extremity pain  PSYCHIATRIC: No depression, anxiety, mood swings, or difficulty sleeping    Medications:  MEDICATIONS  (STANDING):  albuterol/ipratropium for Nebulization 3 milliLiter(s) Nebulizer every 4 hours  amLODIPine   Tablet 10 milliGRAM(s) Oral daily  aspirin enteric coated 81 milliGRAM(s) Oral daily  atorvastatin 80 milliGRAM(s) Oral at bedtime  budesonide  80 MICROgram(s)/formoterol 4.5 MICROgram(s) Inhaler 2 Puff(s) Inhalation two times a day  chlorhexidine 2% Cloths 1 Application(s) Topical daily  clopidogrel Tablet 75 milliGRAM(s) Oral daily  heparin   Injectable 5000 Unit(s) SubCutaneous every 8 hours  influenza  Vaccine (HIGH DOSE) 0.7 milliLiter(s) IntraMuscular once  methylPREDNISolone sodium succinate Injectable 20 milliGRAM(s) IV Push every 8 hours  metoprolol tartrate 25 milliGRAM(s) Oral two times a day  montelukast 10 milliGRAM(s) Oral daily  multivitamin/minerals/iron Oral Solution (CENTRUM) 15 milliLiter(s) Oral daily  pantoprazole    Tablet 40 milliGRAM(s) Oral before breakfast  tamsulosin 0.4 milliGRAM(s) Oral at bedtime    MEDICATIONS  (PRN):  benzonatate 100 milliGRAM(s) Oral three times a day PRN Cough      Vital Signs Last 24 Hrs  T(C): 36.4 (14 Dec 2022 11:22), Max: 36.5 (13 Dec 2022 19:59)  T(F): 97.6 (14 Dec 2022 11:22), Max: 97.7 (13 Dec 2022 19:59)  HR: 102 (14 Dec 2022 11:22) (77 - 110)  BP: 168/74 (14 Dec 2022 11:22) (138/66 - 168/74)  BP(mean): --  RR: 18 (14 Dec 2022 11:22) (18 - 18)  SpO2: 96% (14 Dec 2022 11:22) (94% - 97%)    Parameters below as of 14 Dec 2022 11:22  Patient On (Oxygen Delivery Method): nasal cannula  O2 Flow (L/min): 2      13 @ 07:01  -  12-14 @ 07:00  --------------------------------------------------------  IN: 600 mL / OUT: 1995 mL / NET: -1395 mL    LABS:                        12.1   9.49  )-----------( 318      ( 14 Dec 2022 06:48 )             37.7         142  |  105  |  58<H>  ----------------------------<  103<H>  3.9   |  25  |  2.42<H>    Ca    9.1      14 Dec 2022 06:48    Urinalysis Basic - ( 13 Dec 2022 10:34 )    Color: Colorless / Appearance: Clear / S.008 / pH: x  Gluc: x / Ketone: Negative  / Bili: Negative / Urobili: Negative   Blood: x / Protein: Negative / Nitrite: Negative   Leuk Esterase: Negative / RBC: x / WBC x   Sq Epi: x / Non Sq Epi: x / Bacteria: x    Physical Examination:    General: No acute distress.      HEENT: Pupils equal, reactive to light.  Symmetric.    PULM: B/l expiratory wheeze, +forced expiratory wheeze    CVS: S1, S2    ABD: Soft, nondistended, nontender, normoactive bowel sounds, no masses    EXT: No edema, nontender    SKIN: Warm and well perfused, no rashes noted.    NEURO: Alert, oriented, interactive, nonfocal    RADIOLOGY REVIEWED  CXR: hyperinflated lungs

## 2022-12-14 NOTE — PROGRESS NOTE ADULT - SUBJECTIVE AND OBJECTIVE BOX
[facility] DIVISION OF KIDNEY DISEASES AND HYPERTENSION -- FOLLOW UP NOTE  --------------------------------------------------------------------------------  Chief Complaint:    24 hour events/subjective:    patient seen and examined at the bedside, was being transferred out of bed to chair.   denies having any complaints   Creatinine improving Cr: 2.42 <- 3.34 <- 3.05  UOP: 850 ml      PAST HISTORY  --------------------------------------------------------------------------------  No significant changes to PMH, PSH, FHx, SHx, unless otherwise noted    ALLERGIES & MEDICATIONS  --------------------------------------------------------------------------------  Allergies    No Known Allergies    Intolerances      Standing Inpatient Medications  albuterol/ipratropium for Nebulization 3 milliLiter(s) Nebulizer every 4 hours  amLODIPine   Tablet 10 milliGRAM(s) Oral daily  aspirin enteric coated 81 milliGRAM(s) Oral daily  atorvastatin 80 milliGRAM(s) Oral at bedtime  budesonide  80 MICROgram(s)/formoterol 4.5 MICROgram(s) Inhaler 2 Puff(s) Inhalation two times a day  chlorhexidine 2% Cloths 1 Application(s) Topical daily  clopidogrel Tablet 75 milliGRAM(s) Oral daily  heparin   Injectable 5000 Unit(s) SubCutaneous every 8 hours  influenza  Vaccine (HIGH DOSE) 0.7 milliLiter(s) IntraMuscular once  metoprolol tartrate 25 milliGRAM(s) Oral two times a day  montelukast 10 milliGRAM(s) Oral daily  multivitamin/minerals/iron Oral Solution (CENTRUM) 15 milliLiter(s) Oral daily  pantoprazole    Tablet 40 milliGRAM(s) Oral before breakfast  predniSONE   Tablet 40 milliGRAM(s) Oral daily  sodium chloride 0.9%. 1000 milliLiter(s) IV Continuous <Continuous>  tamsulosin 0.4 milliGRAM(s) Oral at bedtime    PRN Inpatient Medications  benzonatate 100 milliGRAM(s) Oral three times a day PRN      REVIEW OF SYSTEMS  --------------------------------------------------------------------------------        All other systems were reviewed and are negative, except as noted.    VITALS/PHYSICAL EXAM  --------------------------------------------------------------------------------  T(C): 36.4 (12-14-22 @ 11:22), Max: 36.5 (12-13-22 @ 19:59)  HR: 102 (12-14-22 @ 11:22) (77 - 110)  BP: 168/74 (12-14-22 @ 11:22) (138/66 - 168/74)  RR: 18 (12-14-22 @ 11:22) (18 - 18)  SpO2: 96% (12-14-22 @ 11:22) (94% - 97%)  Wt(kg): --        12-13-22 @ 07:01  -  12-14-22 @ 07:00  --------------------------------------------------------  IN: 600 mL / OUT: 1995 mL / NET: -1395 mL    12-14-22 @ 07:01  -  12-14-22 @ 13:00  --------------------------------------------------------  IN: 500 mL / OUT: 850 mL / NET: -350 mL        Physical Exam:  	Gen: NAD  	HEENT: MMM  	Pulm: CTA B/L  	CV: S1S2, Tachycardic   	Abd: Soft, +BS   	Ext: No LE edema B/L  	Neuro: Awake  	Skin: Warm and dry  	Vascular access: peripheral IV       LABS/STUDIES  --------------------------------------------------------------------------------              12.1   9.49  >-----------<  318      [12-14-22 @ 06:48]              37.7     142  |  105  |  58  ----------------------------<  103      [12-14-22 @ 06:48]  3.9   |  25  |  2.42        Ca     9.1     [12-14-22 @ 06:48]            Creatinine Trend:  SCr 2.42 [12-14 @ 06:48]  SCr 3.34 [12-13 @ 06:28]  SCr 3.05 [12-12 @ 10:23]  SCr 2.87 [12-12 @ 07:05]  SCr 0.96 [12-11 @ 06:31]    Urinalysis - [12-13-22 @ 10:34]      Color Colorless / Appearance Clear / SG 1.008 / pH 5.0      Gluc Negative / Ketone Negative  / Bili Negative / Urobili Negative       Blood Negative / Protein Negative / Leuk Est Negative / Nitrite Negative      RBC  / WBC  / Hyaline  / Gran  / Sq Epi  / Non Sq Epi  / Bacteria     Urine Creatinine 108      [12-12-22 @ 10:26]  Urine Sodium 27      [12-12-22 @ 10:26]  Urine Urea Nitrogen 429      [12-12-22 @ 10:25]    HbA1c 5.3      [09-23-19 @ 08:58]

## 2022-12-14 NOTE — PROGRESS NOTE ADULT - PROBLEM SELECTOR PLAN 1
- Likely due to URI causing asthma/COPD exacerbation  - RVP negative  - Currently on NC 2L, wean to RA (home 2L O2)  - C/w albuteral Q6H  - Tessalon perles for cough  - Monitor respiratory status

## 2022-12-14 NOTE — CONSULT NOTE ADULT - ASSESSMENT
71 y/o M with PMH of COPD/asthma, CAD s/p PCI, HTN. Presents with SOB, cough, wheezing x few days after being exposed to sick relative. RVP/COVID negative. CXR with hyperinflated lungs but no clear PNA. Pulmonary called to consult for COPD exacerbation.

## 2022-12-14 NOTE — PROGRESS NOTE ADULT - SUBJECTIVE AND OBJECTIVE BOX
Patient is a 72y old  Male who presents with a chief complaint of worsening SOB (14 Dec 2022 13:00)      SUBJECTIVE / OVERNIGHT EVENTS: Patient seen and examined at bedside. He is having worsening WYMAN today, He denies any CP, abd pain and n/v. No acute events overnight     ROS:  All other review of systems negative    Allergies    No Known Allergies    Intolerances        MEDICATIONS  (STANDING):  albuterol/ipratropium for Nebulization 3 milliLiter(s) Nebulizer every 4 hours  amLODIPine   Tablet 10 milliGRAM(s) Oral daily  aspirin enteric coated 81 milliGRAM(s) Oral daily  atorvastatin 80 milliGRAM(s) Oral at bedtime  budesonide  80 MICROgram(s)/formoterol 4.5 MICROgram(s) Inhaler 2 Puff(s) Inhalation two times a day  chlorhexidine 2% Cloths 1 Application(s) Topical daily  clopidogrel Tablet 75 milliGRAM(s) Oral daily  heparin   Injectable 5000 Unit(s) SubCutaneous every 8 hours  influenza  Vaccine (HIGH DOSE) 0.7 milliLiter(s) IntraMuscular once  metoprolol tartrate 25 milliGRAM(s) Oral two times a day  montelukast 10 milliGRAM(s) Oral daily  multivitamin/minerals/iron Oral Solution (CENTRUM) 15 milliLiter(s) Oral daily  pantoprazole    Tablet 40 milliGRAM(s) Oral before breakfast  predniSONE   Tablet 40 milliGRAM(s) Oral daily  sodium chloride 0.9%. 1000 milliLiter(s) (100 mL/Hr) IV Continuous <Continuous>  tamsulosin 0.4 milliGRAM(s) Oral at bedtime    MEDICATIONS  (PRN):  benzonatate 100 milliGRAM(s) Oral three times a day PRN Cough      Vital Signs Last 24 Hrs  T(C): 36.4 (14 Dec 2022 11:22), Max: 36.5 (13 Dec 2022 19:59)  T(F): 97.6 (14 Dec 2022 11:22), Max: 97.7 (13 Dec 2022 19:59)  HR: 102 (14 Dec 2022 11:22) (77 - 110)  BP: 168/74 (14 Dec 2022 11:22) (138/66 - 168/74)  BP(mean): --  RR: 18 (14 Dec 2022 11:22) (18 - 18)  SpO2: 96% (14 Dec 2022 11:22) (94% - 97%)    Parameters below as of 14 Dec 2022 11:22  Patient On (Oxygen Delivery Method): nasal cannula  O2 Flow (L/min): 2    CAPILLARY BLOOD GLUCOSE        I&O's Summary    13 Dec 2022 07:01  -  14 Dec 2022 07:00  --------------------------------------------------------  IN: 600 mL / OUT: 1995 mL / NET: -1395 mL    14 Dec 2022 07:01  -  14 Dec 2022 14:09  --------------------------------------------------------  IN: 500 mL / OUT: 850 mL / NET: -350 mL        PHYSICAL EXAM:  GENERAL: NAD, well-developed +2L NC  HEAD:  Atraumatic, Normocephalic  EYES: EOMI, PERRLA, conjunctiva and sclera clear  NECK: Supple, No JVD  CHEST/LUNG: Clear to auscultation bilaterally; No wheeze  HEART: Regular rate and rhythm; No murmurs, rubs, or gallops  ABDOMEN: Soft, Nontender, Nondistended; Bowel sounds present  EXTREMITIES:  2+ Peripheral Pulses, No clubbing, cyanosis, or edema  NEUROLOGY: AAOx3, non-focal  PSYCH: calm  SKIN: No rashes or lesions    LABS:                        12.1   9.49  )-----------( 318      ( 14 Dec 2022 06:48 )             37.7     12    142  |  105  |  58<H>  ----------------------------<  103<H>  3.9   |  25  |  2.42<H>    Ca    9.1      14 Dec 2022 06:48            Urinalysis Basic - ( 13 Dec 2022 10:34 )    Color: Colorless / Appearance: Clear / S.008 / pH: x  Gluc: x / Ketone: Negative  / Bili: Negative / Urobili: Negative   Blood: x / Protein: Negative / Nitrite: Negative   Leuk Esterase: Negative / RBC: x / WBC x   Sq Epi: x / Non Sq Epi: x / Bacteria: x        RADIOLOGY & ADDITIONAL TESTS:    Care Discussed with Consultants/Other Providers: Medicine ACP

## 2022-12-14 NOTE — PROGRESS NOTE ADULT - PROBLEM SELECTOR PLAN 2
increased WYMAN today on prednisone   - C/w albuterol and prednisone  - Monitor respiratory status  - Wean supplemental oxygen  - Tessalon perles for cough  - Weaned off of BIPAP at night  - d/c'ed prednisone started on IV solumedrol 40 mg IV daily  - pulmonary consulted

## 2022-12-15 LAB
ANION GAP SERPL CALC-SCNC: 11 MMOL/L — SIGNIFICANT CHANGE UP (ref 5–17)
BASE EXCESS BLDV CALC-SCNC: 1.4 MMOL/L — SIGNIFICANT CHANGE UP (ref -2–3)
BASOPHILS # BLD AUTO: 0.01 K/UL — SIGNIFICANT CHANGE UP (ref 0–0.2)
BASOPHILS NFR BLD AUTO: 0.1 % — SIGNIFICANT CHANGE UP (ref 0–2)
BUN SERPL-MCNC: 52 MG/DL — HIGH (ref 7–23)
CALCIUM SERPL-MCNC: 9.1 MG/DL — SIGNIFICANT CHANGE UP (ref 8.4–10.5)
CHLORIDE SERPL-SCNC: 104 MMOL/L — SIGNIFICANT CHANGE UP (ref 96–108)
CO2 BLDV-SCNC: 30 MMOL/L — HIGH (ref 22–26)
CO2 SERPL-SCNC: 24 MMOL/L — SIGNIFICANT CHANGE UP (ref 22–31)
CREAT SERPL-MCNC: 1.65 MG/DL — HIGH (ref 0.5–1.3)
EGFR: 44 ML/MIN/1.73M2 — LOW
EOSINOPHIL # BLD AUTO: 0 K/UL — SIGNIFICANT CHANGE UP (ref 0–0.5)
EOSINOPHIL NFR BLD AUTO: 0 % — SIGNIFICANT CHANGE UP (ref 0–6)
GAS PNL BLDV: SIGNIFICANT CHANGE UP
GLUCOSE SERPL-MCNC: 153 MG/DL — HIGH (ref 70–99)
HCO3 BLDV-SCNC: 28 MMOL/L — SIGNIFICANT CHANGE UP (ref 22–29)
HCT VFR BLD CALC: 39.3 % — SIGNIFICANT CHANGE UP (ref 39–50)
HGB BLD-MCNC: 12.4 G/DL — LOW (ref 13–17)
IMM GRANULOCYTES NFR BLD AUTO: 0.4 % — SIGNIFICANT CHANGE UP (ref 0–0.9)
LYMPHOCYTES # BLD AUTO: 0.53 K/UL — LOW (ref 1–3.3)
LYMPHOCYTES # BLD AUTO: 4.6 % — LOW (ref 13–44)
MCHC RBC-ENTMCNC: 29.2 PG — SIGNIFICANT CHANGE UP (ref 27–34)
MCHC RBC-ENTMCNC: 31.6 GM/DL — LOW (ref 32–36)
MCV RBC AUTO: 92.7 FL — SIGNIFICANT CHANGE UP (ref 80–100)
MONOCYTES # BLD AUTO: 0.31 K/UL — SIGNIFICANT CHANGE UP (ref 0–0.9)
MONOCYTES NFR BLD AUTO: 2.7 % — SIGNIFICANT CHANGE UP (ref 2–14)
NEUTROPHILS # BLD AUTO: 10.5 K/UL — HIGH (ref 1.8–7.4)
NEUTROPHILS NFR BLD AUTO: 92.2 % — HIGH (ref 43–77)
NRBC # BLD: 0 /100 WBCS — SIGNIFICANT CHANGE UP (ref 0–0)
PCO2 BLDV: 52 MMHG — SIGNIFICANT CHANGE UP (ref 42–55)
PH BLDV: 7.34 — SIGNIFICANT CHANGE UP (ref 7.32–7.43)
PLATELET # BLD AUTO: 360 K/UL — SIGNIFICANT CHANGE UP (ref 150–400)
PO2 BLDV: 48 MMHG — HIGH (ref 25–45)
POTASSIUM SERPL-MCNC: 5.2 MMOL/L — SIGNIFICANT CHANGE UP (ref 3.5–5.3)
POTASSIUM SERPL-SCNC: 5.2 MMOL/L — SIGNIFICANT CHANGE UP (ref 3.5–5.3)
RBC # BLD: 4.24 M/UL — SIGNIFICANT CHANGE UP (ref 4.2–5.8)
RBC # FLD: 14.3 % — SIGNIFICANT CHANGE UP (ref 10.3–14.5)
SAO2 % BLDV: 74.2 % — SIGNIFICANT CHANGE UP (ref 67–88)
SODIUM SERPL-SCNC: 139 MMOL/L — SIGNIFICANT CHANGE UP (ref 135–145)
WBC # BLD: 11.4 K/UL — HIGH (ref 3.8–10.5)
WBC # FLD AUTO: 11.4 K/UL — HIGH (ref 3.8–10.5)

## 2022-12-15 PROCEDURE — 99233 SBSQ HOSP IP/OBS HIGH 50: CPT

## 2022-12-15 RX ADMIN — Medication 3 MILLILITER(S): at 02:08

## 2022-12-15 RX ADMIN — Medication 15 MILLILITER(S): at 11:47

## 2022-12-15 RX ADMIN — AMLODIPINE BESYLATE 10 MILLIGRAM(S): 2.5 TABLET ORAL at 05:55

## 2022-12-15 RX ADMIN — Medication 3 MILLILITER(S): at 13:05

## 2022-12-15 RX ADMIN — Medication 3 MILLILITER(S): at 05:55

## 2022-12-15 RX ADMIN — Medication 3 MILLILITER(S): at 10:32

## 2022-12-15 RX ADMIN — CHLORHEXIDINE GLUCONATE 1 APPLICATION(S): 213 SOLUTION TOPICAL at 11:49

## 2022-12-15 RX ADMIN — Medication 3 MILLILITER(S): at 21:10

## 2022-12-15 RX ADMIN — ATORVASTATIN CALCIUM 80 MILLIGRAM(S): 80 TABLET, FILM COATED ORAL at 21:10

## 2022-12-15 RX ADMIN — Medication 81 MILLIGRAM(S): at 11:48

## 2022-12-15 RX ADMIN — Medication 3 MILLILITER(S): at 17:25

## 2022-12-15 RX ADMIN — Medication 20 MILLIGRAM(S): at 05:56

## 2022-12-15 RX ADMIN — Medication 25 MILLIGRAM(S): at 17:25

## 2022-12-15 RX ADMIN — BUDESONIDE AND FORMOTEROL FUMARATE DIHYDRATE 2 PUFF(S): 160; 4.5 AEROSOL RESPIRATORY (INHALATION) at 05:55

## 2022-12-15 RX ADMIN — Medication 25 MILLIGRAM(S): at 05:56

## 2022-12-15 RX ADMIN — HEPARIN SODIUM 5000 UNIT(S): 5000 INJECTION INTRAVENOUS; SUBCUTANEOUS at 21:10

## 2022-12-15 RX ADMIN — HEPARIN SODIUM 5000 UNIT(S): 5000 INJECTION INTRAVENOUS; SUBCUTANEOUS at 05:55

## 2022-12-15 RX ADMIN — MONTELUKAST 10 MILLIGRAM(S): 4 TABLET, CHEWABLE ORAL at 11:48

## 2022-12-15 RX ADMIN — CLOPIDOGREL BISULFATE 75 MILLIGRAM(S): 75 TABLET, FILM COATED ORAL at 11:47

## 2022-12-15 RX ADMIN — PANTOPRAZOLE SODIUM 40 MILLIGRAM(S): 20 TABLET, DELAYED RELEASE ORAL at 05:55

## 2022-12-15 RX ADMIN — BUDESONIDE AND FORMOTEROL FUMARATE DIHYDRATE 2 PUFF(S): 160; 4.5 AEROSOL RESPIRATORY (INHALATION) at 17:26

## 2022-12-15 RX ADMIN — TAMSULOSIN HYDROCHLORIDE 0.4 MILLIGRAM(S): 0.4 CAPSULE ORAL at 21:10

## 2022-12-15 RX ADMIN — HEPARIN SODIUM 5000 UNIT(S): 5000 INJECTION INTRAVENOUS; SUBCUTANEOUS at 13:04

## 2022-12-15 RX ADMIN — Medication 30 MILLIGRAM(S): at 17:56

## 2022-12-15 NOTE — PROGRESS NOTE ADULT - ASSESSMENT
OWEN: Patient with serum creatinine of 0.67 on 12/10 noted to have uptrended to 3.34 )12/13/22, now improving.   Pt. with OWEN In the setting of COPD exacerbation and hypertensive crises on admission, now with improvement in BP. OWEN, likely hemodynamically mediated in the setting of large variation in BP.   UA bland and renal and bladder US do not show any evidence of obstructive uropathy.   Scr improved with IVF and patient remains non oliguric.    Continue to hold ARB.   Encourage pO hydration.   Consider switching PPI to H2 blocker, if feasible.   Monitor BMP. Strict I/Os. Avoid nephrotoxins.     HTN: BP overall improved however still with elevated BP.   continue current dose of amlodipine, can switch metoprolol to carvedilol for better antihypertensive effect.   Monitor BP.

## 2022-12-15 NOTE — PROGRESS NOTE ADULT - PROBLEM SELECTOR PLAN 2
WYMAN improving, Expiratory wheeze better today   - C/w albuterol and prednisone  - Monitor respiratory status  - Wean supplemental oxygen  - Tessalon perles for cough  - Weaned off of BIPAP at night  - s/p solumedrol 20 mg Q8h, change to PO prednisone   - pulmonary consult appreciated

## 2022-12-15 NOTE — PROGRESS NOTE ADULT - PROBLEM SELECTOR PLAN 4
- Uncontrolled due to respiratory distress  - C/w amlodipine and Lopressor;  - can restart losartan tomorrow if Cr is stable

## 2022-12-15 NOTE — PROGRESS NOTE ADULT - SUBJECTIVE AND OBJECTIVE BOX
[facility] DIVISION OF KIDNEY DISEASES AND HYPERTENSION -- PROGRESS NOTE    Chief complaint: OWEN    24 hour events/subjective: no acute events noted        PAST HISTORY  --------------------------------------------------------------------------------  No significant changes to PMH, PSH, FHx, SHx, unless otherwise noted    ALLERGIES & MEDICATIONS  --------------------------------------------------------------------------------  Allergies    No Known Allergies    Intolerances      Standing Inpatient Medications  albuterol/ipratropium for Nebulization 3 milliLiter(s) Nebulizer every 4 hours  amLODIPine   Tablet 10 milliGRAM(s) Oral daily  aspirin enteric coated 81 milliGRAM(s) Oral daily  atorvastatin 80 milliGRAM(s) Oral at bedtime  budesonide  80 MICROgram(s)/formoterol 4.5 MICROgram(s) Inhaler 2 Puff(s) Inhalation two times a day  chlorhexidine 2% Cloths 1 Application(s) Topical daily  clopidogrel Tablet 75 milliGRAM(s) Oral daily  heparin   Injectable 5000 Unit(s) SubCutaneous every 8 hours  influenza  Vaccine (HIGH DOSE) 0.7 milliLiter(s) IntraMuscular once  metoprolol tartrate 25 milliGRAM(s) Oral two times a day  montelukast 10 milliGRAM(s) Oral daily  multivitamin/minerals/iron Oral Solution (CENTRUM) 15 milliLiter(s) Oral daily  pantoprazole    Tablet 40 milliGRAM(s) Oral before breakfast  predniSONE   Tablet 30 milliGRAM(s) Oral two times a day  tamsulosin 0.4 milliGRAM(s) Oral at bedtime    PRN Inpatient Medications  benzonatate 100 milliGRAM(s) Oral three times a day PRN      REVIEW OF SYSTEMS  --------------------------------------------------------------------------------  Constitutional: [ ] Fever [ ] Chills [ ] Fatigue [ ] Weight change   HEENT: [ ] Blurred vision [ ] Eye Pain [ ] Headache [ ] Runny nose [ ] Sore Throat   Respiratory: [ ] Cough [ ] Wheezing [ ] Shortness of breath  Cardiovascular: [ ] Chest Pain [ ] Palpitations [ ] WYAMN [ ] PND [ ] Orthopnea  Gastrointestinal: [ ] Abdominal Pain [ ] Diarrhea [ ] Constipation [ ] Hemorrhoids [ ] Nausea [ ] Vomiting  Genitourinary: [ ] Nocturia [ ] Dysuria [ ] Incontinence  Extremities: [ ] Swelling [ ] Joint Pain  Neurologic: [ ] Focal deficit [ ] Paresthesias [ ] Syncope  Lymphatic: [ ] Swelling [ ] Lymphadenopathy   Skin: [ ] Rash [ ] Ecchymoses [ ] Wounds [ ] Lesions  Psychiatry: [ ] Depression [ ] Suicidal/Homicidal Ideation [ ] Anxiety [ ] Sleep Disturbances  [x ] 10 point review of systems is otherwise negative except as mentioned above              [ ]Unable to obtain due to   All other systems were reviewed and are negative, except as noted.    VITALS/PHYSICAL EXAM  --------------------------------------------------------------------------------  T(C): 36.8 (12-15-22 @ 11:22), Max: 36.8 (12-15-22 @ 11:22)  HR: 90 (12-15-22 @ 11:22) (82 - 92)  BP: 163/79 (12-15-22 @ 11:22) (140/72 - 163/79)  RR: 18 (12-15-22 @ 11:22) (18 - 18)  SpO2: 96% (12-15-22 @ 11:22) (96% - 100%)  Wt(kg): --        12-14-22 @ 07:01  -  12-15-22 @ 07:00  --------------------------------------------------------  IN: 740 mL / OUT: 1750 mL / NET: -1010 mL    12-15-22 @ 07:01  -  12-15-22 @ 12:15  --------------------------------------------------------  IN: 600 mL / OUT: 1050 mL / NET: -450 mL      Physical Exam:  	Gen: NAD, well-appearing  	HEENT: on supplemental oxygen  	Pulm: no resp distress  	CV: normal S1S2; no rub  	Abd: soft                      Back : No sacral edema  	: No jose antonio  	LE: no edema  	Skin: Warm  	  LABS/STUDIES  --------------------------------------------------------------------------------              12.4   11.40 >-----------<  360      [12-15-22 @ 06:12]              39.3     139  |  104  |  52  ----------------------------<  153      [12-15-22 @ 06:12]  5.2   |  24  |  1.65        Ca     9.1     [12-15-22 @ 06:12]            Creatinine Trend:  SCr 1.65 [12-15 @ 06:12]  SCr 2.42 [12-14 @ 06:48]  SCr 3.34 [12-13 @ 06:28]  SCr 3.05 [12-12 @ 10:23]  SCr 2.87 [12-12 @ 07:05]    Urinalysis - [12-13-22 @ 10:34]      Color Colorless / Appearance Clear / SG 1.008 / pH 5.0      Gluc Negative / Ketone Negative  / Bili Negative / Urobili Negative       Blood Negative / Protein Negative / Leuk Est Negative / Nitrite Negative      RBC  / WBC  / Hyaline  / Gran  / Sq Epi  / Non Sq Epi  / Bacteria     Urine Creatinine 108      [12-12-22 @ 10:26]  Urine Sodium 27      [12-12-22 @ 10:26]  Urine Urea Nitrogen 429      [12-12-22 @ 10:25]    HbA1c 5.3      [09-23-19 @ 08:58]

## 2022-12-15 NOTE — PROGRESS NOTE ADULT - SUBJECTIVE AND OBJECTIVE BOX
Patient is a 72y old  Male who presents with a chief complaint of worsening SOB (15 Dec 2022 12:15)      SUBJECTIVE / OVERNIGHT EVENTS: Patient seen and examined at beside. Stats he feels better today after getting IV steroids today. Stil have WYMAN but better, denies any CP, abd pain and n/v. No acute events overnight     ROS:  All other review of systems negative    Allergies    No Known Allergies    Intolerances        MEDICATIONS  (STANDING):  albuterol/ipratropium for Nebulization 3 milliLiter(s) Nebulizer every 4 hours  amLODIPine   Tablet 10 milliGRAM(s) Oral daily  aspirin enteric coated 81 milliGRAM(s) Oral daily  atorvastatin 80 milliGRAM(s) Oral at bedtime  budesonide  80 MICROgram(s)/formoterol 4.5 MICROgram(s) Inhaler 2 Puff(s) Inhalation two times a day  chlorhexidine 2% Cloths 1 Application(s) Topical daily  clopidogrel Tablet 75 milliGRAM(s) Oral daily  heparin   Injectable 5000 Unit(s) SubCutaneous every 8 hours  influenza  Vaccine (HIGH DOSE) 0.7 milliLiter(s) IntraMuscular once  metoprolol tartrate 25 milliGRAM(s) Oral two times a day  montelukast 10 milliGRAM(s) Oral daily  multivitamin/minerals/iron Oral Solution (CENTRUM) 15 milliLiter(s) Oral daily  pantoprazole    Tablet 40 milliGRAM(s) Oral before breakfast  predniSONE   Tablet 30 milliGRAM(s) Oral two times a day  tamsulosin 0.4 milliGRAM(s) Oral at bedtime    MEDICATIONS  (PRN):  benzonatate 100 milliGRAM(s) Oral three times a day PRN Cough      Vital Signs Last 24 Hrs  T(C): 36.8 (15 Dec 2022 11:22), Max: 36.8 (15 Dec 2022 11:22)  T(F): 98.2 (15 Dec 2022 11:22), Max: 98.2 (15 Dec 2022 11:22)  HR: 90 (15 Dec 2022 11:22) (82 - 92)  BP: 163/79 (15 Dec 2022 11:22) (140/72 - 163/79)  BP(mean): --  RR: 18 (15 Dec 2022 11:22) (18 - 18)  SpO2: 96% (15 Dec 2022 11:22) (96% - 100%)    Parameters below as of 15 Dec 2022 11:22  Patient On (Oxygen Delivery Method): nasal cannula  O2 Flow (L/min): 2    CAPILLARY BLOOD GLUCOSE        I&O's Summary    14 Dec 2022 07:01  -  15 Dec 2022 07:00  --------------------------------------------------------  IN: 740 mL / OUT: 1750 mL / NET: -1010 mL    15 Dec 2022 07:01  -  15 Dec 2022 12:37  --------------------------------------------------------  IN: 600 mL / OUT: 1050 mL / NET: -450 mL        PHYSICAL EXAM:  GENERAL: thin +2L Nc  HEAD:  Atraumatic, Normocephalic  EYES: EOMI, PERRLA, conjunctiva and sclera clear  NECK: Supple, No JVD  CHEST/LUNG: Clear to auscultation bilaterally; + expriratory wheeze (improving)  HEART: Regular rate and rhythm; No murmurs, rubs, or gallops  ABDOMEN: Soft, Nontender, Nondistended; Bowel sounds present  EXTREMITIES:  2+ Peripheral Pulses, No clubbing, cyanosis, or edema  NEUROLOGY: AAOx3, non-focal  PSYCH: calm  SKIN: No rashes or lesions    LABS:                        12.4   11.40 )-----------( 360      ( 15 Dec 2022 06:12 )             39.3     12-15    139  |  104  |  52<H>  ----------------------------<  153<H>  5.2   |  24  |  1.65<H>    Ca    9.1      15 Dec 2022 06:12                RADIOLOGY & ADDITIONAL TESTS:      Consultant(s) Notes Reviewed:  Pulmonary rec noted     Care Discussed with Consultants/Other Providers: Medicine ACP

## 2022-12-15 NOTE — PROGRESS NOTE ADULT - PROBLEM SELECTOR PLAN 1
Pt with COPD/emphysema, asthma overlap  -B/l expiratory wheezes on exam, also with + forced end expiratory wheeze  -Continue Solumedrol 20 mg IVP q8h for now. Will re-evaluate this afternoon for further transition to PO  -C/w Symbicort 160/4.5 mcg 2 puffs BID  -C/w Singulair  -C/w Duoneb q6h  -VBG this AM improved  -Can use Bipap 10/5/30% PRN for increased WOB, pt did not use overnight  -Keep sats >90% with O2 PRN (currently 1LNC). Wean O2 as tolerated.

## 2022-12-15 NOTE — PROGRESS NOTE ADULT - SUBJECTIVE AND OBJECTIVE BOX
Follow-up Pulm Progress Note    Dyspnea improving  Did not need bipap overnight  O2 sats 98% on 2LNC, lowered to 1LNC now  Denies CP    Medications:  MEDICATIONS  (STANDING):  albuterol/ipratropium for Nebulization 3 milliLiter(s) Nebulizer every 4 hours  amLODIPine   Tablet 10 milliGRAM(s) Oral daily  aspirin enteric coated 81 milliGRAM(s) Oral daily  atorvastatin 80 milliGRAM(s) Oral at bedtime  budesonide  80 MICROgram(s)/formoterol 4.5 MICROgram(s) Inhaler 2 Puff(s) Inhalation two times a day  chlorhexidine 2% Cloths 1 Application(s) Topical daily  clopidogrel Tablet 75 milliGRAM(s) Oral daily  heparin   Injectable 5000 Unit(s) SubCutaneous every 8 hours  influenza  Vaccine (HIGH DOSE) 0.7 milliLiter(s) IntraMuscular once  methylPREDNISolone sodium succinate Injectable 20 milliGRAM(s) IV Push every 8 hours  metoprolol tartrate 25 milliGRAM(s) Oral two times a day  montelukast 10 milliGRAM(s) Oral daily  multivitamin/minerals/iron Oral Solution (CENTRUM) 15 milliLiter(s) Oral daily  pantoprazole    Tablet 40 milliGRAM(s) Oral before breakfast  tamsulosin 0.4 milliGRAM(s) Oral at bedtime    MEDICATIONS  (PRN):  benzonatate 100 milliGRAM(s) Oral three times a day PRN Cough    Vital Signs Last 24 Hrs  T(C): 36.3 (15 Dec 2022 04:20), Max: 36.5 (14 Dec 2022 16:14)  T(F): 97.3 (15 Dec 2022 04:20), Max: 97.7 (14 Dec 2022 16:14)  HR: 92 (15 Dec 2022 10:43) (82 - 102)  BP: 148/66 (15 Dec 2022 04:20) (140/72 - 168/74)  BP(mean): --  RR: 18 (15 Dec 2022 04:20) (18 - 18)  SpO2: 99% (15 Dec 2022 10:43) (96% - 100%)    Parameters below as of 15 Dec 2022 04:20  Patient On (Oxygen Delivery Method): nasal cannula  O2 Flow (L/min): 2    VBG pH 7.34 12-15 @ 06:09    VBG pCO2 52 12-15 @ 06:09    VBG O2 sat 74.2 12-15 @ 06:09    VBG lactate -- 12-15 @ 06:09      12-14 @ 07:01  -  12-15 @ 07:00  --------------------------------------------------------  IN: 740 mL / OUT: 1750 mL / NET: -1010 mL    LABS:                        12.4   11.40 )-----------( 360      ( 15 Dec 2022 06:12 )             39.3     12-15    139  |  104  |  52<H>  ----------------------------<  153<H>  5.2   |  24  |  1.65<H>    Ca    9.1      15 Dec 2022 06:12    Physical Examination:  PULM: Improving b/l expiratory wheeze, +wheeze with forced expiration   CVS: S1, S2 heard    RADIOLOGY REVIEWED  CXR: hyperinflated lungs

## 2022-12-16 LAB
ALBUMIN SERPL ELPH-MCNC: 4 G/DL — SIGNIFICANT CHANGE UP (ref 3.3–5)
ALP SERPL-CCNC: 59 U/L — SIGNIFICANT CHANGE UP (ref 40–120)
ALT FLD-CCNC: 29 U/L — SIGNIFICANT CHANGE UP (ref 10–45)
ANION GAP SERPL CALC-SCNC: 13 MMOL/L — SIGNIFICANT CHANGE UP (ref 5–17)
ANION GAP SERPL CALC-SCNC: 14 MMOL/L — SIGNIFICANT CHANGE UP (ref 5–17)
APPEARANCE UR: CLEAR — SIGNIFICANT CHANGE UP
APTT BLD: 24.5 SEC — LOW (ref 27.5–35.5)
AST SERPL-CCNC: 27 U/L — SIGNIFICANT CHANGE UP (ref 10–40)
BASE EXCESS BLDV CALC-SCNC: 0.6 MMOL/L — SIGNIFICANT CHANGE UP (ref -2–3)
BILIRUB SERPL-MCNC: 0.3 MG/DL — SIGNIFICANT CHANGE UP (ref 0.2–1.2)
BILIRUB UR-MCNC: NEGATIVE — SIGNIFICANT CHANGE UP
BUN SERPL-MCNC: 40 MG/DL — HIGH (ref 7–23)
BUN SERPL-MCNC: 48 MG/DL — HIGH (ref 7–23)
CA-I SERPL-SCNC: 1.33 MMOL/L — SIGNIFICANT CHANGE UP (ref 1.15–1.33)
CALCIUM SERPL-MCNC: 9.2 MG/DL — SIGNIFICANT CHANGE UP (ref 8.4–10.5)
CALCIUM SERPL-MCNC: 9.6 MG/DL — SIGNIFICANT CHANGE UP (ref 8.4–10.5)
CHLORIDE BLDV-SCNC: 95 MMOL/L — LOW (ref 96–108)
CHLORIDE SERPL-SCNC: 101 MMOL/L — SIGNIFICANT CHANGE UP (ref 96–108)
CHLORIDE SERPL-SCNC: 98 MMOL/L — SIGNIFICANT CHANGE UP (ref 96–108)
CK MB BLD-MCNC: 6.4 % — HIGH (ref 0–3.5)
CK MB CFR SERPL CALC: 17.6 NG/ML — HIGH (ref 0–6.7)
CK SERPL-CCNC: 275 U/L — HIGH (ref 30–200)
CO2 BLDV-SCNC: 28 MMOL/L — HIGH (ref 22–26)
CO2 SERPL-SCNC: 25 MMOL/L — SIGNIFICANT CHANGE UP (ref 22–31)
CO2 SERPL-SCNC: 25 MMOL/L — SIGNIFICANT CHANGE UP (ref 22–31)
COLOR SPEC: SIGNIFICANT CHANGE UP
CREAT SERPL-MCNC: 1.19 MG/DL — SIGNIFICANT CHANGE UP (ref 0.5–1.3)
CREAT SERPL-MCNC: 1.31 MG/DL — HIGH (ref 0.5–1.3)
DIFF PNL FLD: NEGATIVE — SIGNIFICANT CHANGE UP
EGFR: 58 ML/MIN/1.73M2 — LOW
EGFR: 65 ML/MIN/1.73M2 — SIGNIFICANT CHANGE UP
GAS PNL BLDA: SIGNIFICANT CHANGE UP
GAS PNL BLDV: 138 MMOL/L — SIGNIFICANT CHANGE UP (ref 136–145)
GAS PNL BLDV: SIGNIFICANT CHANGE UP
GAS PNL BLDV: SIGNIFICANT CHANGE UP
GLUCOSE BLDC GLUCOMTR-MCNC: 153 MG/DL — HIGH (ref 70–99)
GLUCOSE BLDV-MCNC: 146 MG/DL — HIGH (ref 70–99)
GLUCOSE SERPL-MCNC: 116 MG/DL — HIGH (ref 70–99)
GLUCOSE SERPL-MCNC: 156 MG/DL — HIGH (ref 70–99)
GLUCOSE UR QL: NEGATIVE — SIGNIFICANT CHANGE UP
HCO3 BLDV-SCNC: 27 MMOL/L — SIGNIFICANT CHANGE UP (ref 22–29)
HCT VFR BLD CALC: 38.3 % — LOW (ref 39–50)
HCT VFR BLD CALC: 41 % — SIGNIFICANT CHANGE UP (ref 39–50)
HCT VFR BLDA CALC: 36 % — LOW (ref 39–51)
HGB BLD CALC-MCNC: 11.9 G/DL — LOW (ref 12.6–17.4)
HGB BLD-MCNC: 12.3 G/DL — LOW (ref 13–17)
HGB BLD-MCNC: 12.9 G/DL — LOW (ref 13–17)
HOROWITZ INDEX BLDV+IHG-RTO: 60 — SIGNIFICANT CHANGE UP
IGE SERPL-ACNC: 139 KU/L — HIGH
INR BLD: 1.03 RATIO — SIGNIFICANT CHANGE UP (ref 0.88–1.16)
KETONES UR-MCNC: NEGATIVE — SIGNIFICANT CHANGE UP
LACTATE BLDV-MCNC: 2.6 MMOL/L — HIGH (ref 0.5–2)
LEUKOCYTE ESTERASE UR-ACNC: NEGATIVE — SIGNIFICANT CHANGE UP
MAGNESIUM SERPL-MCNC: 1.7 MG/DL — SIGNIFICANT CHANGE UP (ref 1.6–2.6)
MCHC RBC-ENTMCNC: 29.3 PG — SIGNIFICANT CHANGE UP (ref 27–34)
MCHC RBC-ENTMCNC: 29.4 PG — SIGNIFICANT CHANGE UP (ref 27–34)
MCHC RBC-ENTMCNC: 31.5 GM/DL — LOW (ref 32–36)
MCHC RBC-ENTMCNC: 32.1 GM/DL — SIGNIFICANT CHANGE UP (ref 32–36)
MCV RBC AUTO: 91.2 FL — SIGNIFICANT CHANGE UP (ref 80–100)
MCV RBC AUTO: 93.4 FL — SIGNIFICANT CHANGE UP (ref 80–100)
NITRITE UR-MCNC: NEGATIVE — SIGNIFICANT CHANGE UP
NRBC # BLD: 0 /100 WBCS — SIGNIFICANT CHANGE UP (ref 0–0)
NT-PROBNP SERPL-SCNC: 422 PG/ML — HIGH (ref 0–300)
NT-PROBNP SERPL-SCNC: 537 PG/ML — HIGH (ref 0–300)
PCO2 BLDV: 50 MMHG — SIGNIFICANT CHANGE UP (ref 42–55)
PH BLDV: 7.34 — SIGNIFICANT CHANGE UP (ref 7.32–7.43)
PH UR: 6 — SIGNIFICANT CHANGE UP (ref 5–8)
PHOSPHATE SERPL-MCNC: 4.1 MG/DL — SIGNIFICANT CHANGE UP (ref 2.5–4.5)
PLATELET # BLD AUTO: 433 K/UL — HIGH (ref 150–400)
PLATELET # BLD AUTO: 516 K/UL — HIGH (ref 150–400)
PO2 BLDV: 48 MMHG — HIGH (ref 25–45)
POTASSIUM BLDV-SCNC: 6 MMOL/L — HIGH (ref 3.5–5.1)
POTASSIUM SERPL-MCNC: 4.8 MMOL/L — SIGNIFICANT CHANGE UP (ref 3.5–5.3)
POTASSIUM SERPL-MCNC: 5.6 MMOL/L — HIGH (ref 3.5–5.3)
POTASSIUM SERPL-SCNC: 4.8 MMOL/L — SIGNIFICANT CHANGE UP (ref 3.5–5.3)
POTASSIUM SERPL-SCNC: 5.6 MMOL/L — HIGH (ref 3.5–5.3)
PROT SERPL-MCNC: 7.6 G/DL — SIGNIFICANT CHANGE UP (ref 6–8.3)
PROT UR-MCNC: SIGNIFICANT CHANGE UP
PROTHROM AB SERPL-ACNC: 11.9 SEC — SIGNIFICANT CHANGE UP (ref 10.5–13.4)
RAPID RVP RESULT: SIGNIFICANT CHANGE UP
RBC # BLD: 4.2 M/UL — SIGNIFICANT CHANGE UP (ref 4.2–5.8)
RBC # BLD: 4.39 M/UL — SIGNIFICANT CHANGE UP (ref 4.2–5.8)
RBC # FLD: 14 % — SIGNIFICANT CHANGE UP (ref 10.3–14.5)
RBC # FLD: 14.3 % — SIGNIFICANT CHANGE UP (ref 10.3–14.5)
SAO2 % BLDV: 76.3 % — SIGNIFICANT CHANGE UP (ref 67–88)
SARS-COV-2 RNA SPEC QL NAA+PROBE: SIGNIFICANT CHANGE UP
SODIUM SERPL-SCNC: 137 MMOL/L — SIGNIFICANT CHANGE UP (ref 135–145)
SODIUM SERPL-SCNC: 139 MMOL/L — SIGNIFICANT CHANGE UP (ref 135–145)
SP GR SPEC: 1.02 — SIGNIFICANT CHANGE UP (ref 1.01–1.02)
TROPONIN T, HIGH SENSITIVITY RESULT: 26 NG/L — SIGNIFICANT CHANGE UP (ref 0–51)
TROPONIN T, HIGH SENSITIVITY RESULT: 33 NG/L — SIGNIFICANT CHANGE UP (ref 0–51)
UROBILINOGEN FLD QL: NEGATIVE — SIGNIFICANT CHANGE UP
WBC # BLD: 17 K/UL — HIGH (ref 3.8–10.5)
WBC # BLD: 23.89 K/UL — HIGH (ref 3.8–10.5)
WBC # FLD AUTO: 17 K/UL — HIGH (ref 3.8–10.5)
WBC # FLD AUTO: 23.89 K/UL — HIGH (ref 3.8–10.5)

## 2022-12-16 PROCEDURE — 76604 US EXAM CHEST: CPT | Mod: 26

## 2022-12-16 PROCEDURE — 99232 SBSQ HOSP IP/OBS MODERATE 35: CPT

## 2022-12-16 PROCEDURE — 99233 SBSQ HOSP IP/OBS HIGH 50: CPT

## 2022-12-16 PROCEDURE — 71045 X-RAY EXAM CHEST 1 VIEW: CPT | Mod: 26

## 2022-12-16 PROCEDURE — 99291 CRITICAL CARE FIRST HOUR: CPT | Mod: GC

## 2022-12-16 RX ORDER — SODIUM CHLORIDE 9 MG/ML
1000 INJECTION, SOLUTION INTRAVENOUS
Refills: 0 | Status: DISCONTINUED | OUTPATIENT
Start: 2022-12-16 | End: 2022-12-21

## 2022-12-16 RX ORDER — AZITHROMYCIN 500 MG/1
500 TABLET, FILM COATED ORAL EVERY 24 HOURS
Refills: 0 | Status: COMPLETED | OUTPATIENT
Start: 2022-12-16 | End: 2022-12-18

## 2022-12-16 RX ORDER — IPRATROPIUM/ALBUTEROL SULFATE 18-103MCG
3 AEROSOL WITH ADAPTER (GRAM) INHALATION ONCE
Refills: 0 | Status: DISCONTINUED | OUTPATIENT
Start: 2022-12-16 | End: 2022-12-17

## 2022-12-16 RX ORDER — PIPERACILLIN AND TAZOBACTAM 4; .5 G/20ML; G/20ML
3.38 INJECTION, POWDER, LYOPHILIZED, FOR SOLUTION INTRAVENOUS EVERY 8 HOURS
Refills: 0 | Status: DISCONTINUED | OUTPATIENT
Start: 2022-12-17 | End: 2022-12-17

## 2022-12-16 RX ORDER — NITROGLYCERIN 6.5 MG
5 CAPSULE, EXTENDED RELEASE ORAL
Qty: 50 | Refills: 0 | Status: DISCONTINUED | OUTPATIENT
Start: 2022-12-16 | End: 2022-12-17

## 2022-12-16 RX ORDER — INSULIN LISPRO 100/ML
VIAL (ML) SUBCUTANEOUS EVERY 6 HOURS
Refills: 0 | Status: DISCONTINUED | OUTPATIENT
Start: 2022-12-16 | End: 2022-12-18

## 2022-12-16 RX ORDER — CHLORHEXIDINE GLUCONATE 213 G/1000ML
1 SOLUTION TOPICAL
Refills: 0 | Status: DISCONTINUED | OUTPATIENT
Start: 2022-12-16 | End: 2022-12-21

## 2022-12-16 RX ORDER — MAGNESIUM SULFATE 500 MG/ML
2 VIAL (ML) INJECTION ONCE
Refills: 0 | Status: DISCONTINUED | OUTPATIENT
Start: 2022-12-16 | End: 2022-12-17

## 2022-12-16 RX ORDER — LABETALOL HCL 100 MG
5 TABLET ORAL ONCE
Refills: 0 | Status: DISCONTINUED | OUTPATIENT
Start: 2022-12-16 | End: 2022-12-17

## 2022-12-16 RX ORDER — PANTOPRAZOLE SODIUM 20 MG/1
40 TABLET, DELAYED RELEASE ORAL DAILY
Refills: 0 | Status: DISCONTINUED | OUTPATIENT
Start: 2022-12-16 | End: 2022-12-21

## 2022-12-16 RX ORDER — INSULIN LISPRO 100/ML
VIAL (ML) SUBCUTANEOUS AT BEDTIME
Refills: 0 | Status: DISCONTINUED | OUTPATIENT
Start: 2022-12-16 | End: 2022-12-16

## 2022-12-16 RX ORDER — DEXTROSE 50 % IN WATER 50 %
25 SYRINGE (ML) INTRAVENOUS ONCE
Refills: 0 | Status: DISCONTINUED | OUTPATIENT
Start: 2022-12-16 | End: 2022-12-21

## 2022-12-16 RX ORDER — DEXTROSE 50 % IN WATER 50 %
15 SYRINGE (ML) INTRAVENOUS ONCE
Refills: 0 | Status: DISCONTINUED | OUTPATIENT
Start: 2022-12-16 | End: 2022-12-21

## 2022-12-16 RX ORDER — NITROGLYCERIN 6.5 MG
0.4 CAPSULE, EXTENDED RELEASE ORAL ONCE
Refills: 0 | Status: DISCONTINUED | OUTPATIENT
Start: 2022-12-16 | End: 2022-12-17

## 2022-12-16 RX ORDER — DEXTROSE 50 % IN WATER 50 %
12.5 SYRINGE (ML) INTRAVENOUS ONCE
Refills: 0 | Status: DISCONTINUED | OUTPATIENT
Start: 2022-12-16 | End: 2022-12-21

## 2022-12-16 RX ORDER — BUDESONIDE, MICRONIZED 100 %
0.25 POWDER (GRAM) MISCELLANEOUS EVERY 12 HOURS
Refills: 0 | Status: DISCONTINUED | OUTPATIENT
Start: 2022-12-16 | End: 2022-12-16

## 2022-12-16 RX ORDER — PIPERACILLIN AND TAZOBACTAM 4; .5 G/20ML; G/20ML
3.38 INJECTION, POWDER, LYOPHILIZED, FOR SOLUTION INTRAVENOUS ONCE
Refills: 0 | Status: DISCONTINUED | OUTPATIENT
Start: 2022-12-16 | End: 2022-12-17

## 2022-12-16 RX ORDER — PIPERACILLIN AND TAZOBACTAM 4; .5 G/20ML; G/20ML
3.38 INJECTION, POWDER, LYOPHILIZED, FOR SOLUTION INTRAVENOUS ONCE
Refills: 0 | Status: DISCONTINUED | OUTPATIENT
Start: 2022-12-16 | End: 2022-12-16

## 2022-12-16 RX ORDER — HYDRALAZINE HCL 50 MG
5 TABLET ORAL ONCE
Refills: 0 | Status: DISCONTINUED | OUTPATIENT
Start: 2022-12-16 | End: 2022-12-16

## 2022-12-16 RX ORDER — METOPROLOL TARTRATE 50 MG
10 TABLET ORAL ONCE
Refills: 0 | Status: DISCONTINUED | OUTPATIENT
Start: 2022-12-16 | End: 2022-12-16

## 2022-12-16 RX ORDER — GLUCAGON INJECTION, SOLUTION 0.5 MG/.1ML
1 INJECTION, SOLUTION SUBCUTANEOUS ONCE
Refills: 0 | Status: DISCONTINUED | OUTPATIENT
Start: 2022-12-16 | End: 2022-12-21

## 2022-12-16 RX ADMIN — MONTELUKAST 10 MILLIGRAM(S): 4 TABLET, CHEWABLE ORAL at 12:01

## 2022-12-16 RX ADMIN — Medication 15 MILLILITER(S): at 12:02

## 2022-12-16 RX ADMIN — HEPARIN SODIUM 5000 UNIT(S): 5000 INJECTION INTRAVENOUS; SUBCUTANEOUS at 21:49

## 2022-12-16 RX ADMIN — HEPARIN SODIUM 5000 UNIT(S): 5000 INJECTION INTRAVENOUS; SUBCUTANEOUS at 05:01

## 2022-12-16 RX ADMIN — AZITHROMYCIN 255 MILLIGRAM(S): 500 TABLET, FILM COATED ORAL at 21:48

## 2022-12-16 RX ADMIN — CHLORHEXIDINE GLUCONATE 1 APPLICATION(S): 213 SOLUTION TOPICAL at 12:02

## 2022-12-16 RX ADMIN — HEPARIN SODIUM 5000 UNIT(S): 5000 INJECTION INTRAVENOUS; SUBCUTANEOUS at 16:34

## 2022-12-16 RX ADMIN — Medication 3 MILLILITER(S): at 16:35

## 2022-12-16 RX ADMIN — Medication 3 MILLILITER(S): at 01:01

## 2022-12-16 RX ADMIN — Medication 30 MILLIGRAM(S): at 05:02

## 2022-12-16 RX ADMIN — AMLODIPINE BESYLATE 10 MILLIGRAM(S): 2.5 TABLET ORAL at 05:02

## 2022-12-16 RX ADMIN — Medication 25 MILLIGRAM(S): at 05:02

## 2022-12-16 RX ADMIN — Medication 3 MILLILITER(S): at 05:02

## 2022-12-16 RX ADMIN — PANTOPRAZOLE SODIUM 40 MILLIGRAM(S): 20 TABLET, DELAYED RELEASE ORAL at 05:02

## 2022-12-16 RX ADMIN — Medication 3 MILLILITER(S): at 12:02

## 2022-12-16 RX ADMIN — Medication 3 MILLILITER(S): at 22:01

## 2022-12-16 RX ADMIN — Medication 81 MILLIGRAM(S): at 12:01

## 2022-12-16 RX ADMIN — Medication 1.5 MICROGRAM(S)/MIN: at 21:49

## 2022-12-16 RX ADMIN — BUDESONIDE AND FORMOTEROL FUMARATE DIHYDRATE 2 PUFF(S): 160; 4.5 AEROSOL RESPIRATORY (INHALATION) at 05:02

## 2022-12-16 RX ADMIN — CLOPIDOGREL BISULFATE 75 MILLIGRAM(S): 75 TABLET, FILM COATED ORAL at 12:01

## 2022-12-16 NOTE — PROGRESS NOTE ADULT - PROBLEM SELECTOR PLAN 1
- Likely due to URI causing asthma/COPD exacerbation  - RVP negative  - Currently on NC 1L, wean to RA (home 2L O2)  - C/w albuteral Q6H  - Tessalon perles for cough  - Monitor respiratory status

## 2022-12-16 NOTE — PROGRESS NOTE ADULT - ASSESSMENT
OWEN: Patient with serum creatinine of 0.67 on 12/10 noted to have uptrended to 3.34 )12/13/22, now improving.   Pt. with OWEN In the setting of COPD exacerbation and hypertensive crises on admission, now with improvement in BP. OWEN, likely hemodynamically mediated in the setting of large variation in BP.   UA bland and renal and bladder US do not show any evidence of obstructive uropathy.   Scr improved with IVF and patient remains non oliguric.    Continue to hold ARB.   Encourage pO hydration.   Consider switching PPI to H2 blocker, if feasible.   Monitor BMP. Strict I/Os. Avoid nephrotoxins.     HTN: BP still elevated.   continue current dose of amlodipine, can switch metoprolol to carvedilol for better antihypertensive effect.   Monitor BP.       OWEN resolving.   Nephrology will sign off.   Please reconsult as needed.

## 2022-12-16 NOTE — RAPID RESPONSE TEAM SUMMARY - NSSITUATIONBACKGROUNDRRT_GEN_ALL_CORE
Patient is a 72 yr old with history of COPD/asthma (not on home O2), CAD (s/p PCI), HTN p/w SOB on 12/10/22. At presentation, he had nasal congestion and a non-productive cough. He was wheezing for one day refractory to nebs, symbicort and needed 6L NC. RRT called for hypoxia, saturating 82% on RA, increased work of breathing, and hypertensive emergency with /96, , RR 22. He had just completed a duoneb treatment before the RRT.

## 2022-12-16 NOTE — RAPID RESPONSE TEAM SUMMARY - NSADDTLFINDINGSRRT_GEN_ALL_CORE
On physical exam, patient had minimal breath sounds, in acute distress struggling to breath. He was on NRB saturating in the 90s but due to increased work of breathing was placed on BiPAP 8/5.  and Temp 97.3F. He was Ax3 and very uncomfortably. he was given lopressor IV 10 mg and his /105, HR 88. His pressures carlin to 187/103 and labetalol 5 mg IV was given and pressures decreased to 158/103. His pressures carlin again to 181/106 and hydralazine 10 mg IV was given. An EKG showed sinus tachycardia.  A CXR was unchanged from prior, and a full set of labs were drawn. WBC were elevated at 17 this morning and carlin to 23.9 on CBC taken during the RRT. zosyn x 1 was given. ABG 7.31/56/106/28. CTA was ordered but patient was not stable enough to bring down to CT scan and the MICU was consulted. Patient was given duonebs x 2. Nitro 0.4 SL x 2 was given, Mg 2g IV, Solumedrol 100 mg with BP climbing to 219/103. BiPAP settings were changed to 10/5 and patient had TV 700s and 800s. A nitro ggt was started and uptitrated to 80 mcg with /80 with nitro gtt down-titrated to 60 mcg. Patient received another duoneb x 2. Patient was transferred to the MICU after consultation with cardiology.

## 2022-12-16 NOTE — CONSULT NOTE ADULT - NSCONSULTADDITIONALINFOA_GEN_ALL_CORE
Patient seen and examined. Agree with assessment and plan as outlined above. Agree plan as out line by fellow. Patient with CAD, admitted with COPD exacerbation/URI complicated by significant BP elevation. Ultimately started on Nitro drip. Currently patient feels much better with improved BP. Titrate off nitro drip. Amlodipine, Losartan. Agree transition to Coreg may be a reasonable option if additional BP control is needed.

## 2022-12-16 NOTE — CONSULT NOTE ADULT - SUBJECTIVE AND OBJECTIVE BOX
CARDIOLOGY FELLOW CONSULT NOTE    HPI: The patient is a 73yo male with PMH of COPD/asthma, CAD (s/p PCI to pLCx and mLAD), HTN who presented with  worsening dyspnea, was being managed on the medicine floor for a COPD exacerbation thought to be secondary to a URI. Was improving with treatment. Course complicated by uncontrolled hypertension which was also attributed to patient's respiratory distress. Pt was planned for discharge however was noted to have increasing leukocytosis. Ultimately had a Rapid response called for hypoxia. BP was also significantly elevated at 214/111, did not respond to IV push of Labetalol and Hydralazine, Nitro drip started with BP improving to the 140s systolic. Cardiology consulted for evaluation. CXR obtained during rapid response indicating hyperinflated lungs, no pulmonary edema. No cardiomegaly. Pt accepted by MICU due to acute hypoxic respiratory failure and concern for sepsis, WBC now 23.89, lactate 3.7.       PMHx:   COPD (chronic obstructive pulmonary disease)    Asthma    CAD (coronary artery disease)    BPH (benign prostatic hyperplasia)    GERD (gastroesophageal reflux disease)        PSHx:   No significant past surgical history        Allergies:  No Known Allergies      Current Medications:   albuterol/ipratropium for Nebulization 3 milliLiter(s) Nebulizer every 4 hours  albuterol/ipratropium for Nebulization. 3 milliLiter(s) Nebulizer once  aspirin enteric coated 81 milliGRAM(s) Oral daily  buDESOnide    Inhalation Suspension 0.25 milliGRAM(s) Nebulizer every 12 hours  budesonide  80 MICROgram(s)/formoterol 4.5 MICROgram(s) Inhaler 2 Puff(s) Inhalation two times a day  chlorhexidine 2% Cloths 1 Application(s) Topical daily  chlorhexidine 4% Liquid 1 Application(s) Topical <User Schedule>  clopidogrel Tablet 75 milliGRAM(s) Oral daily  dextrose 5%. 1000 milliLiter(s) IV Continuous <Continuous>  dextrose 5%. 1000 milliLiter(s) IV Continuous <Continuous>  dextrose 50% Injectable 25 Gram(s) IV Push once  dextrose 50% Injectable 12.5 Gram(s) IV Push once  dextrose 50% Injectable 25 Gram(s) IV Push once  dextrose Oral Gel 15 Gram(s) Oral once PRN  glucagon  Injectable 1 milliGRAM(s) IntraMuscular once  heparin   Injectable 5000 Unit(s) SubCutaneous every 8 hours  influenza  Vaccine (HIGH DOSE) 0.7 milliLiter(s) IntraMuscular once  insulin lispro (ADMELOG) corrective regimen sliding scale   SubCutaneous every 6 hours  labetalol Injectable 5 milliGRAM(s) IV Push once  magnesium sulfate  IVPB 2 Gram(s) IV Intermittent once  methylPREDNISolone sodium succinate Injectable 40 milliGRAM(s) IV Push every 8 hours  methylPREDNISolone sodium succinate Injectable 100 milliGRAM(s) IV Push once  nitroglycerin     SubLingual 0.4 milliGRAM(s) SubLingual once  nitroglycerin     SubLingual 0.4 milliGRAM(s) SubLingual once  nitroglycerin  Infusion 5 MICROgram(s)/Min IV Continuous <Continuous>  pantoprazole  Injectable 40 milliGRAM(s) IV Push daily  piperacillin/tazobactam IVPB. 3.375 Gram(s) IV Intermittent once  piperacillin/tazobactam IVPB.. 3.375 Gram(s) IV Intermittent every 8 hours      FAMILY HISTORY:  FH: diabetes mellitus (Mother)    FH: HTN (hypertension) (Mother, Sibling)      REVIEW OF SYSTEMS:  Unable to provide complete ROS at this time but denies any chest pain, improved dyspnea     Physical Exam:  T(F): 97.8 (12-16), Max: 98.3 (12-15)  HR: 106 (12-16) (84 - 106)  BP: 144/65 (12-16) (144/65 - 178/81)  RR: 30 (12-16)  SpO2: 100% (12-16)      Appearance: Moderate respiratory distress  Eyes: pink conjunctiva  HEENT: Normal oral mucosa  Cardiovascular: Tachycardic, S1, S2, no murmurs, rubs, or gallops; no edema; no JVD  Respiratory: Diminished breath sounds, on BiPAP, accessory resp muscle use  Gastrointestinal: soft, non-tender, non-distended   Musculoskeletal: No clubbing; no joint deformity   Neurologic: Normal speech, no facial asymmetry  Psychiatry: AAOx3, mood & affect appropriate  Skin: No rashes, ecchymoses, or cyanosis of exposed skin                             12.9   23.89 )-----------( 516      ( 16 Dec 2022 17:27 )             41.0     12-16    137  |  98  |  48<H>  ----------------------------<  156<H>  5.6<H>   |  25  |  1.19    Ca    9.6      16 Dec 2022 17:27  Phos  4.1     12-16  Mg     1.7     12-16    TPro  7.6  /  Alb  4.0  /  TBili  0.3  /  DBili  x   /  AST  27  /  ALT  29  /  AlkPhos  59  12-16    PT/INR - ( 16 Dec 2022 17:27 )   PT: 11.9 sec;   INR: 1.03 ratio         PTT - ( 16 Dec 2022 17:27 )  PTT:24.5 sec    CARDIAC MARKERS ( 16 Dec 2022 18:10 )  33 ng/L / x     / x     / x     / x     / x      CARDIAC MARKERS ( 16 Dec 2022 17:27 )  26 ng/L / x     / x     / 275 U/L / x     / 17.6 ng/mL  CARDIAC MARKERS ( 10 Dec 2022 00:36 )  27 ng/L / x     / x     / x     / x     / x            Serum Pro-Brain Natriuretic Peptide: 537 pg/mL (12-16 @ 18:10)  Serum Pro-Brain Natriuretic Peptide: 2641 pg/mL (12-10 @ 00:36)

## 2022-12-16 NOTE — PROGRESS NOTE ADULT - SUBJECTIVE AND OBJECTIVE BOX
[facility] DIVISION OF KIDNEY DISEASES AND HYPERTENSION -- PROGRESS NOTE    Chief complaint: OWEN    24 hour events/subjective: no acute events        PAST HISTORY  --------------------------------------------------------------------------------  No significant changes to PMH, PSH, FHx, SHx, unless otherwise noted    ALLERGIES & MEDICATIONS  --------------------------------------------------------------------------------  Allergies    No Known Allergies    Intolerances      Standing Inpatient Medications  albuterol/ipratropium for Nebulization 3 milliLiter(s) Nebulizer every 4 hours  amLODIPine   Tablet 10 milliGRAM(s) Oral daily  aspirin enteric coated 81 milliGRAM(s) Oral daily  atorvastatin 80 milliGRAM(s) Oral at bedtime  budesonide  80 MICROgram(s)/formoterol 4.5 MICROgram(s) Inhaler 2 Puff(s) Inhalation two times a day  chlorhexidine 2% Cloths 1 Application(s) Topical daily  clopidogrel Tablet 75 milliGRAM(s) Oral daily  guaiFENesin ER 1200 milliGRAM(s) Oral every 12 hours  heparin   Injectable 5000 Unit(s) SubCutaneous every 8 hours  influenza  Vaccine (HIGH DOSE) 0.7 milliLiter(s) IntraMuscular once  metoprolol tartrate 25 milliGRAM(s) Oral two times a day  montelukast 10 milliGRAM(s) Oral daily  multivitamin/minerals/iron Oral Solution (CENTRUM) 15 milliLiter(s) Oral daily  pantoprazole    Tablet 40 milliGRAM(s) Oral before breakfast  predniSONE   Tablet 30 milliGRAM(s) Oral two times a day  tamsulosin 0.4 milliGRAM(s) Oral at bedtime    PRN Inpatient Medications  benzonatate 100 milliGRAM(s) Oral three times a day PRN      REVIEW OF SYSTEMS  --------------------------------------------------------------------------------  Constitutional: [ ] Fever [ ] Chills [ ] Fatigue [ ] Weight change   HEENT: [ ] Blurred vision [ ] Eye Pain [ ] Headache [ ] Runny nose [ ] Sore Throat   Respiratory: [ ] Cough [ ] Wheezing [ ] Shortness of breath  Cardiovascular: [ ] Chest Pain [ ] Palpitations [ ] WYMAN [ ] PND [ ] Orthopnea  Gastrointestinal: [ ] Abdominal Pain [ ] Diarrhea [ ] Constipation [ ] Hemorrhoids [ ] Nausea [ ] Vomiting  Genitourinary: [ ] Nocturia [ ] Dysuria [ ] Incontinence  Extremities: [ ] Swelling [ ] Joint Pain  Neurologic: [ ] Focal deficit [ ] Paresthesias [ ] Syncope  Lymphatic: [ ] Swelling [ ] Lymphadenopathy   Skin: [ ] Rash [ ] Ecchymoses [ ] Wounds [ ] Lesions  Psychiatry: [ ] Depression [ ] Suicidal/Homicidal Ideation [ ] Anxiety [ ] Sleep Disturbances  [x ] 10 point review of systems is otherwise negative except as mentioned above              [ ]Unable to obtain due to   All other systems were reviewed and are negative, except as noted.    VITALS/PHYSICAL EXAM  --------------------------------------------------------------------------------  T(C): 36.6 (12-16-22 @ 11:18), Max: 36.8 (12-15-22 @ 20:10)  HR: 89 (12-16-22 @ 11:39) (81 - 99)  BP: 156/79 (12-16-22 @ 11:39) (151/74 - 178/81)  RR: 18 (12-16-22 @ 11:18) (18 - 18)  SpO2: 90% (12-16-22 @ 11:39) (90% - 97%)  Wt(kg): --        12-15-22 @ 07:01  -  12-16-22 @ 07:00  --------------------------------------------------------  IN: 1320 mL / OUT: 2350 mL / NET: -1030 mL    12-16-22 @ 07:01  -  12-16-22 @ 12:53  --------------------------------------------------------  IN: 560 mL / OUT: 400 mL / NET: 160 mL      Physical Exam:  	Gen: NAD, well-appearing  	HEENT: on room air  	Pulm: CTA B/L  	CV: normal S1S2; no rub  	Abd: soft                      Back : No sacral edema  	: No brady  	LE: no edema  	Skin: Warm, without rashes      LABS/STUDIES  --------------------------------------------------------------------------------              12.3   17.00 >-----------<  433      [12-16-22 @ 06:47]              38.3     139  |  101  |  40  ----------------------------<  116      [12-16-22 @ 06:45]  4.8   |  25  |  1.31        Ca     9.2     [12-16-22 @ 06:45]            Creatinine Trend:  SCr 1.31 [12-16 @ 06:45]  SCr 1.65 [12-15 @ 06:12]  SCr 2.42 [12-14 @ 06:48]  SCr 3.34 [12-13 @ 06:28]  SCr 3.05 [12-12 @ 10:23]    Urinalysis - [12-13-22 @ 10:34]      Color Colorless / Appearance Clear / SG 1.008 / pH 5.0      Gluc Negative / Ketone Negative  / Bili Negative / Urobili Negative       Blood Negative / Protein Negative / Leuk Est Negative / Nitrite Negative      RBC  / WBC  / Hyaline  / Gran  / Sq Epi  / Non Sq Epi  / Bacteria     Urine Creatinine 108      [12-12-22 @ 10:26]  Urine Sodium 27      [12-12-22 @ 10:26]  Urine Urea Nitrogen 429      [12-12-22 @ 10:25]    HbA1c 5.3      [09-23-19 @ 08:58]

## 2022-12-16 NOTE — PROGRESS NOTE ADULT - PROBLEM SELECTOR PLAN 1
Pt with COPD/emphysema, asthma overlap  -Initially with B/l expiratory wheezes on exam, also with + forced end expiratory wheeze  -Wheezing much improved  -S/p IV steroids, now on Prednisone 30 mg PO BID (started 12/15). Continue for now, will evaluate further taper for d/c planning  -C/w Symbicort 160/4.5 mcg 2 puffs BID  -C/w Singulair  -C/w Duoneb q6h  -Mucinex 1200 mg PO BID x5 days for congested cough  -VBG 12/15 improved, can monitor off Bipap  -Keep sats >90% with O2 PRN (currently 1LNC). Wean O2 as tolerated.  -Check sats on RA at rest and with ambulation. Pt reports he has home O2 from prior admission.

## 2022-12-16 NOTE — PROGRESS NOTE ADULT - SUBJECTIVE AND OBJECTIVE BOX
SUBJECTIVE / OVERNIGHT EVENTS: Patient seen and examined at beside. Still endorses WYMAN but overall improved. Denies CP, n/v, abd pain, diarrhea.     ROS:  All other review of systems negative    Allergies    No Known Allergies    Intolerances        MEDICATIONS  (STANDING):  albuterol/ipratropium for Nebulization 3 milliLiter(s) Nebulizer every 4 hours  amLODIPine   Tablet 10 milliGRAM(s) Oral daily  aspirin enteric coated 81 milliGRAM(s) Oral daily  atorvastatin 80 milliGRAM(s) Oral at bedtime  budesonide  80 MICROgram(s)/formoterol 4.5 MICROgram(s) Inhaler 2 Puff(s) Inhalation two times a day  chlorhexidine 2% Cloths 1 Application(s) Topical daily  clopidogrel Tablet 75 milliGRAM(s) Oral daily  heparin   Injectable 5000 Unit(s) SubCutaneous every 8 hours  influenza  Vaccine (HIGH DOSE) 0.7 milliLiter(s) IntraMuscular once  metoprolol tartrate 25 milliGRAM(s) Oral two times a day  montelukast 10 milliGRAM(s) Oral daily  multivitamin/minerals/iron Oral Solution (CENTRUM) 15 milliLiter(s) Oral daily  pantoprazole    Tablet 40 milliGRAM(s) Oral before breakfast  predniSONE   Tablet 30 milliGRAM(s) Oral two times a day  tamsulosin 0.4 milliGRAM(s) Oral at bedtime    MEDICATIONS  (PRN):  benzonatate 100 milliGRAM(s) Oral three times a day PRN Cough      Vital Signs Last 24 Hrs  T(C): 36.8 (15 Dec 2022 11:22), Max: 36.8 (15 Dec 2022 11:22)  T(F): 98.2 (15 Dec 2022 11:22), Max: 98.2 (15 Dec 2022 11:22)  HR: 90 (15 Dec 2022 11:22) (82 - 92)  BP: 163/79 (15 Dec 2022 11:22) (140/72 - 163/79)  BP(mean): --  RR: 18 (15 Dec 2022 11:22) (18 - 18)  SpO2: 96% (15 Dec 2022 11:22) (96% - 100%)    Parameters below as of 15 Dec 2022 11:22  Patient On (Oxygen Delivery Method): nasal cannula  O2 Flow (L/min): 2    CAPILLARY BLOOD GLUCOSE        I&O's Summary    14 Dec 2022 07:01  -  15 Dec 2022 07:00  --------------------------------------------------------  IN: 740 mL / OUT: 1750 mL / NET: -1010 mL    15 Dec 2022 07:01  -  15 Dec 2022 12:37  --------------------------------------------------------  IN: 600 mL / OUT: 1050 mL / NET: -450 mL        PHYSICAL EXAM:  GENERAL: thin +1L Nc  HEAD:  Atraumatic, Normocephalic  EYES: EOMI, conjunctiva and sclera clear  NECK: Supple, No JVD  CHEST/LUNG: Clear to auscultation bilaterally; + mild expiratory wheeze  HEART: Regular rate and rhythm; No murmurs, rubs, or gallops  ABDOMEN: Soft, Nontender, Nondistended; Bowel sounds present  EXTREMITIES:  2+ Peripheral Pulses, No clubbing, cyanosis, or edema  NEUROLOGY: AAOx3, non-focal  PSYCH: calm  SKIN: No rashes or lesions    LABS:                         12.3   17.00 )-----------( 433      ( 16 Dec 2022 06:47 )             38.3     12-16    139  |  101  |  40<H>  ----------------------------<  116<H>  4.8   |  25  |  1.31<H>    Ca    9.2      16 Dec 2022 06:45              RADIOLOGY & ADDITIONAL TESTS:      Consultant(s) Notes Reviewed:  Pulmonary rec noted     Care Discussed with Consultants/Other Providers: Medicine ACP and pulmonary NP

## 2022-12-16 NOTE — PROGRESS NOTE ADULT - ASSESSMENT
71 y/o M COPD/asthma, HTN, CAD s/p PCI, BPH, GERD p/w worsening SOB a/w acute hypoxic respiratory failure, COPD exacerbation due to URI.

## 2022-12-16 NOTE — CONSULT NOTE ADULT - ATTENDING COMMENTS
Patient seen and examined. Agree with assessment and plan as outlined above. Agree plan as out line by fellow. Patient with CAD, admitted with COPD exacerbation/URI complicated by significant BP elevation. Ultimately started on Nitro drip. Currently patient feels much better with improved BP. Titrate off nitro drip. Amlodipine, Losartan. Agree transition to Coreg may be a reasonable option if additional BP control is needed. Strict I and O. Close attention to volume statu.
Pt. with OWEN In the setting of COPD exacerbation and hypertensive crises on admission, with improvement in BP. Unclear etiology of OWEN, likely hemodynamically mediated in the setting of large variation in BP.   UA bland and renal and bladder US do not show any evidence of obstructive uropathy.   Scr continues to rise, however patient is maintaining UO with stable electrolytes and does not appear volume overloaded on exam.   Continue to hold ARB.   Consider switching PPI to H2 blocker, if feasible.   Monitor BMP. Strict I/Os. Avoid nephrotoxins.

## 2022-12-16 NOTE — PROGRESS NOTE ADULT - SUBJECTIVE AND OBJECTIVE BOX
Follow-up Pulm Progress Note    Dyspnea improving  O2 sats 95% on 1LNC  Denies CP  Congested cough    Medications:  MEDICATIONS  (STANDING):  albuterol/ipratropium for Nebulization 3 milliLiter(s) Nebulizer every 4 hours  amLODIPine   Tablet 10 milliGRAM(s) Oral daily  aspirin enteric coated 81 milliGRAM(s) Oral daily  atorvastatin 80 milliGRAM(s) Oral at bedtime  budesonide  80 MICROgram(s)/formoterol 4.5 MICROgram(s) Inhaler 2 Puff(s) Inhalation two times a day  chlorhexidine 2% Cloths 1 Application(s) Topical daily  clopidogrel Tablet 75 milliGRAM(s) Oral daily  heparin   Injectable 5000 Unit(s) SubCutaneous every 8 hours  influenza  Vaccine (HIGH DOSE) 0.7 milliLiter(s) IntraMuscular once  metoprolol tartrate 25 milliGRAM(s) Oral two times a day  montelukast 10 milliGRAM(s) Oral daily  multivitamin/minerals/iron Oral Solution (CENTRUM) 15 milliLiter(s) Oral daily  pantoprazole    Tablet 40 milliGRAM(s) Oral before breakfast  predniSONE   Tablet 30 milliGRAM(s) Oral two times a day  tamsulosin 0.4 milliGRAM(s) Oral at bedtime    MEDICATIONS  (PRN):  benzonatate 100 milliGRAM(s) Oral three times a day PRN Cough    Vital Signs Last 24 Hrs  T(C): 36.5 (16 Dec 2022 04:29), Max: 36.8 (15 Dec 2022 11:22)  T(F): 97.7 (16 Dec 2022 04:29), Max: 98.3 (15 Dec 2022 20:10)  HR: 86 (16 Dec 2022 04:29) (81 - 99)  BP: 151/74 (16 Dec 2022 04:29) (151/74 - 163/79)  BP(mean): --  RR: 18 (16 Dec 2022 04:29) (18 - 18)  SpO2: 97% (16 Dec 2022 04:29) (95% - 99%)    Parameters below as of 16 Dec 2022 04:29  Patient On (Oxygen Delivery Method): nasal cannula  O2 Flow (L/min): 1    VBG pH 7.34 12-15 @ 06:09    VBG pCO2 52 12-15 @ 06:09    VBG O2 sat 74.2 12-15 @ 06:09    VBG lactate -- 12-15 @ 06:09      12-15 @ 07:01  -  12-16 @ 07:00  --------------------------------------------------------  IN: 1320 mL / OUT: 2350 mL / NET: -1030 mL    LABS:                        12.3   17.00 )-----------( 433      ( 16 Dec 2022 06:47 )             38.3     12-16    139  |  101  |  40<H>  ----------------------------<  116<H>  4.8   |  25  |  1.31<H>    Ca    9.2      16 Dec 2022 06:45    Physical Examination:  PULM: b/l rhonchi, no wheeze  CVS: S1, S2 heard    RADIOLOGY REVIEWED  CXR: hyperinflated lungs

## 2022-12-16 NOTE — CHART NOTE - NSCHARTNOTEFT_GEN_A_CORE
MICU Accept Note    CHIEF COMPLAINT: worsening SOB    HPI / INTERVAL HISTORY:    72M pmh COPD/asthma (not on home O2, last exacerbation 1y ago october), CAD (s/p PCI), HTN p/w worsening SOB on 12/10/22.  Patient saw his son (works for [employer]) and daughter-in-law recently, + sick contact but their RVP was reported negative.  He started to have nasal congestion/non-productive cough, running nose for the past 5days, treated with OTC Sudafed and Albuterol nebs with some improvement.  He began to have wheezing for 1 day, refractory to nebs, symbicort, and started to use 6L NC (had home O2 after Oct 2021 hospital discharge but never used it).  Wife brought him to ED due to worsening SOB, constant chest tightness, unable to complete a sentence "speaking in 1 word sentences" and elevated BP.    RRT called 12/16/22 for hypoxia, increased WOB on BIPAP iso hypertensive emergency. Pt was started on nitro gtt.    PAST MEDICAL & SURGICAL HISTORY:  Diabetes mellitus      Hypertension      Hyperlipidemia      ESRD on hemodialysis  5 days/week at home      Eye abnormality  Left eye      Vitamin D deficiency      Vitamin B12 deficiency      Heartburn      Trigger finger of both hands      Cholecystitis      Former smoker      Shingles      Retinal hemorrhage      Glaucoma      AGUEDA on CPAP      Trigger finger of both hands  Release surgery      H/O right knee surgery      History of cholecystectomy      Status post glaucoma surgery  both eyes          FAMILY HISTORY:  Family history of hypertension in mother    Family history of DVT  Mother    Family history of heart attack (Father)  age in 40s    Family history of breast cancer (Aunt)    Family history of leukemia (Aunt)        SOCIAL HISTORY:  Smoking:   Substance Use:   EtOH Use:   Marital Status:   Sexual History:   Occupation:  Recent Travel:  Country of Birth:   Advance Directives:     HOME MEDICATIONS:      Allergies    [This allergen will not trigger allergy alert] orange oil (Unknown)  cinnamon (Swelling)  Orange (Angioedema)  Orange Juice (Angioedema)  simvastatin (Other)    Intolerances          REVIEW OF SYSTEMS:  Constitutional: No fevers, chills, weight loss, weight gain  HEENT: No vision problems, eye pain, nasal congestion, rhinorrhea, sore throat, dysphagia  CV: No chest pain, orthopnea, palpitations  Resp: No cough, dyspnea, wheezing, hemoptysis  GI: No nausea, vomiting, diarrhea, constipation, abdominal pain  : [ ] dysuria [ ] nocturia [ ] hematuria [ ] increased urinary frequency  Musculoskeletal: [ ] back pain [ ] myalgias [ ] arthralgias [ ] fracture  Skin: [ ] rash [ ] itch  Neurological: [ ] headache [ ] dizziness [ ] syncope [ ] weakness [ ] numbness  Psychiatric: [ ] anxiety [ ] depression  Endocrine: [ ] diabetes [ ] thyroid problem  Hematologic/Lymphatic: [ ] anemia [ ] bleeding problem  Allergic/Immunologic: [ ] itchy eyes [ ] nasal discharge [ ] hives [ ] angioedema  [ ] All other systems negative  [ ] Unable to assess ROS because ________    OBJECTIVE:  ICU Vital Signs Last 24 Hrs  T(C): 37 (16 Dec 2022 12:00), Max: 37 (15 Dec 2022 20:00)  T(F): 98.6 (16 Dec 2022 12:00), Max: 98.6 (15 Dec 2022 20:00)  HR: 93 (16 Dec 2022 17:30) (63 - 109)  BP: --  BP(mean): --  ABP: 99/43 (16 Dec 2022 17:30) (99/43 - 151/59)  ABP(mean): 61 (16 Dec 2022 17:30) (60 - 93)  RR: 18 (16 Dec 2022 17:30) (14 - 25)  SpO2: 98% (16 Dec 2022 17:30) (94% - 100%)    O2 Parameters below as of 16 Dec 2022 08:00  Patient On (Oxygen Delivery Method): nasal cannula w/ humidification  O2 Flow (L/min): 4            12-15 @ 07:01  -  12-16 @ 07:00  --------------------------------------------------------  IN: 3257 mL / OUT: 4026 mL / NET: -769 mL    12-16 @ 07:01  -  12-16 @ 17:47  --------------------------------------------------------  IN: 1093.8 mL / OUT: 1626 mL / NET: -532.2 mL      CAPILLARY BLOOD GLUCOSE      POCT Blood Glucose.: 170 mg/dL (16 Dec 2022 15:14)      PHYSICAL EXAM:  General:   HEENT:   Neck:   Chest/Lungs:  Heart:  Abdomen:   Extremities:   Skin:   Neuro:   Psych:     LINES:     HOSPITAL MEDICATIONS:  MEDICATIONS  (STANDING):  budesonide  80 MICROgram(s)/formoterol 4.5 MICROgram(s) Inhaler 2 Puff(s) Inhalation two times a day  calcitriol   Capsule 0.5 MICROGram(s) Oral daily  chlorhexidine 2% Cloths 1 Application(s) Topical daily  chlorhexidine 4% Liquid 1 Application(s) Topical <User Schedule>  cinacalcet 60 milliGRAM(s) Oral two times a day  CRRT Treatment    <Continuous>  dextrose 5%. 1000 milliLiter(s) (50 mL/Hr) IV Continuous <Continuous>  dextrose 5%. 1000 milliLiter(s) (100 mL/Hr) IV Continuous <Continuous>  dextrose 5%. 1000 milliLiter(s) (75 mL/Hr) IV Continuous <Continuous>  dextrose 5%. 1000 milliLiter(s) (100 mL/Hr) IV Continuous <Continuous>  dextrose 5%. 1000 milliLiter(s) (50 mL/Hr) IV Continuous <Continuous>  dextrose 50% Injectable 25 Gram(s) IV Push once  dextrose 50% Injectable 12.5 Gram(s) IV Push once  dextrose 50% Injectable 25 Gram(s) IV Push once  epoetin sam-epbx (RETACRIT) Injectable 4000 Unit(s) SubCutaneous <User Schedule>  fenofibrate Tablet 48 milliGRAM(s) Oral daily  ferrous    sulfate 325 milliGRAM(s) Oral daily  glucagon  Injectable 1 milliGRAM(s) IntraMuscular once  glucagon  Injectable 1 milliGRAM(s) IntraMuscular once  heparin   Injectable 5000 Unit(s) SubCutaneous every 8 hours  heparin  Infusion Syringe 300 Unit(s)/Hr (0.6 mL/Hr) CRRT <Continuous>  hydrocortisone 1% Cream 1 Application(s) Topical every 8 hours  influenza   Vaccine 0.5 milliLiter(s) IntraMuscular once  insulin lispro (ADMELOG) corrective regimen sliding scale   SubCutaneous three times a day before meals  insulin lispro (ADMELOG) corrective regimen sliding scale   SubCutaneous at bedtime  insulin lispro Injectable (ADMELOG) 2 Unit(s) SubCutaneous three times a day before meals  lidocaine   4% Patch 1 Patch Transdermal daily  lidocaine   4% Patch 1 Patch Transdermal daily  midodrine. 30 milliGRAM(s) Oral three times a day  Nephro-russ 1 Tablet(s) Oral daily  norepinephrine Infusion 0.05 MICROgram(s)/kG/Min (3.83 mL/Hr) IV Continuous <Continuous>  pantoprazole  Injectable 40 milliGRAM(s) IV Push every 12 hours  Phoxillum Filtration BK 4 / 2.5 5000 milliLiter(s) (1300 mL/Hr) CRRT <Continuous>  Phoxillum Filtration BK 4 / 2.5 5000 milliLiter(s) (200 mL/Hr) CRRT <Continuous>  PrismaSATE Dialysate BGK 4 / 2.5 5000 milliLiter(s) (1500 mL/Hr) CRRT <Continuous>  sucralfate 1 Gram(s) Oral four times a day  vasopressin Infusion 0.04 Unit(s)/Min (6 mL/Hr) IV Continuous <Continuous>    MEDICATIONS  (PRN):  acetaminophen    Suspension .. 650 milliGRAM(s) Oral every 6 hours PRN Temp greater or equal to 38C (100.4F), Mild Pain (1 - 3)  albuterol    90 MICROgram(s) HFA Inhaler 2 Puff(s) Inhalation every 6 hours PRN Shortness of Breath and/or Wheezing  albuterol/ipratropium for Nebulization 3 milliLiter(s) Nebulizer every 6 hours PRN Shortness of Breath and/or Wheezing  dextrose Oral Gel 15 Gram(s) Oral once PRN Blood Glucose LESS THAN 70 milliGRAM(s)/deciliter  HYDROmorphone  Injectable 0.5 milliGRAM(s) IV Push every 4 hours PRN Moderate Pain (4 - 6)  lidocaine/prilocaine Cream 1 Application(s) Topical <User Schedule> PRN Pre-HD on HD days  sodium chloride 0.9% Bolus. 100 milliLiter(s) IV Bolus every 5 minutes PRN SBP LESS THAN or EQUAL to 90 mmHg  sodium chloride 0.9% lock flush 10 milliLiter(s) IV Push every 1 hour PRN Pre/post blood products, medications, blood draw, and to maintain line patency      LABS:                        8.5    37.09 )-----------( 425      ( 16 Dec 2022 00:24 )             29.4     Hgb Trend: 8.5<--, 7.9<--, 8.2<--, 9.5<--, 9.3<--  12-16    133<L>  |  97  |  8   ----------------------------<  168<H>  3.9   |  20<L>  |  1.69<H>    Ca    9.3      16 Dec 2022 12:38  Phos  2.9     12-16  Mg     2.2     12-16    TPro  6.3  /  Alb  2.6<L>  /  TBili  0.3  /  DBili  x   /  AST  20  /  ALT  14  /  AlkPhos  131<H>  12-16    Creatinine Trend: 1.69<--, 1.77<--, 1.92<--, 2.10<--, 2.20<--, 2.46<--      Arterial Blood Gas:  12-16 @ 16:36  7.32/37/96/19/98.8/-6.4  ABG lactate: --  Arterial Blood Gas:  12-16 @ 12:17  7.29/41/72/20/96.6/-6.4  ABG lactate: --  Arterial Blood Gas:  12-16 @ 08:52  7.31/45/79/23/97.7/-3.5  ABG lactate: --  Arterial Blood Gas:  12-16 @ 04:08  7.31/46/106/23/99.1/-3.0  ABG lactate: --  Arterial Blood Gas:  12-16 @ 00:10  7.27/45/105/21/98.4/-5.9  ABG lactate: --  Arterial Blood Gas:  12-15 @ 20:13  7.26/49/79/22/97.3/-5.0  ABG lactate: --  Arterial Blood Gas:  12-15 @ 15:36  7.29/41/100/20/98.4/-6.4  ABG lactate: --  Arterial Blood Gas:  12-15 @ 11:39  7.27/38/76/17/96.8/-8.8  ABG lactate: --    Venous Blood Gas:  12-15 @ 08:25  7.25/55/43/24/70.8  VBG Lactate: 1.4  Venous Blood Gas:  12-15 @ 04:13  7.29/58/34/28/57.5  VBG Lactate: 1.5  Venous Blood Gas:  12-15 @ 00:16  7.23/65/41/27/72.6  VBG Lactate: 1.1  Venous Blood Gas:  12-14 @ 20:14  7.23/61/43/26/73.2  VBG Lactate: 1.3      MICROBIOLOGY:     RADIOLOGY & ADDITIONAL TESTS:      ASSESSMENT AND PLAN:  Mr. Ruvalcaba is a 71 y/o M COPD/asthma, HTN, CAD s/p PCI, BPH, GERD p/w worsening SOB a/w acute hypoxic respiratory failure, COPD exacerbation due to URI now being accepted to MICU iso hypertensive emergency and hypoxia.    ==Neuro==  - Intubated  - Sedated    ==Cardiovascular==  #Hypertensive emergency  - BP:   - Started on nitro gtt during RRT  - C/w amlodipine, lopressor to carvedvilol?    #CAD  - s/p stents   - C/w DAPT, statin, and lopressor    ==Respiratory==  #Acute respiratory failure w/ hypoxia  - likely 2/2 URI causing asthma/COPD exacerbation  - RVP neg  - C/w albuterol Q6H  - Ventilator settings:  - Serial ABGs and adjust vent settings as tolerated    #COPD exacerbation  - Hx w/ COPD/emphysema, asthma   - Reports home O2 from prior admission  - S/p IV steroids, now on Prednisone 30 mg PO BID (started 12/15).  -C/w Symbicort 160/4.5 mcg 2 puffs BID  -C/w Singulair  -C/w Duoneb q6h  -Mucinex 1200 mg PO BID x5 days for congested cough  -Keep sats >90% with O2 PRN (currently 1LNC). Wean O2 as tolerated.  -Check sats on RA at rest and with ambulation  - Intubated on settings:  - Serial ABGs and adjust vent settings as tolerated  - Pulm following    ==GI/Nutrition==  #GERD  -C/w PPI    ==/Renal==  #OWEN  - Baseline SCr: 0.67  - FeNA: prerenal iso COPD exacerbation and hypertensive crisis  - renal and bladder u/s unremarkable  - Nephrology following  - Monitor and trend SCr. Strict I/Os.   - Avoid nephrotoxic agents and renally dose medications   - Cont to hold home ARB    ==Skin==  - No active issues    ==ID==  #Leukocytosis  - 12/16 WBC 23.89    ==Endocrine==  - NPO for now and low SSI    ==Hematologic/DVT ppx==  DVT ppx:heparin SQ    ==Ethics==  GOC: Full Code MICU Accept Note    CHIEF COMPLAINT: worsening SOB    HPI / INTERVAL HISTORY:    72M pmh COPD/asthma (not on home O2, last exacerbation 1y ago october), CAD (s/p PCI), HTN p/w worsening SOB on 12/10/22.  Patient saw his son (works for [employer]) and daughter-in-law recently, + sick contact but their RVP was reported negative.  He started to have nasal congestion/non-productive cough, running nose for the past 5days, treated with OTC Sudafed and Albuterol nebs with some improvement.  He began to have wheezing for 1 day, refractory to nebs, symbicort, and started to use 6L NC (had home O2 after Oct 2021 hospital discharge but never used it).  Wife brought him to ED due to worsening SOB, constant chest tightness, unable to complete a sentence "speaking in 1 word sentences" and elevated BP.    RRT called 12/16/22 for hypoxia, increased WOB on BIPAP iso hypertensive emergency. Pt was started on nitro gtt.     PAST MEDICAL & SURGICAL HISTORY:  Diabetes mellitus      Hypertension      Hyperlipidemia      ESRD on hemodialysis  5 days/week at home      Eye abnormality  Left eye      Vitamin D deficiency      Vitamin B12 deficiency      Heartburn      Trigger finger of both hands      Cholecystitis      Former smoker      Shingles      Retinal hemorrhage      Glaucoma      AGUEDA on CPAP      Trigger finger of both hands  Release surgery      H/O right knee surgery      History of cholecystectomy      Status post glaucoma surgery  both eyes          FAMILY HISTORY:  Family history of hypertension in mother    Family history of DVT  Mother    Family history of heart attack (Father)  age in 40s    Family history of breast cancer (Aunt)    Family history of leukemia (Aunt)        SOCIAL HISTORY:  Smoking:   Substance Use:   EtOH Use:   Marital Status:   Sexual History:   Occupation:  Recent Travel:  Country of Birth:   Advance Directives:     HOME MEDICATIONS:      Allergies    [This allergen will not trigger allergy alert] orange oil (Unknown)  cinnamon (Swelling)  Orange (Angioedema)  Orange Juice (Angioedema)  simvastatin (Other)    Intolerances          REVIEW OF SYSTEMS:  Constitutional: No fevers, chills, weight loss, weight gain  HEENT: No vision problems, eye pain, nasal congestion, rhinorrhea, sore throat, dysphagia  CV: No chest pain, orthopnea, palpitations  Resp: No cough, dyspnea, wheezing, hemoptysis  GI: No nausea, vomiting, diarrhea, constipation, abdominal pain  : [ ] dysuria [ ] nocturia [ ] hematuria [ ] increased urinary frequency  Musculoskeletal: [ ] back pain [ ] myalgias [ ] arthralgias [ ] fracture  Skin: [ ] rash [ ] itch  Neurological: [ ] headache [ ] dizziness [ ] syncope [ ] weakness [ ] numbness  Psychiatric: [ ] anxiety [ ] depression  Endocrine: [ ] diabetes [ ] thyroid problem  Hematologic/Lymphatic: [ ] anemia [ ] bleeding problem  Allergic/Immunologic: [ ] itchy eyes [ ] nasal discharge [ ] hives [ ] angioedema  [ ] All other systems negative  [ ] Unable to assess ROS because ________    OBJECTIVE:  ICU Vital Signs Last 24 Hrs  T(C): 37 (16 Dec 2022 12:00), Max: 37 (15 Dec 2022 20:00)  T(F): 98.6 (16 Dec 2022 12:00), Max: 98.6 (15 Dec 2022 20:00)  HR: 93 (16 Dec 2022 17:30) (63 - 109)  BP: --  BP(mean): --  ABP: 99/43 (16 Dec 2022 17:30) (99/43 - 151/59)  ABP(mean): 61 (16 Dec 2022 17:30) (60 - 93)  RR: 18 (16 Dec 2022 17:30) (14 - 25)  SpO2: 98% (16 Dec 2022 17:30) (94% - 100%)    O2 Parameters below as of 16 Dec 2022 08:00  Patient On (Oxygen Delivery Method): nasal cannula w/ humidification  O2 Flow (L/min): 4            12-15 @ 07:01  -  12-16 @ 07:00  --------------------------------------------------------  IN: 3257 mL / OUT: 4026 mL / NET: -769 mL    12-16 @ 07:01  -  12-16 @ 17:47  --------------------------------------------------------  IN: 1093.8 mL / OUT: 1626 mL / NET: -532.2 mL      CAPILLARY BLOOD GLUCOSE      POCT Blood Glucose.: 170 mg/dL (16 Dec 2022 15:14)      PHYSICAL EXAM:  General:   HEENT:   Neck:   Chest/Lungs:  Heart:  Abdomen:   Extremities:   Skin:   Neuro:   Psych:     LINES:     HOSPITAL MEDICATIONS:  MEDICATIONS  (STANDING):  budesonide  80 MICROgram(s)/formoterol 4.5 MICROgram(s) Inhaler 2 Puff(s) Inhalation two times a day  calcitriol   Capsule 0.5 MICROGram(s) Oral daily  chlorhexidine 2% Cloths 1 Application(s) Topical daily  chlorhexidine 4% Liquid 1 Application(s) Topical <User Schedule>  cinacalcet 60 milliGRAM(s) Oral two times a day  CRRT Treatment    <Continuous>  dextrose 5%. 1000 milliLiter(s) (50 mL/Hr) IV Continuous <Continuous>  dextrose 5%. 1000 milliLiter(s) (100 mL/Hr) IV Continuous <Continuous>  dextrose 5%. 1000 milliLiter(s) (75 mL/Hr) IV Continuous <Continuous>  dextrose 5%. 1000 milliLiter(s) (100 mL/Hr) IV Continuous <Continuous>  dextrose 5%. 1000 milliLiter(s) (50 mL/Hr) IV Continuous <Continuous>  dextrose 50% Injectable 25 Gram(s) IV Push once  dextrose 50% Injectable 12.5 Gram(s) IV Push once  dextrose 50% Injectable 25 Gram(s) IV Push once  epoetin sam-epbx (RETACRIT) Injectable 4000 Unit(s) SubCutaneous <User Schedule>  fenofibrate Tablet 48 milliGRAM(s) Oral daily  ferrous    sulfate 325 milliGRAM(s) Oral daily  glucagon  Injectable 1 milliGRAM(s) IntraMuscular once  glucagon  Injectable 1 milliGRAM(s) IntraMuscular once  heparin   Injectable 5000 Unit(s) SubCutaneous every 8 hours  heparin  Infusion Syringe 300 Unit(s)/Hr (0.6 mL/Hr) CRRT <Continuous>  hydrocortisone 1% Cream 1 Application(s) Topical every 8 hours  influenza   Vaccine 0.5 milliLiter(s) IntraMuscular once  insulin lispro (ADMELOG) corrective regimen sliding scale   SubCutaneous three times a day before meals  insulin lispro (ADMELOG) corrective regimen sliding scale   SubCutaneous at bedtime  insulin lispro Injectable (ADMELOG) 2 Unit(s) SubCutaneous three times a day before meals  lidocaine   4% Patch 1 Patch Transdermal daily  lidocaine   4% Patch 1 Patch Transdermal daily  midodrine. 30 milliGRAM(s) Oral three times a day  Nephro-russ 1 Tablet(s) Oral daily  norepinephrine Infusion 0.05 MICROgram(s)/kG/Min (3.83 mL/Hr) IV Continuous <Continuous>  pantoprazole  Injectable 40 milliGRAM(s) IV Push every 12 hours  Phoxillum Filtration BK 4 / 2.5 5000 milliLiter(s) (1300 mL/Hr) CRRT <Continuous>  Phoxillum Filtration BK 4 / 2.5 5000 milliLiter(s) (200 mL/Hr) CRRT <Continuous>  PrismaSATE Dialysate BGK 4 / 2.5 5000 milliLiter(s) (1500 mL/Hr) CRRT <Continuous>  sucralfate 1 Gram(s) Oral four times a day  vasopressin Infusion 0.04 Unit(s)/Min (6 mL/Hr) IV Continuous <Continuous>    MEDICATIONS  (PRN):  acetaminophen    Suspension .. 650 milliGRAM(s) Oral every 6 hours PRN Temp greater or equal to 38C (100.4F), Mild Pain (1 - 3)  albuterol    90 MICROgram(s) HFA Inhaler 2 Puff(s) Inhalation every 6 hours PRN Shortness of Breath and/or Wheezing  albuterol/ipratropium for Nebulization 3 milliLiter(s) Nebulizer every 6 hours PRN Shortness of Breath and/or Wheezing  dextrose Oral Gel 15 Gram(s) Oral once PRN Blood Glucose LESS THAN 70 milliGRAM(s)/deciliter  HYDROmorphone  Injectable 0.5 milliGRAM(s) IV Push every 4 hours PRN Moderate Pain (4 - 6)  lidocaine/prilocaine Cream 1 Application(s) Topical <User Schedule> PRN Pre-HD on HD days  sodium chloride 0.9% Bolus. 100 milliLiter(s) IV Bolus every 5 minutes PRN SBP LESS THAN or EQUAL to 90 mmHg  sodium chloride 0.9% lock flush 10 milliLiter(s) IV Push every 1 hour PRN Pre/post blood products, medications, blood draw, and to maintain line patency      LABS:                        8.5    37.09 )-----------( 425      ( 16 Dec 2022 00:24 )             29.4     Hgb Trend: 8.5<--, 7.9<--, 8.2<--, 9.5<--, 9.3<--  12-16    133<L>  |  97  |  8   ----------------------------<  168<H>  3.9   |  20<L>  |  1.69<H>    Ca    9.3      16 Dec 2022 12:38  Phos  2.9     12-16  Mg     2.2     12-16    TPro  6.3  /  Alb  2.6<L>  /  TBili  0.3  /  DBili  x   /  AST  20  /  ALT  14  /  AlkPhos  131<H>  12-16    Creatinine Trend: 1.69<--, 1.77<--, 1.92<--, 2.10<--, 2.20<--, 2.46<--      Arterial Blood Gas:  12-16 @ 16:36  7.32/37/96/19/98.8/-6.4  ABG lactate: --  Arterial Blood Gas:  12-16 @ 12:17  7.29/41/72/20/96.6/-6.4  ABG lactate: --  Arterial Blood Gas:  12-16 @ 08:52  7.31/45/79/23/97.7/-3.5  ABG lactate: --  Arterial Blood Gas:  12-16 @ 04:08  7.31/46/106/23/99.1/-3.0  ABG lactate: --  Arterial Blood Gas:  12-16 @ 00:10  7.27/45/105/21/98.4/-5.9  ABG lactate: --  Arterial Blood Gas:  12-15 @ 20:13  7.26/49/79/22/97.3/-5.0  ABG lactate: --  Arterial Blood Gas:  12-15 @ 15:36  7.29/41/100/20/98.4/-6.4  ABG lactate: --  Arterial Blood Gas:  12-15 @ 11:39  7.27/38/76/17/96.8/-8.8  ABG lactate: --    Venous Blood Gas:  12-15 @ 08:25  7.25/55/43/24/70.8  VBG Lactate: 1.4  Venous Blood Gas:  12-15 @ 04:13  7.29/58/34/28/57.5  VBG Lactate: 1.5  Venous Blood Gas:  12-15 @ 00:16  7.23/65/41/27/72.6  VBG Lactate: 1.1  Venous Blood Gas:  12-14 @ 20:14  7.23/61/43/26/73.2  VBG Lactate: 1.3      MICROBIOLOGY:     RADIOLOGY & ADDITIONAL TESTS:      ASSESSMENT AND PLAN:  Mr. Ruvalcaba is a 71 y/o M COPD/asthma, HTN, CAD s/p PCI, BPH, GERD p/w worsening SOB a/w acute hypoxic respiratory failure, COPD exacerbation due to URI now being accepted to MICU iso hypertensive emergency and hypoxia.    ==Neuro==  - Intubated  - Sedated    ==Cardiovascular==  #Hypertensive emergency  - BP:   - Started on nitro gtt during RRT  - C/w amlodipine, lopressor to carvedvilol?    #CAD  - s/p stents   - C/w DAPT, statin, and lopressor    ==Respiratory==  #Acute respiratory failure w/ hypoxia  - likely 2/2 URI causing asthma/COPD exacerbation  - RVP neg  - C/w albuterol Q6H  - Ventilator settings:  - Serial ABGs and adjust vent settings as tolerated    #COPD exacerbation  - Hx w/ COPD/emphysema, asthma   - Reports home O2 from prior admission  - S/p IV steroids, now on Prednisone 30 mg PO BID (started 12/15).  -C/w Symbicort 160/4.5 mcg 2 puffs BID  -C/w Singulair  -C/w Duoneb q6h  -Mucinex 1200 mg PO BID x5 days for congested cough  -Keep sats >90% with O2 PRN (currently 1LNC). Wean O2 as tolerated.  -Check sats on RA at rest and with ambulation  - Intubated on settings:  - Serial ABGs and adjust vent settings as tolerated  - Pulm following    ==GI/Nutrition==  #GERD  -C/w PPI    ==/Renal==  #OWEN  - Baseline SCr: 0.67  - FeNA: prerenal iso COPD exacerbation and hypertensive crisis  - renal and bladder u/s unremarkable  - Nephrology following  - Monitor and trend SCr. Strict I/Os.   - Avoid nephrotoxic agents and renally dose medications   - Cont to hold home ARB    ==Skin==  - No active issues    ==ID==  #Leukocytosis  - 12/16 WBC 23.89    ==Endocrine==  - NPO for now and low SSI    ==Hematologic/DVT ppx==  DVT ppx:heparin SQ    ==Ethics==  GOC: Full Code MICU Accept Note    CHIEF COMPLAINT: worsening SOB    HPI / INTERVAL HISTORY:    72M pmh COPD/asthma (not on home O2, last exacerbation 1y ago october), CAD (s/p PCI), HTN p/w worsening SOB on 12/10/22 with concern for COPD exacerbation and hypertensive urgency.     During hospitalization, patient treated with steroids and duonebs. Course complicated by OWEN with Cr up to >3 (baseline ~0.7), thought to be 2/2 pre-renal etiology. Patient was reportedly weaned down on oxygen and set to have tentative discharge today. AM labs 12/16 with WBC to 17.  RRT called 12/16/22 for hypoxia, increased WOB on BIPAP iso hypertensive emergency. At bedside, patient had limited air movement on lung exam. Duonebsx4, Magnesium 2mg IV, zosynx1, solumedrol 100x1, sublingual nitroglycerinx2, lopressor 5mgx1, hydralazinex1 and labetalol x1.  given. Vitals significant for SBP>200 continuously.  Pt was started on nitro gtt. Patient also placed on BIPAP for increased work of breathing. Patient transferred to MICU for monitoring in s/o COPD exacerbation 2/2 hypertensive emergency vs emerging sepsis.     PAST MEDICAL & SURGICAL HISTORY:  Diabetes mellitus      Hypertension      Hyperlipidemia      ESRD on hemodialysis  5 days/week at home      Eye abnormality  Left eye      Vitamin D deficiency      Vitamin B12 deficiency      Heartburn      Trigger finger of both hands      Cholecystitis      Former smoker      Shingles      Retinal hemorrhage      Glaucoma      AGUEDA on CPAP      Trigger finger of both hands  Release surgery      H/O right knee surgery      History of cholecystectomy      Status post glaucoma surgery  both eyes          FAMILY HISTORY:  Family history of hypertension in mother    Family history of DVT  Mother    Family history of heart attack (Father)  age in 40s    Family history of breast cancer (Aunt)    Family history of leukemia (Aunt)      HOME MEDICATIONS:      Allergies    [This allergen will not trigger allergy alert] orange oil (Unknown)  cinnamon (Swelling)  Orange (Angioedema)  Orange Juice (Angioedema)  simvastatin (Other)    Intolerances      CONSTITUTIONAL:  No weight loss, fever, chills, weakness or fatigue.  HEENT:  Eyes:  No visual loss, blurred vision, double vision or yellow sclerae.   SKIN:  No rash or itching.  CARDIOVASCULAR:  No chest pain, chest pressure or chest discomfort. No palpitations.  RESPIRATORY:  +SOB  GASTROINTESTINAL:  No anorexia, nausea, vomiting or diarrhea. No abdominal pain or blood.  GENITOURINARY:  Denies hematuria, dysuria.   NEUROLOGICAL:  No headache, dizziness, syncope, paralysis, ataxia, numbness or tingling in the extremities.   MUSCULOSKELETAL:  No muscle, back pain, joint pain or stiffness.  HEMATOLOGIC:  No anemia, bleeding or bruising.    OBJECTIVE:  ICU Vital Signs Last 24 Hrs  T(C): 37 (16 Dec 2022 12:00), Max: 37 (15 Dec 2022 20:00)  T(F): 98.6 (16 Dec 2022 12:00), Max: 98.6 (15 Dec 2022 20:00)  HR: 93 (16 Dec 2022 17:30) (63 - 109)  BP: --  BP(mean): --  ABP: 99/43 (16 Dec 2022 17:30) (99/43 - 151/59)  ABP(mean): 61 (16 Dec 2022 17:30) (60 - 93)  RR: 18 (16 Dec 2022 17:30) (14 - 25)  SpO2: 98% (16 Dec 2022 17:30) (94% - 100%)    O2 Parameters below as of 16 Dec 2022 08:00  Patient On (Oxygen Delivery Method): nasal cannula w/ humidification  O2 Flow (L/min): 4            12-15 @ 07:01  -  12-16 @ 07:00  --------------------------------------------------------  IN: 3257 mL / OUT: 4026 mL / NET: -769 mL    12-16 @ 07:01  -  12-16 @ 17:47  --------------------------------------------------------  IN: 1093.8 mL / OUT: 1626 mL / NET: -532.2 mL      CAPILLARY BLOOD GLUCOSE      POCT Blood Glucose.: 170 mg/dL (16 Dec 2022 15:14)    PHYSICAL EXAM:  GENERAL: NAD, Resting in bed, thin   HEENT:  Head atraumatic, EOMI, PERRLA, conjunctiva and sclera clear; Moist mucous membranes, normal oropharynx  NECK: Supple  CHEST/LUNG: decreased bilateral air movement   HEART: tachycardic  ABDOMEN: Bowel sounds present; Soft, Nontender, Nondistended.   EXTREMITIES:  2+ Peripheral Pulses, brisk capillary refill. No clubbing, cyanosis, or edema  NERVOUS SYSTEM:  Alert & Oriented X3, non-focal and spontaneous movements of all extremities  SKIN: No rashes or lesions    LINES:     HOSPITAL MEDICATIONS:  MEDICATIONS  (STANDING):  budesonide  80 MICROgram(s)/formoterol 4.5 MICROgram(s) Inhaler 2 Puff(s) Inhalation two times a day  calcitriol   Capsule 0.5 MICROGram(s) Oral daily  chlorhexidine 2% Cloths 1 Application(s) Topical daily  chlorhexidine 4% Liquid 1 Application(s) Topical <User Schedule>  cinacalcet 60 milliGRAM(s) Oral two times a day  CRRT Treatment    <Continuous>  dextrose 5%. 1000 milliLiter(s) (50 mL/Hr) IV Continuous <Continuous>  dextrose 5%. 1000 milliLiter(s) (100 mL/Hr) IV Continuous <Continuous>  dextrose 5%. 1000 milliLiter(s) (75 mL/Hr) IV Continuous <Continuous>  dextrose 5%. 1000 milliLiter(s) (100 mL/Hr) IV Continuous <Continuous>  dextrose 5%. 1000 milliLiter(s) (50 mL/Hr) IV Continuous <Continuous>  dextrose 50% Injectable 25 Gram(s) IV Push once  dextrose 50% Injectable 12.5 Gram(s) IV Push once  dextrose 50% Injectable 25 Gram(s) IV Push once  epoetin sam-epbx (RETACRIT) Injectable 4000 Unit(s) SubCutaneous <User Schedule>  fenofibrate Tablet 48 milliGRAM(s) Oral daily  ferrous    sulfate 325 milliGRAM(s) Oral daily  glucagon  Injectable 1 milliGRAM(s) IntraMuscular once  glucagon  Injectable 1 milliGRAM(s) IntraMuscular once  heparin   Injectable 5000 Unit(s) SubCutaneous every 8 hours  heparin  Infusion Syringe 300 Unit(s)/Hr (0.6 mL/Hr) CRRT <Continuous>  hydrocortisone 1% Cream 1 Application(s) Topical every 8 hours  influenza   Vaccine 0.5 milliLiter(s) IntraMuscular once  insulin lispro (ADMELOG) corrective regimen sliding scale   SubCutaneous three times a day before meals  insulin lispro (ADMELOG) corrective regimen sliding scale   SubCutaneous at bedtime  insulin lispro Injectable (ADMELOG) 2 Unit(s) SubCutaneous three times a day before meals  lidocaine   4% Patch 1 Patch Transdermal daily  lidocaine   4% Patch 1 Patch Transdermal daily  midodrine. 30 milliGRAM(s) Oral three times a day  Nephro-russ 1 Tablet(s) Oral daily  norepinephrine Infusion 0.05 MICROgram(s)/kG/Min (3.83 mL/Hr) IV Continuous <Continuous>  pantoprazole  Injectable 40 milliGRAM(s) IV Push every 12 hours  Phoxillum Filtration BK 4 / 2.5 5000 milliLiter(s) (1300 mL/Hr) CRRT <Continuous>  Phoxillum Filtration BK 4 / 2.5 5000 milliLiter(s) (200 mL/Hr) CRRT <Continuous>  PrismaSATE Dialysate BGK 4 / 2.5 5000 milliLiter(s) (1500 mL/Hr) CRRT <Continuous>  sucralfate 1 Gram(s) Oral four times a day  vasopressin Infusion 0.04 Unit(s)/Min (6 mL/Hr) IV Continuous <Continuous>    MEDICATIONS  (PRN):  acetaminophen    Suspension .. 650 milliGRAM(s) Oral every 6 hours PRN Temp greater or equal to 38C (100.4F), Mild Pain (1 - 3)  albuterol    90 MICROgram(s) HFA Inhaler 2 Puff(s) Inhalation every 6 hours PRN Shortness of Breath and/or Wheezing  albuterol/ipratropium for Nebulization 3 milliLiter(s) Nebulizer every 6 hours PRN Shortness of Breath and/or Wheezing  dextrose Oral Gel 15 Gram(s) Oral once PRN Blood Glucose LESS THAN 70 milliGRAM(s)/deciliter  HYDROmorphone  Injectable 0.5 milliGRAM(s) IV Push every 4 hours PRN Moderate Pain (4 - 6)  lidocaine/prilocaine Cream 1 Application(s) Topical <User Schedule> PRN Pre-HD on HD days  sodium chloride 0.9% Bolus. 100 milliLiter(s) IV Bolus every 5 minutes PRN SBP LESS THAN or EQUAL to 90 mmHg  sodium chloride 0.9% lock flush 10 milliLiter(s) IV Push every 1 hour PRN Pre/post blood products, medications, blood draw, and to maintain line patency      LABS:                        8.5    37.09 )-----------( 425      ( 16 Dec 2022 00:24 )             29.4     Hgb Trend: 8.5<--, 7.9<--, 8.2<--, 9.5<--, 9.3<--  12-16    133<L>  |  97  |  8   ----------------------------<  168<H>  3.9   |  20<L>  |  1.69<H>    Ca    9.3      16 Dec 2022 12:38  Phos  2.9     12-16  Mg     2.2     12-16    TPro  6.3  /  Alb  2.6<L>  /  TBili  0.3  /  DBili  x   /  AST  20  /  ALT  14  /  AlkPhos  131<H>  12-16    Creatinine Trend: 1.69<--, 1.77<--, 1.92<--, 2.10<--, 2.20<--, 2.46<--      Arterial Blood Gas:  12-16 @ 16:36  7.32/37/96/19/98.8/-6.4  ABG lactate: --  Arterial Blood Gas:  12-16 @ 12:17  7.29/41/72/20/96.6/-6.4  ABG lactate: --  Arterial Blood Gas:  12-16 @ 08:52  7.31/45/79/23/97.7/-3.5  ABG lactate: --  Arterial Blood Gas:  12-16 @ 04:08  7.31/46/106/23/99.1/-3.0  ABG lactate: --  Arterial Blood Gas:  12-16 @ 00:10  7.27/45/105/21/98.4/-5.9  ABG lactate: --  Arterial Blood Gas:  12-15 @ 20:13  7.26/49/79/22/97.3/-5.0  ABG lactate: --  Arterial Blood Gas:  12-15 @ 15:36  7.29/41/100/20/98.4/-6.4  ABG lactate: --  Arterial Blood Gas:  12-15 @ 11:39  7.27/38/76/17/96.8/-8.8  ABG lactate: --    Venous Blood Gas:  12-15 @ 08:25  7.25/55/43/24/70.8  VBG Lactate: 1.4  Venous Blood Gas:  12-15 @ 04:13  7.29/58/34/28/57.5  VBG Lactate: 1.5  Venous Blood Gas:  12-15 @ 00:16  7.23/65/41/27/72.6  VBG Lactate: 1.1  Venous Blood Gas:  12-14 @ 20:14  7.23/61/43/26/73.2  VBG Lactate: 1.3      MICROBIOLOGY:     RADIOLOGY & ADDITIONAL TESTS:      ASSESSMENT AND PLAN:  Mr. Ruvalcaba is a 71 y/o M COPD/asthma, HTN, CAD s/p PCI, BPH, GERD p/w worsening SOB a/w acute hypoxic respiratory failure, COPD exacerbation due to URI now being accepted to MICU iso hypertensive emergency and hypoxia requiring escalation to BIPAP.     ==Neuro==  - AxOx3 at baseline      ==Cardiovascular==  #Hypertensive emergency  -SBP consistently >200 during RRT  - Started on nitro gtt for BP control   - on amlodipine, lopressor on floors, hold while on nitro gtt.     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 Low c/f pulmonary edema.   - Currently on BIPAP  - c/w duonebs q6h  - solumedrol 40mg IV q8h (12/16-)  - repeat RVP  - f/u ABGs  -C/w Symbicort 160/4.5 mcg 2 puffs BID  -C/w Singulair        ==GI/Nutrition==  #GERD  -C/w PPI IV    #Nutrition  - NPO for now while on BIPAP  - Will re-evaluate pending respiratory status    ==/Renal==  #OWEN  - Baseline SCr: 0.67.  - FeNA: prerenal iso COPD exacerbation and hypertensive crisis  - renal and bladder u/s unremarkable  - Nephrology following  - Monitor and trend SCr. Strict I/Os.   - Avoid nephrotoxic agents and renally dose medications   - Appreciate nephrology recs        ==ID==  #Sepsis  - 12/16 WBC 23.89, tachypneic, tachycardic, lactate >3, afebrile  - s/p zosynx1 during RRT  - c/w empiric zosyn here  -  f/u sputum culture, RVP, MRSA swab  - blood culturesx2  - f/u urinalysis and urine culture    ==Endocrine==  - NPO for now given on BIPAP  - Trend FS q6h while on steroids  - low ISS  - add basal/bolus as needed for steroid-induced hyperglycemia.     ==Hematologic/DVT ppx==  DVT ppx:heparin SQ    ==Ethics==  GOC: Full Code MICU Accept Note    CHIEF COMPLAINT: worsening SOB    HPI / INTERVAL HISTORY:    72M pmh COPD/asthma (not on home O2, last exacerbation 1y ago october), CAD (s/p PCI), HTN p/w worsening SOB on 12/10/22 with concern for COPD exacerbation and hypertensive urgency.     During hospitalization, patient treated with steroids and duonebs. Course complicated by OWEN with Cr up to >3 (baseline ~0.7), thought to be 2/2 pre-renal etiology vs hypertensive urgency. Patient was reportedly weaned down on oxygen and set to have tentative discharge today. AM labs 12/16 with WBC to 17.  RRT called 12/16/22 for hypoxia, increased WOB on BIPAP iso hypertensive emergency. At bedside, patient had limited air movement on lung exam. Duonebsx4, Magnesium 2mg IV, zosynx1, solumedrol 100x1, sublingual nitroglycerinx2, lopressor 5mgx1, hydralazinex1 and labetalol x1.  given. Vitals significant for SBP>200 continuously.  Pt was started on nitro gtt. Patient also placed on BIPAP for increased work of breathing. Patient transferred to MICU for monitoring in s/o COPD exacerbation 2/2 hypertensive emergency vs emerging sepsis.     PAST MEDICAL & SURGICAL HISTORY:  Diabetes mellitus      Hypertension      Hyperlipidemia      ESRD on hemodialysis  5 days/week at home      Eye abnormality  Left eye      Vitamin D deficiency      Vitamin B12 deficiency      Heartburn      Trigger finger of both hands      Cholecystitis      Former smoker      Shingles      Retinal hemorrhage      Glaucoma      AGUEDA on CPAP      Trigger finger of both hands  Release surgery      H/O right knee surgery      History of cholecystectomy      Status post glaucoma surgery  both eyes          FAMILY HISTORY:  Family history of hypertension in mother    Family history of DVT  Mother    Family history of heart attack (Father)  age in 40s    Family history of breast cancer (Aunt)    Family history of leukemia (Aunt)      HOME MEDICATIONS:      Allergies    [This allergen will not trigger allergy alert] orange oil (Unknown)  cinnamon (Swelling)  Orange (Angioedema)  Orange Juice (Angioedema)  simvastatin (Other)    Intolerances      CONSTITUTIONAL:  No weight loss, fever, chills, weakness or fatigue.  HEENT:  Eyes:  No visual loss, blurred vision, double vision or yellow sclerae.   SKIN:  No rash or itching.  CARDIOVASCULAR:  No chest pain, chest pressure or chest discomfort. No palpitations.  RESPIRATORY:  +SOB  GASTROINTESTINAL:  No anorexia, nausea, vomiting or diarrhea. No abdominal pain or blood.  GENITOURINARY:  Denies hematuria, dysuria.   NEUROLOGICAL:  No headache, dizziness, syncope, paralysis, ataxia, numbness or tingling in the extremities.   MUSCULOSKELETAL:  No muscle, back pain, joint pain or stiffness.  HEMATOLOGIC:  No anemia, bleeding or bruising.    OBJECTIVE:  ICU Vital Signs Last 24 Hrs  T(C): 37 (16 Dec 2022 12:00), Max: 37 (15 Dec 2022 20:00)  T(F): 98.6 (16 Dec 2022 12:00), Max: 98.6 (15 Dec 2022 20:00)  HR: 93 (16 Dec 2022 17:30) (63 - 109)  BP: --  BP(mean): --  ABP: 99/43 (16 Dec 2022 17:30) (99/43 - 151/59)  ABP(mean): 61 (16 Dec 2022 17:30) (60 - 93)  RR: 18 (16 Dec 2022 17:30) (14 - 25)  SpO2: 98% (16 Dec 2022 17:30) (94% - 100%)    O2 Parameters below as of 16 Dec 2022 08:00  Patient On (Oxygen Delivery Method): nasal cannula w/ humidification  O2 Flow (L/min): 4            12-15 @ 07:01  -  12-16 @ 07:00  --------------------------------------------------------  IN: 3257 mL / OUT: 4026 mL / NET: -769 mL    12-16 @ 07:01  -  12-16 @ 17:47  --------------------------------------------------------  IN: 1093.8 mL / OUT: 1626 mL / NET: -532.2 mL      CAPILLARY BLOOD GLUCOSE      POCT Blood Glucose.: 170 mg/dL (16 Dec 2022 15:14)    PHYSICAL EXAM:  GENERAL: NAD, Resting in bed, thin   HEENT:  Head atraumatic, EOMI, PERRLA, conjunctiva and sclera clear; Moist mucous membranes, normal oropharynx  NECK: Supple  CHEST/LUNG: decreased bilateral air movement   HEART: tachycardic  ABDOMEN: Bowel sounds present; Soft, Nontender, Nondistended.   EXTREMITIES:  2+ Peripheral Pulses, brisk capillary refill. No clubbing, cyanosis, or edema  NERVOUS SYSTEM:  Alert & Oriented X3, non-focal and spontaneous movements of all extremities  SKIN: No rashes or lesions    LINES:     HOSPITAL MEDICATIONS:  MEDICATIONS  (STANDING):  budesonide  80 MICROgram(s)/formoterol 4.5 MICROgram(s) Inhaler 2 Puff(s) Inhalation two times a day  calcitriol   Capsule 0.5 MICROGram(s) Oral daily  chlorhexidine 2% Cloths 1 Application(s) Topical daily  chlorhexidine 4% Liquid 1 Application(s) Topical <User Schedule>  cinacalcet 60 milliGRAM(s) Oral two times a day  CRRT Treatment    <Continuous>  dextrose 5%. 1000 milliLiter(s) (50 mL/Hr) IV Continuous <Continuous>  dextrose 5%. 1000 milliLiter(s) (100 mL/Hr) IV Continuous <Continuous>  dextrose 5%. 1000 milliLiter(s) (75 mL/Hr) IV Continuous <Continuous>  dextrose 5%. 1000 milliLiter(s) (100 mL/Hr) IV Continuous <Continuous>  dextrose 5%. 1000 milliLiter(s) (50 mL/Hr) IV Continuous <Continuous>  dextrose 50% Injectable 25 Gram(s) IV Push once  dextrose 50% Injectable 12.5 Gram(s) IV Push once  dextrose 50% Injectable 25 Gram(s) IV Push once  epoetin sam-epbx (RETACRIT) Injectable 4000 Unit(s) SubCutaneous <User Schedule>  fenofibrate Tablet 48 milliGRAM(s) Oral daily  ferrous    sulfate 325 milliGRAM(s) Oral daily  glucagon  Injectable 1 milliGRAM(s) IntraMuscular once  glucagon  Injectable 1 milliGRAM(s) IntraMuscular once  heparin   Injectable 5000 Unit(s) SubCutaneous every 8 hours  heparin  Infusion Syringe 300 Unit(s)/Hr (0.6 mL/Hr) CRRT <Continuous>  hydrocortisone 1% Cream 1 Application(s) Topical every 8 hours  influenza   Vaccine 0.5 milliLiter(s) IntraMuscular once  insulin lispro (ADMELOG) corrective regimen sliding scale   SubCutaneous three times a day before meals  insulin lispro (ADMELOG) corrective regimen sliding scale   SubCutaneous at bedtime  insulin lispro Injectable (ADMELOG) 2 Unit(s) SubCutaneous three times a day before meals  lidocaine   4% Patch 1 Patch Transdermal daily  lidocaine   4% Patch 1 Patch Transdermal daily  midodrine. 30 milliGRAM(s) Oral three times a day  Nephro-russ 1 Tablet(s) Oral daily  norepinephrine Infusion 0.05 MICROgram(s)/kG/Min (3.83 mL/Hr) IV Continuous <Continuous>  pantoprazole  Injectable 40 milliGRAM(s) IV Push every 12 hours  Phoxillum Filtration BK 4 / 2.5 5000 milliLiter(s) (1300 mL/Hr) CRRT <Continuous>  Phoxillum Filtration BK 4 / 2.5 5000 milliLiter(s) (200 mL/Hr) CRRT <Continuous>  PrismaSATE Dialysate BGK 4 / 2.5 5000 milliLiter(s) (1500 mL/Hr) CRRT <Continuous>  sucralfate 1 Gram(s) Oral four times a day  vasopressin Infusion 0.04 Unit(s)/Min (6 mL/Hr) IV Continuous <Continuous>    MEDICATIONS  (PRN):  acetaminophen    Suspension .. 650 milliGRAM(s) Oral every 6 hours PRN Temp greater or equal to 38C (100.4F), Mild Pain (1 - 3)  albuterol    90 MICROgram(s) HFA Inhaler 2 Puff(s) Inhalation every 6 hours PRN Shortness of Breath and/or Wheezing  albuterol/ipratropium for Nebulization 3 milliLiter(s) Nebulizer every 6 hours PRN Shortness of Breath and/or Wheezing  dextrose Oral Gel 15 Gram(s) Oral once PRN Blood Glucose LESS THAN 70 milliGRAM(s)/deciliter  HYDROmorphone  Injectable 0.5 milliGRAM(s) IV Push every 4 hours PRN Moderate Pain (4 - 6)  lidocaine/prilocaine Cream 1 Application(s) Topical <User Schedule> PRN Pre-HD on HD days  sodium chloride 0.9% Bolus. 100 milliLiter(s) IV Bolus every 5 minutes PRN SBP LESS THAN or EQUAL to 90 mmHg  sodium chloride 0.9% lock flush 10 milliLiter(s) IV Push every 1 hour PRN Pre/post blood products, medications, blood draw, and to maintain line patency      LABS:                        8.5    37.09 )-----------( 425      ( 16 Dec 2022 00:24 )             29.4     Hgb Trend: 8.5<--, 7.9<--, 8.2<--, 9.5<--, 9.3<--  12-16    133<L>  |  97  |  8   ----------------------------<  168<H>  3.9   |  20<L>  |  1.69<H>    Ca    9.3      16 Dec 2022 12:38  Phos  2.9     12-16  Mg     2.2     12-16    TPro  6.3  /  Alb  2.6<L>  /  TBili  0.3  /  DBili  x   /  AST  20  /  ALT  14  /  AlkPhos  131<H>  12-16    Creatinine Trend: 1.69<--, 1.77<--, 1.92<--, 2.10<--, 2.20<--, 2.46<--      Arterial Blood Gas:  12-16 @ 16:36  7.32/37/96/19/98.8/-6.4  ABG lactate: --  Arterial Blood Gas:  12-16 @ 12:17  7.29/41/72/20/96.6/-6.4  ABG lactate: --  Arterial Blood Gas:  12-16 @ 08:52  7.31/45/79/23/97.7/-3.5  ABG lactate: --  Arterial Blood Gas:  12-16 @ 04:08  7.31/46/106/23/99.1/-3.0  ABG lactate: --  Arterial Blood Gas:  12-16 @ 00:10  7.27/45/105/21/98.4/-5.9  ABG lactate: --  Arterial Blood Gas:  12-15 @ 20:13  7.26/49/79/22/97.3/-5.0  ABG lactate: --  Arterial Blood Gas:  12-15 @ 15:36  7.29/41/100/20/98.4/-6.4  ABG lactate: --  Arterial Blood Gas:  12-15 @ 11:39  7.27/38/76/17/96.8/-8.8  ABG lactate: --    Venous Blood Gas:  12-15 @ 08:25  7.25/55/43/24/70.8  VBG Lactate: 1.4  Venous Blood Gas:  12-15 @ 04:13  7.29/58/34/28/57.5  VBG Lactate: 1.5  Venous Blood Gas:  12-15 @ 00:16  7.23/65/41/27/72.6  VBG Lactate: 1.1  Venous Blood Gas:  12-14 @ 20:14  7.23/61/43/26/73.2  VBG Lactate: 1.3      MICROBIOLOGY:     RADIOLOGY & ADDITIONAL TESTS:      ASSESSMENT AND PLAN:  Mr. Ruvalcaba is a 73 y/o M COPD/asthma, HTN, CAD s/p PCI, BPH, GERD p/w worsening SOB a/w acute hypoxic respiratory failure 2/2 COPD exacerbation possibly 2/2 hypertensive urgency vs sepsis requiring escalation to BIPAP and nitroglycerin gtt. Now accepted to MICU 12/16 for further monitoring.     ==Neuro==  - AxOx3 at baseline      ==Cardiovascular==  #Hypertensive emergency  -SBP consistently >200 during RRT  - Started on nitro gtt for BP control   - signs of end-organ damage with OWEN  - on amlodipine, lopressor on floors, hold while on nitro gtt.     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 Low c/f pulmonary edema.   - Currently on BIPAP  - c/w duonebs q6h  - solumedrol 40mg IV q8h (12/16-)  - repeat RVP  - f/u ABGs  -C/w pulmicort nebulizer BID  -C/w Singulair        ==GI/Nutrition==  #GERD  -C/w PPI IV    #Nutrition  - NPO for now while on BIPAP  - Will re-evaluate pending respiratory status    ==/Renal==  #OEWN  - Baseline SCr: 0.67.  - likely in setting of hypertensive crises.   - renal and bladder u/s unremarkable  - Nephrology following  - Monitor and trend SCr. Strict I/Os.   - Avoid nephrotoxic agents and renally dose medications   - Appreciate nephrology recs    ==ID==  #Sepsis  - 12/16 WBC 23.89, tachypneic, tachycardic, lactate >3, afebrile  - s/p zosynx1 during RRT  - c/w empiric zosyn here (12/16-)  -  f/u sputum culture, RVP, MRSA swab  - blood culturesx2  - f/u urinalysis and urine culture    ==Endocrine==  - NPO for now given on BIPAP  - Trend FS q6h while on steroids  - low ISS  - add basal/bolus as needed for steroid-induced hyperglycemia.     ==Hematologic/DVT ppx==  DVT ppx:heparin SQ    ==Ethics==  GOC: Full Code MICU Accept Note    CHIEF COMPLAINT: worsening SOB    HPI / INTERVAL HISTORY:    72M pmh COPD/asthma (not on home O2, last exacerbation 1y ago october), CAD (s/p PCI), HTN p/w worsening SOB on 12/10/22 with concern for COPD exacerbation and hypertensive urgency.     During hospitalization, patient treated with steroids and duonebs. Course complicated by OWEN with Cr up to >3 (baseline ~0.7), thought to be 2/2 pre-renal etiology vs hypertensive urgency. Patient was reportedly weaned down on oxygen and set to have tentative discharge today. AM labs 12/16 with WBC to 17.  RRT called 12/16/22 for hypoxia, increased WOB on BIPAP iso hypertensive emergency. At bedside, patient had limited air movement on lung exam. Duonebsx4, Magnesium 2mg IV, zosynx1, solumedrol 100x1, sublingual nitroglycerinx2, lopressor 5mgx1, hydralazinex1 and labetalol x1.  given. Vitals significant for SBP>200 continuously.  Pt was started on nitro gtt. Patient also placed on BIPAP for increased work of breathing. Cardiology also contacted during RRT.  Patient transferred to MICU for monitoring in s/o COPD exacerbation 2/2 hypertensive emergency vs emerging sepsis.     PAST MEDICAL & SURGICAL HISTORY:  Diabetes mellitus      Hypertension      Hyperlipidemia      ESRD on hemodialysis  5 days/week at home      Eye abnormality  Left eye      Vitamin D deficiency      Vitamin B12 deficiency      Heartburn      Trigger finger of both hands      Cholecystitis      Former smoker      Shingles      Retinal hemorrhage      Glaucoma      AGUEDA on CPAP      Trigger finger of both hands  Release surgery      H/O right knee surgery      History of cholecystectomy      Status post glaucoma surgery  both eyes          FAMILY HISTORY:  Family history of hypertension in mother    Family history of DVT  Mother    Family history of heart attack (Father)  age in 40s    Family history of breast cancer (Aunt)    Family history of leukemia (Aunt)      HOME MEDICATIONS:      Allergies    [This allergen will not trigger allergy alert] orange oil (Unknown)  cinnamon (Swelling)  Orange (Angioedema)  Orange Juice (Angioedema)  simvastatin (Other)    Intolerances      CONSTITUTIONAL:  No weight loss, fever, chills, weakness or fatigue.  HEENT:  Eyes:  No visual loss, blurred vision, double vision or yellow sclerae.   SKIN:  No rash or itching.  CARDIOVASCULAR:  No chest pain, chest pressure or chest discomfort. No palpitations.  RESPIRATORY:  +SOB  GASTROINTESTINAL:  No anorexia, nausea, vomiting or diarrhea. No abdominal pain or blood.  GENITOURINARY:  Denies hematuria, dysuria.   NEUROLOGICAL:  No headache, dizziness, syncope, paralysis, ataxia, numbness or tingling in the extremities.   MUSCULOSKELETAL:  No muscle, back pain, joint pain or stiffness.  HEMATOLOGIC:  No anemia, bleeding or bruising.    OBJECTIVE:  ICU Vital Signs Last 24 Hrs  T(C): 37 (16 Dec 2022 12:00), Max: 37 (15 Dec 2022 20:00)  T(F): 98.6 (16 Dec 2022 12:00), Max: 98.6 (15 Dec 2022 20:00)  HR: 93 (16 Dec 2022 17:30) (63 - 109)  BP: --  BP(mean): --  ABP: 99/43 (16 Dec 2022 17:30) (99/43 - 151/59)  ABP(mean): 61 (16 Dec 2022 17:30) (60 - 93)  RR: 18 (16 Dec 2022 17:30) (14 - 25)  SpO2: 98% (16 Dec 2022 17:30) (94% - 100%)    O2 Parameters below as of 16 Dec 2022 08:00  Patient On (Oxygen Delivery Method): nasal cannula w/ humidification  O2 Flow (L/min): 4            12-15 @ 07:01  -  12-16 @ 07:00  --------------------------------------------------------  IN: 3257 mL / OUT: 4026 mL / NET: -769 mL    12-16 @ 07:01  -  12-16 @ 17:47  --------------------------------------------------------  IN: 1093.8 mL / OUT: 1626 mL / NET: -532.2 mL      CAPILLARY BLOOD GLUCOSE      POCT Blood Glucose.: 170 mg/dL (16 Dec 2022 15:14)    PHYSICAL EXAM:  GENERAL: NAD, Resting in bed, thin   HEENT:  Head atraumatic, EOMI, PERRLA, conjunctiva and sclera clear; Moist mucous membranes, normal oropharynx  NECK: Supple  CHEST/LUNG: decreased bilateral air movement   HEART: tachycardic  ABDOMEN: Bowel sounds present; Soft, Nontender, Nondistended.   EXTREMITIES:  2+ Peripheral Pulses, brisk capillary refill. No clubbing, cyanosis, or edema  NERVOUS SYSTEM:  Alert & Oriented X3, non-focal and spontaneous movements of all extremities  SKIN: No rashes or lesions    LINES:     HOSPITAL MEDICATIONS:  MEDICATIONS  (STANDING):  budesonide  80 MICROgram(s)/formoterol 4.5 MICROgram(s) Inhaler 2 Puff(s) Inhalation two times a day  calcitriol   Capsule 0.5 MICROGram(s) Oral daily  chlorhexidine 2% Cloths 1 Application(s) Topical daily  chlorhexidine 4% Liquid 1 Application(s) Topical <User Schedule>  cinacalcet 60 milliGRAM(s) Oral two times a day  CRRT Treatment    <Continuous>  dextrose 5%. 1000 milliLiter(s) (50 mL/Hr) IV Continuous <Continuous>  dextrose 5%. 1000 milliLiter(s) (100 mL/Hr) IV Continuous <Continuous>  dextrose 5%. 1000 milliLiter(s) (75 mL/Hr) IV Continuous <Continuous>  dextrose 5%. 1000 milliLiter(s) (100 mL/Hr) IV Continuous <Continuous>  dextrose 5%. 1000 milliLiter(s) (50 mL/Hr) IV Continuous <Continuous>  dextrose 50% Injectable 25 Gram(s) IV Push once  dextrose 50% Injectable 12.5 Gram(s) IV Push once  dextrose 50% Injectable 25 Gram(s) IV Push once  epoetin sam-epbx (RETACRIT) Injectable 4000 Unit(s) SubCutaneous <User Schedule>  fenofibrate Tablet 48 milliGRAM(s) Oral daily  ferrous    sulfate 325 milliGRAM(s) Oral daily  glucagon  Injectable 1 milliGRAM(s) IntraMuscular once  glucagon  Injectable 1 milliGRAM(s) IntraMuscular once  heparin   Injectable 5000 Unit(s) SubCutaneous every 8 hours  heparin  Infusion Syringe 300 Unit(s)/Hr (0.6 mL/Hr) CRRT <Continuous>  hydrocortisone 1% Cream 1 Application(s) Topical every 8 hours  influenza   Vaccine 0.5 milliLiter(s) IntraMuscular once  insulin lispro (ADMELOG) corrective regimen sliding scale   SubCutaneous three times a day before meals  insulin lispro (ADMELOG) corrective regimen sliding scale   SubCutaneous at bedtime  insulin lispro Injectable (ADMELOG) 2 Unit(s) SubCutaneous three times a day before meals  lidocaine   4% Patch 1 Patch Transdermal daily  lidocaine   4% Patch 1 Patch Transdermal daily  midodrine. 30 milliGRAM(s) Oral three times a day  Nephro-russ 1 Tablet(s) Oral daily  norepinephrine Infusion 0.05 MICROgram(s)/kG/Min (3.83 mL/Hr) IV Continuous <Continuous>  pantoprazole  Injectable 40 milliGRAM(s) IV Push every 12 hours  Phoxillum Filtration BK 4 / 2.5 5000 milliLiter(s) (1300 mL/Hr) CRRT <Continuous>  Phoxillum Filtration BK 4 / 2.5 5000 milliLiter(s) (200 mL/Hr) CRRT <Continuous>  PrismaSATE Dialysate BGK 4 / 2.5 5000 milliLiter(s) (1500 mL/Hr) CRRT <Continuous>  sucralfate 1 Gram(s) Oral four times a day  vasopressin Infusion 0.04 Unit(s)/Min (6 mL/Hr) IV Continuous <Continuous>    MEDICATIONS  (PRN):  acetaminophen    Suspension .. 650 milliGRAM(s) Oral every 6 hours PRN Temp greater or equal to 38C (100.4F), Mild Pain (1 - 3)  albuterol    90 MICROgram(s) HFA Inhaler 2 Puff(s) Inhalation every 6 hours PRN Shortness of Breath and/or Wheezing  albuterol/ipratropium for Nebulization 3 milliLiter(s) Nebulizer every 6 hours PRN Shortness of Breath and/or Wheezing  dextrose Oral Gel 15 Gram(s) Oral once PRN Blood Glucose LESS THAN 70 milliGRAM(s)/deciliter  HYDROmorphone  Injectable 0.5 milliGRAM(s) IV Push every 4 hours PRN Moderate Pain (4 - 6)  lidocaine/prilocaine Cream 1 Application(s) Topical <User Schedule> PRN Pre-HD on HD days  sodium chloride 0.9% Bolus. 100 milliLiter(s) IV Bolus every 5 minutes PRN SBP LESS THAN or EQUAL to 90 mmHg  sodium chloride 0.9% lock flush 10 milliLiter(s) IV Push every 1 hour PRN Pre/post blood products, medications, blood draw, and to maintain line patency      LABS:                        8.5    37.09 )-----------( 425      ( 16 Dec 2022 00:24 )             29.4     Hgb Trend: 8.5<--, 7.9<--, 8.2<--, 9.5<--, 9.3<--  12-16    133<L>  |  97  |  8   ----------------------------<  168<H>  3.9   |  20<L>  |  1.69<H>    Ca    9.3      16 Dec 2022 12:38  Phos  2.9     12-16  Mg     2.2     12-16    TPro  6.3  /  Alb  2.6<L>  /  TBili  0.3  /  DBili  x   /  AST  20  /  ALT  14  /  AlkPhos  131<H>  12-16    Creatinine Trend: 1.69<--, 1.77<--, 1.92<--, 2.10<--, 2.20<--, 2.46<--      Arterial Blood Gas:  12-16 @ 16:36  7.32/37/96/19/98.8/-6.4  ABG lactate: --  Arterial Blood Gas:  12-16 @ 12:17  7.29/41/72/20/96.6/-6.4  ABG lactate: --  Arterial Blood Gas:  12-16 @ 08:52  7.31/45/79/23/97.7/-3.5  ABG lactate: --  Arterial Blood Gas:  12-16 @ 04:08  7.31/46/106/23/99.1/-3.0  ABG lactate: --  Arterial Blood Gas:  12-16 @ 00:10  7.27/45/105/21/98.4/-5.9  ABG lactate: --  Arterial Blood Gas:  12-15 @ 20:13  7.26/49/79/22/97.3/-5.0  ABG lactate: --  Arterial Blood Gas:  12-15 @ 15:36  7.29/41/100/20/98.4/-6.4  ABG lactate: --  Arterial Blood Gas:  12-15 @ 11:39  7.27/38/76/17/96.8/-8.8  ABG lactate: --    Venous Blood Gas:  12-15 @ 08:25  7.25/55/43/24/70.8  VBG Lactate: 1.4  Venous Blood Gas:  12-15 @ 04:13  7.29/58/34/28/57.5  VBG Lactate: 1.5  Venous Blood Gas:  12-15 @ 00:16  7.23/65/41/27/72.6  VBG Lactate: 1.1  Venous Blood Gas:  12-14 @ 20:14  7.23/61/43/26/73.2  VBG Lactate: 1.3      MICROBIOLOGY:     RADIOLOGY & ADDITIONAL TESTS:      ASSESSMENT AND PLAN:  Mr. Ruvalcaba is a 73 y/o M COPD/asthma, HTN, CAD s/p PCI, BPH, GERD p/w worsening SOB a/w acute hypoxic respiratory failure 2/2 COPD exacerbation possibly 2/2 hypertensive urgency vs sepsis requiring escalation to BIPAP and nitroglycerin gtt. Now accepted to MICU 12/16 for further monitoring.     ==Neuro==  - AxOx3 at baseline      ==Cardiovascular==  #Hypertensive emergency  -SBP consistently >200 during RRT  - Started on nitro gtt for BP control   - signs of end-organ damage with OWEN  - on amlodipine, lopressor on floors, hold while on nitro gtt.     #CAD s/p PCI  - last TTE 2021 with EF 60%  - C/w DAPT, statin here once able to tolerate PO  - f/u troponins and BNP  - f/u TTE (ordered)    ==Respiratory==  #Acute respiratory failure w/ hypoxia  #COPD exacerbation  - Likely exacerbated by hypertensive urgency vs emerging sepsis  -- Hx w/ COPD/emphysema, asthma on 2L oxygen at home  - CXR during RRT with hyperinflated lungs. Low c/f pulmonary edema.   - Currently on BIPAP  - c/w duonebs q6h  - solumedrol 40mg IV q8h (12/16-)  - repeat RVP  - f/u ABGs  -C/w pulmicort nebulizer BID  -C/w Singulair        ==GI/Nutrition==  #GERD  -C/w PPI IV    #Nutrition  - NPO for now while on BIPAP  - Will re-evaluate pending respiratory status    ==/Renal==  #OWEN  - Baseline SCr: 0.67.  - likely in setting of hypertensive crises.   - renal and bladder u/s unremarkable  - Nephrology following  - Monitor and trend SCr. Strict I/Os.   - Avoid nephrotoxic agents and renally dose medications   - Appreciate nephrology recs    ==ID==  #Sepsis  - 12/16 WBC 23.89, tachypneic, tachycardic, lactate >3, afebrile  - s/p zosynx1 during RRT  - c/w empiric zosyn here (12/16-)  -  f/u sputum culture, RVP, MRSA swab  - blood culturesx2  - f/u urinalysis and urine culture    ==Endocrine==  - NPO for now given on BIPAP  - Trend FS q6h while on steroids  - low ISS  - add basal/bolus as needed for steroid-induced hyperglycemia.     ==Hematologic/DVT ppx==  DVT ppx:heparin SQ    ==Ethics==  GOC: Full Code MICU Accept Note    CHIEF COMPLAINT: worsening SOB    HPI / INTERVAL HISTORY:    72M pmh COPD/asthma (not on home O2, last exacerbation 1y ago october), CAD (s/p PCI), HTN p/w worsening SOB on 12/10/22 with concern for COPD exacerbation and hypertensive urgency.     During hospitalization, patient treated with steroids and duonebs. Course complicated by OWEN with Cr up to >3 (baseline ~0.7), thought to be 2/2 pre-renal etiology vs hypertensive urgency. Patient was reportedly weaned down on oxygen and set to have tentative discharge today. AM labs 12/16 with WBC to 17.  RRT called 12/16/22 for hypoxia, increased WOB requiring escalation to BIPAP iso hypertensive emergency. At bedside, patient had limited air movement on lung exam. CXR with hyperinflated lungs w/o overt evidence of edema. Duonebsx4, Magnesium 2mg IV, zosynx1, solumedrol 100x1, sublingual nitroglycerinx2, lopressor 5mgx1, hydralazinex1 and labetalol x1 given. Vitals significant for SBP>200 continuously.  Pt was started on nitro gtt. Patient also placed on BIPAP for increased work of breathing. Cardiology also contacted during RRT.  Patient transferred to MICU for monitoring in s/o COPD exacerbation 2/2 hypertensive emergency vs emerging sepsis.     PAST MEDICAL & SURGICAL HISTORY:  Diabetes mellitus      Hypertension      Hyperlipidemia      ESRD on hemodialysis  5 days/week at home      Eye abnormality  Left eye      Vitamin D deficiency      Vitamin B12 deficiency      Heartburn      Trigger finger of both hands      Cholecystitis      Former smoker      Shingles      Retinal hemorrhage      Glaucoma      AGUEDA on CPAP      Trigger finger of both hands  Release surgery      H/O right knee surgery      History of cholecystectomy      Status post glaucoma surgery  both eyes          FAMILY HISTORY:  Family history of hypertension in mother    Family history of DVT  Mother    Family history of heart attack (Father)  age in 40s    Family history of breast cancer (Aunt)    Family history of leukemia (Aunt)      HOME MEDICATIONS:      Allergies    [This allergen will not trigger allergy alert] orange oil (Unknown)  cinnamon (Swelling)  Orange (Angioedema)  Orange Juice (Angioedema)  simvastatin (Other)    Intolerances      CONSTITUTIONAL:  No weight loss, fever, chills, weakness or fatigue.  HEENT:  Eyes:  No visual loss, blurred vision, double vision or yellow sclerae.   SKIN:  No rash or itching.  CARDIOVASCULAR:  No chest pain, chest pressure or chest discomfort. No palpitations.  RESPIRATORY:  +SOB  GASTROINTESTINAL:  No anorexia, nausea, vomiting or diarrhea. No abdominal pain or blood.  GENITOURINARY:  Denies hematuria, dysuria.   NEUROLOGICAL:  No headache, dizziness, syncope, paralysis, ataxia, numbness or tingling in the extremities.   MUSCULOSKELETAL:  No muscle, back pain, joint pain or stiffness.  HEMATOLOGIC:  No anemia, bleeding or bruising.    OBJECTIVE:  ICU Vital Signs Last 24 Hrs  T(C): 37 (16 Dec 2022 12:00), Max: 37 (15 Dec 2022 20:00)  T(F): 98.6 (16 Dec 2022 12:00), Max: 98.6 (15 Dec 2022 20:00)  HR: 93 (16 Dec 2022 17:30) (63 - 109)  BP: --  BP(mean): --  ABP: 99/43 (16 Dec 2022 17:30) (99/43 - 151/59)  ABP(mean): 61 (16 Dec 2022 17:30) (60 - 93)  RR: 18 (16 Dec 2022 17:30) (14 - 25)  SpO2: 98% (16 Dec 2022 17:30) (94% - 100%)    O2 Parameters below as of 16 Dec 2022 08:00  Patient On (Oxygen Delivery Method): nasal cannula w/ humidification  O2 Flow (L/min): 4            12-15 @ 07:01  -  12-16 @ 07:00  --------------------------------------------------------  IN: 3257 mL / OUT: 4026 mL / NET: -769 mL    12-16 @ 07:01  -  12-16 @ 17:47  --------------------------------------------------------  IN: 1093.8 mL / OUT: 1626 mL / NET: -532.2 mL      CAPILLARY BLOOD GLUCOSE      POCT Blood Glucose.: 170 mg/dL (16 Dec 2022 15:14)    PHYSICAL EXAM:  GENERAL: NAD, Resting in bed, thin   HEENT:  Head atraumatic, EOMI, PERRLA, conjunctiva and sclera clear; Moist mucous membranes, normal oropharynx  NECK: Supple  CHEST/LUNG: decreased bilateral air movement   HEART: tachycardic  ABDOMEN: Bowel sounds present; Soft, Nontender, Nondistended.   EXTREMITIES:  2+ Peripheral Pulses, brisk capillary refill. No clubbing, cyanosis, or edema  NERVOUS SYSTEM:  Alert & Oriented X3, non-focal and spontaneous movements of all extremities  SKIN: No rashes or lesions    LINES:     HOSPITAL MEDICATIONS:  MEDICATIONS  (STANDING):  budesonide  80 MICROgram(s)/formoterol 4.5 MICROgram(s) Inhaler 2 Puff(s) Inhalation two times a day  calcitriol   Capsule 0.5 MICROGram(s) Oral daily  chlorhexidine 2% Cloths 1 Application(s) Topical daily  chlorhexidine 4% Liquid 1 Application(s) Topical <User Schedule>  cinacalcet 60 milliGRAM(s) Oral two times a day  CRRT Treatment    <Continuous>  dextrose 5%. 1000 milliLiter(s) (50 mL/Hr) IV Continuous <Continuous>  dextrose 5%. 1000 milliLiter(s) (100 mL/Hr) IV Continuous <Continuous>  dextrose 5%. 1000 milliLiter(s) (75 mL/Hr) IV Continuous <Continuous>  dextrose 5%. 1000 milliLiter(s) (100 mL/Hr) IV Continuous <Continuous>  dextrose 5%. 1000 milliLiter(s) (50 mL/Hr) IV Continuous <Continuous>  dextrose 50% Injectable 25 Gram(s) IV Push once  dextrose 50% Injectable 12.5 Gram(s) IV Push once  dextrose 50% Injectable 25 Gram(s) IV Push once  epoetin sam-epbx (RETACRIT) Injectable 4000 Unit(s) SubCutaneous <User Schedule>  fenofibrate Tablet 48 milliGRAM(s) Oral daily  ferrous    sulfate 325 milliGRAM(s) Oral daily  glucagon  Injectable 1 milliGRAM(s) IntraMuscular once  glucagon  Injectable 1 milliGRAM(s) IntraMuscular once  heparin   Injectable 5000 Unit(s) SubCutaneous every 8 hours  heparin  Infusion Syringe 300 Unit(s)/Hr (0.6 mL/Hr) CRRT <Continuous>  hydrocortisone 1% Cream 1 Application(s) Topical every 8 hours  influenza   Vaccine 0.5 milliLiter(s) IntraMuscular once  insulin lispro (ADMELOG) corrective regimen sliding scale   SubCutaneous three times a day before meals  insulin lispro (ADMELOG) corrective regimen sliding scale   SubCutaneous at bedtime  insulin lispro Injectable (ADMELOG) 2 Unit(s) SubCutaneous three times a day before meals  lidocaine   4% Patch 1 Patch Transdermal daily  lidocaine   4% Patch 1 Patch Transdermal daily  midodrine. 30 milliGRAM(s) Oral three times a day  Nephro-russ 1 Tablet(s) Oral daily  norepinephrine Infusion 0.05 MICROgram(s)/kG/Min (3.83 mL/Hr) IV Continuous <Continuous>  pantoprazole  Injectable 40 milliGRAM(s) IV Push every 12 hours  Phoxillum Filtration BK 4 / 2.5 5000 milliLiter(s) (1300 mL/Hr) CRRT <Continuous>  Phoxillum Filtration BK 4 / 2.5 5000 milliLiter(s) (200 mL/Hr) CRRT <Continuous>  PrismaSATE Dialysate BGK 4 / 2.5 5000 milliLiter(s) (1500 mL/Hr) CRRT <Continuous>  sucralfate 1 Gram(s) Oral four times a day  vasopressin Infusion 0.04 Unit(s)/Min (6 mL/Hr) IV Continuous <Continuous>    MEDICATIONS  (PRN):  acetaminophen    Suspension .. 650 milliGRAM(s) Oral every 6 hours PRN Temp greater or equal to 38C (100.4F), Mild Pain (1 - 3)  albuterol    90 MICROgram(s) HFA Inhaler 2 Puff(s) Inhalation every 6 hours PRN Shortness of Breath and/or Wheezing  albuterol/ipratropium for Nebulization 3 milliLiter(s) Nebulizer every 6 hours PRN Shortness of Breath and/or Wheezing  dextrose Oral Gel 15 Gram(s) Oral once PRN Blood Glucose LESS THAN 70 milliGRAM(s)/deciliter  HYDROmorphone  Injectable 0.5 milliGRAM(s) IV Push every 4 hours PRN Moderate Pain (4 - 6)  lidocaine/prilocaine Cream 1 Application(s) Topical <User Schedule> PRN Pre-HD on HD days  sodium chloride 0.9% Bolus. 100 milliLiter(s) IV Bolus every 5 minutes PRN SBP LESS THAN or EQUAL to 90 mmHg  sodium chloride 0.9% lock flush 10 milliLiter(s) IV Push every 1 hour PRN Pre/post blood products, medications, blood draw, and to maintain line patency      LABS:                        8.5    37.09 )-----------( 425      ( 16 Dec 2022 00:24 )             29.4     Hgb Trend: 8.5<--, 7.9<--, 8.2<--, 9.5<--, 9.3<--  12-16    133<L>  |  97  |  8   ----------------------------<  168<H>  3.9   |  20<L>  |  1.69<H>    Ca    9.3      16 Dec 2022 12:38  Phos  2.9     12-16  Mg     2.2     12-16    TPro  6.3  /  Alb  2.6<L>  /  TBili  0.3  /  DBili  x   /  AST  20  /  ALT  14  /  AlkPhos  131<H>  12-16    Creatinine Trend: 1.69<--, 1.77<--, 1.92<--, 2.10<--, 2.20<--, 2.46<--      Arterial Blood Gas:  12-16 @ 16:36  7.32/37/96/19/98.8/-6.4  ABG lactate: --  Arterial Blood Gas:  12-16 @ 12:17  7.29/41/72/20/96.6/-6.4  ABG lactate: --  Arterial Blood Gas:  12-16 @ 08:52  7.31/45/79/23/97.7/-3.5  ABG lactate: --  Arterial Blood Gas:  12-16 @ 04:08  7.31/46/106/23/99.1/-3.0  ABG lactate: --  Arterial Blood Gas:  12-16 @ 00:10  7.27/45/105/21/98.4/-5.9  ABG lactate: --  Arterial Blood Gas:  12-15 @ 20:13  7.26/49/79/22/97.3/-5.0  ABG lactate: --  Arterial Blood Gas:  12-15 @ 15:36  7.29/41/100/20/98.4/-6.4  ABG lactate: --  Arterial Blood Gas:  12-15 @ 11:39  7.27/38/76/17/96.8/-8.8  ABG lactate: --    Venous Blood Gas:  12-15 @ 08:25  7.25/55/43/24/70.8  VBG Lactate: 1.4  Venous Blood Gas:  12-15 @ 04:13  7.29/58/34/28/57.5  VBG Lactate: 1.5  Venous Blood Gas:  12-15 @ 00:16  7.23/65/41/27/72.6  VBG Lactate: 1.1  Venous Blood Gas:  12-14 @ 20:14  7.23/61/43/26/73.2  VBG Lactate: 1.3      MICROBIOLOGY:     RADIOLOGY & ADDITIONAL TESTS:      ASSESSMENT AND PLAN:  Mr. Ruvalcaba is a 73 y/o M COPD/asthma, HTN, CAD s/p PCI, BPH, GERD p/w worsening SOB a/w acute hypoxic respiratory failure 2/2 COPD exacerbation possibly 2/2 hypertensive urgency vs sepsis requiring escalation to BIPAP and nitroglycerin gtt. Now accepted to MICU 12/16 for further monitoring.     ==Neuro==  - AxOx3 at baseline  - currently mentating AxOx3.       ==Cardiovascular==  #Hypertensive emergency  -SBP consistently >200 during RRT  - Started on nitro gtt for BP control   - signs of end-organ damage with OWEN  - on amlodipine, lopressor on floors, hold while on nitro gtt.   - Cardiology recs, contacted during RRT.     #CAD s/p PCI  - last TTE 2021 with EF 60%  - C/w DAPT, statin here once able to tolerate PO  - f/u troponin and BNP  - f/u TTE (ordered)    ==Respiratory==  #Acute respiratory failure w/ hypoxia  #COPD exacerbation  - Likely exacerbated by hypertensive urgency vs emerging sepsis  -- Hx w/ COPD/emphysema, asthma on 2L oxygen at home  - CXR during RRT with hyperinflated lungs. Low c/f pulmonary edema.   - Currently on BIPAP  - c/w duonebs q6h  - solumedrol 40mg IV q8h (12/16-)  - repeat RVP  - f/u ABGs  -C/w pulmicort nebulizer BID  -C/w Singulair        ==GI/Nutrition==  #GERD  -C/w PPI IV    #Nutrition  - NPO for now while on BIPAP  - Will re-evaluate pending respiratory status    ==/Renal==  #OWEN  - Baseline SCr: 0.67.  - likely in setting of hypertensive crises.   - renal and bladder u/s unremarkable  - Nephrology following  - Monitor and trend SCr. Strict I/Os.   - Avoid nephrotoxic agents and renally dose medications   - Appreciate nephrology recs    ==ID==  #Sepsis  - 12/16 WBC 23.89, tachypneic, tachycardic, lactate >3, afebrile  - s/p zosynx1 during RRT  - c/w empiric zosyn here (12/16-)  - f/u sputum culture, RVP, MRSA swab  - blood culturesx2  - f/u urinalysis and urine culture  - Trend CBC, fever curve.     ==Endocrine==  - NPO for now given on BIPAP  - Trend FS q6h while on steroids  - low ISS  - add basal/bolus as needed for steroid-induced hyperglycemia.     ==Hematologic/DVT ppx==  DVT ppx: heparin SQ    ==Ethics==  GOC: Full Code MICU Accept Note    CHIEF COMPLAINT: worsening SOB    HPI / INTERVAL HISTORY:    72M pmh COPD/asthma (not on home O2, last exacerbation 1y ago october), CAD (s/p PCI), HTN p/w worsening SOB on 12/10/22 with concern for COPD exacerbation and hypertensive urgency.     During hospitalization, patient treated with steroids and duonebs. Course complicated by OWEN with Cr up to >3 (baseline ~0.7), thought to be 2/2 pre-renal etiology vs hypertensive urgency. Patient was reportedly weaned down on oxygen and set to have tentative discharge today. AM labs 12/16 with WBC to 17.  RRT called 12/16/22 for hypoxia, increased WOB requiring escalation to BIPAP iso hypertensive emergency. At bedside, patient had limited air movement on lung exam. CXR with hyperinflated lungs w/o overt evidence of edema. Duonebsx4, Magnesium 2mg IV, zosynx1, solumedrol 100x1, sublingual nitroglycerinx2, lopressor 5mgx1, hydralazinex1 and labetalol x1 given. Vitals significant for SBP>200 continuously.  Pt was started on nitro gtt. Patient also placed on BIPAP for increased work of breathing. Cardiology also contacted during RRT.  Patient transferred to MICU for monitoring in s/o COPD exacerbation 2/2 hypertensive emergency vs emerging sepsis.     PAST MEDICAL & SURGICAL HISTORY:  Diabetes mellitus      Hypertension      Hyperlipidemia      ESRD on hemodialysis  5 days/week at home      Eye abnormality  Left eye      Vitamin D deficiency      Vitamin B12 deficiency      Heartburn      Trigger finger of both hands      Cholecystitis      Former smoker      Shingles      Retinal hemorrhage      Glaucoma      AGUEDA on CPAP      Trigger finger of both hands  Release surgery      H/O right knee surgery      History of cholecystectomy      Status post glaucoma surgery  both eyes          FAMILY HISTORY:  Family history of hypertension in mother    Family history of DVT  Mother    Family history of heart attack (Father)  age in 40s    Family history of breast cancer (Aunt)    Family history of leukemia (Aunt)      HOME MEDICATIONS:      Allergies    [This allergen will not trigger allergy alert] orange oil (Unknown)  cinnamon (Swelling)  Orange (Angioedema)  Orange Juice (Angioedema)  simvastatin (Other)    Intolerances      CONSTITUTIONAL:  No weight loss, fever, chills, weakness or fatigue.  HEENT:  Eyes:  No visual loss, blurred vision, double vision or yellow sclerae.   SKIN:  No rash or itching.  CARDIOVASCULAR:  No chest pain, chest pressure or chest discomfort. No palpitations.  RESPIRATORY:  +SOB  GASTROINTESTINAL:  No anorexia, nausea, vomiting or diarrhea. No abdominal pain or blood.  GENITOURINARY:  Denies hematuria, dysuria.   NEUROLOGICAL:  No headache, dizziness, syncope, paralysis, ataxia, numbness or tingling in the extremities.   MUSCULOSKELETAL:  No muscle, back pain, joint pain or stiffness.  HEMATOLOGIC:  No anemia, bleeding or bruising.    OBJECTIVE:  ICU Vital Signs Last 24 Hrs  T(C): 37 (16 Dec 2022 12:00), Max: 37 (15 Dec 2022 20:00)  T(F): 98.6 (16 Dec 2022 12:00), Max: 98.6 (15 Dec 2022 20:00)  HR: 93 (16 Dec 2022 17:30) (63 - 109)  BP: --  BP(mean): --  ABP: 99/43 (16 Dec 2022 17:30) (99/43 - 151/59)  ABP(mean): 61 (16 Dec 2022 17:30) (60 - 93)  RR: 18 (16 Dec 2022 17:30) (14 - 25)  SpO2: 98% (16 Dec 2022 17:30) (94% - 100%)    O2 Parameters below as of 16 Dec 2022 08:00  Patient On (Oxygen Delivery Method): nasal cannula w/ humidification  O2 Flow (L/min): 4            12-15 @ 07:01  -  12-16 @ 07:00  --------------------------------------------------------  IN: 3257 mL / OUT: 4026 mL / NET: -769 mL    12-16 @ 07:01  -  12-16 @ 17:47  --------------------------------------------------------  IN: 1093.8 mL / OUT: 1626 mL / NET: -532.2 mL      CAPILLARY BLOOD GLUCOSE      POCT Blood Glucose.: 170 mg/dL (16 Dec 2022 15:14)    PHYSICAL EXAM:  GENERAL: NAD, Resting in bed, thin   HEENT:  Head atraumatic, EOMI, PERRLA, conjunctiva and sclera clear; Moist mucous membranes, normal oropharynx  NECK: Supple  CHEST/LUNG: decreased bilateral air movement   HEART: tachycardic  ABDOMEN: Bowel sounds present; Soft, Nontender, Nondistended.   EXTREMITIES:  2+ Peripheral Pulses, brisk capillary refill. No clubbing, cyanosis, or edema  NERVOUS SYSTEM:  Alert & Oriented X3, non-focal and spontaneous movements of all extremities  SKIN: No rashes or lesions    LINES:     HOSPITAL MEDICATIONS:  MEDICATIONS  (STANDING):  budesonide  80 MICROgram(s)/formoterol 4.5 MICROgram(s) Inhaler 2 Puff(s) Inhalation two times a day  calcitriol   Capsule 0.5 MICROGram(s) Oral daily  chlorhexidine 2% Cloths 1 Application(s) Topical daily  chlorhexidine 4% Liquid 1 Application(s) Topical <User Schedule>  cinacalcet 60 milliGRAM(s) Oral two times a day  CRRT Treatment    <Continuous>  dextrose 5%. 1000 milliLiter(s) (50 mL/Hr) IV Continuous <Continuous>  dextrose 5%. 1000 milliLiter(s) (100 mL/Hr) IV Continuous <Continuous>  dextrose 5%. 1000 milliLiter(s) (75 mL/Hr) IV Continuous <Continuous>  dextrose 5%. 1000 milliLiter(s) (100 mL/Hr) IV Continuous <Continuous>  dextrose 5%. 1000 milliLiter(s) (50 mL/Hr) IV Continuous <Continuous>  dextrose 50% Injectable 25 Gram(s) IV Push once  dextrose 50% Injectable 12.5 Gram(s) IV Push once  dextrose 50% Injectable 25 Gram(s) IV Push once  epoetin sam-epbx (RETACRIT) Injectable 4000 Unit(s) SubCutaneous <User Schedule>  fenofibrate Tablet 48 milliGRAM(s) Oral daily  ferrous    sulfate 325 milliGRAM(s) Oral daily  glucagon  Injectable 1 milliGRAM(s) IntraMuscular once  glucagon  Injectable 1 milliGRAM(s) IntraMuscular once  heparin   Injectable 5000 Unit(s) SubCutaneous every 8 hours  heparin  Infusion Syringe 300 Unit(s)/Hr (0.6 mL/Hr) CRRT <Continuous>  hydrocortisone 1% Cream 1 Application(s) Topical every 8 hours  influenza   Vaccine 0.5 milliLiter(s) IntraMuscular once  insulin lispro (ADMELOG) corrective regimen sliding scale   SubCutaneous three times a day before meals  insulin lispro (ADMELOG) corrective regimen sliding scale   SubCutaneous at bedtime  insulin lispro Injectable (ADMELOG) 2 Unit(s) SubCutaneous three times a day before meals  lidocaine   4% Patch 1 Patch Transdermal daily  lidocaine   4% Patch 1 Patch Transdermal daily  midodrine. 30 milliGRAM(s) Oral three times a day  Nephro-russ 1 Tablet(s) Oral daily  norepinephrine Infusion 0.05 MICROgram(s)/kG/Min (3.83 mL/Hr) IV Continuous <Continuous>  pantoprazole  Injectable 40 milliGRAM(s) IV Push every 12 hours  Phoxillum Filtration BK 4 / 2.5 5000 milliLiter(s) (1300 mL/Hr) CRRT <Continuous>  Phoxillum Filtration BK 4 / 2.5 5000 milliLiter(s) (200 mL/Hr) CRRT <Continuous>  PrismaSATE Dialysate BGK 4 / 2.5 5000 milliLiter(s) (1500 mL/Hr) CRRT <Continuous>  sucralfate 1 Gram(s) Oral four times a day  vasopressin Infusion 0.04 Unit(s)/Min (6 mL/Hr) IV Continuous <Continuous>    MEDICATIONS  (PRN):  acetaminophen    Suspension .. 650 milliGRAM(s) Oral every 6 hours PRN Temp greater or equal to 38C (100.4F), Mild Pain (1 - 3)  albuterol    90 MICROgram(s) HFA Inhaler 2 Puff(s) Inhalation every 6 hours PRN Shortness of Breath and/or Wheezing  albuterol/ipratropium for Nebulization 3 milliLiter(s) Nebulizer every 6 hours PRN Shortness of Breath and/or Wheezing  dextrose Oral Gel 15 Gram(s) Oral once PRN Blood Glucose LESS THAN 70 milliGRAM(s)/deciliter  HYDROmorphone  Injectable 0.5 milliGRAM(s) IV Push every 4 hours PRN Moderate Pain (4 - 6)  lidocaine/prilocaine Cream 1 Application(s) Topical <User Schedule> PRN Pre-HD on HD days  sodium chloride 0.9% Bolus. 100 milliLiter(s) IV Bolus every 5 minutes PRN SBP LESS THAN or EQUAL to 90 mmHg  sodium chloride 0.9% lock flush 10 milliLiter(s) IV Push every 1 hour PRN Pre/post blood products, medications, blood draw, and to maintain line patency      LABS:                        8.5    37.09 )-----------( 425      ( 16 Dec 2022 00:24 )             29.4     Hgb Trend: 8.5<--, 7.9<--, 8.2<--, 9.5<--, 9.3<--  12-16    133<L>  |  97  |  8   ----------------------------<  168<H>  3.9   |  20<L>  |  1.69<H>    Ca    9.3      16 Dec 2022 12:38  Phos  2.9     12-16  Mg     2.2     12-16    TPro  6.3  /  Alb  2.6<L>  /  TBili  0.3  /  DBili  x   /  AST  20  /  ALT  14  /  AlkPhos  131<H>  12-16    Creatinine Trend: 1.69<--, 1.77<--, 1.92<--, 2.10<--, 2.20<--, 2.46<--      Arterial Blood Gas:  12-16 @ 16:36  7.32/37/96/19/98.8/-6.4  ABG lactate: --  Arterial Blood Gas:  12-16 @ 12:17  7.29/41/72/20/96.6/-6.4  ABG lactate: --  Arterial Blood Gas:  12-16 @ 08:52  7.31/45/79/23/97.7/-3.5  ABG lactate: --  Arterial Blood Gas:  12-16 @ 04:08  7.31/46/106/23/99.1/-3.0  ABG lactate: --  Arterial Blood Gas:  12-16 @ 00:10  7.27/45/105/21/98.4/-5.9  ABG lactate: --  Arterial Blood Gas:  12-15 @ 20:13  7.26/49/79/22/97.3/-5.0  ABG lactate: --  Arterial Blood Gas:  12-15 @ 15:36  7.29/41/100/20/98.4/-6.4  ABG lactate: --  Arterial Blood Gas:  12-15 @ 11:39  7.27/38/76/17/96.8/-8.8  ABG lactate: --    Venous Blood Gas:  12-15 @ 08:25  7.25/55/43/24/70.8  VBG Lactate: 1.4  Venous Blood Gas:  12-15 @ 04:13  7.29/58/34/28/57.5  VBG Lactate: 1.5  Venous Blood Gas:  12-15 @ 00:16  7.23/65/41/27/72.6  VBG Lactate: 1.1  Venous Blood Gas:  12-14 @ 20:14  7.23/61/43/26/73.2  VBG Lactate: 1.3      MICROBIOLOGY:     RADIOLOGY & ADDITIONAL TESTS:      ASSESSMENT AND PLAN:  Peg is a 73 y/o M COPD/asthma, HTN, CAD s/p PCI, BPH, GERD p/w worsening SOB a/w acute hypoxic respiratory failure 2/2 COPD exacerbation possibly 2/2 hypertensive urgency vs sepsis requiring escalation to BIPAP and nitroglycerin gtt. Now accepted to MICU 12/16 for further monitoring.     ==Neuro==  - AxOx3 at baseline  - currently mentating AxOx3.       ==Cardiovascular==  #Hypertensive emergency  -SBP consistently >200 during RRT  - Started on nitro gtt for BP control   - signs of end-organ damage with OWEN  - on amlodipine, lopressor on floors, hold while on nitro gtt.   - Cardiology recs, contacted during RRT.     #CAD s/p PCI  - last TTE 2021 with EF 60%  - C/w DAPT, statin here once able to tolerate PO  - f/u troponin and BNP  - f/u TTE (ordered)    ==Respiratory==  #Acute respiratory failure w/ hypoxia  #COPD exacerbation  - Likely exacerbated by hypertensive urgency vs emerging sepsis  -- Hx w/ COPD/emphysema, asthma on 2L oxygen at home  - CXR during RRT with hyperinflated lungs. Low c/f pulmonary edema.   - Currently on BIPAP  - c/w duonebs q6h  - solumedrol 40mg IV q8h (12/16-)  - repeat RVP  - f/u ABGs  -C/w pulmicort nebulizer BID  -C/w Singulair        ==GI/Nutrition==  #GERD  -C/w PPI IV    #Nutrition  - NPO for now while on BIPAP  - Will re-evaluate pending respiratory status    ==/Renal==  #OWEN  - Baseline SCr: 0.67.  - likely in setting of hypertensive crises.   - renal and bladder u/s unremarkable  - Nephrology following  - Monitor and trend SCr. Strict I/Os.   - Avoid nephrotoxic agents and renally dose medications   - Appreciate nephrology recs    ==ID==  #Sepsis  - 12/16 WBC 23.89, tachypneic, tachycardic, lactate >3, afebrile  - s/p zosynx1 during RRT  - c/w zosyn (12/16-) and azithromycin (12/16-)  - f/u sputum culture, RVP, MRSA swab  - blood culturesx2  - f/u urinalysis and urine culture  - Trend CBC, fever curve.     ==Endocrine==  - NPO for now given on BIPAP  - Trend FS q6h while on steroids  - low ISS  - add basal/bolus as needed for steroid-induced hyperglycemia.     ==Hematologic/DVT ppx==  DVT ppx: heparin SQ    ==Ethics==  GOC: Full Code MICU Accept Note    CHIEF COMPLAINT: worsening SOB    HPI / INTERVAL HISTORY:    72M pmh COPD/asthma (not on home O2, last exacerbation 1y ago october), CAD (s/p PCI), HTN p/w worsening SOB on 12/10/22 with concern for COPD exacerbation and hypertensive urgency.     During hospitalization, patient treated with steroids and duonebs. Course complicated by OWEN with Cr up to >3 (baseline ~0.7), thought to be 2/2 pre-renal etiology vs hypertensive urgency. Patient was reportedly weaned down on oxygen and set to have tentative discharge today. AM labs 12/16 with WBC to 17.  RRT called 12/16/22 for hypoxia, increased WOB requiring escalation to BIPAP iso hypertensive emergency. At bedside, patient had limited air movement on lung exam. CXR with hyperinflated lungs w/o overt evidence of edema. Duonebsx4, Magnesium 2mg IV, zosynx1, solumedrol 100x1, sublingual nitroglycerinx2, lopressor 5mgx1, hydralazinex1 and labetalol x1 given. Vitals significant for SBP>200 continuously.  Pt was started on nitro gtt. Patient also placed on BIPAP for increased work of breathing. Cardiology also contacted during RRT.  Patient transferred to MICU for monitoring in s/o COPD exacerbation 2/2 hypertensive emergency vs emerging sepsis.     PAST MEDICAL & SURGICAL HISTORY:  Diabetes mellitus      Hypertension      Hyperlipidemia      ESRD on hemodialysis  5 days/week at home      Eye abnormality  Left eye      Vitamin D deficiency      Vitamin B12 deficiency      Heartburn      Trigger finger of both hands      Cholecystitis      Former smoker      Shingles      Retinal hemorrhage      Glaucoma      AGUEDA on CPAP      Trigger finger of both hands  Release surgery      H/O right knee surgery      History of cholecystectomy      Status post glaucoma surgery  both eyes          FAMILY HISTORY:  Family history of hypertension in mother    Family history of DVT  Mother    Family history of heart attack (Father)  age in 40s    Family history of breast cancer (Aunt)    Family history of leukemia (Aunt)      HOME MEDICATIONS:      Allergies    [This allergen will not trigger allergy alert] orange oil (Unknown)  cinnamon (Swelling)  Orange (Angioedema)  Orange Juice (Angioedema)  simvastatin (Other)    Intolerances      CONSTITUTIONAL:  No weight loss, fever, chills, weakness or fatigue.  HEENT:  Eyes:  No visual loss, blurred vision, double vision or yellow sclerae.   SKIN:  No rash or itching.  CARDIOVASCULAR:  No chest pain, chest pressure or chest discomfort. No palpitations.  RESPIRATORY:  +SOB  GASTROINTESTINAL:  No anorexia, nausea, vomiting or diarrhea. No abdominal pain or blood.  GENITOURINARY:  Denies hematuria, dysuria.   NEUROLOGICAL:  No headache, dizziness, syncope, paralysis, ataxia, numbness or tingling in the extremities.   MUSCULOSKELETAL:  No muscle, back pain, joint pain or stiffness.  HEMATOLOGIC:  No anemia, bleeding or bruising.    OBJECTIVE:  ICU Vital Signs Last 24 Hrs  T(C): 37 (16 Dec 2022 12:00), Max: 37 (15 Dec 2022 20:00)  T(F): 98.6 (16 Dec 2022 12:00), Max: 98.6 (15 Dec 2022 20:00)  HR: 93 (16 Dec 2022 17:30) (63 - 109)  BP: --  BP(mean): --  ABP: 99/43 (16 Dec 2022 17:30) (99/43 - 151/59)  ABP(mean): 61 (16 Dec 2022 17:30) (60 - 93)  RR: 18 (16 Dec 2022 17:30) (14 - 25)  SpO2: 98% (16 Dec 2022 17:30) (94% - 100%)    O2 Parameters below as of 16 Dec 2022 08:00  Patient On (Oxygen Delivery Method): nasal cannula w/ humidification  O2 Flow (L/min): 4            12-15 @ 07:01  -  12-16 @ 07:00  --------------------------------------------------------  IN: 3257 mL / OUT: 4026 mL / NET: -769 mL    12-16 @ 07:01  -  12-16 @ 17:47  --------------------------------------------------------  IN: 1093.8 mL / OUT: 1626 mL / NET: -532.2 mL      CAPILLARY BLOOD GLUCOSE      POCT Blood Glucose.: 170 mg/dL (16 Dec 2022 15:14)    PHYSICAL EXAM:  GENERAL: NAD, Resting in bed, thin   HEENT:  Head atraumatic, EOMI, PERRLA, conjunctiva and sclera clear; Moist mucous membranes, normal oropharynx  NECK: Supple  CHEST/LUNG: decreased bilateral air movement   HEART: tachycardic  ABDOMEN: Bowel sounds present; Soft, Nontender, Nondistended.   EXTREMITIES:  2+ Peripheral Pulses, brisk capillary refill. No clubbing, cyanosis, or edema  NERVOUS SYSTEM:  Alert & Oriented X3, non-focal and spontaneous movements of all extremities  SKIN: No rashes or lesions    LINES:     HOSPITAL MEDICATIONS:  MEDICATIONS  (STANDING):  budesonide  80 MICROgram(s)/formoterol 4.5 MICROgram(s) Inhaler 2 Puff(s) Inhalation two times a day  calcitriol   Capsule 0.5 MICROGram(s) Oral daily  chlorhexidine 2% Cloths 1 Application(s) Topical daily  chlorhexidine 4% Liquid 1 Application(s) Topical <User Schedule>  cinacalcet 60 milliGRAM(s) Oral two times a day  CRRT Treatment    <Continuous>  dextrose 5%. 1000 milliLiter(s) (50 mL/Hr) IV Continuous <Continuous>  dextrose 5%. 1000 milliLiter(s) (100 mL/Hr) IV Continuous <Continuous>  dextrose 5%. 1000 milliLiter(s) (75 mL/Hr) IV Continuous <Continuous>  dextrose 5%. 1000 milliLiter(s) (100 mL/Hr) IV Continuous <Continuous>  dextrose 5%. 1000 milliLiter(s) (50 mL/Hr) IV Continuous <Continuous>  dextrose 50% Injectable 25 Gram(s) IV Push once  dextrose 50% Injectable 12.5 Gram(s) IV Push once  dextrose 50% Injectable 25 Gram(s) IV Push once  epoetin sam-epbx (RETACRIT) Injectable 4000 Unit(s) SubCutaneous <User Schedule>  fenofibrate Tablet 48 milliGRAM(s) Oral daily  ferrous    sulfate 325 milliGRAM(s) Oral daily  glucagon  Injectable 1 milliGRAM(s) IntraMuscular once  glucagon  Injectable 1 milliGRAM(s) IntraMuscular once  heparin   Injectable 5000 Unit(s) SubCutaneous every 8 hours  heparin  Infusion Syringe 300 Unit(s)/Hr (0.6 mL/Hr) CRRT <Continuous>  hydrocortisone 1% Cream 1 Application(s) Topical every 8 hours  influenza   Vaccine 0.5 milliLiter(s) IntraMuscular once  insulin lispro (ADMELOG) corrective regimen sliding scale   SubCutaneous three times a day before meals  insulin lispro (ADMELOG) corrective regimen sliding scale   SubCutaneous at bedtime  insulin lispro Injectable (ADMELOG) 2 Unit(s) SubCutaneous three times a day before meals  lidocaine   4% Patch 1 Patch Transdermal daily  lidocaine   4% Patch 1 Patch Transdermal daily  midodrine. 30 milliGRAM(s) Oral three times a day  Nephro-russ 1 Tablet(s) Oral daily  norepinephrine Infusion 0.05 MICROgram(s)/kG/Min (3.83 mL/Hr) IV Continuous <Continuous>  pantoprazole  Injectable 40 milliGRAM(s) IV Push every 12 hours  Phoxillum Filtration BK 4 / 2.5 5000 milliLiter(s) (1300 mL/Hr) CRRT <Continuous>  Phoxillum Filtration BK 4 / 2.5 5000 milliLiter(s) (200 mL/Hr) CRRT <Continuous>  PrismaSATE Dialysate BGK 4 / 2.5 5000 milliLiter(s) (1500 mL/Hr) CRRT <Continuous>  sucralfate 1 Gram(s) Oral four times a day  vasopressin Infusion 0.04 Unit(s)/Min (6 mL/Hr) IV Continuous <Continuous>    MEDICATIONS  (PRN):  acetaminophen    Suspension .. 650 milliGRAM(s) Oral every 6 hours PRN Temp greater or equal to 38C (100.4F), Mild Pain (1 - 3)  albuterol    90 MICROgram(s) HFA Inhaler 2 Puff(s) Inhalation every 6 hours PRN Shortness of Breath and/or Wheezing  albuterol/ipratropium for Nebulization 3 milliLiter(s) Nebulizer every 6 hours PRN Shortness of Breath and/or Wheezing  dextrose Oral Gel 15 Gram(s) Oral once PRN Blood Glucose LESS THAN 70 milliGRAM(s)/deciliter  HYDROmorphone  Injectable 0.5 milliGRAM(s) IV Push every 4 hours PRN Moderate Pain (4 - 6)  lidocaine/prilocaine Cream 1 Application(s) Topical <User Schedule> PRN Pre-HD on HD days  sodium chloride 0.9% Bolus. 100 milliLiter(s) IV Bolus every 5 minutes PRN SBP LESS THAN or EQUAL to 90 mmHg  sodium chloride 0.9% lock flush 10 milliLiter(s) IV Push every 1 hour PRN Pre/post blood products, medications, blood draw, and to maintain line patency      LABS:                        8.5    37.09 )-----------( 425      ( 16 Dec 2022 00:24 )             29.4     Hgb Trend: 8.5<--, 7.9<--, 8.2<--, 9.5<--, 9.3<--  12-16    133<L>  |  97  |  8   ----------------------------<  168<H>  3.9   |  20<L>  |  1.69<H>    Ca    9.3      16 Dec 2022 12:38  Phos  2.9     12-16  Mg     2.2     12-16    TPro  6.3  /  Alb  2.6<L>  /  TBili  0.3  /  DBili  x   /  AST  20  /  ALT  14  /  AlkPhos  131<H>  12-16    Creatinine Trend: 1.69<--, 1.77<--, 1.92<--, 2.10<--, 2.20<--, 2.46<--      Arterial Blood Gas:  12-16 @ 16:36  7.32/37/96/19/98.8/-6.4  ABG lactate: --  Arterial Blood Gas:  12-16 @ 12:17  7.29/41/72/20/96.6/-6.4  ABG lactate: --  Arterial Blood Gas:  12-16 @ 08:52  7.31/45/79/23/97.7/-3.5  ABG lactate: --  Arterial Blood Gas:  12-16 @ 04:08  7.31/46/106/23/99.1/-3.0  ABG lactate: --  Arterial Blood Gas:  12-16 @ 00:10  7.27/45/105/21/98.4/-5.9  ABG lactate: --  Arterial Blood Gas:  12-15 @ 20:13  7.26/49/79/22/97.3/-5.0  ABG lactate: --  Arterial Blood Gas:  12-15 @ 15:36  7.29/41/100/20/98.4/-6.4  ABG lactate: --  Arterial Blood Gas:  12-15 @ 11:39  7.27/38/76/17/96.8/-8.8  ABG lactate: --    Venous Blood Gas:  12-15 @ 08:25  7.25/55/43/24/70.8  VBG Lactate: 1.4  Venous Blood Gas:  12-15 @ 04:13  7.29/58/34/28/57.5  VBG Lactate: 1.5  Venous Blood Gas:  12-15 @ 00:16  7.23/65/41/27/72.6  VBG Lactate: 1.1  Venous Blood Gas:  12-14 @ 20:14  7.23/61/43/26/73.2  VBG Lactate: 1.3      MICROBIOLOGY:     RADIOLOGY & ADDITIONAL TESTS:      ASSESSMENT AND PLAN:  Peg is a 73 y/o M COPD/asthma, HTN, CAD s/p PCI, BPH, GERD p/w worsening SOB a/w acute hypoxic respiratory failure 2/2 COPD exacerbation possibly 2/2 hypertensive urgency vs sepsis requiring escalation to BIPAP and nitroglycerin gtt. Now accepted to MICU 12/16 for further monitoring.     ==Neuro==  - AxOx3 at baseline  - currently mentating AxOx3.       ==Cardiovascular==  #Hypertensive emergency  -SBP consistently >200 during RRT  - Started on nitro gtt for BP control   - signs of end-organ damage with OWEN  - on amlodipine, lopressor on floors, hold while on nitro gtt.   - Cardiology recs, contacted during RRT.     #CAD s/p PCI  - last TTE 2021 with EF 60%  - C/w DAPT, statin here once able to tolerate PO  - f/u troponin and BNP  - f/u TTE (ordered)    ==Respiratory==  #Acute respiratory failure w/ hypoxia  #COPD exacerbation  - Likely exacerbated by hypertensive urgency vs emerging sepsis  -- Hx w/ COPD/emphysema, asthma on 2L oxygen at home  - CXR during RRT with hyperinflated lungs. Low c/f pulmonary edema.   - Currently on BIPAP  - c/w duonebs q6h  - solumedrol 40mg IV q8h (12/16-)  - c/w empiric abx   - repeat RVP and sputum culture  - f/u ABGs  -C/w pulmicort nebulizer BID  -C/w Singulair        ==GI/Nutrition==  #GERD  -C/w PPI IV    #Nutrition  - NPO for now while on BIPAP  - Will re-evaluate pending respiratory status    ==/Renal==  #OWEN  - Baseline SCr: 0.67.  - likely in setting of hypertensive crises.   - renal and bladder u/s unremarkable  - Nephrology following  - Monitor and trend SCr. Strict I/Os.   - Avoid nephrotoxic agents and renally dose medications   - Appreciate nephrology recs    ==ID==  #Sepsis  - 12/16 WBC 23.89, tachypneic, tachycardic, lactate >3, afebrile  - s/p zosynx1 during RRT  - c/w zosyn (12/16-) and azithromycin 500mg IVx3 days (12/16-12/18)  - f/u sputum culture, RVP, MRSA swab  - blood culturesx2  - f/u urinalysis and urine culture  - Trend CBC, fever curve.     ==Endocrine==  - NPO for now given on BIPAP  - Trend FS q6h while on steroids  - low ISS  - add basal/bolus as needed for steroid-induced hyperglycemia.     ==Hematologic/DVT ppx==  DVT ppx: heparin SQ    ==Ethics==  GOC: Full Code

## 2022-12-16 NOTE — RAPID RESPONSE TEAM SUMMARY - NSMEDICATIONSRRT_GEN_ALL_CORE
duonebs x 4  zosyn 3.375 x 1  Solumedrol 100 mg x 1  Nitro gtt  Nitroglycerin 0.4 x 2  Lopressor 10 mg IV  Labetalol 5 mg IV  Hydralazine 10 mg x1 IV

## 2022-12-16 NOTE — CONSULT NOTE ADULT - ASSESSMENT
The patient is a 73yo male with PMH of COPD/asthma, CAD (s/p PCI to pLCx and mLAD), HTN who presented with  worsening dyspnea, was being managed on the medicine floor for a COPD exacerbation thought to be secondary to a URI. Rapid response called today for acute hypoxic respiratory failure in the setting of hypertensive emergency and worsening leukocytosis The patient is a 73yo male with PMH of COPD/asthma, CAD (s/p PCI to pLCx and mLAD), HTN who presented with  worsening dyspnea, was being managed on the medicine floor for a COPD exacerbation thought to be secondary to a URI. Rapid response called today for acute hypoxic respiratory failure in the setting of hypertensive emergency and worsening leukocytosis    Recommendations:   - EKG performed during rapid with tachycardia and some artefact, please repeat EKG   - CXR without cardiomegaly or pulmonary edema, BNP downtrended, currently 537, Trop 33   - Telemetry monitoring   - Home anti-hypertensives include Amlodipine 10, Losartan 50mg  - Cr improved to 1.19, previously with OWEN  - Also on Metoprolol  tartrate 25mg BID   - Wean Nitro drip as tolerated and Continue home anti-hypertensive regimen with Amlodipine and Losartan at this time  - Can transition from Metoprolol to Coreg if need additional BP coverage   - Appreciate care by MICU team in managing hypoxic respiratory failure and investigating leukocytosis/concern for sepsis    Perla Fox MD  Cardiology Fellow     Recommendations are preliminary until cosigned by attending

## 2022-12-16 NOTE — CHART NOTE - NSCHARTNOTEFT_GEN_A_CORE
:    INDICATION: COPD exacerbation     PROCEDURE:  [x] LIMITED ECHO  [x ] LIMITED CHEST  [ ] LIMITED RETROPERITONEAL  [ ] LIMITED ABDOMINAL  [ ] LIMITED DVT  [ ] NEEDLE GUIDANCE VASCULAR  [ ] NEEDLE GUIDANCE THORACENTESIS  [ ] NEEDLE GUIDANCE PARACENTESIS  [ ] NEEDLE GUIDANCE PERICARDIOCENTESIS  [ ] OTHER    FINDINGS:   Lungs: Bilateral lungs with anterior A-line predominance. Focal B-lines at bases bilaterally small L-sided consolidation at lung base. No pleural effusions bilaterally.   Cardiac: Trace pericardial effusion appreciated. IVC non-plethoric, collapsible with respirations. RV<LV. Grossly normal LV systolic function with no wall motional abnormalities appreciated.      INTERPRETATION: Predominantly normal aeration patterns of lungs and grossly normal cardiac function with trace pericardial effusion and with small IVC. Source of respiratory failure likely not secondary to flash pulmonary edema/fluid overload.

## 2022-12-16 NOTE — PROGRESS NOTE ADULT - PROBLEM SELECTOR PLAN 2
WYMAN improving, Expiratory wheeze better today   - C/w albuterol and prednisone  - Monitor respiratory status  - Wean supplemental oxygen  - Tessalon perles for cough  - s/p solumedrol 20 mg Q8h, change to PO prednisone   - pulmonary consult appreciated

## 2022-12-17 DIAGNOSIS — D72.829 ELEVATED WHITE BLOOD CELL COUNT, UNSPECIFIED: ICD-10-CM

## 2022-12-17 DIAGNOSIS — I16.1 HYPERTENSIVE EMERGENCY: ICD-10-CM

## 2022-12-17 DIAGNOSIS — I16.0 HYPERTENSIVE URGENCY: ICD-10-CM

## 2022-12-17 LAB
A1C WITH ESTIMATED AVERAGE GLUCOSE RESULT: 5.6 % — SIGNIFICANT CHANGE UP (ref 4–5.6)
ALBUMIN SERPL ELPH-MCNC: 3.4 G/DL — SIGNIFICANT CHANGE UP (ref 3.3–5)
ALBUMIN SERPL ELPH-MCNC: 3.6 G/DL — SIGNIFICANT CHANGE UP (ref 3.3–5)
ALP SERPL-CCNC: 47 U/L — SIGNIFICANT CHANGE UP (ref 40–120)
ALP SERPL-CCNC: 48 U/L — SIGNIFICANT CHANGE UP (ref 40–120)
ALT FLD-CCNC: 24 U/L — SIGNIFICANT CHANGE UP (ref 10–45)
ALT FLD-CCNC: 25 U/L — SIGNIFICANT CHANGE UP (ref 10–45)
ANION GAP SERPL CALC-SCNC: 12 MMOL/L — SIGNIFICANT CHANGE UP (ref 5–17)
ANION GAP SERPL CALC-SCNC: 12 MMOL/L — SIGNIFICANT CHANGE UP (ref 5–17)
AST SERPL-CCNC: 20 U/L — SIGNIFICANT CHANGE UP (ref 10–40)
AST SERPL-CCNC: 22 U/L — SIGNIFICANT CHANGE UP (ref 10–40)
BASOPHILS # BLD AUTO: 0.03 K/UL — SIGNIFICANT CHANGE UP (ref 0–0.2)
BASOPHILS NFR BLD AUTO: 0.2 % — SIGNIFICANT CHANGE UP (ref 0–2)
BILIRUB SERPL-MCNC: 0.3 MG/DL — SIGNIFICANT CHANGE UP (ref 0.2–1.2)
BILIRUB SERPL-MCNC: 0.3 MG/DL — SIGNIFICANT CHANGE UP (ref 0.2–1.2)
BUN SERPL-MCNC: 48 MG/DL — HIGH (ref 7–23)
BUN SERPL-MCNC: 51 MG/DL — HIGH (ref 7–23)
CALCIUM SERPL-MCNC: 9 MG/DL — SIGNIFICANT CHANGE UP (ref 8.4–10.5)
CALCIUM SERPL-MCNC: 9.4 MG/DL — SIGNIFICANT CHANGE UP (ref 8.4–10.5)
CHLORIDE SERPL-SCNC: 100 MMOL/L — SIGNIFICANT CHANGE UP (ref 96–108)
CHLORIDE SERPL-SCNC: 102 MMOL/L — SIGNIFICANT CHANGE UP (ref 96–108)
CK MB CFR SERPL CALC: 19.4 NG/ML — HIGH (ref 0–6.7)
CO2 SERPL-SCNC: 23 MMOL/L — SIGNIFICANT CHANGE UP (ref 22–31)
CO2 SERPL-SCNC: 24 MMOL/L — SIGNIFICANT CHANGE UP (ref 22–31)
CREAT SERPL-MCNC: 1.31 MG/DL — HIGH (ref 0.5–1.3)
CREAT SERPL-MCNC: 1.36 MG/DL — HIGH (ref 0.5–1.3)
EGFR: 55 ML/MIN/1.73M2 — LOW
EGFR: 58 ML/MIN/1.73M2 — LOW
EOSINOPHIL # BLD AUTO: 0 K/UL — SIGNIFICANT CHANGE UP (ref 0–0.5)
EOSINOPHIL NFR BLD AUTO: 0 % — SIGNIFICANT CHANGE UP (ref 0–6)
ESTIMATED AVERAGE GLUCOSE: 114 MG/DL — SIGNIFICANT CHANGE UP (ref 68–114)
GAS PNL BLDA: SIGNIFICANT CHANGE UP
GLUCOSE BLDC GLUCOMTR-MCNC: 139 MG/DL — HIGH (ref 70–99)
GLUCOSE BLDC GLUCOMTR-MCNC: 141 MG/DL — HIGH (ref 70–99)
GLUCOSE BLDC GLUCOMTR-MCNC: 143 MG/DL — HIGH (ref 70–99)
GLUCOSE BLDC GLUCOMTR-MCNC: 149 MG/DL — HIGH (ref 70–99)
GLUCOSE SERPL-MCNC: 136 MG/DL — HIGH (ref 70–99)
GLUCOSE SERPL-MCNC: 158 MG/DL — HIGH (ref 70–99)
HCT VFR BLD CALC: 35.4 % — LOW (ref 39–50)
HGB BLD-MCNC: 11.3 G/DL — LOW (ref 13–17)
IMM GRANULOCYTES NFR BLD AUTO: 0.8 % — SIGNIFICANT CHANGE UP (ref 0–0.9)
LEGIONELLA AG UR QL: NEGATIVE — SIGNIFICANT CHANGE UP
LYMPHOCYTES # BLD AUTO: 0.71 K/UL — LOW (ref 1–3.3)
LYMPHOCYTES # BLD AUTO: 3.9 % — LOW (ref 13–44)
MAGNESIUM SERPL-MCNC: 2.1 MG/DL — SIGNIFICANT CHANGE UP (ref 1.6–2.6)
MCHC RBC-ENTMCNC: 29.7 PG — SIGNIFICANT CHANGE UP (ref 27–34)
MCHC RBC-ENTMCNC: 31.9 GM/DL — LOW (ref 32–36)
MCV RBC AUTO: 93.2 FL — SIGNIFICANT CHANGE UP (ref 80–100)
MONOCYTES # BLD AUTO: 0.24 K/UL — SIGNIFICANT CHANGE UP (ref 0–0.9)
MONOCYTES NFR BLD AUTO: 1.3 % — LOW (ref 2–14)
MRSA PCR RESULT.: DETECTED
NEUTROPHILS # BLD AUTO: 17.12 K/UL — HIGH (ref 1.8–7.4)
NEUTROPHILS NFR BLD AUTO: 93.8 % — HIGH (ref 43–77)
NRBC # BLD: 0 /100 WBCS — SIGNIFICANT CHANGE UP (ref 0–0)
NT-PROBNP SERPL-SCNC: 806 PG/ML — HIGH (ref 0–300)
PHOSPHATE SERPL-MCNC: 4.1 MG/DL — SIGNIFICANT CHANGE UP (ref 2.5–4.5)
PLATELET # BLD AUTO: 420 K/UL — HIGH (ref 150–400)
POTASSIUM SERPL-MCNC: 5.3 MMOL/L — SIGNIFICANT CHANGE UP (ref 3.5–5.3)
POTASSIUM SERPL-MCNC: 5.5 MMOL/L — HIGH (ref 3.5–5.3)
POTASSIUM SERPL-SCNC: 5.3 MMOL/L — SIGNIFICANT CHANGE UP (ref 3.5–5.3)
POTASSIUM SERPL-SCNC: 5.5 MMOL/L — HIGH (ref 3.5–5.3)
PROT SERPL-MCNC: 6.5 G/DL — SIGNIFICANT CHANGE UP (ref 6–8.3)
PROT SERPL-MCNC: 6.6 G/DL — SIGNIFICANT CHANGE UP (ref 6–8.3)
RBC # BLD: 3.8 M/UL — LOW (ref 4.2–5.8)
RBC # FLD: 14.1 % — SIGNIFICANT CHANGE UP (ref 10.3–14.5)
S AUREUS DNA NOSE QL NAA+PROBE: DETECTED
SODIUM SERPL-SCNC: 136 MMOL/L — SIGNIFICANT CHANGE UP (ref 135–145)
SODIUM SERPL-SCNC: 137 MMOL/L — SIGNIFICANT CHANGE UP (ref 135–145)
TROPONIN T, HIGH SENSITIVITY RESULT: 46 NG/L — SIGNIFICANT CHANGE UP (ref 0–51)
WBC # BLD: 18.25 K/UL — HIGH (ref 3.8–10.5)
WBC # FLD AUTO: 18.25 K/UL — HIGH (ref 3.8–10.5)

## 2022-12-17 PROCEDURE — 99233 SBSQ HOSP IP/OBS HIGH 50: CPT | Mod: GC

## 2022-12-17 PROCEDURE — 93010 ELECTROCARDIOGRAM REPORT: CPT

## 2022-12-17 PROCEDURE — 99291 CRITICAL CARE FIRST HOUR: CPT | Mod: GC

## 2022-12-17 RX ORDER — MUPIROCIN 20 MG/G
1 OINTMENT TOPICAL
Refills: 0 | Status: DISCONTINUED | OUTPATIENT
Start: 2022-12-17 | End: 2022-12-21

## 2022-12-17 RX ORDER — METOPROLOL TARTRATE 50 MG
25 TABLET ORAL
Refills: 0 | Status: DISCONTINUED | OUTPATIENT
Start: 2022-12-17 | End: 2022-12-17

## 2022-12-17 RX ORDER — METOPROLOL TARTRATE 50 MG
50 TABLET ORAL
Refills: 0 | Status: DISCONTINUED | OUTPATIENT
Start: 2022-12-17 | End: 2022-12-21

## 2022-12-17 RX ORDER — LOSARTAN POTASSIUM 100 MG/1
50 TABLET, FILM COATED ORAL DAILY
Refills: 0 | Status: DISCONTINUED | OUTPATIENT
Start: 2022-12-17 | End: 2022-12-17

## 2022-12-17 RX ORDER — IPRATROPIUM/ALBUTEROL SULFATE 18-103MCG
3 AEROSOL WITH ADAPTER (GRAM) INHALATION EVERY 6 HOURS
Refills: 0 | Status: DISCONTINUED | OUTPATIENT
Start: 2022-12-17 | End: 2022-12-21

## 2022-12-17 RX ORDER — AMLODIPINE BESYLATE 2.5 MG/1
10 TABLET ORAL DAILY
Refills: 0 | Status: DISCONTINUED | OUTPATIENT
Start: 2022-12-17 | End: 2022-12-21

## 2022-12-17 RX ADMIN — AMLODIPINE BESYLATE 10 MILLIGRAM(S): 2.5 TABLET ORAL at 05:15

## 2022-12-17 RX ADMIN — BUDESONIDE AND FORMOTEROL FUMARATE DIHYDRATE 2 PUFF(S): 160; 4.5 AEROSOL RESPIRATORY (INHALATION) at 17:07

## 2022-12-17 RX ADMIN — Medication 40 MILLIGRAM(S): at 02:50

## 2022-12-17 RX ADMIN — Medication 81 MILLIGRAM(S): at 12:08

## 2022-12-17 RX ADMIN — CLOPIDOGREL BISULFATE 75 MILLIGRAM(S): 75 TABLET, FILM COATED ORAL at 12:09

## 2022-12-17 RX ADMIN — AZITHROMYCIN 255 MILLIGRAM(S): 500 TABLET, FILM COATED ORAL at 21:53

## 2022-12-17 RX ADMIN — Medication 3 MILLILITER(S): at 13:02

## 2022-12-17 RX ADMIN — HEPARIN SODIUM 5000 UNIT(S): 5000 INJECTION INTRAVENOUS; SUBCUTANEOUS at 13:15

## 2022-12-17 RX ADMIN — Medication 25 MILLIGRAM(S): at 08:57

## 2022-12-17 RX ADMIN — LOSARTAN POTASSIUM 50 MILLIGRAM(S): 100 TABLET, FILM COATED ORAL at 05:15

## 2022-12-17 RX ADMIN — PIPERACILLIN AND TAZOBACTAM 25 GRAM(S): 4; .5 INJECTION, POWDER, LYOPHILIZED, FOR SOLUTION INTRAVENOUS at 08:54

## 2022-12-17 RX ADMIN — Medication 3 MILLILITER(S): at 09:47

## 2022-12-17 RX ADMIN — CHLORHEXIDINE GLUCONATE 1 APPLICATION(S): 213 SOLUTION TOPICAL at 05:15

## 2022-12-17 RX ADMIN — Medication 3 MILLILITER(S): at 23:30

## 2022-12-17 RX ADMIN — Medication 3 MILLILITER(S): at 05:43

## 2022-12-17 RX ADMIN — HEPARIN SODIUM 5000 UNIT(S): 5000 INJECTION INTRAVENOUS; SUBCUTANEOUS at 21:54

## 2022-12-17 RX ADMIN — Medication 40 MILLIGRAM(S): at 15:00

## 2022-12-17 RX ADMIN — Medication 3 MILLILITER(S): at 03:06

## 2022-12-17 RX ADMIN — CHLORHEXIDINE GLUCONATE 1 APPLICATION(S): 213 SOLUTION TOPICAL at 12:10

## 2022-12-17 RX ADMIN — Medication 1: at 00:45

## 2022-12-17 RX ADMIN — Medication 50 MILLIGRAM(S): at 17:27

## 2022-12-17 RX ADMIN — Medication 40 MILLIGRAM(S): at 23:17

## 2022-12-17 RX ADMIN — HEPARIN SODIUM 5000 UNIT(S): 5000 INJECTION INTRAVENOUS; SUBCUTANEOUS at 05:15

## 2022-12-17 RX ADMIN — PANTOPRAZOLE SODIUM 40 MILLIGRAM(S): 20 TABLET, DELAYED RELEASE ORAL at 12:09

## 2022-12-17 RX ADMIN — Medication 3 MILLILITER(S): at 17:08

## 2022-12-17 RX ADMIN — PIPERACILLIN AND TAZOBACTAM 25 GRAM(S): 4; .5 INJECTION, POWDER, LYOPHILIZED, FOR SOLUTION INTRAVENOUS at 00:21

## 2022-12-17 NOTE — PROGRESS NOTE ADULT - PROBLEM SELECTOR PLAN 3
Uptrending WBC count 12/16  -S/p RRT, ? reactive vs 2nd to steroids  -CXR with no clear infiltrate  -ABX per MICU  -Monitor trend.

## 2022-12-17 NOTE — PROGRESS NOTE ADULT - SUBJECTIVE AND OBJECTIVE BOX
Patient is a 72y old  Male who presents with a chief complaint of worsening SOB (16 Dec 2022 19:23)      SUBJECTIVE / OVERNIGHT EVENTS:  - overnight, restarted on home antihypertensives; nitro gtt requirements coming down    MEDICATIONS  (STANDING):  albuterol/ipratropium for Nebulization 3 milliLiter(s) Nebulizer every 4 hours  albuterol/ipratropium for Nebulization. 3 milliLiter(s) Nebulizer once  amLODIPine   Tablet 10 milliGRAM(s) Oral daily  aspirin enteric coated 81 milliGRAM(s) Oral daily  azithromycin  IVPB 500 milliGRAM(s) IV Intermittent every 24 hours  budesonide  80 MICROgram(s)/formoterol 4.5 MICROgram(s) Inhaler 2 Puff(s) Inhalation two times a day  chlorhexidine 2% Cloths 1 Application(s) Topical daily  chlorhexidine 4% Liquid 1 Application(s) Topical <User Schedule>  clopidogrel Tablet 75 milliGRAM(s) Oral daily  dextrose 5%. 1000 milliLiter(s) (50 mL/Hr) IV Continuous <Continuous>  dextrose 5%. 1000 milliLiter(s) (100 mL/Hr) IV Continuous <Continuous>  dextrose 50% Injectable 25 Gram(s) IV Push once  dextrose 50% Injectable 12.5 Gram(s) IV Push once  dextrose 50% Injectable 25 Gram(s) IV Push once  glucagon  Injectable 1 milliGRAM(s) IntraMuscular once  heparin   Injectable 5000 Unit(s) SubCutaneous every 8 hours  influenza  Vaccine (HIGH DOSE) 0.7 milliLiter(s) IntraMuscular once  insulin lispro (ADMELOG) corrective regimen sliding scale   SubCutaneous every 6 hours  labetalol Injectable 5 milliGRAM(s) IV Push once  losartan 50 milliGRAM(s) Oral daily  magnesium sulfate  IVPB 2 Gram(s) IV Intermittent once  methylPREDNISolone sodium succinate Injectable 40 milliGRAM(s) IV Push every 8 hours  methylPREDNISolone sodium succinate Injectable 100 milliGRAM(s) IV Push once  nitroglycerin     SubLingual 0.4 milliGRAM(s) SubLingual once  nitroglycerin     SubLingual 0.4 milliGRAM(s) SubLingual once  nitroglycerin  Infusion 5 MICROgram(s)/Min (1.5 mL/Hr) IV Continuous <Continuous>  pantoprazole  Injectable 40 milliGRAM(s) IV Push daily  piperacillin/tazobactam IVPB. 3.375 Gram(s) IV Intermittent once  piperacillin/tazobactam IVPB.. 3.375 Gram(s) IV Intermittent every 8 hours    MEDICATIONS  (PRN):  dextrose Oral Gel 15 Gram(s) Oral once PRN Blood Glucose LESS THAN 70 milliGRAM(s)/deciliter      PHYSICAL EXAM:  Vital Signs Last 24 Hrs  T(C): 37 (17 Dec 2022 04:00), Max: 37 (17 Dec 2022 04:00)  T(F): 98.6 (17 Dec 2022 04:00), Max: 98.6 (17 Dec 2022 04:00)  HR: 85 (17 Dec 2022 07:00) (82 - 843)  BP: 135/66 (17 Dec 2022 07:00) (110/56 - 195/86)  BP(mean): 93 (17 Dec 2022 07:00) (77 - 124)  RR: 27 (17 Dec 2022 07:00) (18 - 30)  SpO2: 94% (17 Dec 2022 07:00) (90% - 100%)    Parameters below as of 17 Dec 2022 04:30  Patient On (Oxygen Delivery Method): nasal cannula  O2 Flow (L/min): 2    GENERAL: NAD, Resting in bed, thin   HEENT:  Head atraumatic, conjunctiva and sclera clear; Moist mucous membranes, normal oropharynx  NECK: Supple  CHEST/LUNG: decreased bilateral air movement  HEART: RRR  ABDOMEN: Bowel sounds present; Soft, Nontender, Nondistended.   EXTREMITIES:  2+ Peripheral Pulses, brisk capillary refill. No clubbing, cyanosis, or edema  NERVOUS SYSTEM:  Alert & Oriented X3, non-focal and spontaneous movements of all extremities  SKIN: No rashes or lesions      ----  I&O's Summary    16 Dec 2022 07:01  -  17 Dec 2022 07:00  --------------------------------------------------------  IN: 1091.5 mL / OUT: 2000 mL / NET: -908.5 mL      ----  LABS:                        11.3   18.25 )-----------( 420      ( 17 Dec 2022 00:59 )             35.4     ----      137  |  102  |  48<H>  ----------------------------<  136<H>  5.3   |  23  |  1.31<H>    Ca    9.0      17 Dec 2022 03:01  Phos  4.1       Mg     2.1         TPro  6.5  /  Alb  3.4  /  TBili  0.3  /  DBili  x   /  AST  22  /  ALT  24  /  AlkPhos  47      ----  PT/INR - ( 16 Dec 2022 17:27 )   PT: 11.9 sec;   INR: 1.03 ratio         PTT - ( 16 Dec 2022 17:27 )  PTT:24.5 sec  ----  CARDIAC MARKERS ( 17 Dec 2022 01:01 )  x     / x     / x     / x     / 19.4 ng/mL  CARDIAC MARKERS ( 16 Dec 2022 17:27 )  x     / x     / 275 U/L / x     / 17.6 ng/mL      ----  Urinalysis Basic - ( 16 Dec 2022 21:01 )    Color: Light Yellow / Appearance: Clear / S.017 / pH: x  Gluc: x / Ketone: Negative  / Bili: Negative / Urobili: Negative   Blood: x / Protein: Trace / Nitrite: Negative   Leuk Esterase: Negative / RBC: x / WBC x   Sq Epi: x / Non Sq Epi: x / Bacteria: x      ----      RADIOLOGY & ADDITIONAL TESTS:  Results Reviewed:   Imaging Personally Reviewed:  Electrocardiogram Personally Reviewed: Patient is a 72y old  Male who presents with a chief complaint of worsening SOB (16 Dec 2022 19:23)      SUBJECTIVE / OVERNIGHT EVENTS:  - overnight, restarted on home antihypertensives; nitro gtt requirements coming down    MEDICATIONS  (STANDING):  albuterol/ipratropium for Nebulization 3 milliLiter(s) Nebulizer every 4 hours  albuterol/ipratropium for Nebulization. 3 milliLiter(s) Nebulizer once  amLODIPine   Tablet 10 milliGRAM(s) Oral daily  aspirin enteric coated 81 milliGRAM(s) Oral daily  azithromycin  IVPB 500 milliGRAM(s) IV Intermittent every 24 hours  budesonide  80 MICROgram(s)/formoterol 4.5 MICROgram(s) Inhaler 2 Puff(s) Inhalation two times a day  chlorhexidine 2% Cloths 1 Application(s) Topical daily  chlorhexidine 4% Liquid 1 Application(s) Topical <User Schedule>  clopidogrel Tablet 75 milliGRAM(s) Oral daily  dextrose 5%. 1000 milliLiter(s) (50 mL/Hr) IV Continuous <Continuous>  dextrose 5%. 1000 milliLiter(s) (100 mL/Hr) IV Continuous <Continuous>  dextrose 50% Injectable 25 Gram(s) IV Push once  dextrose 50% Injectable 12.5 Gram(s) IV Push once  dextrose 50% Injectable 25 Gram(s) IV Push once  glucagon  Injectable 1 milliGRAM(s) IntraMuscular once  heparin   Injectable 5000 Unit(s) SubCutaneous every 8 hours  influenza  Vaccine (HIGH DOSE) 0.7 milliLiter(s) IntraMuscular once  insulin lispro (ADMELOG) corrective regimen sliding scale   SubCutaneous every 6 hours  labetalol Injectable 5 milliGRAM(s) IV Push once  losartan 50 milliGRAM(s) Oral daily  magnesium sulfate  IVPB 2 Gram(s) IV Intermittent once  methylPREDNISolone sodium succinate Injectable 40 milliGRAM(s) IV Push every 8 hours  methylPREDNISolone sodium succinate Injectable 100 milliGRAM(s) IV Push once  nitroglycerin     SubLingual 0.4 milliGRAM(s) SubLingual once  nitroglycerin     SubLingual 0.4 milliGRAM(s) SubLingual once  nitroglycerin  Infusion 5 MICROgram(s)/Min (1.5 mL/Hr) IV Continuous <Continuous>  pantoprazole  Injectable 40 milliGRAM(s) IV Push daily  piperacillin/tazobactam IVPB. 3.375 Gram(s) IV Intermittent once  piperacillin/tazobactam IVPB.. 3.375 Gram(s) IV Intermittent every 8 hours    MEDICATIONS  (PRN):  dextrose Oral Gel 15 Gram(s) Oral once PRN Blood Glucose LESS THAN 70 milliGRAM(s)/deciliter      PHYSICAL EXAM:  Vital Signs Last 24 Hrs  T(C): 37 (17 Dec 2022 04:00), Max: 37 (17 Dec 2022 04:00)  T(F): 98.6 (17 Dec 2022 04:00), Max: 98.6 (17 Dec 2022 04:00)  HR: 85 (17 Dec 2022 07:00) (82 - 843)  BP: 135/66 (17 Dec 2022 07:00) (110/56 - 195/86)  BP(mean): 93 (17 Dec 2022 07:) (77 - 124)  RR: 27 (17 Dec 2022 07:00) (18 - 30)  SpO2: 94% (17 Dec 2022 07:00) (90% - 100%)    Parameters below as of 17 Dec 2022 04:30  Patient On (Oxygen Delivery Method): nasal cannula  O2 Flow (L/min): 2    GENERAL: NAD, Resting in bed, thin   HEENT:  Head atraumatic, conjunctiva and sclera clear; Moist mucous membranes, normal oropharynx  NECK: Supple  CHEST/LUNG: decreased bilateral air movement throughout; no wheezing  HEART: RRR, no murmurs  ABDOMEN: Bowel sounds present; Soft, Nontender, Nondistended.   EXTREMITIES:  2+ Peripheral Pulses, brisk capillary refill. No clubbing, cyanosis, or edema  NERVOUS SYSTEM:  Alert & Oriented X3, non-focal and spontaneous movements of all extremities  SKIN: No rashes or lesions      ----  I&O's Summary    16 Dec 2022 07:01  -  17 Dec 2022 07:00  --------------------------------------------------------  IN: 1091.5 mL / OUT: 2000 mL / NET: -908.5 mL      ----  LABS:                        11.3   18.25 )-----------( 420      ( 17 Dec 2022 00:59 )             35.4     ----      137  |  102  |  48<H>  ----------------------------<  136<H>  5.3   |  23  |  1.31<H>    Ca    9.0      17 Dec 2022 03:01  Phos  4.1       Mg     2.1         TPro  6.5  /  Alb  3.4  /  TBili  0.3  /  DBili  x   /  AST  22  /  ALT  24  /  AlkPhos  47      ----  PT/INR - ( 16 Dec 2022 17:27 )   PT: 11.9 sec;   INR: 1.03 ratio         PTT - ( 16 Dec 2022 17:27 )  PTT:24.5 sec  ----  CARDIAC MARKERS ( 17 Dec 2022 01:01 )  x     / x     / x     / x     / 19.4 ng/mL  CARDIAC MARKERS ( 16 Dec 2022 17:27 )  x     / x     / 275 U/L / x     / 17.6 ng/mL      ----  Urinalysis Basic - ( 16 Dec 2022 21:01 )    Color: Light Yellow / Appearance: Clear / S.017 / pH: x  Gluc: x / Ketone: Negative  / Bili: Negative / Urobili: Negative   Blood: x / Protein: Trace / Nitrite: Negative   Leuk Esterase: Negative / RBC: x / WBC x   Sq Epi: x / Non Sq Epi: x / Bacteria: x      ----      RADIOLOGY & ADDITIONAL TESTS:  Results Reviewed:   Imaging Personally Reviewed:  Electrocardiogram Personally Reviewed:

## 2022-12-17 NOTE — PROGRESS NOTE ADULT - PROBLEM SELECTOR PLAN 2
S/p RRT for /96, tx to MICU for further management  -Antihypertensives, nitro drip per MICU  -Cards f/u.

## 2022-12-17 NOTE — PROGRESS NOTE ADULT - SUBJECTIVE AND OBJECTIVE BOX
Follow-up Pulm Progress Note    Events noted  Now in MICU - O2 sats 93% on 2LNC  Denies CP, SOB     Medications:  MEDICATIONS  (STANDING):  albuterol/ipratropium for Nebulization 3 milliLiter(s) Nebulizer every 4 hours  amLODIPine   Tablet 10 milliGRAM(s) Oral daily  aspirin enteric coated 81 milliGRAM(s) Oral daily  azithromycin  IVPB 500 milliGRAM(s) IV Intermittent every 24 hours  budesonide  80 MICROgram(s)/formoterol 4.5 MICROgram(s) Inhaler 2 Puff(s) Inhalation two times a day  chlorhexidine 2% Cloths 1 Application(s) Topical daily  chlorhexidine 4% Liquid 1 Application(s) Topical <User Schedule>  clopidogrel Tablet 75 milliGRAM(s) Oral daily  dextrose 5%. 1000 milliLiter(s) (50 mL/Hr) IV Continuous <Continuous>  dextrose 5%. 1000 milliLiter(s) (100 mL/Hr) IV Continuous <Continuous>  dextrose 50% Injectable 25 Gram(s) IV Push once  dextrose 50% Injectable 12.5 Gram(s) IV Push once  dextrose 50% Injectable 25 Gram(s) IV Push once  glucagon  Injectable 1 milliGRAM(s) IntraMuscular once  heparin   Injectable 5000 Unit(s) SubCutaneous every 8 hours  influenza  Vaccine (HIGH DOSE) 0.7 milliLiter(s) IntraMuscular once  insulin lispro (ADMELOG) corrective regimen sliding scale   SubCutaneous every 6 hours  methylPREDNISolone sodium succinate Injectable 40 milliGRAM(s) IV Push every 8 hours  metoprolol tartrate 25 milliGRAM(s) Oral two times a day  nitroglycerin  Infusion 5 MICROgram(s)/Min (1.5 mL/Hr) IV Continuous <Continuous>  pantoprazole  Injectable 40 milliGRAM(s) IV Push daily  piperacillin/tazobactam IVPB. 3.375 Gram(s) IV Intermittent once  piperacillin/tazobactam IVPB.. 3.375 Gram(s) IV Intermittent every 8 hours    MEDICATIONS  (PRN):  dextrose Oral Gel 15 Gram(s) Oral once PRN Blood Glucose LESS THAN 70 milliGRAM(s)/deciliter      Vital Signs Last 24 Hrs  T(C): 36.8 (17 Dec 2022 08:00), Max: 37 (17 Dec 2022 04:00)  T(F): 98.2 (17 Dec 2022 08:00), Max: 98.6 (17 Dec 2022 04:00)  HR: 103 (17 Dec 2022 09:00) (82 - 106)  BP: 169/79 (17 Dec 2022 09:00) (110/56 - 195/86)  BP(mean): 114 (17 Dec 2022 09:00) (77 - 127)  RR: 48 (17 Dec 2022 09:00) (18 - 56)  SpO2: 91% (17 Dec 2022 09:00) (90% - 100%)    Parameters below as of 17 Dec 2022 05:38  Patient On (Oxygen Delivery Method): nasal cannula    ABG - ( 17 Dec 2022 00:29 )  pH, Arterial: 7.40  pH, Blood: x     /  pCO2: 44    /  pO2: 213   / HCO3: 27    / Base Excess: 2.1   /  SaO2: 99.4        VBG pH 7.34  @ 20:56    VBG pCO2 50  @ 20:56    VBG O2 sat 76.3  @ 20:56    VBG lactate 2.6  @ 20:56       @ 07:01  -   @ 07:00  --------------------------------------------------------  IN: 1091.5 mL / OUT: 2000 mL / NET: -908.5 mL    LABS:                        11.3   18.25 )-----------( 420      ( 17 Dec 2022 00:59 )             35.4         137  |  102  |  48<H>  ----------------------------<  136<H>  5.3   |  23  |  1.31<H>    Ca    9.0      17 Dec 2022 03:01  Phos  4.1       Mg     2.1         TPro  6.5  /  Alb  3.4  /  TBili  0.3  /  DBili  x   /  AST  22  /  ALT  24  /  AlkPhos  47        CARDIAC MARKERS ( 17 Dec 2022 01:01 )  x     / x     / x     / x     / 19.4 ng/mL  CARDIAC MARKERS ( 16 Dec 2022 17:27 )  x     / x     / 275 U/L / x     / 17.6 ng/mL      CAPILLARY BLOOD GLUCOSE  POCT Blood Glucose.: 139 mg/dL (17 Dec 2022 08:06)    PT/INR - ( 16 Dec 2022 17:27 )   PT: 11.9 sec;   INR: 1.03 ratio      PTT - ( 16 Dec 2022 17:27 )  PTT:24.5 sec  Urinalysis Basic - ( 16 Dec 2022 21:01 )    Color: Light Yellow / Appearance: Clear / S.017 / pH: x  Gluc: x / Ketone: Negative  / Bili: Negative / Urobili: Negative   Blood: x / Protein: Trace / Nitrite: Negative   Leuk Esterase: Negative / RBC: x / WBC x   Sq Epi: x / Non Sq Epi: x / Bacteria: x    Serum Pro-Brain Natriuretic Peptide: 806 pg/mL (22 @ 01:01)  Serum Pro-Brain Natriuretic Peptide: 537 pg/mL (22 @ 18:10)  Serum Pro-Brain Natriuretic Peptide: 422 pg/mL (22 @ 17:27)    Physical Examination:  PULM: Decreased BS   CVS: S1, S2 heard    RADIOLOGY REVIEWED  CXR:  grossly clear, hyperinflated lungs

## 2022-12-17 NOTE — CHART NOTE - NSCHARTNOTEFT_GEN_A_CORE
MICU Transfer Note    Transfer from: MICU  Transfer to:  ( X ) Medicine    (  ) Telemetry    (  ) RCU    (  ) Palliative    (  ) Stroke Unit    (  ) _______________  Accepting physician:    MICU COURSE:  72M pmh COPD/asthma (not on home O2, last exacerbation 1y ago october), CAD (s/p PCI), HTN p/w worsening SOB on 12/10/22 with concern for COPD exacerbation and hypertensive urgency.     During hospitalization, patient treated with steroids and duonebs. Course complicated by OWEN with Cr up to >3 (baseline ~0.7), thought to be 2/2 pre-renal etiology vs hypertensive urgency. Patient was reportedly weaned down on oxygen and set to have tentative discharge today. AM labs 12/16 with WBC to 17.  RRT called 12/16/22 for hypoxia, increased WOB requiring escalation to BIPAP iso hypertensive emergency. At bedside, patient had limited air movement on lung exam. CXR with hyperinflated lungs w/o overt evidence of edema. Duonebsx4, Magnesium 2mg IV, zosynx1, solumedrol 100x1, sublingual nitroglycerinx2, lopressor 5mgx1, hydralazinex1 and labetalol x1 given. Vitals significant for SBP>200 continuously.  Pt was started on nitro gtt. Patient also placed on BIPAP for increased work of breathing. Cardiology also contacted during RRT.  Patient transferred to MICU for monitoring in s/o COPD exacerbation 2/2 hypertensive emergency requiring nitro gtt. Patient restarted on PO antihypertensives (amlodipine, lopressor) and weaned off nitro gtt 12/17 at 11:00. Patient stable fo transfer to general medical floors.    ASSESSMENT & PLAN:   Peg is a 71 y/o M COPD/asthma, HTN, CAD s/p PCI, BPH, GERD p/w worsening SOB a/w acute hypoxic respiratory failure 2/2 COPD exacerbation possibly 2/2 hypertensive urgency vs sepsis requiring escalation to BIPAP and nitroglycerin gtt. Now accepted to MICU 12/16 for further monitoring.     ==Neuro==  - AxOx3 at baseline  - currently mentating AxOx3    ==Cardiovascular==  #Hypertensive emergency  - SBP consistently >200 during RRT  - s/p nitro gtt; titrated off 12/17 at 11AM  - signs of end-organ damage with OWEN  - restarted home amlodipine; lopressor 25 BID restarted 12/17; losartan on hold 2/2 OWEN  - Cardiology recs, contacted during RRT    #CAD s/p PCI  - last TTE 2021 with EF 60%  - C/w DAPT, statin here once able to tolerate PO  - trend troponin;   - f/u TTE (ordered)    ==Respiratory==  #hypoxic/hypercapnic respiratory failure 2/2 COPD exacerbation  - Likely exacerbated by hypertensive urgency vs emerging sepsis  -- Hx w/ COPD/emphysema, 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 Strict I/Os  - Nephrology following    ==ID==  #Sepsis  - 12/16 WBC 23.89, tachypneic, tachycardic, lactate >3, afebrile  - s/p zosynx1 during RRT  - stop zosyn (12/16-12/17) and azithromycin 500mg IVx3 days (12/16-12/18)  - f/u sputum culture, RVP, MRSA swab  - blood culturesx2  - f/u urinalysis and urine culture    ==Endocrine==  - NPO for now given on BIPAP  - Trend FS q6h while on steroids  - low ISS  - add basal/bolus as needed for steroid-induced hyperglycemia    ==Hematologic/DVT ppx==  DVT ppx: heparin SQ    ==Ethics==  GOC: Full Code      For Follow-Up:  [] monitor BP; can increase lopressor as needed  [] restart losartan once OWEN resolved  [] monitor renal function  [] continue 5 day course of steroids  [] continue 3 days of azithromycin for anti-inflammatory effects      Vital Signs Last 24 Hrs  T(C): 36.8 (17 Dec 2022 08:00), Max: 37 (17 Dec 2022 04:00)  T(F): 98.2 (17 Dec 2022 08:00), Max: 98.6 (17 Dec 2022 04:00)  HR: 80 (17 Dec 2022 13:12) (79 - 106)  BP: 140/65 (17 Dec 2022 13:00) (110/56 - 195/86)  BP(mean): 96 (17 Dec 2022 13:00) (77 - 127)  RR: 33 (17 Dec 2022 13:00) (19 - 57)  SpO2: 97% (17 Dec 2022 13:12) (91% - 100%)    Parameters below as of 17 Dec 2022 13:12  Patient On (Oxygen Delivery Method): nasal cannula      I&O's Summary    16 Dec 2022 07:01  -  17 Dec 2022 07:00  --------------------------------------------------------  IN: 1091.5 mL / OUT: 2000 mL / NET: -908.5 mL    17 Dec 2022 07:01  -  17 Dec 2022 13:36  --------------------------------------------------------  IN: 1069 mL / OUT: 775 mL / NET: 294 mL          MEDICATIONS  (STANDING):  albuterol/ipratropium for Nebulization 3 milliLiter(s) Nebulizer every 4 hours  amLODIPine   Tablet 10 milliGRAM(s) Oral daily  aspirin enteric coated 81 milliGRAM(s) Oral daily  azithromycin  IVPB 500 milliGRAM(s) IV Intermittent every 24 hours  budesonide  80 MICROgram(s)/formoterol 4.5 MICROgram(s) Inhaler 2 Puff(s) Inhalation two times a day  chlorhexidine 2% Cloths 1 Application(s) Topical daily  chlorhexidine 4% Liquid 1 Application(s) Topical <User Schedule>  clopidogrel Tablet 75 milliGRAM(s) Oral daily  dextrose 5%. 1000 milliLiter(s) (50 mL/Hr) IV Continuous <Continuous>  dextrose 5%. 1000 milliLiter(s) (100 mL/Hr) IV Continuous <Continuous>  dextrose 50% Injectable 25 Gram(s) IV Push once  dextrose 50% Injectable 12.5 Gram(s) IV Push once  dextrose 50% Injectable 25 Gram(s) IV Push once  glucagon  Injectable 1 milliGRAM(s) IntraMuscular once  heparin   Injectable 5000 Unit(s) SubCutaneous every 8 hours  influenza  Vaccine (HIGH DOSE) 0.7 milliLiter(s) IntraMuscular once  insulin lispro (ADMELOG) corrective regimen sliding scale   SubCutaneous every 6 hours  methylPREDNISolone sodium succinate Injectable 40 milliGRAM(s) IV Push every 8 hours  metoprolol tartrate 25 milliGRAM(s) Oral two times a day  pantoprazole  Injectable 40 milliGRAM(s) IV Push daily    MEDICATIONS  (PRN):  dextrose Oral Gel 15 Gram(s) Oral once PRN Blood Glucose LESS THAN 70 milliGRAM(s)/deciliter        LABS                                            11.3                  Neurophils% (auto):   93.8   (12-17 @ 00:59):    18.25)-----------(420          Lymphocytes% (auto):  3.9                                           35.4                   Eosinphils% (auto):   0.0      Manual%: Neutrophils x    ; Lymphocytes x    ; Eosinophils x    ; Bands%: x    ; Blasts x                                    137    |  102    |  48                  Calcium: 9.0   / iCa: x      (12-17 @ 03:01)    ----------------------------<  136       Magnesium: x                                5.3     |  23     |  1.31             Phosphorous: x        TPro  6.5    /  Alb  3.4    /  TBili  0.3    /  DBili  x      /  AST  22     /  ALT  24     /  AlkPhos  47     17 Dec 2022 03:01    ( 12-16 @ 17:27 )   PT: 11.9 sec;   INR: 1.03 ratio  aPTT: 24.5 sec MICU Transfer Note    Transfer from: MICU  Transfer to:  ( X ) Medicine    (  ) Telemetry    (  ) RCU    (  ) Palliative    (  ) Stroke Unit    ( X ) continuous pulse ox  Accepting physician: Dr. Tamayo    MICU COURSE:  72M pmh COPD/asthma (not on home O2, last exacerbation 1y ago october), CAD (s/p PCI), HTN p/w worsening SOB on 12/10/22 with concern for COPD exacerbation and hypertensive urgency.     During hospitalization, patient treated with steroids and duonebs. Course complicated by OWEN with Cr up to >3 (baseline ~0.7), thought to be 2/2 pre-renal etiology vs hypertensive urgency. Patient was reportedly weaned down on oxygen and set to have tentative discharge today. AM labs 12/16 with WBC to 17.  RRT called 12/16/22 for hypoxia, increased WOB requiring escalation to BIPAP iso hypertensive emergency. At bedside, patient had limited air movement on lung exam. CXR with hyperinflated lungs w/o overt evidence of edema. Duonebsx4, Magnesium 2mg IV, zosynx1, solumedrol 100x1, sublingual nitroglycerinx2, lopressor 5mgx1, hydralazinex1 and labetalol x1 given. Vitals significant for SBP>200 continuously.  Pt was started on nitro gtt. Patient also placed on BIPAP for increased work of breathing. Cardiology also contacted during RRT.  Patient transferred to MICU for monitoring in s/o COPD exacerbation 2/2 hypertensive emergency requiring nitro gtt. Patient restarted on PO antihypertensives (amlodipine, lopressor) and weaned off nitro gtt 12/17 at 11:00. Patient stable fo transfer to general medical floors.    ASSESSMENT & PLAN:   Peg is a 71 y/o M COPD/asthma, HTN, CAD s/p PCI, BPH, GERD p/w worsening SOB a/w acute hypoxic respiratory failure 2/2 COPD exacerbation possibly 2/2 hypertensive urgency vs sepsis requiring escalation to BIPAP and nitroglycerin gtt. Now accepted to MICU 12/16 for further monitoring.     ==Neuro==  - AxOx3 at baseline  - currently mentating AxOx3    ==Cardiovascular==  #Hypertensive emergency  - SBP consistently >200 during RRT  - s/p nitro gtt; titrated off 12/17 at 11AM  - signs of end-organ damage with OWEN  - restarted home amlodipine; lopressor 25 BID restarted 12/17; losartan on hold 2/2 OWEN  - Cardiology recs, contacted during RRT    #CAD s/p PCI  - last TTE 2021 with EF 60%  - C/w DAPT, statin here once able to tolerate PO  - trend troponin;   - f/u TTE (ordered)    ==Respiratory==  #hypoxic/hypercapnic respiratory failure 2/2 COPD exacerbation  - Likely exacerbated by hypertensive urgency vs emerging sepsis  -- Hx w/ COPD/emphysema, 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 Strict I/Os  - Nephrology following    ==ID==  #Sepsis  - 12/16 WBC 23.89, tachypneic, tachycardic, lactate >3, afebrile  - s/p zosynx1 during RRT  - stop zosyn (12/16-12/17) and azithromycin 500mg IVx3 days (12/16-12/18)  - f/u sputum culture, RVP, MRSA swab  - blood culturesx2  - f/u urinalysis and urine culture    ==Endocrine==  - NPO for now given on BIPAP  - Trend FS q6h while on steroids  - low ISS  - add basal/bolus as needed for steroid-induced hyperglycemia    ==Hematologic/DVT ppx==  DVT ppx: heparin SQ    ==Ethics==  GOC: Full Code      For Follow-Up:  [] monitor BP; can increase lopressor as needed  [] restart losartan once OWEN resolved  [] monitor renal function  [] continue 5 day course of steroids  [] continue 3 days of azithromycin for anti-inflammatory effects      Vital Signs Last 24 Hrs  T(C): 36.8 (17 Dec 2022 08:00), Max: 37 (17 Dec 2022 04:00)  T(F): 98.2 (17 Dec 2022 08:00), Max: 98.6 (17 Dec 2022 04:00)  HR: 80 (17 Dec 2022 13:12) (79 - 106)  BP: 140/65 (17 Dec 2022 13:00) (110/56 - 195/86)  BP(mean): 96 (17 Dec 2022 13:00) (77 - 127)  RR: 33 (17 Dec 2022 13:00) (19 - 57)  SpO2: 97% (17 Dec 2022 13:12) (91% - 100%)    Parameters below as of 17 Dec 2022 13:12  Patient On (Oxygen Delivery Method): nasal cannula      I&O's Summary    16 Dec 2022 07:01  -  17 Dec 2022 07:00  --------------------------------------------------------  IN: 1091.5 mL / OUT: 2000 mL / NET: -908.5 mL    17 Dec 2022 07:01  -  17 Dec 2022 13:36  --------------------------------------------------------  IN: 1069 mL / OUT: 775 mL / NET: 294 mL          MEDICATIONS  (STANDING):  albuterol/ipratropium for Nebulization 3 milliLiter(s) Nebulizer every 4 hours  amLODIPine   Tablet 10 milliGRAM(s) Oral daily  aspirin enteric coated 81 milliGRAM(s) Oral daily  azithromycin  IVPB 500 milliGRAM(s) IV Intermittent every 24 hours  budesonide  80 MICROgram(s)/formoterol 4.5 MICROgram(s) Inhaler 2 Puff(s) Inhalation two times a day  chlorhexidine 2% Cloths 1 Application(s) Topical daily  chlorhexidine 4% Liquid 1 Application(s) Topical <User Schedule>  clopidogrel Tablet 75 milliGRAM(s) Oral daily  dextrose 5%. 1000 milliLiter(s) (50 mL/Hr) IV Continuous <Continuous>  dextrose 5%. 1000 milliLiter(s) (100 mL/Hr) IV Continuous <Continuous>  dextrose 50% Injectable 25 Gram(s) IV Push once  dextrose 50% Injectable 12.5 Gram(s) IV Push once  dextrose 50% Injectable 25 Gram(s) IV Push once  glucagon  Injectable 1 milliGRAM(s) IntraMuscular once  heparin   Injectable 5000 Unit(s) SubCutaneous every 8 hours  influenza  Vaccine (HIGH DOSE) 0.7 milliLiter(s) IntraMuscular once  insulin lispro (ADMELOG) corrective regimen sliding scale   SubCutaneous every 6 hours  methylPREDNISolone sodium succinate Injectable 40 milliGRAM(s) IV Push every 8 hours  metoprolol tartrate 25 milliGRAM(s) Oral two times a day  pantoprazole  Injectable 40 milliGRAM(s) IV Push daily    MEDICATIONS  (PRN):  dextrose Oral Gel 15 Gram(s) Oral once PRN Blood Glucose LESS THAN 70 milliGRAM(s)/deciliter        LABS                                            11.3                  Neurophils% (auto):   93.8   (12-17 @ 00:59):    18.25)-----------(420          Lymphocytes% (auto):  3.9                                           35.4                   Eosinphils% (auto):   0.0      Manual%: Neutrophils x    ; Lymphocytes x    ; Eosinophils x    ; Bands%: x    ; Blasts x                                    137    |  102    |  48                  Calcium: 9.0   / iCa: x      (12-17 @ 03:01)    ----------------------------<  136       Magnesium: x                                5.3     |  23     |  1.31             Phosphorous: x        TPro  6.5    /  Alb  3.4    /  TBili  0.3    /  DBili  x      /  AST  22     /  ALT  24     /  AlkPhos  47     17 Dec 2022 03:01    ( 12-16 @ 17:27 )   PT: 11.9 sec;   INR: 1.03 ratio  aPTT: 24.5 sec

## 2022-12-17 NOTE — PROGRESS NOTE ADULT - ASSESSMENT
Peg is a 73 y/o M COPD/asthma, HTN, CAD s/p PCI, BPH, GERD p/w worsening SOB a/w acute hypoxic respiratory failure 2/2 COPD exacerbation possibly 2/2 hypertensive urgency vs sepsis requiring escalation to BIPAP and nitroglycerin gtt. Now accepted to MICU 12/16 for further monitoring.     ==Neuro==  - AxOx3 at baseline  - currently mentating AxOx3.    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 Strict I/Os  - Nephrology following    ==ID==  #Sepsis  - 12/16 WBC 23.89, tachypneic, tachycardic, lactate >3, afebrile  - s/p zosynx1 during RRT  - c/w zosyn (12/16-) and azithromycin 500mg IVx3 days (12/16-12/18)  - f/u sputum culture, RVP, MRSA swab  - blood culturesx2  - f/u urinalysis and urine culture    ==Endocrine==  - NPO for now given on BIPAP  - Trend FS q6h while on steroids  - low ISS  - add basal/bolus as needed for steroid-induced hyperglycemia    ==Hematologic/DVT ppx==  DVT ppx: heparin SQ    ==Ethics==  GOC: Full Code Peg is a 71 y/o M COPD/asthma, HTN, CAD s/p PCI, BPH, GERD p/w worsening SOB a/w acute hypoxic respiratory failure 2/2 COPD exacerbation possibly 2/2 hypertensive urgency vs sepsis requiring escalation to BIPAP and nitroglycerin gtt. Now accepted to MICU 12/16 for further monitoring.     ==Neuro==  - AxOx3 at baseline  - currently mentating AxOx3.    ==Cardiovascular==  #Hypertensive emergency  - SBP consistently >200 during RRT  - Started on nitro gtt for BP control now titrated off  - signs of end-organ damage with OWEN  - restarted home amlodipine; lopressor 25 BID restarted 12/17; losartan on hold 2/2 OWEN  - Cardiology recs, contacted during RRT    #CAD s/p PCI  - last TTE 2021 with EF 60%  - C/w DAPT, statin here once able to tolerate PO  - trend troponin;   - f/u TTE (ordered)    ==Respiratory==  #hypoxic/hypercapnic respiratory failure 2/2 COPD exacerbation  - Likely exacerbated by hypertensive urgency vs emerging sepsis  -- Hx w/ COPD/emphysema, 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 Strict I/Os  - Nephrology following    ==ID==  #Sepsis  - 12/16 WBC 23.89, tachypneic, tachycardic, lactate >3, afebrile  - s/p zosynx1 during RRT  - stop zosyn (12/16-12/17) and azithromycin 500mg IVx3 days (12/16-12/18)  - f/u sputum culture, RVP, MRSA swab  - blood culturesx2  - f/u urinalysis and urine culture    ==Endocrine==  - NPO for now given on BIPAP  - Trend FS q6h while on steroids  - low ISS  - add basal/bolus as needed for steroid-induced hyperglycemia    ==Hematologic/DVT ppx==  DVT ppx: heparin SQ    ==Ethics==  GOC: Full Code

## 2022-12-17 NOTE — PROGRESS NOTE ADULT - PROBLEM SELECTOR PLAN 1
Pt with COPD/emphysema, asthma overlap  -Initially with B/l expiratory wheezes on exam, also with + forced end expiratory wheeze  -Wheezing & dyspnea had been improving. S/p IV steroids & transitioned to PO Prednisone taper  -S/p RRT 12/16 for increased WOB in the setting of hypertensive emergency. Decreased BS on exam  -Steroids per MICU  -Keep sats >90% with O2 PRN (currently 2LNC). Wean O2 as tolerated. Pt with home o2 from prior hospital admission  -Bipap qHS and PRN for increased WOB  -C/w Symbicort 160/4.5 mcg 2 puffs BID  -C/w Singulair  -C/w Duoneb q6h.

## 2022-12-18 DIAGNOSIS — D64.9 ANEMIA, UNSPECIFIED: ICD-10-CM

## 2022-12-18 LAB
ALBUMIN SERPL ELPH-MCNC: 3.2 G/DL — LOW (ref 3.3–5)
ALP SERPL-CCNC: 41 U/L — SIGNIFICANT CHANGE UP (ref 40–120)
ALT FLD-CCNC: 23 U/L — SIGNIFICANT CHANGE UP (ref 10–45)
ANION GAP SERPL CALC-SCNC: 11 MMOL/L — SIGNIFICANT CHANGE UP (ref 5–17)
AST SERPL-CCNC: 14 U/L — SIGNIFICANT CHANGE UP (ref 10–40)
BILIRUB SERPL-MCNC: 0.2 MG/DL — SIGNIFICANT CHANGE UP (ref 0.2–1.2)
BUN SERPL-MCNC: 48 MG/DL — HIGH (ref 7–23)
CALCIUM SERPL-MCNC: 8.8 MG/DL — SIGNIFICANT CHANGE UP (ref 8.4–10.5)
CHLORIDE SERPL-SCNC: 100 MMOL/L — SIGNIFICANT CHANGE UP (ref 96–108)
CO2 SERPL-SCNC: 26 MMOL/L — SIGNIFICANT CHANGE UP (ref 22–31)
CREAT SERPL-MCNC: 1.19 MG/DL — SIGNIFICANT CHANGE UP (ref 0.5–1.3)
EGFR: 65 ML/MIN/1.73M2 — SIGNIFICANT CHANGE UP
GLUCOSE BLDC GLUCOMTR-MCNC: 123 MG/DL — HIGH (ref 70–99)
GLUCOSE BLDC GLUCOMTR-MCNC: 130 MG/DL — HIGH (ref 70–99)
GLUCOSE BLDC GLUCOMTR-MCNC: 136 MG/DL — HIGH (ref 70–99)
GLUCOSE BLDC GLUCOMTR-MCNC: 147 MG/DL — HIGH (ref 70–99)
GLUCOSE BLDC GLUCOMTR-MCNC: 163 MG/DL — HIGH (ref 70–99)
GLUCOSE SERPL-MCNC: 137 MG/DL — HIGH (ref 70–99)
HCT VFR BLD CALC: 31.4 % — LOW (ref 39–50)
HGB BLD-MCNC: 9.9 G/DL — LOW (ref 13–17)
MCHC RBC-ENTMCNC: 29.2 PG — SIGNIFICANT CHANGE UP (ref 27–34)
MCHC RBC-ENTMCNC: 31.5 GM/DL — LOW (ref 32–36)
MCV RBC AUTO: 92.6 FL — SIGNIFICANT CHANGE UP (ref 80–100)
NRBC # BLD: 0 /100 WBCS — SIGNIFICANT CHANGE UP (ref 0–0)
PLATELET # BLD AUTO: 382 K/UL — SIGNIFICANT CHANGE UP (ref 150–400)
POTASSIUM SERPL-MCNC: 5 MMOL/L — SIGNIFICANT CHANGE UP (ref 3.5–5.3)
POTASSIUM SERPL-SCNC: 5 MMOL/L — SIGNIFICANT CHANGE UP (ref 3.5–5.3)
PROT SERPL-MCNC: 5.8 G/DL — LOW (ref 6–8.3)
RBC # BLD: 3.39 M/UL — LOW (ref 4.2–5.8)
RBC # FLD: 14 % — SIGNIFICANT CHANGE UP (ref 10.3–14.5)
SODIUM SERPL-SCNC: 137 MMOL/L — SIGNIFICANT CHANGE UP (ref 135–145)
WBC # BLD: 22.4 K/UL — HIGH (ref 3.8–10.5)
WBC # FLD AUTO: 22.4 K/UL — HIGH (ref 3.8–10.5)

## 2022-12-18 PROCEDURE — 99232 SBSQ HOSP IP/OBS MODERATE 35: CPT

## 2022-12-18 RX ORDER — INSULIN LISPRO 100/ML
VIAL (ML) SUBCUTANEOUS
Refills: 0 | Status: DISCONTINUED | OUTPATIENT
Start: 2022-12-18 | End: 2022-12-21

## 2022-12-18 RX ORDER — INSULIN LISPRO 100/ML
VIAL (ML) SUBCUTANEOUS AT BEDTIME
Refills: 0 | Status: DISCONTINUED | OUTPATIENT
Start: 2022-12-18 | End: 2022-12-21

## 2022-12-18 RX ADMIN — Medication 81 MILLIGRAM(S): at 12:09

## 2022-12-18 RX ADMIN — Medication 3 MILLILITER(S): at 06:28

## 2022-12-18 RX ADMIN — Medication 50 MILLIGRAM(S): at 17:30

## 2022-12-18 RX ADMIN — Medication 3 MILLILITER(S): at 17:31

## 2022-12-18 RX ADMIN — Medication 50 MILLIGRAM(S): at 06:27

## 2022-12-18 RX ADMIN — CHLORHEXIDINE GLUCONATE 1 APPLICATION(S): 213 SOLUTION TOPICAL at 06:41

## 2022-12-18 RX ADMIN — Medication 40 MILLIGRAM(S): at 06:28

## 2022-12-18 RX ADMIN — BUDESONIDE AND FORMOTEROL FUMARATE DIHYDRATE 2 PUFF(S): 160; 4.5 AEROSOL RESPIRATORY (INHALATION) at 17:31

## 2022-12-18 RX ADMIN — MUPIROCIN 1 APPLICATION(S): 20 OINTMENT TOPICAL at 06:29

## 2022-12-18 RX ADMIN — HEPARIN SODIUM 5000 UNIT(S): 5000 INJECTION INTRAVENOUS; SUBCUTANEOUS at 06:35

## 2022-12-18 RX ADMIN — Medication 20 MILLIGRAM(S): at 21:13

## 2022-12-18 RX ADMIN — AZITHROMYCIN 255 MILLIGRAM(S): 500 TABLET, FILM COATED ORAL at 21:13

## 2022-12-18 RX ADMIN — CHLORHEXIDINE GLUCONATE 1 APPLICATION(S): 213 SOLUTION TOPICAL at 12:10

## 2022-12-18 RX ADMIN — AMLODIPINE BESYLATE 10 MILLIGRAM(S): 2.5 TABLET ORAL at 06:28

## 2022-12-18 RX ADMIN — Medication 20 MILLIGRAM(S): at 14:34

## 2022-12-18 RX ADMIN — HEPARIN SODIUM 5000 UNIT(S): 5000 INJECTION INTRAVENOUS; SUBCUTANEOUS at 14:32

## 2022-12-18 RX ADMIN — Medication 3 MILLILITER(S): at 12:15

## 2022-12-18 RX ADMIN — MUPIROCIN 1 APPLICATION(S): 20 OINTMENT TOPICAL at 17:31

## 2022-12-18 RX ADMIN — BUDESONIDE AND FORMOTEROL FUMARATE DIHYDRATE 2 PUFF(S): 160; 4.5 AEROSOL RESPIRATORY (INHALATION) at 06:29

## 2022-12-18 RX ADMIN — PANTOPRAZOLE SODIUM 40 MILLIGRAM(S): 20 TABLET, DELAYED RELEASE ORAL at 12:09

## 2022-12-18 RX ADMIN — CLOPIDOGREL BISULFATE 75 MILLIGRAM(S): 75 TABLET, FILM COATED ORAL at 12:09

## 2022-12-18 RX ADMIN — HEPARIN SODIUM 5000 UNIT(S): 5000 INJECTION INTRAVENOUS; SUBCUTANEOUS at 21:13

## 2022-12-18 NOTE — PROGRESS NOTE ADULT - PROBLEM SELECTOR PLAN 1
#hypoxic/hypercapnic respiratory failure 2/2 COPD exacerbation  - Likely exacerbated by hypertensive urgency  - POCUS 12/16 without volume overload  - S/p BIPAP in ICU. Currently on 1L NC; wean as tolerated   - c/w duonebs q6h  - Steroid taper per pulm - decrease to solumedrol 20mg IV q8h  - c/w empiric abx w/ azithromycin  -C/w pulmicort nebulizer BID  -C/w Singulair

## 2022-12-18 NOTE — PROGRESS NOTE ADULT - ASSESSMENT
The patient is a 71yo male with PMH of COPD/asthma, CAD (s/p PCI to pLCx and mLAD), HTN who presented with  worsening dyspnea, was being managed on the medicine floor for a COPD exacerbation thought to be secondary to a URI. Rapid response called today for acute hypoxic respiratory failure in the setting of hypertensive emergency and worsening leukocytosis    Recommendations:   - BP improved today as resp condition improves as well.   - On oral antihypertensives. Continue present medical regimen.   - Strict I and O. Diuretics as needed.     Patient is on cardiology consult service. Please call cardiology fellow with questions

## 2022-12-18 NOTE — PROGRESS NOTE ADULT - SUBJECTIVE AND OBJECTIVE BOX
SUBJECTIVE / OVERNIGHT EVENTS: Patient seen and examined at beside. On 1L NC. Says SOB has improved. Denies CP, n/v, abd pain, diarrhea.     ROS:  All other review of systems negative    Allergies    No Known Allergies    Intolerances        MEDICATIONS  (STANDING):  albuterol/ipratropium for Nebulization 3 milliLiter(s) Nebulizer every 4 hours  amLODIPine   Tablet 10 milliGRAM(s) Oral daily  aspirin enteric coated 81 milliGRAM(s) Oral daily  atorvastatin 80 milliGRAM(s) Oral at bedtime  budesonide  80 MICROgram(s)/formoterol 4.5 MICROgram(s) Inhaler 2 Puff(s) Inhalation two times a day  chlorhexidine 2% Cloths 1 Application(s) Topical daily  clopidogrel Tablet 75 milliGRAM(s) Oral daily  heparin   Injectable 5000 Unit(s) SubCutaneous every 8 hours  influenza  Vaccine (HIGH DOSE) 0.7 milliLiter(s) IntraMuscular once  metoprolol tartrate 25 milliGRAM(s) Oral two times a day  montelukast 10 milliGRAM(s) Oral daily  multivitamin/minerals/iron Oral Solution (CENTRUM) 15 milliLiter(s) Oral daily  pantoprazole    Tablet 40 milliGRAM(s) Oral before breakfast  predniSONE   Tablet 30 milliGRAM(s) Oral two times a day  tamsulosin 0.4 milliGRAM(s) Oral at bedtime    MEDICATIONS  (PRN):  benzonatate 100 milliGRAM(s) Oral three times a day PRN Cough      Vital Signs Last 24 Hrs  T(C): 36.8 (15 Dec 2022 11:22), Max: 36.8 (15 Dec 2022 11:22)  T(F): 98.2 (15 Dec 2022 11:22), Max: 98.2 (15 Dec 2022 11:22)  HR: 90 (15 Dec 2022 11:22) (82 - 92)  BP: 163/79 (15 Dec 2022 11:22) (140/72 - 163/79)  BP(mean): --  RR: 18 (15 Dec 2022 11:22) (18 - 18)  SpO2: 96% (15 Dec 2022 11:22) (96% - 100%)    Parameters below as of 15 Dec 2022 11:22  Patient On (Oxygen Delivery Method): nasal cannula  O2 Flow (L/min): 2    CAPILLARY BLOOD GLUCOSE        I&O's Summary    14 Dec 2022 07:01  -  15 Dec 2022 07:00  --------------------------------------------------------  IN: 740 mL / OUT: 1750 mL / NET: -1010 mL    15 Dec 2022 07:01  -  15 Dec 2022 12:37  --------------------------------------------------------  IN: 600 mL / OUT: 1050 mL / NET: -450 mL        PHYSICAL EXAM:  GENERAL: thin +1L Nc  HEAD:  Atraumatic, Normocephalic  EYES: EOMI, conjunctiva and sclera clear  NECK: Supple, No JVD  CHEST/LUNG: Clear to auscultation bilaterally; + mild expiratory wheeze  HEART: Regular rate and rhythm; No murmurs, rubs, or gallops  ABDOMEN: Soft, Nontender, Nondistended; Bowel sounds present  EXTREMITIES:  2+ Peripheral Pulses, No clubbing, cyanosis, or edema  NEUROLOGY: AAOx3, non-focal  PSYCH: calm  SKIN: No rashes or lesions      LABS:                         9.9    22.40 )-----------( 382      ( 18 Dec 2022 06:28 )             31.4     12-18    137  |  100  |  48<H>  ----------------------------<  137<H>  5.0   |  26  |  1.19    Ca    8.8      18 Dec 2022 06:28  Phos  4.1     12-17  Mg     2.1     12-17    TPro  5.8<L>  /  Alb  3.2<L>  /  TBili  0.2  /  DBili  x   /  AST  14  /  ALT  23  /  AlkPhos  41  12-18    PT/INR - ( 16 Dec 2022 17:27 )   PT: 11.9 sec;   INR: 1.03 ratio         PTT - ( 16 Dec 2022 17:27 )  PTT:24.5 sec  Urinalysis Basic - ( 16 Dec 2022 21:01 )    Color: Light Yellow / Appearance: Clear / S.017 / pH: x  Gluc: x / Ketone: Negative  / Bili: Negative / Urobili: Negative   Blood: x / Protein: Trace / Nitrite: Negative   Leuk Esterase: Negative / RBC: x / WBC x   Sq Epi: x / Non Sq Epi: x / Bacteria: x      CARDIAC MARKERS ( 17 Dec 2022 01:01 )  x     / x     / x     / x     / 19.4 ng/mL  CARDIAC MARKERS ( 16 Dec 2022 17:27 )  x     / x     / 275 U/L / x     / 17.6 ng/mL

## 2022-12-18 NOTE — PROGRESS NOTE ADULT - PROBLEM SELECTOR PLAN 4
RRT for worsening SOB, hypertensive urgency.   -Cardiology following - s/p nitro gtt in ICU. C/w amlodipine and metoprolol. Restart losartan pending improvement in renal function.

## 2022-12-18 NOTE — PROGRESS NOTE ADULT - SUBJECTIVE AND OBJECTIVE BOX
CARDIOLOGY NOTE    HPI: Feels better today.       PMHx:   COPD (chronic obstructive pulmonary disease)    Asthma    CAD (coronary artery disease)    BPH (benign prostatic hyperplasia)    GERD (gastroesophageal reflux disease)        PSHx:   No significant past surgical history        Allergies:  No Known Allergies      Current Medications:   MEDICATIONS  (STANDING):  albuterol/ipratropium for Nebulization 3 milliLiter(s) Nebulizer every 6 hours  amLODIPine   Tablet 10 milliGRAM(s) Oral daily  aspirin enteric coated 81 milliGRAM(s) Oral daily  azithromycin  IVPB 500 milliGRAM(s) IV Intermittent every 24 hours  budesonide  80 MICROgram(s)/formoterol 4.5 MICROgram(s) Inhaler 2 Puff(s) Inhalation two times a day  chlorhexidine 2% Cloths 1 Application(s) Topical daily  chlorhexidine 4% Liquid 1 Application(s) Topical <User Schedule>  clopidogrel Tablet 75 milliGRAM(s) Oral daily  dextrose 5%. 1000 milliLiter(s) (50 mL/Hr) IV Continuous <Continuous>  dextrose 5%. 1000 milliLiter(s) (100 mL/Hr) IV Continuous <Continuous>  dextrose 50% Injectable 25 Gram(s) IV Push once  dextrose 50% Injectable 12.5 Gram(s) IV Push once  dextrose 50% Injectable 25 Gram(s) IV Push once  glucagon  Injectable 1 milliGRAM(s) IntraMuscular once  heparin   Injectable 5000 Unit(s) SubCutaneous every 8 hours  influenza  Vaccine (HIGH DOSE) 0.7 milliLiter(s) IntraMuscular once  insulin lispro (ADMELOG) corrective regimen sliding scale   SubCutaneous three times a day before meals  insulin lispro (ADMELOG) corrective regimen sliding scale   SubCutaneous at bedtime  methylPREDNISolone sodium succinate Injectable 40 milliGRAM(s) IV Push every 8 hours  metoprolol tartrate 50 milliGRAM(s) Oral two times a day  mupirocin 2% Ointment 1 Application(s) Both Nostrils two times a day  pantoprazole  Injectable 40 milliGRAM(s) IV Push daily      FAMILY HISTORY:  FH: diabetes mellitus (Mother)    FH: HTN (hypertension) (Mother, Sibling)      REVIEW OF SYSTEMS:  Unable to provide complete ROS at this time but denies any chest pain, improved dyspnea     Physical Exam:  Vital Signs Last 24 Hrs  T(C): 36.3 (18 Dec 2022 11:22), Max: 36.9 (17 Dec 2022 13:00)  T(F): 97.4 (18 Dec 2022 11:22), Max: 98.4 (17 Dec 2022 13:00)  HR: 75 (18 Dec 2022 11:22) (65 - 97)  BP: 131/69 (18 Dec 2022 11:22) (124/63 - 196/83)  BP(mean): 98 (17 Dec 2022 22:00) (94 - 124)  RR: 18 (18 Dec 2022 11:22) (18 - 48)  SpO2: 96% (18 Dec 2022 11:22) (94% - 98%)    Parameters below as of 18 Dec 2022 11:22  Patient On (Oxygen Delivery Method): room air        Appearance: Moderate respiratory distress  Eyes: pink conjunctiva  HEENT: Normal oral mucosa  Cardiovascular: Tachycardic, S1, S2, no murmurs, rubs, or gallops; no edema; no JVD  Respiratory: Diminished breath sounds, on BiPAP, accessory resp muscle use  Gastrointestinal: soft, non-tender, non-distended   Musculoskeletal: No clubbing; no joint deformity   Neurologic: Normal speech, no facial asymmetry  Psychiatry: AAOx3, mood & affect appropriate  Skin: No rashes, ecchymoses, or cyanosis of exposed skin                             12.9   23.89 )-----------( 516      ( 16 Dec 2022 17:27 )             41.0     12-16    137  |  98  |  48<H>  ----------------------------<  156<H>  5.6<H>   |  25  |  1.19    Ca    9.6      16 Dec 2022 17:27  Phos  4.1     12-16  Mg     1.7     12-16    TPro  7.6  /  Alb  4.0  /  TBili  0.3  /  DBili  x   /  AST  27  /  ALT  29  /  AlkPhos  59  12-16    PT/INR - ( 16 Dec 2022 17:27 )   PT: 11.9 sec;   INR: 1.03 ratio         PTT - ( 16 Dec 2022 17:27 )  PTT:24.5 sec    CARDIAC MARKERS ( 16 Dec 2022 18:10 )  33 ng/L / x     / x     / x     / x     / x      CARDIAC MARKERS ( 16 Dec 2022 17:27 )  26 ng/L / x     / x     / 275 U/L / x     / 17.6 ng/mL  CARDIAC MARKERS ( 10 Dec 2022 00:36 )  27 ng/L / x     / x     / x     / x     / x            Serum Pro-Brain Natriuretic Peptide: 537 pg/mL (12-16 @ 18:10)  Serum Pro-Brain Natriuretic Peptide: 2641 pg/mL (12-10 @ 00:36)

## 2022-12-18 NOTE — PROGRESS NOTE ADULT - PROBLEM SELECTOR PLAN 2
WYMAN improving, Expiratory wheeze better today   - Monitor respiratory status  - Wean supplemental oxygen  - Tessalon perles for cough  - pulmonary following   - Steroids and nebs as above

## 2022-12-18 NOTE — PROGRESS NOTE ADULT - ASSESSMENT
73 y/o M COPD/asthma, HTN, CAD s/p PCI, BPH, GERD p/w worsening SOB a/w acute hypoxic respiratory failure, COPD exacerbation due to URI. Course c/b worsening SOB, flash pulmonary edema 2/2 hypertensive urgency requiring BIPAP and nitro gtt.

## 2022-12-19 LAB
ANION GAP SERPL CALC-SCNC: 11 MMOL/L — SIGNIFICANT CHANGE UP (ref 5–17)
BUN SERPL-MCNC: 38 MG/DL — HIGH (ref 7–23)
CALCIUM SERPL-MCNC: 8.8 MG/DL — SIGNIFICANT CHANGE UP (ref 8.4–10.5)
CHLORIDE SERPL-SCNC: 98 MMOL/L — SIGNIFICANT CHANGE UP (ref 96–108)
CO2 SERPL-SCNC: 27 MMOL/L — SIGNIFICANT CHANGE UP (ref 22–31)
CREAT SERPL-MCNC: 1.14 MG/DL — SIGNIFICANT CHANGE UP (ref 0.5–1.3)
EGFR: 68 ML/MIN/1.73M2 — SIGNIFICANT CHANGE UP
FERRITIN SERPL-MCNC: 83 NG/ML — SIGNIFICANT CHANGE UP (ref 30–400)
GLUCOSE BLDC GLUCOMTR-MCNC: 113 MG/DL — HIGH (ref 70–99)
GLUCOSE BLDC GLUCOMTR-MCNC: 115 MG/DL — HIGH (ref 70–99)
GLUCOSE BLDC GLUCOMTR-MCNC: 134 MG/DL — HIGH (ref 70–99)
GLUCOSE BLDC GLUCOMTR-MCNC: 162 MG/DL — HIGH (ref 70–99)
GLUCOSE SERPL-MCNC: 107 MG/DL — HIGH (ref 70–99)
HCT VFR BLD CALC: 31.2 % — LOW (ref 39–50)
HGB BLD-MCNC: 9.8 G/DL — LOW (ref 13–17)
IRON SATN MFR SERPL: 103 UG/DL — SIGNIFICANT CHANGE UP (ref 45–165)
IRON SATN MFR SERPL: 45 % — SIGNIFICANT CHANGE UP (ref 16–55)
MCHC RBC-ENTMCNC: 28.8 PG — SIGNIFICANT CHANGE UP (ref 27–34)
MCHC RBC-ENTMCNC: 31.4 GM/DL — LOW (ref 32–36)
MCV RBC AUTO: 91.8 FL — SIGNIFICANT CHANGE UP (ref 80–100)
NRBC # BLD: 0 /100 WBCS — SIGNIFICANT CHANGE UP (ref 0–0)
PLATELET # BLD AUTO: 431 K/UL — HIGH (ref 150–400)
POTASSIUM SERPL-MCNC: 4.8 MMOL/L — SIGNIFICANT CHANGE UP (ref 3.5–5.3)
POTASSIUM SERPL-SCNC: 4.8 MMOL/L — SIGNIFICANT CHANGE UP (ref 3.5–5.3)
RBC # BLD: 3.4 M/UL — LOW (ref 4.2–5.8)
RBC # FLD: 13.9 % — SIGNIFICANT CHANGE UP (ref 10.3–14.5)
SODIUM SERPL-SCNC: 136 MMOL/L — SIGNIFICANT CHANGE UP (ref 135–145)
TIBC SERPL-MCNC: 226 UG/DL — SIGNIFICANT CHANGE UP (ref 220–430)
UIBC SERPL-MCNC: 124 UG/DL — SIGNIFICANT CHANGE UP (ref 110–370)
WBC # BLD: 18.96 K/UL — HIGH (ref 3.8–10.5)
WBC # FLD AUTO: 18.96 K/UL — HIGH (ref 3.8–10.5)

## 2022-12-19 PROCEDURE — 93306 TTE W/DOPPLER COMPLETE: CPT | Mod: 26

## 2022-12-19 PROCEDURE — 99233 SBSQ HOSP IP/OBS HIGH 50: CPT | Mod: GC

## 2022-12-19 PROCEDURE — 99232 SBSQ HOSP IP/OBS MODERATE 35: CPT

## 2022-12-19 RX ORDER — MONTELUKAST 4 MG/1
10 TABLET, CHEWABLE ORAL DAILY
Refills: 0 | Status: DISCONTINUED | OUTPATIENT
Start: 2022-12-19 | End: 2022-12-21

## 2022-12-19 RX ORDER — LOSARTAN POTASSIUM 100 MG/1
50 TABLET, FILM COATED ORAL DAILY
Refills: 0 | Status: DISCONTINUED | OUTPATIENT
Start: 2022-12-20 | End: 2022-12-21

## 2022-12-19 RX ADMIN — Medication 81 MILLIGRAM(S): at 12:29

## 2022-12-19 RX ADMIN — MONTELUKAST 10 MILLIGRAM(S): 4 TABLET, CHEWABLE ORAL at 12:29

## 2022-12-19 RX ADMIN — HEPARIN SODIUM 5000 UNIT(S): 5000 INJECTION INTRAVENOUS; SUBCUTANEOUS at 14:43

## 2022-12-19 RX ADMIN — Medication 20 MILLIGRAM(S): at 06:17

## 2022-12-19 RX ADMIN — HEPARIN SODIUM 5000 UNIT(S): 5000 INJECTION INTRAVENOUS; SUBCUTANEOUS at 21:41

## 2022-12-19 RX ADMIN — Medication 50 MILLIGRAM(S): at 17:32

## 2022-12-19 RX ADMIN — Medication 3 MILLILITER(S): at 23:24

## 2022-12-19 RX ADMIN — CHLORHEXIDINE GLUCONATE 1 APPLICATION(S): 213 SOLUTION TOPICAL at 07:00

## 2022-12-19 RX ADMIN — CHLORHEXIDINE GLUCONATE 1 APPLICATION(S): 213 SOLUTION TOPICAL at 12:29

## 2022-12-19 RX ADMIN — Medication 3 MILLILITER(S): at 06:17

## 2022-12-19 RX ADMIN — Medication 3 MILLILITER(S): at 17:33

## 2022-12-19 RX ADMIN — MUPIROCIN 1 APPLICATION(S): 20 OINTMENT TOPICAL at 06:18

## 2022-12-19 RX ADMIN — CLOPIDOGREL BISULFATE 75 MILLIGRAM(S): 75 TABLET, FILM COATED ORAL at 12:28

## 2022-12-19 RX ADMIN — Medication 3 MILLILITER(S): at 01:00

## 2022-12-19 RX ADMIN — Medication 50 MILLIGRAM(S): at 06:18

## 2022-12-19 RX ADMIN — BUDESONIDE AND FORMOTEROL FUMARATE DIHYDRATE 2 PUFF(S): 160; 4.5 AEROSOL RESPIRATORY (INHALATION) at 06:19

## 2022-12-19 RX ADMIN — HEPARIN SODIUM 5000 UNIT(S): 5000 INJECTION INTRAVENOUS; SUBCUTANEOUS at 06:18

## 2022-12-19 RX ADMIN — MUPIROCIN 1 APPLICATION(S): 20 OINTMENT TOPICAL at 17:34

## 2022-12-19 RX ADMIN — Medication 30 MILLIGRAM(S): at 17:33

## 2022-12-19 RX ADMIN — Medication 1200 MILLIGRAM(S): at 17:32

## 2022-12-19 RX ADMIN — AMLODIPINE BESYLATE 10 MILLIGRAM(S): 2.5 TABLET ORAL at 06:18

## 2022-12-19 RX ADMIN — Medication 3 MILLILITER(S): at 12:29

## 2022-12-19 RX ADMIN — BUDESONIDE AND FORMOTEROL FUMARATE DIHYDRATE 2 PUFF(S): 160; 4.5 AEROSOL RESPIRATORY (INHALATION) at 17:34

## 2022-12-19 RX ADMIN — Medication 1: at 17:30

## 2022-12-19 RX ADMIN — PANTOPRAZOLE SODIUM 40 MILLIGRAM(S): 20 TABLET, DELAYED RELEASE ORAL at 12:28

## 2022-12-19 NOTE — PROGRESS NOTE ADULT - ASSESSMENT
73 y/o M with PMH of COPD/asthma, CAD s/p PCI, HTN. Presents with SOB, cough, wheezing x few days after being exposed to sick relative. RVP/COVID negative. CXR with hyperinflated lungs but no clear PNA. Pulmonary called to consult for COPD exacerbation.

## 2022-12-19 NOTE — PROGRESS NOTE ADULT - SUBJECTIVE AND OBJECTIVE BOX
CARDIOLOGY CONSULT PROGRESS NOTE  ANABELLE GOODSON  MRN-[id number]    INTERVAL EVENTS:  - NAEO, denies chest pain, sob, palpitations  - BP's improved, 120's/60's    ROS:  Negative, except as above.    MEDICATIONS  (STANDING):  albuterol/ipratropium for Nebulization 3 milliLiter(s) Nebulizer every 6 hours  amLODIPine   Tablet 10 milliGRAM(s) Oral daily  aspirin enteric coated 81 milliGRAM(s) Oral daily  budesonide  80 MICROgram(s)/formoterol 4.5 MICROgram(s) Inhaler 2 Puff(s) Inhalation two times a day  chlorhexidine 2% Cloths 1 Application(s) Topical daily  chlorhexidine 4% Liquid 1 Application(s) Topical <User Schedule>  clopidogrel Tablet 75 milliGRAM(s) Oral daily  dextrose 5%. 1000 milliLiter(s) (50 mL/Hr) IV Continuous <Continuous>  dextrose 5%. 1000 milliLiter(s) (100 mL/Hr) IV Continuous <Continuous>  dextrose 50% Injectable 25 Gram(s) IV Push once  dextrose 50% Injectable 12.5 Gram(s) IV Push once  dextrose 50% Injectable 25 Gram(s) IV Push once  glucagon  Injectable 1 milliGRAM(s) IntraMuscular once  guaiFENesin ER 1200 milliGRAM(s) Oral every 12 hours  heparin   Injectable 5000 Unit(s) SubCutaneous every 8 hours  influenza  Vaccine (HIGH DOSE) 0.7 milliLiter(s) IntraMuscular once  insulin lispro (ADMELOG) corrective regimen sliding scale   SubCutaneous three times a day before meals  insulin lispro (ADMELOG) corrective regimen sliding scale   SubCutaneous at bedtime  metoprolol tartrate 50 milliGRAM(s) Oral two times a day  montelukast 10 milliGRAM(s) Oral daily  mupirocin 2% Ointment 1 Application(s) Both Nostrils two times a day  pantoprazole  Injectable 40 milliGRAM(s) IV Push daily  predniSONE   Tablet 30 milliGRAM(s) Oral two times a day    MEDICATIONS  (PRN):  dextrose Oral Gel 15 Gram(s) Oral once PRN Blood Glucose LESS THAN 70 milliGRAM(s)/deciliter    Allergies    No Known Allergies    Intolerances      P/E:  Vital Signs Last 24 Hrs  T(C): 36.6 (19 Dec 2022 11:45), Max: 36.6 (19 Dec 2022 11:45)  T(F): 97.8 (19 Dec 2022 11:45), Max: 97.8 (19 Dec 2022 11:45)  HR: 78 (19 Dec 2022 11:45) (65 - 87)  BP: 121/69 (19 Dec 2022 11:45) (121/69 - 164/70)  BP(mean): --  RR: 18 (19 Dec 2022 11:45) (18 - 18)  SpO2: 96% (19 Dec 2022 11:45) (96% - 98%)    Parameters below as of 19 Dec 2022 11:45  Patient On (Oxygen Delivery Method): room air      Daily     Daily   I&O's Detail    18 Dec 2022 07:01  -  19 Dec 2022 07:00  --------------------------------------------------------  IN:  Total IN: 0 mL    OUT:    Voided (mL): 2700 mL  Total OUT: 2700 mL    Total NET: -2700 mL        I&O's Summary    18 Dec 2022 07:01  -  19 Dec 2022 07:00  --------------------------------------------------------  IN: 0 mL / OUT: 2700 mL / NET: -2700 mL      - gen: well appearing, laying in hospital bed, NAD  - HEENT: MMM, NCAT  - neck: no JVD, supple  - heart: RRR, nml S1/S2, no m/r/g  - lungs: CTABL, nml WOB, no w/c/r  - abd: soft, NTND, no r/g  - ext: WWP, no LE edema b/l    RELEVANT RECENT LABS/IMAGING/STUDIES:                        9.8    18.96 )-----------( 431      ( 19 Dec 2022 07:30 )             31.2     12-19    136  |  98  |  38<H>  ----------------------------<  107<H>  4.8   |  27  |  1.14    Ca    8.8      19 Dec 2022 07:30    TPro  5.8<L>  /  Alb  3.2<L>  /  TBili  0.2  /  DBili  x   /  AST  14  /  ALT  23  /  AlkPhos  41  12-18    LIVER FUNCTIONS - ( 18 Dec 2022 06:28 )  Alb: 3.2 g/dL / Pro: 5.8 g/dL / ALK PHOS: 41 U/L / ALT: 23 U/L / AST: 14 U/L / GGT: x             --------------------------------------        --------------------------------------    Culture - Blood (collected 16 Dec 2022 20:37)  Source: .Blood Blood-Peripheral  Preliminary Report (18 Dec 2022 01:02):    No growth to date.    Culture - Blood (collected 16 Dec 2022 17:10)  Source: .Blood Blood-Peripheral  Preliminary Report (17 Dec 2022 20:01):    No growth to date.

## 2022-12-19 NOTE — PROGRESS NOTE ADULT - ASSESSMENT
71 y/o M COPD/asthma, HTN, CAD s/p PCI, BPH, GERD p/w worsening SOB a/w acute hypoxic respiratory failure, COPD exacerbation due to URI. Course c/b worsening SOB, flash pulmonary edema 2/2 hypertensive urgency requiring BIPAP and nitro gtt.

## 2022-12-19 NOTE — PROGRESS NOTE ADULT - SUBJECTIVE AND OBJECTIVE BOX
Andre Reyes, M.D.  Pager: [phone]  Office: [phone]    Patient is a 72y old  Male who presents with a chief complaint of worsening SOB (19 Dec 2022 10:28)          SUBJECTIVE / OVERNIGHT EVENTS:    No acute overnight events.    ROS: ( - ) Fever, ( - )Chills,  ( - )Nausea/Vomiting, ( - ) Cough, ( - )Shortness of breath, ( - )Chest Pain    MEDICATIONS  (STANDING):  albuterol/ipratropium for Nebulization 3 milliLiter(s) Nebulizer every 6 hours  amLODIPine   Tablet 10 milliGRAM(s) Oral daily  aspirin enteric coated 81 milliGRAM(s) Oral daily  budesonide  80 MICROgram(s)/formoterol 4.5 MICROgram(s) Inhaler 2 Puff(s) Inhalation two times a day  chlorhexidine 2% Cloths 1 Application(s) Topical daily  chlorhexidine 4% Liquid 1 Application(s) Topical <User Schedule>  clopidogrel Tablet 75 milliGRAM(s) Oral daily  dextrose 5%. 1000 milliLiter(s) (50 mL/Hr) IV Continuous <Continuous>  dextrose 5%. 1000 milliLiter(s) (100 mL/Hr) IV Continuous <Continuous>  dextrose 50% Injectable 25 Gram(s) IV Push once  dextrose 50% Injectable 12.5 Gram(s) IV Push once  dextrose 50% Injectable 25 Gram(s) IV Push once  glucagon  Injectable 1 milliGRAM(s) IntraMuscular once  guaiFENesin ER 1200 milliGRAM(s) Oral every 12 hours  heparin   Injectable 5000 Unit(s) SubCutaneous every 8 hours  influenza  Vaccine (HIGH DOSE) 0.7 milliLiter(s) IntraMuscular once  insulin lispro (ADMELOG) corrective regimen sliding scale   SubCutaneous three times a day before meals  insulin lispro (ADMELOG) corrective regimen sliding scale   SubCutaneous at bedtime  methylPREDNISolone sodium succinate Injectable 20 milliGRAM(s) IV Push every 8 hours  metoprolol tartrate 50 milliGRAM(s) Oral two times a day  montelukast 10 milliGRAM(s) Oral daily  mupirocin 2% Ointment 1 Application(s) Both Nostrils two times a day  pantoprazole  Injectable 40 milliGRAM(s) IV Push daily    MEDICATIONS  (PRN):  dextrose Oral Gel 15 Gram(s) Oral once PRN Blood Glucose LESS THAN 70 milliGRAM(s)/deciliter          T(C): 36.3 (12-19 @ 05:07), Max: 36.3 (12-18 @ 11:22)   HR: 66   BP: 127/69   RR: 18   SpO2: 98%    PHYSICAL EXAM:    CONSTITUTIONAL: NAD, well-developed, well-groomed  EYES: PERRLA; conjunctiva and sclera clear  ENMT: Moist oral mucosa, no pharyngeal injection or exudates; normal dentition  NECK: Supple, no palpable masses; no thyromegaly  RESPIRATORY: Normal respiratory effort; lungs are clear to auscultation bilaterally  CARDIOVASCULAR: Regular rate and rhythm, normal S1 and S2, no murmur/rub/gallop; No lower extremity edema; Peripheral pulses are 2+ bilaterally  ABDOMEN: Nontender to palpation, normoactive bowel sounds, no rebound/guarding; No hepatosplenomegaly  MUSCULOSKELETAL:  no clubbing or cyanosis of digits; no joint swelling or tenderness to palpation  PSYCH: A+O to person, place, and time; affect appropriate  NEUROLOGY: CN 2-12 are intact and symmetric; no gross sensory deficits   SKIN: No rashes; no palpable lesions      LABS:                        9.8    18.96 )-----------( 431      ( 19 Dec 2022 07:30 )             31.2      12-19    136  |  98  |  38<H>  ----------------------------<  107<H>  4.8   |  27  |  1.14    Ca    8.8      19 Dec 2022 07:30    TPro  5.8<L>  /  Alb  3.2<L>  /  TBili  0.2  /  DBili  x   /  AST  14  /  ALT  23  /  AlkPhos  41  12-18       CAPILLARY BLOOD GLUCOSE      POCT Blood Glucose.: 115 mg/dL (19 Dec 2022 08:00)  POCT Blood Glucose.: 163 mg/dL (18 Dec 2022 21:44)  POCT Blood Glucose.: 147 mg/dL (18 Dec 2022 16:49)  POCT Blood Glucose.: 136 mg/dL (18 Dec 2022 12:07)      RADIOLOGY & ADDITIONAL TESTS:    Imaging Personally Reviewed:  Consultant(s) Notes Reviewed:    Care Discussed with Consultants/Other Providers:   Andre Reyes, M.D.  Pager: [phone]  Office: [phone]    Patient is a 72y old  Male who presents with a chief complaint of worsening SOB (19 Dec 2022 10:28)          SUBJECTIVE / OVERNIGHT EVENTS:    No acute overnight events.  Feels much better today.     ROS: ( - ) Fever, ( - )Chills,  ( - )Nausea/Vomiting, ( - ) Cough, ( - )Shortness of breath, ( - )Chest Pain    MEDICATIONS  (STANDING):  albuterol/ipratropium for Nebulization 3 milliLiter(s) Nebulizer every 6 hours  amLODIPine   Tablet 10 milliGRAM(s) Oral daily  aspirin enteric coated 81 milliGRAM(s) Oral daily  budesonide  80 MICROgram(s)/formoterol 4.5 MICROgram(s) Inhaler 2 Puff(s) Inhalation two times a day  chlorhexidine 2% Cloths 1 Application(s) Topical daily  chlorhexidine 4% Liquid 1 Application(s) Topical <User Schedule>  clopidogrel Tablet 75 milliGRAM(s) Oral daily  dextrose 5%. 1000 milliLiter(s) (50 mL/Hr) IV Continuous <Continuous>  dextrose 5%. 1000 milliLiter(s) (100 mL/Hr) IV Continuous <Continuous>  dextrose 50% Injectable 25 Gram(s) IV Push once  dextrose 50% Injectable 12.5 Gram(s) IV Push once  dextrose 50% Injectable 25 Gram(s) IV Push once  glucagon  Injectable 1 milliGRAM(s) IntraMuscular once  guaiFENesin ER 1200 milliGRAM(s) Oral every 12 hours  heparin   Injectable 5000 Unit(s) SubCutaneous every 8 hours  influenza  Vaccine (HIGH DOSE) 0.7 milliLiter(s) IntraMuscular once  insulin lispro (ADMELOG) corrective regimen sliding scale   SubCutaneous three times a day before meals  insulin lispro (ADMELOG) corrective regimen sliding scale   SubCutaneous at bedtime  methylPREDNISolone sodium succinate Injectable 20 milliGRAM(s) IV Push every 8 hours  metoprolol tartrate 50 milliGRAM(s) Oral two times a day  montelukast 10 milliGRAM(s) Oral daily  mupirocin 2% Ointment 1 Application(s) Both Nostrils two times a day  pantoprazole  Injectable 40 milliGRAM(s) IV Push daily    MEDICATIONS  (PRN):  dextrose Oral Gel 15 Gram(s) Oral once PRN Blood Glucose LESS THAN 70 milliGRAM(s)/deciliter          T(C): 36.3 (12-19 @ 05:07), Max: 36.3 (12-18 @ 11:22)   HR: 66   BP: 127/69   RR: 18   SpO2: 98%    PHYSICAL EXAM:    CONSTITUTIONAL: NAD, well-developed, well-groomed  EYES: PERRLA; conjunctiva and sclera clear  ENMT: Moist oral mucosa, no pharyngeal injection or exudates; normal dentition  NECK: Supple, no palpable masses; no thyromegaly  RESPIRATORY: Normal respiratory effort; +Rhonchi + Wheezing  CARDIOVASCULAR: Regular rate and rhythm, normal S1 and S2, no murmur/rub/gallop; No lower extremity edema; Peripheral pulses are 2+ bilaterally  ABDOMEN: Nontender to palpation, normoactive bowel sounds, no rebound/guarding; No hepatosplenomegaly  MUSCULOSKELETAL:  no clubbing or cyanosis of digits; no joint swelling or tenderness to palpation  PSYCH: A+O to person, place, and time; affect appropriate  NEUROLOGY: CN 2-12 are intact and symmetric; no gross sensory deficits   SKIN: No rashes; no palpable lesions      LABS:                        9.8    18.96 )-----------( 431      ( 19 Dec 2022 07:30 )             31.2      12-19    136  |  98  |  38<H>  ----------------------------<  107<H>  4.8   |  27  |  1.14    Ca    8.8      19 Dec 2022 07:30    TPro  5.8<L>  /  Alb  3.2<L>  /  TBili  0.2  /  DBili  x   /  AST  14  /  ALT  23  /  AlkPhos  41  12-18       CAPILLARY BLOOD GLUCOSE      POCT Blood Glucose.: 115 mg/dL (19 Dec 2022 08:00)  POCT Blood Glucose.: 163 mg/dL (18 Dec 2022 21:44)  POCT Blood Glucose.: 147 mg/dL (18 Dec 2022 16:49)  POCT Blood Glucose.: 136 mg/dL (18 Dec 2022 12:07)      RADIOLOGY & ADDITIONAL TESTS:    Imaging Personally Reviewed:  Consultant(s) Notes Reviewed:    Care Discussed with Consultants/Other Providers:

## 2022-12-19 NOTE — PROGRESS NOTE ADULT - PROBLEM SELECTOR PLAN 4
RRT for worsening SOB, hypertensive urgency.   - Cardiology following - s/p nitro gtt in ICU.   - C/w amlodipine and metoprolol.   - will restart losartan 50mg starting tomorrow.

## 2022-12-19 NOTE — PROGRESS NOTE ADULT - PROBLEM SELECTOR PLAN 3
Cr improved  Creatinine Trend: 1.14<--, 1.19<--, 1.31<--, 1.36<--, 1.19<--, 1.31<--  FeNA: pre-renal  s/p IVF  renal u/s ordered unremarkable   Nephrology rec noted

## 2022-12-19 NOTE — PROGRESS NOTE ADULT - ATTENDING COMMENTS
Agree with fellow's assessment above.
72M pmh COPD/asthma (not on home O2, last exacerbation 1y ago october), CAD (s/p PCI), HTN p/w worsening SOB on 12/10/22 with concern for COPD exacerbation and hypertensive urgency. During hospitalization, patient treated with steroids and duonebs. Course complicated by OWEN with Cr up to >3 (baseline ~0.7), thought to be 2/2 pre-renal etiology vs hypertensive urgency. Patient was reportedly weaned down on oxygen and set to have tentative discharge today. Kept due to rising white count on AM labs.  RRT called 12/16/22 for hypoxia, increased WOB requiring escalation to BIPAP. Per pt states he was walking and moving around ( weaned off o2 today) when he suddenly became very short of breath. Pt noted to desaturate to high 80s on RA. RR was called and pt placed on bipap. BP noted to be elevated and lopressor 5mgx1, hydralazinex1 and labetalol x1 given.    Noted to have COPD exacerbation and also flash pulmonary edema cliniclaly precipitated by hypertension. Responded well to nitroglycerin infusion and bronchodilators. TItrate off nitro drip and started back on long acting anti hypertensives. Continue to optimize COPD regimen. PT/OT. Zithromax for antiinflammatory effect. Heparin for dvt ppx     Critically ill patient with frequent bedside visits. Patient seen and examined on rounds and plan of care discussed with team.
Pt. with OWEN In the setting of COPD exacerbation and hypertensive crises on admission, now with improvement in BP. OWEN, likely hemodynamically mediated in the setting of large variation in BP.   UA bland and renal and bladder US do not show any evidence of obstructive uropathy.   Scr improved with IVF and patient remains non oliguric.    Continue to hold ARB.   Encourage pO hydration.   Consider switching PPI to H2 blocker, if feasible.   Monitor BMP. Strict I/Os. Avoid nephrotoxins.

## 2022-12-19 NOTE — PROGRESS NOTE ADULT - PROBLEM SELECTOR PLAN 1
Pt with COPD/emphysema, asthma overlap  -Initially with B/l expiratory wheezes on exam, also with + forced end expiratory wheeze  -Wheezing & dyspnea had been improving. S/p IV steroids & transitioned to PO Prednisone taper  -S/p RRT 12/16 for increased WOB in the setting of hypertensive emergency. Decreased BS on exam, tx to MICU for further management. CTA pending to r/o PE.   -Now tx from MICU to floors. Dyspnea improving  -Keep sats >90% with O2 PRN (currently 1LNC). Wean O2 as tolerated. Pt with home o2 from prior hospital admission  -C/w Solumedrol 20 mg IVP q8h for now, will likely transition to PO Prednisone later today.   -C/w Symbicort 160/4.5 mcg 2 puffs BID  -C/w Singulair  -C/w Duoneb q6h  -Resume Mucinex 1200 mg PO BID x5 days for congested cough.

## 2022-12-19 NOTE — PROGRESS NOTE ADULT - PROBLEM SELECTOR PLAN 2
S/p RRT for /96, tx to MICU for further management  -S/p nitro drip  -BP now more controlled  -Cards f/u.

## 2022-12-19 NOTE — PROGRESS NOTE ADULT - PROBLEM SELECTOR PLAN 3
Uptrending WBC count 12/16  -S/p RRT, ? reactive vs 2nd to steroids  -CXR with no clear infiltrate, non-toxic appearing  -Monitor trend.

## 2022-12-19 NOTE — PROGRESS NOTE ADULT - ASSESSMENT
73yo male with PMH of COPD/asthma, CAD (s/p PCI to pLCx and mLAD), HTN who presented with worsening dyspnea, found to have hypoxia 2/2 hypertensive emergency, s/p MICU, now BP's improved and on oral medications.     Recommendations:   - continue current antihypertensive medications  - cardiology to sign off, please call with any questions    Andrew Menezes MD  Cardiology Fellow - PGY4    For all New Consults and Questions:  [email or web address]   Login: [email or web address]    *** Recommendations are preliminary until cosigned by the attending.

## 2022-12-19 NOTE — PROGRESS NOTE ADULT - PROBLEM SELECTOR PLAN 2
clinically improving  - Monitor respiratory status  - Wean supplemental oxygen  - Tessalon perles for cough  - pulmonary following   - Steroids and nebs as above

## 2022-12-19 NOTE — PROGRESS NOTE ADULT - SUBJECTIVE AND OBJECTIVE BOX
Follow-up Pulm Progress Note    Tx from MICU to floors  O2 sats 98% on 1LNC  Dyspnea much improved, denies CP    Medications:  MEDICATIONS  (STANDING):  albuterol/ipratropium for Nebulization 3 milliLiter(s) Nebulizer every 6 hours  amLODIPine   Tablet 10 milliGRAM(s) Oral daily  aspirin enteric coated 81 milliGRAM(s) Oral daily  budesonide  80 MICROgram(s)/formoterol 4.5 MICROgram(s) Inhaler 2 Puff(s) Inhalation two times a day  chlorhexidine 2% Cloths 1 Application(s) Topical daily  chlorhexidine 4% Liquid 1 Application(s) Topical <User Schedule>  clopidogrel Tablet 75 milliGRAM(s) Oral daily  dextrose 5%. 1000 milliLiter(s) (50 mL/Hr) IV Continuous <Continuous>  dextrose 5%. 1000 milliLiter(s) (100 mL/Hr) IV Continuous <Continuous>  dextrose 50% Injectable 25 Gram(s) IV Push once  dextrose 50% Injectable 12.5 Gram(s) IV Push once  dextrose 50% Injectable 25 Gram(s) IV Push once  glucagon  Injectable 1 milliGRAM(s) IntraMuscular once  heparin   Injectable 5000 Unit(s) SubCutaneous every 8 hours  influenza  Vaccine (HIGH DOSE) 0.7 milliLiter(s) IntraMuscular once  insulin lispro (ADMELOG) corrective regimen sliding scale   SubCutaneous three times a day before meals  insulin lispro (ADMELOG) corrective regimen sliding scale   SubCutaneous at bedtime  methylPREDNISolone sodium succinate Injectable 20 milliGRAM(s) IV Push every 8 hours  metoprolol tartrate 50 milliGRAM(s) Oral two times a day  mupirocin 2% Ointment 1 Application(s) Both Nostrils two times a day  pantoprazole  Injectable 40 milliGRAM(s) IV Push daily    MEDICATIONS  (PRN):  dextrose Oral Gel 15 Gram(s) Oral once PRN Blood Glucose LESS THAN 70 milliGRAM(s)/deciliter    Vital Signs Last 24 Hrs  T(C): 36.3 (19 Dec 2022 05:07), Max: 36.3 (18 Dec 2022 11:22)  T(F): 97.4 (19 Dec 2022 05:07), Max: 97.4 (18 Dec 2022 11:22)  HR: 66 (19 Dec 2022 05:07) (65 - 87)  BP: 127/69 (19 Dec 2022 05:07) (127/69 - 164/70)  BP(mean): --  RR: 18 (19 Dec 2022 05:07) (18 - 18)  SpO2: 98% (19 Dec 2022 05:07) (96% - 98%)    Parameters below as of 19 Dec 2022 05:07  Patient On (Oxygen Delivery Method): room air    12-18 @ 07:01  -  12-19 @ 07:00  --------------------------------------------------------  IN: 0 mL / OUT: 2700 mL / NET: -2700 mL      LABS:                        9.8    18.96 )-----------( 431      ( 19 Dec 2022 07:30 )             31.2     12-19    136  |  98  |  38<H>  ----------------------------<  107<H>  4.8   |  27  |  1.14    Ca    8.8      19 Dec 2022 07:30    TPro  5.8<L>  /  Alb  3.2<L>  /  TBili  0.2  /  DBili  x   /  AST  14  /  ALT  23  /  AlkPhos  41  12-18    CAPILLARY BLOOD GLUCOSE  POCT Blood Glucose.: 115 mg/dL (19 Dec 2022 08:00)    Serum Pro-Brain Natriuretic Peptide: 806 pg/mL (12-17-22 @ 01:01)  Serum Pro-Brain Natriuretic Peptide: 537 pg/mL (12-16-22 @ 18:10)  Serum Pro-Brain Natriuretic Peptide: 422 pg/mL (12-16-22 @ 17:27)    CULTURES:     Culture - Blood (collected 12-16-22 @ 20:37)  Source: .Blood Blood-Peripheral  Preliminary Report (12-18-22 @ 01:02):    No growth to date.    Culture - Blood (collected 12-16-22 @ 17:10)  Source: .Blood Blood-Peripheral  Preliminary Report (12-17-22 @ 20:01):    No growth to date.    Physical Examination:  PULM: bibasilar rhonchi   CVS: S1, S2 heard    RADIOLOGY REVIEWED  CXR: 12/16 grossly clear

## 2022-12-19 NOTE — PROGRESS NOTE ADULT - PROBLEM SELECTOR PLAN 1
#hypoxic/hypercapnic respiratory failure 2/2 COPD exacerbation  - Likely exacerbated by hypertensive urgency  - POCUS 12/16 without volume overload  - S/p BIPAP in ICU. Currently on 1L NC; will wean off of oxygen today  - c/w duonebs q6h  - will transition to oral steroids  - c/w empiric abx w/ azithromycin  -C/w pulmicort nebulizer BID  -C/w Singulair  - add an acapella for airway clearance assistance

## 2022-12-20 ENCOUNTER — TRANSCRIPTION ENCOUNTER (OUTPATIENT)
Age: 72
End: 2022-12-20

## 2022-12-20 LAB
GLUCOSE BLDC GLUCOMTR-MCNC: 115 MG/DL — HIGH (ref 70–99)
GLUCOSE BLDC GLUCOMTR-MCNC: 127 MG/DL — HIGH (ref 70–99)
GLUCOSE BLDC GLUCOMTR-MCNC: 134 MG/DL — HIGH (ref 70–99)
GLUCOSE BLDC GLUCOMTR-MCNC: 185 MG/DL — HIGH (ref 70–99)

## 2022-12-20 PROCEDURE — 99232 SBSQ HOSP IP/OBS MODERATE 35: CPT

## 2022-12-20 RX ADMIN — MONTELUKAST 10 MILLIGRAM(S): 4 TABLET, CHEWABLE ORAL at 11:33

## 2022-12-20 RX ADMIN — Medication 1200 MILLIGRAM(S): at 05:15

## 2022-12-20 RX ADMIN — HEPARIN SODIUM 5000 UNIT(S): 5000 INJECTION INTRAVENOUS; SUBCUTANEOUS at 05:17

## 2022-12-20 RX ADMIN — HEPARIN SODIUM 5000 UNIT(S): 5000 INJECTION INTRAVENOUS; SUBCUTANEOUS at 13:38

## 2022-12-20 RX ADMIN — CLOPIDOGREL BISULFATE 75 MILLIGRAM(S): 75 TABLET, FILM COATED ORAL at 11:33

## 2022-12-20 RX ADMIN — BUDESONIDE AND FORMOTEROL FUMARATE DIHYDRATE 2 PUFF(S): 160; 4.5 AEROSOL RESPIRATORY (INHALATION) at 05:18

## 2022-12-20 RX ADMIN — Medication 3 MILLILITER(S): at 11:33

## 2022-12-20 RX ADMIN — MUPIROCIN 1 APPLICATION(S): 20 OINTMENT TOPICAL at 05:17

## 2022-12-20 RX ADMIN — Medication 1200 MILLIGRAM(S): at 17:25

## 2022-12-20 RX ADMIN — CHLORHEXIDINE GLUCONATE 1 APPLICATION(S): 213 SOLUTION TOPICAL at 11:37

## 2022-12-20 RX ADMIN — Medication 50 MILLIGRAM(S): at 05:14

## 2022-12-20 RX ADMIN — Medication 30 MILLIGRAM(S): at 17:25

## 2022-12-20 RX ADMIN — LOSARTAN POTASSIUM 50 MILLIGRAM(S): 100 TABLET, FILM COATED ORAL at 05:56

## 2022-12-20 RX ADMIN — Medication 30 MILLIGRAM(S): at 05:16

## 2022-12-20 RX ADMIN — CHLORHEXIDINE GLUCONATE 1 APPLICATION(S): 213 SOLUTION TOPICAL at 06:56

## 2022-12-20 RX ADMIN — Medication 3 MILLILITER(S): at 17:25

## 2022-12-20 RX ADMIN — MUPIROCIN 1 APPLICATION(S): 20 OINTMENT TOPICAL at 17:27

## 2022-12-20 RX ADMIN — BUDESONIDE AND FORMOTEROL FUMARATE DIHYDRATE 2 PUFF(S): 160; 4.5 AEROSOL RESPIRATORY (INHALATION) at 17:26

## 2022-12-20 RX ADMIN — Medication 50 MILLIGRAM(S): at 17:25

## 2022-12-20 RX ADMIN — AMLODIPINE BESYLATE 10 MILLIGRAM(S): 2.5 TABLET ORAL at 05:14

## 2022-12-20 RX ADMIN — Medication 3 MILLILITER(S): at 05:15

## 2022-12-20 RX ADMIN — Medication 81 MILLIGRAM(S): at 11:33

## 2022-12-20 RX ADMIN — PANTOPRAZOLE SODIUM 40 MILLIGRAM(S): 20 TABLET, DELAYED RELEASE ORAL at 11:33

## 2022-12-20 NOTE — PROGRESS NOTE ADULT - PROBLEM SELECTOR PLAN 1
Pt with COPD/emphysema, asthma overlap  -Initially with B/l expiratory wheezes on exam, also with + forced end expiratory wheeze  -Wheezing & dyspnea had been improving. S/p IV steroids & transitioned to PO Prednisone taper  -S/p RRT 12/16 for increased WOB in the setting of hypertensive emergency. Decreased BS on exam, tx to MICU for further management  -Now tx from MICU to floors. Dyspnea improving  -Keep sats >90% with O2 PRN. Tolerating RA today. Pt with home o2 from prior hospital admission  -Transitioned from IV steroids to PO Prednisone again 12/19. C/w Prednisone taper as follows: Prednisone 60 mg PO qd x4 more days, then Prednisone 40 mg PO qd x5 days, then Prednisone 30 mg PO qd x5 days, then Prednisone 20 mg PO qd x5 days, then Prednisone 10 mg PO qd. Continue 10 mg PO qd until outpatient f/u in office or with primary pulm Dr. Bajwa.   -C/w Symbicort 160/4.5 mcg 2 puffs BID  -C/w Singulair  -C/w Duoneb q6h  -Mucinex 1200 mg PO BID x5 days for congested cough. Pt with COPD/emphysema, asthma overlap  -Initially with B/l expiratory wheezes on exam, also with + forced end expiratory wheeze  -Wheezing & dyspnea had been improving. S/p IV steroids & transitioned to PO Prednisone taper  -S/p RRT 12/16 for increased WOB in the setting of hypertensive emergency. Decreased BS on exam, tx to MICU for further management  -Now tx from MICU to floors. Dyspnea improving  -Keep sats >90% with O2 PRN. Tolerating RA today. Pt with home o2 from prior hospital admission  -C/w Symbicort 160/4.5 mcg 2 puffs BID  -C/w Singulair  -C/w Duoneb q6h  -Mucinex 1200 mg PO BID x5 days for congested cough  -Transitioned from IV steroids to PO Prednisone again 12/19. C/w Prednisone taper as follows: Prednisone 60 mg PO qd x4 more days, then Prednisone 40 mg PO qd x5 days, then Prednisone 30 mg PO qd x5 days, then Prednisone 20 mg PO qd x5 days, then Prednisone 10 mg PO qd. Continue 10 mg PO qd until outpatient f/u in office or with primary pulm Dr. Bajwa.   -D/c planning per primary team.

## 2022-12-20 NOTE — PROGRESS NOTE ADULT - SUBJECTIVE AND OBJECTIVE BOX
Follow-up Pulm Progress Note    Dyspnea improving  O2 sats 96% on RA at rest  Denies CP  Congested cough improving with Mucinex    Medications:  MEDICATIONS  (STANDING):  albuterol/ipratropium for Nebulization 3 milliLiter(s) Nebulizer every 6 hours  amLODIPine   Tablet 10 milliGRAM(s) Oral daily  aspirin enteric coated 81 milliGRAM(s) Oral daily  budesonide  80 MICROgram(s)/formoterol 4.5 MICROgram(s) Inhaler 2 Puff(s) Inhalation two times a day  chlorhexidine 2% Cloths 1 Application(s) Topical daily  chlorhexidine 4% Liquid 1 Application(s) Topical <User Schedule>  clopidogrel Tablet 75 milliGRAM(s) Oral daily  dextrose 5%. 1000 milliLiter(s) (100 mL/Hr) IV Continuous <Continuous>  dextrose 5%. 1000 milliLiter(s) (50 mL/Hr) IV Continuous <Continuous>  dextrose 50% Injectable 25 Gram(s) IV Push once  dextrose 50% Injectable 12.5 Gram(s) IV Push once  dextrose 50% Injectable 25 Gram(s) IV Push once  glucagon  Injectable 1 milliGRAM(s) IntraMuscular once  guaiFENesin ER 1200 milliGRAM(s) Oral every 12 hours  heparin   Injectable 5000 Unit(s) SubCutaneous every 8 hours  influenza  Vaccine (HIGH DOSE) 0.7 milliLiter(s) IntraMuscular once  insulin lispro (ADMELOG) corrective regimen sliding scale   SubCutaneous three times a day before meals  insulin lispro (ADMELOG) corrective regimen sliding scale   SubCutaneous at bedtime  losartan 50 milliGRAM(s) Oral daily  metoprolol tartrate 50 milliGRAM(s) Oral two times a day  montelukast 10 milliGRAM(s) Oral daily  mupirocin 2% Ointment 1 Application(s) Both Nostrils two times a day  pantoprazole  Injectable 40 milliGRAM(s) IV Push daily  predniSONE   Tablet 30 milliGRAM(s) Oral two times a day    MEDICATIONS  (PRN):  dextrose Oral Gel 15 Gram(s) Oral once PRN Blood Glucose LESS THAN 70 milliGRAM(s)/deciliter    Vital Signs Last 24 Hrs  T(C): 36.7 (20 Dec 2022 11:58), Max: 36.7 (19 Dec 2022 17:38)  T(F): 98 (20 Dec 2022 11:58), Max: 98 (19 Dec 2022 17:38)  HR: 88 (20 Dec 2022 11:58) (71 - 100)  BP: 138/68 (20 Dec 2022 11:58) (123/64 - 147/70)  BP(mean): --  RR: 18 (20 Dec 2022 11:58) (18 - 18)  SpO2: 98% (20 Dec 2022 11:58) (94% - 98%)    Parameters below as of 20 Dec 2022 11:58  Patient On (Oxygen Delivery Method): room air        12-19 @ 07:01  -  12-20 @ 07:00  --------------------------------------------------------  IN: 0 mL / OUT: 1250 mL / NET: -1250 mL      LABS:                        9.8    18.96 )-----------( 431      ( 19 Dec 2022 07:30 )             31.2     12-19    136  |  98  |  38<H>  ----------------------------<  107<H>  4.8   |  27  |  1.14    Ca    8.8      19 Dec 2022 07:30        CAPILLARY BLOOD GLUCOSE  POCT Blood Glucose.: 134 mg/dL (20 Dec 2022 12:46)        CULTURES:    Culture - Blood (collected 12-16-22 @ 20:37)  Source: .Blood Blood-Peripheral  Preliminary Report (12-18-22 @ 01:02):    No growth to date.    Culture - Blood (collected 12-16-22 @ 17:10)  Source: .Blood Blood-Peripheral  Preliminary Report (12-17-22 @ 20:01):    No growth to date.    Physical Examination:  PULM: bibasilar rhonchi  CVS: S1, S2 heard    RADIOLOGY REVIEWED  CXR: grossly clear

## 2022-12-20 NOTE — DISCHARGE NOTE PROVIDER - HOSPITAL COURSE
71 y/o M COPD/asthma, HTN, CAD s/p PCI, BPH, GERD p/w worsening SOB a/w acute hypoxic respiratory failure, COPD exacerbation due to URI. Course c/b worsening SOB, flash pulmonary edema 2/2 hypertensive urgency requiring BIPAP and nitro gtt.        Problem/Plan - 1:  ·  Problem: Acute respiratory failure with hypoxia.   ·  Plan: #hypoxic/hypercapnic respiratory failure 2/2 COPD exacerbation  - Likely exacerbated by hypertensive urgency  - POCUS 12/16 without volume overload  - S/p BIPAP in ICU. Currently on room air and saturating well   - c/w duonebs q6h  - on oral steroids, will taper upon d/c   - s/p abx   -C/w pulmicort nebulizer BID  -C/w Singulair  - add an acapella for airway clearance assistance.     Problem/Plan - 2:  ·  Problem: Asthma exacerbation in COPD.   ·  Plan: clinically improving  - Monitor respiratory status  - Wean supplemental oxygen  - Tessalon perles for cough  - pulmonary following   - Steroids and nebs as above.     Problem/Plan - 3:  ·  Problem: OWEN (acute kidney injury).   ·  Plan: Cr improved  Creatinine Trend: 1.14<--, 1.19<--, 1.31<--, 1.36<--, 1.19<--, 1.31<--  FeNA: pre-renal  s/p IVF  renal u/s ordered unremarkable     Problem/Plan - 4:  ·  Problem: HTN (hypertension).   ·  Plan: RRT for worsening SOB, hypertensive urgency.   - Cardiology following - s/p nitro gtt in ICU.   - C/w amlodipine and metoprolol.   - will restart losartan 50mg starting tomorrow.     Problem/Plan - 5:  ·  Problem: CAD (coronary artery disease).   ·  Plan: - s/p stents  - C/w DAPT, statin, and Lopressor.     Problem/Plan - 6:  ·  Problem: GERD (gastroesophageal reflux disease).   ·  Plan: - C/w PPI.     Problem/Plan - 7:  ·  Problem: Prophylactic measure.   ·  Plan: - Heparin subQ.     Problem/Plan - 8:  ·  Problem: Anemia.   ·  Plan: Iron studies in the normal.  Iron - Total Binding Capacity.: 226 ug/dL (12.19.22 @ 07:30)  Ferritin, Serum: 83 ng/mL (12.19.22 @ 07:30)  pt will need GI follow up outpt for screen colonoscopy.     Problem/Plan - 9:  ·  Problem: Leukocytosis.   ·  Plan: Leukocytosis improving.       Additional Information:  Additional Information: tte reviewed - ef 55%, Normal cardiac fxn.       71 y/o M COPD/asthma, HTN, CAD s/p PCI, BPH, GERD p/w worsening SOB a/w acute hypoxic respiratory failure, COPD exacerbation due to URI. Course c/b worsening SOB, flash pulmonary edema 2/2 hypertensive urgency requiring BIPAP and nitro gtt.        Problem/Plan - 1:  ·  Problem: Acute respiratory failure with hypoxia.   ·  Plan: #hypoxic/hypercapnic respiratory failure 2/2 COPD exacerbation  - Likely exacerbated by hypertensive urgency  - POCUS 12/16 without volume overload  - S/p BIPAP in ICU. Currently on room air and saturating well   - c/w duonebs q6h  - on oral steroids, will taper upon d/c   - s/p abx   -C/w pulmicort nebulizer BID  -C/w Singulair  - add an acapella for airway clearance assistance.     Problem/Plan - 2:  ·  Problem: Asthma exacerbation in COPD.   ·  Plan: clinically improving  - Monitor respiratory status  - Wean supplemental oxygen  - Tessalon perles for cough  - pulmonary following   - Steroids and nebs as above.     Problem/Plan - 3:  ·  Problem: OWEN (acute kidney injury).   ·  Plan: Cr improved  Creatinine Trend: 1.14<--, 1.19<--, 1.31<--, 1.36<--, 1.19<--, 1.31<--  FeNA: pre-renal  s/p IVF  renal u/s ordered unremarkable     Problem/Plan - 4:  ·  Problem: HTN (hypertension).   ·  Plan: RRT for worsening SOB, hypertensive urgency.   - Cardiology following - s/p nitro gtt in ICU.   - C/w amlodipine and metoprolol.   - will restart losartan 50mg starting tomorrow.     Problem/Plan - 5:  ·  Problem: CAD (coronary artery disease).   ·  Plan: - s/p stents  - C/w DAPT, statin, and Lopressor.     Problem/Plan - 6:  ·  Problem: GERD (gastroesophageal reflux disease).   ·  Plan: - C/w PPI.     Problem/Plan - 7:  ·  Problem: Prophylactic measure.   ·  Plan: - Heparin subQ.     Problem/Plan - 8:  ·  Problem: Anemia.   ·  Plan: Iron studies in the normal.  Iron - Total Binding Capacity.: 226 ug/dL (12.19.22 @ 07:30)  Ferritin, Serum: 83 ng/mL (12.19.22 @ 07:30)  pt will need GI follow up outpt for screen colonoscopy.     Problem/Plan - 9:  ·  Problem: Leukocytosis.   ·  Plan: Leukocytosis improving.       Additional Information:  Additional Information: tte reviewed - ef 55%, Normal cardiac fxn.    Medically cleared for discharge with outpatient follow-up with PCP, pulm, GI 73 y/o M COPD/asthma, HTN, CAD s/p PCI, BPH, GERD p/w worsening SOB a/w acute hypoxic respiratory failure, COPD exacerbation due to URI. Course c/b worsening SOB, flash pulmonary edema 2/2 hypertensive urgency requiring BIPAP and nitro gtt now resolved      Problem/Plan - 1:  ·  Problem: Acute respiratory failure with hypoxia.   ·  Plan: #hypoxic/hypercapnic respiratory failure 2/2 COPD exacerbation  - Likely exacerbated by hypertensive urgency  - POCUS 12/16 without volume overload  - S/p BIPAP in ICU. Currently on room air and saturating well   - c/w duonebs q6h  - on oral steroids, will taper upon d/c   - s/p abx   -C/w pulmicort nebulizer BID  -C/w Singulair  - add an acapella for airway clearance assistance.     Problem/Plan - 2:  ·  Problem: Asthma exacerbation in COPD.   ·  Plan: clinically improving  - Monitor respiratory status  - Wean supplemental oxygen  - Tessalon perles for cough  - Steroids and nebs as above.     Problem/Plan - 3:  ·  Problem: OWEN (acute kidney injury).   ·  Plan: Cr improved  Creatinine Trend: 1.14<--, 1.19<--, 1.31<--, 1.36<--, 1.19<--, 1.31<--  FeNA: pre-renal  s/p IVF  renal u/s ordered unremarkable     Problem/Plan - 4:  ·  Problem: HTN (hypertension).   ·  Plan: RRT for worsening SOB, hypertensive urgency.   - Cardiology following - s/p nitro gtt in ICU.   - C/w amlodipine and metoprolol.   - BP above goal  -  will increase losartan to 100mg      Problem/Plan - 5:  ·  Problem: CAD (coronary artery disease).   ·  Plan: - s/p stents  - C/w DAPT, statin, and Lopressor.     Problem/Plan - 6:  ·  Problem: GERD (gastroesophageal reflux disease).   ·  Plan: - C/w PPI.     Problem/Plan - 7:  ·  Problem: Prophylactic measure.   ·  Plan: - Heparin subQ.     Problem/Plan - 8:  ·  Problem: Anemia.   ·  Plan: Iron studies in the normal.  Iron - Total Binding Capacity.: 226 ug/dL (12.19.22 @ 07:30)  Ferritin, Serum: 83 ng/mL (12.19.22 @ 07:30)  pt will need GI follow up outpt for screen colonoscopy.     Problem/Plan - 9:  ·  Problem: Leukocytosis.   ·  Plan: Leukocytosis improving.       Additional Information:  Additional Information: tte reviewed - ef 55%, Normal cardiac fxn.    Medically cleared for discharge with outpatient follow-up with PCP, pulm, GI

## 2022-12-20 NOTE — PROGRESS NOTE ADULT - PROBLEM SELECTOR PLAN 2
clinically improving  - Monitor respiratory status  - Wean supplemental oxygen  - Tessalon perles for cough  - pulmonary following   - ok to transition oral steroid --> prednisone which should be tapered upon discharge.   to and nebs as above

## 2022-12-20 NOTE — DISCHARGE NOTE PROVIDER - NSDCFUADDAPPT_GEN_ALL_CORE_FT
APPTS ARE READY TO BE MADE: [ X] YES    Best Family or Patient Contact (if needed):    Additional Information about above appointments (if needed):    1: Dr. Bajwa  2:   3:     Other comments or requests:    APPTS ARE READY TO BE MADE: [ X] YES    Best Family or Patient Contact (if needed):    Additional Information about above appointments (if needed):    1: Dr. Bajwa  2:   3:     Other comments or requests:         Patient was provided with follow up request details and was advised to call to schedule follow up within specified time frame.

## 2022-12-20 NOTE — DISCHARGE NOTE PROVIDER - NSDCCPCAREPLAN_GEN_ALL_CORE_FT
PRINCIPAL DISCHARGE DIAGNOSIS  Diagnosis: COPD with asthma and status asthmaticus  Assessment and Plan of Treatment: Call your Health Care provider upon arrival home to make a follow up appointment within one week.  Take all inhalers as prescribed by your Health Care Provider.  Take steroids as prescribed by your Health Care Provider.  If your cough increases infrequency and severity and/or you have shortness of breath or increased shortness of breath call your Health Care Provider.  If you develop fever, chills, night sweats, malaise, and/or change in mental status call your Health care Provider.  Nutrition is very important.  Eat small frequent meals.  Increase your activity as tolerated.  Do not stay in bed all day        SECONDARY DISCHARGE DIAGNOSES  Diagnosis: URI with cough and congestion  Assessment and Plan of Treatment: Completed antibiotics  Follow-up with your Primary Care Doctor or Specialist    Diagnosis: Moderate dehydration  Assessment and Plan of Treatment: Resolved    Diagnosis: Asymptomatic hypertension  Assessment and Plan of Treatment: Take medications for your blood pressure as recommended.  Eat a heart healthy diet that is low in saturated fats and salt, and includes whole grains, fruits, vegetables and lean protein   Exercise regularly (consult with your physician or cardiologist first); maintain a heart healthy weight.   If you smoke - quit (A resource to help you stop smoking is the [facility] – phone number 182-937-1477.). Continue to follow with your primary physician or cardiologist.   Seek medical help for dizziness, Lightheadedness, Blurry vision, Headache, Chest pain, Shortness of breath  Follow up with your medical doctor to establish long term blood pressure treatment goals.

## 2022-12-20 NOTE — PROGRESS NOTE ADULT - PROBLEM SELECTOR PLAN 3
Uptrending WBC count 12/16  -S/p RRT, likely reactive vs 2nd to steroids  -CXR with no clear infiltrate, non-toxic appearing  -Monitor trend.

## 2022-12-20 NOTE — PROGRESS NOTE ADULT - PROBLEM SELECTOR PLAN 4
RRT for worsening SOB, hypertensive urgency.   - Cardiology following - s/p nitro gtt in ICU.   - C/w amlodipine and metoprolol.   - restarted losartan 50mg

## 2022-12-20 NOTE — DISCHARGE NOTE PROVIDER - CARE PROVIDER_API CALL
Edwin Bajwa  INTERNAL MEDICINE  [address] [place] [address]  Phone: [phone]  Fax: [phone]  Follow Up Time: 1 week    Mateo Randle (MD)  Critical Care Medicine  [address] [place] [address], [facility] [address]  [place] [address]  Phone: [phone]  Fax: [phone]  Follow Up Time: 1 week

## 2022-12-20 NOTE — PROGRESS NOTE ADULT - SUBJECTIVE AND OBJECTIVE BOX
Andre Reyes, M.D.  Pager: [phone]  Office: [phone]    Patient is a 72y old  Male who presents with a chief complaint of worsening SOB (19 Dec 2022 12:45)          SUBJECTIVE / OVERNIGHT EVENTS:    No acute overnight events.    ROS: ( - ) Fever, ( - )Chills,  ( - )Nausea/Vomiting, ( - ) Cough, ( - )Shortness of breath, ( - )Chest Pain    MEDICATIONS  (STANDING):  albuterol/ipratropium for Nebulization 3 milliLiter(s) Nebulizer every 6 hours  amLODIPine   Tablet 10 milliGRAM(s) Oral daily  aspirin enteric coated 81 milliGRAM(s) Oral daily  budesonide  80 MICROgram(s)/formoterol 4.5 MICROgram(s) Inhaler 2 Puff(s) Inhalation two times a day  chlorhexidine 2% Cloths 1 Application(s) Topical daily  chlorhexidine 4% Liquid 1 Application(s) Topical <User Schedule>  clopidogrel Tablet 75 milliGRAM(s) Oral daily  dextrose 5%. 1000 milliLiter(s) (100 mL/Hr) IV Continuous <Continuous>  dextrose 5%. 1000 milliLiter(s) (50 mL/Hr) IV Continuous <Continuous>  dextrose 50% Injectable 25 Gram(s) IV Push once  dextrose 50% Injectable 12.5 Gram(s) IV Push once  dextrose 50% Injectable 25 Gram(s) IV Push once  glucagon  Injectable 1 milliGRAM(s) IntraMuscular once  guaiFENesin ER 1200 milliGRAM(s) Oral every 12 hours  heparin   Injectable 5000 Unit(s) SubCutaneous every 8 hours  influenza  Vaccine (HIGH DOSE) 0.7 milliLiter(s) IntraMuscular once  insulin lispro (ADMELOG) corrective regimen sliding scale   SubCutaneous three times a day before meals  insulin lispro (ADMELOG) corrective regimen sliding scale   SubCutaneous at bedtime  losartan 50 milliGRAM(s) Oral daily  metoprolol tartrate 50 milliGRAM(s) Oral two times a day  montelukast 10 milliGRAM(s) Oral daily  mupirocin 2% Ointment 1 Application(s) Both Nostrils two times a day  pantoprazole  Injectable 40 milliGRAM(s) IV Push daily  predniSONE   Tablet 30 milliGRAM(s) Oral two times a day    MEDICATIONS  (PRN):  dextrose Oral Gel 15 Gram(s) Oral once PRN Blood Glucose LESS THAN 70 milliGRAM(s)/deciliter          T(C): 36.6 (12-20 @ 04:18), Max: 36.7 (12-19 @ 17:38)   HR: 72   BP: 147/70   RR: 18   SpO2: 94%    PHYSICAL EXAM:    CONSTITUTIONAL: NAD, well-developed, well-groomed  EYES: PERRLA; conjunctiva and sclera clear  ENMT: Moist oral mucosa, no pharyngeal injection or exudates; normal dentition  NECK: Supple, no palpable masses; no thyromegaly  RESPIRATORY: Normal respiratory effort; lungs are clear to auscultation bilaterally  CARDIOVASCULAR: Regular rate and rhythm, normal S1 and S2, no murmur/rub/gallop; No lower extremity edema; Peripheral pulses are 2+ bilaterally  ABDOMEN: Nontender to palpation, normoactive bowel sounds, no rebound/guarding; No hepatosplenomegaly  MUSCULOSKELETAL:  no clubbing or cyanosis of digits; no joint swelling or tenderness to palpation  PSYCH: A+O to person, place, and time; affect appropriate  NEUROLOGY: CN 2-12 are intact and symmetric; no gross sensory deficits   SKIN: No rashes; no palpable lesions      LABS:                        9.8    18.96 )-----------( 431      ( 19 Dec 2022 07:30 )             31.2      12-19    136  |  98  |  38<H>  ----------------------------<  107<H>  4.8   |  27  |  1.14    Ca    8.8      19 Dec 2022 07:30         CAPILLARY BLOOD GLUCOSE      POCT Blood Glucose.: 127 mg/dL (20 Dec 2022 08:01)  POCT Blood Glucose.: 134 mg/dL (19 Dec 2022 22:32)  POCT Blood Glucose.: 162 mg/dL (19 Dec 2022 16:40)  POCT Blood Glucose.: 113 mg/dL (19 Dec 2022 12:55)      RADIOLOGY & ADDITIONAL TESTS:    Imaging Personally Reviewed:  Consultant(s) Notes Reviewed:    Care Discussed with Consultants/Other Providers:   Andre Reyes, M.D.  Pager: [phone]  Office: [phone]    Patient is a 72y old  Male who presents with a chief complaint of worsening SOB (19 Dec 2022 12:45)          SUBJECTIVE / OVERNIGHT EVENTS:    No acute overnight events.   was weaned off of oxygen.  feeling much better    ROS: ( - ) Fever, ( - )Chills,  ( - )Nausea/Vomiting, ( - ) Cough, ( - )Shortness of breath, ( - )Chest Pain    MEDICATIONS  (STANDING):  albuterol/ipratropium for Nebulization 3 milliLiter(s) Nebulizer every 6 hours  amLODIPine   Tablet 10 milliGRAM(s) Oral daily  aspirin enteric coated 81 milliGRAM(s) Oral daily  budesonide  80 MICROgram(s)/formoterol 4.5 MICROgram(s) Inhaler 2 Puff(s) Inhalation two times a day  chlorhexidine 2% Cloths 1 Application(s) Topical daily  chlorhexidine 4% Liquid 1 Application(s) Topical <User Schedule>  clopidogrel Tablet 75 milliGRAM(s) Oral daily  dextrose 5%. 1000 milliLiter(s) (100 mL/Hr) IV Continuous <Continuous>  dextrose 5%. 1000 milliLiter(s) (50 mL/Hr) IV Continuous <Continuous>  dextrose 50% Injectable 25 Gram(s) IV Push once  dextrose 50% Injectable 12.5 Gram(s) IV Push once  dextrose 50% Injectable 25 Gram(s) IV Push once  glucagon  Injectable 1 milliGRAM(s) IntraMuscular once  guaiFENesin ER 1200 milliGRAM(s) Oral every 12 hours  heparin   Injectable 5000 Unit(s) SubCutaneous every 8 hours  influenza  Vaccine (HIGH DOSE) 0.7 milliLiter(s) IntraMuscular once  insulin lispro (ADMELOG) corrective regimen sliding scale   SubCutaneous three times a day before meals  insulin lispro (ADMELOG) corrective regimen sliding scale   SubCutaneous at bedtime  losartan 50 milliGRAM(s) Oral daily  metoprolol tartrate 50 milliGRAM(s) Oral two times a day  montelukast 10 milliGRAM(s) Oral daily  mupirocin 2% Ointment 1 Application(s) Both Nostrils two times a day  pantoprazole  Injectable 40 milliGRAM(s) IV Push daily  predniSONE   Tablet 30 milliGRAM(s) Oral two times a day    MEDICATIONS  (PRN):  dextrose Oral Gel 15 Gram(s) Oral once PRN Blood Glucose LESS THAN 70 milliGRAM(s)/deciliter          T(C): 36.6 (12-20 @ 04:18), Max: 36.7 (12-19 @ 17:38)   HR: 72   BP: 147/70   RR: 18   SpO2: 94%    PHYSICAL EXAM:    CONSTITUTIONAL: NAD, well-developed, well-groomed  EYES: PERRLA; conjunctiva and sclera clear  ENMT: Moist oral mucosa, no pharyngeal injection or exudates; normal dentition  NECK: Supple, no palpable masses; no thyromegaly  RESPIRATORY: Normal respiratory effort; wheezing improved +rhonchi  CARDIOVASCULAR: Regular rate and rhythm, normal S1 and S2, no murmur/rub/gallop; No lower extremity edema; Peripheral pulses are 2+ bilaterally  ABDOMEN: Nontender to palpation, normoactive bowel sounds, no rebound/guarding; No hepatosplenomegaly  MUSCULOSKELETAL:  no clubbing or cyanosis of digits; no joint swelling or tenderness to palpation  PSYCH: A+O to person, place, and time; affect appropriate  NEUROLOGY: CN 2-12 are intact and symmetric; no gross sensory deficits   SKIN: No rashes; no palpable lesions      LABS:                        9.8    18.96 )-----------( 431      ( 19 Dec 2022 07:30 )             31.2      12-19    136  |  98  |  38<H>  ----------------------------<  107<H>  4.8   |  27  |  1.14    Ca    8.8      19 Dec 2022 07:30         CAPILLARY BLOOD GLUCOSE      POCT Blood Glucose.: 127 mg/dL (20 Dec 2022 08:01)  POCT Blood Glucose.: 134 mg/dL (19 Dec 2022 22:32)  POCT Blood Glucose.: 162 mg/dL (19 Dec 2022 16:40)  POCT Blood Glucose.: 113 mg/dL (19 Dec 2022 12:55)      RADIOLOGY & ADDITIONAL TESTS:    Imaging Personally Reviewed:  Consultant(s) Notes Reviewed:    Care Discussed with Consultants/Other Providers:

## 2022-12-20 NOTE — DISCHARGE NOTE PROVIDER - NSDCMRMEDTOKEN_GEN_ALL_CORE_FT
albuterol 90 mcg/inh inhalation aerosol: 2 puff(s) inhaled every 4 hours, As needed, Shortness of Breath and/or Wheezing  amLODIPine 10 mg oral tablet: 1 tab(s) orally once a day  aspirin 81 mg oral delayed release tablet: 1 tab(s) orally once a day  atorvastatin 80 mg oral tablet: 1 tab(s) orally once a day (at bedtime)  Daliresp 500 mcg oral tablet: 1 tab(s) orally once a day  losartan 50 mg oral tablet: 1 tab(s) orally once a day  Metoprolol Tartrate 25 mg oral tablet: 1 tab(s) orally 2 times a day  Multiple Vitamins with Minerals oral liquid: 1 unit(s) orally once a day  pantoprazole 40 mg oral delayed release tablet: 1 tab(s) orally once a day   Plavix 75 mg oral tablet: 1 tab(s) orally once a day  Singulair 10 mg oral tablet: 1 tab(s) orally once a day  Spiriva HandiHaler 18 mcg inhalation capsule: 1 cap(s) inhaled once a day, As Needed  Symbicort 160 mcg-4.5 mcg/inh inhalation aerosol: 2 puff(s) inhaled 2 times a day, As Needed  tamsulosin 0.4 mg oral capsule: 1 cap(s) orally once a day (at bedtime)   albuterol 90 mcg/inh inhalation aerosol: 2 puff(s) inhaled every 4 hours, As needed, Shortness of Breath and/or Wheezing  amLODIPine 10 mg oral tablet: 1 tab(s) orally once a day  aspirin 81 mg oral delayed release tablet: 1 tab(s) orally once a day  atorvastatin 80 mg oral tablet: 1 tab(s) orally once a day (at bedtime)  Daliresp 500 mcg oral tablet: 1 tab(s) orally once a day  losartan 50 mg oral tablet: 1 tab(s) orally once a day  metoprolol tartrate 50 mg oral tablet: 1 tab(s) orally 2 times a day  Mucinex Max Strength 1200 mg oral tablet, extended release: 1 tab(s) orally 2 times a day   Multiple Vitamins with Minerals oral liquid: 1 unit(s) orally once a day  mupirocin 2% topical ointment: Apply topically to affected area 2 times a day   pantoprazole 40 mg oral delayed release tablet: 1 tab(s) orally once a day   Plavix 75 mg oral tablet: 1 tab(s) orally once a day  predniSONE 10 mg oral tablet: 6 tab(s) orally once a day x 4 days  4 tab(s) orally once a day x 5 days  3 tab(s) orally once a day x 5 days  2 tab(s) orally once a day x 5 days  1 tab(s) orally once a day  Singulair 10 mg oral tablet: 1 tab(s) orally once a day  Spiriva HandiHaler 18 mcg inhalation capsule: 1 cap(s) inhaled once a day, As Needed  Symbicort 160 mcg-4.5 mcg/inh inhalation aerosol: 2 puff(s) inhaled 2 times a day, As Needed  tamsulosin 0.4 mg oral capsule: 1 cap(s) orally once a day (at bedtime)   albuterol 90 mcg/inh inhalation aerosol: 2 puff(s) inhaled every 4 hours, As needed, Shortness of Breath and/or Wheezing  amLODIPine 10 mg oral tablet: 1 tab(s) orally once a day  aspirin 81 mg oral delayed release tablet: 1 tab(s) orally once a day  atorvastatin 80 mg oral tablet: 1 tab(s) orally once a day (at bedtime)  Daliresp 500 mcg oral tablet: 1 tab(s) orally once a day  losartan 100 mg oral tablet: 1 tab(s) orally once a day  metoprolol tartrate 50 mg oral tablet: 1 tab(s) orally 2 times a day  Mucinex Max Strength 1200 mg oral tablet, extended release: 1 tab(s) orally 2 times a day   Multiple Vitamins with Minerals oral liquid: 1 unit(s) orally once a day  mupirocin 2% topical ointment: Apply topically to affected area 2 times a day   pantoprazole 40 mg oral delayed release tablet: 1 tab(s) orally once a day   Plavix 75 mg oral tablet: 1 tab(s) orally once a day  predniSONE 10 mg oral tablet: 6 tab(s) orally once a day x 4 days  4 tab(s) orally once a day x 5 days  3 tab(s) orally once a day x 5 days  2 tab(s) orally once a day x 5 days  1 tab(s) orally once a day  Singulair 10 mg oral tablet: 1 tab(s) orally once a day  Spiriva HandiHaler 18 mcg inhalation capsule: 1 cap(s) inhaled once a day, As Needed  Symbicort 160 mcg-4.5 mcg/inh inhalation aerosol: 2 puff(s) inhaled 2 times a day, As Needed  tamsulosin 0.4 mg oral capsule: 1 cap(s) orally once a day (at bedtime)

## 2022-12-21 ENCOUNTER — TRANSCRIPTION ENCOUNTER (OUTPATIENT)
Age: 72
End: 2022-12-21

## 2022-12-21 VITALS
OXYGEN SATURATION: 94 % | TEMPERATURE: 97 F | SYSTOLIC BLOOD PRESSURE: 149 MMHG | HEART RATE: 97 BPM | DIASTOLIC BLOOD PRESSURE: 81 MMHG | RESPIRATION RATE: 18 BRPM

## 2022-12-21 LAB
CULTURE RESULTS: SIGNIFICANT CHANGE UP
GLUCOSE BLDC GLUCOMTR-MCNC: 110 MG/DL — HIGH (ref 70–99)
GLUCOSE BLDC GLUCOMTR-MCNC: 125 MG/DL — HIGH (ref 70–99)
GLUCOSE BLDC GLUCOMTR-MCNC: 138 MG/DL — HIGH (ref 70–99)
HCT VFR BLD CALC: 30.4 % — LOW (ref 39–50)
HGB BLD-MCNC: 9.8 G/DL — LOW (ref 13–17)
MCHC RBC-ENTMCNC: 29.4 PG — SIGNIFICANT CHANGE UP (ref 27–34)
MCHC RBC-ENTMCNC: 32.2 GM/DL — SIGNIFICANT CHANGE UP (ref 32–36)
MCV RBC AUTO: 91.3 FL — SIGNIFICANT CHANGE UP (ref 80–100)
NRBC # BLD: 0 /100 WBCS — SIGNIFICANT CHANGE UP (ref 0–0)
PLATELET # BLD AUTO: 444 K/UL — HIGH (ref 150–400)
RBC # BLD: 3.33 M/UL — LOW (ref 4.2–5.8)
RBC # FLD: 13.8 % — SIGNIFICANT CHANGE UP (ref 10.3–14.5)
SPECIMEN SOURCE: SIGNIFICANT CHANGE UP
WBC # BLD: 18.43 K/UL — HIGH (ref 3.8–10.5)
WBC # FLD AUTO: 18.43 K/UL — HIGH (ref 3.8–10.5)

## 2022-12-21 PROCEDURE — 99239 HOSP IP/OBS DSCHRG MGMT >30: CPT

## 2022-12-21 RX ORDER — LOSARTAN POTASSIUM 100 MG/1
100 TABLET, FILM COATED ORAL DAILY
Refills: 0 | Status: DISCONTINUED | OUTPATIENT
Start: 2022-12-21 | End: 2022-12-21

## 2022-12-21 RX ORDER — LOSARTAN POTASSIUM 100 MG/1
1 TABLET, FILM COATED ORAL
Qty: 30 | Refills: 0
Start: 2022-12-21 | End: 2023-01-19

## 2022-12-21 RX ORDER — MUPIROCIN 20 MG/G
1 OINTMENT TOPICAL
Qty: 1 | Refills: 0
Start: 2022-12-21 | End: 2022-12-25

## 2022-12-21 RX ORDER — ALBUTEROL 90 UG/1
2 AEROSOL, METERED ORAL
Qty: 1 | Refills: 0
Start: 2022-12-21 | End: 2023-01-19

## 2022-12-21 RX ORDER — METOPROLOL TARTRATE 50 MG
1 TABLET ORAL
Qty: 60 | Refills: 0
Start: 2022-12-21 | End: 2023-01-19

## 2022-12-21 RX ADMIN — Medication 50 MILLIGRAM(S): at 17:22

## 2022-12-21 RX ADMIN — Medication 3 MILLILITER(S): at 00:00

## 2022-12-21 RX ADMIN — LOSARTAN POTASSIUM 50 MILLIGRAM(S): 100 TABLET, FILM COATED ORAL at 06:24

## 2022-12-21 RX ADMIN — MONTELUKAST 10 MILLIGRAM(S): 4 TABLET, CHEWABLE ORAL at 12:57

## 2022-12-21 RX ADMIN — Medication 1200 MILLIGRAM(S): at 17:21

## 2022-12-21 RX ADMIN — AMLODIPINE BESYLATE 10 MILLIGRAM(S): 2.5 TABLET ORAL at 06:24

## 2022-12-21 RX ADMIN — MUPIROCIN 1 APPLICATION(S): 20 OINTMENT TOPICAL at 06:57

## 2022-12-21 RX ADMIN — Medication 3 MILLILITER(S): at 12:57

## 2022-12-21 RX ADMIN — Medication 1200 MILLIGRAM(S): at 06:23

## 2022-12-21 RX ADMIN — Medication 81 MILLIGRAM(S): at 12:57

## 2022-12-21 RX ADMIN — Medication 3 MILLILITER(S): at 06:24

## 2022-12-21 RX ADMIN — PANTOPRAZOLE SODIUM 40 MILLIGRAM(S): 20 TABLET, DELAYED RELEASE ORAL at 12:57

## 2022-12-21 RX ADMIN — CLOPIDOGREL BISULFATE 75 MILLIGRAM(S): 75 TABLET, FILM COATED ORAL at 12:57

## 2022-12-21 RX ADMIN — MUPIROCIN 1 APPLICATION(S): 20 OINTMENT TOPICAL at 17:21

## 2022-12-21 RX ADMIN — Medication 30 MILLIGRAM(S): at 06:24

## 2022-12-21 RX ADMIN — CHLORHEXIDINE GLUCONATE 1 APPLICATION(S): 213 SOLUTION TOPICAL at 06:57

## 2022-12-21 RX ADMIN — Medication 30 MILLIGRAM(S): at 17:22

## 2022-12-21 RX ADMIN — CHLORHEXIDINE GLUCONATE 1 APPLICATION(S): 213 SOLUTION TOPICAL at 12:57

## 2022-12-21 RX ADMIN — BUDESONIDE AND FORMOTEROL FUMARATE DIHYDRATE 2 PUFF(S): 160; 4.5 AEROSOL RESPIRATORY (INHALATION) at 17:24

## 2022-12-21 RX ADMIN — BUDESONIDE AND FORMOTEROL FUMARATE DIHYDRATE 2 PUFF(S): 160; 4.5 AEROSOL RESPIRATORY (INHALATION) at 06:56

## 2022-12-21 RX ADMIN — Medication 3 MILLILITER(S): at 17:21

## 2022-12-21 RX ADMIN — Medication 50 MILLIGRAM(S): at 06:23

## 2022-12-21 NOTE — PROGRESS NOTE ADULT - PROBLEM SELECTOR PROBLEM 2
Hypertensive emergency
OWEN (acute kidney injury)
OWEN (acute kidney injury)
Asthma exacerbation in COPD
Asthma exacerbation in COPD
OWEN (acute kidney injury)
Asthma exacerbation in COPD
Asthma exacerbation in COPD
Hypertensive emergency
Asthma exacerbation in COPD
Hypertensive emergency
Asthma exacerbation in COPD
Hypertensive emergency
Asthma exacerbation in COPD

## 2022-12-21 NOTE — DISCHARGE NOTE NURSING/CASE MANAGEMENT/SOCIAL WORK - NSDCPEFALRISK_GEN_ALL_CORE
For information on Fall & Injury Prevention, visit: [email or web address] OR  [email or web address] OR  https://www.cdc.gov/steadi/patient.html

## 2022-12-21 NOTE — PROGRESS NOTE ADULT - NS ATTEND OPT1 GEN_ALL_CORE
I independently performed the documented:
(1) weak, irregular

## 2022-12-21 NOTE — DISCHARGE NOTE NURSING/CASE MANAGEMENT/SOCIAL WORK - PATIENT PORTAL LINK FT
You can access the FollowMyHealth Patient Portal offered by [facility] by registering at the following website: [email or web address]. By joining [facility] FollowMyHealth portal, you will also be able to view your health information using other applications (apps) compatible with our system.

## 2022-12-21 NOTE — PROGRESS NOTE ADULT - PROBLEM SELECTOR PLAN 8
Drop in H/H this AM. No obvious s/s of any acute bleeding. Denies hematuria, melena. F/u iron studies, FOBT, repeat CBC this evening.
Iron studies in the normal.  Iron - Total Binding Capacity.: 226 ug/dL (12.19.22 @ 07:30)  Ferritin, Serum: 83 ng/mL (12.19.22 @ 07:30)  pt will need GI follow up outpt for screen colonoscopy.

## 2022-12-21 NOTE — PROGRESS NOTE ADULT - PROBLEM SELECTOR PLAN 9
Leukocytosis improving
Leukocytosis up to 22. Currently afebrile with infectious workup overall unremarkable. CXR w/o any obvious acute pulmonary process, BCx NGTD. RVP neg. S/p zosyn and azithro x3 days.   - Monitor fever curve and WBC.
Leukocytosis improving
Leukocytosis improving

## 2022-12-21 NOTE — PROGRESS NOTE ADULT - REASON FOR ADMISSION
worsening SOB

## 2022-12-21 NOTE — PROGRESS NOTE ADULT - PROVIDER SPECIALTY LIST ADULT
Hospitalist
Internal Medicine
Pulmonology
Cardiology
Hospitalist
MICU
Cardiology
Nephrology
Pulmonology
Internal Medicine
Internal Medicine
Nephrology
Pulmonology
Pulmonology
Nephrology
Internal Medicine
Pulmonology
Hospitalist
Pulmonology
Hospitalist
Hospitalist

## 2022-12-21 NOTE — PROGRESS NOTE ADULT - PROBLEM SELECTOR PLAN 7
- Heparin subQ
- Heparin subQ
- Heparin subQ    Dispo: likely in 24-48hrs if SOB and OWEN continues to improve, Pt already has home O2
- Heparin subQ    Dispo: likely in 24-48hrs if SOB and OWEN continues to improve, Pt already has home O2
- Heparin subQ

## 2022-12-21 NOTE — PROGRESS NOTE ADULT - SUBJECTIVE AND OBJECTIVE BOX
Follow-up Pulm Progress Note    No new respiratory events overnight.  Denies SOB/CP.   O2 sats 97% RA    Medications:  MEDICATIONS  (STANDING):  albuterol/ipratropium for Nebulization 3 milliLiter(s) Nebulizer every 6 hours  amLODIPine   Tablet 10 milliGRAM(s) Oral daily  aspirin enteric coated 81 milliGRAM(s) Oral daily  budesonide  80 MICROgram(s)/formoterol 4.5 MICROgram(s) Inhaler 2 Puff(s) Inhalation two times a day  chlorhexidine 2% Cloths 1 Application(s) Topical daily  chlorhexidine 4% Liquid 1 Application(s) Topical <User Schedule>  clopidogrel Tablet 75 milliGRAM(s) Oral daily  dextrose 5%. 1000 milliLiter(s) (100 mL/Hr) IV Continuous <Continuous>  dextrose 5%. 1000 milliLiter(s) (50 mL/Hr) IV Continuous <Continuous>  dextrose 50% Injectable 25 Gram(s) IV Push once  dextrose 50% Injectable 12.5 Gram(s) IV Push once  dextrose 50% Injectable 25 Gram(s) IV Push once  glucagon  Injectable 1 milliGRAM(s) IntraMuscular once  guaiFENesin ER 1200 milliGRAM(s) Oral every 12 hours  heparin   Injectable 5000 Unit(s) SubCutaneous every 8 hours  influenza  Vaccine (HIGH DOSE) 0.7 milliLiter(s) IntraMuscular once  insulin lispro (ADMELOG) corrective regimen sliding scale   SubCutaneous three times a day before meals  insulin lispro (ADMELOG) corrective regimen sliding scale   SubCutaneous at bedtime  losartan 100 milliGRAM(s) Oral daily  metoprolol tartrate 50 milliGRAM(s) Oral two times a day  montelukast 10 milliGRAM(s) Oral daily  mupirocin 2% Ointment 1 Application(s) Both Nostrils two times a day  pantoprazole  Injectable 40 milliGRAM(s) IV Push daily  predniSONE   Tablet 30 milliGRAM(s) Oral two times a day    MEDICATIONS  (PRN):  dextrose Oral Gel 15 Gram(s) Oral once PRN Blood Glucose LESS THAN 70 milliGRAM(s)/deciliter    Vital Signs Last 24 Hrs  T(C): 36.6 (21 Dec 2022 04:29), Max: 36.7 (20 Dec 2022 17:31)  T(F): 97.8 (21 Dec 2022 04:29), Max: 98 (20 Dec 2022 17:31)  HR: 76 (21 Dec 2022 04:29) (71 - 91)  BP: 162/73 (21 Dec 2022 04:29) (145/69 - 162/73)  BP(mean): --  RR: 18 (21 Dec 2022 04:29) (18 - 18)  SpO2: 96% (21 Dec 2022 12:01) (94% - 96%)    Parameters below as of 21 Dec 2022 12:01  Patient On (Oxygen Delivery Method): room air    12-20 @ 07:01  -  12-21 @ 07:00  --------------------------------------------------------  IN: 120 mL / OUT: 1100 mL / NET: -980 mL    LABS:                        9.8    18.43 )-----------( 444      ( 21 Dec 2022 06:54 )             30.4       CAPILLARY BLOOD GLUCOSE  POCT Blood Glucose.: 138 mg/dL (21 Dec 2022 07:54)      CULTURES:    Culture - Blood (collected 12-16-22 @ 20:37)  Source: .Blood Blood-Peripheral  Preliminary Report (12-18-22 @ 01:02):    No growth to date.    Culture - Blood (collected 12-16-22 @ 17:10)  Source: .Blood Blood-Peripheral  Preliminary Report (12-17-22 @ 20:01):    No growth to date.    Physical Examination:  PULM: Few bibasilar rhonchi, no wheeze  CVS: S1, S2 heard    RADIOLOGY REVIEWED  CXR: grossly clear

## 2022-12-21 NOTE — PROGRESS NOTE ADULT - ASSESSMENT
71 y/o M COPD/asthma, HTN, CAD s/p PCI, BPH, GERD p/w worsening SOB a/w acute hypoxic respiratory failure, COPD exacerbation due to URI. Course c/b worsening SOB, flash pulmonary edema 2/2 hypertensive urgency requiring BIPAP and nitro gtt now resolved.

## 2022-12-21 NOTE — PROGRESS NOTE ADULT - SUBJECTIVE AND OBJECTIVE BOX
Andre Reyes, M.D.  Pager: [phone]  Office: [phone]    Patient is a 72y old  Male who presents with a chief complaint of worsening SOB (20 Dec 2022 16:09)          SUBJECTIVE / OVERNIGHT EVENTS:    No acute overnight events.    ROS: ( - ) Fever, ( - )Chills,  ( - )Nausea/Vomiting, ( - ) Cough, ( - )Shortness of breath, ( - )Chest Pain    MEDICATIONS  (STANDING):  albuterol/ipratropium for Nebulization 3 milliLiter(s) Nebulizer every 6 hours  amLODIPine   Tablet 10 milliGRAM(s) Oral daily  aspirin enteric coated 81 milliGRAM(s) Oral daily  budesonide  80 MICROgram(s)/formoterol 4.5 MICROgram(s) Inhaler 2 Puff(s) Inhalation two times a day  chlorhexidine 2% Cloths 1 Application(s) Topical daily  chlorhexidine 4% Liquid 1 Application(s) Topical <User Schedule>  clopidogrel Tablet 75 milliGRAM(s) Oral daily  dextrose 5%. 1000 milliLiter(s) (100 mL/Hr) IV Continuous <Continuous>  dextrose 5%. 1000 milliLiter(s) (50 mL/Hr) IV Continuous <Continuous>  dextrose 50% Injectable 25 Gram(s) IV Push once  dextrose 50% Injectable 12.5 Gram(s) IV Push once  dextrose 50% Injectable 25 Gram(s) IV Push once  glucagon  Injectable 1 milliGRAM(s) IntraMuscular once  guaiFENesin ER 1200 milliGRAM(s) Oral every 12 hours  heparin   Injectable 5000 Unit(s) SubCutaneous every 8 hours  influenza  Vaccine (HIGH DOSE) 0.7 milliLiter(s) IntraMuscular once  insulin lispro (ADMELOG) corrective regimen sliding scale   SubCutaneous three times a day before meals  insulin lispro (ADMELOG) corrective regimen sliding scale   SubCutaneous at bedtime  losartan 50 milliGRAM(s) Oral daily  metoprolol tartrate 50 milliGRAM(s) Oral two times a day  montelukast 10 milliGRAM(s) Oral daily  mupirocin 2% Ointment 1 Application(s) Both Nostrils two times a day  pantoprazole  Injectable 40 milliGRAM(s) IV Push daily  predniSONE   Tablet 30 milliGRAM(s) Oral two times a day    MEDICATIONS  (PRN):  dextrose Oral Gel 15 Gram(s) Oral once PRN Blood Glucose LESS THAN 70 milliGRAM(s)/deciliter          T(C): 36.6 (12-21 @ 04:29), Max: 36.7 (12-20 @ 11:58)   HR: 76   BP: 162/73   RR: 18   SpO2: 94%    PHYSICAL EXAM:    CONSTITUTIONAL: NAD, well-developed, well-groomed  EYES: PERRLA; conjunctiva and sclera clear  ENMT: Moist oral mucosa, no pharyngeal injection or exudates; normal dentition  NECK: Supple, no palpable masses; no thyromegaly  RESPIRATORY: Normal respiratory effort; lungs are clear to auscultation bilaterally  CARDIOVASCULAR: Regular rate and rhythm, normal S1 and S2, no murmur/rub/gallop; No lower extremity edema; Peripheral pulses are 2+ bilaterally  ABDOMEN: Nontender to palpation, normoactive bowel sounds, no rebound/guarding; No hepatosplenomegaly  MUSCULOSKELETAL:  no clubbing or cyanosis of digits; no joint swelling or tenderness to palpation  PSYCH: A+O to person, place, and time; affect appropriate  NEUROLOGY: CN 2-12 are intact and symmetric; no gross sensory deficits   SKIN: No rashes; no palpable lesions      LABS:                        9.8    18.43 )-----------( 444      ( 21 Dec 2022 06:54 )             30.4               CAPILLARY BLOOD GLUCOSE      POCT Blood Glucose.: 138 mg/dL (21 Dec 2022 07:54)  POCT Blood Glucose.: 185 mg/dL (20 Dec 2022 22:26)  POCT Blood Glucose.: 115 mg/dL (20 Dec 2022 17:06)  POCT Blood Glucose.: 134 mg/dL (20 Dec 2022 12:46)      RADIOLOGY & ADDITIONAL TESTS:    Imaging Personally Reviewed:  Consultant(s) Notes Reviewed:    Care Discussed with Consultants/Other Providers:   Andre Reyes, M.D.  Pager: [phone]  Office: [phone]    Patient is a 72y old  Male who presents with a chief complaint of worsening SOB (20 Dec 2022 16:09)          SUBJECTIVE / OVERNIGHT EVENTS:    No acute overnight events.  no complaints  feels much better today.  ROS: ( - ) Fever, ( - )Chills,  ( - )Nausea/Vomiting, ( - ) Cough, ( - )Shortness of breath, ( - )Chest Pain    MEDICATIONS  (STANDING):  albuterol/ipratropium for Nebulization 3 milliLiter(s) Nebulizer every 6 hours  amLODIPine   Tablet 10 milliGRAM(s) Oral daily  aspirin enteric coated 81 milliGRAM(s) Oral daily  budesonide  80 MICROgram(s)/formoterol 4.5 MICROgram(s) Inhaler 2 Puff(s) Inhalation two times a day  chlorhexidine 2% Cloths 1 Application(s) Topical daily  chlorhexidine 4% Liquid 1 Application(s) Topical <User Schedule>  clopidogrel Tablet 75 milliGRAM(s) Oral daily  dextrose 5%. 1000 milliLiter(s) (100 mL/Hr) IV Continuous <Continuous>  dextrose 5%. 1000 milliLiter(s) (50 mL/Hr) IV Continuous <Continuous>  dextrose 50% Injectable 25 Gram(s) IV Push once  dextrose 50% Injectable 12.5 Gram(s) IV Push once  dextrose 50% Injectable 25 Gram(s) IV Push once  glucagon  Injectable 1 milliGRAM(s) IntraMuscular once  guaiFENesin ER 1200 milliGRAM(s) Oral every 12 hours  heparin   Injectable 5000 Unit(s) SubCutaneous every 8 hours  influenza  Vaccine (HIGH DOSE) 0.7 milliLiter(s) IntraMuscular once  insulin lispro (ADMELOG) corrective regimen sliding scale   SubCutaneous three times a day before meals  insulin lispro (ADMELOG) corrective regimen sliding scale   SubCutaneous at bedtime  losartan 50 milliGRAM(s) Oral daily  metoprolol tartrate 50 milliGRAM(s) Oral two times a day  montelukast 10 milliGRAM(s) Oral daily  mupirocin 2% Ointment 1 Application(s) Both Nostrils two times a day  pantoprazole  Injectable 40 milliGRAM(s) IV Push daily  predniSONE   Tablet 30 milliGRAM(s) Oral two times a day    MEDICATIONS  (PRN):  dextrose Oral Gel 15 Gram(s) Oral once PRN Blood Glucose LESS THAN 70 milliGRAM(s)/deciliter          T(C): 36.6 (12-21 @ 04:29), Max: 36.7 (12-20 @ 11:58)   HR: 76   BP: 162/73   RR: 18   SpO2: 94%    PHYSICAL EXAM:    CONSTITUTIONAL: NAD, well-developed, well-groomed  EYES: PERRLA; conjunctiva and sclera clear  ENMT: Moist oral mucosa, no pharyngeal injection or exudates; normal dentition  NECK: Supple, no palpable masses; no thyromegaly  RESPIRATORY: Normal respiratory effort; no wheezing, Rhonchi improving  CARDIOVASCULAR: Regular rate and rhythm, normal S1 and S2, no murmur/rub/gallop; No lower extremity edema; Peripheral pulses are 2+ bilaterally  ABDOMEN: Nontender to palpation, normoactive bowel sounds, no rebound/guarding; No hepatosplenomegaly  MUSCULOSKELETAL:  no clubbing or cyanosis of digits; no joint swelling or tenderness to palpation  PSYCH: A+O to person, place, and time; affect appropriate  NEUROLOGY: CN 2-12 are intact and symmetric; no gross sensory deficits   SKIN: No rashes; no palpable lesions      LABS:                        9.8    18.43 )-----------( 444      ( 21 Dec 2022 06:54 )             30.4               CAPILLARY BLOOD GLUCOSE      POCT Blood Glucose.: 138 mg/dL (21 Dec 2022 07:54)  POCT Blood Glucose.: 185 mg/dL (20 Dec 2022 22:26)  POCT Blood Glucose.: 115 mg/dL (20 Dec 2022 17:06)  POCT Blood Glucose.: 134 mg/dL (20 Dec 2022 12:46)      RADIOLOGY & ADDITIONAL TESTS:    Imaging Personally Reviewed:  Consultant(s) Notes Reviewed:    Care Discussed with Consultants/Other Providers:

## 2022-12-21 NOTE — CHART NOTE - NSCHARTNOTEFT_GEN_A_CORE
Discharge Note:    Requested by Dr. Reyes to facilitate d/c home.  V/s, labs, diagnostics reviewed, pt stable to d/c now.  Medication reconciliation reviewed, revised, and resolved with Dr. Reyes who medically cleared w/ f/u as directed.   Please refer to d/c note for hospital details.    Natalia Monroe, TIM-c  [id number]

## 2022-12-21 NOTE — PROGRESS NOTE ADULT - PROBLEM SELECTOR PLAN 1
Pt with COPD/emphysema, asthma overlap  -Initially with B/l expiratory wheezes on exam, also with + forced end expiratory wheeze  -Wheezing & dyspnea had been improving. S/p IV steroids & transitioned to PO Prednisone taper  -S/p RRT 12/16 for increased WOB in the setting of hypertensive emergency. Decreased BS on exam, tx to MICU for further management  -Now tx from MICU to floors. Dyspnea improving  -Keep sats >90% with O2 PRN. Tolerating RA today. Pt with home o2 from prior hospital admission  -C/w Symbicort 160/4.5 mcg 2 puffs BID  -C/w Singulair  -C/w Duoneb q6h  -Mucinex 1200 mg PO BID x5 days for congested cough  -Transitioned from IV steroids to PO Prednisone again 12/19. C/w Prednisone taper as follows: Prednisone 60 mg PO qd x3 more days, then Prednisone 40 mg PO qd x5 days, then Prednisone 30 mg PO qd x5 days, then Prednisone 20 mg PO qd x5 days, then Prednisone 10 mg PO qd. Continue 10 mg PO qd until outpatient f/u in office or with primary pulm Dr. Bajwa.   -D/c planning per primary team.

## 2022-12-21 NOTE — PROGRESS NOTE ADULT - NSPROGADDITIONALINFOA_GEN_ALL_CORE
stable for discharge today  Discharge time spent: 34 min
d/w Medicine FATIMAH Zheng
Plan as above d/w Dr. Randle.
d/w ACP
d/w ACP
tte reviewed - ef 55%, Normal cardiac fxn.
d/w Medicine FATIMAH Zheng
d/c planning for tomorrow  plan of care d/w patient and his spouse.
d/w Medicine FATIMAH Zheng
d/w Medicine ACP Jillian

## 2022-12-21 NOTE — PROGRESS NOTE ADULT - NS ATTEND OPT1A GEN_ALL_CORE
Medical decision making

## 2022-12-21 NOTE — DISCHARGE NOTE NURSING/CASE MANAGEMENT/SOCIAL WORK - NSDCFUADDAPPT_GEN_ALL_CORE_FT
APPTS ARE READY TO BE MADE: [ X] YES    Best Family or Patient Contact (if needed):    Additional Information about above appointments (if needed):    1: Dr. Bajwa  2:   3:     Other comments or requests:         Patient was provided with follow up request details and was advised to call to schedule follow up within specified time frame.

## 2022-12-21 NOTE — PROGRESS NOTE ADULT - NS ATTEND AMEND GEN_ALL_CORE FT
pt with improved forced exp wheeze  suggest change solumedrol to prednisone 30mg po bid
pt ambulating in hallway on ra, sat 91% no sob, minimal forced exp wheeze  continue b dilators  continue prednisone 60mg/d x 4 more days then 40mg x5d then 30mg x 5d then 20mg x 5d then 10mg/d   fu in office or with pts primary pulmonary physician withn 7d of dc  possible dc tomorrow  maintain sat>90% w o2 if needed
change to prednisone 30 po bid
events noted, hypertensive urgency treated in micu now on transfer back to floor  physical exam unchanged  on nc o2 wo distress sat 95%  decrease solumedrol to 20q8   continue b dilators  keep sat>90% w o2 as needed  dw pt and md
prednisone taper  fu in office

## 2022-12-21 NOTE — DISCHARGE NOTE NURSING/CASE MANAGEMENT/SOCIAL WORK - NSDCVIVACCINE_GEN_ALL_CORE_FT
influenza, injectable, quadrivalent, preservative free; 28-Oct-2021 10:13; Bonnie Hodgson (RN); [employer]; [id number] (Exp. Date: 30-Jun-2022); IntraMuscular; Deltoid Right.; 0.5 milliLiter(s); VIS (VIS Published: 06-Aug-2021, VIS Presented: 28-Oct-2021);   pneumococcal polysaccharide PPV23; 28-Oct-2021 15:21; Bonnie Hodgson (RN); [employer] [id number] (Exp. Date: 14-Mar-2023); IntraMuscular; Deltoid Left.; 0.5 milliLiter(s); VIS (VIS Published: 06-Oct-2009, VIS Presented: 28-Oct-2021);

## 2022-12-22 LAB
CULTURE RESULTS: SIGNIFICANT CHANGE UP
SPECIMEN SOURCE: SIGNIFICANT CHANGE UP

## 2023-01-04 PROCEDURE — 80053 COMPREHEN METABOLIC PANEL: CPT

## 2023-01-04 PROCEDURE — 97161 PT EVAL LOW COMPLEX 20 MIN: CPT

## 2023-01-04 PROCEDURE — 84540 ASSAY OF URINE/UREA-N: CPT

## 2023-01-04 PROCEDURE — 94660 CPAP INITIATION&MGMT: CPT

## 2023-01-04 PROCEDURE — 82435 ASSAY OF BLOOD CHLORIDE: CPT

## 2023-01-04 PROCEDURE — 94664 DEMO&/EVAL PT USE INHALER: CPT

## 2023-01-04 PROCEDURE — 82803 BLOOD GASES ANY COMBINATION: CPT

## 2023-01-04 PROCEDURE — 82565 ASSAY OF CREATININE: CPT

## 2023-01-04 PROCEDURE — 71045 X-RAY EXAM CHEST 1 VIEW: CPT

## 2023-01-04 PROCEDURE — 76770 US EXAM ABDO BACK WALL COMP: CPT

## 2023-01-04 PROCEDURE — 83550 IRON BINDING TEST: CPT

## 2023-01-04 PROCEDURE — 85730 THROMBOPLASTIN TIME PARTIAL: CPT

## 2023-01-04 PROCEDURE — 80048 BASIC METABOLIC PNL TOTAL CA: CPT

## 2023-01-04 PROCEDURE — 83735 ASSAY OF MAGNESIUM: CPT

## 2023-01-04 PROCEDURE — 84295 ASSAY OF SERUM SODIUM: CPT

## 2023-01-04 PROCEDURE — 85025 COMPLETE CBC W/AUTO DIFF WBC: CPT

## 2023-01-04 PROCEDURE — 81003 URINALYSIS AUTO W/O SCOPE: CPT

## 2023-01-04 PROCEDURE — 85027 COMPLETE CBC AUTOMATED: CPT

## 2023-01-04 PROCEDURE — 93306 TTE W/DOPPLER COMPLETE: CPT

## 2023-01-04 PROCEDURE — 94640 AIRWAY INHALATION TREATMENT: CPT

## 2023-01-04 PROCEDURE — 99285 EMERGENCY DEPT VISIT HI MDM: CPT | Mod: 25

## 2023-01-04 PROCEDURE — 82947 ASSAY GLUCOSE BLOOD QUANT: CPT

## 2023-01-04 PROCEDURE — 84300 ASSAY OF URINE SODIUM: CPT

## 2023-01-04 PROCEDURE — 87641 MR-STAPH DNA AMP PROBE: CPT

## 2023-01-04 PROCEDURE — 85018 HEMOGLOBIN: CPT

## 2023-01-04 PROCEDURE — 97116 GAIT TRAINING THERAPY: CPT

## 2023-01-04 PROCEDURE — 87640 STAPH A DNA AMP PROBE: CPT

## 2023-01-04 PROCEDURE — 82570 ASSAY OF URINE CREATININE: CPT

## 2023-01-04 PROCEDURE — 82728 ASSAY OF FERRITIN: CPT

## 2023-01-04 PROCEDURE — 83880 ASSAY OF NATRIURETIC PEPTIDE: CPT

## 2023-01-04 PROCEDURE — 87449 NOS EACH ORGANISM AG IA: CPT

## 2023-01-04 PROCEDURE — 82553 CREATINE MB FRACTION: CPT

## 2023-01-04 PROCEDURE — 84132 ASSAY OF SERUM POTASSIUM: CPT

## 2023-01-04 PROCEDURE — 83540 ASSAY OF IRON: CPT

## 2023-01-04 PROCEDURE — 82785 ASSAY OF IGE: CPT

## 2023-01-04 PROCEDURE — 82962 GLUCOSE BLOOD TEST: CPT

## 2023-01-04 PROCEDURE — 93005 ELECTROCARDIOGRAM TRACING: CPT

## 2023-01-04 PROCEDURE — 97110 THERAPEUTIC EXERCISES: CPT

## 2023-01-04 PROCEDURE — 83036 HEMOGLOBIN GLYCOSYLATED A1C: CPT

## 2023-01-04 PROCEDURE — 97530 THERAPEUTIC ACTIVITIES: CPT

## 2023-01-04 PROCEDURE — 0225U NFCT DS DNA&RNA 21 SARSCOV2: CPT

## 2023-01-04 PROCEDURE — 85610 PROTHROMBIN TIME: CPT

## 2023-01-04 PROCEDURE — 87040 BLOOD CULTURE FOR BACTERIA: CPT

## 2023-01-04 PROCEDURE — 84145 PROCALCITONIN (PCT): CPT

## 2023-01-04 PROCEDURE — 82550 ASSAY OF CK (CPK): CPT

## 2023-01-04 PROCEDURE — 83605 ASSAY OF LACTIC ACID: CPT

## 2023-01-04 PROCEDURE — 82330 ASSAY OF CALCIUM: CPT

## 2023-01-04 PROCEDURE — 84100 ASSAY OF PHOSPHORUS: CPT

## 2023-01-04 PROCEDURE — 85014 HEMATOCRIT: CPT

## 2023-01-04 PROCEDURE — 84484 ASSAY OF TROPONIN QUANT: CPT

## 2023-01-04 PROCEDURE — 36415 COLL VENOUS BLD VENIPUNCTURE: CPT

## 2023-03-15 NOTE — ED ADULT NURSE NOTE - CHIEF COMPLAINT
Patient : Mary Kirkpatrick Age: 20 year old Sex: female   MRN: [id number] Encounter Date: 3/15/2023    History     Chief Complaint   Patient presents with   • Flank Pain       HPI    Mary Kirkpatrick is a 20 year old presenting to the emergency department with complaints of severe left flank pain.  Patient reports that she was downstairs studying when she began to experience significant left-sided flank pain out of no where that began around an hour ago.  Patient states that since onset the pain has remained constant however there are moments of significant sharp pain that are considerably worse than the baseline constant dull aching pain.  Patient has not taken anything for the pain.  Patient is feeling nauseous and did have 1 bout of emesis here in the emergency department however she states that this is \"due to the way she handles pain.\" Patient denies any chest pain, shortness of breath, UTI type symptoms, fever, chills.  No history of kidney stones.  Patient denies any possibility of pregnancy.  No other modifying factors.          Allergies   Allergen Reactions   • Penicillins RASH       No current facility-administered medications for this encounter.     Current Outpatient Medications   Medication Sig   • HYDROcodone-acetaminophen (NORCO) 5-325 MG per tablet Take 1 tablet by mouth every 4 hours as needed for Pain.   • sulfamethoxazole-trimethoprim (Bactrim DS) 800-160 MG per tablet Take 1 tablet by mouth in the morning and 1 tablet in the evening. Do all this for 7 days.   • tamsulosin (Flomax) 0.4 MG Cap Take 1 capsule by mouth daily for 7 days.       History reviewed. No pertinent past medical history.    History reviewed. No pertinent surgical history.    Family History   Problem Relation Age of Onset   • Hypertension Mother        Social History     Tobacco Use   • Smoking status: Never   • Smokeless tobacco: Never   Vaping Use   • Vaping Use: Some days   Substance Use Topics   • Alcohol use: Yes     Comment: occ    • Drug use: Not Currently     Types: Marijuana     Comment: occ       Review of Systems     Review of Systems   Constitutional: Negative for chills, diaphoresis, fatigue and fever.   HENT: Negative for congestion, postnasal drip, rhinorrhea, sore throat and voice change.    Eyes: Negative for visual disturbance.   Respiratory: Negative for shortness of breath, wheezing and stridor.    Cardiovascular: Negative for chest pain, palpitations and leg swelling.   Gastrointestinal: Positive for nausea and vomiting. Negative for abdominal pain and diarrhea.   Genitourinary: Positive for flank pain. Negative for dyspareunia, dysuria and frequency.   Musculoskeletal: Negative for back pain, neck pain and neck stiffness.   Neurological: Negative for dizziness, syncope, weakness, light-headedness and numbness.   Psychiatric/Behavioral: Negative.        Physical Exam     ED Triage Vitals [03/15/23 1611]   ED Triage Vitals Group      Temp 97.4 °F (36.3 °C)      Heart Rate 96      Resp 17      /67      SpO2 100 %      EtCO2 mmHg       Height 5' 3\" (1.6 m)      Weight 95 lb 4.8 oz (43.2 kg)      Weight Scale Used Standing scale      BMI (Calculated) 16.88      IBW/kg (Calculated) 52.4       Physical Exam  Vitals and nursing note reviewed.   Constitutional:       General: She is not in acute distress.     Appearance: Normal appearance. She is normal weight. She is ill-appearing. She is not toxic-appearing.   HENT:      Head: Normocephalic and atraumatic.      Right Ear: Tympanic membrane, ear canal and external ear normal.      Left Ear: Tympanic membrane, ear canal and external ear normal.      Nose: Nose normal. No congestion or rhinorrhea.      Mouth/Throat:      Mouth: Mucous membranes are moist.      Pharynx: Oropharynx is clear. No oropharyngeal exudate or posterior oropharyngeal erythema.   Eyes:      Extraocular Movements: Extraocular movements intact.      Conjunctiva/sclera: Conjunctivae normal.      Pupils: Pupils  are equal, round, and reactive to light.   Cardiovascular:      Rate and Rhythm: Normal rate and regular rhythm.      Pulses: Normal pulses.      Heart sounds: No murmur heard.  Pulmonary:      Effort: Pulmonary effort is normal.      Breath sounds: Normal breath sounds. No wheezing or rales.   Abdominal:      General: Abdomen is flat.      Palpations: Abdomen is soft.      Tenderness: There is abdominal tenderness. There is left CVA tenderness. There is no guarding or rebound.      Comments: No overlying erythema or ecchymosis.   Musculoskeletal:      Cervical back: Normal range of motion and neck supple. No tenderness.      Right lower leg: No edema.      Left lower leg: No edema.   Lymphadenopathy:      Cervical: No cervical adenopathy.   Skin:     General: Skin is warm.      Capillary Refill: Capillary refill takes less than 2 seconds.   Neurological:      General: No focal deficit present.      Mental Status: She is alert and oriented to person, place, and time.   Psychiatric:         Mood and Affect: Mood normal.         Behavior: Behavior normal.           Procedures     Procedures    Lab Results     Results for orders placed or performed during the hospital encounter of 03/15/23   Comprehensive Metabolic Panel   Result Value Ref Range    Fasting Status      Sodium 137 135 - 145 mmol/L    Potassium 3.2 (L) 3.4 - 5.1 mmol/L    Chloride 107 97 - 110 mmol/L    Carbon Dioxide 23 21 - 32 mmol/L    Anion Gap 10 7 - 19 mmol/L    Glucose 112 (H) 70 - 99 mg/dL    BUN 12 6 - 20 mg/dL    Creatinine 0.68 0.51 - 0.95 mg/dL    Glomerular Filtration Rate >90 >=60    BUN/Cr 18 7 - 25    Calcium 9.3 8.4 - 10.2 mg/dL    Bilirubin, Total 0.5 0.2 - 1.0 mg/dL    GOT/AST 18 <=37 Units/L    GPT/ALT 18 <64 Units/L    Alkaline Phosphatase 56 45 - 117 Units/L    Albumin 4.2 3.6 - 5.1 g/dL    Protein, Total 7.4 6.4 - 8.2 g/dL    Globulin 3.2 2.0 - 4.0 g/dL    A/G Ratio 1.3 1.0 - 2.4   Lipase   Result Value Ref Range    Lipase 117 73 -  393 Units/L   Urinalysis & Reflex Microscopy With Culture If Indicated   Result Value Ref Range    COLOR, URINALYSIS Straw     APPEARANCE, URINALYSIS Clear     GLUCOSE, URINALYSIS Negative Negative mg/dL    BILIRUBIN, URINALYSIS Negative Negative    KETONES, URINALYSIS 20 (A) Negative mg/dL    SPECIFIC GRAVITY, URINALYSIS 1.025 1.005 - 1.030    OCCULT BLOOD, URINALYSIS Moderate (A) Negative    PH, URINALYSIS 6.5 5.0 - 7.0    PROTEIN, URINALYSIS Trace (A) Negative mg/dL    UROBILINOGEN, URINALYSIS 0.2 0.2, 1.0 mg/dL    NITRITE, URINALYSIS Negative Negative    LEUKOCYTE ESTERASE, URINALYSIS Negative Negative    SQUAMOUS EPITHELIAL, URINALYSIS 1 to 5 None Seen, 1 to 5 /hpf    ERYTHROCYTES, URINALYSIS 11 to 25 (A) None Seen, 1 to 2 /hpf    LEUKOCYTES, URINALYSIS 1 to 5 None Seen, 1 to 5 /hpf    BACTERIA, URINALYSIS None Seen None Seen /hpf    HYALINE CASTS, URINALYSIS None Seen None Seen, 1 to 5 /lpf    MUCUS Present    CBC with Automated Differential (performable only)   Result Value Ref Range    WBC 15.9 (H) 4.2 - 11.0 K/mcL    RBC 4.48 4.00 - 5.20 mil/mcL    HGB 13.8 12.0 - 15.5 g/dL    HCT 39.8 36.0 - 46.5 %    MCV 88.8 78.0 - 100.0 fl    MCH 30.8 26.0 - 34.0 pg    MCHC 34.7 32.0 - 36.5 g/dL    RDW-CV 11.9 11.0 - 15.0 %    RDW-SD 38.4 (L) 39.0 - 50.0 fL     140 - 450 K/mcL    NRBC 0 <=0 /100 WBC    Neutrophil, Percent 74 %    Lymphocytes, Percent 19 %    Mono, Percent 5 %    Eosinophils, Percent 1 %    Basophils, Percent 0 %    Immature Granulocytes 1 %    Absolute Neutrophils 11.9 (H) 1.8 - 8.0 K/mcL    Absolute Lymphocytes 3.0 1.2 - 5.2 K/mcL    Absolute Monocytes 0.7 0.3 - 0.9 K/mcL    Absolute Eosinophils  0.1 0.0 - 0.5 K/mcL    Absolute Basophils 0.1 0.0 - 0.3 K/mcL    Absolute Immature Granulocytes 0.1 0.0 - 0.2 K/mcL   ISTAT8 VENOUS  POINT OF CARE   Result Value Ref Range    BUN - POINT OF CARE 11 6 - 20 mg/dL    SODIUM - POINT OF CARE 139 135 - 145 mmol/L    POTASSIUM - POINT OF CARE 3.3 (L) 3.4 - 5.1  mmol/L    CHLORIDE - POINT OF CARE 104 97 - 110 mmol/L    TCO2 - POINT OF CARE 21 19 - 24 mmol/L    ANION GAP - POINT OF CARE 18 7 - 19 mmol/L    HEMATOCRIT - POINT OF CARE 42.0 36.0 - 46.5 %    HEMOGLOBIN - POINT OF CARE 14.3 12.0 - 15.5 g/dL    GLUCOSE - POINT OF CARE 111 (H) 70 - 99 mg/dL    CALCIUM, IONIZED - POINT OF CARE 1.12 (L) 1.15 - 1.29 mmol/L    Creatinine 0.70 0.51 - 0.95 mg/dL    Glomerular Filtration Rate >90 >=60   ISTAT BETA HCG - POINT OF CARE   Result Value Ref Range    ISTAT BETA HCG - POINT OF CARE <5.0 <5.0 IU/L       EKG     No EKG performed    Radiology Results     Imaging Results          US Pelvis Non OB and Duplex Complete (Final result)  Result time 03/15/23 19:38:32    Final result                 Impression:    IMPRESSION:    Complex 5.1 cm left adnexal cyst. Favored hemorrhagic cyst. 10-12 week  follow-up ultrasound recommended to document resolution               Narrative:      EXAM:  US PELVIS NON OB TRANSVAGINAL AND DUPLEX COMPLETE    HISTORY:  Flank pain. Cyst.    TECHNIQUE: Realtime grayscale, color Doppler, and duplex examination was  performed using transabdominal and transvaginal  techniques.    COMPARISON: None.    FINDINGS:    Uterus measures 8 x 5 x 3 cm. Endometrium measures 7 mm. Trace endocervical  fluid is presumably physiologic. No fibroid seen.    Right ovary measures 3.0 x 3.8 x 1.8 cm. Left ovary measures 5.6 x 4.8 x  3.5 cm. Color and arterial/venous wave forms document bilaterally. Complex  5.1 x 4.2 x 2.8 cm left ovarian cyst.    Scattered free fluid is presumably physiologic. Mild internal echoes.  Likely skin blood product from ruptured hemorrhagic cyst.                               CT ABDOMEN PELVIS FOR KIDNEY STONES (Edited Result - FINAL)  Result time 03/15/23 18:03:03    Addendum (preliminary) 1 of 1          Few mildly prominent right lower quadrant lymph nodes are favored reactive.               Final result                 Impression:    IMPRESSION:        1. 3 mm obstructing stone within the distal left ureter. Mild upstream  hydroureteronephrosis and inflammatory stranding.  2. Suspected 5 cm left adnexal cyst. Consider correlate ultrasound for  better characterization.  3. Other findings as above               Narrative:    CT ABDOMEN PELVIS FOR KIDNEY STONES WO CONTRAST    HISTORY: Left flank pain    COMPARISON: None.    TECHNIQUE:  Multiple axial images were obtained of the abdomen and pelvis  without administration of IV or oral contrast. Sagittal and coronal  reformats were generated.    FINDINGS: Exam limited by lack of intra-abdominal fat and contrast    Visualized lung bases are grossly clear.    The liver, gallbladder, spleen, pancreas, right kidney and adrenal glands  demonstrate a normal noncontrast CT appearance. 3 mm obstructing stone  suspected in the distal left ureter. Mild upstream hydroureteronephrosis  and inflammatory stranding.    Tiny hiatal hernia. Moderate clonic stool burden. Appendix not confidently  seen.      Abdominal aorta is normal in caliber. Evaluation for adenopathy is limited.    Mild bladder wall thickening is favored related to partially decompressed  state. 5 cm left ovarian cyst suspected. Mild free pelvic fluid which is  presumably physiologic    Mild nonspecific thickening overlies the umbilicus. No acute osseous  findings scattered probable bone islands.                                  ED Medications     Medications   sodium chloride (NORMAL SALINE) 0.9 % bolus 1,000 mL (0 mLs Intravenous Completed 3/15/23 1815)   ketorolac (TORADOL) injection 15 mg (15 mg Intravenous Given 3/15/23 1706)   fentaNYL (SUBLIMAZE) injection 25 mcg (25 mcg Intravenous Given 3/15/23 1649)   ondansetron (ZOFRAN) injection 4 mg (4 mg Intravenous Given 3/15/23 1647)   ketorolac (TORADOL) injection 15 mg (15 mg Intravenous Given 3/15/23 2010)         ED Course     Vitals:    03/15/23 1930 03/15/23 1956 03/15/23 2000 03/15/23 2030   BP: 110/62   107/57 111/59   BP Location:       Patient Position:       Pulse: 98 92  98   Resp:       Temp:       TempSrc:       SpO2: 99%  99% 99%   Weight:       Height:       LMP: 06/16/2022       ED Course as of 03/15/23 2203   Wed Mar 15, 2023   1630 Initial presentation patient is afebrile with stable vital signs however patient does look to be in pain laying in the bed.  Patient has a 1 hour history of severe left-sided flank pain that began kind of out of no where.  Pain has remained mostly constant with moments of sharp pain intermittent.  Plan on getting pain control along with blood work and CT scan of abdomen. [JL]   1819 Initial lab work and CT scan was reviewed independently.  Patient does have a leukocytosis with no presence of anemia or severe metabolic derangement patient has a mild hypokalemia with no symptoms.  Patient CT scan does show a 3 mm obstructing distal ureteral stone and a 5 cm adnexal mass on the left side.  Urinalysis is still pending will have to get ultrasound to rule out any origin on the left side as it is correlated with patient's severe left-sided pain both the obstructing kidney stone and adnexal mass. [JL]   1900 Ultrasound finds for his likely a hemorrhagic ovarian cyst unlikely the cause of the left flank pain.  Recommend that it get followed up within the next 12 weeks with repeat ultrasound.  Patient has no acutely infected urine.  Will consult with Urology for next steps. [JL]   1920  I have discussed the case with Urology.   We discussed the pertinent history, physical, diagnostic studies and the ED management of the patient.  The plan is trial of passage of stone at home and follow-up with urology.   [JL]   2000 Discussed findings with patient and parents explained to them that pain is likely due to the obstructing 3 mm ureteral stone and the stone will likely pass on its own accord with the some pain control at home.  They were agreeable with the treatment plan and will follow-up  with Dr. Hanley from Urology.  Considering that the patient's vital signs are stable her pain has resolved here in the emergency department, and she has a 3 mm stone that urology recommends can be passed out patient I do not think any further testing or admission is necessary.  Patient was prescribed prophylactic antibiotics as well as Flomax and pain control with Norco.  She can take ibuprofen for further pain control.  Reviewed return precautions with patient including signs of systemic infection.  Patient was discharged from the emergency department in stable condition. [JL]      ED Course User Index  [JL] Johnathan Aguilar PA-C       Independent Review Completed in ED course        Consults                ED Consults done in the ED course    MDM                            MDM done in ED Course        Does the Patient have sepsis: NO     Critical Care       No Critical Care        Disposition       Clinical Impression and Diagnosis  9:58 PM       ED Diagnoses     Diagnosis Comment Associated Orders       Final diagnoses    Ureteral stone --  HYDROCODONE-ACETAMINOPHEN 5-325 MG PO TABS   SERVICE TO UROLOGY      Ovarian cyst, left -- --          The patient was provided with a recommendation to follow up with a primary care provider and obtain reassessment of his/her blood pressure within three months.    Follow Up:  [facility]  [address]  [facility] [address]  [phone]    If symptoms worsen    Otilio Hanley MD  [address]    [facility] [address]  [phone]                Summary of your Discharge Medications      Take these Medications      Details   HYDROcodone-acetaminophen 5-325 MG per tablet  Commonly known as: NORCO   Take 1 tablet by mouth every 4 hours as needed for Pain.     sulfamethoxazole-trimethoprim 800-160 MG per tablet  Commonly known as: Bactrim DS   Take 1 tablet by mouth in the morning and 1 tablet in the evening. Do all this for 7 days.      tamsulosin 0.4 MG Cap  Commonly known as: Flomax   Take 1 capsule by mouth daily for 7 days.            Pt is discharged to home/self care in stable condition.              Discharge 3/15/2023  8:33 PM  After Toradol.             Johnathan Aguilar PA-C  03/15/23 5924     The patient is a 69y Male complaining of

## 2023-05-17 NOTE — PATIENT PROFILE ADULT. - ANESTHESIA, PREVIOUS REACTION, PROFILE
I have reviewed all needed documentation in preparation for visit. Verified patient by name and date of birth  Chief Complaint   Patient presents with    Joint Pain       Vitals:    05/17/23 1357   Resp: 18   Weight: 142 lb (64.4 kg)   Height: 5' 3\" (1.6 m)        Health Maintenance Due   Topic Date Due    DTaP/Tdap/Td vaccine (1 - Tdap) Never done    Shingles vaccine (1 of 2) Never done    COVID-19 Vaccine (4 - Booster for Pfizer series) 01/17/2022        1. \"Have you been to the ER, urgent care clinic since your last visit? Hospitalized since your last visit? \" no    2. \"Have you seen or consulted any other health care providers outside of the [address] [place] [address] since your last visit? \" no     3. For patients aged 39-70: Has the patient had a colonoscopy / FIT/ Cologuard? N/a      If the patient is female:    4. For patients aged 41-77: Has the patient had a mammogram within the past 2 years? N/a      5. For patients aged 21-65: Has the patient had a pap smear?  N/a none

## 2023-06-01 NOTE — DISCHARGE NOTE PROVIDER - PROVIDER TOKENS
independent
PROVIDER:[TOKEN:[[id number]],FOLLOWUP:[1 week]],PROVIDER:[TOKEN:[[id number]],FOLLOWUP:[1 week]]

## 2023-09-06 NOTE — PATIENT PROFILE ADULT - FALLEN IN THE PAST
This provider is located at [facility]. The Patient is seen remotely located at the [facility] ([facility]) using Video. Patient is being seen via telehealth and confirm that they are in a secure environment for this session. The patient's condition being diagnosed/treated is appropriate for telemedicine. Provider identified as Williams Brody as well as credentials APRN MSN FNP-C SRINIVASAN-YOUNG.   The client/patient gave consent to be seen remotely, and when consent is given they understand that the consent allows for patient identifiable information to be sent to a third party as needed.   They may refuse to be seen remotely at any time. The electronic data is encrypted and password protected, and the patient has been advised of the potential risks to privacy not withstanding such measures.    6/23/2023; last evaluation for medication assisted treatment [facility]    Chief Complaint/History of Present Illness: Follow Up buprenorphine/naloxone Medicated Assisted Treatment for Opiate Use Disorder     Patient/Client Concerns/Updates: Patient presents to resume medication assisted treatment in the [facility]; lapse in therapy in the [facility] due to patient transitioning care to a clinic closer proximity to her mother in which she has been the caregiver (patient reports her mother is recovering and overall doing well)  -Patient reports while receiving care at the interim clinic patient reports she was mandated to taper buprenorphine; patient reports this was not her choice and did not feel ready to do so and at this time request to resume previously established maintenance dosage as she is experiencing an increase in cravings-Will accommodate  -Patient reports continued struggle with methamphetamine and patient reports use over the course of 2 days this past week; patient reports she has not utilized Wellbutrin since last evaluation with myself and it is interested in resuming, will  represcribe at 150 mg daily  -Discussed with patient difficulty in controlling anxiety and insomnia considering concurrent use of strong stimulants such as methamphetamine  -Add Vistaril to be taken as needed for heightened periods of anxiety  -Goal with patient to decrease overall use of methamphetamine from multiple times over 2 days to only once daily if she uses    Triggers (Persons/Places/Things/Events/Thought/Emotions): Mother who has recently been ill    Cravings/Substance Use: Intermittent cravings for methamphetamine and use of such this past week    Relapse Prevention: Counseling and continue weekly medication assisted treatment evaluations for support and accountability    Urine Drug Screen (today's visit) discussed: Positive buprenorphine positive amphetamine methamphetamine and THC    UDS Confirmation (Most recent/Resulted): None available of current clinical significance    Most recent pertinent laboratory studies reviewed: 4/11/23-hepatic function within normal limits, Cr WNL, HIV negative, hepatitis C not detected      ROSY (PDMP) Reviewed for Current/Active Medications: buprenorphine/naloxone as reviewed today    Past Surgical History:  No past surgical history on file.    Problem List:  There is no problem list on file for this patient.      Allergy:   No Known Allergies     Current Medications:   Current Outpatient Medications   Medication Sig Dispense Refill   • famotidine (PEPCID) 20 MG tablet TAKE ONE TABLET BY MOUTH EVERY MORNING FOR 30 DAYS     • GNP PAIN RELIEF EX-STRENGTH 500 MG tablet TAKE ONE TABLET BY MOUTH EVERY 6 HOURS AS NEEDED FOR 30 DAYS **DO NOT EXCEED 6 TABLETS IN 24 HOURS**     • ondansetron ODT (ZOFRAN-ODT) 8 MG disintegrating tablet Place 1 tablet on the tongue Every 8 (Eight) Hours As Needed for Nausea or Vomiting. 45 tablet 0   • buprenorphine-naloxone (SUBOXONE) 8-2 MG per SL tablet Place 2 tablets under the tongue Daily. 14 tablet 0   • buPROPion XL (Wellbutrin XL) 150 MG  24 hr tablet Take 1 tablet by mouth Every Morning. 30 tablet 0   • hydrOXYzine pamoate (Vistaril) 25 MG capsule Take 1 capsule by mouth 4 (Four) Times a Day As Needed for Anxiety. 120 capsule 0   • naloxone (NARCAN) 4 MG/0.1ML nasal spray 1 spray into the nostril(s) as directed by provider As Needed (opiate over sedation). 1 spray in 1 nostril every 2 to 3 minutes, call 911 (Patient not taking: Reported on 9/6/2023) 2 each 2     No current facility-administered medications for this visit.       Past Medical History:  Past Medical History:   Diagnosis Date   • History of hepatitis B    • Pancreatitis          Social History     Socioeconomic History   • Marital status: Single   Tobacco Use   • Smoking status: Every Day     Packs/day: 1.00     Years: 15.00     Pack years: 15.00     Types: Cigarettes   • Smokeless tobacco: Never   Vaping Use   • Vaping Use: Never used   Substance and Sexual Activity   • Alcohol use: Not Currently   • Drug use: Yes     Types: Marijuana, Hydrocodone, Oxycodone, Methamphetamines   • Sexual activity: Defer         Family History   Problem Relation Age of Onset   • No Known Problems Mother    • No Known Problems Father    • No Known Problems Sister    • No Known Problems Brother    • No Known Problems Maternal Aunt    • No Known Problems Paternal Aunt    • No Known Problems Maternal Uncle    • No Known Problems Paternal Uncle    • No Known Problems Maternal Grandfather    • No Known Problems Maternal Grandmother    • No Known Problems Paternal Grandfather    • No Known Problems Paternal Grandmother    • No Known Problems Cousin    • No Known Problems Other    • ADD / ADHD Neg Hx    • Alcohol abuse Neg Hx    • Anxiety disorder Neg Hx    • Bipolar disorder Neg Hx    • Dementia Neg Hx    • Depression Neg Hx    • Drug abuse Neg Hx    • OCD Neg Hx    • Paranoid behavior Neg Hx    • Schizophrenia Neg Hx    • Seizures Neg Hx    • Self-Injurious Behavior  Neg Hx    • Suicide Attempts Neg Hx           Mental Status Exam:   Hygiene:   good  Cooperation:  Cooperative  Eye Contact:  Good  Psychomotor Behavior:  Appropriate  Affect:  Appropriate  Mood: normal  Speech:  Normal  Thought Process:  Goal directed  Thought Content:  Normal  Suicidal:  None  Homicidal:  None  Hallucinations:  None  Delusion:  None  Memory:  Intact  Orientation:  Grossly intact  Reliability:  good  Insight:  Fair  Judgement:  Fair  Impulse Control:  Poor         Review of Systems:  Review of Systems   Constitutional:  Negative for activity change, chills, diaphoresis and fatigue.   Respiratory:  Negative for apnea, cough and shortness of breath.    Cardiovascular:  Negative for chest pain, palpitations and leg swelling.   Gastrointestinal:  Negative for abdominal pain, constipation, diarrhea, nausea and vomiting.   Genitourinary:  Negative for difficulty urinating.   Musculoskeletal:  Negative for arthralgias.   Skin:  Negative for rash.   Neurological:  Negative for dizziness, weakness and headaches.   Psychiatric/Behavioral:  Negative for agitation, self-injury, sleep disturbance and suicidal ideas. The patient is nervous/anxious.        Physical Exam:  Physical Exam  Vitals reviewed.   Constitutional:       General: She is not in acute distress.     Appearance: Normal appearance. She is not ill-appearing or toxic-appearing.   Pulmonary:      Effort: Pulmonary effort is normal.   Musculoskeletal:         General: Normal range of motion.   Neurological:      General: No focal deficit present.      Mental Status: She is alert and oriented to person, place, and time.   Psychiatric:         Attention and Perception: Attention and perception normal.         Mood and Affect: Mood normal. Mood is not anxious or depressed.         Speech: Speech normal.         Behavior: Behavior normal. Behavior is cooperative.         Thought Content: Thought content normal.         Cognition and Memory: Cognition and memory normal.         Judgment:  "Judgment normal.     Vital Signs:   /78   Pulse 88   Ht 156.2 cm (61.5\")   Wt 78.3 kg (172 lb 9.6 oz)   BMI 32.09 kg/m²      Lab Results:   Telemedicine on 09/06/2023   Component Date Value Ref Range Status   • External Amphetamine Screen Urine 09/06/2023 Positive (A)   Final   • External Benzodiazepine Screen Uri* 09/06/2023 Negative   Final   • External Cocaine Screen Urine 09/06/2023 Negative   Final   • External THC Screen Urine 09/06/2023 Positive (A)   Final   • External Methadone Screen Urine 09/06/2023 Negative   Final   • External Methamphetamine Screen Ur* 09/06/2023 Positive (A)   Final   • External Oxycodone Screen Urine 09/06/2023 Negative   Final   • External Buprenorphine Screen Urine 09/06/2023 Positive (A)   Final   • External MDMA 09/06/2023 Negative   Final   • External Opiates Screen Urine 09/06/2023 Negative   Final   Telemedicine on 06/22/2023   Component Date Value Ref Range Status   • External Amphetamine Screen Urine 06/22/2023 Positive (A)   Final   • External Benzodiazepine Screen Uri* 06/22/2023 Negative   Final   • External Cocaine Screen Urine 06/22/2023 Negative   Final   • External THC Screen Urine 06/22/2023 Positive (A)   Final   • External Methadone Screen Urine 06/22/2023 Negative   Final   • External Methamphetamine Screen Ur* 06/22/2023 Positive (A)   Final   • External Oxycodone Screen Urine 06/22/2023 Negative   Final   • External Buprenorphine Screen Urine 06/22/2023 Positive (A)   Final   • External MDMA 06/22/2023 Negative   Final   • External Opiates Screen Urine 06/22/2023 Negative   Final   Telemedicine on 06/08/2023   Component Date Value Ref Range Status   • External Amphetamine Screen Urine 06/08/2023 Negative   Final   • External Benzodiazepine Screen Uri* 06/08/2023 Negative   Final   • External Cocaine Screen Urine 06/08/2023 Negative   Final   • External THC Screen Urine 06/08/2023 Positive (A)   Final   • External Methadone Screen Urine 06/08/2023 " Negative   Final   • External Methamphetamine Screen Ur* 06/08/2023 Positive (A)   Final   • External Oxycodone Screen Urine 06/08/2023 Negative   Final   • External Buprenorphine Screen Urine 06/08/2023 Positive (A)   Final   • External MDMA 06/08/2023 Negative   Final   • External Opiates Screen Urine 06/08/2023 Negative   Final   Telemedicine on 05/18/2023   Component Date Value Ref Range Status   • External Amphetamine Screen Urine 05/18/2023 Positive (A)   Final   • External Benzodiazepine Screen Uri* 05/18/2023 Negative   Final   • External Cocaine Screen Urine 05/18/2023 Negative   Final   • External THC Screen Urine 05/18/2023 Positive (A)   Final   • External Methadone Screen Urine 05/18/2023 Negative   Final   • External Methamphetamine Screen Ur* 05/18/2023 Positive (A)   Final   • External Oxycodone Screen Urine 05/18/2023 Negative   Final   • External Buprenorphine Screen Urine 05/18/2023 Positive (A)   Final   • External MDMA 05/18/2023 Positive (A)   Final   • External Opiates Screen Urine 05/18/2023 Negative   Final   Telemedicine on 05/02/2023   Component Date Value Ref Range Status   • External Amphetamine Screen Urine 05/02/2023 Positive (A)   Final   • External Benzodiazepine Screen Uri* 05/02/2023 Negative   Final   • External Cocaine Screen Urine 05/02/2023 Negative   Final   • External THC Screen Urine 05/02/2023 Positive (A)   Final   • External Methadone Screen Urine 05/02/2023 Negative   Final   • External Methamphetamine Screen Ur* 05/02/2023 Positive (A)   Final   • External Oxycodone Screen Urine 05/02/2023 Negative   Final   • External Buprenorphine Screen Urine 05/02/2023 Positive (A)   Final   • External MDMA 05/02/2023 Negative   Final   • External Opiates Screen Urine 05/02/2023 Negative   Final   Telemedicine on 04/25/2023   Component Date Value Ref Range Status   • External Amphetamine Screen Urine 04/25/2023 Positive (A)   Final   • External Benzodiazepine Screen Uri* 04/25/2023  Negative   Final   • External Cocaine Screen Urine 04/25/2023 Negative   Final   • External THC Screen Urine 04/25/2023 Positive (A)   Final   • External Methadone Screen Urine 04/25/2023 Negative   Final   • External Methamphetamine Screen Ur* 04/25/2023 Positive (A)   Final   • External Oxycodone Screen Urine 04/25/2023 Negative   Final   • External Buprenorphine Screen Urine 04/25/2023 Positive (A)   Final   • External MDMA 04/25/2023 Negative   Final   • External Opiates Screen Urine 04/25/2023 Negative   Final   Telemedicine on 04/18/2023   Component Date Value Ref Range Status   • External Amphetamine Screen Urine 04/18/2023 Positive (A)   Final   • External Benzodiazepine Screen Uri* 04/18/2023 Negative   Final   • External Cocaine Screen Urine 04/18/2023 Negative   Final   • External THC Screen Urine 04/18/2023 Positive (A)   Final   • External Methadone Screen Urine 04/18/2023 Negative   Final   • External Methamphetamine Screen Ur* 04/18/2023 Positive (A)   Final   • External Oxycodone Screen Urine 04/18/2023 Negative   Final   • External Buprenorphine Screen Urine 04/18/2023 Positive (A)   Final   • External MDMA 04/18/2023 Negative   Final   • External Opiates Screen Urine 04/18/2023 Negative   Final   Lab on 04/11/2023   Component Date Value Ref Range Status   • Glucose 04/11/2023 91  65 - 99 mg/dL Final   • BUN 04/11/2023 12  6 - 20 mg/dL Final   • Creatinine 04/11/2023 0.78  0.57 - 1.00 mg/dL Final   • Sodium 04/11/2023 140  136 - 145 mmol/L Final   • Potassium 04/11/2023 4.3  3.5 - 5.2 mmol/L Final   • Chloride 04/11/2023 109 (H)  98 - 107 mmol/L Final   • CO2 04/11/2023 23.1  22.0 - 29.0 mmol/L Final   • Calcium 04/11/2023 9.3  8.6 - 10.5 mg/dL Final   • Total Protein 04/11/2023 6.8  6.0 - 8.5 g/dL Final   • Albumin 04/11/2023 4.1  3.5 - 5.2 g/dL Final   • ALT (SGPT) 04/11/2023 20  1 - 33 U/L Final   • AST (SGOT) 04/11/2023 18  1 - 32 U/L Final   • Alkaline Phosphatase 04/11/2023 93  39 - 117 U/L  Final   • Total Bilirubin 04/11/2023 <0.2  0.0 - 1.2 mg/dL Final   • Globulin 04/11/2023 2.7  gm/dL Final   • A/G Ratio 04/11/2023 1.5  g/dL Final   • BUN/Creatinine Ratio 04/11/2023 15.4  7.0 - 25.0 Final   • Anion Gap 04/11/2023 7.9  5.0 - 15.0 mmol/L Final   • eGFR 04/11/2023 90.4  >60.0 mL/min/1.73 Final   • Hepatitis C Quantitation 04/11/2023 HCV Not Detected  IU/mL Final   • HCV log10 04/11/2023 TNP  log10 IU/mL Final    Unable to calculate result since non-numeric result obtained for  component test.   • HCV Test Information 04/11/2023 Comment   Final    The quantitative range of this assay is 15 IU/mL to 100 million IU/mL.   • HCV Genotype 04/11/2023 TNP   Final    Not indicated   • WBC 04/11/2023 8.90  3.40 - 10.80 10*3/mm3 Final   • RBC 04/11/2023 4.45  3.77 - 5.28 10*6/mm3 Final   • Hemoglobin 04/11/2023 12.3  12.0 - 15.9 g/dL Final   • Hematocrit 04/11/2023 37.0  34.0 - 46.6 % Final   • MCV 04/11/2023 83.1  79.0 - 97.0 fL Final   • MCH 04/11/2023 27.6  26.6 - 33.0 pg Final   • MCHC 04/11/2023 33.2  31.5 - 35.7 g/dL Final   • RDW 04/11/2023 12.6  12.3 - 15.4 % Final   • RDW-SD 04/11/2023 38.1  37.0 - 54.0 fl Final   • MPV 04/11/2023 10.9  6.0 - 12.0 fL Final   • Platelets 04/11/2023 290  140 - 450 10*3/mm3 Final   • Neutrophil % 04/11/2023 46.8  42.7 - 76.0 % Final   • Lymphocyte % 04/11/2023 41.6  19.6 - 45.3 % Final   • Monocyte % 04/11/2023 7.2  5.0 - 12.0 % Final   • Eosinophil % 04/11/2023 3.5  0.3 - 6.2 % Final   • Basophil % 04/11/2023 0.7  0.0 - 1.5 % Final   • Immature Grans % 04/11/2023 0.2  0.0 - 0.5 % Final   • Neutrophils, Absolute 04/11/2023 4.17  1.70 - 7.00 10*3/mm3 Final   • Lymphocytes, Absolute 04/11/2023 3.70 (H)  0.70 - 3.10 10*3/mm3 Final   • Monocytes, Absolute 04/11/2023 0.64  0.10 - 0.90 10*3/mm3 Final   • Eosinophils, Absolute 04/11/2023 0.31  0.00 - 0.40 10*3/mm3 Final   • Basophils, Absolute 04/11/2023 0.06  0.00 - 0.20 10*3/mm3 Final   • Immature Grans, Absolute 04/11/2023  0.02  0.00 - 0.05 10*3/mm3 Final   • nRBC 04/11/2023 0.0  0.0 - 0.2 /100 WBC Final   Telemedicine on 04/11/2023   Component Date Value Ref Range Status   • External Amphetamine Screen Urine 04/11/2023 Positive (A)   Final   • External Benzodiazepine Screen Uri* 04/11/2023 Negative   Final   • External Cocaine Screen Urine 04/11/2023 Negative   Final   • External THC Screen Urine 04/11/2023 Positive (A)   Final   • External Methadone Screen Urine 04/11/2023 Negative   Final   • External Methamphetamine Screen Ur* 04/11/2023 Positive (A)   Final   • External Oxycodone Screen Urine 04/11/2023 Negative   Final   • External Buprenorphine Screen Urine 04/11/2023 Positive (A)   Final   • External MDMA 04/11/2023 Positive (A)   Final   • External Opiates Screen Urine 04/11/2023 Negative   Final   • HIV-1/ HIV-2 04/11/2023 Non-Reactive  Non-Reactive Final    A non-reactive test result does not preclude the possibility of exposure to HIV or infection with HIV. An antibody response to recent exposure may take several months to reach detectable levels.         Assessment & Plan   Diagnoses and all orders for this visit:    1. Opioid type dependence, continuous (Primary)  -     buprenorphine-naloxone (SUBOXONE) 8-2 MG per SL tablet; Place 2 tablets under the tongue Daily.  Dispense: 14 tablet; Refill: 0    2. Medication management  -     [facility] Drug Screen    3. Methamphetamine abuse  -     buPROPion XL (Wellbutrin XL) 150 MG 24 hr tablet; Take 1 tablet by mouth Every Morning.  Dispense: 30 tablet; Refill: 0    4. Generalized anxiety disorder  -     hydrOXYzine pamoate (Vistaril) 25 MG capsule; Take 1 capsule by mouth 4 (Four) Times a Day As Needed for Anxiety.  Dispense: 120 capsule; Refill: 0        Visit Diagnoses:    ICD-10-CM ICD-9-CM   1. Opioid type dependence, continuous  F11.20 304.01   2. Medication management  Z79.899 V58.69   3. Methamphetamine abuse  F15.10 305.70   4. Generalized anxiety disorder  F41.1 300.02        PLAN:  Safety: No acute safety concerns  Risk Assessment: Risk of self-harm acutely is low. Risk of self-harm chronically is also low, but could be further elevated in the event of treatment noncompliance and/or AODA.    TREATMENT PLAN/GOALS: Continue supportive psychotherapy efforts and medications as indicated. Treatment and medication options discussed during today's visit. Patient acknowledged and verbally consented to continue with current treatment plan and was educated on the importance of compliance with treatment and follow-up appointments.    MEDICATION ISSUES:  ROSY reviewed as expected.  Discussed medication options and treatment plan of prescribed medication as well as the risks, benefits, and side effects including potential falls, possible impaired driving and metabolic adversities among others. Patient is agreeable to call the office with any worsening of symptoms or onset of side effects. Patient is agreeable to call 911 or go to the nearest ER should he/she begin having SI/HI. No medication side effects or related complaints today.     MEDS ORDERED DURING VISIT:  New Medications Ordered This Visit   Medications   • buprenorphine-naloxone (SUBOXONE) 8-2 MG per SL tablet     Sig: Place 2 tablets under the tongue Daily.     Dispense:  14 tablet     Refill:  0     NADEAN:[id number]   • buPROPion XL (Wellbutrin XL) 150 MG 24 hr tablet     Sig: Take 1 tablet by mouth Every Morning.     Dispense:  30 tablet     Refill:  0   • hydrOXYzine pamoate (Vistaril) 25 MG capsule     Sig: Take 1 capsule by mouth 4 (Four) Times a Day As Needed for Anxiety.     Dispense:  120 capsule     Refill:  0       No follow-ups on file.           This document has been electronically signed by WALKER Romero  September 6, 2023 11:45 EDT      Part of this note may be an electronic transcription/translation of spoken language to printed text using the Dragon Dictation System.   no No

## 2023-09-22 NOTE — OCCUPATIONAL THERAPY INITIAL EVALUATION ADULT - VISUAL ASSESSMENT: TRACKING
Please schedule a follow up appointment with your primary care provider for further evaluation of your symptoms within the next 2-3 days.  Seek medical care if symptoms reoccur or there are any concerns.  
normal

## 2023-11-20 NOTE — DIETITIAN INITIAL EVALUATION ADULT. - RD TO REMAIN AVAILABLE
"[facility]  Physical Therapy Initial Evaluation PRE-OP  Lymphedema Therapy    Visit Date: 11/20/2023     Name: Sheri Garces  Clinic Number: [id number]  Therapy Diagnosis: at risk for lymphedema  Physician: Akanksha Leon MD  Physician Orders: PT Eval and Treat  Medical Diagnosis from Referral: at risk for lymphedema,Invasive lobular carcinoma of left breast  Chart review pertaining to cancer hx:    Cancer Staging   Invasive lobular carcinoma of left breast in female  Staging form: Breast, AJCC 8th Edition  - Clinical stage from 10/30/2023: Stage IA (cT1a, cN0, cM0, G1, ER+, NV+, HER2-) - Signed by Akanksha Leon MD on 11/4/2023    Left upper outer quadrant grade 1 ILC.  7 mm lesion  2nd biopsy left central was benign  ER 96 NV 5 Her2 1+ neg Ki 2  Left garcía lump, left SLNB 11/28/23 [facility]    Needs to quit smoking.  Declines chantix and smoking cessation clinic.  I discussed the critical importance of quitting and the risks associated as above.    Addendum 11/4/23  ER 96 NV 5 Her2 1+ neg Ki 2  MRI no additional findings     11/13/23  Tumor conf  If >5mm, rec oncotype  Evaluation Date: 11/20/2023  Authorization: pending  Plan of Care Expiration: PT f/u 6-8 weeks post-op  Reassessment Due: 8 weeks post-op    Surgery date: Left garcía lump, left SLNB 11/28/23  Radiation: expected to have radiation possibly 5 tx  Chemotherapy: tbd    Visit: 1 / 3  PTA Visit: -- / 5  Time In: 4:15  Time Out: 5:15 PM  Total Billable Time: 60 minutes    Precautions: Standard, cancer and avoid blood sticks, BP, IV or injections in L UE    Subjective     Pt reports: has tinnitus and has a lot of trouble sleeping    Pain  Location: lower back  Current 2/10, Worst 9/10, Best 2/10   Description: sharp pains,  muscle spasms, feels "achey" left foot goes numb occasionally      Past Medical History:   Past Medical History:   Diagnosis Date    Anxiety     Bursitis of both hips     Chronic back pain     " Chronic leg pain     left    Depression     Hyperlipidemia     Hypertension     Insomnia     Miscarriage     Sleep apnea     Tinnitus        Past Surgical History:  has a past surgical history that includes Dilation and curettage of uterus; Back surgery; and Colonoscopy (N/A, 9/19/2019).    Medications: has a current medication list which includes the following prescription(s): alprazolam, atorvastatin, diphenhydramine, eszopiclone, hydrocodone-acetaminophen, lorazepam, losartan, methylprednisolone, ondansetron, ondansetron, propranolol, sertraline, and venlafaxine.    Allergies:   Review of patient's allergies indicates:   Allergen Reactions    Demerol [meperidine]           Hand Dominance: Right  Diet: nauseated alot  Habitus: thin  BMI: 19.18    Social History: lives with their spouse  Place of Residence (Steps/Adaptations): no steps into home  Occupation: Pt does not work.   Prior Exercise Routine: walking the dog daily in evenings 15-20 min walk limited by her hip pain  Prior Level of Function: indep  Current Level of Function:  indep    Patient's Goals: to learn about what she needs to know, also has orders for L hip for PT ( will see for eval on this at a later point)    Objective     Mental Status: Alert/Oriented    Observations  Posture: forward head, rounded shoulders  Joint Integrity: WFLs bilateral  Skin Integrity: intact bilateral  Edema: none bilateral    Sensation  Light Touch: intact bilateral  Proprioception: intact bilateral    A/PROM  (L) UE: WFLs  (R) UE: WFLs  Limitations:  none    STRENGTH  (L) UE: WFLs  (R) UE: WFLs  Limitations:  none    Baseline Measurements of BUEs  LANDMARK LEFT UE (cm)   W + 16 inches 25.0   W + 14 inches 22.3   Elbow 22.2   W + 8 inches 21.4   W +6  inches 19.5   Wrist 14.1   DPC 17.6   IP Thumb 6.2   Length 18 inches    Garment Recommendations: Sigvaris Secure Lite upper arm sleeve with silicone band at top, in size S2 ( small, long length) in 15-20 mmHg. Sigvaris  Secure Lite gauntlet in size small with 15-20 mmHg. Please wear these garments beginning at 2 weeks after surgery, for one year pro-phylactically during the day.   Full axillary coverage in bra choices are recommended.  Please contact the insurance to see if they will cover it. If it is not, you can obtain online, or in person at a local DME. PT will get orders placed in your chart.   Please contact  if it is a financial hardship. ( [facility] or [facility] the [facility])   Pt to wear wraps 2 weeks after sx.     Functional Mobility   Bed mobility: independent   Roll to left: independent   Roll to right: independent   Supine to prone: independent   Scooting to edge of bed: independent   Supine to sit: independent   Sit to supine: independent   Transfers to bed: independent   Transfers to toilet: independent   Sit to stand: independent   Stand pivot: independent   Car transfers: independent     Gait Assessment  AD used: none  Assistance: independent  Distance: community distances  Endurance: WFL     Gait Pattern: wfl       Treatment     Treatment Time In: 4:40  Treatment Time Out: 5:15  Total Treatment time separate from Evaluation: 35 minutes      Self-Care/Home Management to improve behavioral/activity modifications related to ADLs, compensatory training, safety procedures, and adaptive equipment for 15 minutes including:  Garments: PT recommend wearing garments for one year per guidelines for prophylactical assist to decrease risk for lymphedema  Skin care  Weight management  Sleep  Nutrition  Infection prevention      Therapeutic Exercise to develop ROM and flexibility for 15 minutes including:  Surgical protocol for position recommendations  Diaphragmatic Breathing  Exercises by day, complete with pictures of exercises and description for safe progression of motion      Therapeutic Activity to improve dynamic functional performance for 5 minutes including:  Bed mobility, log rolling  Transfers and  functional mobility safely  Supportive posture with sleeping on her right side, with pillow behind back and also pillow under left arm and in between knees also use of ear muffs secodnary tinnitus to get sleep       Education: Instructed on general anatomy/physiology, lymphedema information (definitions, signs, symptoms, precautions), role of therapy in multi-disciplinary team, purpose of lymphedema physical therapy and the benefits/risks of treatment, risks of refusing treatment, POC, and goals for therapy were discussed with the pt.    Written Home Exercises Provided: yes.  Exercises were reviewed and Sheri was able to demonstrate them prior to the end of the session. Sheri demonstrated good  understanding of the education provided.     See EMR under Patient Instructions for exercises provided 11/20/2023.    Assessment     Sheri is a 60 y.o. female referred to outpatient physical therapy with a medical diagnosis of  at risk for lymphedema,Invasive lobular carcinoma of left breast with surgical procedure planned on 11/28/2023. Pt was seen today pre-operatively to assess strength and ROM of BUEs, to take baseline circumferential measurements of BUEs to aid in the early detection of lymphedema post-operatively, and to provide pt education on exercises/precautions post-operative. Pt does not exhibit any ROM impairments currently. This pt will benefit from skilled PT for reassessment of baseline measurements 6-8 week post-operatively and to address future impairments following surgery such as pain, limited ROM, or decreased mobility. Will need to wait until pt is medically cleared to determine if pt is safe for MLD, pneumatic compression pump, or lymphatouch treatments.      Plan of care discussed with patient: Yes  Pt's spiritual, cultural and educational needs considered and patient is agreeable to the plan of care and goals as stated below:     Anticipated barriers for therapy: none    Medical Necessity is  demonstrated by the following:  History  Co-morbidities and personal factors that may impact the plan of care Co-morbidities:   history of cancer, prior lumbar surgery, and tinnitus, chronic low back pain, and bilateral hip pain    Personal Factors:   smoker     high   Examination  Body Structures and Functions, activity limitations and participation restrictions that may impact the plan of care Body Systems:    Pain with low back and mobility    Activity limitations:   Mobility  lifting and carrying objects    Self care  no deficits    Domestic Life  no deficits    Participation Restrictions:   none         moderate   Clinical Presentation evolving clinical presentation with changing clinical characteristics moderate   Decision Making/ Complexity Score: moderate       GOALS  Short Term Goals: 3 months  Pt to be seen for reassessment in 8 weeks after surgery.  Pt will demonstrate 100% knowledge of lymphedema precautions and signs of infection.  Pt to obtain compression garments for prophylactic concerns according to APTA clinical guidelines published in Journal of Physical Therapy.  4.  HEP to include resistive exercises to all major muscle groups 2 x a week 12-15 reps   Long Term Goals: deferred    Plan     Plan of Care: 11/20/2023 to 2/10/2024.    Patient to be seen in 8 weeks following surgical date for 2 visits for reassessment. Patient will benefit from Outpatient Physical Therapy services which may include the following interventions: patient education, HEP, therapeutic exercises, neuromuscular re-education, therapeutic activity, manual therapy, self care/home management, modalities, gait training, decongestive massage, multi-layered bandaging, self massage, self bandaging, and assistance in obtaining appropriate compression garment.      If pt is to undergo XRT, POC must exclude MLD, pneumatic compression pump, and lymphatouch to any radiated area.       Delaney Jose, PT, CLT      yes

## 2023-11-26 NOTE — PROCEDURE NOTE - NSTOLERANCE_GEN_A_CORE
Patient tolerated procedure well. Pt presents to ED from home for left sided dental pain. Pt states for the past month she has been having swelling of the left cheek and chills. Afebrile on arrival.

## 2024-01-01 NOTE — PROVIDER CONTACT NOTE (OTHER) - ASSESSMENT
Pt. AOX4. VSS. No c/o chest pain, palpitations, abdominal pain and nausea vomiting.
Patient A & O x4.  BP- 128/71, HR- 82, SPO2- 97 % on O2 @ 2lpm via n/c.  Denies palpitations, chest pain Pt received metoprolol tablet at 1712.
-If your baby has: Difficulty breathing; blue lips or tongue, and/or does not respond to touch.

## 2024-01-09 NOTE — ED ADULT TRIAGE NOTE - INTERNATIONAL TRAVEL
Lianna Allen  1940  1) Start miralax 17 gram daily and continue daily PRN- administer dose today for constipation  2) Hold glimeperide if BS <100  KATIE Chinchilla CNP on 1/9/2024 at 9:48 AM    
No

## 2024-01-15 PROBLEM — N40.0 BENIGN PROSTATIC HYPERPLASIA WITHOUT LOWER URINARY TRACT SYMPTOMS: Chronic | Status: ACTIVE | Noted: 2022-12-10

## 2024-01-15 PROBLEM — I25.10 ATHEROSCLEROTIC HEART DISEASE OF NATIVE CORONARY ARTERY WITHOUT ANGINA PECTORIS: Chronic | Status: ACTIVE | Noted: 2021-10-08

## 2024-01-15 PROBLEM — K21.9 GASTRO-ESOPHAGEAL REFLUX DISEASE WITHOUT ESOPHAGITIS: Chronic | Status: ACTIVE | Noted: 2022-12-10

## 2024-01-30 ENCOUNTER — NON-APPOINTMENT (OUTPATIENT)
Age: 74
End: 2024-01-30

## 2024-01-30 ENCOUNTER — APPOINTMENT (OUTPATIENT)
Dept: CARDIOLOGY | Facility: CLINIC | Age: 74
End: 2024-01-30
Payer: MEDICARE

## 2024-01-30 VITALS
SYSTOLIC BLOOD PRESSURE: 150 MMHG | WEIGHT: 155 LBS | OXYGEN SATURATION: 99 % | BODY MASS INDEX: 20.54 KG/M2 | HEART RATE: 77 BPM | HEIGHT: 73 IN | DIASTOLIC BLOOD PRESSURE: 74 MMHG

## 2024-01-30 VITALS — SYSTOLIC BLOOD PRESSURE: 130 MMHG | DIASTOLIC BLOOD PRESSURE: 75 MMHG

## 2024-01-30 PROCEDURE — 99215 OFFICE O/P EST HI 40 MIN: CPT | Mod: 25

## 2024-01-30 PROCEDURE — 93000 ELECTROCARDIOGRAM COMPLETE: CPT

## 2024-01-30 NOTE — PHYSICAL EXAM
[No Acute Distress] : no acute distress [Frail] : frail [Normal Conjunctiva] : normal conjunctiva [No Xanthelasma] : no xanthelasma [Normal Venous Pressure] : normal venous pressure [No Carotid Bruit] : no carotid bruit [Normal S1, S2] : normal S1, S2 [Murmur] : murmur [Soft] : abdomen soft [Non Tender] : non-tender [Normal Bowel Sounds] : normal bowel sounds [Normal Gait] : normal gait [Gait - Sufficient for Exercise Testing] : gait - sufficient for exercise testing [No Edema] : no edema [No Cyanosis] : no cyanosis [No Clubbing] : no clubbing [No Varicosities] : no varicosities [Normal Radial B/L] : normal radial B/L [Edema ___] : edema [unfilled] [Normal] : alert and oriented, normal memory [de-identified] : + ROMAINE 2/6

## 2024-01-30 NOTE — DISCUSSION/SUMMARY
[FreeTextEntry1] : 74 yo with known CAD s/p PCI 2019 LCx and LAD and moderate LAD disease, COPD (follows up with Dr. Bajwa), possible COVID19 illness given positive antibodies per pt, + former smoker who presents for a follow up.   -Dyspnea/CAD given his symptoms, previous CAD, RF I recommend evaluation with stress test, additionally will obtain TTE given dyspnea and murmur.    -CAD s/p PCI in 2019 continue ASA 81 mg po daily.   HTN -controlled with repeat manual.  Will continue Norvasc 10 mg po daily, Losartan 100 mg po daily. We discussed importance of medication compliance, low salt intake, regular exercise and ambulatory BP monitoring.   HLD -on high intensity statin.  Will attempt to obtain recent labs   Pt will follow up with me in 3 months.  I have spent 40 min on this encounter by seeing the pt, reviewing previous records, discussing plan of care and documenting this encounter.   [EKG obtained to assist in diagnosis and management of assessed problem(s)] : EKG obtained to assist in diagnosis and management of assessed problem(s)

## 2024-01-30 NOTE — REVIEW OF SYSTEMS
[Cough] : cough [Erectile Dysfunction] : erectile dysfunction [Negative] : Heme/Lymph [Dyspnea on exertion] : dyspnea during exertion [Lower Ext Edema] : no extremity edema [Palpitations] : no palpitations [Syncope] : no syncope [FreeTextEntry5] : see HPI

## 2024-01-30 NOTE — HISTORY OF PRESENT ILLNESS
[FreeTextEntry1] : 74 yo with known CAD s/p PCI 2019 LCx and LAD and moderate LAD disease, COPD (follows up with Dr. Bajwa), possible COVID19 illness given positive antibodies per pt, + former smoker who presents for a follow up.  Previously in April, 2021 pt presented for cardiovascular evaluation after being referred by his PMD.  He has not been seen since 08/2021, family member wanted pt to be seen.  Does report worsened WYMAN.    Has been seeing his PMD Dr. Lozano on [facility], last appointment approximately a month ago.  Was also referred to pulm rehab by his pulmonologist.  Uses oxygen at home, as needed, uses once a week give or take.

## 2024-01-30 NOTE — CARDIOLOGY SUMMARY
[de-identified] : 01/30/2024 sinus, ant septal MI  [de-identified] : 09/26/2019\par  * Chest Pain: No chest pain with administration of\par  Regadenoson.\par  * Symptom: No Symptoms.\par  * HR Response: Appropriate.\par  * BP Response: Appropriate.\par  * Heart Rhythm: Sinus Rhythm - Frequent APD's - 81 BPM.\par  * Q Waves: Anteroseptal MI.\par  * Baseline ECG: Nonspecific ST-T wave abnormality.\par  * ECG Changes: ST Depression: 0.5 mm horizontal in leads\par  II, III, aVF, and V4-V6  started at 2:00 min following\par  regadenoson infusion at a HR of 113 bpm and persisted at\par  least 6:25 min into recovery.\par  * Arrhythmia: Occasional APDs occurred during rest.\par  * The left ventricle was normal in size. There are large,\par  severe defects in the anterior, anteroseptal, and apical\par  walls that are mostly reversible suggestive of infarct\par  with significant tracy-infarct ischemia.\par  * There are large, moderate to severe defects in the\par  inferior, inferoseptal, and mid to distal inferolateral\par  walls that are mostly reversible suggestive of infarct\par  with significant tracy-infarct ischemia.\par  * Increased right ventricular tracer uptake is noted on\par  stress imaging.\par  * Post-stress gated wall motion analysis was performed\par  (LVEF = 61 %;LVEDV = 87 ml.) revealing markedly reduced\par  systolic thickening of the anterior, apical, septal,\par  inferior, and mid to distal inferolateral walls and\par  hypokinesis of the distal septal and apical septal walls.\par  Overall left ventricular ejection fraction is preserved.\par   [de-identified] : 09/25/2019 \par  Mitral Valve: Normal mitral valve. Minimal mitral\par  regurgitation.\par  Aortic Valve/Aorta: Thickened aortic valve.\par  Aortic Root: 3.3 cm.\par  Left Atrium: Normal left atrium.\par  Left Ventricle: Normal left ventricular systolic function.\par  No segmental wall motion abnormalities. Normal left\par  ventricular internal dimensions and wall thicknesses. Mild\par  diastolic dysfunction (Stage I).\par  Right Heart: Normal right atrium. Normal right ventricular\par  size and function. Normal tricuspid valve. Normal pulmonic\par  valve.\par  Pericardium/Pleura: Normal pericardium with no pericardial\par  effusion.\par  Hemodynamic: Estimated right atrial pressure is 8 mm Hg.\par  Inadequate tricuspid regurgitation Doppler envelope\par  precludes estimation of RVSP.\par   [de-identified] : 09/27/2019\par  CORONARY VESSELS: The coronary circulation is right dominant.\par  LM:   --  LM: Normal.\par  LAD:   --  Proximal LAD: There was a 60 % stenosis.\par  --  Mid LAD: There was a 70 % stenosis. IFR=0.77\par  --  D1: There was a 70 % stenosis.\par  CX:   --  Proximal circumflex: There was a 80 % stenosis. YLI=819\par  RCA:   --  Mid RCA: There was a 50 % stenosis.\par  COMPLICATIONS: There were no complications.\par  DIAGNOSTIC IMPRESSIONS: Severe, IFR abn Lcx and LAD disease.\par  INTERVENTIONAL IMPRESSIONS: Successful PCI to the LCx. Staged PC to the\par  LAD.\par

## 2024-02-12 ENCOUNTER — NON-APPOINTMENT (OUTPATIENT)
Age: 74
End: 2024-02-12

## 2024-02-12 ENCOUNTER — APPOINTMENT (OUTPATIENT)
Dept: CARDIOLOGY | Facility: CLINIC | Age: 74
End: 2024-02-12

## 2024-02-12 ENCOUNTER — RESULT REVIEW (OUTPATIENT)
Age: 74
End: 2024-02-12

## 2024-02-12 ENCOUNTER — OUTPATIENT (OUTPATIENT)
Dept: OUTPATIENT SERVICES | Facility: HOSPITAL | Age: 74
LOS: 1 days | End: 2024-02-12
Payer: COMMERCIAL

## 2024-02-12 ENCOUNTER — APPOINTMENT (OUTPATIENT)
Dept: CV DIAGNOSTICS | Facility: HOSPITAL | Age: 74
End: 2024-02-12

## 2024-02-12 DIAGNOSIS — I25.10 ATHEROSCLEROTIC HEART DISEASE OF NATIVE CORONARY ARTERY WITHOUT ANGINA PECTORIS: ICD-10-CM

## 2024-02-12 PROCEDURE — 78452 HT MUSCLE IMAGE SPECT MULT: CPT | Mod: 26,MH

## 2024-02-12 PROCEDURE — 93018 CV STRESS TEST I&R ONLY: CPT | Mod: MH

## 2024-02-12 PROCEDURE — A9500: CPT

## 2024-02-12 PROCEDURE — 78452 HT MUSCLE IMAGE SPECT MULT: CPT | Mod: MH

## 2024-02-12 PROCEDURE — 93016 CV STRESS TEST SUPVJ ONLY: CPT | Mod: MH

## 2024-02-12 PROCEDURE — 93017 CV STRESS TEST TRACING ONLY: CPT

## 2024-02-13 ENCOUNTER — APPOINTMENT (OUTPATIENT)
Dept: CARDIOLOGY | Facility: CLINIC | Age: 74
End: 2024-02-13

## 2024-02-22 ENCOUNTER — APPOINTMENT (OUTPATIENT)
Dept: CARDIOLOGY | Facility: CLINIC | Age: 74
End: 2024-02-22
Payer: MEDICARE

## 2024-02-22 PROCEDURE — 93306 TTE W/DOPPLER COMPLETE: CPT

## 2024-02-26 ENCOUNTER — NON-APPOINTMENT (OUTPATIENT)
Age: 74
End: 2024-02-26

## 2024-03-06 NOTE — ED PROVIDER NOTE - NS ED ATTENDING STATEMENT MOD
"  Assessment & Plan   Problem List Items Addressed This Visit       Anxiety, insomnia      He reports he is doing well with escitalopram         Relevant Medications    escitalopram (LEXAPRO) 20 MG tablet    Nicotine Dependence - Primary     Try nicotine patches again starting at 21 mg patch. In 1 month, lower to 14 mg. May use a nicotine gum PRN when he lowers the patch dose.          Relevant Medications    nicotine (NICODERM CQ) 21 MG/24HR 24 hr patch    nicotine (NICODERM CQ) 14 MG/24HR 24 hr patch    nicotine (NICORETTE) 2 MG gum    nicotine (NICODERM CQ) 7 MG/24HR 24 hr patch    tiotropium (SPIRIVA) 18 MCG inhaled capsule    Hyperlipidemia     Continue statin          Relevant Medications    atorvastatin (LIPITOR) 20 MG tablet    Type 2 diabetes mellitus (H)    Relevant Medications    dulaglutide (TRULICITY) 0.75 MG/0.5ML pen    atorvastatin (LIPITOR) 20 MG tablet    Other Relevant Orders    Hemoglobin A1c (Completed)    Essential hypertension     Stable          Pulmonary emphysema, unspecified emphysema type (H), suspected based on clinical sx     Currently using albuterol BID. Strongly suspect COPD due to tobacco use history and radiographic findings of emphysema   - START: spiriva  - Continue albuterol          Relevant Medications    nicotine (NICODERM CQ) 21 MG/24HR 24 hr patch    nicotine (NICODERM CQ) 14 MG/24HR 24 hr patch    nicotine (NICORETTE) 2 MG gum    nicotine (NICODERM CQ) 7 MG/24HR 24 hr patch    tiotropium (SPIRIVA) 18 MCG inhaled capsule    albuterol (PROAIR HFA/PROVENTIL HFA/VENTOLIN HFA) 108 (90 Base) MCG/ACT inhaler   - Check on RSV vaccine cost  - He declines flu/COVID vaccines today      Return in about 6 months (around 9/6/2024).       BMI  Estimated body mass index is 32.66 kg/m  as calculated from the following:    Height as of this encounter: 1.702 m (5' 7\").    Weight as of this encounter: 94.6 kg (208 lb 8 oz).       Kaleb Mccray is a 65 year old, presenting for 3 month " "follow-up        3/6/2024    10:46 AM   Additional Questions   Roomed by Adwoa Mccray reports some increasing cough and shortness of breath with albuterol inhaler use 2 times per day (AM and PM). Has started smoking again roughly 2 packs per week but is wanting to try the nicotine patches again to quit. Reports restarted when he decreased his patch from 21mg to 14mg. No oxygen use. Some phlegm that is clear colored mostly in the morning.     Unable to get Trulicity, med is out of stock at his pharmacy. He checked his blood sugar before coming into the office today and reports it was 124.     Answers submitted by the patient for this visit:  Provider Visit on 3/6/2024 11:00 AM with Rosie Dorado  COPD (Chronic Obstructive Pulmonary Disease) Visit Questionnaire (Submitted on 3/6/2024)  Chief Complaint: Chronic problems general questions HPI Form  Current status of COPD symptom:: better  Status of fatigue and dyspnea with ambulation:: same as usual  Status of dyspnea:: same as usual  Increase or change in cough or sputum:: often  Have you noticed a change in your sputum/phlegm?: Yes  Have you experienced a recent fever?: No  Baseline ambulation without stopping to rest:: 2-5 blocks  Number of flights of stairs without resting:: 3 or more  Have you had any Emergency Room, Urgent Care visits or a Hospital admission because of your COPD since your last office visit?: No      Review of Systems- pertinent positive in bold:  Constitutional: Fever, chills, night sweats, fainting, weight change, fatigue,  dizziness, sleeping difficulties  Respiratory: shortness of breath, cough, bloody sputum, wheezing  Cardiovascular: chest pain, palpitations, heart racing (only when having a cough attack)        Objective    /62   Pulse 72   Temp 98.1  F (36.7  C) (Oral)   Resp 15   Ht 1.702 m (5' 7\")   Wt 94.6 kg (208 lb 8 oz)   SpO2 98%   BMI 32.66 kg/m    Body mass index is 32.66 kg/m .    Physical Exam   GENERAL: alert and " no distress  RESP: Fine crackles auscultated in the lower bases. No wheezing   CV: regular rate and rhythm, normal S1 S2, no S3 or S4, no murmur, click or rub, no peripheral edema, distant heart sounds noted  ABDOMEN: obese, bowel sounds active, no tenderness on palpation            Signed Electronically by: Rosie Dorado NP       I have personally seen and examined this patient.  I have fully participated in the care of this patient. I have reviewed all pertinent clinical information, including history, physical exam, plan and the Resident’s note and agree except as noted.

## 2024-04-30 ENCOUNTER — NON-APPOINTMENT (OUTPATIENT)
Age: 74
End: 2024-04-30

## 2024-04-30 ENCOUNTER — APPOINTMENT (OUTPATIENT)
Dept: CARDIOLOGY | Facility: CLINIC | Age: 74
End: 2024-04-30
Payer: MEDICARE

## 2024-04-30 VITALS
HEART RATE: 71 BPM | WEIGHT: 155 LBS | OXYGEN SATURATION: 98 % | DIASTOLIC BLOOD PRESSURE: 94 MMHG | SYSTOLIC BLOOD PRESSURE: 182 MMHG | HEIGHT: 73 IN | BODY MASS INDEX: 20.54 KG/M2

## 2024-04-30 VITALS — SYSTOLIC BLOOD PRESSURE: 180 MMHG | DIASTOLIC BLOOD PRESSURE: 90 MMHG

## 2024-04-30 DIAGNOSIS — R06.09 OTHER FORMS OF DYSPNEA: ICD-10-CM

## 2024-04-30 DIAGNOSIS — R94.39 ABNORMAL RESULT OF OTHER CARDIOVASCULAR FUNCTION STUDY: ICD-10-CM

## 2024-04-30 DIAGNOSIS — I25.10 ATHEROSCLEROTIC HEART DISEASE OF NATIVE CORONARY ARTERY W/OUT ANGINA PECTORIS: ICD-10-CM

## 2024-04-30 DIAGNOSIS — I10 ESSENTIAL (PRIMARY) HYPERTENSION: ICD-10-CM

## 2024-04-30 PROCEDURE — 99215 OFFICE O/P EST HI 40 MIN: CPT | Mod: 25

## 2024-04-30 PROCEDURE — 93000 ELECTROCARDIOGRAM COMPLETE: CPT

## 2024-04-30 RX ORDER — METOPROLOL SUCCINATE 25 MG/1
25 TABLET, EXTENDED RELEASE ORAL DAILY
Qty: 90 | Refills: 2 | Status: DISCONTINUED | COMMUNITY
Start: 2021-05-28 | End: 2024-04-30

## 2024-04-30 RX ORDER — CARVEDILOL 6.25 MG/1
6.25 TABLET, FILM COATED ORAL
Qty: 180 | Refills: 3 | Status: ACTIVE | COMMUNITY
Start: 2024-04-30 | End: 1900-01-01

## 2024-05-01 PROBLEM — R94.39 ABNORMAL CARDIOVASCULAR STRESS TEST: Status: ACTIVE | Noted: 2024-05-01

## 2024-05-01 PROBLEM — I25.10 CAD (CORONARY ARTERY DISEASE): Status: ACTIVE | Noted: 2020-11-24

## 2024-05-01 PROBLEM — I10 HYPERTENSION: Status: ACTIVE | Noted: 2020-11-24

## 2024-05-01 PROBLEM — R06.09 DYSPNEA ON EXERTION: Status: ACTIVE | Noted: 2024-01-30

## 2024-05-01 NOTE — PHYSICAL EXAM
[No Acute Distress] : no acute distress [Frail] : frail [Normal Conjunctiva] : normal conjunctiva [No Xanthelasma] : no xanthelasma [Normal Venous Pressure] : normal venous pressure [No Carotid Bruit] : no carotid bruit [Normal S1, S2] : normal S1, S2 [Murmur] : murmur [Soft] : abdomen soft [Non Tender] : non-tender [Normal Bowel Sounds] : normal bowel sounds [Normal Gait] : normal gait [Gait - Sufficient for Exercise Testing] : gait - sufficient for exercise testing [No Edema] : no edema [No Cyanosis] : no cyanosis [No Clubbing] : no clubbing [No Varicosities] : no varicosities [Normal Radial B/L] : normal radial B/L [Edema ___] : edema [unfilled] [Normal] : alert and oriented, normal memory [de-identified] : + ROMAINE 2/6

## 2024-05-01 NOTE — REVIEW OF SYSTEMS
[Dyspnea on exertion] : dyspnea during exertion [Lower Ext Edema] : no extremity edema [Palpitations] : no palpitations [Syncope] : no syncope [Cough] : cough [Erectile Dysfunction] : erectile dysfunction [Negative] : Heme/Lymph [FreeTextEntry5] : see HPI

## 2024-05-01 NOTE — DISCUSSION/SUMMARY
[FreeTextEntry1] : 72 yo with known CAD s/p PCI 2019 LCx and LAD and moderate LAD disease, COPD (follows up with Dr. Bajwa), possible COVID19 illness given positive antibodies per pt, + former smoker who presents for a follow up.   -Dyspnea/CAD given his symptoms, previous CAD, RF I recommended evaluation with stress test, which was abnormal.  Given that and presence of symptoms recommend cardiac cath after a long discussion with the pt and Xin on the phone.     -CAD s/p PCI in 2019 continue ASA 81 mg po daily.   HTN -uncontrolled with repeat manual.  Will continue Norvasc 10 mg po daily, Losartan 100 mg po daily. Switched metoprolol to coreg 6.25 mg BID. We discussed importance of medication compliance, low salt intake, regular exercise and ambulatory BP monitoring.   HLD -on high intensity statin.   Pt will follow up with me after cath.  I have spent 40 min on this encounter by seeing the pt, reviewing previous records, discussing plan of care and documenting this encounter.   [EKG obtained to assist in diagnosis and management of assessed problem(s)] : EKG obtained to assist in diagnosis and management of assessed problem(s)

## 2024-05-01 NOTE — HISTORY OF PRESENT ILLNESS
[FreeTextEntry1] : 74 yo with known CAD s/p PCI 2019 LCx and LAD and moderate LAD disease, COPD (follows up with Dr. Bajwa), possible COVID19 illness given positive antibodies per pt, + former smoker who presents for a follow up.  Underwent stress testing since the last visit.  Reports still WYMAN.      Has been seeing his PMD Dr. Lozano on [facility], last appointment approximately a month ago.  Was also referred to pulm rehab by his pulmonologist.  Uses oxygen at home, as needed, uses once a week give or take.

## 2024-05-01 NOTE — CARDIOLOGY SUMMARY
[de-identified] : 04/30/2024 sinus, ant septal MI  [de-identified] : 09/26/2019\par  * Chest Pain: No chest pain with administration of\par  Regadenoson.\par  * Symptom: No Symptoms.\par  * HR Response: Appropriate.\par  * BP Response: Appropriate.\par  * Heart Rhythm: Sinus Rhythm - Frequent APD's - 81 BPM.\par  * Q Waves: Anteroseptal MI.\par  * Baseline ECG: Nonspecific ST-T wave abnormality.\par  * ECG Changes: ST Depression: 0.5 mm horizontal in leads\par  II, III, aVF, and V4-V6  started at 2:00 min following\par  regadenoson infusion at a HR of 113 bpm and persisted at\par  least 6:25 min into recovery.\par  * Arrhythmia: Occasional APDs occurred during rest.\par  * The left ventricle was normal in size. There are large,\par  severe defects in the anterior, anteroseptal, and apical\par  walls that are mostly reversible suggestive of infarct\par  with significant tracy-infarct ischemia.\par  * There are large, moderate to severe defects in the\par  inferior, inferoseptal, and mid to distal inferolateral\par  walls that are mostly reversible suggestive of infarct\par  with significant tracy-infarct ischemia.\par  * Increased right ventricular tracer uptake is noted on\par  stress imaging.\par  * Post-stress gated wall motion analysis was performed\par  (LVEF = 61 %;LVEDV = 87 ml.) revealing markedly reduced\par  systolic thickening of the anterior, apical, septal,\par  inferior, and mid to distal inferolateral walls and\par  hypokinesis of the distal septal and apical septal walls.\par  Overall left ventricular ejection fraction is preserved.\par   [de-identified] : 09/25/2019 \par  Mitral Valve: Normal mitral valve. Minimal mitral\par  regurgitation.\par  Aortic Valve/Aorta: Thickened aortic valve.\par  Aortic Root: 3.3 cm.\par  Left Atrium: Normal left atrium.\par  Left Ventricle: Normal left ventricular systolic function.\par  No segmental wall motion abnormalities. Normal left\par  ventricular internal dimensions and wall thicknesses. Mild\par  diastolic dysfunction (Stage I).\par  Right Heart: Normal right atrium. Normal right ventricular\par  size and function. Normal tricuspid valve. Normal pulmonic\par  valve.\par  Pericardium/Pleura: Normal pericardium with no pericardial\par  effusion.\par  Hemodynamic: Estimated right atrial pressure is 8 mm Hg.\par  Inadequate tricuspid regurgitation Doppler envelope\par  precludes estimation of RVSP.\par   [de-identified] : 09/27/2019\par  CORONARY VESSELS: The coronary circulation is right dominant.\par  LM:   --  LM: Normal.\par  LAD:   --  Proximal LAD: There was a 60 % stenosis.\par  --  Mid LAD: There was a 70 % stenosis. IFR=0.77\par  --  D1: There was a 70 % stenosis.\par  CX:   --  Proximal circumflex: There was a 80 % stenosis. YDQ=094\par  RCA:   --  Mid RCA: There was a 50 % stenosis.\par  COMPLICATIONS: There were no complications.\par  DIAGNOSTIC IMPRESSIONS: Severe, IFR abn Lcx and LAD disease.\par  INTERVENTIONAL IMPRESSIONS: Successful PCI to the LCx. Staged PC to the\par  LAD.\par

## 2024-05-22 ENCOUNTER — INPATIENT (INPATIENT)
Facility: HOSPITAL | Age: 74
LOS: 0 days | Discharge: HOME | DRG: 324 | End: 2024-05-23
Attending: INTERNAL MEDICINE | Admitting: INTERNAL MEDICINE
Payer: COMMERCIAL

## 2024-05-22 ENCOUNTER — TRANSCRIPTION ENCOUNTER (OUTPATIENT)
Age: 74
End: 2024-05-22

## 2024-05-22 VITALS — HEIGHT: 73 IN | WEIGHT: 154.98 LBS | RESPIRATION RATE: 16 BRPM

## 2024-05-22 DIAGNOSIS — R94.39 ABNORMAL RESULT OF OTHER CARDIOVASCULAR FUNCTION STUDY: ICD-10-CM

## 2024-05-22 LAB
ANION GAP SERPL CALC-SCNC: 15 MMOL/L — SIGNIFICANT CHANGE UP (ref 5–17)
BUN SERPL-MCNC: 20 MG/DL — SIGNIFICANT CHANGE UP (ref 7–23)
CALCIUM SERPL-MCNC: 9 MG/DL — SIGNIFICANT CHANGE UP (ref 8.4–10.5)
CHLORIDE SERPL-SCNC: 104 MMOL/L — SIGNIFICANT CHANGE UP (ref 96–108)
CO2 SERPL-SCNC: 21 MMOL/L — LOW (ref 22–31)
CREAT SERPL-MCNC: 1.15 MG/DL — SIGNIFICANT CHANGE UP (ref 0.5–1.3)
EGFR: 67 ML/MIN/1.73M2 — SIGNIFICANT CHANGE UP
GLUCOSE SERPL-MCNC: 96 MG/DL — SIGNIFICANT CHANGE UP (ref 70–99)
HCT VFR BLD CALC: 41.8 % — SIGNIFICANT CHANGE UP (ref 39–50)
HGB BLD-MCNC: 13.4 G/DL — SIGNIFICANT CHANGE UP (ref 13–17)
MCHC RBC-ENTMCNC: 29.8 PG — SIGNIFICANT CHANGE UP (ref 27–34)
MCHC RBC-ENTMCNC: 32.1 GM/DL — SIGNIFICANT CHANGE UP (ref 32–36)
MCV RBC AUTO: 92.9 FL — SIGNIFICANT CHANGE UP (ref 80–100)
NRBC # BLD: 0 /100 WBCS — SIGNIFICANT CHANGE UP (ref 0–0)
PLATELET # BLD AUTO: 251 K/UL — SIGNIFICANT CHANGE UP (ref 150–400)
POTASSIUM SERPL-MCNC: 3.9 MMOL/L — SIGNIFICANT CHANGE UP (ref 3.5–5.3)
POTASSIUM SERPL-SCNC: 3.9 MMOL/L — SIGNIFICANT CHANGE UP (ref 3.5–5.3)
RBC # BLD: 4.5 M/UL — SIGNIFICANT CHANGE UP (ref 4.2–5.8)
RBC # FLD: 14.4 % — SIGNIFICANT CHANGE UP (ref 10.3–14.5)
SODIUM SERPL-SCNC: 140 MMOL/L — SIGNIFICANT CHANGE UP (ref 135–145)
WBC # BLD: 7.17 K/UL — SIGNIFICANT CHANGE UP (ref 3.8–10.5)
WBC # FLD AUTO: 7.17 K/UL — SIGNIFICANT CHANGE UP (ref 3.8–10.5)

## 2024-05-22 PROCEDURE — 93454 CORONARY ARTERY ANGIO S&I: CPT | Mod: 26,59

## 2024-05-22 PROCEDURE — 93010 ELECTROCARDIOGRAM REPORT: CPT

## 2024-05-22 PROCEDURE — 92972 PERQ TRLUML CORONRY LITHOTRP: CPT

## 2024-05-22 PROCEDURE — 99152 MOD SED SAME PHYS/QHP 5/>YRS: CPT

## 2024-05-22 PROCEDURE — 92928 PRQ TCAT PLMT NTRAC ST 1 LES: CPT | Mod: LD

## 2024-05-22 PROCEDURE — 93010 ELECTROCARDIOGRAM REPORT: CPT | Mod: 77

## 2024-05-22 RX ORDER — TIOTROPIUM BROMIDE 18 UG/1
1 CAPSULE ORAL; RESPIRATORY (INHALATION)
Qty: 0 | Refills: 0 | DISCHARGE

## 2024-05-22 RX ORDER — ROFLUMILAST 500 UG/1
1 TABLET ORAL
Qty: 0 | Refills: 0 | DISCHARGE

## 2024-05-22 RX ORDER — ATORVASTATIN CALCIUM 80 MG/1
80 TABLET, FILM COATED ORAL AT BEDTIME
Refills: 0 | Status: DISCONTINUED | OUTPATIENT
Start: 2024-05-22 | End: 2024-05-23

## 2024-05-22 RX ORDER — TIOTROPIUM BROMIDE 18 UG/1
2 CAPSULE ORAL; RESPIRATORY (INHALATION) DAILY
Refills: 0 | Status: DISCONTINUED | OUTPATIENT
Start: 2024-05-22 | End: 2024-05-23

## 2024-05-22 RX ORDER — BUDESONIDE AND FORMOTEROL FUMARATE DIHYDRATE 160; 4.5 UG/1; UG/1
2 AEROSOL RESPIRATORY (INHALATION)
Refills: 0 | Status: DISCONTINUED | OUTPATIENT
Start: 2024-05-22 | End: 2024-05-23

## 2024-05-22 RX ORDER — ALBUTEROL 90 UG/1
2 AEROSOL, METERED ORAL EVERY 6 HOURS
Refills: 0 | Status: DISCONTINUED | OUTPATIENT
Start: 2024-05-22 | End: 2024-05-23

## 2024-05-22 RX ORDER — BUDESONIDE AND FORMOTEROL FUMARATE DIHYDRATE 160; 4.5 UG/1; UG/1
2 AEROSOL RESPIRATORY (INHALATION)
Qty: 0 | Refills: 0 | DISCHARGE

## 2024-05-22 RX ORDER — MONTELUKAST 4 MG/1
1 TABLET, CHEWABLE ORAL
Qty: 0 | Refills: 0 | DISCHARGE

## 2024-05-22 RX ORDER — MONTELUKAST 4 MG/1
10 TABLET, CHEWABLE ORAL DAILY
Refills: 0 | Status: DISCONTINUED | OUTPATIENT
Start: 2024-05-22 | End: 2024-05-23

## 2024-05-22 RX ORDER — SODIUM CHLORIDE 9 MG/ML
1000 INJECTION INTRAMUSCULAR; INTRAVENOUS; SUBCUTANEOUS
Refills: 0 | Status: DISCONTINUED | OUTPATIENT
Start: 2024-05-22 | End: 2024-05-23

## 2024-05-22 RX ORDER — CLOPIDOGREL BISULFATE 75 MG/1
75 TABLET, FILM COATED ORAL DAILY
Refills: 0 | Status: DISCONTINUED | OUTPATIENT
Start: 2024-05-23 | End: 2024-05-23

## 2024-05-22 RX ORDER — AMLODIPINE BESYLATE 2.5 MG/1
1 TABLET ORAL
Qty: 30 | Refills: 0

## 2024-05-22 RX ORDER — LOSARTAN POTASSIUM 100 MG/1
100 TABLET, FILM COATED ORAL DAILY
Refills: 0 | Status: DISCONTINUED | OUTPATIENT
Start: 2024-05-22 | End: 2024-05-23

## 2024-05-22 RX ORDER — CARVEDILOL PHOSPHATE 80 MG/1
6.25 CAPSULE, EXTENDED RELEASE ORAL EVERY 12 HOURS
Refills: 0 | Status: DISCONTINUED | OUTPATIENT
Start: 2024-05-22 | End: 2024-05-23

## 2024-05-22 RX ORDER — AMLODIPINE BESYLATE 2.5 MG/1
10 TABLET ORAL DAILY
Refills: 0 | Status: DISCONTINUED | OUTPATIENT
Start: 2024-05-22 | End: 2024-05-23

## 2024-05-22 RX ORDER — ASPIRIN/CALCIUM CARB/MAGNESIUM 324 MG
81 TABLET ORAL DAILY
Refills: 0 | Status: DISCONTINUED | OUTPATIENT
Start: 2024-05-22 | End: 2024-05-23

## 2024-05-22 RX ORDER — CLOPIDOGREL BISULFATE 75 MG/1
1 TABLET, FILM COATED ORAL
Qty: 0 | Refills: 0 | DISCHARGE

## 2024-05-22 RX ADMIN — ATORVASTATIN CALCIUM 80 MILLIGRAM(S): 80 TABLET, FILM COATED ORAL at 20:17

## 2024-05-22 RX ADMIN — AMLODIPINE BESYLATE 10 MILLIGRAM(S): 2.5 TABLET ORAL at 13:36

## 2024-05-22 RX ADMIN — CARVEDILOL PHOSPHATE 6.25 MILLIGRAM(S): 80 CAPSULE, EXTENDED RELEASE ORAL at 17:07

## 2024-05-22 RX ADMIN — SODIUM CHLORIDE 75 MILLILITER(S): 9 INJECTION INTRAMUSCULAR; INTRAVENOUS; SUBCUTANEOUS at 13:38

## 2024-05-22 RX ADMIN — MONTELUKAST 10 MILLIGRAM(S): 4 TABLET, CHEWABLE ORAL at 13:36

## 2024-05-22 RX ADMIN — BUDESONIDE AND FORMOTEROL FUMARATE DIHYDRATE 2 PUFF(S): 160; 4.5 AEROSOL RESPIRATORY (INHALATION) at 17:07

## 2024-05-22 RX ADMIN — LOSARTAN POTASSIUM 100 MILLIGRAM(S): 100 TABLET, FILM COATED ORAL at 13:37

## 2024-05-22 NOTE — PATIENT PROFILE ADULT - DOES PATIENT HAVE ADVANCE DIRECTIVE
No
27y  @38wks and 4 days gestation presenting for IOL for gHTN. Patient denies ctx, LOF, VB, HA, visual disturbances, epigastric pain or swelling. PNC c/b gHTN, otherwise uncomplicated. +FM. GBS +. EFW 6#5 done by ultrasound.    POBHx: 2012-, FT, 6#4. 2014-TOPx1 s/p D&C  PGYNHx: +ovarian cysts. Denies fibroids, abnormal pap smears, STD's  PMHx: Nephrolithiasis-2018  Medications: PNV  Allergies: NKDA  PSHx: Denies  Social Hx: Denies etoh/tobacco/drug use. Denies anxiety/depression/pp depression    Vital Signs Last 24 Hrs  T(C): 37.1 (2021 21:19), Max: 37.1 (2021 21:13)  T(F): 98.8 (2021 21:19), Max: 98.8 (2021 21:19)  HR: 103 (2021 21:29) (99 - 103)  BP: 130/93 (2021 21:25) (127/84 - 130/93)  BP(mean): --  RR: 18 (2021 21:19) (18 - 18)  SpO2: 98% (2021 21:29) (98% - 100%)    General: NAD, A&Ox3  CV: RRR  Lungs: CTA b/l  Abdomen: Soft, NT, gravid  Ext: Neg swelling noted B/L LE    VE: 2/60/-3  Bedside sono: Vertex presentation  EFM: 130/moderate variability/+accels/no decels  [place]: Irregular, q8-10m

## 2024-05-22 NOTE — PATIENT PROFILE ADULT - FALL HARM RISK - UNIVERSAL INTERVENTIONS
Bed in lowest position, wheels locked, appropriate side rails in place/Call bell, personal items and telephone in reach/Instruct patient to call for assistance before getting out of bed or chair/Non-slip footwear when patient is out of bed/[place] to call system/Physically safe environment - no spills, clutter or unnecessary equipment/Purposeful Proactive Rounding/Room/bathroom lighting operational, light cord in reach

## 2024-05-22 NOTE — DISCHARGE NOTE PROVIDER - CARE PROVIDER_API CALL
Cristal Hendrix  Interventional Cardiology  [address] [place] [address], [address] [place] [id number]  Phone: [phone]  Fax: [phone]  Follow Up Time: 2 weeks

## 2024-05-22 NOTE — H&P CARDIOLOGY - HISTORY OF PRESENT ILLNESS
Cardiologist Dr. Hendrix    72 y/o M former smoker, COPD/asthma on occasional oxygen at home that he uses PRN (hasn't used in the last 2 months), HTN, CAD s/p PCI 2019 LCx and LAD with moderate LAD disease, BPH, GERD presents for [facility]. Patient with c/o SOB . Patient with positive stress test and presents to Dr. Hendrix for further ischemic evaluation.     Currently denies chest pain, palpitations, orthopnea or PND.

## 2024-05-22 NOTE — CHART NOTE - NSCHARTNOTEFT_GEN_A_CORE
Removal of Femoral Sheath by Dede Powers NP    Pulses in the (right) lower extremity are (palpable +2). The patient was placed in the supine position. The insertion site was identified and the sutures were removed per protocol.  The ___6_ Upper sor[place] femoral sheath was then removed. Direct pressure was applied for  ____20__ minutes.     Monitoring of the (right) groin and both lower extremities including neuro-vascular checks and vital signs every 15 minutes x 4, then every 30 minutes x 2, then every 1 hour was ordered.    Complications: None

## 2024-05-22 NOTE — DISCHARGE NOTE PROVIDER - HOSPITAL COURSE
74 y/o M former smoker, COPD/asthma on occasional oxygen at home that he uses PRN (hasn't used in the last 2 months), HTN, CAD s/p PCI 2019 LCx and LAD with moderate LAD disease, BPH, GERD presents for [facility]. Patient with c/o SOB . Patient with positive stress test and presents to Dr. Hendrix for further ischemic evaluation.   Currently denies chest pain, palpitations, orthopnea or PND.     .  ------------------------------------------------------------------------  Cardiac Rehab (STEMI/NSTEMI/ACS/Unstable Angina/CHF/Chronic Stable Angina/Heart Surgery (CABG,Valve)/Post PCI):              *Education on benefits of Cardiac Rehab provided to patient: Yes         *Referral and Prescription Given for Cardiac Rehab : Yes         *Pt given list of locations & instructed to contact their insurance company to review list of participating providers: Yes         *Pt instructed to bring Cardiac Rehab prescription with them to Cardiology Follow up appointment for assistance with enrollment: Yes         *Pt discharged with copies detail cardiovascular history, medications, testing/treatments: Yes      ****** Reasons for NO Cardiac rehab referral rx:              Patient Refused            Medical Reason: ex has Home Care, Home PT, incomplete revascularization            Patient lacks medical coverage for Cardiac Rehab            Pt discharged to Nursing Care/[facility]/Long term Care Facility            Patient Lacks Transportation or no cardiac rehab within 60 minutes driving range            Patient already participates in Cardiac Rehab            Other: (provide details) ex: Hospice patient      < from: Nuclear Stress Test-Pharmacologic.. (02.12.24 @ 07:52) >    Conclusions:   1. Myocardial Perfusion: Abnormal.   2. Arrhythmias: Occasional VPD's occurred during rest, stress and recovery.   3. Perfusion Findings:      --There are large, moderate to severe defects in the anteroseptal, distal anterior, and apical walls that are partially reversible suggestive of infarct with mild to moderate tracy-infarctischemia.      --There are large, moderate defects in the inferior and inferoseptal walls that are mostly fixed suggestive of infarct with mild tracy-infarct ischemia.      --There are small, mild defects in the basal anterior and basal anterolateral walls that are reversible suggestive of mild ischemia.   4. The left ventricle is normal in size. The post stress left ventricular EF is 58 %. The stress end diastolic volume is 91 ml and systolic volume is 38 ml.   5. Post-stress gated wall motion analysis revealed hypokinesis of the inferior, inferoseptal, distal anteroseptal, and apical walls.   6. *When compared with the Nuclear/Stress perfusion images of 6/22/2021, the overall perfusion findings appear similar.    < end of copied text >         74 y/o M former smoker, COPD/asthma on occasional oxygen at home that he uses PRN (hasn't used in the last 2 months), HTN, CAD s/p PCI 2019 LCx and LAD with moderate LAD disease, BPH, GERD presents for [facility] referred by Dr. Hendrix.  Catheterization on 5/22 revealed severe atherosclerosis of the pLAD s/p PCI with RISSA and Shockwave, severe atherosclerosis of the mLCx, moderate atherosclerosis of the mRCA, patent stents in the mLAD and mLCx via RFA (view catheterization report for full details). He remained overnight to have staged procedure on 5/23 revealed successful PCI to the OM1 with RISSA. DAPT 3-6 months was recommended along with aggressive medical management and risk factor modification.  5/23 right femoral sheaths were removed according to protocol without complication. Patient remained hemodynamically stable, neurovascularly intact and chest pain free. Patient voided and ambulated and had no EKG changes post procedure.  The remainder of his hospitalization was uneventful.  He was provided education regarding DAPT/statin therapy, as well as post procedure wound care instructions.  He will follow up with his private cardiologist Dr. Davis in 2 weeks and advised to return to ER with any concerning symptoms.       .  ------------------------------------------------------------------------  Cardiac Rehab (STEMI/NSTEMI/ACS/Unstable Angina/CHF/Chronic Stable Angina/Heart Surgery (CABG,Valve)/Post PCI):              *Education on benefits of Cardiac Rehab provided to patient: Yes         *Referral and Prescription Given for Cardiac Rehab : Yes         *Pt given list of locations & instructed to contact their insurance company to review list of participating providers: Yes         *Pt instructed to bring Cardiac Rehab prescription with them to Cardiology Follow up appointment for assistance with enrollment: Yes         *Pt discharged with copies detail cardiovascular history, medications, testing/treatments: Yes      ****** Reasons for NO Cardiac rehab referral rx:           74 y/o M former smoker, COPD/asthma on occasional oxygen at home that he uses PRN (hasn't used in the last 2 months), HTN, CAD s/p PCI 2019 LCx and LAD with moderate LAD disease, BPH, GERD presents for [facility] referred by Dr. Hendrix for abnormal stress test.  Catheterization on 5/22 revealed severe atherosclerosis of the pLAD s/p PCI with RISSA and Shockwave, severe atherosclerosis of the mLCx, moderate atherosclerosis of the mRCA, patent stents in the mLAD and mLCx via RFA (view catheterization report for full details). He remained overnight to have staged procedure on 5/23 revealed successful PCI to the OM1 with RISSA. DAPT 3-6 months was recommended along with aggressive medical management and risk factor modification.  5/23 right femoral sheath was removed according to protocol without complication. Patient remained hemodynamically stable, neurovascularly intact and chest pain free. Patient voided and ambulated and had no EKG changes post procedure.  The remainder of his hospitalization was uneventful.  He was provided education regarding DAPT/statin therapy, as well as post procedure wound care instructions.  He will follow up with his private cardiologist Dr. Davis in 2 weeks and advised to return to ER with any concerning symptoms.       .  ------------------------------------------------------------------------  Cardiac Rehab (STEMI/NSTEMI/ACS/Unstable Angina/CHF/Chronic Stable Angina/Heart Surgery (CABG,Valve)/Post PCI):              *Education on benefits of Cardiac Rehab provided to patient: Yes         *Referral and Prescription Given for Cardiac Rehab : Yes         *Pt given list of locations & instructed to contact their insurance company to review list of participating providers: Yes         *Pt instructed to bring Cardiac Rehab prescription with them to Cardiology Follow up appointment for assistance with enrollment: Yes         *Pt discharged with copies detail cardiovascular history, medications, testing/treatments: Yes      ****** Reasons for NO Cardiac rehab referral rx:           74 y/o M former smoker, COPD/asthma on occasional oxygen at home that he uses PRN (hasn't used in the last 2 months), HTN, CAD s/p PCI 2019 LCx and LAD with moderate LAD disease, BPH, GERD presents for [facility] referred by Dr. Hendrix for abnormal stress test.  Catheterization on 5/22 revealed severe atherosclerosis of the pLAD s/p PCI with RISSA and Shockwave, severe atherosclerosis of the mLCx, moderate atherosclerosis of the mRCA, patent stents in the mLAD and mLCx via RFA (view catheterization report for full details). 6F right femoral sheath was removed without complication. He remained overnight to have staged procedure on 5/23 revealed successful PCI to the OM1 with RISSA. DAPT 3-6 months was recommended along with aggressive medical management and risk factor modification. 6F right femoral sheath was removed according to protocol without complication. Patient remained hemodynamically stable, neurovascularly intact and chest pain free. Patient voided and ambulated and had no EKG changes post procedure.  The remainder of his hospitalization was uneventful.  He was provided education regarding DAPT/statin therapy, as well as post procedure wound care instructions.  He will follow up with his private cardiologist Dr. Hunter in 2 weeks and advised to return to ER with any concerning symptoms. He was referred to cardiac rehab:       Cardiac Rehab (STEMI/NSTEMI/ACS/Unstable Angina/CHF/Chronic Stable Angina/Heart Surgery (CABG,Valve)/Post PCI):              *Education on benefits of Cardiac Rehab provided to patient: Yes         *Referral and Prescription Given for Cardiac Rehab : Yes         *Pt given list of locations & instructed to contact their insurance company to review list of participating providers: Yes         *Pt instructed to bring Cardiac Rehab prescription with them to Cardiology Follow up appointment for assistance with enrollment: Yes         *Pt discharged with copies detail cardiovascular history, medications, testing/treatments: Yes

## 2024-05-22 NOTE — ASU PATIENT PROFILE, ADULT - AS SC BRADEN ACTIVITY
Called pts wife and relayed Dr. Somers's recommendation. Pts wife verbalized understanding and had no further questions or concerns.   (4) walks frequently Yes

## 2024-05-22 NOTE — DISCHARGE NOTE PROVIDER - NSDCCPCAREPLAN_GEN_ALL_CORE_FT
PRINCIPAL DISCHARGE DIAGNOSIS  Diagnosis: CAD (coronary artery disease)  Assessment and Plan of Treatment: You had stents placed to your LAD and OM1.  Wound Care: The day AFTER your procedure:  Remove bandage GENTLY, and clean using mild soap and gentle warm, water stream, pat dry.   Leave OPEN to air. YOU MAY SHOWER  DO NOT SOAK your procedure site for 1 week (no baths, no pools, no tubs)  Check your wrist or groin puncture site everyday.  A small amount of soreness and bruising is normal  ACTIVITY for the next 24 hours:  1) DO NOT DRIVE   2) DO NOT make any important decisions or sign legal documents   3) DO NOT operate heavy [employer]  You may resume sexual activity in 48 hours, unless otherwise instructed by your cardiologist  If your procedure was done through the [facility], for the NEXT 5 DAYS:  Limit climbing the stairs, no strenuous activities, no pushing, no pulling, no straining  Do not lift anything that is 10lbs or heavier   MEDICATION:   Take your medications as explained (see discharge paperwork). If you recieved a stent, you will be taking medication to KEEP YOUR STENT OPEN. DO NOT STOP THESE MEDICATIONS UNLESS DIRECTED BY A CARDIOLOGIST  If you smoke, WE RECOMMEND YOU QUIT (you may call [phone] the [facility] of [facility] if you need assistance)   FOLLOW UP: Please follow up with Dr. Hendrix in 2 weeks.  Please call immediately for an appointment upon discharge from the hospital.  ***CALL YOUR DOCTOR***  If you experience: chest pain, fever, chills, body aches, or severe pain, swelling, redness, heat or yellow discharge at incision site or if you experience bleeding, temperature change, numbness or excruciating pain at the procedural site  If you are unable to reach your doctor, you may contact:   Cardiology Office at [facility] at [phone]   Cardiac Short Stay Unit (CSSU) [phone]     Cardiac Recovery Suite (CRS) [phone]

## 2024-05-22 NOTE — DISCHARGE NOTE PROVIDER - NSDCCPTREATMENT_GEN_ALL_CORE_FT
PRINCIPAL PROCEDURE  Procedure: Left heart cardiac cath  Findings and Treatment: No heavy lifting for 2 weeks, no strenuous activity  or uneccesary stair climbing, no driving for x 2 days,  you may shower 24 hours following procedure but no bathing or swimming for x1  week, no bending, no straining while having a Bowel movement, No strenuous sexual activity for x 1 week check groin for bleeding, pain, tightness or ( golf ball size)  swelling daily following procedure , & follow up with your cardiologist in 1-2 week

## 2024-05-22 NOTE — DISCHARGE NOTE PROVIDER - NSDCMRMEDTOKEN_GEN_ALL_CORE_FT
albuterol 90 mcg/inh inhalation aerosol: 2 puff(s) inhaled every 4 hours, As needed, Shortness of Breath and/or Wheezing  amLODIPine 10 mg oral tablet: 1 tab(s) orally once a day  aspirin 81 mg oral delayed release tablet: 1 tab(s) orally once a day  atorvastatin 80 mg oral tablet: 1 tab(s) orally once a day (at bedtime)  Coreg 6.25 mg oral tablet: 1 tab(s) orally 2 times a day  Daliresp 500 mcg oral tablet: 1 tab(s) orally once a day  losartan 100 mg oral tablet: 1 tab(s) orally once a day  Singulair 10 mg oral tablet: 1 tab(s) orally once a day  Spiriva HandiHaler 18 mcg inhalation capsule: 1 cap(s) inhaled once a day, As Needed  Symbicort 160 mcg-4.5 mcg/inh inhalation aerosol: 2 puff(s) inhaled 2 times a day, As Needed   albuterol 90 mcg/inh inhalation aerosol: 2 puff(s) inhaled every 4 hours, As needed, Shortness of Breath and/or Wheezing  amLODIPine 10 mg oral tablet: 1 tab(s) orally once a day  aspirin 81 mg oral delayed release tablet: 1 tab(s) orally once a day  atorvastatin 80 mg oral tablet: 1 tab(s) orally once a day (at bedtime)  cardiac rehab 2-3 times/week for 12 weeks: 2-3 times/week for 12 weeks  cardiac rehab 2-3 times/week for 12 weeks: 2-3 times/week for 12 weeks  Coreg 6.25 mg oral tablet: 1 tab(s) orally 2 times a day  Daliresp 500 mcg oral tablet: 1 tab(s) orally once a day  losartan 100 mg oral tablet: 1 tab(s) orally once a day  Singulair 10 mg oral tablet: 1 tab(s) orally once a day  Spiriva HandiHaler 18 mcg inhalation capsule: 1 cap(s) inhaled once a day, As Needed  Symbicort 160 mcg-4.5 mcg/inh inhalation aerosol: 2 puff(s) inhaled 2 times a day, As Needed   albuterol 90 mcg/inh inhalation aerosol: 2 puff(s) inhaled every 4 hours, As needed, Shortness of Breath and/or Wheezing  amLODIPine 10 mg oral tablet: 1 tab(s) orally once a day  aspirin 81 mg oral delayed release tablet: 1 tab(s) orally once a day  atorvastatin 80 mg oral tablet: 1 tab(s) orally once a day (at bedtime)  cardiac rehab 2-3 times/week for 12 weeks: 2-3 times/week for 12 weeks  carvedilol 6.25 mg oral tablet: 1 tab(s) orally every 12 hours  clopidogrel 75 mg oral tablet: 1 tab(s) orally once a day  Daliresp 500 mcg oral tablet: 1 tab(s) orally once a day  losartan 100 mg oral tablet: 1 tab(s) orally once a day  Singulair 10 mg oral tablet: 1 tab(s) orally once a day  Spiriva HandiHaler 18 mcg inhalation capsule: 1 cap(s) inhaled once a day, As Needed  Symbicort 160 mcg-4.5 mcg/inh inhalation aerosol: 2 puff(s) inhaled 2 times a day, As Needed

## 2024-05-23 ENCOUNTER — TRANSCRIPTION ENCOUNTER (OUTPATIENT)
Age: 74
End: 2024-05-23

## 2024-05-23 VITALS
SYSTOLIC BLOOD PRESSURE: 154 MMHG | DIASTOLIC BLOOD PRESSURE: 77 MMHG | HEART RATE: 89 BPM | RESPIRATION RATE: 18 BRPM | OXYGEN SATURATION: 94 %

## 2024-05-23 DIAGNOSIS — I25.10 ATHEROSCLEROTIC HEART DISEASE OF NATIVE CORONARY ARTERY WITHOUT ANGINA PECTORIS: ICD-10-CM

## 2024-05-23 DIAGNOSIS — E78.5 HYPERLIPIDEMIA, UNSPECIFIED: ICD-10-CM

## 2024-05-23 DIAGNOSIS — I10 ESSENTIAL (PRIMARY) HYPERTENSION: ICD-10-CM

## 2024-05-23 LAB
ANION GAP SERPL CALC-SCNC: 13 MMOL/L — SIGNIFICANT CHANGE UP (ref 5–17)
BUN SERPL-MCNC: 18 MG/DL — SIGNIFICANT CHANGE UP (ref 7–23)
CALCIUM SERPL-MCNC: 9 MG/DL — SIGNIFICANT CHANGE UP (ref 8.4–10.5)
CHLORIDE SERPL-SCNC: 104 MMOL/L — SIGNIFICANT CHANGE UP (ref 96–108)
CO2 SERPL-SCNC: 22 MMOL/L — SIGNIFICANT CHANGE UP (ref 22–31)
CREAT SERPL-MCNC: 0.99 MG/DL — SIGNIFICANT CHANGE UP (ref 0.5–1.3)
EGFR: 80 ML/MIN/1.73M2 — SIGNIFICANT CHANGE UP
GLUCOSE SERPL-MCNC: 89 MG/DL — SIGNIFICANT CHANGE UP (ref 70–99)
POTASSIUM SERPL-MCNC: 4.2 MMOL/L — SIGNIFICANT CHANGE UP (ref 3.5–5.3)
POTASSIUM SERPL-SCNC: 4.2 MMOL/L — SIGNIFICANT CHANGE UP (ref 3.5–5.3)
SODIUM SERPL-SCNC: 139 MMOL/L — SIGNIFICANT CHANGE UP (ref 135–145)

## 2024-05-23 PROCEDURE — C9600: CPT | Mod: LD

## 2024-05-23 PROCEDURE — C1761: CPT

## 2024-05-23 PROCEDURE — 80048 BASIC METABOLIC PNL TOTAL CA: CPT

## 2024-05-23 PROCEDURE — 92972 PERQ TRLUML CORONRY LITHOTRP: CPT

## 2024-05-23 PROCEDURE — C1769: CPT

## 2024-05-23 PROCEDURE — 93454 CORONARY ARTERY ANGIO S&I: CPT | Mod: 59

## 2024-05-23 PROCEDURE — 92928 PRQ TCAT PLMT NTRAC ST 1 LES: CPT | Mod: LD

## 2024-05-23 PROCEDURE — 94640 AIRWAY INHALATION TREATMENT: CPT

## 2024-05-23 PROCEDURE — 36415 COLL VENOUS BLD VENIPUNCTURE: CPT

## 2024-05-23 PROCEDURE — 93005 ELECTROCARDIOGRAM TRACING: CPT

## 2024-05-23 PROCEDURE — 85027 COMPLETE CBC AUTOMATED: CPT

## 2024-05-23 PROCEDURE — 99152 MOD SED SAME PHYS/QHP 5/>YRS: CPT

## 2024-05-23 PROCEDURE — C1894: CPT

## 2024-05-23 PROCEDURE — 93454 CORONARY ARTERY ANGIO S&I: CPT | Mod: 26,59

## 2024-05-23 PROCEDURE — C1874: CPT

## 2024-05-23 PROCEDURE — 93010 ELECTROCARDIOGRAM REPORT: CPT

## 2024-05-23 PROCEDURE — 99232 SBSQ HOSP IP/OBS MODERATE 35: CPT

## 2024-05-23 PROCEDURE — C1887: CPT

## 2024-05-23 PROCEDURE — C1725: CPT

## 2024-05-23 RX ORDER — ATORVASTATIN CALCIUM 80 MG/1
1 TABLET, FILM COATED ORAL
Qty: 0 | Refills: 0 | DISCHARGE
Start: 2024-05-23

## 2024-05-23 RX ORDER — CARVEDILOL PHOSPHATE 80 MG/1
1 CAPSULE, EXTENDED RELEASE ORAL
Qty: 0 | Refills: 0 | DISCHARGE
Start: 2024-05-23

## 2024-05-23 RX ORDER — CLOPIDOGREL BISULFATE 75 MG/1
1 TABLET, FILM COATED ORAL
Qty: 90 | Refills: 3
Start: 2024-05-23

## 2024-05-23 RX ORDER — SODIUM CHLORIDE 9 MG/ML
1000 INJECTION INTRAMUSCULAR; INTRAVENOUS; SUBCUTANEOUS
Refills: 0 | Status: DISCONTINUED | OUTPATIENT
Start: 2024-05-23 | End: 2024-05-23

## 2024-05-23 RX ORDER — LOSARTAN POTASSIUM 100 MG/1
1 TABLET, FILM COATED ORAL
Qty: 0 | Refills: 0 | DISCHARGE
Start: 2024-05-23

## 2024-05-23 RX ORDER — CARVEDILOL PHOSPHATE 80 MG/1
1 CAPSULE, EXTENDED RELEASE ORAL
Refills: 0 | DISCHARGE

## 2024-05-23 RX ADMIN — BUDESONIDE AND FORMOTEROL FUMARATE DIHYDRATE 2 PUFF(S): 160; 4.5 AEROSOL RESPIRATORY (INHALATION) at 17:25

## 2024-05-23 RX ADMIN — Medication 81 MILLIGRAM(S): at 04:27

## 2024-05-23 RX ADMIN — MONTELUKAST 10 MILLIGRAM(S): 4 TABLET, CHEWABLE ORAL at 11:54

## 2024-05-23 RX ADMIN — CARVEDILOL PHOSPHATE 6.25 MILLIGRAM(S): 80 CAPSULE, EXTENDED RELEASE ORAL at 04:27

## 2024-05-23 RX ADMIN — AMLODIPINE BESYLATE 10 MILLIGRAM(S): 2.5 TABLET ORAL at 04:27

## 2024-05-23 RX ADMIN — CARVEDILOL PHOSPHATE 6.25 MILLIGRAM(S): 80 CAPSULE, EXTENDED RELEASE ORAL at 17:25

## 2024-05-23 RX ADMIN — CLOPIDOGREL BISULFATE 75 MILLIGRAM(S): 75 TABLET, FILM COATED ORAL at 04:27

## 2024-05-23 RX ADMIN — BUDESONIDE AND FORMOTEROL FUMARATE DIHYDRATE 2 PUFF(S): 160; 4.5 AEROSOL RESPIRATORY (INHALATION) at 04:30

## 2024-05-23 RX ADMIN — LOSARTAN POTASSIUM 100 MILLIGRAM(S): 100 TABLET, FILM COATED ORAL at 04:27

## 2024-05-23 NOTE — CHART NOTE - NSCHARTNOTEFT_GEN_A_CORE
Cardiac Rehab (Post PCI):              *Education on benefits of Cardiac Rehab provided to patient: Yes         *Referral and Prescription Given for Cardiac Rehab : Yes         *Pt given list of locations & instructed to contact their insurance company to review list of participating providers: Yes         *Pt instructed to bring Cardiac Rehab prescription with them to Cardiology Follow up appointment for assistance with enrollment: Yes         *Pt discharged with copies detail cardiovascular history, medications, testing/treatments: Yes

## 2024-05-23 NOTE — DISCHARGE NOTE NURSING/CASE MANAGEMENT/SOCIAL WORK - PATIENT PORTAL LINK FT
You can access the FollowMyHealth Patient Portal offered by [facility] by registering at the following website: [email or web address]. By joining [facility]’s FollowMyHealth portal, you will also be able to view your health information using other applications (apps) compatible with our system.

## 2024-05-23 NOTE — CHART NOTE - NSCHARTNOTEFT_GEN_A_CORE
Removal of Femoral Sheath    Pulses in the right lower extremity are palpable. The patient was placed in the supine position. The insertion site was identified and the sutures were removed per protocol.  The 6 Indonesian femoral sheath was then removed. Direct pressure was applied for 25 minutes.     Monitoring of the right groin and both lower extremities including neuro-vascular checks and vital signs every 15 minutes x 4, then every 30 minutes x 2, then every 1 hour was ordered.    Complications: None/Other    Comments: sheath removed by TIM Mendoza Removal of Femoral Sheath    Pulses in the right lower extremity are palpable. The patient was placed in the supine position. The insertion site was identified and the sutures were removed per protocol.  The 6 Taiwanese femoral sheath was then removed. Direct pressure was applied for 25 minutes.     Monitoring of the right groin and both lower extremities including neuro-vascular checks and vital signs every 15 minutes x 4, then every 30 minutes x 2, then every 1 hour was ordered.    Complications: None     Comments: sheath removed by NP Nani Mendoza

## 2024-05-23 NOTE — PROGRESS NOTE ADULT - SUBJECTIVE AND OBJECTIVE BOX
Patient is a 73y old  Male who presents with a chief complaint of Abnormal stress test  (22 May 2024 14:12)          Allergies    No Known Allergies    Intolerances        Medications:  albuterol    90 MICROgram(s) HFA Inhaler 2 Puff(s) Inhalation every 6 hours PRN  amLODIPine   Tablet 10 milliGRAM(s) Oral daily  aspirin enteric coated 81 milliGRAM(s) Oral daily  atorvastatin 80 milliGRAM(s) Oral at bedtime  budesonide 160 MICROgram(s)/formoterol 4.5 MICROgram(s) Inhaler 2 Puff(s) Inhalation two times a day  carvedilol 6.25 milliGRAM(s) Oral every 12 hours  clopidogrel Tablet 75 milliGRAM(s) Oral daily  losartan 100 milliGRAM(s) Oral daily  montelukast 10 milliGRAM(s) Oral daily  sodium chloride 0.9%. 1000 milliLiter(s) IV Continuous <Continuous>  tiotropium 2.5 MICROgram(s) Inhaler 2 Puff(s) Inhalation daily      Vitals:  T(C): 36.7 (24 @ 20:15), Max: 36.7 (24 @ 20:15)  HR: 67 (24 @ 20:15) (60 - 95)  BP: 144/69 (-24 @ 20:15) (131/68 - 195/91)  BP(mean): 98 (24 @ 20:15) (94 - 133)  RR: 16 (24 @ 20:15) (15 - 17)  SpO2: 97% (24 @ 20:15) (97% - 100%)  Wt(kg): --  Daily Height in cm: 182.88 (22 May 2024 08:36)    Daily Weight in k.3 (22 May 2024 08:36)  I&O's Summary    22 May 2024 07:01  -  23 May 2024 01:08  --------------------------------------------------------  IN: 240 mL / OUT: 700 mL / NET: -460 mL          Physical Exam:  Appearance: Normal  Eyes: PERRL, EOMI  HENT: Normal oral muscosa, NC/AT  Cardiovascular: S1S2, RRR, No M/R/G, no JVD, No Lower extremity edema  Procedural Access Site: No hematoma, Non-tender to palpation, 2+ pulse, No bruit, No Ecchymosis  Respiratory: Clear to auscultation bilaterally  Gastrointestinal: Soft, Non tender, Normal Bowel Sounds  Musculoskeletal: No clubbing, No joint deformity   Neurologic: Non-focal  Lymphatic: No lymphadenopathy  Psychiatry: AAOx3, Mood & affect appropriate  Skin: No rashes, No ecchymoses, No cyanosis        140  |  104  |  20  ----------------------------<  96  3.9   |  21<L>  |  1.15    Ca    9.0      22 May 2024 08:39              Lipid panel   Hgb A1c                         13.4   7.17  )-----------( 251      ( 22 May 2024 08:39 )             41.8         ECG: SR 1st degree 69 bpm

## 2024-05-23 NOTE — CHART NOTE - NSCHARTNOTEFT_GEN_A_CORE
Removal of Femoral Sheath    Pulses in the right lower extremity are palpable.  The patient was placed in the supine position. The insertion site was identified and the sutures were removed per protocol.    The _6___ Turkish femoral sheath was then removed.   Direct pressure was applied for  ___20___ minutes.   Complications: None, VSS, Good Hemostasis.     Monitoring of the right groin and both lower extremities including neuro-vascular checks and vital signs every 15 minutes x 4, then every 30 minutes x 2, then every 1 hour was ordered.    Discharge Instruction discussed with patient: ASA, Plavix statin, diet, activities, access site care, follow up care, reportable signs and symptoms.     A/P   73 year old male/female Hx of HTN, HLD, former smoker, COPD, Asthma,     CAD with stents had abnormal NST now s/p Stent in LAD in 5/23, staged PCI in OM today      Continue to monitor,   continue ASA, Plavix and statin,   Diet and exercise after 5 days,   f/u with Dr. Taylor in 2 weeks  Procedure results, risk and benefits of ASA, Plavix, diet and activities,   F/u care, reportable signs and Symptoms discussed with pt  Pt understood and verbalized.

## 2024-05-23 NOTE — DISCHARGE NOTE NURSING/CASE MANAGEMENT/SOCIAL WORK - NSDCVIVACCINE_GEN_ALL_CORE_FT
influenza, injectable, quadrivalent, preservative free; 28-Oct-2021 10:13; Bonnie Hodgson (RN); [employer]; [id number] (Exp. Date: 30-Jun-2022); IntraMuscular; Deltoid Right.; 0.5 milliLiter(s); VIS (VIS Published: 06-Aug-2021, VIS Presented: 28-Oct-2021);   pneumococcal polysaccharide PPV23; 28-Oct-2021 15:21; Bonnie Hodgson (RN); [employer] [id number] (Exp. Date: 14-Mar-2023); IntraMuscular; Deltoid Left.; 0.5 milliLiter(s); VIS (VIS Published: 06-Oct-2009, VIS Presented: 28-Oct-2021);

## 2024-07-23 ENCOUNTER — NON-APPOINTMENT (OUTPATIENT)
Age: 74
End: 2024-07-23

## 2024-07-23 ENCOUNTER — APPOINTMENT (OUTPATIENT)
Dept: CARDIOLOGY | Facility: CLINIC | Age: 74
End: 2024-07-23
Payer: MEDICARE

## 2024-07-23 VITALS
SYSTOLIC BLOOD PRESSURE: 185 MMHG | WEIGHT: 155 LBS | DIASTOLIC BLOOD PRESSURE: 74 MMHG | HEART RATE: 73 BPM | HEIGHT: 73 IN | BODY MASS INDEX: 20.54 KG/M2 | OXYGEN SATURATION: 96 %

## 2024-07-23 DIAGNOSIS — I10 ESSENTIAL (PRIMARY) HYPERTENSION: ICD-10-CM

## 2024-07-23 DIAGNOSIS — E78.49 OTHER HYPERLIPIDEMIA: ICD-10-CM

## 2024-07-23 DIAGNOSIS — I25.10 ATHEROSCLEROTIC HEART DISEASE OF NATIVE CORONARY ARTERY W/OUT ANGINA PECTORIS: ICD-10-CM

## 2024-07-23 PROCEDURE — 99215 OFFICE O/P EST HI 40 MIN: CPT

## 2024-07-23 PROCEDURE — 93000 ELECTROCARDIOGRAM COMPLETE: CPT

## 2024-07-23 PROCEDURE — G2211 COMPLEX E/M VISIT ADD ON: CPT

## 2024-07-23 RX ORDER — ATORVASTATIN CALCIUM 80 MG/1
80 TABLET, FILM COATED ORAL
Qty: 90 | Refills: 1 | Status: ACTIVE | COMMUNITY
Start: 2024-07-23 | End: 1900-01-01

## 2024-07-23 RX ORDER — CLOPIDOGREL BISULFATE 75 MG/1
75 TABLET, FILM COATED ORAL DAILY
Qty: 1 | Refills: 1 | Status: ACTIVE | COMMUNITY
Start: 2024-07-23 | End: 1900-01-01

## 2024-07-24 PROBLEM — E78.49 OTHER HYPERLIPIDEMIA: Status: ACTIVE | Noted: 2024-07-24

## 2024-07-24 NOTE — DISCUSSION/SUMMARY
[FreeTextEntry1] : 73 yo with known CAD s/p PCI 2019 LCx and LAD and moderate LAD disease, now most recently underwent PCI to the pLAD and OM 05/2024, COPD (follows up with Dr. Bajwa), + former smoker who presents for a follow up.  -CAD s/p most recent PCI to the pLAD and OM cont ASA and Plavix    TTE 2024 preserved EF, no significant structural ds   HTN -uncontrolled.  Will continue Norvasc 10 mg po daily, Losartan 100 mg po daily. Will call pharmacy to confirm meds.  Previously switched metoprolol to coreg 6.25 mg BID and will increase the dose today to 12.5 BID.  We discussed importance of medication compliance, low salt intake, regular exercise and ambulatory BP monitoring.   HLD -on high intensity statin. Will confirm with pharmacy.  Will need eventual check   Pt will follow up with me in 3 months.  I have spent 40 min on this encounter by seeing the pt, reviewing previous records, discussing plan of care and documenting this encounter.   [EKG obtained to assist in diagnosis and management of assessed problem(s)] : EKG obtained to assist in diagnosis and management of assessed problem(s)

## 2024-07-24 NOTE — HISTORY OF PRESENT ILLNESS
[FreeTextEntry1] : 73 yo with known CAD s/p PCI 2019 LCx and LAD and moderate LAD disease, now most recently underwent PCI to the pLAD and OM 05/2024, COPD (follows up with Dr. Bajwa), + former smoker who presents for a follow up.  Pt reports that in June he was hospitalized in [facility] with PNA for about 4 days.  Reports compliance with meds but did not bring his med list with him and is not sure what he takes exactly.  His GF Xin on the phone during the visit.  Pt reports that WYMAN has been better since the procedure.  Reports BPs at home 120-140s.

## 2024-07-24 NOTE — REVIEW OF SYSTEMS
[Dyspnea on exertion] : dyspnea during exertion [Chest Discomfort] : no chest discomfort [Palpitations] : no palpitations [Lower Ext Edema] : no extremity edema [Syncope] : no syncope [Cough] : cough [Erectile Dysfunction] : erectile dysfunction [Negative] : Heme/Lymph [FreeTextEntry5] : see HPI

## 2024-07-24 NOTE — CARDIOLOGY SUMMARY
[de-identified] : 07/23/2024 sinus, ant septal MI  [de-identified] : 09/26/2019\par  * Chest Pain: No chest pain with administration of\par  Regadenoson.\par  * Symptom: No Symptoms.\par  * HR Response: Appropriate.\par  * BP Response: Appropriate.\par  * Heart Rhythm: Sinus Rhythm - Frequent APD's - 81 BPM.\par  * Q Waves: Anteroseptal MI.\par  * Baseline ECG: Nonspecific ST-T wave abnormality.\par  * ECG Changes: ST Depression: 0.5 mm horizontal in leads\par  II, III, aVF, and V4-V6  started at 2:00 min following\par  regadenoson infusion at a HR of 113 bpm and persisted at\par  least 6:25 min into recovery.\par  * Arrhythmia: Occasional APDs occurred during rest.\par  * The left ventricle was normal in size. There are large,\par  severe defects in the anterior, anteroseptal, and apical\par  walls that are mostly reversible suggestive of infarct\par  with significant tracy-infarct ischemia.\par  * There are large, moderate to severe defects in the\par  inferior, inferoseptal, and mid to distal inferolateral\par  walls that are mostly reversible suggestive of infarct\par  with significant tracy-infarct ischemia.\par  * Increased right ventricular tracer uptake is noted on\par  stress imaging.\par  * Post-stress gated wall motion analysis was performed\par  (LVEF = 61 %;LVEDV = 87 ml.) revealing markedly reduced\par  systolic thickening of the anterior, apical, septal,\par  inferior, and mid to distal inferolateral walls and\par  hypokinesis of the distal septal and apical septal walls.\par  Overall left ventricular ejection fraction is preserved.\par   [de-identified] : 09/27/2019\par  CORONARY VESSELS: The coronary circulation is right dominant.\par  LM:   --  LM: Normal.\par  LAD:   --  Proximal LAD: There was a 60 % stenosis.\par  --  Mid LAD: There was a 70 % stenosis. IFR=0.77\par  --  D1: There was a 70 % stenosis.\par  CX:   --  Proximal circumflex: There was a 80 % stenosis. BXK=657\par  RCA:   --  Mid RCA: There was a 50 % stenosis.\par  COMPLICATIONS: There were no complications.\par  DIAGNOSTIC IMPRESSIONS: Severe, IFR abn Lcx and LAD disease.\par  INTERVENTIONAL IMPRESSIONS: Successful PCI to the LCx. Staged PC to the\par  LAD.\par   [de-identified] : 09/25/2019 \par  Mitral Valve: Normal mitral valve. Minimal mitral\par  regurgitation.\par  Aortic Valve/Aorta: Thickened aortic valve.\par  Aortic Root: 3.3 cm.\par  Left Atrium: Normal left atrium.\par  Left Ventricle: Normal left ventricular systolic function.\par  No segmental wall motion abnormalities. Normal left\par  ventricular internal dimensions and wall thicknesses. Mild\par  diastolic dysfunction (Stage I).\par  Right Heart: Normal right atrium. Normal right ventricular\par  size and function. Normal tricuspid valve. Normal pulmonic\par  valve.\par  Pericardium/Pleura: Normal pericardium with no pericardial\par  effusion.\par  Hemodynamic: Estimated right atrial pressure is 8 mm Hg.\par  Inadequate tricuspid regurgitation Doppler envelope\par  precludes estimation of RVSP.\par

## 2024-08-22 ENCOUNTER — APPOINTMENT (OUTPATIENT)
Dept: CT IMAGING | Facility: HOSPITAL | Age: 74
End: 2024-08-22

## 2024-10-02 NOTE — ED PROVIDER NOTE - CONSTITUTIONAL APPEARANCE HYGIENE, MLM
Patient here for a follow up    Subjective   Patient ID: Ant Seaman is a 89 y.o. male who presents for Follow-up.    The patient has been measuring his blood pressure regularly at home at night time and in the morning.  He brings in a chart revealing occasionally high readings though they seem to correlate with back pain as these improved following a corticosteroid injection for lumbar radiculopathy on 9/26/2024.  The patient is following with Pharmacy.    The patient states that the back pain wakes him from sleep three or four times a night.  He takes Tylenol 1000mg twice daily, and Advil 200mg twice daily in an overlapping manner, and finds that this is helping.  The patient continues to follow with  from Pain Management, and is trying to cut down on pain medication with each successive corticosteroid injection.    The patient denies any chest congestion, dyspnea, or cough.  He also denies any abdominal pain or bowel problems.       Review of Systems   Respiratory:  Negative for cough and shortness of breath.         Negative for chest congestion.   Gastrointestinal:  Negative for abdominal pain, constipation and diarrhea.   Musculoskeletal:  Positive for back pain.       Objective   Physical Exam  Constitutional:       Appearance: Normal appearance.   Neck:      Vascular: No carotid bruit.   Cardiovascular:      Rate and Rhythm: Normal rate and regular rhythm.      Heart sounds: Normal heart sounds.   Pulmonary:      Effort: Pulmonary effort is normal.      Breath sounds: Normal breath sounds.   Abdominal:      General: Bowel sounds are normal.      Palpations: Abdomen is soft.      Tenderness: There is no abdominal tenderness.   Skin:     General: Skin is warm and dry.   Neurological:      General: No focal deficit present.      Mental Status: He is alert and oriented to person, place, and time. Mental status is at baseline.   Psychiatric:         Mood and Affect: Mood normal.         Behavior:  Behavior normal.         Assessment/Plan   Problem List Items Addressed This Visit             ICD-10-CM    Hyperlipidemia - Primary E78.5    Lumbar stenosis M48.061    Lung nodule R91.1       IMPRESSIONS/PLAN:      Lumbar Spondylolisthesis   - Following with Dr. Rodriguez in Pain Medicine, s/p lumbar decompression under fluoroscopic guidance 8/18/2021.  Continue with Tylenol and Advil as directed, but drink plenty of water to mitigate adverse effects.  Call the clinic if symptoms persist or worsen, and will consult with Pain Management about additional management.    Lung Nodule   - Chest CT stable 9/22 - 6mm subpleural right lower lobe nodule, calcified walls of gallbladder, and hiatal hernia. 10/2023 repeat showed Interval development of mild tree-in-bud reticulonodular opacities in  the left upper lobe, nonspecific, but most likely related to infectious or inflammatory bronchiolitis. Stable 6 mm subpleural nodule in the right lower lobe.  Patient would like to hold off repeat CT Chest for now.  Will discuss during next visit.     /70 in office today.  Patient monitoring at home, and readings improved following epidural steroid injection.  Suspect elevated blood pressure related to back pain.  Continue following with Pharmacy.     HLD   - Stable, continue on atorvastatin 40mg every day, and ASA 81mg once daily.     CAD   - s/p stent placement, following with Dr. Waddell in Cardiology, takes ASA 81mg QD.     Coarsened echotecture of liver   - US of gallbladder 4/2021 showed coarsened nodule contour of liver. He does not wish to work this up further.       Painless Nodule of RLE, lateral  - No signs of inflammation.  Decreasing in size per patient.  Monitor for now.  Call the clinic if symptoms persist or worsen, and will order U/S.      Health Maintenance   - Routine labs 4/2024.      Follow up for regular wellness examinations, call sooner if needed.       Scribe Attestation  By signing my name below, I,  [facility]   attest that this documentation has been prepared under the direction and in the presence of Edis Figueroa DO.   Viridiana Pratt 10/02/24 10:58 AM    ILL APPEARING

## 2024-10-22 ENCOUNTER — NON-APPOINTMENT (OUTPATIENT)
Age: 74
End: 2024-10-22

## 2024-10-22 ENCOUNTER — APPOINTMENT (OUTPATIENT)
Dept: CARDIOLOGY | Facility: CLINIC | Age: 74
End: 2024-10-22
Payer: MEDICARE

## 2024-10-22 VITALS — DIASTOLIC BLOOD PRESSURE: 85 MMHG | SYSTOLIC BLOOD PRESSURE: 140 MMHG

## 2024-10-22 VITALS
HEIGHT: 73 IN | SYSTOLIC BLOOD PRESSURE: 167 MMHG | DIASTOLIC BLOOD PRESSURE: 83 MMHG | WEIGHT: 155 LBS | OXYGEN SATURATION: 96 % | BODY MASS INDEX: 20.54 KG/M2 | HEART RATE: 76 BPM

## 2024-10-22 DIAGNOSIS — I10 ESSENTIAL (PRIMARY) HYPERTENSION: ICD-10-CM

## 2024-10-22 DIAGNOSIS — I25.10 ATHEROSCLEROTIC HEART DISEASE OF NATIVE CORONARY ARTERY W/OUT ANGINA PECTORIS: ICD-10-CM

## 2024-10-22 DIAGNOSIS — I47.29 OTHER VENTRICULAR TACHYCARDIA: ICD-10-CM

## 2024-10-22 PROCEDURE — 99214 OFFICE O/P EST MOD 30 MIN: CPT | Mod: 25

## 2024-10-22 PROCEDURE — 93000 ELECTROCARDIOGRAM COMPLETE: CPT

## 2024-10-28 PROBLEM — I47.29 NSVT (NONSUSTAINED VENTRICULAR TACHYCARDIA): Status: ACTIVE | Noted: 2021-05-28

## 2024-10-28 LAB
ALBUMIN SERPL ELPH-MCNC: 4.1 G/DL
ALP BLD-CCNC: 66 U/L
ALT SERPL-CCNC: 17 U/L
ANION GAP SERPL CALC-SCNC: 11 MMOL/L
AST SERPL-CCNC: 22 U/L
BILIRUB SERPL-MCNC: 0.4 MG/DL
BUN SERPL-MCNC: 16 MG/DL
CALCIUM SERPL-MCNC: 9.3 MG/DL
CHLORIDE SERPL-SCNC: 103 MMOL/L
CHOLEST SERPL-MCNC: 130 MG/DL
CO2 SERPL-SCNC: 21 MMOL/L
CREAT SERPL-MCNC: 0.98 MG/DL
EGFR: 81 ML/MIN/1.73M2
ESTIMATED AVERAGE GLUCOSE: 100 MG/DL
GLUCOSE SERPL-MCNC: 111 MG/DL
HBA1C MFR BLD HPLC: 5.1 %
HDLC SERPL-MCNC: 67 MG/DL
LDLC SERPL CALC-MCNC: 50 MG/DL
NONHDLC SERPL-MCNC: 63 MG/DL
POTASSIUM SERPL-SCNC: 4.7 MMOL/L
PROT SERPL-MCNC: 6.7 G/DL
SODIUM SERPL-SCNC: 136 MMOL/L
TRIGL SERPL-MCNC: 62 MG/DL

## 2024-10-29 ENCOUNTER — NON-APPOINTMENT (OUTPATIENT)
Age: 74
End: 2024-10-29

## 2024-10-30 ENCOUNTER — APPOINTMENT (OUTPATIENT)
Dept: CT IMAGING | Facility: IMAGING CENTER | Age: 74
End: 2024-10-30

## 2024-11-12 ENCOUNTER — APPOINTMENT (OUTPATIENT)
Dept: CV DIAGNOSTICS | Facility: HOSPITAL | Age: 74
End: 2024-11-12

## 2024-12-17 NOTE — ED PROVIDER NOTE - ABNORMAL RHYTHM
Detail Level: Generalized Detail Level: Zone Patient Specific Counseling (Will Not Stick From Patient To Patient): I advised consultation with a podiatrist. Detail Level: Simple sinus tachycardia

## 2025-01-13 ENCOUNTER — RX RENEWAL (OUTPATIENT)
Age: 75
End: 2025-01-13

## 2025-01-19 ENCOUNTER — INPATIENT (INPATIENT)
Facility: HOSPITAL | Age: 75
LOS: 3 days | Discharge: HOME | DRG: 192 | End: 2025-01-23
Attending: INTERNAL MEDICINE | Admitting: INTERNAL MEDICINE
Payer: COMMERCIAL

## 2025-01-19 VITALS
WEIGHT: 154.98 LBS | HEART RATE: 88 BPM | RESPIRATION RATE: 24 BRPM | DIASTOLIC BLOOD PRESSURE: 75 MMHG | TEMPERATURE: 98 F | OXYGEN SATURATION: 92 % | SYSTOLIC BLOOD PRESSURE: 138 MMHG | HEIGHT: 77 IN

## 2025-01-19 DIAGNOSIS — J44.1 CHRONIC OBSTRUCTIVE PULMONARY DISEASE WITH (ACUTE) EXACERBATION: ICD-10-CM

## 2025-01-19 LAB
ALBUMIN SERPL ELPH-MCNC: 2.6 G/DL — LOW (ref 3.3–5)
ALP SERPL-CCNC: 68 U/L — SIGNIFICANT CHANGE UP (ref 40–120)
ALT FLD-CCNC: 14 U/L — SIGNIFICANT CHANGE UP (ref 10–45)
ANION GAP SERPL CALC-SCNC: 12 MMOL/L — SIGNIFICANT CHANGE UP (ref 5–17)
ANION GAP SERPL CALC-SCNC: 13 MMOL/L — SIGNIFICANT CHANGE UP (ref 5–17)
ANION GAP SERPL CALC-SCNC: 15 MMOL/L — SIGNIFICANT CHANGE UP (ref 5–17)
AST SERPL-CCNC: 22 U/L — SIGNIFICANT CHANGE UP (ref 10–40)
BASOPHILS # BLD AUTO: 0.02 K/UL — SIGNIFICANT CHANGE UP (ref 0–0.2)
BASOPHILS NFR BLD AUTO: 0.1 % — SIGNIFICANT CHANGE UP (ref 0–2)
BILIRUB SERPL-MCNC: 0.4 MG/DL — SIGNIFICANT CHANGE UP (ref 0.2–1.2)
BUN SERPL-MCNC: 28 MG/DL — HIGH (ref 7–23)
BUN SERPL-MCNC: 28 MG/DL — HIGH (ref 7–23)
BUN SERPL-MCNC: 32 MG/DL — HIGH (ref 7–23)
CALCIUM SERPL-MCNC: 8.8 MG/DL — SIGNIFICANT CHANGE UP (ref 8.4–10.5)
CALCIUM SERPL-MCNC: 8.9 MG/DL — SIGNIFICANT CHANGE UP (ref 8.4–10.5)
CALCIUM SERPL-MCNC: 9 MG/DL — SIGNIFICANT CHANGE UP (ref 8.4–10.5)
CHLORIDE SERPL-SCNC: 103 MMOL/L — SIGNIFICANT CHANGE UP (ref 96–108)
CHLORIDE SERPL-SCNC: 105 MMOL/L — SIGNIFICANT CHANGE UP (ref 96–108)
CHLORIDE SERPL-SCNC: 105 MMOL/L — SIGNIFICANT CHANGE UP (ref 96–108)
CO2 SERPL-SCNC: 21 MMOL/L — LOW (ref 22–31)
CO2 SERPL-SCNC: 23 MMOL/L — SIGNIFICANT CHANGE UP (ref 22–31)
CO2 SERPL-SCNC: 26 MMOL/L — SIGNIFICANT CHANGE UP (ref 22–31)
CREAT SERPL-MCNC: 0.79 MG/DL — SIGNIFICANT CHANGE UP (ref 0.5–1.3)
CREAT SERPL-MCNC: 0.95 MG/DL — SIGNIFICANT CHANGE UP (ref 0.5–1.3)
CREAT SERPL-MCNC: 0.97 MG/DL — SIGNIFICANT CHANGE UP (ref 0.5–1.3)
EGFR: 82 ML/MIN/1.73M2 — SIGNIFICANT CHANGE UP
EGFR: 84 ML/MIN/1.73M2 — SIGNIFICANT CHANGE UP
EGFR: 93 ML/MIN/1.73M2 — SIGNIFICANT CHANGE UP
EOSINOPHIL # BLD AUTO: 0.04 K/UL — SIGNIFICANT CHANGE UP (ref 0–0.5)
EOSINOPHIL NFR BLD AUTO: 0.3 % — SIGNIFICANT CHANGE UP (ref 0–6)
FLUAV AG NPH QL: SIGNIFICANT CHANGE UP
FLUBV AG NPH QL: SIGNIFICANT CHANGE UP
GAS PNL BLDV: SIGNIFICANT CHANGE UP
GLUCOSE SERPL-MCNC: 106 MG/DL — HIGH (ref 70–99)
GLUCOSE SERPL-MCNC: 137 MG/DL — HIGH (ref 70–99)
GLUCOSE SERPL-MCNC: 202 MG/DL — HIGH (ref 70–99)
HCT VFR BLD CALC: 30.3 % — LOW (ref 39–50)
HGB BLD-MCNC: 8.9 G/DL — LOW (ref 13–17)
IMM GRANULOCYTES NFR BLD AUTO: 0.8 % — SIGNIFICANT CHANGE UP (ref 0–0.9)
LYMPHOCYTES # BLD AUTO: 0.55 K/UL — LOW (ref 1–3.3)
LYMPHOCYTES # BLD AUTO: 3.6 % — LOW (ref 13–44)
MCHC RBC-ENTMCNC: 25.9 PG — LOW (ref 27–34)
MCHC RBC-ENTMCNC: 29.4 G/DL — LOW (ref 32–36)
MCV RBC AUTO: 88.1 FL — SIGNIFICANT CHANGE UP (ref 80–100)
MONOCYTES # BLD AUTO: 1.05 K/UL — HIGH (ref 0–0.9)
MONOCYTES NFR BLD AUTO: 6.9 % — SIGNIFICANT CHANGE UP (ref 2–14)
NEUTROPHILS # BLD AUTO: 13.53 K/UL — HIGH (ref 1.8–7.4)
NEUTROPHILS NFR BLD AUTO: 88.3 % — HIGH (ref 43–77)
NRBC # BLD: 0 /100 WBCS — SIGNIFICANT CHANGE UP (ref 0–0)
NRBC BLD-RTO: 0 /100 WBCS — SIGNIFICANT CHANGE UP (ref 0–0)
PLATELET # BLD AUTO: 404 K/UL — HIGH (ref 150–400)
POTASSIUM SERPL-MCNC: 4.2 MMOL/L — SIGNIFICANT CHANGE UP (ref 3.5–5.3)
POTASSIUM SERPL-MCNC: 5.5 MMOL/L — HIGH (ref 3.5–5.3)
POTASSIUM SERPL-MCNC: 5.5 MMOL/L — HIGH (ref 3.5–5.3)
POTASSIUM SERPL-SCNC: 4.2 MMOL/L — SIGNIFICANT CHANGE UP (ref 3.5–5.3)
POTASSIUM SERPL-SCNC: 5.5 MMOL/L — HIGH (ref 3.5–5.3)
POTASSIUM SERPL-SCNC: 5.5 MMOL/L — HIGH (ref 3.5–5.3)
PROT SERPL-MCNC: 6.2 G/DL — SIGNIFICANT CHANGE UP (ref 6–8.3)
RBC # BLD: 3.44 M/UL — LOW (ref 4.2–5.8)
RBC # FLD: 16.3 % — HIGH (ref 10.3–14.5)
RSV RNA NPH QL NAA+NON-PROBE: SIGNIFICANT CHANGE UP
SARS-COV-2 RNA SPEC QL NAA+PROBE: SIGNIFICANT CHANGE UP
SODIUM SERPL-SCNC: 139 MMOL/L — SIGNIFICANT CHANGE UP (ref 135–145)
SODIUM SERPL-SCNC: 141 MMOL/L — SIGNIFICANT CHANGE UP (ref 135–145)
SODIUM SERPL-SCNC: 143 MMOL/L — SIGNIFICANT CHANGE UP (ref 135–145)
TROPONIN T, HIGH SENSITIVITY RESULT: 50 NG/L — SIGNIFICANT CHANGE UP (ref 0–51)
WBC # BLD: 15.31 K/UL — HIGH (ref 3.8–10.5)
WBC # FLD AUTO: 15.31 K/UL — HIGH (ref 3.8–10.5)

## 2025-01-19 PROCEDURE — 99285 EMERGENCY DEPT VISIT HI MDM: CPT

## 2025-01-19 PROCEDURE — 71275 CT ANGIOGRAPHY CHEST: CPT | Mod: 26

## 2025-01-19 PROCEDURE — 71045 X-RAY EXAM CHEST 1 VIEW: CPT | Mod: 26

## 2025-01-19 RX ORDER — ASPIRIN 81 MG/1
1 TABLET, COATED ORAL
Refills: 0 | DISCHARGE

## 2025-01-19 RX ORDER — METHYLPREDNISOLONE ACETATE 40 MG/ML
20 VIAL (ML) INJECTION EVERY 8 HOURS
Refills: 0 | Status: DISCONTINUED | OUTPATIENT
Start: 2025-01-19 | End: 2025-01-20

## 2025-01-19 RX ORDER — AZITHROMYCIN DIHYDRATE 500 MG/1
500 TABLET, FILM COATED ORAL ONCE
Refills: 0 | Status: COMPLETED | OUTPATIENT
Start: 2025-01-19 | End: 2025-01-19

## 2025-01-19 RX ORDER — ASPIRIN 81 MG/1
81 TABLET, COATED ORAL DAILY
Refills: 0 | Status: DISCONTINUED | OUTPATIENT
Start: 2025-01-19 | End: 2025-01-23

## 2025-01-19 RX ORDER — CEFTRIAXONE 250 MG/1
1000 INJECTION, POWDER, FOR SOLUTION INTRAMUSCULAR; INTRAVENOUS ONCE
Refills: 0 | Status: COMPLETED | OUTPATIENT
Start: 2025-01-19 | End: 2025-01-19

## 2025-01-19 RX ORDER — CARVEDILOL 6.25 MG
1 TABLET ORAL
Refills: 0 | DISCHARGE

## 2025-01-19 RX ORDER — METHYLPREDNISOLONE ACETATE 40 MG/ML
125 VIAL (ML) INJECTION ONCE
Refills: 0 | Status: COMPLETED | OUTPATIENT
Start: 2025-01-19 | End: 2025-01-19

## 2025-01-19 RX ORDER — CEFTRIAXONE 250 MG/1
1000 INJECTION, POWDER, FOR SOLUTION INTRAMUSCULAR; INTRAVENOUS EVERY 24 HOURS
Refills: 0 | Status: DISCONTINUED | OUTPATIENT
Start: 2025-01-20 | End: 2025-01-23

## 2025-01-19 RX ORDER — FLUTICASONE PROPIONATE AND SALMETEROL 113; 14 UG/1; UG/1
1 POWDER, METERED RESPIRATORY (INHALATION)
Refills: 0 | Status: DISCONTINUED | OUTPATIENT
Start: 2025-01-19 | End: 2025-01-23

## 2025-01-19 RX ORDER — ALBUTEROL 90 MCG
1 AEROSOL REFILL (GRAM) INHALATION
Refills: 0 | DISCHARGE

## 2025-01-19 RX ORDER — AMLODIPINE BESYLATE 5 MG
10 TABLET ORAL DAILY
Refills: 0 | Status: DISCONTINUED | OUTPATIENT
Start: 2025-01-19 | End: 2025-01-23

## 2025-01-19 RX ORDER — MONTELUKAST SODIUM 5 MG/1
10 TABLET, CHEWABLE ORAL DAILY
Refills: 0 | Status: DISCONTINUED | OUTPATIENT
Start: 2025-01-19 | End: 2025-01-23

## 2025-01-19 RX ORDER — CARVEDILOL 6.25 MG
25 TABLET ORAL EVERY 12 HOURS
Refills: 0 | Status: DISCONTINUED | OUTPATIENT
Start: 2025-01-19 | End: 2025-01-23

## 2025-01-19 RX ORDER — IPRATROPIUM BROMIDE AND ALBUTEROL SULFATE .5; 2.5 MG/3ML; MG/3ML
3 SOLUTION RESPIRATORY (INHALATION) ONCE
Refills: 0 | Status: COMPLETED | OUTPATIENT
Start: 2025-01-19 | End: 2025-01-19

## 2025-01-19 RX ORDER — HEPARIN SODIUM,PORCINE 10000/ML
5000 VIAL (ML) INJECTION EVERY 8 HOURS
Refills: 0 | Status: DISCONTINUED | OUTPATIENT
Start: 2025-01-19 | End: 2025-01-23

## 2025-01-19 RX ORDER — ATORVASTATIN CALCIUM 80 MG/1
80 TABLET, FILM COATED ORAL AT BEDTIME
Refills: 0 | Status: DISCONTINUED | OUTPATIENT
Start: 2025-01-19 | End: 2025-01-23

## 2025-01-19 RX ORDER — LOSARTAN POTASSIUM 100 MG
100 TABLET ORAL DAILY
Refills: 0 | Status: DISCONTINUED | OUTPATIENT
Start: 2025-01-19 | End: 2025-01-23

## 2025-01-19 RX ORDER — IPRATROPIUM BROMIDE AND ALBUTEROL SULFATE .5; 2.5 MG/3ML; MG/3ML
3 SOLUTION RESPIRATORY (INHALATION) EVERY 6 HOURS
Refills: 0 | Status: DISCONTINUED | OUTPATIENT
Start: 2025-01-19 | End: 2025-01-23

## 2025-01-19 RX ORDER — AZITHROMYCIN DIHYDRATE 500 MG/1
500 TABLET, FILM COATED ORAL EVERY 24 HOURS
Refills: 0 | Status: DISCONTINUED | OUTPATIENT
Start: 2025-01-20 | End: 2025-01-22

## 2025-01-19 RX ADMIN — CEFTRIAXONE 100 MILLIGRAM(S): 250 INJECTION, POWDER, FOR SOLUTION INTRAMUSCULAR; INTRAVENOUS at 09:59

## 2025-01-19 RX ADMIN — AZITHROMYCIN DIHYDRATE 255 MILLIGRAM(S): 500 TABLET, FILM COATED ORAL at 09:04

## 2025-01-19 RX ADMIN — IPRATROPIUM BROMIDE AND ALBUTEROL SULFATE 3 MILLILITER(S): .5; 2.5 SOLUTION RESPIRATORY (INHALATION) at 08:01

## 2025-01-19 RX ADMIN — ATORVASTATIN CALCIUM 80 MILLIGRAM(S): 80 TABLET, FILM COATED ORAL at 21:39

## 2025-01-19 RX ADMIN — Medication 20 MILLIGRAM(S): at 21:39

## 2025-01-19 RX ADMIN — IPRATROPIUM BROMIDE AND ALBUTEROL SULFATE 3 MILLILITER(S): .5; 2.5 SOLUTION RESPIRATORY (INHALATION) at 17:28

## 2025-01-19 RX ADMIN — Medication 5000 UNIT(S): at 21:39

## 2025-01-19 RX ADMIN — Medication 125 MILLIGRAM(S): at 09:04

## 2025-01-19 RX ADMIN — FLUTICASONE PROPIONATE AND SALMETEROL 1 DOSE(S): 113; 14 POWDER, METERED RESPIRATORY (INHALATION) at 17:26

## 2025-01-19 RX ADMIN — Medication 25 MILLIGRAM(S): at 17:25

## 2025-01-19 NOTE — H&P ADULT - NSHPLABSRESULTS_GEN_ALL_CORE
LABS:                        8.9    15.31 )-----------( 404      ( 19 Jan 2025 08:08 )             30.3     01-19    139  |  103  |  28[H]  ----------------------------<  137[H]  5.5[H]   |  23  |  0.95    Ca    8.8      19 Jan 2025 09:33    TPro  6.2  /  Alb  2.6[L]  /  TBili  0.4  /  DBili  x   /  AST  22  /  ALT  14  /  AlkPhos  68  01-19      Urinalysis Basic - ( 19 Jan 2025 09:33 )    Color: x / Appearance: x / SG: x / pH: x  Gluc: 137 mg/dL / Ketone: x  / Bili: x / Urobili: x   Blood: x / Protein: x / Nitrite: x   Leuk Esterase: x / RBC: x / WBC x   Sq Epi: x / Non Sq Epi: x / Bacteria: x            RADIOLOGY & ADDITIONAL TESTS:

## 2025-01-19 NOTE — CHART NOTE - NSCHARTNOTEFT_GEN_A_CORE
Emergency Room : [facility]ACHAEL received a referral from the ED medical team for assistance with completing Health Care Proxy.  Chart was reviewed. Patient is a 74 year old male presented to the ED for shortness of breath.  Patient's medical history includes:   COPD, former smoker, CAD, GERD, and BPH.     LMSW introduced herself to patient and friend, Xin Winslow at bedside. Patient agreed to meet with SW with Ms. Winslow present. Patient is alert and oriented x 4 spheres. Demographic information was reviewed and confirmed. Patient resides alone in an apartment in [place]. Patient informed he has a rolling walker at home. Patient informed Ms Winslow assists with his laundry, ADLS and food shopping.  LMSW reviewed with patient the Health Care Proxy which he completed with no concerns voiced. A copy of Health Care Proxy provided to Ms Winslow and uploaded to [employer].  Copy was also placed on the chart.  Support provided. Floor SW will follow up if needed.

## 2025-01-19 NOTE — ED PROVIDER NOTE - CARDIAC, MLM
Regarding: [place] 40 y/o male migraine 8\9 pain level when he eats certain foods and vision changes  ----- Message from Hannah So sent at 11/21/2022 10:30 AM CST -----  Patient Name: Justice Yu    Specialist or PCP Name: no pcp    Symptoms: [place] 40 y/o male migraine 8\9 pain level when he eats certain foods and vision changes    Pregnant (females aged 13-60. If Yes, how long?) :     Call Back # : [phone]     Which State are you currently located in?: [place]    Name of Clinic Site / Acct# : [facility] [address]    Use following scripting for patients waiting for a callback:   “Nurse callback times vary based on call volumes; please be aware the return phone call may come from an unidentified phone number. If your symptoms worsen or become life threatening while waiting, you should seek immediate assistance by calling 911 or going to the ER for evaluation.”   Normal rate, regular rhythm.  Heart sounds S1, S2.  No murmurs, rubs or gallops.

## 2025-01-19 NOTE — PATIENT PROFILE ADULT - DO YOU FEEL LIKE HURTING YOURSELF OR OTHERS?
[Verbal] : Verbal [Written] : Written [Patient] : Patient [Good - alert, interested, motivated] : Good - alert, interested, motivated [Verbalizes knowledge/Understanding] : Verbalizes knowledge/understanding [Dressing changes] : dressing changes [Skin Care] : skin care [Signs and symptoms of infection] : sign and symptoms of infection [Nutrition] : nutrition [How and When to Call] : how and when to call [Pain Management] : pain management [Compression Therapy] : compression therapy [Patient responsibility to plan of care] : patient responsibility to plan of care [Glycemic Control] : glycemic control [Stable] : stable [Home] : Home [Other: ____] : [unfilled] [Not Applicable - Long Term Care/Home Health Agency] : Long Term Care/Home Health Agency: Not Applicable [] : No [FreeTextEntry2] : Infection Prevention\par Promote Skin Integrity\par Offloading\par Elevation\par Compression Compliance\par Low Na+ Diet\par Maintain acceptable levels of pain\par Demonstrates use of both pharmacological and nonpharmacological pain management interventions\par Weight reduction strategies\par Glycemic Control\par CAM Boot to offload  [FreeTextEntry4] : F/U 1 week for assessment\par Daily HBOT, 34/40 completed\par Auth submitted for + 10 HBOT\par preauth for sharps debridement and [place] #3 next assessment\par  no

## 2025-01-19 NOTE — ED PROVIDER NOTE - OBJECTIVE STATEMENT
Rajat MILLER: Patient is a 74-year-old male with a history of COPD, former smoker, CAD, GERD, BPH here for evaluation of about 9 days of runny nose, progressive shortness of breath.  Patient reports he has oxygen at home, has started using it because he felt short of breath with minimal exertion and movement.  He denies any fevers or chills.  He also reports an episode of left-sided chest pain when he was reaching for something.  He denies any falls or trauma.  No nausea, vomiting or diarrhea.  No urinary symptoms.  Patient reports he was trying Mucinex.  He is on albuterol and a steroid inhaler which he has been using without any relief.

## 2025-01-19 NOTE — ED ADULT NURSE NOTE - NSFALLUNIVINTERV_ED_ALL_ED
Bed/Stretcher in lowest position, wheels locked, appropriate side rails in place/Call bell, personal items and telephone in reach/Instruct patient to call for assistance before getting out of bed/chair/stretcher/Non-slip footwear applied when patient is off stretcher/[place] to call system/Physically safe environment - no spills, clutter or unnecessary equipment/Purposeful proactive rounding/Room/bathroom lighting operational, light cord in reach

## 2025-01-19 NOTE — ED ADULT TRIAGE NOTE - CHIEF COMPLAINT QUOTE
Dyspnea and productive cough x3 days. Started using home O2 since Saturday on 3LPM, O2 tank empty in triage. Placed on 3LPM NC, 96% on O2.  pmh: COPD (Has home O2 PRN) Denies chest pain.

## 2025-01-19 NOTE — H&P ADULT - ASSESSMENT
74-year-old male with a history of COPD, former smoker, CAD, GERD, BPH here for evaluation of about 9 days of runny nose, progressive shortness of breath.  Patient reports he has oxygen at home, has started using it because he felt short of breath with minimal exertion and movement.  He denies any fevers or chills.  He also reports an episode of left-sided chest pain when he was reaching for something.  He denies any falls or trauma.  No nausea, vomiting or diarrhea.  No urinary symptoms.  Patient reports he was trying Mucinex.  He is on albuterol and a steroid inhaler which he has been using without any relief    COPD exacerbation  - keep O2 saturation above 92%  - iv steroids  - duoneb qid  - inhaled steroids  - pulm eval    Pneumonia  - iv ceftriaxone  - iv azithromycin  - follow up cultures  - CT chest    CAD  - c/w asa and plavix    HTN  - c/w coreg  - c/w norvasc  - c/w losartan    HLD  - c/w lipitor    dvt px  - sq heparin

## 2025-01-19 NOTE — ED PROVIDER NOTE - PROGRESS NOTE DETAILS
[place] PGY3 - 74-year-old male with a history of COPD, former tobacco use, HTN, CAD s/p stents, GERD, BPH here for approximately 1 week of shortness of breath, runny nose.  WBC 15.31, hemoglobin 8.9.  pH 7.31, CO2 61.  On 3 L O2 NC.  CXR shows clear lungs.  Likely viral pneumonia.  Flu/COVID/RSV swab pending. [facility] PGY3 - 74-year-old male with a history of COPD, former tobacco use, HTN, CAD s/p stents, GERD, BPH here for approximately 1 week of shortness of breath, runny nose.  WBC 15.31, hemoglobin 8.9.  pH 7.31, CO2 61.  On 3 L O2 NC.  CXR shows L infiltrate.  Likely viral pneumonia.  Flu/COVID/RSV swab pending. [facility] PGY3 - Flu/covid/RSV negative. Patient has Hgb 8.9, baseline 13 from 6 months ago.  EKG with new T wave inversions in V3-V6 change from prior. Admit to medicine for COPD exacerbation and anemia workup.

## 2025-01-19 NOTE — ED ADULT NURSE NOTE - OBJECTIVE STATEMENT
74 y.o M A&04 ambulatory w.o assist  with a history of COPD, former smoker, CAD, GERD, BPH here for evaluation of about 9 days of runny nose, progressive shortness of breath.  Patient reports he has oxygen at home, has started using it because he felt short of breath with minimal exertion and movement.  He denies any fevers or chills.  He also reports an episode of left-sided chest pain when he was reaching for something.  He denies any falls or trauma.  No nausea, vomiting or diarrhea.  No urinary symptoms.  Patient reports he was trying Mucinex.  He is on albuterol and a steroid inhaler which he has been using without any relief.

## 2025-01-19 NOTE — ED ADULT NURSE NOTE - NSFALLLASTSIX_ED_ALL_ED
Report received at bedside, assumed care. Pt transferred up to tele 7 via gurney. Ambulated to bed with hand held assistance. A&O x 4. Declines pain. No other concerns, complains or distress. Tele box on. Updated about plan of care. Chart reviewed. Bed in lowest position, treaded slipper sock on, and call light within reach.       No.

## 2025-01-19 NOTE — H&P ADULT - HISTORY OF PRESENT ILLNESS
74-year-old male with a history of COPD, former smoker, CAD, GERD, BPH here for evaluation of about 9 days of runny nose, progressive shortness of breath.  Patient reports he has oxygen at home, has started using it because he felt short of breath with minimal exertion and movement.  He denies any fevers or chills.  He also reports an episode of left-sided chest pain when he was reaching for something.  He denies any falls or trauma.  No nausea, vomiting or diarrhea.  No urinary symptoms.  Patient reports he was trying Mucinex.  He is on albuterol and a steroid inhaler which he has been using without any relief

## 2025-01-19 NOTE — PATIENT PROFILE ADULT - FALL HARM RISK - HARM RISK INTERVENTIONS
Assistance with ambulation/Assistance OOB with selected safe patient handling equipment/Communicate Risk of Fall with Harm to all staff/Monitor for mental status changes/Monitor gait and stability/Reinforce activity limits and safety measures with patient and family/Reorient to person, place and time as needed/Review medications for side effects contributing to fall risk/Sit up slowly, dangle for a short time, stand at bedside before walking/Tailored Fall Risk Interventions/Toileting schedule using arm’s reach rule for commode and bathroom/Use of alarms - bed, chair and/or voice tab/Visual Cue: Yellow wristband and red socks/Bed in lowest position, wheels locked, appropriate side rails in place/Call bell, personal items and telephone in reach/Instruct patient to call for assistance before getting out of bed or chair/Non-slip footwear when patient is out of bed/[place] to call system/Physically safe environment - no spills, clutter or unnecessary equipment/Purposeful Proactive Rounding/Room/bathroom lighting operational, light cord in reach

## 2025-01-20 DIAGNOSIS — J18.9 PNEUMONIA, UNSPECIFIED ORGANISM: ICD-10-CM

## 2025-01-20 DIAGNOSIS — J44.1 CHRONIC OBSTRUCTIVE PULMONARY DISEASE WITH (ACUTE) EXACERBATION: ICD-10-CM

## 2025-01-20 DIAGNOSIS — R91.1 SOLITARY PULMONARY NODULE: ICD-10-CM

## 2025-01-20 LAB
ANION GAP SERPL CALC-SCNC: 12 MMOL/L — SIGNIFICANT CHANGE UP (ref 5–17)
BUN SERPL-MCNC: 31 MG/DL — HIGH (ref 7–23)
CALCIUM SERPL-MCNC: 8.7 MG/DL — SIGNIFICANT CHANGE UP (ref 8.4–10.5)
CHLORIDE SERPL-SCNC: 106 MMOL/L — SIGNIFICANT CHANGE UP (ref 96–108)
CO2 SERPL-SCNC: 24 MMOL/L — SIGNIFICANT CHANGE UP (ref 22–31)
CREAT SERPL-MCNC: 1 MG/DL — SIGNIFICANT CHANGE UP (ref 0.5–1.3)
EGFR: 79 ML/MIN/1.73M2 — SIGNIFICANT CHANGE UP
FERRITIN SERPL-MCNC: 476 NG/ML — HIGH (ref 30–400)
FOLATE SERPL-MCNC: 7 NG/ML — SIGNIFICANT CHANGE UP
GLUCOSE SERPL-MCNC: 239 MG/DL — HIGH (ref 70–99)
HCT VFR BLD CALC: 26.1 % — LOW (ref 39–50)
HGB BLD-MCNC: 8.2 G/DL — LOW (ref 13–17)
IRON SATN MFR SERPL: 17 UG/DL — LOW (ref 45–165)
IRON SATN MFR SERPL: 8 % — LOW (ref 16–55)
MAGNESIUM SERPL-MCNC: 2.2 MG/DL — SIGNIFICANT CHANGE UP (ref 1.6–2.6)
MCHC RBC-ENTMCNC: 27.1 PG — SIGNIFICANT CHANGE UP (ref 27–34)
MCHC RBC-ENTMCNC: 31.4 G/DL — LOW (ref 32–36)
MCV RBC AUTO: 86.1 FL — SIGNIFICANT CHANGE UP (ref 80–100)
NRBC # BLD: 0 /100 WBCS — SIGNIFICANT CHANGE UP (ref 0–0)
NRBC BLD-RTO: 0 /100 WBCS — SIGNIFICANT CHANGE UP (ref 0–0)
PHOSPHATE SERPL-MCNC: 2.5 MG/DL — SIGNIFICANT CHANGE UP (ref 2.5–4.5)
PLATELET # BLD AUTO: 508 K/UL — HIGH (ref 150–400)
POTASSIUM SERPL-MCNC: 3.9 MMOL/L — SIGNIFICANT CHANGE UP (ref 3.5–5.3)
POTASSIUM SERPL-SCNC: 3.9 MMOL/L — SIGNIFICANT CHANGE UP (ref 3.5–5.3)
PROCALCITONIN SERPL-MCNC: 0.73 NG/ML — HIGH (ref 0.02–0.1)
RBC # BLD: 3.03 M/UL — LOW (ref 4.2–5.8)
RBC # FLD: 16.6 % — HIGH (ref 10.3–14.5)
SODIUM SERPL-SCNC: 142 MMOL/L — SIGNIFICANT CHANGE UP (ref 135–145)
TIBC SERPL-MCNC: 199 UG/DL — LOW (ref 220–430)
TSH SERPL-MCNC: 0.03 UIU/ML — LOW (ref 0.27–4.2)
UIBC SERPL-MCNC: 182 UG/DL — SIGNIFICANT CHANGE UP (ref 110–370)
VIT B12 SERPL-MCNC: 611 PG/ML — SIGNIFICANT CHANGE UP (ref 232–1245)
WBC # BLD: 19.56 K/UL — HIGH (ref 3.8–10.5)
WBC # FLD AUTO: 19.56 K/UL — HIGH (ref 3.8–10.5)

## 2025-01-20 RX ORDER — PREDNISONE 5 MG/1
40 TABLET ORAL DAILY
Refills: 0 | Status: DISCONTINUED | OUTPATIENT
Start: 2025-01-20 | End: 2025-01-21

## 2025-01-20 RX ADMIN — Medication 20 MILLIGRAM(S): at 05:23

## 2025-01-20 RX ADMIN — IPRATROPIUM BROMIDE AND ALBUTEROL SULFATE 3 MILLILITER(S): .5; 2.5 SOLUTION RESPIRATORY (INHALATION) at 01:05

## 2025-01-20 RX ADMIN — Medication 5000 UNIT(S): at 05:23

## 2025-01-20 RX ADMIN — Medication 25 MILLIGRAM(S): at 05:23

## 2025-01-20 RX ADMIN — Medication 75 MILLIGRAM(S): at 11:54

## 2025-01-20 RX ADMIN — ASPIRIN 81 MILLIGRAM(S): 81 TABLET, COATED ORAL at 11:54

## 2025-01-20 RX ADMIN — Medication 10 MILLIGRAM(S): at 05:23

## 2025-01-20 RX ADMIN — ATORVASTATIN CALCIUM 80 MILLIGRAM(S): 80 TABLET, FILM COATED ORAL at 21:25

## 2025-01-20 RX ADMIN — IPRATROPIUM BROMIDE AND ALBUTEROL SULFATE 3 MILLILITER(S): .5; 2.5 SOLUTION RESPIRATORY (INHALATION) at 11:54

## 2025-01-20 RX ADMIN — AZITHROMYCIN DIHYDRATE 255 MILLIGRAM(S): 500 TABLET, FILM COATED ORAL at 09:35

## 2025-01-20 RX ADMIN — IPRATROPIUM BROMIDE AND ALBUTEROL SULFATE 3 MILLILITER(S): .5; 2.5 SOLUTION RESPIRATORY (INHALATION) at 05:24

## 2025-01-20 RX ADMIN — CEFTRIAXONE 100 MILLIGRAM(S): 250 INJECTION, POWDER, FOR SOLUTION INTRAMUSCULAR; INTRAVENOUS at 11:51

## 2025-01-20 RX ADMIN — Medication 5000 UNIT(S): at 21:25

## 2025-01-20 RX ADMIN — PREDNISONE 40 MILLIGRAM(S): 5 TABLET ORAL at 13:40

## 2025-01-20 RX ADMIN — FLUTICASONE PROPIONATE AND SALMETEROL 1 DOSE(S): 113; 14 POWDER, METERED RESPIRATORY (INHALATION) at 05:23

## 2025-01-20 RX ADMIN — Medication 100 MILLIGRAM(S): at 05:23

## 2025-01-20 RX ADMIN — IPRATROPIUM BROMIDE AND ALBUTEROL SULFATE 3 MILLILITER(S): .5; 2.5 SOLUTION RESPIRATORY (INHALATION) at 17:16

## 2025-01-20 RX ADMIN — FLUTICASONE PROPIONATE AND SALMETEROL 1 DOSE(S): 113; 14 POWDER, METERED RESPIRATORY (INHALATION) at 17:15

## 2025-01-20 RX ADMIN — Medication 25 MILLIGRAM(S): at 17:16

## 2025-01-20 RX ADMIN — MONTELUKAST SODIUM 10 MILLIGRAM(S): 5 TABLET, CHEWABLE ORAL at 11:55

## 2025-01-20 NOTE — CONSULT NOTE ADULT - PROBLEM SELECTOR RECOMMENDATION 2
2nd to PNA  -Hypoxia resolved, keep sats >88% with o2 PRN  -D/c IV solumedrol, start prednisone 40mg PO qd. Will re-evaluate further steroid taper.   -Continue Advair 250/50 1 puff BID (home med: Symbicort)  -Continue Singulair (home med)  -Continue Duoneb q6h

## 2025-01-20 NOTE — CONSULT NOTE ADULT - SUBJECTIVE AND OBJECTIVE BOX
PULMONARY CONSULT    HPI: 73 y/o M with PMH of COPD, former smoker, CAD, GERD, BPH. Presents for evaluation of about 9 days of runny nose, progressive shortness of breath. Patient reports he has oxygen at home, has started using it because he felt short of breath with minimal exertion and movement. He also reports an episode of left-sided chest pain when he was reaching for something. He denies any falls or trauma. Patient reports he was trying Mucinex.  He is on albuterol and a steroid inhaler which he has been using without any relief.        PAST MEDICAL & SURGICAL HISTORY:  COPD (chronic obstructive pulmonary disease)  Asthma  CAD (coronary artery disease) s/p pci  BPH (benign prostatic hyperplasia)  GERD (gastroesophageal reflux disease)  No significant past surgical history      Allergies  No Known Allergies    FAMILY HISTORY:  FH: diabetes mellitus (Mother)  FH: HTN (hypertension) (Mother, Sibling)      Social history:     Review of Systems:  CONSTITUTIONAL: No fever, chills, or fatigue  EYES: No eye pain, visual disturbances, or discharge  ENMT:  No difficulty hearing, tinnitus, vertigo; No sinus or throat pain  NECK: No pain or stiffness  RESPIRATORY: Per above  CARDIOVASCULAR: No chest pain, palpitations, dizziness, or leg swelling  GASTROINTESTINAL: No abdominal or epigastric pain. No nausea, vomiting, or hematemesis; No diarrhea or constipation. No melena or hematochezia.  GENITOURINARY: No dysuria, frequency, hematuria, or incontinence  NEUROLOGICAL: No headaches, memory loss, loss of strength, numbness, or tremors  SKIN: No itching, burning, rashes, or lesions   MUSCULOSKELETAL: No joint pain or swelling; No muscle, back, or extremity pain  PSYCHIATRIC: No depression, anxiety, mood swings, or difficulty sleeping      Medications:  MEDICATIONS  (STANDING):  albuterol/ipratropium for Nebulization 3 milliLiter(s) Nebulizer every 6 hours  amLODIPine   Tablet 10 milliGRAM(s) Oral daily  aspirin  chewable 81 milliGRAM(s) Oral daily  atorvastatin 80 milliGRAM(s) Oral at bedtime  azithromycin  IVPB 500 milliGRAM(s) IV Intermittent every 24 hours  carvedilol 25 milliGRAM(s) Oral every 12 hours  cefTRIAXone   IVPB 1000 milliGRAM(s) IV Intermittent every 24 hours  clopidogrel Tablet 75 milliGRAM(s) Oral daily  fluticasone propionate/ salmeterol 250-50 MICROgram(s) Diskus 1 Dose(s) Inhalation two times a day  heparin   Injectable 5000 Unit(s) SubCutaneous every 8 hours  influenza  Vaccine (HIGH DOSE) 0.5 milliLiter(s) IntraMuscular once  losartan 100 milliGRAM(s) Oral daily  methylPREDNISolone sodium succinate Injectable 20 milliGRAM(s) IV Push every 8 hours  montelukast 10 milliGRAM(s) Oral daily    MEDICATIONS  (PRN):            Vital Signs Last 24 Hrs  T(C): 36.6 (20 Jan 2025 04:00), Max: 36.6 (20 Jan 2025 04:00)  T(F): 97.8 (20 Jan 2025 04:00), Max: 97.8 (20 Jan 2025 04:00)  HR: 72 (20 Jan 2025 04:00) (69 - 81)  BP: 120/81 (20 Jan 2025 04:00) (98/60 - 120/81)  BP(mean): --  RR: 17 (20 Jan 2025 04:00) (17 - 20)  SpO2: 98% (19 Jan 2025 20:04) (98% - 100%)    Parameters below as of 20 Jan 2025 04:00  Patient On (Oxygen Delivery Method): nasal cannula          VBG pH 7.31 01-19 @ 08:08  VBG pCO2 61 01-19 @ 08:08  VBG O2 sat 29.4 01-19 @ 08:08  VBG lactate 1.2 01-19 @ 08:08              LABS:                        8.2    19.56 )-----------( 508      ( 20 Jan 2025 05:24 )             26.1     01-20    142  |  106  |  31[H]  ----------------------------<  239[H]  3.9   |  24  |  1.00    Ca    8.7      20 Jan 2025 05:26  Phos  2.5     01-20  Mg     2.2     01-20    TPro  6.2  /  Alb  2.6[L]  /  TBili  0.4  /  DBili  x   /  AST  22  /  ALT  14  /  AlkPhos  68  01-19          CAPILLARY BLOOD GLUCOSE          Urinalysis Basic - ( 20 Jan 2025 05:26 )    Color: x / Appearance: x / SG: x / pH: x  Gluc: 239 mg/dL / Ketone: x  / Bili: x / Urobili: x   Blood: x / Protein: x / Nitrite: x   Leuk Esterase: x / RBC: x / WBC x   Sq Epi: x / Non Sq Epi: x / Bacteria: x      Procalcitonin: 0.73 ng/mL (01-20-25 @ 05:26)                Physical Examination:    General: No acute distress.      HEENT: Pupils equal, reactive to light.  Symmetric.    PULM: Clear to auscultation bilaterally, no significant sputum production    CVS: S1, S2    ABD: Soft, nondistended, nontender, normoactive bowel sounds, no masses    EXT: No edema, nontender    SKIN: Warm and well perfused, no rashes noted.    NEURO: Alert, oriented, interactive, nonfocal    RADIOLOGY REVIEWED    CT chest:    < from: CT Angio Chest PE Protocol w/ IV Cont (01.19.25 @ 14:38) >    FINDINGS:    LUNGS AND LARGE AIRWAYS: Left upper lobe and lingular consolidation.   Layering secretions within the central airways.    *  Irregular right upper lobe nodule (301-18) measuring 1.8 x 0.8 x 2.1   cm, not well appreciated on prior imaging.  *  5 mm nodule within the right upper lobe (301-42), new from 10/22/2021.  *  0.7 cm juxtapleural nodule within the right lower lobe along the   oblique fissure (301-44) likely representing an intrapulmonary lymph   node, new from 10/22/2021.    Bilateral linear basilar opacities, likely representing atelectasis.   Emphysematous changes bilaterally.  PLEURA: No pleural effusion.  VESSELS: Aortic calcifications. Coronary artery calcifications and/or   stents. No pulmonary embolism.  HEART: Concentric left ventricular wall thickening  Trace pericardial   effusion.  MEDIASTINUM AND LUCHO: No lymphadenopathy.  CHEST WALL AND LOWER NECK: Unchanged metallic foreign object within the   right anterior chest wall.  VISUALIZED UPPER ABDOMEN: Left-sided renal cysts.  BONES: Degenerative changes.      IMPRESSION:  No pulmonary embolism.    Irregular 2.1 cm right upper lobe nodule may represent pulmonary neoplasm.    Left upper lobe and lingular consolidations are likely infectious.    < end of copied text >   PULMONARY CONSULT    HPI: 73 y/o M with PMH of COPD, former smoker, CAD, GERD, BPH. Presents for evaluation of about 9 days of runny nose, progressive shortness of breath. Patient reports he has oxygen at home, has started using it because he felt short of breath with minimal exertion and movement. He also reports an episode of left-sided chest pain when he was reaching for something. He denies any falls or trauma. Patient reports he was trying Mucinex.  He is on albuterol and a steroid inhaler which he has been using without any relief. Now with improving congested cough and SOB. Denies CP, pleuritic CP at this time.         PAST MEDICAL & SURGICAL HISTORY:  COPD (chronic obstructive pulmonary disease)  Asthma  CAD (coronary artery disease) s/p pci  BPH (benign prostatic hyperplasia)  GERD (gastroesophageal reflux disease)  No significant past surgical history      Allergies  No Known Allergies    FAMILY HISTORY:  FH: diabetes mellitus (Mother)  FH: HTN (hypertension) (Mother, Sibling)      Social history: former smoker     Review of Systems:  CONSTITUTIONAL: No fever, chills, or fatigue  EYES: No eye pain, visual disturbances, or discharge  ENMT:  No difficulty hearing, tinnitus, vertigo; No sinus or throat pain  NECK: No pain or stiffness  RESPIRATORY: Per above  CARDIOVASCULAR: Per above   GASTROINTESTINAL: No abdominal or epigastric pain. No nausea, vomiting, or hematemesis; No diarrhea or constipation. No melena or hematochezia.  GENITOURINARY: No dysuria, frequency, hematuria, or incontinence  NEUROLOGICAL: No headaches, memory loss, loss of strength, numbness, or tremors  SKIN: No itching, burning, rashes, or lesions   MUSCULOSKELETAL: No joint pain or swelling; No muscle, back, or extremity pain  PSYCHIATRIC: No depression, anxiety, mood swings, or difficulty sleeping      Medications:  MEDICATIONS  (STANDING):  albuterol/ipratropium for Nebulization 3 milliLiter(s) Nebulizer every 6 hours  amLODIPine   Tablet 10 milliGRAM(s) Oral daily  aspirin  chewable 81 milliGRAM(s) Oral daily  atorvastatin 80 milliGRAM(s) Oral at bedtime  azithromycin  IVPB 500 milliGRAM(s) IV Intermittent every 24 hours  carvedilol 25 milliGRAM(s) Oral every 12 hours  cefTRIAXone   IVPB 1000 milliGRAM(s) IV Intermittent every 24 hours  clopidogrel Tablet 75 milliGRAM(s) Oral daily  fluticasone propionate/ salmeterol 250-50 MICROgram(s) Diskus 1 Dose(s) Inhalation two times a day  heparin   Injectable 5000 Unit(s) SubCutaneous every 8 hours  influenza  Vaccine (HIGH DOSE) 0.5 milliLiter(s) IntraMuscular once  losartan 100 milliGRAM(s) Oral daily  methylPREDNISolone sodium succinate Injectable 20 milliGRAM(s) IV Push every 8 hours  montelukast 10 milliGRAM(s) Oral daily              Vital Signs Last 24 Hrs  T(C): 36.6 (20 Jan 2025 04:00), Max: 36.6 (20 Jan 2025 04:00)  T(F): 97.8 (20 Jan 2025 04:00), Max: 97.8 (20 Jan 2025 04:00)  HR: 72 (20 Jan 2025 04:00) (69 - 81)  BP: 120/81 (20 Jan 2025 04:00) (98/60 - 120/81)  BP(mean): --  RR: 17 (20 Jan 2025 04:00) (17 - 20)  SpO2: 98% (19 Jan 2025 20:04) (98% - 100%)    Parameters below as of 20 Jan 2025 04:00  Patient On (Oxygen Delivery Method): nasal cannula          VBG pH 7.31 01-19 @ 08:08  VBG pCO2 61 01-19 @ 08:08  VBG O2 sat 29.4 01-19 @ 08:08  VBG lactate 1.2 01-19 @ 08:08              LABS:                        8.2    19.56 )-----------( 508      ( 20 Jan 2025 05:24 )             26.1     01-20    142  |  106  |  31[H]  ----------------------------<  239[H]  3.9   |  24  |  1.00    Ca    8.7      20 Jan 2025 05:26  Phos  2.5     01-20  Mg     2.2     01-20    TPro  6.2  /  Alb  2.6[L]  /  TBili  0.4  /  DBili  x   /  AST  22  /  ALT  14  /  AlkPhos  68  01-19          CAPILLARY BLOOD GLUCOSE          Urinalysis Basic - ( 20 Jan 2025 05:26 )    Color: x / Appearance: x / SG: x / pH: x  Gluc: 239 mg/dL / Ketone: x  / Bili: x / Urobili: x   Blood: x / Protein: x / Nitrite: x   Leuk Esterase: x / RBC: x / WBC x   Sq Epi: x / Non Sq Epi: x / Bacteria: x      Procalcitonin: 0.73 ng/mL (01-20-25 @ 05:26)                Physical Examination:    General: No acute distress.      HEENT: Pupils equal, reactive to light.  Symmetric.    PULM: decreased BS    CVS: S1, S2    ABD: Soft, nondistended, nontender, normoactive bowel sounds, no masses    EXT: No edema, nontender    SKIN: Warm and well perfused, no rashes noted.    NEURO: Alert, oriented, interactive, nonfocal    RADIOLOGY REVIEWED    CT chest:    < from: CT Angio Chest PE Protocol w/ IV Cont (01.19.25 @ 14:38) >    FINDINGS:    LUNGS AND LARGE AIRWAYS: Left upper lobe and lingular consolidation.   Layering secretions within the central airways.    *  Irregular right upper lobe nodule (301-18) measuring 1.8 x 0.8 x 2.1   cm, not well appreciated on prior imaging.  *  5 mm nodule within the right upper lobe (301-42), new from 10/22/2021.  *  0.7 cm juxtapleural nodule within the right lower lobe along the   oblique fissure (301-44) likely representing an intrapulmonary lymph   node, new from 10/22/2021.    Bilateral linear basilar opacities, likely representing atelectasis.   Emphysematous changes bilaterally.  PLEURA: No pleural effusion.  VESSELS: Aortic calcifications. Coronary artery calcifications and/or   stents. No pulmonary embolism.  HEART: Concentric left ventricular wall thickening  Trace pericardial   effusion.  MEDIASTINUM AND LUCHO: No lymphadenopathy.  CHEST WALL AND LOWER NECK: Unchanged metallic foreign object within the   right anterior chest wall.  VISUALIZED UPPER ABDOMEN: Left-sided renal cysts.  BONES: Degenerative changes.      IMPRESSION:  No pulmonary embolism.    Irregular 2.1 cm right upper lobe nodule may represent pulmonary neoplasm.    Left upper lobe and lingular consolidations are likely infectious.    < end of copied text >

## 2025-01-20 NOTE — CONSULT NOTE ADULT - ASSESSMENT
75 y/o M with PMH of COPD, former smoker, CAD, GERD, BPH. Presents for evaluation of about 9 days of runny nose, progressive shortness of breath. Patient reports he has oxygen at home, has started using it because he felt short of breath with minimal exertion and movement. He also reports an episode of left-sided chest pain when he was reaching for something. He denies any falls or trauma. Patient reports he was trying Mucinex.  He is on albuterol and a steroid inhaler which he has been using without any relief. CT chest with PNA. Now with improving congested cough and SOB.

## 2025-01-20 NOTE — PHYSICAL THERAPY INITIAL EVALUATION ADULT - ADDITIONAL COMMENTS
Pt resides with his significant other in an apartment with +elevator access. PTA, pt ambulated with no device and was independent with ADLs. +. Pt intermittently uses home O2 as needed.

## 2025-01-20 NOTE — CONSULT NOTE ADULT - NS ATTEND AMEND GEN_ALL_CORE FT
-Continue abx  -D/c IV solumedrol, start prednisone 40mg PO qd  -Continue Advair 250/50 1 puff BID   -Continue Singulair (home med)  -Continue Duoneb q6h  -Keep sat>90%

## 2025-01-20 NOTE — CONSULT NOTE ADULT - PROBLEM SELECTOR RECOMMENDATION 9
CTA chest with DIANA and lingular consolidations  -Continue Ceftriaxone and azithromycin  -Check urine legionella  -WBC elevated, continue to trend  -Check sputum culture if able

## 2025-01-20 NOTE — PHYSICAL THERAPY INITIAL EVALUATION ADULT - PERTINENT HX OF CURRENT PROBLEM, REHAB EVAL
74-year-old male with a history of COPD, former smoker, CAD, GERD, BPH here for evaluation of about 9 days of runny nose, progressive shortness of breath.  Patient reports he has oxygen at home, has started using it because he felt short of breath with minimal exertion and movement.  He denies any fevers or chills.  He also reports an episode of left-sided chest pain when he was reaching for something.  He denies any falls or trauma.  No nausea, vomiting or diarrhea.  No urinary symptoms.  Patient reports he was trying Mucinex.  He is on albuterol and a steroid inhaler which he has been using without any relief. Hosp course: XR chest (1/19) Left infiltrates. CTA chest (1/19) No pulmonary embolism. Irregular 2.1 cm right upper lobe nodule may represent pulmonary neoplasm. Left upper lobe and lingular consolidations are likely infectious.

## 2025-01-20 NOTE — CONSULT NOTE ADULT - PROBLEM SELECTOR RECOMMENDATION 3
CTA chest with irregular 2.1 cm RUL nodule, 5mm RUL nodule   -Pt states his outpatient pulmonologist (Dr. Bajwa) sent him for a lung needle biopsy, but it was rescheduled.   -Eventual lung nodule biopsy as an outpatient

## 2025-01-21 LAB
ANION GAP SERPL CALC-SCNC: 10 MMOL/L — SIGNIFICANT CHANGE UP (ref 5–17)
BUN SERPL-MCNC: 37 MG/DL — HIGH (ref 7–23)
CALCIUM SERPL-MCNC: 8.5 MG/DL — SIGNIFICANT CHANGE UP (ref 8.4–10.5)
CHLORIDE SERPL-SCNC: 103 MMOL/L — SIGNIFICANT CHANGE UP (ref 96–108)
CO2 SERPL-SCNC: 24 MMOL/L — SIGNIFICANT CHANGE UP (ref 22–31)
CREAT SERPL-MCNC: 1.08 MG/DL — SIGNIFICANT CHANGE UP (ref 0.5–1.3)
EGFR: 72 ML/MIN/1.73M2 — SIGNIFICANT CHANGE UP
GLUCOSE SERPL-MCNC: 168 MG/DL — HIGH (ref 70–99)
HCT VFR BLD CALC: 23.7 % — LOW (ref 39–50)
HGB BLD-MCNC: 7.3 G/DL — LOW (ref 13–17)
LEGIONELLA AG UR QL: NEGATIVE — SIGNIFICANT CHANGE UP
MCHC RBC-ENTMCNC: 26.2 PG — LOW (ref 27–34)
MCHC RBC-ENTMCNC: 30.8 G/DL — LOW (ref 32–36)
MCV RBC AUTO: 84.9 FL — SIGNIFICANT CHANGE UP (ref 80–100)
NRBC # BLD: 0 /100 WBCS — SIGNIFICANT CHANGE UP (ref 0–0)
NRBC BLD-RTO: 0 /100 WBCS — SIGNIFICANT CHANGE UP (ref 0–0)
PLATELET # BLD AUTO: 525 K/UL — HIGH (ref 150–400)
POTASSIUM SERPL-MCNC: 3.7 MMOL/L — SIGNIFICANT CHANGE UP (ref 3.5–5.3)
POTASSIUM SERPL-SCNC: 3.7 MMOL/L — SIGNIFICANT CHANGE UP (ref 3.5–5.3)
RBC # BLD: 2.79 M/UL — LOW (ref 4.2–5.8)
RBC # FLD: 16.5 % — HIGH (ref 10.3–14.5)
SODIUM SERPL-SCNC: 137 MMOL/L — SIGNIFICANT CHANGE UP (ref 135–145)
WBC # BLD: 21.23 K/UL — HIGH (ref 3.8–10.5)
WBC # FLD AUTO: 21.23 K/UL — HIGH (ref 3.8–10.5)

## 2025-01-21 RX ORDER — PREDNISONE 5 MG/1
20 TABLET ORAL ONCE
Refills: 0 | Status: COMPLETED | OUTPATIENT
Start: 2025-01-21 | End: 2025-01-21

## 2025-01-21 RX ORDER — PREDNISONE 5 MG/1
30 TABLET ORAL
Refills: 0 | Status: DISCONTINUED | OUTPATIENT
Start: 2025-01-22 | End: 2025-01-23

## 2025-01-21 RX ADMIN — Medication 100 MILLIGRAM(S): at 05:41

## 2025-01-21 RX ADMIN — IPRATROPIUM BROMIDE AND ALBUTEROL SULFATE 3 MILLILITER(S): .5; 2.5 SOLUTION RESPIRATORY (INHALATION) at 05:42

## 2025-01-21 RX ADMIN — Medication 10 MILLIGRAM(S): at 06:44

## 2025-01-21 RX ADMIN — PREDNISONE 20 MILLIGRAM(S): 5 TABLET ORAL at 11:10

## 2025-01-21 RX ADMIN — Medication 5000 UNIT(S): at 13:22

## 2025-01-21 RX ADMIN — MONTELUKAST SODIUM 10 MILLIGRAM(S): 5 TABLET, CHEWABLE ORAL at 11:10

## 2025-01-21 RX ADMIN — ASPIRIN 81 MILLIGRAM(S): 81 TABLET, COATED ORAL at 11:11

## 2025-01-21 RX ADMIN — FLUTICASONE PROPIONATE AND SALMETEROL 1 DOSE(S): 113; 14 POWDER, METERED RESPIRATORY (INHALATION) at 05:41

## 2025-01-21 RX ADMIN — Medication 25 MILLIGRAM(S): at 17:19

## 2025-01-21 RX ADMIN — Medication 25 MILLIGRAM(S): at 05:41

## 2025-01-21 RX ADMIN — Medication 5000 UNIT(S): at 05:41

## 2025-01-21 RX ADMIN — AZITHROMYCIN DIHYDRATE 255 MILLIGRAM(S): 500 TABLET, FILM COATED ORAL at 09:21

## 2025-01-21 RX ADMIN — FLUTICASONE PROPIONATE AND SALMETEROL 1 DOSE(S): 113; 14 POWDER, METERED RESPIRATORY (INHALATION) at 17:19

## 2025-01-21 RX ADMIN — IPRATROPIUM BROMIDE AND ALBUTEROL SULFATE 3 MILLILITER(S): .5; 2.5 SOLUTION RESPIRATORY (INHALATION) at 17:19

## 2025-01-21 RX ADMIN — PREDNISONE 40 MILLIGRAM(S): 5 TABLET ORAL at 05:42

## 2025-01-21 RX ADMIN — IPRATROPIUM BROMIDE AND ALBUTEROL SULFATE 3 MILLILITER(S): .5; 2.5 SOLUTION RESPIRATORY (INHALATION) at 23:18

## 2025-01-21 RX ADMIN — IPRATROPIUM BROMIDE AND ALBUTEROL SULFATE 3 MILLILITER(S): .5; 2.5 SOLUTION RESPIRATORY (INHALATION) at 00:18

## 2025-01-21 RX ADMIN — Medication 75 MILLIGRAM(S): at 11:10

## 2025-01-21 RX ADMIN — CEFTRIAXONE 100 MILLIGRAM(S): 250 INJECTION, POWDER, FOR SOLUTION INTRAMUSCULAR; INTRAVENOUS at 10:24

## 2025-01-21 RX ADMIN — ATORVASTATIN CALCIUM 80 MILLIGRAM(S): 80 TABLET, FILM COATED ORAL at 21:18

## 2025-01-21 RX ADMIN — Medication 5000 UNIT(S): at 21:18

## 2025-01-21 RX ADMIN — IPRATROPIUM BROMIDE AND ALBUTEROL SULFATE 3 MILLILITER(S): .5; 2.5 SOLUTION RESPIRATORY (INHALATION) at 11:11

## 2025-01-21 NOTE — DIETITIAN INITIAL EVALUATION ADULT - ETIOLOGY
Discharge instructions/education provided to mother, Brooke Saul, she verbalized understanding and had no questions. Patient discharged at ER entrance to home with parents via private vehicle.
Patient alert and oriented x4, VS stable, 4/10 R ankle pain at time of discharge. Mother at bedside. R ankle & leg elevated with polar ice in place.
related to inadequate protein-energy intake

## 2025-01-21 NOTE — DIETITIAN INITIAL EVALUATION ADULT - ADD RECOMMEND
1. Continue Regular Diet to optimize nutrition. Monitor need for Consistent Carbohydrate Diet.  2. Recommend trial Ensure Max Vanilla once daily to optimize PO intake.   3. Recommend re-check Patient's height.   4. Monitor routine weights, nutrition related labs, fingersticks, PO intake and tolerance, oral nutrition supplement compliance, and skin integrity.   5. Malnutrition notification sticker placed in Patient's chart.

## 2025-01-21 NOTE — DIETITIAN INITIAL EVALUATION ADULT - PERTINENT MEDS FT
MEDICATIONS  (STANDING):  albuterol/ipratropium for Nebulization 3 milliLiter(s) Nebulizer every 6 hours  amLODIPine   Tablet 10 milliGRAM(s) Oral daily  aspirin  chewable 81 milliGRAM(s) Oral daily  atorvastatin 80 milliGRAM(s) Oral at bedtime  azithromycin  IVPB 500 milliGRAM(s) IV Intermittent every 24 hours  carvedilol 25 milliGRAM(s) Oral every 12 hours  cefTRIAXone   IVPB 1000 milliGRAM(s) IV Intermittent every 24 hours  clopidogrel Tablet 75 milliGRAM(s) Oral daily  fluticasone propionate/ salmeterol 250-50 MICROgram(s) Diskus 1 Dose(s) Inhalation two times a day  heparin   Injectable 5000 Unit(s) SubCutaneous every 8 hours  influenza  Vaccine (HIGH DOSE) 0.5 milliLiter(s) IntraMuscular once  losartan 100 milliGRAM(s) Oral daily  montelukast 10 milliGRAM(s) Oral daily    MEDICATIONS  (PRN):

## 2025-01-21 NOTE — DIETITIAN INITIAL EVALUATION ADULT - OTHER INFO
Pertinent History:   Per H&P: h/o GERD    Pertinent Nutrition Related Labs:  - BUN 37 (H)  - Glucose 168 (H). Hyperglycemia noted during admission. No HbA1c level available to review  - Hyperkalemia noted 1/19, now resolved    Pertinent Medications:   - Antibiotics in use  - Steroid in use, elevated glucose to be expected

## 2025-01-21 NOTE — DIETITIAN INITIAL EVALUATION ADULT - PERTINENT LABORATORY DATA
01-21    137  |  103  |  37[H]  ----------------------------<  168[H]  3.7   |  24  |  1.08    Ca    8.5      21 Jan 2025 05:18  Phos  2.5     01-20  Mg     2.2     01-20

## 2025-01-21 NOTE — DIETITIAN INITIAL EVALUATION ADULT - ORAL INTAKE PTA/DIET HISTORY
Patient visited. Per Patient, denies adhering to a therapeutic diet and reports normal/ fair appetite prior to admission. Patient reports having 2-3 meals daily, and supplementing with a protein shake on occasion. Unable to recall what protein shake he consumes. NFKA or intolerances reported. Patient reports supplementing with Iron and Zinc prior to admission.

## 2025-01-21 NOTE — DIETITIAN INITIAL EVALUATION ADULT - REASON INDICATOR FOR ASSESSMENT
Dietitian consult received for: MST score 2 or >   Chart reviewed, events noted   Information obtained from: electronic medical record, Patient

## 2025-01-21 NOTE — DIETITIAN INITIAL EVALUATION ADULT - PHYSCIAL ASSESSMENT
- Per RD note from previous admission (10/11/2021), Patient's weight documented to be 155lbs.     - Per [facility] HIE: 155lbs (4/30/2024), 155lbs (7/24/2024), 155lbs (10/23/2024), 154lbs (1/19/2025). Stable weight x 9 months indicated.       - Per electronic medical record, current dosing weight 154lbs (1/19/2025), and height documented to be 6'5". Patient confirmed weight to be accurate, and denies height accuracy. Patient reports to be 6'1", and denies recent unintentional weight loss x 1 year prior to admission.

## 2025-01-21 NOTE — DIETITIAN INITIAL EVALUATION ADULT - ENERGY INTAKE
Per nursing flowsheets, adequate PO intake during admission, %. Patient reports good appetite. Offered oral nutrition shake to promote PO intake. Patient accepting. Dietary preferences reviewed, food and nutrition services to be made aware./Adequate (%)

## 2025-01-22 LAB
ADD ON TEST-SPECIMEN IN LAB: SIGNIFICANT CHANGE UP
HCT VFR BLD CALC: 23.5 % — LOW (ref 39–50)
HGB BLD-MCNC: 7.3 G/DL — LOW (ref 13–17)
MCHC RBC-ENTMCNC: 26.9 PG — LOW (ref 27–34)
MCHC RBC-ENTMCNC: 31.1 G/DL — LOW (ref 32–36)
MCV RBC AUTO: 86.7 FL — SIGNIFICANT CHANGE UP (ref 80–100)
NRBC # BLD: 0 /100 WBCS — SIGNIFICANT CHANGE UP (ref 0–0)
NRBC BLD-RTO: 0 /100 WBCS — SIGNIFICANT CHANGE UP (ref 0–0)
PLATELET # BLD AUTO: 573 K/UL — HIGH (ref 150–400)
PROCALCITONIN SERPL-MCNC: 0.54 NG/ML — HIGH (ref 0.02–0.1)
RBC # BLD: 2.71 M/UL — LOW (ref 4.2–5.8)
RBC # FLD: 16.6 % — HIGH (ref 10.3–14.5)
T4 FREE SERPL-MCNC: 1.4 NG/DL — SIGNIFICANT CHANGE UP (ref 0.9–1.7)
TSH SERPL-MCNC: 0.06 UIU/ML — LOW (ref 0.27–4.2)
WBC # BLD: 16.47 K/UL — HIGH (ref 3.8–10.5)
WBC # FLD AUTO: 16.47 K/UL — HIGH (ref 3.8–10.5)

## 2025-01-22 RX ADMIN — AZITHROMYCIN DIHYDRATE 255 MILLIGRAM(S): 500 TABLET, FILM COATED ORAL at 08:17

## 2025-01-22 RX ADMIN — Medication 100 MILLIGRAM(S): at 06:54

## 2025-01-22 RX ADMIN — CEFTRIAXONE 100 MILLIGRAM(S): 250 INJECTION, POWDER, FOR SOLUTION INTRAMUSCULAR; INTRAVENOUS at 09:25

## 2025-01-22 RX ADMIN — IPRATROPIUM BROMIDE AND ALBUTEROL SULFATE 3 MILLILITER(S): .5; 2.5 SOLUTION RESPIRATORY (INHALATION) at 18:03

## 2025-01-22 RX ADMIN — Medication 75 MILLIGRAM(S): at 11:09

## 2025-01-22 RX ADMIN — Medication 25 MILLIGRAM(S): at 05:06

## 2025-01-22 RX ADMIN — Medication 5000 UNIT(S): at 05:06

## 2025-01-22 RX ADMIN — IPRATROPIUM BROMIDE AND ALBUTEROL SULFATE 3 MILLILITER(S): .5; 2.5 SOLUTION RESPIRATORY (INHALATION) at 23:50

## 2025-01-22 RX ADMIN — Medication 10 MILLIGRAM(S): at 05:06

## 2025-01-22 RX ADMIN — MONTELUKAST SODIUM 10 MILLIGRAM(S): 5 TABLET, CHEWABLE ORAL at 11:09

## 2025-01-22 RX ADMIN — Medication 5000 UNIT(S): at 21:34

## 2025-01-22 RX ADMIN — FLUTICASONE PROPIONATE AND SALMETEROL 1 DOSE(S): 113; 14 POWDER, METERED RESPIRATORY (INHALATION) at 05:07

## 2025-01-22 RX ADMIN — FLUTICASONE PROPIONATE AND SALMETEROL 1 DOSE(S): 113; 14 POWDER, METERED RESPIRATORY (INHALATION) at 18:03

## 2025-01-22 RX ADMIN — ATORVASTATIN CALCIUM 80 MILLIGRAM(S): 80 TABLET, FILM COATED ORAL at 21:34

## 2025-01-22 RX ADMIN — PREDNISONE 30 MILLIGRAM(S): 5 TABLET ORAL at 05:10

## 2025-01-22 RX ADMIN — Medication 5000 UNIT(S): at 13:08

## 2025-01-22 RX ADMIN — Medication 25 MILLIGRAM(S): at 18:02

## 2025-01-22 RX ADMIN — ASPIRIN 81 MILLIGRAM(S): 81 TABLET, COATED ORAL at 11:09

## 2025-01-22 RX ADMIN — IPRATROPIUM BROMIDE AND ALBUTEROL SULFATE 3 MILLILITER(S): .5; 2.5 SOLUTION RESPIRATORY (INHALATION) at 05:06

## 2025-01-22 RX ADMIN — PREDNISONE 30 MILLIGRAM(S): 5 TABLET ORAL at 18:02

## 2025-01-22 RX ADMIN — IPRATROPIUM BROMIDE AND ALBUTEROL SULFATE 3 MILLILITER(S): .5; 2.5 SOLUTION RESPIRATORY (INHALATION) at 13:09

## 2025-01-23 ENCOUNTER — TRANSCRIPTION ENCOUNTER (OUTPATIENT)
Age: 75
End: 2025-01-23

## 2025-01-23 VITALS
DIASTOLIC BLOOD PRESSURE: 90 MMHG | SYSTOLIC BLOOD PRESSURE: 145 MMHG | OXYGEN SATURATION: 99 % | RESPIRATION RATE: 18 BRPM | TEMPERATURE: 97 F | HEART RATE: 67 BPM

## 2025-01-23 LAB
ADD ON TEST-SPECIMEN IN LAB: SIGNIFICANT CHANGE UP
ANION GAP SERPL CALC-SCNC: 12 MMOL/L — SIGNIFICANT CHANGE UP (ref 5–17)
BUN SERPL-MCNC: 35 MG/DL — HIGH (ref 7–23)
CALCIUM SERPL-MCNC: 9 MG/DL — SIGNIFICANT CHANGE UP (ref 8.4–10.5)
CHLORIDE SERPL-SCNC: 102 MMOL/L — SIGNIFICANT CHANGE UP (ref 96–108)
CO2 SERPL-SCNC: 24 MMOL/L — SIGNIFICANT CHANGE UP (ref 22–31)
CREAT SERPL-MCNC: 1.05 MG/DL — SIGNIFICANT CHANGE UP (ref 0.5–1.3)
EGFR: 74 ML/MIN/1.73M2 — SIGNIFICANT CHANGE UP
GLUCOSE SERPL-MCNC: 229 MG/DL — HIGH (ref 70–99)
HCT VFR BLD CALC: 23.8 % — LOW (ref 39–50)
HGB BLD-MCNC: 7.5 G/DL — LOW (ref 13–17)
MCHC RBC-ENTMCNC: 26.6 PG — LOW (ref 27–34)
MCHC RBC-ENTMCNC: 31.5 G/DL — LOW (ref 32–36)
MCV RBC AUTO: 84.4 FL — SIGNIFICANT CHANGE UP (ref 80–100)
NRBC # BLD: 0 /100 WBCS — SIGNIFICANT CHANGE UP (ref 0–0)
NRBC BLD-RTO: 0 /100 WBCS — SIGNIFICANT CHANGE UP (ref 0–0)
PLATELET # BLD AUTO: 635 K/UL — HIGH (ref 150–400)
POTASSIUM SERPL-MCNC: 4.4 MMOL/L — SIGNIFICANT CHANGE UP (ref 3.5–5.3)
POTASSIUM SERPL-SCNC: 4.4 MMOL/L — SIGNIFICANT CHANGE UP (ref 3.5–5.3)
PROCALCITONIN SERPL-MCNC: 0.17 NG/ML — HIGH (ref 0.02–0.1)
RBC # BLD: 2.82 M/UL — LOW (ref 4.2–5.8)
RBC # FLD: 16.5 % — HIGH (ref 10.3–14.5)
SODIUM SERPL-SCNC: 138 MMOL/L — SIGNIFICANT CHANGE UP (ref 135–145)
WBC # BLD: 18.31 K/UL — HIGH (ref 3.8–10.5)
WBC # FLD AUTO: 18.31 K/UL — HIGH (ref 3.8–10.5)

## 2025-01-23 PROCEDURE — 85018 HEMOGLOBIN: CPT

## 2025-01-23 PROCEDURE — 93005 ELECTROCARDIOGRAM TRACING: CPT

## 2025-01-23 PROCEDURE — 80048 BASIC METABOLIC PNL TOTAL CA: CPT

## 2025-01-23 PROCEDURE — 96375 TX/PRO/DX INJ NEW DRUG ADDON: CPT

## 2025-01-23 PROCEDURE — 99285 EMERGENCY DEPT VISIT HI MDM: CPT

## 2025-01-23 PROCEDURE — 82607 VITAMIN B-12: CPT

## 2025-01-23 PROCEDURE — 82746 ASSAY OF FOLIC ACID SERUM: CPT

## 2025-01-23 PROCEDURE — 84439 ASSAY OF FREE THYROXINE: CPT

## 2025-01-23 PROCEDURE — 83735 ASSAY OF MAGNESIUM: CPT

## 2025-01-23 PROCEDURE — 84295 ASSAY OF SERUM SODIUM: CPT

## 2025-01-23 PROCEDURE — 83540 ASSAY OF IRON: CPT

## 2025-01-23 PROCEDURE — 82947 ASSAY GLUCOSE BLOOD QUANT: CPT

## 2025-01-23 PROCEDURE — 97161 PT EVAL LOW COMPLEX 20 MIN: CPT

## 2025-01-23 PROCEDURE — 84145 PROCALCITONIN (PCT): CPT

## 2025-01-23 PROCEDURE — 85014 HEMATOCRIT: CPT

## 2025-01-23 PROCEDURE — 96374 THER/PROPH/DIAG INJ IV PUSH: CPT

## 2025-01-23 PROCEDURE — 80053 COMPREHEN METABOLIC PANEL: CPT

## 2025-01-23 PROCEDURE — 71275 CT ANGIOGRAPHY CHEST: CPT | Mod: MC

## 2025-01-23 PROCEDURE — 94640 AIRWAY INHALATION TREATMENT: CPT

## 2025-01-23 PROCEDURE — 85027 COMPLETE CBC AUTOMATED: CPT

## 2025-01-23 PROCEDURE — 82435 ASSAY OF BLOOD CHLORIDE: CPT

## 2025-01-23 PROCEDURE — 84132 ASSAY OF SERUM POTASSIUM: CPT

## 2025-01-23 PROCEDURE — 83550 IRON BINDING TEST: CPT

## 2025-01-23 PROCEDURE — 71045 X-RAY EXAM CHEST 1 VIEW: CPT

## 2025-01-23 PROCEDURE — 83605 ASSAY OF LACTIC ACID: CPT

## 2025-01-23 PROCEDURE — 36415 COLL VENOUS BLD VENIPUNCTURE: CPT

## 2025-01-23 PROCEDURE — 82803 BLOOD GASES ANY COMBINATION: CPT

## 2025-01-23 PROCEDURE — 87637 SARSCOV2&INF A&B&RSV AMP PRB: CPT

## 2025-01-23 PROCEDURE — 84484 ASSAY OF TROPONIN QUANT: CPT

## 2025-01-23 PROCEDURE — 84443 ASSAY THYROID STIM HORMONE: CPT

## 2025-01-23 PROCEDURE — 82330 ASSAY OF CALCIUM: CPT

## 2025-01-23 PROCEDURE — 82728 ASSAY OF FERRITIN: CPT

## 2025-01-23 PROCEDURE — 85025 COMPLETE CBC W/AUTO DIFF WBC: CPT

## 2025-01-23 PROCEDURE — 84100 ASSAY OF PHOSPHORUS: CPT

## 2025-01-23 PROCEDURE — 87449 NOS EACH ORGANISM AG IA: CPT

## 2025-01-23 RX ORDER — PREDNISONE 5 MG/1
4 TABLET ORAL
Qty: 48 | Refills: 0
Start: 2025-01-23

## 2025-01-23 RX ORDER — CEFUROXIME AXETIL 500 MG
1 TABLET ORAL
Qty: 6 | Refills: 0
Start: 2025-01-23 | End: 2025-01-25

## 2025-01-23 RX ADMIN — Medication 25 MILLIGRAM(S): at 17:10

## 2025-01-23 RX ADMIN — IPRATROPIUM BROMIDE AND ALBUTEROL SULFATE 3 MILLILITER(S): .5; 2.5 SOLUTION RESPIRATORY (INHALATION) at 17:09

## 2025-01-23 RX ADMIN — Medication 5000 UNIT(S): at 05:34

## 2025-01-23 RX ADMIN — Medication 25 MILLIGRAM(S): at 05:34

## 2025-01-23 RX ADMIN — ASPIRIN 81 MILLIGRAM(S): 81 TABLET, COATED ORAL at 11:11

## 2025-01-23 RX ADMIN — IPRATROPIUM BROMIDE AND ALBUTEROL SULFATE 3 MILLILITER(S): .5; 2.5 SOLUTION RESPIRATORY (INHALATION) at 11:10

## 2025-01-23 RX ADMIN — Medication 5000 UNIT(S): at 13:24

## 2025-01-23 RX ADMIN — FLUTICASONE PROPIONATE AND SALMETEROL 1 DOSE(S): 113; 14 POWDER, METERED RESPIRATORY (INHALATION) at 05:37

## 2025-01-23 RX ADMIN — Medication 10 MILLIGRAM(S): at 05:34

## 2025-01-23 RX ADMIN — CEFTRIAXONE 100 MILLIGRAM(S): 250 INJECTION, POWDER, FOR SOLUTION INTRAMUSCULAR; INTRAVENOUS at 09:07

## 2025-01-23 RX ADMIN — Medication 100 MILLIGRAM(S): at 05:34

## 2025-01-23 RX ADMIN — Medication 75 MILLIGRAM(S): at 11:11

## 2025-01-23 RX ADMIN — MONTELUKAST SODIUM 10 MILLIGRAM(S): 5 TABLET, CHEWABLE ORAL at 11:11

## 2025-01-23 RX ADMIN — IPRATROPIUM BROMIDE AND ALBUTEROL SULFATE 3 MILLILITER(S): .5; 2.5 SOLUTION RESPIRATORY (INHALATION) at 05:35

## 2025-01-23 RX ADMIN — PREDNISONE 30 MILLIGRAM(S): 5 TABLET ORAL at 17:10

## 2025-01-23 RX ADMIN — PREDNISONE 30 MILLIGRAM(S): 5 TABLET ORAL at 05:34

## 2025-01-23 RX ADMIN — FLUTICASONE PROPIONATE AND SALMETEROL 1 DOSE(S): 113; 14 POWDER, METERED RESPIRATORY (INHALATION) at 17:09

## 2025-01-23 NOTE — PROGRESS NOTE ADULT - NS ATTEND AMEND GEN_ALL_CORE FT
Continue Advair 250/50 1 puff BID   Continue Singulair (home med)  Continue Duoneb q6h.  Continue prednisone 30mg PO BID  Keep sat>90%  PET as outpt
Pt ok for dc home  Continue b dilators  Continue prednisone 30mg PO BID x 1 more day then decrease to 40mg PO qd x 3 days, 30mg PO qd x 3 days, 20mg PO qd x 3 days then 10mg PO qd and continue until outpatient f/u  Continue abx x 7d total  eventual repeat ct chest as pet/ct as outpt

## 2025-01-23 NOTE — DISCHARGE NOTE PROVIDER - NSDCFUSCHEDAPPT_GEN_ALL_CORE_FT
Cristal Hendrix  [facility] Physician [facility]  CARDIOLOGY [address]  Scheduled Appointment: 01/28/2025

## 2025-01-23 NOTE — PROVIDER CONTACT NOTE (OTHER) - BACKGROUND
Dx Chronic obstructive pulmonary disease with acute exacerbation
DX: COPD exacerbation  PMH: CAD, COPD, GERD, BPH

## 2025-01-23 NOTE — PROGRESS NOTE ADULT - ASSESSMENT
74-year-old male with a history of COPD, former smoker, CAD, GERD, BPH here for evaluation of about 9 days of runny nose, progressive shortness of breath.  Patient reports he has oxygen at home, has started using it because he felt short of breath with minimal exertion and movement.  He denies any fevers or chills.  He also reports an episode of left-sided chest pain when he was reaching for something.  He denies any falls or trauma.  No nausea, vomiting or diarrhea.  No urinary symptoms.  Patient reports he was trying Mucinex.  He is on albuterol and a steroid inhaler which he has been using without any relief      Pneumonia.   -  CTA chest with DIANA and lingular consolidations  -Urine legionella negative  -Continue Ceftriaxone.   -WBC elevated, continue to trend. Steroids possibly contributing to elevated WBC as well. Check procalcitonin (added to AM labs).     COPD exacerbation.   -: 2nd to PNA  -Continue Advair 250/50 1 puff BID (home med: Symbicort)  -Continue Singulair (home med)  -Continue Duoneb q6h.  -S/p IV solumedrol. Continue prednisone 30mg PO BID - will re-evaluate further steroid taper.  -Check o2 sats on RA at rest and with ambulation to determine o2 needs. Keep sats >88% with o2 PRN. Pt has home o2 which he uses PRN.     Pulmonary nodule.   - CTA chest with irregular 2.1 cm RUL nodule, 5mm RUL nodule   -Pt states his outpatient pulmonologist (Dr. Bajwa) sent him for a PET-CT or lung needle biopsy (he is unsure), but it was rescheduled.   -F/u with his outpatient pulm on discharge or in our office. Would likely plan for PET-CT as an outpatient.    CAD  - c/w asa and plavix    HTN  - c/w coreg  - c/w norvasc  - c/w losartan    HLD  - c/w lipitor    dvt px  - sq heparin  
74-year-old male with a history of COPD, former smoker, CAD, GERD, BPH here for evaluation of about 9 days of runny nose, progressive shortness of breath.  Patient reports he has oxygen at home, has started using it because he felt short of breath with minimal exertion and movement.  He denies any fevers or chills.  He also reports an episode of left-sided chest pain when he was reaching for something.  He denies any falls or trauma.  No nausea, vomiting or diarrhea.  No urinary symptoms.  Patient reports he was trying Mucinex.  He is on albuterol and a steroid inhaler which he has been using without any relief      Pneumonia.   ·  Plan: CTA chest with DIANA and lingular consolidations  -Continue Ceftriaxone and azithromycin  -F/u urine legionella  -WBC elevated, continue to trend  -Check sputum culture if able.    COPD exacerbation.   -  2nd to PNA  -Hypoxia resolved, keep sats >88% with o2 PRN  -Continue Advair 250/50 1 puff BID (home med: Symbicort)  -Continue Singulair (home med)  -Continue Duoneb q6h.  -S/p IV solumedrol. Some wheezing on exam today - increase prednisone to 30mg PO BID (received pred 40mg this AM - 1x dose of 20mg PO ordered). Will re-evaluate further taper.    : Pulmonary nodule.   -  CTA chest with irregular 2.1 cm RUL nodule, 5mm RUL nodule   -Pt states his outpatient pulmonologist (Dr. Bajwa) sent him for a lung needle biopsy, but it was rescheduled.   -Eventual lung nodule biopsy as an outpatient.    CAD  - c/w asa and plavix    HTN  - c/w coreg  - c/w norvasc  - c/w losartan    HLD  - c/w lipitor    dvt px  - sq heparin  
74-year-old male with a history of COPD, former smoker, CAD, GERD, BPH here for evaluation of about 9 days of runny nose, progressive shortness of breath.  Patient reports he has oxygen at home, has started using it because he felt short of breath with minimal exertion and movement.  He denies any fevers or chills.  He also reports an episode of left-sided chest pain when he was reaching for something.  He denies any falls or trauma.  No nausea, vomiting or diarrhea.  No urinary symptoms.  Patient reports he was trying Mucinex.  He is on albuterol and a steroid inhaler which he has been using without any relief     Pneumonia.   - : CTA chest with DIANA and lingular consolidations  -Continue Ceftriaxone and azithromycin  -Check urine legionella  -WBC elevated, continue to trend  -Check sputum culture if able.     COPD exacerbation.   -  2nd to PNA  -Hypoxia resolved, keep sats >88% with o2 PRN  -D/c IV solumedrol, start prednisone 40mg PO qd. Will re-evaluate further steroid taper.   -Continue Advair 250/50 1 puff BID (home med: Symbicort)  -Continue Singulair (home med)  -Continue Duoneb q6h.     Pulmonary nodule.   -  CTA chest with irregular 2.1 cm RUL nodule, 5mm RUL nodule   -Pt states his outpatient pulmonologist (Dr. Bajwa) sent him for a lung needle biopsy, but it was rescheduled.   -Eventual lung nodule biopsy as an outpatient.    CAD  - c/w asa and plavix    HTN  - c/w coreg  - c/w norvasc  - c/w losartan    HLD  - c/w lipitor    dvt px  - sq heparin  
74-year-old male with a history of COPD, former smoker, CAD, GERD, BPH here for evaluation of about 9 days of runny nose, progressive shortness of breath.  Patient reports he has oxygen at home, has started using it because he felt short of breath with minimal exertion and movement.  He denies any fevers or chills.  He also reports an episode of left-sided chest pain when he was reaching for something.  He denies any falls or trauma.  No nausea, vomiting or diarrhea.  No urinary symptoms.  Patient reports he was trying Mucinex.  He is on albuterol and a steroid inhaler which he has been using without any relief      Pneumonia.   ·  Plan: CTA chest with DIANA and lingular consolidations  -Urine legionella negative  -Continue Ceftriaxone.   -WBC elevated, continue to trend. Steroids possibly contributing to elevated WBC as well.   -Procalcitonin elevated, continue to trend (added to AM labs)  - d/c planning  today,      COPD exacerbation.   -: 2nd to PNA  -Continue Advair 250/50 1 puff BID (home med: Symbicort)  -Continue Singulair (home med)  -Continue Duoneb q6h.  -S/p IV solumedrol. Continue prednisone 30mg PO BID - will re-evaluate further steroid taper.  -Check o2 sats on RA at rest and with ambulation to determine o2 needs. Keep sats >88% with o2 PRN. Pt has home o2 which he uses PRN.     Pulmonary nodule.   - CTA chest with irregular 2.1 cm RUL nodule, 5mm RUL nodule   -Pt states his outpatient pulmonologist (Dr. Bajwa) sent him for a PET-CT or lung needle biopsy (he is unsure), but it was rescheduled.   -F/u with his outpatient pulm on discharge or in our office. Would likely plan for PET-CT as an outpatient.    CAD  - c/w asa and plavix    HTN  - c/w coreg  - c/w norvasc  - c/w losartan    HLD  - c/w lipitor    dvt px  - sq heparin  
75 y/o M with PMH of COPD, former smoker, CAD, GERD, BPH. Presents for evaluation of about 9 days of runny nose, progressive shortness of breath. Patient reports he has oxygen at home, has started using it because he felt short of breath with minimal exertion and movement. He also reports an episode of left-sided chest pain when he was reaching for something. He denies any falls or trauma. Patient reports he was trying Mucinex.  He is on albuterol and a steroid inhaler which he has been using without any relief. CT chest with PNA. Now with improving congested cough and SOB.
73 y/o M with PMH of COPD, former smoker, CAD, GERD, BPH. Presents for evaluation of about 9 days of runny nose, progressive shortness of breath. Patient reports he has oxygen at home, has started using it because he felt short of breath with minimal exertion and movement. He also reports an episode of left-sided chest pain when he was reaching for something. He denies any falls or trauma. Patient reports he was trying Mucinex.  He is on albuterol and a steroid inhaler which he has been using without any relief. CT chest with PNA. Now with improving congested cough and SOB.
75 y/o M with PMH of COPD, former smoker, CAD, GERD, BPH. Presents for evaluation of about 9 days of runny nose, progressive shortness of breath. Patient reports he has oxygen at home, has started using it because he felt short of breath with minimal exertion and movement. He also reports an episode of left-sided chest pain when he was reaching for something. He denies any falls or trauma. Patient reports he was trying Mucinex.  He is on albuterol and a steroid inhaler which he has been using without any relief. CT chest with PNA. Now with improving congested cough and SOB.

## 2025-01-23 NOTE — DISCHARGE NOTE PROVIDER - NSDCMRMEDTOKEN_GEN_ALL_CORE_FT
Albuterol (Eqv-Ventolin HFA) 90 mcg/inh inhalation aerosol: 1 puff(s) inhaled 3 times a day as needed  amLODIPine 10 mg oral tablet: 1 tab(s) orally once a day  aspirin 81 mg oral tablet, chewable: 1 tab(s) chewed once a day  atorvastatin 80 mg oral tablet: 1 tab(s) orally once a day (at bedtime)  carvedilol 25 mg oral tablet: 1 tab(s) orally 2 times a day  cefuroxime 500 mg oral tablet: 1 tab(s) orally 2 times a day To complete 7 day course  clopidogrel 75 mg oral tablet: 1 tab(s) orally once a day  losartan 100 mg oral tablet: 1 tab(s) orally once a day  predniSONE 10 mg oral tablet: 4 tab(s) orally once a day Prednisone Taper   4 Tabs daily x 3 days then   3 Tabs daily x 3days then   2 Tabs daily x3 days then   1 Tab daily - until Pulmonary follow-up  Singulair 10 mg oral tablet: 1 tab(s) orally once a day  Supplements/Vitamins: Iron tablet, Zinc tablet: 1 tablet orally once a day  Symbicort 160 mcg-4.5 mcg/inh inhalation aerosol: 2 puff(s) inhaled 2 times a day, As Needed

## 2025-01-23 NOTE — DISCHARGE NOTE NURSING/CASE MANAGEMENT/SOCIAL WORK - PATIENT PORTAL LINK FT
You can access the FollowMyHealth Patient Portal offered by [facility] by registering at the following website: [email or web address]. By joining [facility]’s FollowMyHealth portal, you will also be able to view your health information using other applications (apps) compatible with our system.

## 2025-01-23 NOTE — PROGRESS NOTE ADULT - PROBLEM SELECTOR PLAN 3
CTA chest with irregular 2.1 cm RUL nodule, 5mm RUL nodule   -Pt states his outpatient pulmonologist (Dr. Bajwa) sent him for a lung needle biopsy, but it was rescheduled.   -Eventual lung nodule biopsy as an outpatient.
CTA chest with irregular 2.1 cm RUL nodule, 5mm RUL nodule   -Pt states his outpatient pulmonologist (Dr. Bajwa) sent him for a PET-CT or lung needle biopsy (he is unsure), but it was rescheduled.   -F/u with his outpatient pulm on discharge or in our office. Would likely plan for PET-CT as an outpatient.
CTA chest with irregular 2.1 cm RUL nodule, 5mm RUL nodule   -Pt states his outpatient pulmonologist (Dr. Bajwa) sent him for a PET-CT or lung needle biopsy (he is unsure), but it was rescheduled.   -F/u with his outpatient pulm on discharge or in our office. Would plan for PET-CT as an outpatient.

## 2025-01-23 NOTE — PROGRESS NOTE ADULT - SUBJECTIVE AND OBJECTIVE BOX
DATE OF SERVICE: 01-22-25 @ 20:16    Patient is a 74y old  Male who presents with a chief complaint of SOB (22 Jan 2025 11:08)      SUBJECTIVE / OVERNIGHT EVENTS:  No chest pain. No shortness of breath. No complaints. No events overnight.     MEDICATIONS  (STANDING):  albuterol/ipratropium for Nebulization 3 milliLiter(s) Nebulizer every 6 hours  amLODIPine   Tablet 10 milliGRAM(s) Oral daily  aspirin  chewable 81 milliGRAM(s) Oral daily  atorvastatin 80 milliGRAM(s) Oral at bedtime  azithromycin  IVPB 500 milliGRAM(s) IV Intermittent every 24 hours  carvedilol 25 milliGRAM(s) Oral every 12 hours  cefTRIAXone   IVPB 1000 milliGRAM(s) IV Intermittent every 24 hours  clopidogrel Tablet 75 milliGRAM(s) Oral daily  fluticasone propionate/ salmeterol 250-50 MICROgram(s) Diskus 1 Dose(s) Inhalation two times a day  heparin   Injectable 5000 Unit(s) SubCutaneous every 8 hours  influenza  Vaccine (HIGH DOSE) 0.5 milliLiter(s) IntraMuscular once  losartan 100 milliGRAM(s) Oral daily  montelukast 10 milliGRAM(s) Oral daily  predniSONE   Tablet 30 milliGRAM(s) Oral two times a day    MEDICATIONS  (PRN):      Vital Signs Last 24 Hrs  T(C): 36.4 (22 Jan 2025 17:53), Max: 37 (22 Jan 2025 11:00)  T(F): 97.6 (22 Jan 2025 17:53), Max: 98.6 (22 Jan 2025 11:00)  HR: 75 (22 Jan 2025 17:53) (65 - 75)  BP: 127/71 (22 Jan 2025 17:53) (101/59 - 127/71)  BP(mean): --  RR: 18 (22 Jan 2025 17:53) (18 - 18)  SpO2: 98% (22 Jan 2025 17:53) (97% - 98%)    Parameters below as of 22 Jan 2025 17:53  Patient On (Oxygen Delivery Method): room air      CAPILLARY BLOOD GLUCOSE        I&O's Summary    21 Jan 2025 07:01  -  22 Jan 2025 07:00  --------------------------------------------------------  IN: 480 mL / OUT: 1575 mL / NET: -1095 mL    22 Jan 2025 07:01  -  22 Jan 2025 20:16  --------------------------------------------------------  IN: 480 mL / OUT: 900 mL / NET: -420 mL        PHYSICAL EXAM:  GENERAL: NAD, well-developed  HEAD:  Atraumatic, Normocephalic  EYES: EOMI, PERRLA, conjunctiva and sclera clear  NECK: Supple, No JVD  CHEST/LUNG: Clear to auscultation bilaterally; No wheeze  HEART: Regular rate and rhythm; No murmurs, rubs, or gallops  ABDOMEN: Soft, Nontender, Nondistended; Bowel sounds present  EXTREMITIES:  2+ Peripheral Pulses, No clubbing, cyanosis, or edema  PSYCH: AAOx3  NEUROLOGY: non-focal  SKIN: No rashes or lesions    LABS:                        7.3    16.47 )-----------( 573      ( 22 Jan 2025 06:58 )             23.5     01-21    137  |  103  |  37[H]  ----------------------------<  168[H]  3.7   |  24  |  1.08    Ca    8.5      21 Jan 2025 05:18            Urinalysis Basic - ( 21 Jan 2025 05:18 )    Color: x / Appearance: x / SG: x / pH: x  Gluc: 168 mg/dL / Ketone: x  / Bili: x / Urobili: x   Blood: x / Protein: x / Nitrite: x   Leuk Esterase: x / RBC: x / WBC x   Sq Epi: x / Non Sq Epi: x / Bacteria: x        RADIOLOGY & ADDITIONAL TESTS:    Imaging Personally Reviewed:    Consultant(s) Notes Reviewed:      Care Discussed with Consultants/Other Providers:  
DATE OF SERVICE: 01-20-25 @ 14:31    Patient is a 74y old  Male who presents with a chief complaint of SOB (20 Jan 2025 10:19)      SUBJECTIVE / OVERNIGHT EVENTS:  No chest pain. No shortness of breath. No complaints. No events overnight.     MEDICATIONS  (STANDING):  albuterol/ipratropium for Nebulization 3 milliLiter(s) Nebulizer every 6 hours  amLODIPine   Tablet 10 milliGRAM(s) Oral daily  aspirin  chewable 81 milliGRAM(s) Oral daily  atorvastatin 80 milliGRAM(s) Oral at bedtime  azithromycin  IVPB 500 milliGRAM(s) IV Intermittent every 24 hours  carvedilol 25 milliGRAM(s) Oral every 12 hours  cefTRIAXone   IVPB 1000 milliGRAM(s) IV Intermittent every 24 hours  clopidogrel Tablet 75 milliGRAM(s) Oral daily  fluticasone propionate/ salmeterol 250-50 MICROgram(s) Diskus 1 Dose(s) Inhalation two times a day  heparin   Injectable 5000 Unit(s) SubCutaneous every 8 hours  influenza  Vaccine (HIGH DOSE) 0.5 milliLiter(s) IntraMuscular once  losartan 100 milliGRAM(s) Oral daily  montelukast 10 milliGRAM(s) Oral daily  predniSONE   Tablet 40 milliGRAM(s) Oral daily    MEDICATIONS  (PRN):      Vital Signs Last 24 Hrs  T(C): 36.5 (20 Jan 2025 11:02), Max: 36.6 (20 Jan 2025 04:00)  T(F): 97.7 (20 Jan 2025 11:02), Max: 97.8 (20 Jan 2025 04:00)  HR: 71 (20 Jan 2025 11:02) (69 - 81)  BP: 96/55 (20 Jan 2025 11:02) (96/55 - 126/72)  BP(mean): --  RR: 18 (20 Jan 2025 11:02) (17 - 18)  SpO2: 92% (20 Jan 2025 11:02) (92% - 99%)    Parameters below as of 20 Jan 2025 11:02  Patient On (Oxygen Delivery Method): room air      CAPILLARY BLOOD GLUCOSE        I&O's Summary    20 Jan 2025 07:01  -  20 Jan 2025 14:31  --------------------------------------------------------  IN: 480 mL / OUT: 300 mL / NET: 180 mL        PHYSICAL EXAM:  GENERAL: NAD, well-developed  HEAD:  Atraumatic, Normocephalic  EYES: EOMI, PERRLA, conjunctiva and sclera clear  NECK: Supple, No JVD  CHEST/LUNG: dec bs darwin  HEART: Regular rate and rhythm; No murmurs, rubs, or gallops  ABDOMEN: Soft, Nontender, Nondistended; Bowel sounds present  EXTREMITIES:  2+ Peripheral Pulses, No clubbing, cyanosis, or edema  PSYCH: AAOx3  NEUROLOGY: non-focal  SKIN: No rashes or lesions    LABS:                        8.2    19.56 )-----------( 508      ( 20 Jan 2025 05:24 )             26.1     01-20    142  |  106  |  31[H]  ----------------------------<  239[H]  3.9   |  24  |  1.00    Ca    8.7      20 Jan 2025 05:26  Phos  2.5     01-20  Mg     2.2     01-20    TPro  6.2  /  Alb  2.6[L]  /  TBili  0.4  /  DBili  x   /  AST  22  /  ALT  14  /  AlkPhos  68  01-19          Urinalysis Basic - ( 20 Jan 2025 05:26 )    Color: x / Appearance: x / SG: x / pH: x  Gluc: 239 mg/dL / Ketone: x  / Bili: x / Urobili: x   Blood: x / Protein: x / Nitrite: x   Leuk Esterase: x / RBC: x / WBC x   Sq Epi: x / Non Sq Epi: x / Bacteria: x    < from: CT Angio Chest PE Protocol w/ IV Cont (01.19.25 @ 14:38) >  INTERPRETATION:  CLINICAL INFORMATION: Concern for pulmonary embolism and   lung malignancy.    COMPARISON: CT chest 10/22/2021.    CONTRAST/COMPLICATIONS:  IV Contrast: Omnipaque 350  60 cc administered   40 cc discarded  Oral Contrast: NONE    PROCEDURE:  CT Angiography of the Chest.  Sagittal and coronal reformats were performed as well as 3D (MIP)   reconstructions.    FINDINGS:    LUNGS AND LARGE AIRWAYS: Left upper lobe and lingular consolidation.   Layering secretions within the central airways.    *  Irregular right upper lobe nodule (301-18) measuring 1.8 x 0.8 x 2.1   cm, not well appreciated on prior imaging.  *  5 mm nodule within the right upper lobe (301-42), new from 10/22/2021.  *  0.7 cm juxtapleural nodule within the right lower lobe along the   oblique fissure (301-44) likely representing an intrapulmonary lymph   node, new from 10/22/2021.    Bilateral linear basilar opacities, likely representing atelectasis.   Emphysematous changes bilaterally.  PLEURA: No pleural effusion.  VESSELS: Aortic calcifications. Coronary artery calcifications and/or   stents. No pulmonary embolism.  HEART: Concentric left ventricular wall thickening  Trace pericardial   effusion.  MEDIASTINUM AND LUCHO: No lymphadenopathy.  CHEST WALL AND LOWER NECK: Unchanged metallic foreign object within the   right anterior chest wall.  VISUALIZED UPPER ABDOMEN: Left-sided renal cysts.  BONES: Degenerative changes.      IMPRESSION:  No pulmonary embolism.    Irregular 2.1 cm right upper lobe nodule may represent pulmonary neoplasm.    Left upper lobe and lingular consolidations are likely infectious.    --- End of Report ---      < end of copied text >      RADIOLOGY & ADDITIONAL TESTS:    Imaging Personally Reviewed:    Consultant(s) Notes Reviewed:      Care Discussed with Consultants/Other Providers:  
DATE OF SERVICE: 01-21-25 @ 16:58    Patient is a 74y old  Male who presents with a chief complaint of Chronic obstructive pulmonary disease with acute exacerbation     (21 Jan 2025 12:32)      SUBJECTIVE / OVERNIGHT EVENTS:  No chest pain. No shortness of breath. No complaints. No events overnight.     MEDICATIONS  (STANDING):  albuterol/ipratropium for Nebulization 3 milliLiter(s) Nebulizer every 6 hours  amLODIPine   Tablet 10 milliGRAM(s) Oral daily  aspirin  chewable 81 milliGRAM(s) Oral daily  atorvastatin 80 milliGRAM(s) Oral at bedtime  azithromycin  IVPB 500 milliGRAM(s) IV Intermittent every 24 hours  carvedilol 25 milliGRAM(s) Oral every 12 hours  cefTRIAXone   IVPB 1000 milliGRAM(s) IV Intermittent every 24 hours  clopidogrel Tablet 75 milliGRAM(s) Oral daily  fluticasone propionate/ salmeterol 250-50 MICROgram(s) Diskus 1 Dose(s) Inhalation two times a day  heparin   Injectable 5000 Unit(s) SubCutaneous every 8 hours  influenza  Vaccine (HIGH DOSE) 0.5 milliLiter(s) IntraMuscular once  losartan 100 milliGRAM(s) Oral daily  montelukast 10 milliGRAM(s) Oral daily    MEDICATIONS  (PRN):      Vital Signs Last 24 Hrs  T(C): 36.4 (21 Jan 2025 04:00), Max: 36.5 (20 Jan 2025 20:13)  T(F): 97.5 (21 Jan 2025 04:00), Max: 97.7 (20 Jan 2025 20:13)  HR: 74 (21 Jan 2025 16:42) (65 - 74)  BP: 111/67 (21 Jan 2025 16:42) (109/68 - 114/64)  BP(mean): --  RR: 18 (21 Jan 2025 16:42) (17 - 18)  SpO2: 97% (21 Jan 2025 16:42) (97% - 100%)    Parameters below as of 21 Jan 2025 16:42  Patient On (Oxygen Delivery Method): nasal cannula  O2 Flow (L/min): 1    CAPILLARY BLOOD GLUCOSE        I&O's Summary    20 Jan 2025 07:01  -  21 Jan 2025 07:00  --------------------------------------------------------  IN: 480 mL / OUT: 1300 mL / NET: -820 mL    21 Jan 2025 07:01  -  21 Jan 2025 16:58  --------------------------------------------------------  IN: 480 mL / OUT: 600 mL / NET: -120 mL        PHYSICAL EXAM:  GENERAL: NAD, well-developed  HEAD:  Atraumatic, Normocephalic  EYES: EOMI, PERRLA, conjunctiva and sclera clear  NECK: Supple, No JVD  CHEST/LUNG: Clear to auscultation bilaterally; No wheeze  HEART: Regular rate and rhythm; No murmurs, rubs, or gallops  ABDOMEN: Soft, Nontender, Nondistended; Bowel sounds present  EXTREMITIES:  2+ Peripheral Pulses, No clubbing, cyanosis, or edema  PSYCH: AAOx3  NEUROLOGY: non-focal  SKIN: No rashes or lesions    LABS:                        7.3    21.23 )-----------( 525      ( 21 Jan 2025 05:18 )             23.7     01-21    137  |  103  |  37[H]  ----------------------------<  168[H]  3.7   |  24  |  1.08    Ca    8.5      21 Jan 2025 05:18  Phos  2.5     01-20  Mg     2.2     01-20            Urinalysis Basic - ( 21 Jan 2025 05:18 )    Color: x / Appearance: x / SG: x / pH: x  Gluc: 168 mg/dL / Ketone: x  / Bili: x / Urobili: x   Blood: x / Protein: x / Nitrite: x   Leuk Esterase: x / RBC: x / WBC x   Sq Epi: x / Non Sq Epi: x / Bacteria: x    < from: CT Angio Chest PE Protocol w/ IV Cont (01.19.25 @ 14:38) >  FINDINGS:    LUNGS AND LARGE AIRWAYS: Left upper lobe and lingular consolidation.   Layering secretions within the central airways.    *  Irregular right upper lobe nodule (301-18) measuring 1.8 x 0.8 x 2.1   cm, not well appreciated on prior imaging.  *  5 mm nodule within the right upper lobe (301-42), new from 10/22/2021.  *  0.7 cm juxtapleural nodule within the right lower lobe along the   oblique fissure (301-44) likely representing an intrapulmonary lymph   node, new from 10/22/2021.    Bilateral linear basilar opacities, likely representing atelectasis.   Emphysematous changes bilaterally.  PLEURA: No pleural effusion.  VESSELS: Aortic calcifications. Coronary artery calcifications and/or   stents. No pulmonary embolism.  HEART: Concentric left ventricular wall thickening  Trace pericardial   effusion.  MEDIASTINUM AND LUCHO: No lymphadenopathy.  CHEST WALL AND LOWER NECK: Unchanged metallic foreign object within the   right anterior chest wall.  VISUALIZED UPPER ABDOMEN: Left-sided renal cysts.  BONES: Degenerative changes.      IMPRESSION:  No pulmonary embolism.    Irregular 2.1 cm right upper lobe nodule may represent pulmonary neoplasm.    Left upper lobe and lingular consolidations are likely infectious.    --- End of Report ---        < end of copied text >      RADIOLOGY & ADDITIONAL TESTS:    Imaging Personally Reviewed:    Consultant(s) Notes Reviewed:      Care Discussed with Consultants/Other Providers:  
DATE OF SERVICE: 01-23-25 @ 15:41    Patient is a 74y old  Male who presents with a chief complaint of SOB (23 Jan 2025 14:29)      SUBJECTIVE / OVERNIGHT EVENTS:  No chest pain. No shortness of breath. No complaints. No events overnight.     MEDICATIONS  (STANDING):  albuterol/ipratropium for Nebulization 3 milliLiter(s) Nebulizer every 6 hours  amLODIPine   Tablet 10 milliGRAM(s) Oral daily  aspirin  chewable 81 milliGRAM(s) Oral daily  atorvastatin 80 milliGRAM(s) Oral at bedtime  carvedilol 25 milliGRAM(s) Oral every 12 hours  cefTRIAXone   IVPB 1000 milliGRAM(s) IV Intermittent every 24 hours  clopidogrel Tablet 75 milliGRAM(s) Oral daily  fluticasone propionate/ salmeterol 250-50 MICROgram(s) Diskus 1 Dose(s) Inhalation two times a day  heparin   Injectable 5000 Unit(s) SubCutaneous every 8 hours  influenza  Vaccine (HIGH DOSE) 0.5 milliLiter(s) IntraMuscular once  losartan 100 milliGRAM(s) Oral daily  montelukast 10 milliGRAM(s) Oral daily  predniSONE   Tablet 30 milliGRAM(s) Oral two times a day    MEDICATIONS  (PRN):      Vital Signs Last 24 Hrs  T(C): 36.8 (23 Jan 2025 11:00), Max: 37.2 (23 Jan 2025 04:00)  T(F): 98.3 (23 Jan 2025 11:00), Max: 98.9 (23 Jan 2025 04:00)  HR: 74 (23 Jan 2025 11:00) (68 - 84)  BP: 120/68 (23 Jan 2025 11:00) (108/63 - 127/71)  BP(mean): --  RR: 18 (23 Jan 2025 11:00) (17 - 18)  SpO2: 98% (23 Jan 2025 11:00) (97% - 98%)    Parameters below as of 23 Jan 2025 11:00  Patient On (Oxygen Delivery Method): room air      CAPILLARY BLOOD GLUCOSE        I&O's Summary    22 Jan 2025 07:01  -  23 Jan 2025 07:00  --------------------------------------------------------  IN: 480 mL / OUT: 2000 mL / NET: -1520 mL    23 Jan 2025 07:01  -  23 Jan 2025 15:41  --------------------------------------------------------  IN: 240 mL / OUT: 1000 mL / NET: -760 mL        PHYSICAL EXAM:  GENERAL: NAD, well-developed  HEAD:  Atraumatic, Normocephalic  EYES: EOMI, PERRLA, conjunctiva and sclera clear  NECK: Supple, No JVD  CHEST/LUNG: Clear to auscultation bilaterally; No wheeze  HEART: Regular rate and rhythm; No murmurs, rubs, or gallops  ABDOMEN: Soft, Nontender, Nondistended; Bowel sounds present  EXTREMITIES:  2+ Peripheral Pulses, No clubbing, cyanosis, or edema  PSYCH: AAOx3  NEUROLOGY: non-focal  SKIN: No rashes or lesions    LABS:                        7.5    18.31 )-----------( 635      ( 23 Jan 2025 05:10 )             23.8     01-23    138  |  102  |  35[H]  ----------------------------<  229[H]  4.4   |  24  |  1.05    Ca    9.0      23 Jan 2025 05:18            Urinalysis Basic - ( 23 Jan 2025 05:18 )    Color: x / Appearance: x / SG: x / pH: x  Gluc: 229 mg/dL / Ketone: x  / Bili: x / Urobili: x   Blood: x / Protein: x / Nitrite: x   Leuk Esterase: x / RBC: x / WBC x   Sq Epi: x / Non Sq Epi: x / Bacteria: x        RADIOLOGY & ADDITIONAL TESTS:    Imaging Personally Reviewed:    Consultant(s) Notes Reviewed:      Care Discussed with Consultants/Other Providers:  
Follow-up Pulm Progress Note    SOB overall improving, remains with [facility]MAN  sats currently 99% on 1LNC  denies CP, pleuritic CP     Medications:  MEDICATIONS  (STANDING):  albuterol/ipratropium for Nebulization 3 milliLiter(s) Nebulizer every 6 hours  amLODIPine   Tablet 10 milliGRAM(s) Oral daily  aspirin  chewable 81 milliGRAM(s) Oral daily  atorvastatin 80 milliGRAM(s) Oral at bedtime  azithromycin  IVPB 500 milliGRAM(s) IV Intermittent every 24 hours  carvedilol 25 milliGRAM(s) Oral every 12 hours  cefTRIAXone   IVPB 1000 milliGRAM(s) IV Intermittent every 24 hours  clopidogrel Tablet 75 milliGRAM(s) Oral daily  fluticasone propionate/ salmeterol 250-50 MICROgram(s) Diskus 1 Dose(s) Inhalation two times a day  heparin   Injectable 5000 Unit(s) SubCutaneous every 8 hours  influenza  Vaccine (HIGH DOSE) 0.5 milliLiter(s) IntraMuscular once  losartan 100 milliGRAM(s) Oral daily  montelukast 10 milliGRAM(s) Oral daily  predniSONE   Tablet 20 milliGRAM(s) Oral once            Vital Signs Last 24 Hrs  T(C): 36.4 (21 Jan 2025 04:00), Max: 36.5 (20 Jan 2025 11:02)  T(F): 97.5 (21 Jan 2025 04:00), Max: 97.7 (20 Jan 2025 11:02)  HR: 65 (21 Jan 2025 04:00) (65 - 71)  BP: 111/64 (21 Jan 2025 06:43) (96/55 - 114/64)  BP(mean): --  RR: 17 (21 Jan 2025 04:00) (17 - 18)  SpO2: 99% (21 Jan 2025 04:00) (92% - 100%)    Parameters below as of 21 Jan 2025 04:00  Patient On (Oxygen Delivery Method): nasal cannula              01-20 @ 07:01  -  01-21 @ 07:00  --------------------------------------------------------  IN: 480 mL / OUT: 1300 mL / NET: -820 mL          LABS:                        7.3    21.23 )-----------( 525      ( 21 Jan 2025 05:18 )             23.7     01-21    137  |  103  |  37[H]  ----------------------------<  168[H]  3.7   |  24  |  1.08    Ca    8.5      21 Jan 2025 05:18  Phos  2.5     01-20  Mg     2.2     01-20              Urinalysis Basic - ( 21 Jan 2025 05:18 )    Color: x / Appearance: x / SG: x / pH: x  Gluc: 168 mg/dL / Ketone: x  / Bili: x / Urobili: x   Blood: x / Protein: x / Nitrite: x   Leuk Esterase: x / RBC: x / WBC x   Sq Epi: x / Non Sq Epi: x / Bacteria: x      Procalcitonin: 0.73 ng/mL (01-20-25 @ 05:26)            Physical Examination:  PULM: b/l expiratory wheezes, decreased BS  CVS: S1, S2 heard    RADIOLOGY REVIEWED    CT chest:    < from: CT Angio Chest PE Protocol w/ IV Cont (01.19.25 @ 14:38) >    FINDINGS:    LUNGS AND LARGE AIRWAYS: Left upper lobe and lingular consolidation.   Layering secretions within the central airways.    *  Irregular right upper lobe nodule (301-18) measuring 1.8 x 0.8 x 2.1   cm, not well appreciated on prior imaging.  *  5 mm nodule within the right upper lobe (301-42), new from 10/22/2021.  *  0.7 cm juxtapleural nodule within the right lower lobe along the   oblique fissure (301-44) likely representing an intrapulmonary lymph   node, new from 10/22/2021.    Bilateral linear basilar opacities, likely representing atelectasis.   Emphysematous changes bilaterally.  PLEURA: No pleural effusion.  VESSELS: Aortic calcifications. Coronary artery calcifications and/or   stents. No pulmonary embolism.  HEART: Concentric left ventricular wall thickening  Trace pericardial   effusion.  MEDIASTINUM AND LUCHO: No lymphadenopathy.  CHEST WALL AND LOWER NECK: Unchanged metallic foreign object within the   right anterior chest wall.  VISUALIZED UPPER ABDOMEN: Left-sided renal cysts.  BONES: Degenerative changes.      IMPRESSION:  No pulmonary embolism.    Irregular 2.1 cm right upper lobe nodule may represent pulmonary neoplasm.    Left upper lobe and lingular consolidations are likely infectious.    < end of copied text >    
Follow-up Pulm Progress Note    Still with WYMAN but slowly improving  Denies SOB at rest  Denies CP     Medications:  MEDICATIONS  (STANDING):  albuterol/ipratropium for Nebulization 3 milliLiter(s) Nebulizer every 6 hours  amLODIPine   Tablet 10 milliGRAM(s) Oral daily  aspirin  chewable 81 milliGRAM(s) Oral daily  atorvastatin 80 milliGRAM(s) Oral at bedtime  carvedilol 25 milliGRAM(s) Oral every 12 hours  cefTRIAXone   IVPB 1000 milliGRAM(s) IV Intermittent every 24 hours  clopidogrel Tablet 75 milliGRAM(s) Oral daily  fluticasone propionate/ salmeterol 250-50 MICROgram(s) Diskus 1 Dose(s) Inhalation two times a day  heparin   Injectable 5000 Unit(s) SubCutaneous every 8 hours  influenza  Vaccine (HIGH DOSE) 0.5 milliLiter(s) IntraMuscular once  losartan 100 milliGRAM(s) Oral daily  montelukast 10 milliGRAM(s) Oral daily  predniSONE   Tablet 30 milliGRAM(s) Oral two times a day          Vital Signs Last 24 Hrs  T(C): 37.2 (23 Jan 2025 04:00), Max: 37.2 (23 Jan 2025 04:00)  T(F): 98.9 (23 Jan 2025 04:00), Max: 98.9 (23 Jan 2025 04:00)  HR: 84 (23 Jan 2025 04:00) (68 - 84)  BP: 110/67 (23 Jan 2025 04:00) (101/59 - 127/71)  BP(mean): --  RR: 17 (23 Jan 2025 04:00) (17 - 18)  SpO2: 98% (23 Jan 2025 04:00) (97% - 98%)    Parameters below as of 23 Jan 2025 04:00  Patient On (Oxygen Delivery Method): room air              01-22 @ 07:01  -  01-23 @ 07:00  --------------------------------------------------------  IN: 480 mL / OUT: 2000 mL / NET: -1520 mL          LABS:                        7.5    18.31 )-----------( 635      ( 23 Jan 2025 05:10 )             23.8     01-23    138  |  102  |  35[H]  ----------------------------<  229[H]  4.4   |  24  |  1.05    Ca    9.0      23 Jan 2025 05:18                Urinalysis Basic - ( 23 Jan 2025 05:18 )    Color: x / Appearance: x / SG: x / pH: x  Gluc: 229 mg/dL / Ketone: x  / Bili: x / Urobili: x   Blood: x / Protein: x / Nitrite: x   Leuk Esterase: x / RBC: x / WBC x   Sq Epi: x / Non Sq Epi: x / Bacteria: x      Procalcitonin: 0.54 ng/mL (01-21-25 @ 05:18)              Physical Examination:  PULM: decreased BS, no wheezing  CVS: S1, S2 heard    RADIOLOGY REVIEWED    CT chest:    < from: CT Angio Chest PE Protocol w/ IV Cont (01.19.25 @ 14:38) >    FINDINGS:    LUNGS AND LARGE AIRWAYS: Left upper lobe and lingular consolidation.   Layering secretions within the central airways.    *  Irregular right upper lobe nodule (301-18) measuring 1.8 x 0.8 x 2.1   cm, not well appreciated on prior imaging.  *  5 mm nodule within the right upper lobe (301-42), new from 10/22/2021.  *  0.7 cm juxtapleural nodule within the right lower lobe along the   oblique fissure (301-44) likely representing an intrapulmonary lymph   node, new from 10/22/2021.    Bilateral linear basilar opacities, likely representing atelectasis.   Emphysematous changes bilaterally.  PLEURA: No pleural effusion.  VESSELS: Aortic calcifications. Coronary artery calcifications and/or   stents. No pulmonary embolism.  HEART: Concentric left ventricular wall thickening  Trace pericardial   effusion.  MEDIASTINUM AND LUCHO: No lymphadenopathy.  CHEST WALL AND LOWER NECK: Unchanged metallic foreign object within the   right anterior chest wall.  VISUALIZED UPPER ABDOMEN: Left-sided renal cysts.  BONES: Degenerative changes.      IMPRESSION:  No pulmonary embolism.    Irregular 2.1 cm right upper lobe nodule may represent pulmonary neoplasm.    Left upper lobe and lingular consolidations are likely infectious.    < end of copied text >
Follow-up Pulm Progress Note    feeling better overall, still with WYMAN but improving  Denies SOB at rest  sats 98% on 1LNC     Medications:  MEDICATIONS  (STANDING):  albuterol/ipratropium for Nebulization 3 milliLiter(s) Nebulizer every 6 hours  amLODIPine   Tablet 10 milliGRAM(s) Oral daily  aspirin  chewable 81 milliGRAM(s) Oral daily  atorvastatin 80 milliGRAM(s) Oral at bedtime  azithromycin  IVPB 500 milliGRAM(s) IV Intermittent every 24 hours  carvedilol 25 milliGRAM(s) Oral every 12 hours  cefTRIAXone   IVPB 1000 milliGRAM(s) IV Intermittent every 24 hours  clopidogrel Tablet 75 milliGRAM(s) Oral daily  fluticasone propionate/ salmeterol 250-50 MICROgram(s) Diskus 1 Dose(s) Inhalation two times a day  heparin   Injectable 5000 Unit(s) SubCutaneous every 8 hours  influenza  Vaccine (HIGH DOSE) 0.5 milliLiter(s) IntraMuscular once  losartan 100 milliGRAM(s) Oral daily  montelukast 10 milliGRAM(s) Oral daily  predniSONE   Tablet 30 milliGRAM(s) Oral two times a day          Vital Signs Last 24 Hrs  T(C): 37 (22 Jan 2025 11:00), Max: 37 (22 Jan 2025 11:00)  T(F): 98.6 (22 Jan 2025 11:00), Max: 98.6 (22 Jan 2025 11:00)  HR: 70 (22 Jan 2025 11:00) (65 - 76)  BP: 101/59 (22 Jan 2025 11:00) (101/59 - 111/67)  BP(mean): --  RR: 18 (22 Jan 2025 11:00) (18 - 18)  SpO2: 97% (22 Jan 2025 11:00) (97% - 98%)    Parameters below as of 22 Jan 2025 11:00  Patient On (Oxygen Delivery Method): nasal cannula  O2 Flow (L/min): 1 01-21 @ 07:01  -  01-22 @ 07:00  --------------------------------------------------------  IN: 480 mL / OUT: 1575 mL / NET: -1095 mL          LABS:                        7.3    16.47 )-----------( 573      ( 22 Jan 2025 06:58 )             23.5     01-21    137  |  103  |  37[H]  ----------------------------<  168[H]  3.7   |  24  |  1.08    Ca    8.5      21 Jan 2025 05:18              Urinalysis Basic - ( 21 Jan 2025 05:18 )    Color: x / Appearance: x / SG: x / pH: x  Gluc: 168 mg/dL / Ketone: x  / Bili: x / Urobili: x   Blood: x / Protein: x / Nitrite: x   Leuk Esterase: x / RBC: x / WBC x   Sq Epi: x / Non Sq Epi: x / Bacteria: x      Procalcitonin: 0.73 ng/mL (01-20-25 @ 05:26)              Physical Examination:  PULM: decreased BS, no wheezing  CVS: S1, S2 heard    RADIOLOGY REVIEWED    CT chest:    < from: CT Angio Chest PE Protocol w/ IV Cont (01.19.25 @ 14:38) >    FINDINGS:    LUNGS AND LARGE AIRWAYS: Left upper lobe and lingular consolidation.   Layering secretions within the central airways.    *  Irregular right upper lobe nodule (301-18) measuring 1.8 x 0.8 x 2.1   cm, not well appreciated on prior imaging.  *  5 mm nodule within the right upper lobe (301-42), new from 10/22/2021.  *  0.7 cm juxtapleural nodule within the right lower lobe along the   oblique fissure (301-44) likely representing an intrapulmonary lymph   node, new from 10/22/2021.    Bilateral linear basilar opacities, likely representing atelectasis.   Emphysematous changes bilaterally.  PLEURA: No pleural effusion.  VESSELS: Aortic calcifications. Coronary artery calcifications and/or   stents. No pulmonary embolism.  HEART: Concentric left ventricular wall thickening  Trace pericardial   effusion.  MEDIASTINUM AND LUCHO: No lymphadenopathy.  CHEST WALL AND LOWER NECK: Unchanged metallic foreign object within the   right anterior chest wall.  VISUALIZED UPPER ABDOMEN: Left-sided renal cysts.  BONES: Degenerative changes.      IMPRESSION:  No pulmonary embolism.    Irregular 2.1 cm right upper lobe nodule may represent pulmonary neoplasm.    Left upper lobe and lingular consolidations are likely infectious.    < end of copied text >

## 2025-01-23 NOTE — DISCHARGE NOTE NURSING/CASE MANAGEMENT/SOCIAL WORK - NSDCVIVACCINE_GEN_ALL_CORE_FT
influenza, injectable, quadrivalent, preservative free; 28-Oct-2021 10:13; Bonnie Hodgson (RN); [employer]; [id number] (Exp. Date: 30-Jun-2022); IntraMuscular; Deltoid Right.; 0.5 milliLiter(s); VIS (VIS Published: 06-Aug-2021, VIS Presented: 28-Oct-2021);   pneumococcal polysaccharide PPV23; 28-Oct-2021 15:21; Bonnie Hodgson (RN); [employer] [id number] (Exp. Date: 14-Mar-2023); IntraMuscular; Deltoid Left.; 0.5 milliLiter(s); VIS (VIS Published: 06-Oct-2009, VIS Presented: 28-Oct-2021);

## 2025-01-23 NOTE — PROGRESS NOTE ADULT - NUTRITIONAL ASSESSMENT
This patient has been assessed with a concern for Malnutrition and has been determined to have a diagnosis/diagnoses of Severe protein-calorie malnutrition.    This patient is being managed with:   Diet Regular-  Supplement Feeding Modality:  Oral  Ensure Max Cans or Servings Per Day:  1       Frequency:  Daily  Entered: Jan 21 2025 12:48PM  

## 2025-01-23 NOTE — DISCHARGE NOTE PROVIDER - HOSPITAL COURSE
74-year-old male with a history of COPD, former smoker, CAD, GERD, BPH here for evaluation of about 9 days of runny nose, progressive shortness of breath.  Patient reports he has oxygen at home, has started using it because he felt short of breath with minimal exertion and movement.  He denies any fevers or chills.  He also reports an episode of left-sided chest pain when he was reaching for something.  He denies any falls or trauma.  No nausea, vomiting or diarrhea.  No urinary symptoms.  Patient reports he was trying Mucinex.  He is on albuterol and a steroid inhaler which he has been using without any relief (19 Jan 2025 14:02)    Hospital Course: This 74-year-old male with a history of COPD, CAD, GERD, and BPH presented with a 9-day history of runny nose and progressive shortness of breath, exacerbated by minimal exertion. He reported using supplemental home oxygen, left-sided chest pain with reaching, and use of Mucinex, albuterol, and an inhaled steroid without relief. CT chest revealed pneumonia with left upper lobe and lingular consolidations. Urine Legionella was negative. He was treated with ceftriaxone and a steroid taper (IV solumedrol followed by a prednisone taper starting at 30mg PO BID). His COPD exacerbation was managed with Advair 250/50 BID, Singulair, and Duoneb q6h. He remained normoxic on room air but preferred supplemental oxygen. A CTA chest also showed a 2.1 cm irregular right upper lobe nodule and a 5mm right upper lobe nodule. Outpatient follow-up with pulmonologist Dr. Bajwa was arranged for further evaluation, likely with PET-CT, of the pulmonary nodule(s). The patient's home medications included Symbicort (instead of Advair), Singulair, aspirin, Plavix, Coreg, Norvasc, Losartan, and Lipitor. He received prophylactic subcutaneous heparin during admission. He was independent in all activities of daily living, with full weight-bearing and normal gait. He was discharged home with instructions to continue his medication regimen, follow up with Dr. Bajwa, and maintain oxygen saturation >88% with PRN oxygen.      Important Medication Changes and Reason:    Active or Pending Issues Requiring Follow-up:    Advanced Directives:   [ x] Full code  [ ] DNR  [ ] Hospice    Discharge Diagnoses:         74-year-old male with a history of COPD, former smoker, CAD, GERD, BPH here for evaluation of about 9 days of runny nose, progressive shortness of breath.  Patient reports he has oxygen at home, has started using it because he felt short of breath with minimal exertion and movement.  He denies any fevers or chills.  He also reports an episode of left-sided chest pain when he was reaching for something.  He denies any falls or trauma.  No nausea, vomiting or diarrhea.  No urinary symptoms.  Patient reports he was trying Mucinex.  He is on albuterol and a steroid inhaler which he has been using without any relief (19 Jan 2025 14:02)    Hospital Course: This 74-year-old male with a history of COPD, CAD, GERD, and BPH presented with a 9-day history of runny nose and progressive shortness of breath, exacerbated by minimal exertion. He reported using supplemental home oxygen, left-sided chest pain with reaching, and use of Mucinex, albuterol, and an inhaled steroid without relief. CT chest revealed pneumonia with left upper lobe and lingular consolidations. Urine Legionella was negative. He was treated with ceftriaxone and a steroid taper (IV solumedrol followed by a prednisone taper starting at 30mg PO BID). His COPD exacerbation was managed with Advair 250/50 BID, Singulair, and Duoneb q6h. He remained normoxic on room air but preferred supplemental oxygen. A CTA chest also showed a 2.1 cm irregular right upper lobe nodule and a 5mm right upper lobe nodule. Outpatient follow-up with pulmonologist Dr. Bajwa was arranged for further evaluation, likely with PET-CT, of the pulmonary nodule(s). The patient's home medications included Symbicort (instead of Advair), Singulair, aspirin, Plavix, Coreg, Norvasc, Losartan, and Lipitor. He received prophylactic subcutaneous heparin during admission. He was independent in all activities of daily living, with full weight-bearing and normal gait. He was discharged home with instructions to continue his medication regimen, follow up with Dr. Bajwa, and maintain oxygen saturation >88% with PRN oxygen.      Important Medication Changes and Reason:  Ceftin 500 mg BID for 3 days to complete 7 day course   Prednisone Taper     Active or Pending Issues Requiring Follow-up:    Advanced Directives:   [ x] Full code  [ ] DNR  [ ] Hospice    Discharge Diagnoses:  Asthma   BPH (benign prostatic hyperplasia)   CAD (coronary artery disease)  COPD (chronic obstructive pulmonary disease)   GERD (gastroesophageal reflux disease).

## 2025-01-23 NOTE — DISCHARGE NOTE PROVIDER - DETAILS OF MALNUTRITION DIAGNOSIS/DIAGNOSES
This patient has been assessed with a concern for Malnutrition and was treated during this hospitalization for the following Nutrition diagnosis/diagnoses:     -  01/21/2025: Severe protein-calorie malnutrition

## 2025-01-23 NOTE — PROGRESS NOTE ADULT - PROVIDER SPECIALTY LIST ADULT
Internal Medicine
Pulmonology
Internal Medicine
Pulmonology
Pulmonology

## 2025-01-23 NOTE — PROVIDER CONTACT NOTE (OTHER) - ASSESSMENT
Pt AO x4, VSS, asymptomatic lying in bed with no complaints of pain or discomfort.
Pt ano4, vitals stable /66 HR 81, no c/o chest pain.

## 2025-01-23 NOTE — PHARMACOTHERAPY INTERVENTION NOTE - COMMENTS
Counseled patient on the following inpatient/discharge medications names (brand/generic), indication, and possible side effects:    amLODIPine   Tablet 10 milliGRAM(s) Oral daily  aspirin  chewable 81 milliGRAM(s) Oral daily  atorvastatin 80 milliGRAM(s) Oral at bedtime  carvedilol 25 milliGRAM(s) Oral every 12 hours  clopidogrel Tablet 75 milliGRAM(s) Oral daily  fluticasone propionate/ salmeterol 250-50 MICROgram(s) Diskus 1 Dose(s) Inhalation two times a day  losartan 100 milliGRAM(s) Oral daily  montelukast 10 milliGRAM(s) Oral daily  predniSONE tablet taper:    Continue prednisone 30mg PO BID x 1 more day then decrease to 40mg PO qd x 3 days, 30mg PO qd x 3 days, 20mg PO qd x 3 days then 10mg PO qd and continue until outpatient f/u.       Patient was provided with a medication card for their new medication. Patient questions and concerns were answered and addressed. Patient demonstrated understanding.    Patient is to buy a blood pressure monitor for home from local pharmacy. Patient will throw away wrist BP monitor    Kiana Sandoval PharmD  Transition of Care Pharmacist  Available on [employer] Teams (preferred)   Counseled patient on the following inpatient/discharge medications names (brand/generic), indication, and possible side effects:    amLODIPine   Tablet 10 milliGRAM(s) Oral daily  aspirin  chewable 81 milliGRAM(s) Oral daily  atorvastatin 80 milliGRAM(s) Oral at bedtime  carvedilol 25 milliGRAM(s) Oral every 12 hours  clopidogrel Tablet 75 milliGRAM(s) Oral daily  fluticasone propionate/ salmeterol 250-50 MICROgram(s) Diskus 1 Dose(s) Inhalation two times a day  losartan 100 milliGRAM(s) Oral daily  montelukast 10 milliGRAM(s) Oral daily  predniSONE tablet taper:    Continue prednisone 30mg PO BID x 1 more day then decrease to 40mg PO qd x 3 days, 30mg PO qd x 3 days, 20mg PO qd x 3 days then 10mg PO qd and continue until outpatient f/u.       Patient was provided with a medication card for their new medication. Patient questions and concerns were answered and addressed. Patient demonstrated understanding.    Recommended patient to purchase an arm cuff blood pressure monitor for home from local pharmacy. Patient will throw away wrist BP monitor.    Kiana Sandoval PharmD  Transition of Care Pharmacist  Available on Microsoft Teams (preferred)    Rasta Coleman PharmD (Jian Ming), BCPS  Transitions of Care Pharmacist  Available on [employer] Teams (Preferred)  Spectra: [phone]

## 2025-01-23 NOTE — DISCHARGE NOTE PROVIDER - NSDCFUADDAPPT_GEN_ALL_CORE_FT
APPTS ARE READY TO BE MADE: [x] YES    Best Family or Patient Contact (if needed):    Additional Information about above appointments (if needed):    1: Pulmonary  2: PCP  3: Cardiology    Other comments or requests:

## 2025-01-23 NOTE — DISCHARGE NOTE PROVIDER - CARE PROVIDER_API CALL
Ross Lozano  Internal Medicine  [address] [place] [address]  Phone: [phone]  Fax: [phone]  Follow Up Time: 1 week    Mateo Randle.  Pulmonary Disease  [address] [address]  Phone: [phone]  Fax: [phone]  Follow Up Time: 1 week

## 2025-01-23 NOTE — DISCHARGE NOTE NURSING/CASE MANAGEMENT/SOCIAL WORK - FINANCIAL ASSISTANCE
[facility] provides services at a reduced cost to those who are determined to be eligible through [facility]’s financial assistance program. Information regarding [facility]’s financial assistance program can be found by going to [email or web address] or by calling [phone].

## 2025-01-23 NOTE — DISCHARGE NOTE PROVIDER - NSDCCPCAREPLAN_GEN_ALL_CORE_FT
PRINCIPAL DISCHARGE DIAGNOSIS  Diagnosis: COPD exacerbation  Assessment and Plan of Treatment: You presented to the ED with complaints of SOB associated with left sided chest pain. You were admitted for COPD exacerbation. CT Chest noted 2.1 cm nodule and DIANA consolidation. You were treated with IV Solumedrol x3 days and are being discharge on Prednisone taper. You also received CTX/Azithro IV and are being discharged on Ceftin 500 mg BID to complete a 7 day course. Please continue all medications as directed. Follow-up with your PCP and pulmonologist within 1 week of discharge      SECONDARY DISCHARGE DIAGNOSES  Diagnosis: Pneumonia  Assessment and Plan of Treatment: You presented to the ED with complaints of SOB associated with left sided chest pain. You were admitted for COPD exacerbation. CT Chest noted 2.1 cm nodule and DIANA consolidation. You were treated with IV Solumedrol x3 days and are being discharge on Prednisone taper. You also received CTX/Azithro IV and are being discharged on Ceftin 500 mg BID to complete a 7 day course. Please continue all medications as directed. Follow-up with your PCP and pulmonologist within 1 week of discharge    Diagnosis: Pulmonary nodule  Assessment and Plan of Treatment: On CT Scan you were noted to have 2.1 cm Nodule. Outpatient follow-up reccomended for possible PET Scan. Please follow-up with Pulmonary within 1 week of discharge    Diagnosis: Anemia  Assessment and Plan of Treatment: You were noted to have Hgb of 8.9 while in the hospital. H/H remained stable, no s/s of active bleeding. Continue with all medications as directed.

## 2025-01-23 NOTE — PROGRESS NOTE ADULT - PROBLEM SELECTOR PLAN 1
CTA chest with DIANA and lingular consolidations  -Urine legionella negative  -Continue Ceftriaxone.   -WBC elevated, continue to trend. Steroids possibly contributing to elevated WBC as well. Check procalcitonin (added to AM labs)
CTA chest with DIANA and lingular consolidations  -Continue Ceftriaxone and azithromycin  -F/u urine legionella  -WBC elevated, continue to trend  -Check sputum culture if able.
CTA chest with DIANA and lingular consolidations  -Urine legionella negative  -Continue Ceftriaxone.   -WBC elevated, continue to trend. Steroids possibly contributing to elevated WBC as well.   -Procalcitonin elevated, continue to trend (added to AM labs)  -Possible d/c planning later today, will re-evaluate

## 2025-01-23 NOTE — PROGRESS NOTE ADULT - PROBLEM SELECTOR PLAN 2
2nd to PNA  -Hypoxia resolved, keep sats >88% with o2 PRN  -Continue Advair 250/50 1 puff BID (home med: Symbicort)  -Continue Singulair (home med)  -Continue Duoneb q6h.  -S/p IV solumedrol. Some wheezing on exam today - increase prednisone to 30mg PO BID (received pred 40mg this AM - 1x dose of 20mg PO ordered). Will re-evaluate further taper.
2nd to PNA  -Continue Advair 250/50 1 puff BID (home med: Symbicort)  -Continue Singulair (home med)  -Continue Duoneb q6h.  -S/p IV solumedrol. Continue prednisone 30mg PO BID - will re-evaluate further steroid taper.  -Check o2 sats on RA at rest and with ambulation to determine o2 needs. Keep sats >88% with o2 PRN. Pt has home o2 which he uses PRN.
2nd to PNA  -Continue Advair 250/50 1 puff BID (home med: Symbicort)  -Continue Singulair (home med)  -Continue Duoneb q6h.  -S/p IV solumedrol. Continue prednisone 30mg PO BID x 1 more day then decrease to 40mg PO qd x 3 days, 30mg PO qd x 3 days, 20mg PO qd x 3 days then 10mg PO qd and continue until outpatient f/u.   -Normoxic at rest and with ambulation but patient prefers to remain on o2 at this time. Keep sats >88% with o2 PRN.

## 2025-01-23 NOTE — DISCHARGE NOTE PROVIDER - PROVIDER TOKENS
PROVIDER:[TOKEN:[[id number]],FOLLOWUP:[1 week]],PROVIDER:[TOKEN:[[id number]],FOLLOWUP:[1 week]]

## 2025-01-23 NOTE — PROGRESS NOTE ADULT - PROBLEM/PLAN-3
Interventional Radiology Discharge Instructions    You may resume your previous diet and medications  You may resume your previous activity level on 12/29/23  Keep wraps on for 48 hours; elevate legs frequently  Follow up recommendations: See follow up section  If you have any questions or concerns, please call our Interventional Radiology department at [phone] (Monday to Friday 7:30 a.m. to 5:00 p.m.)    
DISPLAY PLAN FREE TEXT

## 2025-01-28 ENCOUNTER — APPOINTMENT (OUTPATIENT)
Dept: CARDIOLOGY | Facility: CLINIC | Age: 75
End: 2025-01-28

## 2025-02-11 NOTE — H&P CARDIOLOGY - [facility] PE [facility]
EXERCISES/STRETCHES/HEP:  Drew was instructed in and performed under direct supervision the following prescribed exercises and stretches aimed at improving muscle balance across involved joints,  restoring symmetric range of motion and supporting and strengthening posture.  A Home Exercise Program (HEP) was also designed and sent home to perform.  Design, instruction, and performance of this program took greater than 8 minutes and less than 23 minutes during today's visit. Demonstrated by Chiropractic Intern Brennen Moe.     Resistance Band was given to Drew Ahn on 01/13/2025.    The patient's home-based rehabilitation exercise plan was updated, and the patient was provided with written descriptions and demonstrations of new exercises including:    Prescribed 01/16/2025:  * Knee to Chest (1 set of 1 rep, 2 times per day)  * Posterior Pelvic Tilt - Supine  (3 sets of 10 reps, 2 times per day)  * Glute Bridge w/ Band (3 sets of 10 reps, 1 times per day)    Prescribed 02/11/2025:  * Single Leg Knee to Chest ()  * Supine Figure 4 (3 sets of 10 reps, 2 times per day)  * Posterior Pelvic Tilt - Supine  (3 sets of 10 reps, 2 times per day)  * Mad Cat/Old Horse (3 sets of 20 reps, 2 times per day)     Patient denies shoulder, knee, and or hip injuries and restriction/mobilization issues.     regular rate and rhythm/no rub/no murmur

## 2025-03-11 ENCOUNTER — APPOINTMENT (OUTPATIENT)
Dept: CARDIOLOGY | Facility: CLINIC | Age: 75
End: 2025-03-11

## 2025-03-20 ENCOUNTER — INPATIENT (INPATIENT)
Facility: HOSPITAL | Age: 75
LOS: 0 days | Discharge: HOME | DRG: 204 | End: 2025-03-21
Attending: HOSPITALIST | Admitting: STUDENT IN AN ORGANIZED HEALTH CARE EDUCATION/TRAINING PROGRAM
Payer: COMMERCIAL

## 2025-03-20 VITALS
RESPIRATION RATE: 22 BRPM | WEIGHT: 154.98 LBS | HEIGHT: 73 IN | TEMPERATURE: 97 F | DIASTOLIC BLOOD PRESSURE: 110 MMHG | HEART RATE: 83 BPM | OXYGEN SATURATION: 95 % | SYSTOLIC BLOOD PRESSURE: 208 MMHG

## 2025-03-20 LAB
ALBUMIN SERPL ELPH-MCNC: 3.6 G/DL — SIGNIFICANT CHANGE UP (ref 3.3–5)
ALP SERPL-CCNC: 54 U/L — SIGNIFICANT CHANGE UP (ref 40–120)
ALT FLD-CCNC: 12 U/L — SIGNIFICANT CHANGE UP (ref 10–45)
ANION GAP SERPL CALC-SCNC: 10 MMOL/L — SIGNIFICANT CHANGE UP (ref 5–17)
APTT BLD: 32.8 SEC — SIGNIFICANT CHANGE UP (ref 24.5–35.6)
BASOPHILS # BLD AUTO: 0.04 K/UL — SIGNIFICANT CHANGE UP (ref 0–0.2)
BASOPHILS NFR BLD AUTO: 0.5 % — SIGNIFICANT CHANGE UP (ref 0–2)
BILIRUB SERPL-MCNC: 0.4 MG/DL — SIGNIFICANT CHANGE UP (ref 0.2–1.2)
BLD GP AB SCN SERPL QL: NEGATIVE — SIGNIFICANT CHANGE UP
BUN SERPL-MCNC: 22 MG/DL — SIGNIFICANT CHANGE UP (ref 7–23)
CALCIUM SERPL-MCNC: 9.1 MG/DL — SIGNIFICANT CHANGE UP (ref 8.4–10.5)
CHLORIDE SERPL-SCNC: 104 MMOL/L — SIGNIFICANT CHANGE UP (ref 96–108)
CO2 SERPL-SCNC: 27 MMOL/L — SIGNIFICANT CHANGE UP (ref 22–31)
CREAT SERPL-MCNC: 0.59 MG/DL — SIGNIFICANT CHANGE UP (ref 0.5–1.3)
EGFR: 102 ML/MIN/1.73M2 — SIGNIFICANT CHANGE UP
EOSINOPHIL # BLD AUTO: 0.08 K/UL — SIGNIFICANT CHANGE UP (ref 0–0.5)
EOSINOPHIL NFR BLD AUTO: 1 % — SIGNIFICANT CHANGE UP (ref 0–6)
FLUAV AG NPH QL: SIGNIFICANT CHANGE UP
FLUBV AG NPH QL: SIGNIFICANT CHANGE UP
GAS PNL BLDV: SIGNIFICANT CHANGE UP
HCT VFR BLD CALC: 32.2 % — LOW (ref 39–50)
HGB BLD-MCNC: 9.5 G/DL — LOW (ref 13–17)
IMM GRANULOCYTES NFR BLD AUTO: 0.4 % — SIGNIFICANT CHANGE UP (ref 0–0.9)
INR BLD: 1.04 RATIO — SIGNIFICANT CHANGE UP (ref 0.85–1.16)
LYMPHOCYTES # BLD AUTO: 0.77 K/UL — LOW (ref 1–3.3)
LYMPHOCYTES # BLD AUTO: 9.8 % — LOW (ref 13–44)
MCHC RBC-ENTMCNC: 27 PG — SIGNIFICANT CHANGE UP (ref 27–34)
MCHC RBC-ENTMCNC: 29.5 G/DL — LOW (ref 32–36)
MCV RBC AUTO: 91.5 FL — SIGNIFICANT CHANGE UP (ref 80–100)
MONOCYTES # BLD AUTO: 0.48 K/UL — SIGNIFICANT CHANGE UP (ref 0–0.9)
MONOCYTES NFR BLD AUTO: 6.1 % — SIGNIFICANT CHANGE UP (ref 2–14)
NEUTROPHILS # BLD AUTO: 6.42 K/UL — SIGNIFICANT CHANGE UP (ref 1.8–7.4)
NEUTROPHILS NFR BLD AUTO: 82.2 % — HIGH (ref 43–77)
NRBC BLD AUTO-RTO: 0 /100 WBCS — SIGNIFICANT CHANGE UP (ref 0–0)
PLATELET # BLD AUTO: 268 K/UL — SIGNIFICANT CHANGE UP (ref 150–400)
POTASSIUM SERPL-MCNC: 4.9 MMOL/L — SIGNIFICANT CHANGE UP (ref 3.5–5.3)
POTASSIUM SERPL-SCNC: 4.9 MMOL/L — SIGNIFICANT CHANGE UP (ref 3.5–5.3)
PROT SERPL-MCNC: 6.3 G/DL — SIGNIFICANT CHANGE UP (ref 6–8.3)
PROTHROM AB SERPL-ACNC: 11.8 SEC — SIGNIFICANT CHANGE UP (ref 9.9–13.4)
RBC # BLD: 3.52 M/UL — LOW (ref 4.2–5.8)
RBC # FLD: 20 % — HIGH (ref 10.3–14.5)
RH IG SCN BLD-IMP: POSITIVE — SIGNIFICANT CHANGE UP
RSV RNA NPH QL NAA+NON-PROBE: SIGNIFICANT CHANGE UP
SARS-COV-2 RNA SPEC QL NAA+PROBE: SIGNIFICANT CHANGE UP
SODIUM SERPL-SCNC: 141 MMOL/L — SIGNIFICANT CHANGE UP (ref 135–145)
SOURCE RESPIRATORY: SIGNIFICANT CHANGE UP
TROPONIN T, HIGH SENSITIVITY RESULT: 40 NG/L — SIGNIFICANT CHANGE UP (ref 0–51)
WBC # BLD: 7.82 K/UL — SIGNIFICANT CHANGE UP (ref 3.8–10.5)
WBC # FLD AUTO: 7.82 K/UL — SIGNIFICANT CHANGE UP (ref 3.8–10.5)

## 2025-03-20 PROCEDURE — 99285 EMERGENCY DEPT VISIT HI MDM: CPT

## 2025-03-20 PROCEDURE — 93010 ELECTROCARDIOGRAM REPORT: CPT

## 2025-03-20 PROCEDURE — 71046 X-RAY EXAM CHEST 2 VIEWS: CPT | Mod: 26

## 2025-03-20 RX ORDER — FUROSEMIDE 10 MG/ML
40 INJECTION INTRAMUSCULAR; INTRAVENOUS ONCE
Refills: 0 | Status: COMPLETED | OUTPATIENT
Start: 2025-03-20 | End: 2025-03-20

## 2025-03-20 RX ORDER — METHYLPREDNISOLONE ACETATE 80 MG/ML
125 INJECTION, SUSPENSION INTRA-ARTICULAR; INTRALESIONAL; INTRAMUSCULAR; SOFT TISSUE ONCE
Refills: 0 | Status: COMPLETED | OUTPATIENT
Start: 2025-03-20 | End: 2025-03-20

## 2025-03-20 RX ORDER — IPRATROPIUM BROMIDE AND ALBUTEROL SULFATE .5; 2.5 MG/3ML; MG/3ML
3 SOLUTION RESPIRATORY (INHALATION)
Refills: 0 | Status: COMPLETED | OUTPATIENT
Start: 2025-03-20 | End: 2025-03-20

## 2025-03-20 RX ADMIN — FUROSEMIDE 40 MILLIGRAM(S): 10 INJECTION INTRAMUSCULAR; INTRAVENOUS at 23:52

## 2025-03-20 RX ADMIN — IPRATROPIUM BROMIDE AND ALBUTEROL SULFATE 3 MILLILITER(S): .5; 2.5 SOLUTION RESPIRATORY (INHALATION) at 22:01

## 2025-03-20 RX ADMIN — METHYLPREDNISOLONE ACETATE 125 MILLIGRAM(S): 80 INJECTION, SUSPENSION INTRA-ARTICULAR; INTRALESIONAL; INTRAMUSCULAR; SOFT TISSUE at 21:38

## 2025-03-20 RX ADMIN — IPRATROPIUM BROMIDE AND ALBUTEROL SULFATE 3 MILLILITER(S): .5; 2.5 SOLUTION RESPIRATORY (INHALATION) at 21:38

## 2025-03-20 RX ADMIN — IPRATROPIUM BROMIDE AND ALBUTEROL SULFATE 3 MILLILITER(S): .5; 2.5 SOLUTION RESPIRATORY (INHALATION) at 22:00

## 2025-03-20 NOTE — ED ADULT NURSE NOTE - NSFALLHARMRISKINTERV_ED_ALL_ED

## 2025-03-20 NOTE — ED ADULT NURSE NOTE - HIV OFFER
History     Tobacco Use    Smoking status: Never Smoker    Smokeless tobacco: Never Used   Vaping Use    Vaping Use: Never used   Substance Use Topics    Alcohol use: Not Currently    Drug use: Not Currently       ALLERGIES    Allergies   Allergen Reactions    Clindamycin/Lincomycin Hives, Itching and Rash       MEDICATIONS    Current Outpatient Medications on File Prior to Encounter   Medication Sig Dispense Refill    gabapentin (NEURONTIN) 300 MG capsule TAKE 2 CAPSULES BY MOUTH 3 TIMES DAILY FOR 30 DAYS. 180 capsule 0    HYDROcodone-acetaminophen (NORCO) 7.5-325 MG per tablet Take 1 tablet by mouth every 6 hours as needed.  palbociclib (IBRANCE) 75 MG tablet Take 75 mg by mouth daily 21 tablet 1    ELIQUIS 5 MG TABS tablet TAKE 1 TABLET BY MOUTH TWICE A DAY 60 tablet 3    pantoprazole (PROTONIX) 40 MG tablet TAKE 1 TABLET BY MOUTH EVERY DAY (Patient taking differently: Take 40 mg by mouth daily ) 90 tablet 0    sodium hypochlorite (DAKINS) 0.125 % SOLN external solution Apply topically 2 times daily 1 each 0    VENTOLIN  (90 Base) MCG/ACT inhaler Inhale 2 puffs into the lungs 4 times daily as needed for Wheezing 18 g 5    NARCAN 4 MG/0.1ML LIQD nasal spray       cyclobenzaprine (FLEXERIL) 10 MG tablet Take 10 mg by mouth 3 times daily as needed for Muscle spasms      guaiFENesin-codeine (GUAIFENESIN AC) 100-10 MG/5ML liquid Take 5 mLs by mouth 3 times daily as needed for Cough.       Albuterol Sulfate (PROAIR HFA IN) Inhale 1 each into the lungs daily as needed       benzonatate (TESSALON) 100 MG capsule Take 100 mg by mouth 3 times daily as needed for Cough      Cholecalciferol (VITAMIN D3) 125 MCG (5000 UT) TABS Take 1 tablet by mouth daily       lisinopril (PRINIVIL;ZESTRIL) 10 MG tablet Take 10 mg by mouth daily      acyclovir (ZOVIRAX) 800 MG tablet Take 800 mg by mouth 2 times daily as needed       ondansetron (ZOFRAN) 4 MG tablet Take 4 mg by mouth every 8 hours as needed for Nausea or Vomiting      alclomethasone (ACLOVATE) 0.05 % cream Apply topically 2 times daily as needed Apply topically 2 times daily. No current facility-administered medications on file prior to encounter. REVIEW OF SYSTEMS    Pertinent items are noted in HPI.     Objective:      BP (!) 155/92   Pulse 75   Temp 97.4 °F (36.3 °C) (Temporal)   Resp 18   Ht 5' 6\" (1.676 m)   Wt 173 lb (78.5 kg)   BMI 27.92 kg/m²     Wt Readings from Last 3 Encounters:   01/25/22 173 lb (78.5 kg)   01/19/22 173 lb 9.6 oz (78.7 kg)   01/11/22 176 lb 12.8 oz (80.2 kg)       PHYSICAL EXAM    General Appearance: alert and oriented to person, place and time, well developed and well- nourished, in no acute distress  Skin: warm and dry, no rash or erythema  Head: normocephalic and atraumatic  Eyes: pupils equal, round, and reactive to light, extraocular eye movements intact, conjunctivae normal  ENT: tympanic membrane, external ear and ear canal normal bilaterally, nose without deformity, nasal mucosa and turbinates normal without polyps  Neck: supple and non-tender without mass, no thyromegaly or thyroid nodules, no cervical lymphadenopathy  Pulmonary/Chest: clear to auscultation bilaterally- no wheezes, rales or rhonchi, normal air movement, no respiratory distress  Extremities: no cyanosis, clubbing or edema  Musculoskeletal: normal range of motion, no joint swelling, deformity or tenderness  Neurologic: reflexes normal and symmetric, no cranial nerve deficit, gait, coordination and speech normal      Assessment:      Patient Active Problem List   Diagnosis Code    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 ([facility] [address] C50.919    Poor venous access I87.8    Iron deficiency anemia D50.9    Menopausal symptom N95.1    Benign neoplasm of body of uterus D26.1    Dyspnea and respiratory abnormalities R06.00, R06.89    Chronic pain syndrome G89.4    Right wrist pain M25.531    Rheumatoid arthritis involving both knees with negative rheumatoid factor (HCC) M06.061, M06.062    Other cyst of bone, left ankle and foot M85.672    Status post bilateral mastectomy Z90.13    Acute purulent bronchitis J20.9    Lung nodule R91.1    Status post bilateral breast reconstruction Z98.890    S/P revision of left breast reconstruction 9/17/2021 Z98.890    Arm DVT (deep venous thromboembolism), acute, left (HCC) I82.622    Exostosis of left foot M89.8X7    Central line infection T80.219A   [facility] or reservoir infection T80.212A    Chest wall ulcer, with fat layer exposed ([facility] [address] L98.492    Non-cardiac chest pain R07.89    Wheezing R06.2    Snoring R06.83    RUDY (obstructive sleep apnea) G47.33    Non-restorative sleep G47.8                 Wound 10/15/21 Chest Lateral;Left;Upper wound 1- left upper chest post-op wound (Active)   Wound Image   01/25/22 0946   Wound Etiology Surgical 01/25/22 0946   Dressing Status Dry 01/25/22 0946   Wound Cleansed Not Cleansed 01/25/22 0946   Dressing/Treatment Xeroform;Dry dressing 12/23/21 1400   Offloading for Diabetic Foot Ulcers No offloading required 12/09/21 1342   Wound Length (cm) 0 cm 01/25/22 0946   Wound Width (cm) 0 cm 01/25/22 0946   Wound Depth (cm) 0 cm 01/25/22 0946   Wound Surface Area (cm^2) 0 cm^2 01/25/22 0946   Change in Wound Size % (l*w) 100 01/25/22 0946   Wound Volume (cm^3) 0 cm^3 01/25/22 0946   Wound Healing % 100 01/25/22 0946   Post-Procedure Length (cm) 0.3 cm 12/23/21 1400   Post-Procedure Width (cm) 1 cm 12/23/21 1400   Post-Procedure Depth (cm) 0.1 cm 12/23/21 1400   Post-Procedure Surface Area (cm^2) 0.3 cm^2 12/23/21 1400   Post-Procedure Volume (cm^3) 0.03 cm^3 12/23/21 1400   Distance Tunneling (cm) 0 cm 01/25/22 0946   Tunneling Position ___ O'Clock 0 01/25/22 0946   Undermining Starts ___ O'Clock 0 01/25/22 0946   Undermining Ends___ O'Clock 0 01/25/22 0946   Undermining Maxium Distance (cm) 0 01/25/22 0946   Wound Assessment Epithelialization 01/25/22 0946   Drainage Amount None 01/25/22 0946   Drainage Description Serosanguinous 12/23/21 1347   Odor None 01/25/22 0946   Delma-wound Assessment Intact 01/25/22 0946   Margins Attached edges 12/23/21 1347   Wound Thickness Description not for Pressure Injury Full thickness 12/23/21 1347   Number of days: 102       Plan:     Problem List Items Addressed This Visit     * (Principal) Chest wall ulcer, with fat layer exposed ([address] - Primary              Treatment Note please see attached Discharge Instructions    In my professional opinion this patient would benefit from HBO Therapy: No    Written patient dismissal instructions given to patient and signed by patient or POA. Ms. Elmo Hobbs is healed! Discharge Instructions         [address] [facility]   Physician Orders and Discharge Instructions  [address]  Telephone: [phone]    NAME:  Nissa Osei OF BIRTH:  1967  MEDICAL RECORD NUMBER:  [id number]  DATE:  1/6/2022    Discharge condition: Stable    Discharge to: Home    Left via:Private automobile    Accompanied by:  self    ECF/HHA: [facility]     Dressing Orders:   Left chest wound: Apply Vitamin E oil daily to moisturize     Treatment Orders:   Protein rich diet   Multivitamin    [address] [facility] [facility] follow up visit ________Call if needed_____________________  (Please note your next appointment above and if you are unable to keep, kindly give a 24 hour notice. Thank you.)          If you experience any of the following, please call the [facility] during business hours:    * Increase in Pain  * Temperature over 101  * Increase in drainage from your wound  * Drainage with a foul odor  * Bleeding  * Increase in swelling  * Need for compression bandage changes due to slippage, breakthrough drainage.     If you need medical attention outside of the business hours of the [facility] please contact your PCP or go to the nearest emergency room.           Electronically signed by BRITANY Tobar CNP on 1/25/2022 at 9:55 AM Previously Declined (within the last year)

## 2025-03-20 NOTE — ED ADULT NURSE NOTE - OBJECTIVE STATEMENT
74-year-old male with a history of COPD, former smoker, CAD, GERD, BPH presents to the ED for evaluation of shortness of breath.    Patient was just discharged from [facility] today.  Where he was admitted for management of anemia.  Patient received massive transfusion protocol while he was there.  CT chest performed at that hospital showed 1.2 cm nodule in the right upper lobe oncology and cardiothoracic' s were consulted there for the lung mass and it was decided patient be worked up outpatient ,he is  discharged today   . pt states since his discharge, he is unable to walk more than 15 steps before feeling short of breath. He denies any fevers, chills, n/v, abdominal pain or urinary symptoms.

## 2025-03-20 NOTE — ED PROVIDER NOTE - OBJECTIVE STATEMENT
74-year-old male with a history of COPD, former smoker, CAD, GERD, BPH presents to the ED for evaluation of shortness of breath.  Patient was just discharged from [facility] today.  Where he was admitted for management of anemia.  Patient received massive transfusion protocol while he was there.  CT chest performed at that hospital showed 1.2 cm nodule in the right upper lobe oncology and cardiothoracic's were consulted there for the lung mass and it was decided patient be worked up outpatient. pt states since his discharge, he is unable to walk more than 15 steps before feeling short of breath.He denies any fevers, chills, n/v, abdominal pain or urinary symptoms. 74-year-old male with a history of COPD, former smoker, CAD, GERD, BPH presents to the ED for evaluation of shortness of breath.    Patient was just discharged from [facility] today.  Where he was admitted for management of anemia.  Patient received massive transfusion protocol while he was there.  CT chest performed at that hospital showed 1.2 cm nodule in the right upper lobe oncology and cardiothoracic's were consulted there for the lung mass and it was decided patient be worked up outpatient ,he is  discharged today   . pt states since his discharge, he is unable to walk more than 15 steps before feeling short of breath. He denies any fevers, chills, n/v, abdominal pain or urinary symptoms.

## 2025-03-20 NOTE — ED PROVIDER NOTE - CLINICAL SUMMARY MEDICAL DECISION MAKING FREE TEXT BOX
74-year-old male with a history of COPD, former smoker, CAD, GERD, BPH presents to the ED for evaluation of shortness of breath.  Patient was just discharged from [facility] today.  Where he was admitted for management of anemia.  Patient received massive transfusion protocol while he was there.  CT chest performed at that hospital showed 1.2 cm nodule in the right upper lobe oncology and cardiothoracic's were consulted there for the lung mass and it was decided patient be worked up outpatient. pt states since his discharge, he is unable to walk more than 15 steps before feeling short of breath. He denies any fevers, chills, n/v, abdominal pain or urinary symptoms.  Concern for CHF versus symptomatic anemia versus pneumonia versus effusion.  Will eval with chest x-ray, labs including a VBG coags and type and screen in case patient requires blood transfusion.  Given patient presentation and inability to ambulate will most likely need to admit 74-year-old male with a history of COPD, former smoker, CAD, GERD, BPH presents to the ED for evaluation of shortness of breath.  Patient was just discharged from [facility] today.  Where he was admitted for management of anemia.  Patient received massive transfusion protocol while he was there.  CT chest performed at that hospital showed 1.2 cm nodule in the right upper lobe oncology and cardiothoracic's were consulted there for the lung mass and it was decided patient be worked up outpatient. pt states since his discharge, he is unable to walk more than 15 steps before feeling short of breath. He denies any fevers, chills, n/v, abdominal pain or urinary symptoms.  Concern for CHF versus symptomatic anemia versus pneumonia versus effusion.  Will eval with chest x-ray, labs including a VBG coags and type and screen in case patient requires blood transfusion.  Given patient presentation and inability to ambulate will most likely need to admit ZR

## 2025-03-20 NOTE — ED ADULT TRIAGE NOTE - GLASGOW COMA SCALE: SCORE, MLM
15

## 2025-03-20 NOTE — ED PROVIDER NOTE - PHYSICAL EXAMINATION
Physical Exam:  Gen:  pt appears uncomfortable  Head: NCAT  HEENT: Normal conjunctiva, trachea midline, moist mucous membranes  Lung: diminished b/l   CV: RRR,   Abd: Soft, non-tender,   MSK: No visible deformities  Neuro: No focal motor deficits  Skin: Warm, well perfused, no visible rashes, b/l lower extremity pitting edema  Psych: appropriate affect and mood

## 2025-03-20 NOTE — ED ADULT TRIAGE NOTE - CHIEF COMPLAINT QUOTE
Hx of COPD, cardiac stents present with shortness of breath. Pt states he has been feeling short of breath and was hospitalized at [facility] wehre he was told he has lesions on his lungs. Pt states he was told he was anemic and had several transfusions in the hospital.

## 2025-03-20 NOTE — ED ADULT NURSE NOTE - CHIEF COMPLAINT QUOTE
Hx of COPD, cardiac stents present with shortness of breath. Pt states he has been feeling short of breath and was hospitalized at [facility] wehre he was told he has lesions on his lungs. Pt states he was told he was anemic and had several transfusions in the hospital.

## 2025-03-21 ENCOUNTER — TRANSCRIPTION ENCOUNTER (OUTPATIENT)
Age: 75
End: 2025-03-21

## 2025-03-21 VITALS
DIASTOLIC BLOOD PRESSURE: 71 MMHG | HEART RATE: 77 BPM | RESPIRATION RATE: 18 BRPM | TEMPERATURE: 99 F | SYSTOLIC BLOOD PRESSURE: 159 MMHG | OXYGEN SATURATION: 97 %

## 2025-03-21 DIAGNOSIS — Z29.9 ENCOUNTER FOR PROPHYLACTIC MEASURES, UNSPECIFIED: ICD-10-CM

## 2025-03-21 DIAGNOSIS — R73.9 HYPERGLYCEMIA, UNSPECIFIED: ICD-10-CM

## 2025-03-21 DIAGNOSIS — J18.9 PNEUMONIA, UNSPECIFIED ORGANISM: ICD-10-CM

## 2025-03-21 DIAGNOSIS — E78.5 HYPERLIPIDEMIA, UNSPECIFIED: ICD-10-CM

## 2025-03-21 DIAGNOSIS — I10 ESSENTIAL (PRIMARY) HYPERTENSION: ICD-10-CM

## 2025-03-21 DIAGNOSIS — R06.02 SHORTNESS OF BREATH: ICD-10-CM

## 2025-03-21 DIAGNOSIS — I82.409 ACUTE EMBOLISM AND THROMBOSIS OF UNSPECIFIED DEEP VEINS OF UNSPECIFIED LOWER EXTREMITY: ICD-10-CM

## 2025-03-21 DIAGNOSIS — Z95.5 PRESENCE OF CORONARY ANGIOPLASTY IMPLANT AND GRAFT: Chronic | ICD-10-CM

## 2025-03-21 DIAGNOSIS — Z91.89 OTHER SPECIFIED PERSONAL RISK FACTORS, NOT ELSEWHERE CLASSIFIED: ICD-10-CM

## 2025-03-21 DIAGNOSIS — Z87.01 PERSONAL HISTORY OF PNEUMONIA (RECURRENT): ICD-10-CM

## 2025-03-21 DIAGNOSIS — R91.1 SOLITARY PULMONARY NODULE: ICD-10-CM

## 2025-03-21 DIAGNOSIS — J44.9 CHRONIC OBSTRUCTIVE PULMONARY DISEASE, UNSPECIFIED: ICD-10-CM

## 2025-03-21 DIAGNOSIS — Z86.718 PERSONAL HISTORY OF OTHER VENOUS THROMBOSIS AND EMBOLISM: ICD-10-CM

## 2025-03-21 LAB
ADD ON TEST-SPECIMEN IN LAB: SIGNIFICANT CHANGE UP
ALBUMIN SERPL ELPH-MCNC: 3.6 G/DL — SIGNIFICANT CHANGE UP (ref 3.3–5)
ALP SERPL-CCNC: 55 U/L — SIGNIFICANT CHANGE UP (ref 40–120)
ALT FLD-CCNC: 10 U/L — SIGNIFICANT CHANGE UP (ref 10–45)
ANION GAP SERPL CALC-SCNC: 14 MMOL/L — SIGNIFICANT CHANGE UP (ref 5–17)
AST SERPL-CCNC: 13 U/L — SIGNIFICANT CHANGE UP (ref 10–40)
BASOPHILS # BLD AUTO: 0.01 K/UL — SIGNIFICANT CHANGE UP (ref 0–0.2)
BASOPHILS NFR BLD AUTO: 0.2 % — SIGNIFICANT CHANGE UP (ref 0–2)
BILIRUB SERPL-MCNC: 0.3 MG/DL — SIGNIFICANT CHANGE UP (ref 0.2–1.2)
BUN SERPL-MCNC: 34 MG/DL — HIGH (ref 7–23)
CALCIUM SERPL-MCNC: 9.1 MG/DL — SIGNIFICANT CHANGE UP (ref 8.4–10.5)
CHLORIDE SERPL-SCNC: 100 MMOL/L — SIGNIFICANT CHANGE UP (ref 96–108)
CO2 SERPL-SCNC: 27 MMOL/L — SIGNIFICANT CHANGE UP (ref 22–31)
CREAT SERPL-MCNC: 0.83 MG/DL — SIGNIFICANT CHANGE UP (ref 0.5–1.3)
EGFR: 92 ML/MIN/1.73M2 — SIGNIFICANT CHANGE UP
EGFR: 92 ML/MIN/1.73M2 — SIGNIFICANT CHANGE UP
EOSINOPHIL # BLD AUTO: 0 K/UL — SIGNIFICANT CHANGE UP (ref 0–0.5)
EOSINOPHIL NFR BLD AUTO: 0 % — SIGNIFICANT CHANGE UP (ref 0–6)
GLUCOSE SERPL-MCNC: 188 MG/DL — HIGH (ref 70–99)
HCT VFR BLD CALC: 33.1 % — LOW (ref 39–50)
HGB BLD-MCNC: 10 G/DL — LOW (ref 13–17)
IMM GRANULOCYTES NFR BLD AUTO: 0.4 % — SIGNIFICANT CHANGE UP (ref 0–0.9)
LYMPHOCYTES # BLD AUTO: 0.43 K/UL — LOW (ref 1–3.3)
LYMPHOCYTES # BLD AUTO: 7.9 % — LOW (ref 13–44)
MAGNESIUM SERPL-MCNC: 1.8 MG/DL — SIGNIFICANT CHANGE UP (ref 1.6–2.6)
MCHC RBC-ENTMCNC: 28.1 PG — SIGNIFICANT CHANGE UP (ref 27–34)
MCHC RBC-ENTMCNC: 30.2 G/DL — LOW (ref 32–36)
MONOCYTES # BLD AUTO: 0.08 K/UL — SIGNIFICANT CHANGE UP (ref 0–0.9)
MONOCYTES NFR BLD AUTO: 1.5 % — LOW (ref 2–14)
NEUTROPHILS # BLD AUTO: 4.88 K/UL — SIGNIFICANT CHANGE UP (ref 1.8–7.4)
NEUTROPHILS NFR BLD AUTO: 90 % — HIGH (ref 43–77)
NRBC BLD AUTO-RTO: 0 /100 WBCS — SIGNIFICANT CHANGE UP (ref 0–0)
PHOSPHATE SERPL-MCNC: 2 MG/DL — LOW (ref 2.5–4.5)
PLATELET # BLD AUTO: 278 K/UL — SIGNIFICANT CHANGE UP (ref 150–400)
POTASSIUM SERPL-MCNC: 4.6 MMOL/L — SIGNIFICANT CHANGE UP (ref 3.5–5.3)
POTASSIUM SERPL-SCNC: 4.6 MMOL/L — SIGNIFICANT CHANGE UP (ref 3.5–5.3)
PROT SERPL-MCNC: 6.1 G/DL — SIGNIFICANT CHANGE UP (ref 6–8.3)
RBC # BLD: 3.56 M/UL — LOW (ref 4.2–5.8)
RBC # FLD: 19.9 % — HIGH (ref 10.3–14.5)
SODIUM SERPL-SCNC: 141 MMOL/L — SIGNIFICANT CHANGE UP (ref 135–145)
TROPONIN T, HIGH SENSITIVITY RESULT: 39 NG/L — SIGNIFICANT CHANGE UP (ref 0–51)
WBC # BLD: 5.42 K/UL — SIGNIFICANT CHANGE UP (ref 3.8–10.5)
WBC # FLD AUTO: 5.42 K/UL — SIGNIFICANT CHANGE UP (ref 3.8–10.5)

## 2025-03-21 PROCEDURE — 85025 COMPLETE CBC W/AUTO DIFF WBC: CPT

## 2025-03-21 PROCEDURE — 87637 SARSCOV2&INF A&B&RSV AMP PRB: CPT

## 2025-03-21 PROCEDURE — 82803 BLOOD GASES ANY COMBINATION: CPT

## 2025-03-21 PROCEDURE — 82435 ASSAY OF BLOOD CHLORIDE: CPT

## 2025-03-21 PROCEDURE — 84145 PROCALCITONIN (PCT): CPT

## 2025-03-21 PROCEDURE — 82330 ASSAY OF CALCIUM: CPT

## 2025-03-21 PROCEDURE — 71275 CT ANGIOGRAPHY CHEST: CPT | Mod: 26

## 2025-03-21 PROCEDURE — 86900 BLOOD TYPING SEROLOGIC ABO: CPT

## 2025-03-21 PROCEDURE — 85018 HEMOGLOBIN: CPT

## 2025-03-21 PROCEDURE — 71275 CT ANGIOGRAPHY CHEST: CPT | Mod: MC

## 2025-03-21 PROCEDURE — 94640 AIRWAY INHALATION TREATMENT: CPT

## 2025-03-21 PROCEDURE — 84100 ASSAY OF PHOSPHORUS: CPT

## 2025-03-21 PROCEDURE — 85610 PROTHROMBIN TIME: CPT

## 2025-03-21 PROCEDURE — 96375 TX/PRO/DX INJ NEW DRUG ADDON: CPT

## 2025-03-21 PROCEDURE — 83605 ASSAY OF LACTIC ACID: CPT

## 2025-03-21 PROCEDURE — 99285 EMERGENCY DEPT VISIT HI MDM: CPT

## 2025-03-21 PROCEDURE — 84132 ASSAY OF SERUM POTASSIUM: CPT

## 2025-03-21 PROCEDURE — 83735 ASSAY OF MAGNESIUM: CPT

## 2025-03-21 PROCEDURE — 93005 ELECTROCARDIOGRAM TRACING: CPT

## 2025-03-21 PROCEDURE — 84295 ASSAY OF SERUM SODIUM: CPT

## 2025-03-21 PROCEDURE — 83880 ASSAY OF NATRIURETIC PEPTIDE: CPT

## 2025-03-21 PROCEDURE — 82947 ASSAY GLUCOSE BLOOD QUANT: CPT

## 2025-03-21 PROCEDURE — 84484 ASSAY OF TROPONIN QUANT: CPT

## 2025-03-21 PROCEDURE — 71046 X-RAY EXAM CHEST 2 VIEWS: CPT

## 2025-03-21 PROCEDURE — 86850 RBC ANTIBODY SCREEN: CPT

## 2025-03-21 PROCEDURE — 99236 HOSP IP/OBS SAME DATE HI 85: CPT | Mod: GC

## 2025-03-21 PROCEDURE — 85014 HEMATOCRIT: CPT

## 2025-03-21 PROCEDURE — 85730 THROMBOPLASTIN TIME PARTIAL: CPT

## 2025-03-21 PROCEDURE — 80053 COMPREHEN METABOLIC PANEL: CPT

## 2025-03-21 PROCEDURE — 86901 BLOOD TYPING SEROLOGIC RH(D): CPT

## 2025-03-21 PROCEDURE — 96374 THER/PROPH/DIAG INJ IV PUSH: CPT

## 2025-03-21 RX ORDER — APIXABAN 2.5 MG/1
1 TABLET, FILM COATED ORAL
Refills: 0 | DISCHARGE

## 2025-03-21 RX ORDER — ENOXAPARIN SODIUM 100 MG/ML
40 INJECTION SUBCUTANEOUS EVERY 24 HOURS
Refills: 0 | Status: DISCONTINUED | OUTPATIENT
Start: 2025-03-21 | End: 2025-03-21

## 2025-03-21 RX ORDER — ASPIRIN 325 MG
81 TABLET ORAL DAILY
Refills: 0 | Status: DISCONTINUED | OUTPATIENT
Start: 2025-03-21 | End: 2025-03-21

## 2025-03-21 RX ORDER — SODIUM CHLORIDE 9 G/1000ML
1000 INJECTION, SOLUTION INTRAVENOUS
Refills: 0 | Status: DISCONTINUED | OUTPATIENT
Start: 2025-03-21 | End: 2025-03-21

## 2025-03-21 RX ORDER — INSULIN LISPRO 100 U/ML
INJECTION, SOLUTION INTRAVENOUS; SUBCUTANEOUS
Refills: 0 | Status: DISCONTINUED | OUTPATIENT
Start: 2025-03-21 | End: 2025-03-21

## 2025-03-21 RX ORDER — AMLODIPINE BESYLATE 10 MG/1
10 TABLET ORAL DAILY
Refills: 0 | Status: DISCONTINUED | OUTPATIENT
Start: 2025-03-21 | End: 2025-03-21

## 2025-03-21 RX ORDER — ALBUTEROL SULFATE 2.5 MG/3ML
1 VIAL, NEBULIZER (ML) INHALATION THREE TIMES A DAY
Refills: 0 | Status: DISCONTINUED | OUTPATIENT
Start: 2025-03-21 | End: 2025-03-21

## 2025-03-21 RX ORDER — IPRATROPIUM BROMIDE AND ALBUTEROL SULFATE .5; 2.5 MG/3ML; MG/3ML
3 SOLUTION RESPIRATORY (INHALATION) EVERY 6 HOURS
Refills: 0 | Status: DISCONTINUED | OUTPATIENT
Start: 2025-03-21 | End: 2025-03-21

## 2025-03-21 RX ORDER — TIOTROPIUM BROMIDE INHALATION SPRAY 3.12 UG/1
2 SPRAY, METERED RESPIRATORY (INHALATION) DAILY
Refills: 0 | Status: DISCONTINUED | OUTPATIENT
Start: 2025-03-21 | End: 2025-03-21

## 2025-03-21 RX ORDER — DEXTROSE 50 % IN WATER 50 %
25 SYRINGE (ML) INTRAVENOUS ONCE
Refills: 0 | Status: DISCONTINUED | OUTPATIENT
Start: 2025-03-21 | End: 2025-03-21

## 2025-03-21 RX ORDER — LOSARTAN POTASSIUM 100 MG/1
100 TABLET, FILM COATED ORAL ONCE
Refills: 0 | Status: COMPLETED | OUTPATIENT
Start: 2025-03-21 | End: 2025-03-21

## 2025-03-21 RX ORDER — INSULIN LISPRO 100 U/ML
INJECTION, SOLUTION INTRAVENOUS; SUBCUTANEOUS AT BEDTIME
Refills: 0 | Status: DISCONTINUED | OUTPATIENT
Start: 2025-03-21 | End: 2025-03-21

## 2025-03-21 RX ORDER — GLUCAGON 3 MG/1
1 POWDER NASAL ONCE
Refills: 0 | Status: DISCONTINUED | OUTPATIENT
Start: 2025-03-21 | End: 2025-03-21

## 2025-03-21 RX ORDER — MONTELUKAST SODIUM 10 MG/1
10 TABLET ORAL DAILY
Refills: 0 | Status: DISCONTINUED | OUTPATIENT
Start: 2025-03-21 | End: 2025-03-21

## 2025-03-21 RX ORDER — ROFLUMILAST 250 UG/1
1 TABLET ORAL
Refills: 0 | DISCHARGE

## 2025-03-21 RX ORDER — CARVEDILOL 3.12 MG/1
6.25 TABLET, FILM COATED ORAL ONCE
Refills: 0 | Status: COMPLETED | OUTPATIENT
Start: 2025-03-21 | End: 2025-03-21

## 2025-03-21 RX ORDER — CARVEDILOL 3.12 MG/1
25 TABLET, FILM COATED ORAL EVERY 12 HOURS
Refills: 0 | Status: DISCONTINUED | OUTPATIENT
Start: 2025-03-21 | End: 2025-03-21

## 2025-03-21 RX ORDER — ATORVASTATIN CALCIUM 80 MG/1
80 TABLET, FILM COATED ORAL AT BEDTIME
Refills: 0 | Status: DISCONTINUED | OUTPATIENT
Start: 2025-03-21 | End: 2025-03-21

## 2025-03-21 RX ORDER — DEXTROSE 50 % IN WATER 50 %
12.5 SYRINGE (ML) INTRAVENOUS ONCE
Refills: 0 | Status: DISCONTINUED | OUTPATIENT
Start: 2025-03-21 | End: 2025-03-21

## 2025-03-21 RX ORDER — SOD PHOS DI, MONO/K PHOS MONO 250 MG
1 TABLET ORAL EVERY 4 HOURS
Refills: 0 | Status: COMPLETED | OUTPATIENT
Start: 2025-03-21 | End: 2025-03-21

## 2025-03-21 RX ORDER — APIXABAN 2.5 MG/1
5 TABLET, FILM COATED ORAL
Refills: 0 | Status: DISCONTINUED | OUTPATIENT
Start: 2025-03-21 | End: 2025-03-21

## 2025-03-21 RX ORDER — DEXTROSE 50 % IN WATER 50 %
15 SYRINGE (ML) INTRAVENOUS ONCE
Refills: 0 | Status: DISCONTINUED | OUTPATIENT
Start: 2025-03-21 | End: 2025-03-21

## 2025-03-21 RX ORDER — LOSARTAN POTASSIUM 100 MG/1
100 TABLET, FILM COATED ORAL DAILY
Refills: 0 | Status: DISCONTINUED | OUTPATIENT
Start: 2025-03-21 | End: 2025-03-21

## 2025-03-21 RX ADMIN — AMLODIPINE BESYLATE 10 MILLIGRAM(S): 10 TABLET ORAL at 10:50

## 2025-03-21 RX ADMIN — Medication 1 PACKET(S): at 18:38

## 2025-03-21 RX ADMIN — Medication 81 MILLIGRAM(S): at 13:13

## 2025-03-21 RX ADMIN — CARVEDILOL 25 MILLIGRAM(S): 3.12 TABLET, FILM COATED ORAL at 18:37

## 2025-03-21 RX ADMIN — MONTELUKAST SODIUM 10 MILLIGRAM(S): 10 TABLET ORAL at 13:12

## 2025-03-21 RX ADMIN — LOSARTAN POTASSIUM 100 MILLIGRAM(S): 100 TABLET, FILM COATED ORAL at 00:22

## 2025-03-21 RX ADMIN — Medication 1 PUFF(S): at 13:13

## 2025-03-21 RX ADMIN — CARVEDILOL 6.25 MILLIGRAM(S): 3.12 TABLET, FILM COATED ORAL at 00:23

## 2025-03-21 RX ADMIN — Medication 1 PACKET(S): at 13:13

## 2025-03-21 RX ADMIN — APIXABAN 5 MILLIGRAM(S): 2.5 TABLET, FILM COATED ORAL at 18:37

## 2025-03-21 NOTE — DISCHARGE NOTE PROVIDER - NSFOLLOWUPCLINICS_GEN_ALL_ED_FT
[facility] [place]  Internal Medicine  [address] [place] [address]  Phone: [phone]  Fax: [phone]  Follow Up Time: 1 week

## 2025-03-21 NOTE — DISCHARGE NOTE NURSING/CASE MANAGEMENT/SOCIAL WORK - FINANCIAL ASSISTANCE
[facility] provides services at a reduced cost to those who are determined to be eligible through [facility]’s financial assistance program. Information regarding [facility]’s financial assistance program can be found by going to [email or web address] or by calling [phone].

## 2025-03-21 NOTE — DISCHARGE NOTE NURSING/CASE MANAGEMENT/SOCIAL WORK - PATIENT PORTAL LINK FT
You can access the FollowMyHealth Patient Portal offered by [facility] by registering at the following website: [email or web address]. By joining [facility]’s FollowMyHealth portal, you will also be able to view your health information using other applications (apps) compatible with our system.

## 2025-03-21 NOTE — H&P ADULT - PROBLEM SELECTOR PLAN 4
-Continue atorvastatin -Continue home atorvastatin 80 mg qd Continues to be hypertensive. 208/110 in ED. On floor, 187/101.   Home medications: Coreg 25 mg BID, Norvasc 10 mg qd, and Losartan 100 mg qd    Plan:   -Continue carvedilol, amlodipine, and losartan.

## 2025-03-21 NOTE — CONSULT NOTE ADULT - PROBLEM SELECTOR RECOMMENDATION 3
-CTA chest Jan 2025 with DIANA and lingular consolidations  -S/p course of Ceftriaxone  -CTA chest now with DIANA consolidation with cavitation, overall improving since prior imaging. -CTA chest Jan 2025 with DIANA and lingular consolidations  -S/p course of Ceftriaxone  -CTA chest now with DIANA consolidation, overall improving since prior imaging.

## 2025-03-21 NOTE — H&P ADULT - PROBLEM SELECTOR PLAN 6
Diet: regular (DASH/TLC)  DVT: lovenox  Dispo: PT consult. No anticipated barriers to discharge.   Goals of care: full code Diet: Regular (DASH/TLC)  DVT: lovenox 40 mg Q24H  Goals of care: full code  Dispo:Home pending clinical course S/p stent placement 3-4 years ago. Home medication: ASA 81 mg qd & Plavix 75 mg qd    PLAN:  C/w ASA and eliquis  Hold Plavix S/p stent placement 3-4 years ago. Home medication: ASA 81 mg qd & Plavix 75 mg qd    PLAN:  C/w ASA   Hold Plavix

## 2025-03-21 NOTE — H&P ADULT - ASSESSMENT
Bon Ruvalcaba is a 74 yr old male with HTN, HLD, GERD, CAD s/p stent placement, and COPD as former smoker presenting with subacute on chronic shortness of breath associated with decreased appetite and 30 lbs unintentional weight loss over the past two months with CT concerning for malignancy. Important to rule out potential HF exacerbation.

## 2025-03-21 NOTE — CONSULT NOTE ADULT - PROBLEM SELECTOR RECOMMENDATION 9
-Does not appear exacerbated, no wheezing on exam  -Pt does endorse chronic WYMAN which has overall be progressively worsening, but denies SOB at rest.   -Continue Advair 250/50 1 puff BID, can resume home med Symbicort on discharge  -Suggest start Spiriva  -Suggest Duoneb q6h PRN   -VBG with no CO2 retention   -Keep sats >88% with o2 PRN.

## 2025-03-21 NOTE — H&P ADULT - PROBLEM SELECTOR PLAN 7
Diet: Regular (DASH/TLC)  DVT: eliquis  Goals of care: full code  Dispo:Home pending clinical course Diet: Regular (DASH/TLC)  DVT: Eliquis 5 mg BID  Goals of care: full code  Dispo: Home pending clinical course No known hx of diabetes. BG >200 last admission & >180 on this admission    PLAN:  LDISS & FS TIDAC + QHS.  F/u A1C

## 2025-03-21 NOTE — DISCHARGE NOTE PROVIDER - NSDCCPCAREPLAN_GEN_ALL_CORE_FT
PRINCIPAL DISCHARGE DIAGNOSIS  Diagnosis: Shortness of breath  Assessment and Plan of Treatment: You were admitted to the hospital due to shortness of breath. CT imaging was done which showed findings that could be suggestive of a malignanyc. Your Pulmonologist is aware of these findings and wishes to see you outpatient and as well as to get  PET scan done. Please continue to follow up with pulmonology  If you experience chest pain, fevers, shortness of breath, loss of conciousness, or any other change in your overall health please seek medical attention immediatly.

## 2025-03-21 NOTE — H&P ADULT - NSHPSOCIALHISTORY_GEN_ALL_CORE
Mr. Ruvalcaba lives in [place] with his girlfriend in an apartment. Feels safe with no social or financial stressors. 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 Drinks around 6 beers a week socially. Is sexually active with his girlfriend.

## 2025-03-21 NOTE — CONSULT NOTE ADULT - NS ATTEND AMEND GEN_ALL_CORE FT
pt with copd/bronchiectasis s/p pna in january  never f/u with his pulmonary md or myself for a pet/ct  ct now with improved infiltrate on left with residual bronchiectasis and rul spiculated nodule  spoke with him and his girlfriend, who he lives with, micky, explaining that its extremely important to fu after dc because this looks like a cancer, and we 'may' be able to treat it for a cure, but he must get a pet/ct first. they both appeared to understand  pt sat mid 90s on room air  continue bronchodilators  kathie miramontes

## 2025-03-21 NOTE — H&P ADULT - CONVERSATION DETAILS
Patient education regarding advanced care planning was provided. Risks, benefits, indications, and alternatives discussed. Patient wants full code including chest compressions, intubation, etc.

## 2025-03-21 NOTE — H&P ADULT - NSHPPHYSICALEXAM_GEN_ALL_CORE
Vital Signs Last 24 Hrs  T(C): 36.7 (21 Mar 2025 11:41), Max: 36.8 (21 Mar 2025 03:56)  T(F): 98 (21 Mar 2025 11:41), Max: 98.3 (21 Mar 2025 03:56)  HR: 94 (21 Mar 2025 11:41) (67 - 100)  BP: 131/75 (21 Mar 2025 11:41) (130/67 - 208/110)  BP(mean): 93 (21 Mar 2025 05:14) (92 - 114)  RR: 18 (21 Mar 2025 11:41) (18 - 22)  SpO2: 94% (21 Mar 2025 11:41) (93% - 99%)    Parameters below as of 21 Mar 2025 11:41  Patient On (Oxygen Delivery Method): room air    General: cachetic, well-appearing, in no-apparent distress  HEENT: NC/AT with no scleral icterus or conjunctival pallor  Pulm: normal breath sounds auscultated bilaterally with no crackles, wheezes, or rhonchi  CV: normal S1, S2 with no murmurs, rubs, or gallops. No JVD  Abdomen: soft, non-tender, non-distended with no tenderness or pain to palpation. No hepatomegaly appreciated.   Skin: no rashes  Extremities: no swelling, erythema, peripheral pulses 2+  Neuro: A&Ox3  Lymp: no cervical, axillary, or inguinal lymphadenopathy. No lymphedema.

## 2025-03-21 NOTE — H&P ADULT - HISTORY OF PRESENT ILLNESS
AUTHORED BY MARIAN BLISS, MS3    Bon Ruvalcaba is a 74 yr old male with HTN, HLD, GERD, CAD s/p stent placement, and COPD as former smoker presenting with subacute on chronic shortness of breath associated with decreased appetite. For the past 2 months, pt says he has had shortness of breath that's different than the one he normally has from his COPD. This has been associated with decreased appetite and increased thirst. The shortness of breath has been interfering with his quality of life to the point where he presented to [facility] yesterday for evaluation. There, he had symptomatic anemia that was treated with a massive transfusion protocol. CT scan revealed 1.2 cm nodule in the right upper lobe that is going to be evaluated by oncology and cardiothoracic surgery outpatient. After discharge, patient continued to have shortness of breath after 15 steps walking and presented to [facility] for further evaluation and management.     ED Course: CXR revealed perifissure opacities in left upper lobe and foreign metallic body in right anterior hemithorax. Chest CT revealed consolidation with cavitation in lef rosalba lobe. Right upper lobe with spiculated nodule recommended f/u PET/CT or biopsy. Lingula region with patchy opacities with associated bronchiectasis and atelectasis likely due to infectious vs inflammatory etiology.  AUTHORED BY MARIAN BLISS, MS3    Bon Ruvalcaba is a 74 yr old male with HTN, HLD, GERD, CAD s/p stent placement, and COPD as former smoker presenting with subacute on chronic shortness of breath associated with decreased appetite. For the past 2 months, pt says he has had shortness of breath that's different than the one he normally has from his COPD. This has been associated with decreased appetite and increased thirst. The shortness of breath has been interfering with his quality of life to the point where he presented to [facility] yesterday for evaluation. There, he had symptomatic anemia that was treated with a massive transfusion protocol. CT scan revealed 1.2 cm nodule in the right upper lobe that is going to be evaluated by oncology and cardiothoracic surgery outpatient. After discharge, patient continued to have shortness of breath after 15 steps walking and presented to [facility] for further evaluation and management. Denies fevers, rashes, chills, night sweats, chest pain, SOB at rest (it's only on exertion), SOB while lying down, use of pillow to help sleep at night, abdominal pain, N/V, leg pain or swelling. Endorses around 30 lbs of unintentional weight loss over the past couple of months.     ED Course: Received 2L NC which was discontinued. Was on continuous pulse ox. Vital signs were stable but BP was 208/110. Labs remarkable for anemia (RBC 3.5, Hbg 9.5, Hct 32). Elevated BNP (5184). RVP negative. CXR revealed perifissure opacities in left upper lobe and foreign metallic body in right anterior hemithorax. Chest CT revealed consolidation with cavitation in left upper lobe. Right upper lobe with spiculated nodule recommended f/u PET/CT or biopsy. Lingula region with patchy opacities with associated bronchiectasis and atelectasis likely due to infectious vs inflammatory etiology.  AUTHORED BY MARIAN BLISS, MS3    Bon Ruvalcaba is a 74 yr old male with HTN, HLD, GERD, CAD s/p stent placement, and COPD as former smoker presenting with subacute on chronic shortness of breath associated with decreased appetite. For the past 2 months, pt says he has had shortness of breath that's different than the one he normally has from his COPD. This has been associated with decreased appetite and increased thirst. The shortness of breath has been interfering with his quality of life to the point where he presented to [facility] yesterday for evaluation. There, he had symptomatic anemia that was treated with a massive transfusion protocol. CT scan revealed 1.2 cm nodule in the right upper lobe that is going to be evaluated by oncology and cardiothoracic surgery outpatient. After discharge, patient continued to have shortness of breath after 15 steps walking and presented to [facility] for further evaluation and management. Denies fevers, rashes, chills, night sweats, chest pain, SOB at rest (it's only on exertion), SOB while lying down, use of pillow to help sleep at night, abdominal pain, N/V, leg pain or swelling. Endorses around 30 lbs of unintentional weight loss over the past couple of months. Has been taking iron for the past two years with dark color in stool. Stopped taking 3 months ago and his stool back to normal light brown color. No fatigue or weakness that he's noticed. He does not know if he was worked up for a GI bleed at [facility].    ED Course: Received 2L NC which was discontinued. Was on continuous pulse ox. Vital signs were stable but BP was 208/110. Labs remarkable for anemia (RBC 3.5, Hbg 9.5, Hct 32). Elevated BNP (5184). RVP negative. CXR revealed perifissure opacities in left upper lobe and foreign metallic body in right anterior hemithorax. Chest CT revealed consolidation with cavitation in left upper lobe. Right upper lobe with spiculated nodule recommended f/u PET/CT or biopsy. Lingula region with patchy opacities with associated bronchiectasis and atelectasis likely due to infectious vs inflammatory etiology.

## 2025-03-21 NOTE — DISCHARGE NOTE PROVIDER - NSDCCPTREATMENT_GEN_ALL_CORE_FT
PRINCIPAL PROCEDURE  Procedure: CT angiogram chest w contrast  Findings and Treatment: FINDINGS:  LUNGS AND LARGE AIRWAYS: Patent central airways. Emphysema. Left lingular   bronchiectasis and atelectasis as well as some patchy nodular opacities.   Consolidative opacity in posterior segment of the left upper lobe   containing cavitation. Bilateral dependent atelectasis. Reidentified   spiculated right upper lobe nodule measuring 1.8 x 2.1 cm. Subcentimeter   juxtapleural nodules representing age pulmonary lymph nodes, for example   ([id number])  PLEURA: Trace left pleural effusion.  VESSELS: No pulmonary embolism. Aortic calcifications. Coronary artery   calcifications and/or stents.  HEART: Heart size is normal. Small pericardial fluid.  MEDIASTINUM AND LUCHO: No lymphadenopathy.  CHEST WALL AND LOWER NECK: Unchanged metallic foreign object within the   right anterior chest wall.  VISUALIZED UPPER ABDOMEN: Bilateral renal cysts and hypodense lesions   which are too small to characterize. Wedge-shaped area of increased   enhancement within the left kidney, unchanged.  BONES: Degenerative changes. Similar-appearing mild T11 vertebral body   compression deformity.  IMPRESSION:  No pulmonary embolism.  Patchy opacities in the left lingula with associated bronchiectasis and   atelectasis. Findings are nonspecific and may represent infectious versus   inflammatory etiologies.  Consolidative opacity in posterior segment of the left upper lobe   containing cavitation.  Reidentified spiculated right upper lobe nodule as described. Tissue   sampling or correlation with PET/CT suggested to exclude underlying   neoplasm or metastatic disease if not performed previously.  Additional findings as mentioned above.  --- End of Report ---

## 2025-03-21 NOTE — DISCHARGE NOTE PROVIDER - HOSPITAL COURSE
HPI:    Bon Ruvalcaba is a 74 yr old male with HTN, HLD, GERD, CAD s/p stent placement, and COPD as former smoker presenting with subacute on chronic shortness of breath associated with decreased appetite. For the past 2 months, pt says he has had shortness of breath that's different than the one he normally has from his COPD. This has been associated with decreased appetite and increased thirst. The shortness of breath has been interfering with his quality of life to the point where he presented to [facility] yesterday for evaluation. There, he had symptomatic anemia that was treated with a massive transfusion protocol. CT scan revealed 1.2 cm nodule in the right upper lobe that is going to be evaluated by oncology and cardiothoracic surgery outpatient. After discharge, patient continued to have shortness of breath after 15 steps walking and presented to [facility] for further evaluation and management. Denies fevers, rashes, chills, night sweats, chest pain, SOB at rest (it's only on exertion), SOB while lying down, use of pillow to help sleep at night, abdominal pain, N/V, leg pain or swelling. Endorses around 30 lbs of unintentional weight loss over the past couple of months. Has been taking iron for the past two years with dark color in stool. Stopped taking 3 months ago and his stool back to normal light brown color. No fatigue or weakness that he's noticed. He does not know if he was worked up for a GI bleed at [facility].    ED Course: Received 2L NC which was discontinued. Was on continuous pulse ox. Vital signs were stable but BP was 208/110. Labs remarkable for anemia (RBC 3.5, Hbg 9.5, Hct 32). Elevated BNP (5184). RVP negative. CXR revealed perifissure opacities in left upper lobe and foreign metallic body in right anterior hemithorax. Chest CT revealed consolidation with cavitation in left upper lobe. Right upper lobe with spiculated nodule recommended f/u PET/CT or biopsy. Lingula region with patchy opacities with associated bronchiectasis and atelectasis likely due to infectious vs inflammatory etiology.  (21 Mar 2025 09:11)    Hospital Course:  Patient admitted for abnormal CT findings and hypoxia. Patient transitioned to room air with no difficulties. Patient's pulmonologist (Dr. Drake Randle) contacted hospitalist, imaging findings are not new. Patient is to follow up outpatient. No further invention required. Will need outpatient PET scan. Patient is medically stable for discharge    Important Medication Changes and Reason:  None    Active or Pending Issues Requiring Follow-up:  Follow up with outpatient pulmonology    Advanced Directives:   [X] Full code  [ ] DNR  [ ] Hospice    Discharge Diagnoses:  Dyspnea on exertion

## 2025-03-21 NOTE — H&P ADULT - PROBLEM SELECTOR PROBLEM 2
COPD (chronic obstructive pulmonary disease) At risk for thrombosis History of DVT of lower extremity

## 2025-03-21 NOTE — DISCHARGE NOTE NURSING/CASE MANAGEMENT/SOCIAL WORK - NSDCVIVACCINE_GEN_ALL_CORE_FT
influenza, injectable, quadrivalent, preservative free; 28-Oct-2021 10:13; Bonnie Hodgson (RN); [employer]; [id number] (Exp. Date: 30-Jun-2022); IntraMuscular; Deltoid Right.; 0.5 milliLiter(s); VIS (VIS Published: 06-Aug-2021, VIS Presented: 28-Oct-2021);   pneumococcal polysaccharide PPV23; 28-Oct-2021 15:21; Bonnie Hodgson (RN); [employer] [id number] (Exp. Date: 14-Mar-2023); IntraMuscular; Deltoid Left.; 0.5 milliLiter(s); VIS (VIS Published: 06-Oct-2009, VIS Presented: 28-Oct-2021);

## 2025-03-21 NOTE — H&P ADULT - NSHPLABSRESULTS_GEN_ALL_CORE
10.0   5.42  )-----------( 278      ( 21 Mar 2025 10:04 )             33.1     03-21    141  |  100  |  34[H]  ----------------------------<  188[H]  4.6   |  27  |  0.83    Ca    9.1      21 Mar 2025 10:04  Phos  2.0     03-21  Mg     1.8     03-21    TPro  6.1  /  Alb  3.6  /  TBili  0.3  /  DBili  x   /  AST  13  /  ALT  10  /  AlkPhos  55  03-21    BUN 34  Cr 0.83    Phosphorus 2.0    Troponin: 39  Procalcitonin: 0.10    21:36 - VBG - pH: 7.43  | pCO2: 47    | pO2: 70    | Lactate: 0.7      RVP negative    CXR revealed perifissure opacities in left upper lobe and foreign metallic body in right anterior hemithorax. Chest CT revealed consolidation with cavitation in left upper lobe. Right upper lobe with spiculated nodule recommended f/u PET/CT or biopsy. Lingula region with patchy opacities with associated bronchiectasis and atelectasis likely due to infectious vs inflammatory etiology.

## 2025-03-21 NOTE — H&P ADULT - PROBLEM SELECTOR PLAN 8
Diet: Regular (DASH/TLC)  DVT: Eliquis 5 mg BID  Goals of care: full code  Dispo: Home pending clinical course

## 2025-03-21 NOTE — H&P ADULT - PROBLEM SELECTOR PLAN 5
-Continue aspirin and d/c plavix S/p stent placement 3-4 years ago. Home medication: ASA 81 mg qd & Plavix 75 mg qd    PLAN:  C/w ASA  Hold Plavix -Continue home atorvastatin 80 mg qd

## 2025-03-21 NOTE — DISCHARGE NOTE PROVIDER - CARE PROVIDER_API CALL
Mateo Randle.  Pulmonary Disease  [address]  [place] [address]  Phone: [phone]  Fax: [phone]  Established Patient  Follow Up Time: 2 weeks

## 2025-03-21 NOTE — H&P ADULT - PROBLEM SELECTOR PLAN 1
CT demonstrating concern for malignancy with 1.2 cm nodule in right upper lobe. Given lack of HF symptoms besides dyspnea, like orthopnea, paroxysmal nocturnal dyspnea, JVD, abdominal distention, hepatosplenomegaly, and/or b/l leg swelling, malignancy is highest on differential. Low concern for pneumonia so no need to start abx.    Plan:  -Pulmonology consult to discuss potential plan for bronchoscopy and biopsy  -TTE to evaluate for potential HF  -Monitor labs (CBC, CMP, renal function (monitor BUN and Cr)). F/u AM labs. CT demonstrating concern for malignancy with 1.2 cm nodule in right upper lobe. Given lack of HF symptoms  like orthopnea, paroxysmal nocturnal dyspnea, JVD, abdominal distention, hepatosplenomegaly, and/or b/l leg swelling, malignancy is highest on differential. Low concern for pneumonia so no need to start abx for now.    Plan:  -Pulmonology consult to discuss potential plan for bronchoscopy vs biopsy  -TTE to evaluate for potential HF ([facility] Records to be obtained)  -Monitor labs (CBC, CMP, renal function (monitor BUN and Cr)).  - F/u AM labs.  -C/w COPD medications as below Patient has subacute on chronic dyspnea. Was evaluated at [facility] and treated for anemia. CT demonstrated concern for malignancy with 1.2 cm nodule in right upper lobe. After discharge, pt still dyspneic and re-hospitalized at [facility]. Repeat CT demonstrates same concern. Given lack of HF symptoms  like orthopnea, paroxysmal nocturnal dyspnea, JVD, abdominal distention, hepatosplenomegaly, and/or b/l leg swelling, malignancy is highest on differential. Low concern for pneumonia so no need to start abx for now.    Plan:  -Pulmonology consult to discuss potential plan for bronchoscopy vs biopsy  -TTE to evaluate for potential HF ([facility] Records to be obtained)  -Monitor labs (CBC, CMP, renal function (monitor BUN and Cr)).  - F/u AM labs.  -C/w COPD medications as below Patient has subacute on chronic dyspnea. Was evaluated at [facility] and treated for anemia. CT demonstrated concern for malignancy with 1.2 cm nodule in right upper lobe. After discharge, pt still dyspneic and re-hospitalized at [facility]. Repeat CT demonstrates same concern. Given lack of HF symptoms  like orthopnea, paroxysmal nocturnal dyspnea, JVD, abdominal distention, hepatosplenomegaly, and/or b/l leg swelling, malignancy is highest on differential. Low concern for pneumonia so no need to start abx for now.    Plan:  -Pulmonology consult to discuss potential plan for bronchoscopy vs biopsy  -TTE to evaluate for potential HF ([facility] Records to be obtained)  -Monitor labs (CBC, CMP, renal function (monitor BUN and Cr)).  - F/u AM labs.  -C/w COPD medications as below  -Incentive spirometry

## 2025-03-21 NOTE — DISCHARGE NOTE PROVIDER - NSDCMRMEDTOKEN_GEN_ALL_CORE_FT
Albuterol (Eqv-Ventolin HFA) 90 mcg/inh inhalation aerosol: 1 puff(s) inhaled 3 times a day as needed  amLODIPine 10 mg oral tablet: 1 tab(s) orally once a day  aspirin 81 mg oral tablet, chewable: 1 tab(s) chewed once a day  atorvastatin 80 mg oral tablet: 1 tab(s) orally once a day (at bedtime)  carvedilol 25 mg oral tablet: 1 tab(s) orally 2 times a day  Eliquis 5 mg oral tablet: 1 tab(s) orally 2 times a day  losartan 100 mg oral tablet: 1 tab(s) orally once a day  pantoprazole 40 mg oral delayed release tablet: 1 tab(s) orally once a day  roflumilast 500 mcg oral tablet: 1 tab(s) orally once a day  Shower  Chair: As Directed  Singulair 10 mg oral tablet: 1 tab(s) orally once a day  Supplements/Vitamins: Iron tablet, Zinc tablet: 1 tablet orally once a day  Symbicort 160 mcg-4.5 mcg/inh inhalation aerosol: 2 puff(s) inhaled 2 times a day, As Needed

## 2025-03-21 NOTE — DISCHARGE NOTE PROVIDER - NSDCHOSPICE_GEN_A_CORE
Reason For Visit    Chief Complaint   Patient presents with   • Ear Problem   Rocky Du is accompanied by parent.      Referred By  Patient was referred by Dion Paul MD.     History of Present Illness  6 year old male with eustachian tube dysfunction.   Patient had ear tubes, and then tube replacement and adenoidectomy.  I last saw him in April 2018, and I said, \"Rocky has a patent right tube and healthy left ear. Eustachian tube is clearly getting better based on left sided exam. For now, he needs to be seen in 6 months to recheck, but no role for repeat tube placement.\"    Only active issue is pain with swimming.         Left ear worse than right side.       No otorrhea.     Review of Systems  10 systems were reviewed and are negative aside from those detailed above.     Past Medical History  Significant past medical history: yes.       No past medical history on file.     Surgical History  Significant past surgical history: none.  No past surgical history on file.     Social History  Custody status: Parents have full custody of the patient: yes     Current Meds  No current outpatient medications on file.     No current facility-administered medications for this visit.        Vitals        Visit Vitals  /69   Pulse 85   Temp 95.8 °F (35.4 °C)   Wt 21.1 kg (46 lb 8.3 oz)       Physical Exam  General:  The patient is awake, alert. Appropriate.  Head/Face:  Atraumatic, normocephalic. No dysmorphic features.  Eyes:  Pupils round, reactive to light.   Extraocular movement intact.   Conjunctiva normal.   Gaze conjugate.   Ears:  Cerumen in the lateral canals.  Nose:   Anterior rhinoscopy clear.  Oral Cavity:  The vestibule is normal appearing. Oral cavity mucosa is without lesion. The tongue is mobile and midline. Hard Palate is intact and without lesions. No ankyloglossia.  Oropharynx:  Soft palate elevates in the midline. Uvula normal. Tonsils very small, symmetric. Base of tongue soft without  lesions. Posterior oropharyngeal wall clear.  Larynx:   No stridor. Normal voice.  Neck:  normal surface anatomy. Laryngeal landmarks are palpable. Thyroid normal to palpation. No masses. No cervical lymphadenopathy. Range of motion normal.  Lymphatic:  No cervical lymphadenopathy.  Neuro:  Alert, no focal deficits.     Cerumen Removal  The patient's ear canals were examined under binocular microscopy. Using a combination of instruments including Roach tip suction, alligator forceps, and loop curette, cerumen in the external canal was cleared with care taken to avoid trauma to the canal skin. When this was completed, the tympanic membranes could be seen completely bilaterally - intact and aerated middle ear spaces. The patient tolerated the procedure without complication.    Assessment  Problem List Items Addressed This Visit     None      Visit Diagnoses     Bilateral impacted cerumen    -  Primary    Dysfunction of both eustachian tubes        S/P tympanotomy with insertion of tube            Discussion/Summary   Patient looks great. Both ears are healthy. Tubes are now both gone. I will see back on as needed basis.         Kunal Fan MD     No

## 2025-03-21 NOTE — H&P ADULT - PROBLEM SELECTOR PLAN 3
Continues to be hypertensive. 208/110 in ED. On floor, 187/101.     Plan:   -Continue carvedilol, amlodipine, and losartan. Continues to be hypertensive. 208/110 in ED. On floor, 187/101.   Home medications: Coreg 25 mg BID, Norvasc 10 mg qd, and Losartan 100 mg qd    Plan:   -Continue carvedilol, amlodipine, and losartan. Patient has history of COPD and hospitalized exacerbations. Last one Jan 2025 here at [facility].   Pulm consulted and following    Plan:  - Continue Duonebs PRN  -Continue Advair 250/50 1 puff BID (home med: symbicort)  -Continue Singulair (home med)  -Continue albuterol as needed  -Monitor O2 sat and symptoms

## 2025-03-21 NOTE — H&P ADULT - ATTENDING COMMENTS
73 y/o M with hx as stated above who was discharged yesterday from [facility] and upon discharge was sob on exertion so he came to [facility]. Based on hx we can gather he was found to have tez opacity and rt spiculated nodule at OSH. They wanted to do procedure but pt says he refused after he found out risk was PTX. He was discharged to follow up as outpt. Pt endorsed wt loss over last few months and 26 pack year smoking but quit in 98. He has o2 at home PRN but not for regular use. On admission to the floor he is resting in bed breathing comfortably on RA lying flat without evidence of airway constriction or fluid overload.    #WYMAN- likely secondary to findings on Chest CT- r/o malignancy. Obtain old records and pulm consult. No evidence of CHF on exam. Obtain echo if can't get one from OSH.     #RLE DVT- Dx at OSH- c/w Eliquis    #COPD- chronic- received steriods in ED- no indication for further steriods. Continue with nebs    #CAD s/p stent- c/w meds 75 y/o M with hx as stated above who was discharged yesterday from [facility] and upon discharge was sob on exertion so he came to [facility]. Based on hx we can gather he was found to have tez opacity and rt spiculated nodule at OSH. They wanted to do procedure but pt says he refused after he found out risk was PTX. He was discharged to follow up as outpt. Pt endorsed wt loss over last few months and 26 pack year smoking but quit in 98. He has o2 at home PRN but not for regular use. On admission to the floor he is resting in bed breathing comfortably on RA lying flat without evidence of airway constriction or fluid overload.    #WYMAN- likely secondary to findings on Chest CT- r/o malignancy. Seen by pulmonary Dr. Drake Randle who saw him in january. Pt was supposed to follow up and get PET scan as outpt but didn't. Dr. Randle spoke with pts sign other Xin who agrees to take responsibility that he will follow up for pet scan and outpt workup of likely malignancy.    Pt has no acute indication for staying inpatient at this time  will dc home to follow up   Admit/DC time 90 plus minutes

## 2025-03-21 NOTE — H&P ADULT - PROBLEM SELECTOR PLAN 2
Patient has history of COPD and hospitalized exacerbations. Last one Jan 2025 here at [facility].     Plan:  - Continue Duonebs  -Continue Advair 250/50 1 puff BID (home med: symbicort)  -Continue Singulair (home med)  -Continue albuterol as needed  -Monitor O2 sat and symptoms Patient has history of COPD and hospitalized exacerbations. Last one Jan 2025 here at [facility].   Pulm consulted and following    Plan:  - Continue Duonebs PRN  -Continue Advair 250/50 1 puff BID (home med: symbicort)  -Continue Singulair (home med)  -Continue albuterol as needed  -Monitor O2 sat and symptoms Given patient's recent admission at [facility] for DVT and upper extremity thrombosis, important to consider risk of thrombosis during this hospital stay    Plan:  -Aspirin and eliquis   -Compression socks Given patient's recent admission at [facility] for R. DVT and upper extremity thrombosis, important to consider risk of thrombosis during this hospital stay.   Home Medication: Eliquis 5 mg BID    Plan:  -C/w Eliquis 5 mg BID Given patient's recent admission at [facility] for R. DVT and upper extremity thrombosis, important to consider risk of DVT. Patient states had R. LE DVT during 02/2025 admission at [facility]   Home Medication: Eliquis 5 mg BID    Plan:  -C/w Eliquis 5 mg BID  -Awaiting [facility] Records

## 2025-03-21 NOTE — CONSULT NOTE ADULT - SUBJECTIVE AND OBJECTIVE BOX
PULMONARY CONSULT    HPI: 73 y/o M with PMH of HTN, HLD, GERD, CAD s/p stents, COPD, former smoker. Presents with SOB, decreased PO intake/appetite. Reportedly with 30 lbs of unintentional weight loss over the past few months.Went to [facility] for evaluation prior to this admission, reportedly anemic there requiring transfusions, CT scan revealed RUL nodule that was going to be followed as an outpatient. Pt discharged but symptoms persisted, so presented to [facility] for further w/u. CT chest here with DIANA consolidation with cavitation, RUL spiculated nodule. Pulmonary called to consult for further evaluation.       PAST MEDICAL & SURGICAL HISTORY:  COPD (chronic obstructive pulmonary disease)  Asthma  CAD (coronary artery disease)  BPH (benign prostatic hyperplasia)  GERD (gastroesophageal reflux disease)  HTN (hypertension)  HLD (hyperlipidemia)  S/P coronary artery stent placement    No Known Allergies    FAMILY HISTORY:  FH: diabetes mellitus (Mother)    FH: HTN (hypertension) (Mother, Sibling)    FH: diabetes mellitus (Sibling, Father)    Social history:     Review of Systems:  CONSTITUTIONAL: No fever, chills, or fatigue  EYES: No eye pain, visual disturbances, or discharge  ENMT:  No difficulty hearing, tinnitus, vertigo; No sinus or throat pain  NECK: No pain or stiffness  RESPIRATORY: Per above  CARDIOVASCULAR: No chest pain, palpitations, dizziness, or leg swelling  GASTROINTESTINAL: No abdominal or epigastric pain. No nausea, vomiting, or hematemesis; No diarrhea or constipation. No melena or hematochezia.  GENITOURINARY: No dysuria, frequency, hematuria, or incontinence  NEUROLOGICAL: No headaches, memory loss, loss of strength, numbness, or tremors  SKIN: No itching, burning, rashes, or lesions   MUSCULOSKELETAL: No joint pain or swelling; No muscle, back, or extremity pain  PSYCHIATRIC: No depression, anxiety, mood swings, or difficulty sleeping    Medications:  MEDICATIONS  (STANDING):  albuterol    90 MICROgram(s) HFA Inhaler 1 Puff(s) Inhalation three times a day  amLODIPine   Tablet 10 milliGRAM(s) Oral daily  apixaban 5 milliGRAM(s) Oral two times a day  aspirin  chewable 81 milliGRAM(s) Oral daily  atorvastatin 80 milliGRAM(s) Oral at bedtime  carvedilol 25 milliGRAM(s) Oral every 12 hours  dextrose 5%. 1000 milliLiter(s) (100 mL/Hr) IV Continuous <Continuous>  dextrose 5%. 1000 milliLiter(s) (50 mL/Hr) IV Continuous <Continuous>  dextrose 50% Injectable 25 Gram(s) IV Push once  dextrose 50% Injectable 12.5 Gram(s) IV Push once  dextrose 50% Injectable 25 Gram(s) IV Push once  fluticasone propionate/ salmeterol 250-50 MICROgram(s) Diskus 1 Dose(s) Inhalation two times a day  glucagon  Injectable 1 milliGRAM(s) IntraMuscular once  insulin lispro (ADMELOG) corrective regimen sliding scale   SubCutaneous three times a day before meals  insulin lispro (ADMELOG) corrective regimen sliding scale   SubCutaneous at bedtime  losartan 100 milliGRAM(s) Oral daily  montelukast 10 milliGRAM(s) Oral daily  potassium phosphate / sodium phosphate Powder (PHOS-NaK) 1 Packet(s) Oral every 4 hours    MEDICATIONS  (PRN):  albuterol/ipratropium for Nebulization 3 milliLiter(s) Nebulizer every 6 hours PRN Wheezing  dextrose Oral Gel 15 Gram(s) Oral once PRN Blood Glucose LESS THAN 70 milliGRAM(s)/deciliter    Vital Signs Last 24 Hrs  T(C): 36.7 (21 Mar 2025 11:41), Max: 36.8 (21 Mar 2025 03:56)  T(F): 98 (21 Mar 2025 11:41), Max: 98.3 (21 Mar 2025 03:56)  HR: 94 (21 Mar 2025 11:41) (67 - 100)  BP: 131/75 (21 Mar 2025 11:41) (130/67 - 208/110)  BP(mean): 93 (21 Mar 2025 05:14) (92 - 114)  RR: 18 (21 Mar 2025 11:41) (18 - 22)  SpO2: 94% (21 Mar 2025 11:41) (93% - 99%)    Parameters below as of 21 Mar 2025 11:41  Patient On (Oxygen Delivery Method): room air    VBG pH 7.43 03-20 @ 21:36  VBG pCO2 47 03-20 @ 21:36  VBG O2 sat 94.9 03-20 @ 21:36  VBG lactate 0.7 03-20 @ 21:36            LABS:                        10.0   5.42  )-----------( 278      ( 21 Mar 2025 10:04 )             33.1     03-21    141  |  100  |  34[H]  ----------------------------<  188[H]  4.6   |  27  |  0.83    Ca    9.1      21 Mar 2025 10:04  Phos  2.0     03-21  Mg     1.8     03-21    TPro  6.1  /  Alb  3.6  /  TBili  0.3  /  DBili  x   /  AST  13  /  ALT  10  /  AlkPhos  55  03-21      PT/INR - ( 20 Mar 2025 21:38 )   PT: 11.8 sec;   INR: 1.04 ratio         PTT - ( 20 Mar 2025 21:38 )  PTT:32.8 sec  Urinalysis Basic - ( 21 Mar 2025 10:04 )    Color: x / Appearance: x / SG: x / pH: x  Gluc: 188 mg/dL / Ketone: x  / Bili: x / Urobili: x   Blood: x / Protein: x / Nitrite: x   Leuk Esterase: x / RBC: x / WBC x   Sq Epi: x / Non Sq Epi: x / Bacteria: x      Procalcitonin: 0.10 ng/mL (03-21-25 @ 00:43)      Physical Examination:    General: No acute distress.      HEENT: Pupils equal, reactive to light.  Symmetric.    PULM: Clear to auscultation bilaterally, no significant sputum production    CVS: S1, S2    ABD: Soft, nondistended, nontender, normoactive bowel sounds, no masses    EXT: No edema, nontender    SKIN: Warm and well perfused, no rashes noted.    NEURO: Alert, oriented, interactive, nonfocal    RADIOLOGY REVIEWED      CT chest: < from: CT Angio Chest PE Protocol w/ IV Cont (03.21.25 @ 00:49) >    ACC: [id number] EXAM:  CT ANGIO CHEST PULM ART [facility]   ORDERED BY:  FRANKIE PRO     PROCEDURE DATE:  03/21/2025          INTERPRETATION:  CLINICAL INFORMATION: Shortness of breath    COMPARISON: CT chest 1/19/2025.    CONTRAST/COMPLICATIONS:  IV Contrast: Omnipaque 350  60 cc administered  Oral Contrast: NONE    PROCEDURE:  CT Angiography of the Chest.  Sagittal and coronal reformats were performed as well as 3D (MIP)   reconstructions.    FINDINGS:    LUNGS AND LARGE AIRWAYS: Patent central airways. Emphysema. Left lingular   bronchiectasis and atelectasis as well as some patchy nodular opacities.   Consolidative opacity in posterior segment of the left upper lobe   containing cavitation. Bilateral dependent atelectasis. Reidentified   spiculated right upper lobe nodule measuring 1.8 x 2.1 cm. Subcentimeter   juxtapleural nodules representing age pulmonary lymph nodes, for example   ([id number])  PLEURA: Trace left pleural effusion.  VESSELS: No pulmonary embolism. Aortic calcifications. Coronary artery   calcifications and/or stents.  HEART: Heart size is normal. Small pericardial fluid.  MEDIASTINUM AND LUCHO: No lymphadenopathy.  CHEST WALL AND LOWER NECK: Unchanged metallic foreign object within the   right anterior chest wall.  VISUALIZED UPPER ABDOMEN: Bilateral renal cysts and hypodense lesions   which are too small to characterize. Wedge-shaped area of increased   enhancement within the left kidney, unchanged.  BONES: Degenerative changes. Similar-appearing mild T11 vertebral body   compression deformity.    IMPRESSION:    No pulmonary embolism.    Patchy opacities in the left lingula with associated bronchiectasis and   atelectasis. Findings are nonspecific and may represent infectious versus   inflammatory etiologies.    Consolidative opacity in posterior segment of the left upper lobe   containing cavitation.    Reidentified spiculated right upper lobe nodule as described. Tissue   sampling or correlation with PET/CT suggested to exclude underlying   neoplasm or metastatic disease if not performed previously.    Additional findings as mentioned above.    < end of copied text >       PULMONARY CONSULT    HPI: 75 y/o M with PMH of HTN, HLD, GERD, CAD s/p stents, COPD, former smoker. Presents with SOB, decreased PO intake/appetite. Reportedly with 30 lbs of unintentional weight loss over the past few months. Went to [facility] for evaluation prior to this admission, reportedly anemic there requiring transfusions, CT scan revealed RUL nodule that was going to be followed as an outpatient. Pt discharged but symptoms persisted, so presented to [facility] for further w/u. CT chest here with DIANA consolidation with cavitation, RUL spiculated nodule. Pulmonary called to consult for further evaluation. Recent admission January 2025 for COPD exacerbation in the setting of PNA, found to have irregular RUL nodule and was recommended to follow up outpt for PET/CT. Pt continues to endorse SOB, mostly with exertion. Denies cough, fever, chills. O2 sats 94% on room air.       PAST MEDICAL & SURGICAL HISTORY:  COPD (chronic obstructive pulmonary disease)  Asthma  CAD (coronary artery disease)  BPH (benign prostatic hyperplasia)  GERD (gastroesophageal reflux disease)  HTN (hypertension)  HLD (hyperlipidemia)  S/P coronary artery stent placement    No Known Allergies    FAMILY HISTORY:  FH: diabetes mellitus (Mother)    FH: HTN (hypertension) (Mother, Sibling)    FH: diabetes mellitus (Sibling, Father)    Social history: former smoker     Review of Systems:  CONSTITUTIONAL: No fever, chills, or fatigue  EYES: No eye pain, visual disturbances, or discharge  ENMT:  No difficulty hearing, tinnitus, vertigo; No sinus or throat pain  NECK: No pain or stiffness  RESPIRATORY: Per above  CARDIOVASCULAR: No chest pain, palpitations, dizziness, or leg swelling  GASTROINTESTINAL: No abdominal or epigastric pain. No nausea, vomiting, or hematemesis; No diarrhea or constipation. No melena or hematochezia.  GENITOURINARY: No dysuria, frequency, hematuria, or incontinence  NEUROLOGICAL: No headaches, memory loss, loss of strength, numbness, or tremors  SKIN: No itching, burning, rashes, or lesions   MUSCULOSKELETAL: No joint pain or swelling; No muscle, back, or extremity pain  PSYCHIATRIC: No depression, anxiety, mood swings, or difficulty sleeping    Medications:  MEDICATIONS  (STANDING):  albuterol    90 MICROgram(s) HFA Inhaler 1 Puff(s) Inhalation three times a day  amLODIPine   Tablet 10 milliGRAM(s) Oral daily  apixaban 5 milliGRAM(s) Oral two times a day  aspirin  chewable 81 milliGRAM(s) Oral daily  atorvastatin 80 milliGRAM(s) Oral at bedtime  carvedilol 25 milliGRAM(s) Oral every 12 hours  dextrose 5%. 1000 milliLiter(s) (100 mL/Hr) IV Continuous <Continuous>  dextrose 5%. 1000 milliLiter(s) (50 mL/Hr) IV Continuous <Continuous>  dextrose 50% Injectable 25 Gram(s) IV Push once  dextrose 50% Injectable 12.5 Gram(s) IV Push once  dextrose 50% Injectable 25 Gram(s) IV Push once  fluticasone propionate/ salmeterol 250-50 MICROgram(s) Diskus 1 Dose(s) Inhalation two times a day  glucagon  Injectable 1 milliGRAM(s) IntraMuscular once  insulin lispro (ADMELOG) corrective regimen sliding scale   SubCutaneous three times a day before meals  insulin lispro (ADMELOG) corrective regimen sliding scale   SubCutaneous at bedtime  losartan 100 milliGRAM(s) Oral daily  montelukast 10 milliGRAM(s) Oral daily  potassium phosphate / sodium phosphate Powder (PHOS-NaK) 1 Packet(s) Oral every 4 hours    MEDICATIONS  (PRN):  albuterol/ipratropium for Nebulization 3 milliLiter(s) Nebulizer every 6 hours PRN Wheezing  dextrose Oral Gel 15 Gram(s) Oral once PRN Blood Glucose LESS THAN 70 milliGRAM(s)/deciliter    Vital Signs Last 24 Hrs  T(C): 36.7 (21 Mar 2025 11:41), Max: 36.8 (21 Mar 2025 03:56)  T(F): 98 (21 Mar 2025 11:41), Max: 98.3 (21 Mar 2025 03:56)  HR: 94 (21 Mar 2025 11:41) (67 - 100)  BP: 131/75 (21 Mar 2025 11:41) (130/67 - 208/110)  BP(mean): 93 (21 Mar 2025 05:14) (92 - 114)  RR: 18 (21 Mar 2025 11:41) (18 - 22)  SpO2: 94% (21 Mar 2025 11:41) (93% - 99%)    Parameters below as of 21 Mar 2025 11:41  Patient On (Oxygen Delivery Method): room air    VBG pH 7.43 03-20 @ 21:36  VBG pCO2 47 03-20 @ 21:36  VBG O2 sat 94.9 03-20 @ 21:36  VBG lactate 0.7 03-20 @ 21:36            LABS:                        10.0   5.42  )-----------( 278      ( 21 Mar 2025 10:04 )             33.1     03-21    141  |  100  |  34[H]  ----------------------------<  188[H]  4.6   |  27  |  0.83    Ca    9.1      21 Mar 2025 10:04  Phos  2.0     03-21  Mg     1.8     03-21    TPro  6.1  /  Alb  3.6  /  TBili  0.3  /  DBili  x   /  AST  13  /  ALT  10  /  AlkPhos  55  03-21      PT/INR - ( 20 Mar 2025 21:38 )   PT: 11.8 sec;   INR: 1.04 ratio         PTT - ( 20 Mar 2025 21:38 )  PTT:32.8 sec  Urinalysis Basic - ( 21 Mar 2025 10:04 )    Color: x / Appearance: x / SG: x / pH: x  Gluc: 188 mg/dL / Ketone: x  / Bili: x / Urobili: x   Blood: x / Protein: x / Nitrite: x   Leuk Esterase: x / RBC: x / WBC x   Sq Epi: x / Non Sq Epi: x / Bacteria: x      Procalcitonin: 0.10 ng/mL (03-21-25 @ 00:43)      Physical Examination:    General: No acute distress.      HEENT: Pupils equal, reactive to light.  Symmetric.    PULM: Decreased breath sounds     CVS: S1, S2    ABD: Soft, nondistended, nontender, normoactive bowel sounds, no masses    EXT: No edema, nontender    SKIN: Warm and well perfused, no rashes noted.    NEURO: Alert, oriented, interactive, nonfocal    RADIOLOGY REVIEWED      CT chest: < from: CT Angio Chest PE Protocol w/ IV Cont (03.21.25 @ 00:49) >    ACC: [id number] EXAM:  CT ANGIO CHEST PULM ART [facility]   ORDERED BY:  FRANKIE PRO     PROCEDURE DATE:  03/21/2025          INTERPRETATION:  CLINICAL INFORMATION: Shortness of breath    COMPARISON: CT chest 1/19/2025.    CONTRAST/COMPLICATIONS:  IV Contrast: Omnipaque 350  60 cc administered  Oral Contrast: NONE    PROCEDURE:  CT Angiography of the Chest.  Sagittal and coronal reformats were performed as well as 3D (MIP)   reconstructions.    FINDINGS:    LUNGS AND LARGE AIRWAYS: Patent central airways. Emphysema. Left lingular   bronchiectasis and atelectasis as well as some patchy nodular opacities.   Consolidative opacity in posterior segment of the left upper lobe   containing cavitation. Bilateral dependent atelectasis. Reidentified   spiculated right upper lobe nodule measuring 1.8 x 2.1 cm. Subcentimeter   juxtapleural nodules representing age pulmonary lymph nodes, for example   ([id number])  PLEURA: Trace left pleural effusion.  VESSELS: No pulmonary embolism. Aortic calcifications. Coronary artery   calcifications and/or stents.  HEART: Heart size is normal. Small pericardial fluid.  MEDIASTINUM AND LUCHO: No lymphadenopathy.  CHEST WALL AND LOWER NECK: Unchanged metallic foreign object within the   right anterior chest wall.  VISUALIZED UPPER ABDOMEN: Bilateral renal cysts and hypodense lesions   which are too small to characterize. Wedge-shaped area of increased   enhancement within the left kidney, unchanged.  BONES: Degenerative changes. Similar-appearing mild T11 vertebral body   compression deformity.    IMPRESSION:    No pulmonary embolism.    Patchy opacities in the left lingula with associated bronchiectasis and   atelectasis. Findings are nonspecific and may represent infectious versus   inflammatory etiologies.    Consolidative opacity in posterior segment of the left upper lobe   containing cavitation.    Reidentified spiculated right upper lobe nodule as described. Tissue   sampling or correlation with PET/CT suggested to exclude underlying   neoplasm or metastatic disease if not performed previously.    Additional findings as mentioned above.    < end of copied text >

## 2025-03-21 NOTE — CONSULT NOTE ADULT - PROBLEM SELECTOR RECOMMENDATION 2
CTA chest with RUL spiculated nodule, seen on prior admission Jan 2025 as well  -Recommended to f/u in our office or with his outpt pulm for PET/CT but pt reports never followed up CTA chest with RUL spiculated nodule, seen on prior admission Jan 2025 as well  -Recommended to f/u in our office or with his outpt pulm for PET/CT but pt reports never followed up  -Suggest PET/CT as an outpt, card given to patient for follow-up (also discussed w/ pt's fiance), will give script for PET/CT prior to discharge.

## 2025-03-21 NOTE — H&P ADULT - NSICDXFAMILYHX_GEN_ALL_CORE_FT
FAMILY HISTORY:  Father  Still living? Unknown  FH: diabetes mellitus, Age at diagnosis: Age Unknown    Mother  Still living? Unknown  FH: diabetes mellitus, Age at diagnosis: Age Unknown  FH: HTN (hypertension), Age at diagnosis: Age Unknown    Sibling  Still living? Unknown  FH: diabetes mellitus, Age at diagnosis: Age Unknown  FH: HTN (hypertension), Age at diagnosis: Age Unknown

## 2025-03-21 NOTE — H&P ADULT - NSICDXPASTMEDICALHX_GEN_ALL_CORE_FT
PAST MEDICAL HISTORY:  Asthma     BPH (benign prostatic hyperplasia)     CAD (coronary artery disease) s/p pci    COPD (chronic obstructive pulmonary disease) s/p PCI    GERD (gastroesophageal reflux disease)     HLD (hyperlipidemia)     HTN (hypertension)

## 2025-03-21 NOTE — CONSULT NOTE ADULT - PROBLEM SELECTOR RECOMMENDATION 4
-Reportedly found to have RLE DVT at [facility]  -CTA chest neg for PE  -AC with Eliquis.

## 2025-03-21 NOTE — PHARMACOTHERAPY INTERVENTION NOTE - COMMENTS
Performed medication reconciliation and home medication list updated in prescription writer/ outpatient medication review. Medications verified with patient and [place] pharmacy.     Home medications:  Albuterol (Eqv-Ventolin HFA) 90 mcg/inh inhalation aerosol: 1 puff(s) inhaled 3 times a day as needed  amLODIPine 10 mg oral tablet: 1 tab(s) orally once a day  aspirin 81 mg oral tablet, chewable: 1 tab(s) chewed once a day  atorvastatin 80 mg oral tablet: 1 tab(s) orally once a day (at bedtime)  carvedilol 25 mg oral tablet: 1 tab(s) orally 2 times a day  clopidogrel 75 mg oral tablet: 1 tab(s) orally once a day  losartan 100 mg oral tablet: 1 tab(s) orally once a day  Singulair 10 mg oral tablet: 1 tab(s) orally once a day  Supplements/Vitamins: Iron tablet, Zinc tablet: 1 tablet orally once a day  Symbicort 160 mcg-4.5 mcg/inh inhalation aerosol: 2 puff(s) inhaled 2 times a day, As Needed    Added:  pantoprazole 40 mg oral delayed release tablet: 1 tab(s) orally once a day  Eliquis 5 mg oral tablet: 1 tab(s) orally 2 times a day - started for DVT    Zena Alvarez, PharmD, BCPS  Clinical Pharmacy Specialist  Teams (preferred) or [phone]

## 2025-03-21 NOTE — CONSULT NOTE ADULT - ASSESSMENT
75 y/o M with PMH of HTN, HLD, GERD, CAD s/p stents, COPD, former smoker. Presents with SOB, decreased PO intake/appetite. Reportedly with 30 lbs of unintentional weight loss over the past few months.Went to [facility] hospital for evaluation prior to this admission, reportedly anemic there requiring transfusions, CT scan revealed RUL nodule that was going to be followed as an outpatient. Pt discharged but symptoms persisted, so presented to [facility] for further w/u. CT chest here with DIANA consolidation with cavitation, RUL spiculated nodule. Pulmonary called to consult for further evaluation.

## 2025-03-21 NOTE — DISCHARGE NOTE PROVIDER - NSDCFUADDAPPT_GEN_ALL_CORE_FT
APPTS ARE READY TO BE MADE: [X] YES    Best Family or Patient Contact (if needed):    Additional Information about above appointments (if needed):    1: PCP  2: Pulmnology

## 2025-05-07 PROBLEM — E78.5 HYPERLIPIDEMIA, UNSPECIFIED: Chronic | Status: ACTIVE | Noted: 2025-03-21

## 2025-05-07 PROBLEM — I10 ESSENTIAL (PRIMARY) HYPERTENSION: Chronic | Status: ACTIVE | Noted: 2025-03-21

## 2025-05-22 ENCOUNTER — APPOINTMENT (OUTPATIENT)
Dept: NUCLEAR MEDICINE | Facility: IMAGING CENTER | Age: 75
End: 2025-05-22

## 2025-06-09 ENCOUNTER — APPOINTMENT (OUTPATIENT)
Dept: NUCLEAR MEDICINE | Facility: IMAGING CENTER | Age: 75
End: 2025-06-09
Payer: MEDICARE

## 2025-06-09 ENCOUNTER — OUTPATIENT (OUTPATIENT)
Dept: OUTPATIENT SERVICES | Facility: HOSPITAL | Age: 75
LOS: 1 days | End: 2025-06-09
Payer: COMMERCIAL

## 2025-06-09 DIAGNOSIS — Z95.5 PRESENCE OF CORONARY ANGIOPLASTY IMPLANT AND GRAFT: Chronic | ICD-10-CM

## 2025-06-09 DIAGNOSIS — Z00.8 ENCOUNTER FOR OTHER GENERAL EXAMINATION: ICD-10-CM

## 2025-06-09 PROCEDURE — 78815 PET IMAGE W/CT SKULL-THIGH: CPT | Mod: 26,PI

## 2025-06-09 PROCEDURE — A9552: CPT

## 2025-06-09 PROCEDURE — 78815 PET IMAGE W/CT SKULL-THIGH: CPT

## 2025-06-20 ENCOUNTER — RX RENEWAL (OUTPATIENT)
Age: 75
End: 2025-06-20

## 2025-07-01 ENCOUNTER — NON-APPOINTMENT (OUTPATIENT)
Age: 75
End: 2025-07-01

## 2025-07-02 ENCOUNTER — APPOINTMENT (OUTPATIENT)
Dept: THORACIC SURGERY | Facility: CLINIC | Age: 75
End: 2025-07-02

## 2025-07-30 ENCOUNTER — APPOINTMENT (OUTPATIENT)
Dept: THORACIC SURGERY | Facility: CLINIC | Age: 75
End: 2025-07-30
Payer: MEDICARE

## 2025-07-30 VITALS
SYSTOLIC BLOOD PRESSURE: 162 MMHG | WEIGHT: 155 LBS | HEIGHT: 73 IN | DIASTOLIC BLOOD PRESSURE: 73 MMHG | BODY MASS INDEX: 20.54 KG/M2 | HEART RATE: 71 BPM | OXYGEN SATURATION: 94 %

## 2025-07-30 DIAGNOSIS — R91.8 OTHER NONSPECIFIC ABNORMAL FINDING OF LUNG FIELD: ICD-10-CM

## 2025-07-30 DIAGNOSIS — I10 ESSENTIAL (PRIMARY) HYPERTENSION: ICD-10-CM

## 2025-07-30 DIAGNOSIS — Z95.5 PRESENCE OF CORONARY ANGIOPLASTY IMPLANT AND GRAFT: ICD-10-CM

## 2025-07-30 DIAGNOSIS — Z87.898 PERSONAL HISTORY OF OTHER SPECIFIED CONDITIONS: ICD-10-CM

## 2025-07-30 DIAGNOSIS — Z86.79 PERSONAL HISTORY OF OTHER DISEASES OF THE CIRCULATORY SYSTEM: ICD-10-CM

## 2025-07-30 PROCEDURE — 99204 OFFICE O/P NEW MOD 45 MIN: CPT

## 2025-07-30 RX ORDER — APIXABAN 5 MG/1
TABLET, FILM COATED ORAL
Refills: 0 | Status: ACTIVE | COMMUNITY

## 2025-08-04 ENCOUNTER — NON-APPOINTMENT (OUTPATIENT)
Age: 75
End: 2025-08-04

## 2025-08-04 ENCOUNTER — APPOINTMENT (OUTPATIENT)
Dept: CT IMAGING | Facility: IMAGING CENTER | Age: 75
End: 2025-08-04

## 2025-08-11 ENCOUNTER — APPOINTMENT (OUTPATIENT)
Dept: CT IMAGING | Facility: IMAGING CENTER | Age: 75
End: 2025-08-11
Payer: MEDICARE

## 2025-08-11 ENCOUNTER — OUTPATIENT (OUTPATIENT)
Dept: OUTPATIENT SERVICES | Facility: HOSPITAL | Age: 75
LOS: 1 days | End: 2025-08-11
Payer: COMMERCIAL

## 2025-08-11 DIAGNOSIS — Z00.8 ENCOUNTER FOR OTHER GENERAL EXAMINATION: ICD-10-CM

## 2025-08-11 DIAGNOSIS — Z95.5 PRESENCE OF CORONARY ANGIOPLASTY IMPLANT AND GRAFT: Chronic | ICD-10-CM

## 2025-08-11 DIAGNOSIS — R91.8 OTHER NONSPECIFIC ABNORMAL FINDING OF LUNG FIELD: ICD-10-CM

## 2025-08-11 PROCEDURE — 71250 CT THORAX DX C-: CPT | Mod: 26

## 2025-08-11 PROCEDURE — 71250 CT THORAX DX C-: CPT

## 2025-08-20 ENCOUNTER — APPOINTMENT (OUTPATIENT)
Dept: THORACIC SURGERY | Facility: CLINIC | Age: 75
End: 2025-08-20

## 2025-09-03 ENCOUNTER — APPOINTMENT (OUTPATIENT)
Dept: THORACIC SURGERY | Facility: CLINIC | Age: 75
End: 2025-09-03
Payer: MEDICARE

## 2025-09-03 ENCOUNTER — NON-APPOINTMENT (OUTPATIENT)
Age: 75
End: 2025-09-03

## 2025-09-03 VITALS
HEIGHT: 73 IN | BODY MASS INDEX: 20.54 KG/M2 | WEIGHT: 155 LBS | DIASTOLIC BLOOD PRESSURE: 93 MMHG | OXYGEN SATURATION: 94 % | HEART RATE: 75 BPM | SYSTOLIC BLOOD PRESSURE: 208 MMHG

## 2025-09-03 DIAGNOSIS — R91.8 OTHER NONSPECIFIC ABNORMAL FINDING OF LUNG FIELD: ICD-10-CM

## 2025-09-03 PROCEDURE — 99214 OFFICE O/P EST MOD 30 MIN: CPT
